# Patient Record
Sex: FEMALE | Race: WHITE | NOT HISPANIC OR LATINO | URBAN - METROPOLITAN AREA
[De-identification: names, ages, dates, MRNs, and addresses within clinical notes are randomized per-mention and may not be internally consistent; named-entity substitution may affect disease eponyms.]

---

## 2017-02-04 ENCOUNTER — INPATIENT (INPATIENT)
Facility: HOSPITAL | Age: 62
LOS: 12 days | Discharge: HOME HEALTH | End: 2017-02-17
Attending: INTERNAL MEDICINE | Admitting: INTERNAL MEDICINE
Payer: MEDICARE

## 2017-02-04 VITALS
HEIGHT: 61 IN | RESPIRATION RATE: 18 BRPM | SYSTOLIC BLOOD PRESSURE: 82 MMHG | HEART RATE: 96 BPM | OXYGEN SATURATION: 100 % | TEMPERATURE: 97 F | WEIGHT: 125 LBS | DIASTOLIC BLOOD PRESSURE: 62 MMHG

## 2017-02-04 DIAGNOSIS — K29.00 ACUTE GASTRITIS WITHOUT BLEEDING: ICD-10-CM

## 2017-02-04 DIAGNOSIS — Z98.89 OTHER SPECIFIED POSTPROCEDURAL STATES: Chronic | ICD-10-CM

## 2017-02-04 DIAGNOSIS — E10.65 TYPE 1 DIABETES MELLITUS WITH HYPERGLYCEMIA: ICD-10-CM

## 2017-02-04 LAB
ALBUMIN SERPL ELPH-MCNC: 2.6 G/DL — LOW (ref 3.3–5)
ALP SERPL-CCNC: 104 U/L — SIGNIFICANT CHANGE UP (ref 40–120)
ALT FLD-CCNC: 29 U/L — SIGNIFICANT CHANGE UP (ref 12–78)
ANION GAP SERPL CALC-SCNC: 17 MMOL/L — SIGNIFICANT CHANGE UP (ref 5–17)
APPEARANCE UR: ABNORMAL
AST SERPL-CCNC: 22 U/L — SIGNIFICANT CHANGE UP (ref 15–37)
B-OH-BUTYR SERPL-SCNC: 1.2 MMOL/L — HIGH
BACTERIA # UR AUTO: ABNORMAL
BASE EXCESS BLDV CALC-SCNC: -8.3 MMOL/L — LOW (ref -2–2)
BASOPHILS # BLD AUTO: 0.1 K/UL — SIGNIFICANT CHANGE UP (ref 0–0.2)
BASOPHILS NFR BLD AUTO: 1.7 % — SIGNIFICANT CHANGE UP (ref 0–2)
BILIRUB SERPL-MCNC: 0.6 MG/DL — SIGNIFICANT CHANGE UP (ref 0.2–1.2)
BILIRUB UR-MCNC: NEGATIVE — SIGNIFICANT CHANGE UP
BLOOD GAS COMMENTS, VENOUS: SIGNIFICANT CHANGE UP
BUN SERPL-MCNC: 35 MG/DL — HIGH (ref 7–23)
CALCIUM SERPL-MCNC: 7.3 MG/DL — LOW (ref 8.5–10.1)
CHLORIDE SERPL-SCNC: 87 MMOL/L — LOW (ref 96–108)
CO2 SERPL-SCNC: 18 MMOL/L — LOW (ref 22–31)
COLOR SPEC: YELLOW — SIGNIFICANT CHANGE UP
CREAT SERPL-MCNC: 3.22 MG/DL — HIGH (ref 0.5–1.3)
DIFF PNL FLD: ABNORMAL
EOSINOPHIL # BLD AUTO: 0 K/UL — SIGNIFICANT CHANGE UP (ref 0–0.5)
EOSINOPHIL NFR BLD AUTO: 0.1 % — SIGNIFICANT CHANGE UP (ref 0–6)
EPI CELLS # UR: ABNORMAL
GAS PNL BLDV: SIGNIFICANT CHANGE UP
GLUCOSE SERPL-MCNC: 360 MG/DL — HIGH (ref 70–99)
GLUCOSE UR QL: 1000 MG/DL
GRAN CASTS # UR COMP ASSIST: ABNORMAL /LPF
HCG SERPL-ACNC: 2 MIU/ML — SIGNIFICANT CHANGE UP
HCO3 BLDV-SCNC: 16 MMOL/L — LOW (ref 21–29)
HCT VFR BLD CALC: 39.6 % — SIGNIFICANT CHANGE UP (ref 34.5–45)
HGB BLD-MCNC: 14.7 G/DL — SIGNIFICANT CHANGE UP (ref 11.5–15.5)
HOROWITZ INDEX BLDV+IHG-RTO: 21 — SIGNIFICANT CHANGE UP
HYALINE CASTS # UR AUTO: ABNORMAL /LPF
KETONES UR-MCNC: NEGATIVE — SIGNIFICANT CHANGE UP
LEUKOCYTE ESTERASE UR-ACNC: NEGATIVE — SIGNIFICANT CHANGE UP
LIDOCAIN IGE QN: 38 U/L — LOW (ref 73–393)
LYMPHOCYTES # BLD AUTO: 0.3 K/UL — LOW (ref 1–3.3)
LYMPHOCYTES # BLD AUTO: 6.5 % — LOW (ref 13–44)
MCHC RBC-ENTMCNC: 32 PG — SIGNIFICANT CHANGE UP (ref 27–34)
MCHC RBC-ENTMCNC: 37.2 GM/DL — HIGH (ref 32–36)
MCV RBC AUTO: 86 FL — SIGNIFICANT CHANGE UP (ref 80–100)
MONOCYTES # BLD AUTO: 0.3 K/UL — SIGNIFICANT CHANGE UP (ref 0–0.9)
MONOCYTES NFR BLD AUTO: 7.6 % — SIGNIFICANT CHANGE UP (ref 2–14)
NEUTROPHILS # BLD AUTO: 3.8 K/UL — SIGNIFICANT CHANGE UP (ref 1.8–7.4)
NEUTROPHILS NFR BLD AUTO: 84.1 % — HIGH (ref 43–77)
NITRITE UR-MCNC: NEGATIVE — SIGNIFICANT CHANGE UP
PCO2 BLDV: 31 MMHG — LOW (ref 35–50)
PH BLDV: 7.34 — LOW (ref 7.35–7.45)
PH UR: 5 — SIGNIFICANT CHANGE UP (ref 4.8–8)
PLATELET # BLD AUTO: 241 K/UL — SIGNIFICANT CHANGE UP (ref 150–400)
PO2 BLDV: 55 MMHG — HIGH (ref 25–45)
POTASSIUM SERPL-MCNC: 2.6 MMOL/L — CRITICAL LOW (ref 3.5–5.3)
POTASSIUM SERPL-SCNC: 2.6 MMOL/L — CRITICAL LOW (ref 3.5–5.3)
PROT SERPL-MCNC: 6.4 GM/DL — SIGNIFICANT CHANGE UP (ref 6–8.3)
PROT UR-MCNC: 30 MG/DL
RBC # BLD: 4.6 M/UL — SIGNIFICANT CHANGE UP (ref 3.8–5.2)
RBC # FLD: 11.2 % — SIGNIFICANT CHANGE UP (ref 11–15)
RBC CASTS # UR COMP ASSIST: ABNORMAL /HPF (ref 0–4)
SAO2 % BLDV: 83 % — SIGNIFICANT CHANGE UP (ref 67–88)
SODIUM SERPL-SCNC: 122 MMOL/L — LOW (ref 135–145)
SP GR SPEC: 1.02 — SIGNIFICANT CHANGE UP (ref 1.01–1.02)
UROBILINOGEN FLD QL: NEGATIVE MG/DL — SIGNIFICANT CHANGE UP
WBC # BLD: 4.6 K/UL — SIGNIFICANT CHANGE UP (ref 3.8–10.5)
WBC # FLD AUTO: 4.6 K/UL — SIGNIFICANT CHANGE UP (ref 3.8–10.5)
WBC UR QL: SIGNIFICANT CHANGE UP

## 2017-02-04 PROCEDURE — 99285 EMERGENCY DEPT VISIT HI MDM: CPT

## 2017-02-04 RX ORDER — INSULIN GLARGINE 100 [IU]/ML
5 INJECTION, SOLUTION SUBCUTANEOUS
Qty: 0 | Refills: 0 | Status: DISCONTINUED | OUTPATIENT
Start: 2017-02-04 | End: 2017-02-05

## 2017-02-04 RX ORDER — ONDANSETRON 8 MG/1
4 TABLET, FILM COATED ORAL ONCE
Qty: 0 | Refills: 0 | Status: COMPLETED | OUTPATIENT
Start: 2017-02-04 | End: 2017-02-04

## 2017-02-04 RX ORDER — ONDANSETRON 8 MG/1
4 TABLET, FILM COATED ORAL EVERY 8 HOURS
Qty: 0 | Refills: 0 | Status: DISCONTINUED | OUTPATIENT
Start: 2017-02-04 | End: 2017-02-04

## 2017-02-04 RX ORDER — VANCOMYCIN HCL 1 G
125 VIAL (EA) INTRAVENOUS EVERY 6 HOURS
Qty: 0 | Refills: 0 | Status: DISCONTINUED | OUTPATIENT
Start: 2017-02-04 | End: 2017-02-08

## 2017-02-04 RX ORDER — GLUCAGON INJECTION, SOLUTION 0.5 MG/.1ML
1 INJECTION, SOLUTION SUBCUTANEOUS ONCE
Qty: 0 | Refills: 0 | Status: DISCONTINUED | OUTPATIENT
Start: 2017-02-04 | End: 2017-02-17

## 2017-02-04 RX ORDER — HEPARIN SODIUM 5000 [USP'U]/ML
5000 INJECTION INTRAVENOUS; SUBCUTANEOUS EVERY 12 HOURS
Qty: 0 | Refills: 0 | Status: DISCONTINUED | OUTPATIENT
Start: 2017-02-04 | End: 2017-02-11

## 2017-02-04 RX ORDER — DEXTROSE MONOHYDRATE, SODIUM CHLORIDE, AND POTASSIUM CHLORIDE 50; .745; 4.5 G/1000ML; G/1000ML; G/1000ML
1000 INJECTION, SOLUTION INTRAVENOUS
Qty: 0 | Refills: 0 | Status: DISCONTINUED | OUTPATIENT
Start: 2017-02-04 | End: 2017-02-05

## 2017-02-04 RX ORDER — INFLUENZA VIRUS VACCINE 15; 15; 15; 15 UG/.5ML; UG/.5ML; UG/.5ML; UG/.5ML
0.5 SUSPENSION INTRAMUSCULAR ONCE
Qty: 0 | Refills: 0 | Status: COMPLETED | OUTPATIENT
Start: 2017-02-04 | End: 2017-02-04

## 2017-02-04 RX ORDER — GLATIRAMER ACETATE 20 MG/ML
20 INJECTION, SOLUTION SUBCUTANEOUS AT BEDTIME
Qty: 0 | Refills: 0 | Status: DISCONTINUED | OUTPATIENT
Start: 2017-02-04 | End: 2017-02-10

## 2017-02-04 RX ORDER — DULOXETINE HYDROCHLORIDE 30 MG/1
60 CAPSULE, DELAYED RELEASE ORAL DAILY
Qty: 0 | Refills: 0 | Status: DISCONTINUED | OUTPATIENT
Start: 2017-02-04 | End: 2017-02-07

## 2017-02-04 RX ORDER — SODIUM CHLORIDE 9 MG/ML
2000 INJECTION INTRAMUSCULAR; INTRAVENOUS; SUBCUTANEOUS ONCE
Qty: 0 | Refills: 0 | Status: COMPLETED | OUTPATIENT
Start: 2017-02-04 | End: 2017-02-04

## 2017-02-04 RX ORDER — DEXTROSE 50 % IN WATER 50 %
12.5 SYRINGE (ML) INTRAVENOUS ONCE
Qty: 0 | Refills: 0 | Status: DISCONTINUED | OUTPATIENT
Start: 2017-02-04 | End: 2017-02-17

## 2017-02-04 RX ORDER — DEXTROSE 50 % IN WATER 50 %
25 SYRINGE (ML) INTRAVENOUS ONCE
Qty: 0 | Refills: 0 | Status: DISCONTINUED | OUTPATIENT
Start: 2017-02-04 | End: 2017-02-17

## 2017-02-04 RX ORDER — CLONAZEPAM 1 MG
1 TABLET ORAL AT BEDTIME
Qty: 0 | Refills: 0 | Status: DISCONTINUED | OUTPATIENT
Start: 2017-02-04 | End: 2017-02-11

## 2017-02-04 RX ORDER — INSULIN LISPRO 100/ML
VIAL (ML) SUBCUTANEOUS
Qty: 0 | Refills: 0 | Status: DISCONTINUED | OUTPATIENT
Start: 2017-02-04 | End: 2017-02-09

## 2017-02-04 RX ORDER — ALBUTEROL 90 UG/1
2 AEROSOL, METERED ORAL EVERY 6 HOURS
Qty: 0 | Refills: 0 | Status: DISCONTINUED | OUTPATIENT
Start: 2017-02-04 | End: 2017-02-05

## 2017-02-04 RX ORDER — DEXTROSE 50 % IN WATER 50 %
1 SYRINGE (ML) INTRAVENOUS ONCE
Qty: 0 | Refills: 0 | Status: DISCONTINUED | OUTPATIENT
Start: 2017-02-04 | End: 2017-02-17

## 2017-02-04 RX ORDER — POTASSIUM CHLORIDE 20 MEQ
20 PACKET (EA) ORAL ONCE
Qty: 0 | Refills: 0 | Status: COMPLETED | OUTPATIENT
Start: 2017-02-04 | End: 2017-02-04

## 2017-02-04 RX ORDER — SODIUM CHLORIDE 9 MG/ML
1000 INJECTION, SOLUTION INTRAVENOUS
Qty: 0 | Refills: 0 | Status: DISCONTINUED | OUTPATIENT
Start: 2017-02-04 | End: 2017-02-17

## 2017-02-04 RX ORDER — ONDANSETRON 8 MG/1
4 TABLET, FILM COATED ORAL EVERY 8 HOURS
Qty: 0 | Refills: 0 | Status: DISCONTINUED | OUTPATIENT
Start: 2017-02-04 | End: 2017-02-17

## 2017-02-04 RX ORDER — POTASSIUM CHLORIDE 20 MEQ
40 PACKET (EA) ORAL ONCE
Qty: 0 | Refills: 0 | Status: COMPLETED | OUTPATIENT
Start: 2017-02-04 | End: 2017-02-04

## 2017-02-04 RX ADMIN — Medication 8: at 17:12

## 2017-02-04 RX ADMIN — Medication 40 MILLIEQUIVALENT(S): at 12:13

## 2017-02-04 RX ADMIN — SODIUM CHLORIDE 1000 MILLILITER(S): 9 INJECTION INTRAMUSCULAR; INTRAVENOUS; SUBCUTANEOUS at 11:00

## 2017-02-04 RX ADMIN — Medication 1 MILLIGRAM(S): at 21:55

## 2017-02-04 RX ADMIN — Medication 125 MILLIGRAM(S): at 18:37

## 2017-02-04 RX ADMIN — DEXTROSE MONOHYDRATE, SODIUM CHLORIDE, AND POTASSIUM CHLORIDE 125 MILLILITER(S): 50; .745; 4.5 INJECTION, SOLUTION INTRAVENOUS at 21:57

## 2017-02-04 RX ADMIN — DEXTROSE MONOHYDRATE, SODIUM CHLORIDE, AND POTASSIUM CHLORIDE 125 MILLILITER(S): 50; .745; 4.5 INJECTION, SOLUTION INTRAVENOUS at 15:12

## 2017-02-04 RX ADMIN — INSULIN GLARGINE 5 UNIT(S): 100 INJECTION, SOLUTION SUBCUTANEOUS at 22:08

## 2017-02-04 RX ADMIN — ONDANSETRON 4 MILLIGRAM(S): 8 TABLET, FILM COATED ORAL at 11:15

## 2017-02-04 RX ADMIN — GLATIRAMER ACETATE 20 MILLIGRAM(S): 20 INJECTION, SOLUTION SUBCUTANEOUS at 21:56

## 2017-02-04 RX ADMIN — INSULIN GLARGINE 5 UNIT(S): 100 INJECTION, SOLUTION SUBCUTANEOUS at 15:07

## 2017-02-04 RX ADMIN — HEPARIN SODIUM 5000 UNIT(S): 5000 INJECTION INTRAVENOUS; SUBCUTANEOUS at 17:16

## 2017-02-04 RX ADMIN — Medication 50 MILLIEQUIVALENT(S): at 12:07

## 2017-02-04 NOTE — CONSULT NOTE ADULT - SUBJECTIVE AND OBJECTIVE BOX
Patient is a 61y old  Female who presents with a chief complaint of Nausea , Vomiting, pain abdomen. (04 Feb 2017 13:13)      HPI:  Pt is a 62 yo lady with a pmhx of DM1 w prior insulin pump (she stopped it on her own 1-2mo ago and was taking lantus and novolog, poor f/u as outpt last visit in office in 8/2016), HTN, HL, MS who presents to the ED with N/v/d for 2 days. She ate tacos on Wednesday, and then started having n/v/d that night until now, unable to keep anything down. No fevers, no focal abdominal pain, no dysuria, no chest pain, no short of breath. (04 Feb 2017 13:13)      PAST MEDICAL & SURGICAL HISTORY:  Multiple sclerosis  Myasthenia gravis  Hypertension  Diabetes  H/O cervical spine surgery      Diabetes History:DM1 hx multiple episodes of DKA    FAMILY HISTORY:  No pertinent family history in first degree relatives        Social History:    Allergies    No Known Allergies        MEDICATIONS  (STANDING):  clonazePAM Tablet 1milliGRAM(s) Oral at bedtime  DULoxetine 60milliGRAM(s) Oral daily  glatiramer Injectable 20milliGRAM(s) SubCutaneous at bedtime  heparin  Injectable 5000Unit(s) SubCutaneous every 12 hours  dextrose 5% + sodium chloride 0.9% with potassium chloride 20 mEq/L 1000milliLiter(s) IV Continuous <Continuous>  insulin lispro (HumaLOG) corrective regimen sliding scale  SubCutaneous three times a day before meals  dextrose 5%. 1000milliLiter(s) IV Continuous <Continuous>  dextrose 50% Injectable 12.5Gram(s) IV Push once  dextrose 50% Injectable 25Gram(s) IV Push once  insulin glargine Injectable (LANTUS) 5Unit(s) SubCutaneous two times a day  vancomycin    Solution 125milliGRAM(s) Oral every 6 hours    MEDICATIONS  (PRN):  ALBUTerol    90 MICROgram(s) HFA Inhaler 2Puff(s) Inhalation every 6 hours PRN Bronchospasm  dextrose Gel 1Dose(s) Oral once PRN Blood Glucose LESS THAN 70 milliGRAM(s)/deciliter  glucagon  Injectable 1milliGRAM(s) IntraMuscular once PRN Glucose LESS THAN 70 milligrams/deciliter  ondansetron Injectable 4milliGRAM(s) IV Push every 8 hours PRN Nausea and/or Vomiting      REVIEW OF SYSTEMS:  CONSTITUTIONAL:  as per HPI  HEENT:  Eyes:  No diplopia or blurred vision. ENT:  No earache, sore throat or runny nose.  CARDIOVASCULAR:  No pressure, squeezing, strangling, tightness, heaviness or aching about the chest, neck, axilla or epigastrium.  RESPIRATORY:  No cough, shortness of breath, PND or orthopnea.  GASTROINTESTINAL:  + nausea, vomiting or diarrhea.  GENITOURINARY:  No dysuria, frequency or urgency. No Blood in urine  MUSCULOSKELETAL:  no joint aches, no muscle pain, myalgia  SKIN:  No change in skin, hair or nails.  NEUROLOGIC:  No paresthesias, fasciculations, seizures or weakness.  PSYCHIATRIC:  No disorder of thought or mood.  ENDOCRINE:  No heat or cold intolerance, polyuria or polydipsia. abnormal weight gain or loss, oral thrush  HEMATOLOGICAL:  No easy bruising or bleeding.     T(C): 36.6, Max: 36.6 (02-04 @ 15:30)  HR: 95 (85 - 96)  BP: 98/43 (82/62 - 98/43)  RR: 18 (18 - 18)  SpO2: 98% (98% - 100%)  Wt(kg): --    PHYSICAL EXAM:  GENERAL: NAD,  well-developed  HEAD:  Atraumatic, Normocephalic  EYES: EOMI, PERRLA, conjunctiva and sclera clear  ENMT: No tonsillar erythema, exudates, or enlargement; Moist mucous membranes, Good dentition, No lesions  CHEST/LUNG: Clear to percussion bilaterally; No rales, rhonchi, wheezing, or rubs  HEART: Regular rate and rhythm; No murmurs, rubs, or gallops  ABDOMEN: Soft, Nontender, Nondistended; Bowel sounds present  EXTREMITIES:  2+ Peripheral Pulses, No clubbing, cyanosis, or edema      CAPILLARY BLOOD GLUCOSE  340 (04 Feb 2017 16:35)  284 (04 Feb 2017 13:58)                            14.7   4.6   )-----------( 241      ( 04 Feb 2017 11:16 )             39.6       CMP:  02-04 @ 11:16  SGPT 29  Albumin 2.6   Alk Phos 104   Anion Gap 17   SGOT 22   Total Bili 0.6   BUN 35   Calcium Total 7.3   CO2 18   Chloride 87   Creatinine 3.22   eGFR if AA 17   eGFR if non AA 15   Glucose 360   Potassium 2.6   Protein 6.4   Sodium 122

## 2017-02-04 NOTE — ED PROVIDER NOTE - MEDICAL DECISION MAKING DETAILS
Ddx: r/o DKA/ no abdominal tenderness or guarding to suggest surgical abdomen, dehydration  Plan: Labs, fluids, zofran, reassess

## 2017-02-04 NOTE — ED PROVIDER NOTE - OBJECTIVE STATEMENT
Pt is a 62 yo lady with a pmhx of DM1 w prior insulin pump since removed, HTN, HL, MS who presents to the ED with N/v/d for 2 days. She ate tacos on Wednesday, and then started having n/v/d that night until now, unable to keep anything down. No fevers, no focal abdominal pain, no dysuria, no chest pain, no sob.

## 2017-02-04 NOTE — H&P ADULT. - NEUROLOGICAL DETAILS
deep reflexes intact/sensation intact/cranial nerves intact/responds to verbal commands/alert and oriented x 3/responds to pain/no spontaneous movement

## 2017-02-04 NOTE — H&P ADULT. - HISTORY OF PRESENT ILLNESS
Pt is a 60 yo lady with a pmhx of DM1 w prior insulin pump since removed, HTN, HL, MS who presents to the ED with N/v/d for 2 days. She ate tacos on Wednesday, and then started having n/v/d that night until now, unable to keep anything down. No fevers, no focal abdominal pain, no dysuria, no chest pain, no short of breath.

## 2017-02-04 NOTE — ED ADULT NURSE NOTE - OBJECTIVE STATEMENT
patient received, alert and oriented x4, complaining of watery diarrhea and vomiting that started 2 days ago after eating tacos. patient has watery stools 2-3x per day and vomits after passing stool. patient is nasueated, dizzy.

## 2017-02-04 NOTE — H&P ADULT. - ASSESSMENT
Pt is a 62 yo lady with a pmhx of DM1 w prior insulin pump since removed, HTN, HL, MS who presents to the ED with N/v/d for 2 days. She ate tacos on Wednesday, and then started having n/v/d that night until now, unable to keep anything down. No fevers, no focal abdominal pain, no dysuria, no chest pain, no sob.Admit to regular floor, iIV fluids, Monitor BS, and lytres. Endocrine consult..

## 2017-02-04 NOTE — H&P ADULT. - RS GEN PE MLT RESP DETAILS PC
clear to auscultation bilaterally/respirations non-labored/good air movement/airway patent/breath sounds equal

## 2017-02-04 NOTE — H&P ADULT. - NEGATIVE NEUROLOGICAL SYMPTOMS
no vertigo/no syncope/no transient paralysis/no paresthesias/no focal seizures/no tremors/no generalized seizures

## 2017-02-05 DIAGNOSIS — K52.9 NONINFECTIVE GASTROENTERITIS AND COLITIS, UNSPECIFIED: ICD-10-CM

## 2017-02-05 LAB
ANION GAP SERPL CALC-SCNC: 12 MMOL/L — SIGNIFICANT CHANGE UP (ref 5–17)
BUN SERPL-MCNC: 22 MG/DL — SIGNIFICANT CHANGE UP (ref 7–23)
C DIFF BY PCR RESULT: SIGNIFICANT CHANGE UP
C DIFF TOX GENS STL QL NAA+PROBE: SIGNIFICANT CHANGE UP
CALCIUM SERPL-MCNC: 7.6 MG/DL — LOW (ref 8.5–10.1)
CHLORIDE SERPL-SCNC: 95 MMOL/L — LOW (ref 96–108)
CO2 SERPL-SCNC: 17 MMOL/L — LOW (ref 22–31)
CREAT SERPL-MCNC: 1.47 MG/DL — HIGH (ref 0.5–1.3)
GLUCOSE SERPL-MCNC: 453 MG/DL — CRITICAL HIGH (ref 70–99)
HBA1C BLD-MCNC: 11 % — HIGH (ref 4–5.6)
HCT VFR BLD CALC: 36.2 % — SIGNIFICANT CHANGE UP (ref 34.5–45)
HGB BLD-MCNC: 13.6 G/DL — SIGNIFICANT CHANGE UP (ref 11.5–15.5)
LYMPHOCYTES # BLD AUTO: 9 % — LOW (ref 13–44)
MAGNESIUM SERPL-MCNC: 1.8 MG/DL — SIGNIFICANT CHANGE UP (ref 1.8–2.4)
MCHC RBC-ENTMCNC: 32.9 PG — SIGNIFICANT CHANGE UP (ref 27–34)
MCHC RBC-ENTMCNC: 37.5 GM/DL — HIGH (ref 32–36)
MCV RBC AUTO: 87.6 FL — SIGNIFICANT CHANGE UP (ref 80–100)
MONOCYTES NFR BLD AUTO: 4 % — SIGNIFICANT CHANGE UP (ref 2–14)
NEUTROPHILS NFR BLD AUTO: 87 % — HIGH (ref 43–77)
PLAT MORPH BLD: NORMAL — SIGNIFICANT CHANGE UP
PLATELET # BLD AUTO: 243 K/UL — SIGNIFICANT CHANGE UP (ref 150–400)
POTASSIUM SERPL-MCNC: 2.8 MMOL/L — CRITICAL LOW (ref 3.5–5.3)
POTASSIUM SERPL-MCNC: 3.5 MMOL/L — SIGNIFICANT CHANGE UP (ref 3.5–5.3)
POTASSIUM SERPL-SCNC: 2.8 MMOL/L — CRITICAL LOW (ref 3.5–5.3)
POTASSIUM SERPL-SCNC: 3.5 MMOL/L — SIGNIFICANT CHANGE UP (ref 3.5–5.3)
RBC # BLD: 4.13 M/UL — SIGNIFICANT CHANGE UP (ref 3.8–5.2)
RBC # FLD: 11.6 % — SIGNIFICANT CHANGE UP (ref 11–15)
RBC BLD AUTO: NORMAL — SIGNIFICANT CHANGE UP
SODIUM SERPL-SCNC: 124 MMOL/L — LOW (ref 135–145)
WBC # BLD: 3.2 K/UL — LOW (ref 3.8–10.5)
WBC # FLD AUTO: 3.2 K/UL — LOW (ref 3.8–10.5)

## 2017-02-05 PROCEDURE — 74020: CPT | Mod: 26

## 2017-02-05 RX ORDER — DEXTROSE 50 % IN WATER 50 %
25 SYRINGE (ML) INTRAVENOUS ONCE
Qty: 0 | Refills: 0 | Status: COMPLETED | OUTPATIENT
Start: 2017-02-05 | End: 2017-02-05

## 2017-02-05 RX ORDER — INSULIN LISPRO 100/ML
3 VIAL (ML) SUBCUTANEOUS
Qty: 0 | Refills: 0 | Status: DISCONTINUED | OUTPATIENT
Start: 2017-02-05 | End: 2017-02-09

## 2017-02-05 RX ORDER — POTASSIUM CHLORIDE 20 MEQ
10 PACKET (EA) ORAL
Qty: 0 | Refills: 0 | Status: COMPLETED | OUTPATIENT
Start: 2017-02-05 | End: 2017-02-05

## 2017-02-05 RX ORDER — INSULIN GLARGINE 100 [IU]/ML
10 INJECTION, SOLUTION SUBCUTANEOUS
Qty: 0 | Refills: 0 | Status: DISCONTINUED | OUTPATIENT
Start: 2017-02-05 | End: 2017-02-07

## 2017-02-05 RX ORDER — INSULIN GLARGINE 100 [IU]/ML
5 INJECTION, SOLUTION SUBCUTANEOUS ONCE
Qty: 0 | Refills: 0 | Status: COMPLETED | OUTPATIENT
Start: 2017-02-05 | End: 2017-02-05

## 2017-02-05 RX ORDER — ALBUTEROL 90 UG/1
2.5 AEROSOL, METERED ORAL EVERY 6 HOURS
Qty: 0 | Refills: 0 | Status: DISCONTINUED | OUTPATIENT
Start: 2017-02-05 | End: 2017-02-12

## 2017-02-05 RX ORDER — INSULIN LISPRO 100/ML
10 VIAL (ML) SUBCUTANEOUS ONCE
Qty: 0 | Refills: 0 | Status: COMPLETED | OUTPATIENT
Start: 2017-02-05 | End: 2017-02-05

## 2017-02-05 RX ORDER — DEXTROSE MONOHYDRATE, SODIUM CHLORIDE, AND POTASSIUM CHLORIDE 50; .745; 4.5 G/1000ML; G/1000ML; G/1000ML
1000 INJECTION, SOLUTION INTRAVENOUS
Qty: 0 | Refills: 0 | Status: DISCONTINUED | OUTPATIENT
Start: 2017-02-05 | End: 2017-02-05

## 2017-02-05 RX ORDER — ALBUTEROL 90 UG/1
1 AEROSOL, METERED ORAL EVERY 4 HOURS
Qty: 0 | Refills: 0 | Status: DISCONTINUED | OUTPATIENT
Start: 2017-02-05 | End: 2017-02-17

## 2017-02-05 RX ORDER — PANTOPRAZOLE SODIUM 20 MG/1
40 TABLET, DELAYED RELEASE ORAL DAILY
Qty: 0 | Refills: 0 | Status: DISCONTINUED | OUTPATIENT
Start: 2017-02-05 | End: 2017-02-11

## 2017-02-05 RX ORDER — SODIUM CHLORIDE 9 MG/ML
1000 INJECTION, SOLUTION INTRAVENOUS
Qty: 0 | Refills: 0 | Status: DISCONTINUED | OUTPATIENT
Start: 2017-02-05 | End: 2017-02-06

## 2017-02-05 RX ADMIN — Medication 100 MILLIEQUIVALENT(S): at 10:32

## 2017-02-05 RX ADMIN — INSULIN GLARGINE 5 UNIT(S): 100 INJECTION, SOLUTION SUBCUTANEOUS at 10:52

## 2017-02-05 RX ADMIN — Medication 125 MILLIGRAM(S): at 06:59

## 2017-02-05 RX ADMIN — Medication 125 MILLIGRAM(S): at 23:03

## 2017-02-05 RX ADMIN — INSULIN GLARGINE 10 UNIT(S): 100 INJECTION, SOLUTION SUBCUTANEOUS at 22:07

## 2017-02-05 RX ADMIN — Medication 25 GRAM(S): at 12:14

## 2017-02-05 RX ADMIN — Medication 125 MILLIGRAM(S): at 12:55

## 2017-02-05 RX ADMIN — Medication 8: at 17:58

## 2017-02-05 RX ADMIN — HEPARIN SODIUM 5000 UNIT(S): 5000 INJECTION INTRAVENOUS; SUBCUTANEOUS at 17:02

## 2017-02-05 RX ADMIN — Medication 30 MILLILITER(S): at 22:07

## 2017-02-05 RX ADMIN — Medication 1 MILLIGRAM(S): at 22:07

## 2017-02-05 RX ADMIN — GLATIRAMER ACETATE 20 MILLIGRAM(S): 20 INJECTION, SOLUTION SUBCUTANEOUS at 22:06

## 2017-02-05 RX ADMIN — INSULIN GLARGINE 5 UNIT(S): 100 INJECTION, SOLUTION SUBCUTANEOUS at 09:25

## 2017-02-05 RX ADMIN — DULOXETINE HYDROCHLORIDE 60 MILLIGRAM(S): 30 CAPSULE, DELAYED RELEASE ORAL at 12:54

## 2017-02-05 RX ADMIN — Medication 100 MILLIEQUIVALENT(S): at 06:59

## 2017-02-05 RX ADMIN — Medication 125 MILLIGRAM(S): at 17:02

## 2017-02-05 RX ADMIN — ALBUTEROL 2.5 MILLIGRAM(S): 90 AEROSOL, METERED ORAL at 17:27

## 2017-02-05 RX ADMIN — Medication 6: at 08:51

## 2017-02-05 RX ADMIN — DEXTROSE MONOHYDRATE, SODIUM CHLORIDE, AND POTASSIUM CHLORIDE 125 MILLILITER(S): 50; .745; 4.5 INJECTION, SOLUTION INTRAVENOUS at 08:53

## 2017-02-05 RX ADMIN — Medication 100 MILLIEQUIVALENT(S): at 09:24

## 2017-02-05 RX ADMIN — SODIUM CHLORIDE 125 MILLILITER(S): 9 INJECTION, SOLUTION INTRAVENOUS at 13:00

## 2017-02-05 RX ADMIN — ALBUTEROL 2.5 MILLIGRAM(S): 90 AEROSOL, METERED ORAL at 13:12

## 2017-02-05 RX ADMIN — Medication 3 UNIT(S): at 17:58

## 2017-02-05 RX ADMIN — HEPARIN SODIUM 5000 UNIT(S): 5000 INJECTION INTRAVENOUS; SUBCUTANEOUS at 06:59

## 2017-02-05 RX ADMIN — Medication 125 MILLIGRAM(S): at 00:38

## 2017-02-05 RX ADMIN — SODIUM CHLORIDE 125 MILLILITER(S): 9 INJECTION, SOLUTION INTRAVENOUS at 21:11

## 2017-02-05 RX ADMIN — Medication 10 UNIT(S): at 06:58

## 2017-02-05 NOTE — CONSULT NOTE ADULT - SUBJECTIVE AND OBJECTIVE BOX
HPI:  Pt is a 60 yo lady with a pmhx of DM1 w prior insulin pump since removed, HTN, HL, MS who presents to the ED with N/v/d for 2 days. She ate tacos on Wednesday, and then started having n/v/d that night until now, unable to keep anything down. No fevers, no focal abdominal pain, no dysuria, no chest pain, no short of breath. (2017 13:13)  Patient states that she has had N/V, diarrhea since Wednesday. Occasional abdominal cramps.      PAST MEDICAL & SURGICAL HISTORY:  Multiple sclerosis  Myasthenia gravis  Hypertension  Diabetes  H/O cervical spine surgery      MEDICATIONS  (STANDING):  clonazePAM Tablet 1milliGRAM(s) Oral at bedtime  DULoxetine 60milliGRAM(s) Oral daily  glatiramer Injectable 20milliGRAM(s) SubCutaneous at bedtime  heparin  Injectable 5000Unit(s) SubCutaneous every 12 hours  insulin lispro (HumaLOG) corrective regimen sliding scale  SubCutaneous three times a day before meals  dextrose 5%. 1000milliLiter(s) IV Continuous <Continuous>  dextrose 50% Injectable 12.5Gram(s) IV Push once  dextrose 50% Injectable 25Gram(s) IV Push once  vancomycin    Solution 125milliGRAM(s) Oral every 6 hours  influenza   Vaccine 0.5milliLiter(s) IntraMuscular once  ALBUTerol    0.083% 2.5milliGRAM(s) Nebulizer every 6 hours  ALBUTerol    90 MICROgram(s) HFA Inhaler 1Puff(s) Inhalation every 4 hours  insulin glargine Injectable (LANTUS) 10Unit(s) SubCutaneous two times a day  insulin lispro Injectable (HumaLOG) 3Unit(s) SubCutaneous three times a day before meals  dextrose 5% + sodium chloride 0.9%. 1000milliLiter(s) IV Continuous <Continuous>    MEDICATIONS  (PRN):  dextrose Gel 1Dose(s) Oral once PRN Blood Glucose LESS THAN 70 milliGRAM(s)/deciliter  glucagon  Injectable 1milliGRAM(s) IntraMuscular once PRN Glucose LESS THAN 70 milligrams/deciliter  ondansetron Injectable 4milliGRAM(s) IV Push every 8 hours PRN Nausea and/or Vomiting      Allergies    No Known Allergies    Intolerances        FAMILY HISTORY:  No pertinent family history in first degree relatives      REVIEW OF SYSTEMS:    CONSTITUTIONAL: No fever, weight loss, or fatigue  EYES: No eye pain, visual disturbances, or discharge  ENMT:  No difficulty hearing, tinnitus, vertigo; No sinus or throat pain  NECK: No pain or stiffness  BREASTS: No pain, masses, or nipple discharge  RESPIRATORY: No cough, wheezing, chills or hemoptysis; No shortness of breath  CARDIOVASCULAR: No chest pain, palpitations, dizziness, or leg swelling  GASTROINTESTINAL: No abdominal or epigastric pain. No nausea, vomiting, or hematemesis; No diarrhea or constipation. No melena or hematochezia.  GENITOURINARY: No dysuria, frequency, hematuria, or incontinence  NEUROLOGICAL: No headaches, memory loss, loss of strength, numbness, or tremors  SKIN: No itching, burning, rashes, or lesions   LYMPH NODES: No enlarged glands  ENDOCRINE: No heat or cold intolerance; No hair loss  MUSCULOSKELETAL: No joint pain or swelling; No muscle, back, or extremity pain  PSYCHIATRIC: No depression, anxiety, mood swings, or difficulty sleeping  HEME/LYMPH: No easy bruising, or bleeding gums  ALLERGY AND IMMUNOLOGIC: No hives or eczema          SOCIAL HISTORY:    FAMILY HISTORY:  No pertinent family history in first degree relatives      Vital Signs Last 24 Hrs  T(C): 36.4, Max: 37 (-05 @ 07:12)  T(F): 97.6, Max: 98.6 (-05 @ 07:12)  HR: 105 (105 - 118)  BP: 142/76 (117/100 - 142/76)  BP(mean): --  RR: 18 (18 - 98)  SpO2: 100% (100% - 100%)    PHYSICAL EXAM:    GENERAL: NAD, well-groomed, well-developed  HEAD:  Atraumatic, Normocephalic  EYES: EOMI, PERRLA, conjunctiva and sclera clear  ENMT: No tonsillar erythema, exudates, or enlargement; Moist mucous membranes, Good dentition, No lesions  NECK: Supple, No JVD, Normal thyroid  NERVOUS SYSTEM:  Alert & Oriented X3, Good concentration; Motor Strength 5/5 B/L upper and lower extremities; DTRs 2+ intact and symmetric  CHEST/LUNG: Clear to percussion bilaterally; No rales, rhonchi, wheezing, or rubs  HEART: Regular rate and rhythm; No murmurs, rubs, or gallops  ABDOMEN: Soft, Nontender, Nondistended; Bowel sounds present  EXTREMITIES:  2+ Peripheral Pulses, No clubbing, cyanosis, or edema  LYMPH: No lymphadenopathy noted   RECTAL: No masses, prostate normal size and smooth, Guaiaci negative   BREAST: No palpable masses, skin no lesions no retractions, no discharges. adnexal no palpable masses noted   GYN: uterus normal size, adnexal, no palpable masses, no CMT, no uterine discharge   SKIN: No rashes or lesions    LABS:                        13.6   3.2   )-----------( 243      ( 2017 05:14 )             36.2     2017 05:14    124    |  95     |  22     ----------------------------<  453    2.8     |  17     |  1.47   2017 11:16    122    |  87     |  35     ----------------------------<  360    2.6     |  18     |  3.22     Ca    7.6        2017 05:14  Ca    7.3        2017 11:16  Mg     1.8       2017 05:14    TPro  6.4    /  Alb  2.6    /  TBili  0.6    /  DBili  x      /  AST  22     /  ALT  29     /  AlkPhos  104    2017 11:16      Urinalysis Basic - ( 2017 22:27 )    Color: Yellow / Appearance: Slightly Turbid / S.025 / pH: x  Gluc: x / Ketone: Negative  / Bili: Negative / Urobili: Negative mg/dL   Blood: x / Protein: 30 mg/dL / Nitrite: Negative   Leuk Esterase: Negative / RBC: 6-10 /HPF / WBC 0-2   Sq Epi: x / Non Sq Epi: Moderate / Bacteria: Moderate          RADIOLOGY & ADDITIONAL STUDIES: HPI: Patient is a poor historian.  Pt is a 62 yo lady with a pmhx of DM1 w prior insulin pump since removed, HTN, HL, MS who presents to the ED with N/v/d for 2 days. She ate tacos on Wednesday, and then started having n/v/d that night until now, unable to keep anything down. No fevers, no focal abdominal pain, no dysuria, no chest pain, no short of breath. (2017 13:13)  Patient states that she has had N/V, diarrhea since Wednesday. Occasional abdominal cramps. No fever. Non bloody stool. Otherwise, the patient denies melena, hematochezia, hematemesis, nausea, vomiting, abdominal pain, constipation, diarrhea, or change in bowel movements prior to Wednesday  Patient has history of C diff 2016  Patient on Verizibi. for IBS      PAST MEDICAL & SURGICAL HISTORY:  Multiple sclerosis  Myasthenia gravis  Hypertension  Diabetes  H/O cervical spine surgery      MEDICATIONS  (STANDING):  clonazePAM Tablet 1milliGRAM(s) Oral at bedtime  DULoxetine 60milliGRAM(s) Oral daily  glatiramer Injectable 20milliGRAM(s) SubCutaneous at bedtime  heparin  Injectable 5000Unit(s) SubCutaneous every 12 hours  insulin lispro (HumaLOG) corrective regimen sliding scale  SubCutaneous three times a day before meals  dextrose 5%. 1000milliLiter(s) IV Continuous <Continuous>  dextrose 50% Injectable 12.5Gram(s) IV Push once  dextrose 50% Injectable 25Gram(s) IV Push once  vancomycin    Solution 125milliGRAM(s) Oral every 6 hours  influenza   Vaccine 0.5milliLiter(s) IntraMuscular once  ALBUTerol    0.083% 2.5milliGRAM(s) Nebulizer every 6 hours  ALBUTerol    90 MICROgram(s) HFA Inhaler 1Puff(s) Inhalation every 4 hours  insulin glargine Injectable (LANTUS) 10Unit(s) SubCutaneous two times a day  insulin lispro Injectable (HumaLOG) 3Unit(s) SubCutaneous three times a day before meals  dextrose 5% + sodium chloride 0.9%. 1000milliLiter(s) IV Continuous <Continuous>    MEDICATIONS  (PRN):  dextrose Gel 1Dose(s) Oral once PRN Blood Glucose LESS THAN 70 milliGRAM(s)/deciliter  glucagon  Injectable 1milliGRAM(s) IntraMuscular once PRN Glucose LESS THAN 70 milligrams/deciliter  ondansetron Injectable 4milliGRAM(s) IV Push every 8 hours PRN Nausea and/or Vomiting      Allergies    No Known Allergies    Intolerances        FAMILY HISTORY:  No pertinent family history in first degree relatives      REVIEW OF SYSTEMS:    CONSTITUTIONAL: No fever, weight loss, or fatigue  EYES: No eye pain, visual disturbances, or discharge  NECK: No pain or stiffness  BREASTS: No pain, masses, or nipple discharge  RESPIRATORY: No cough, wheezing, chills or hemoptysis; No shortness of breath  CARDIOVASCULAR: No chest pain, palpitations, dizziness, or leg swelling  GASTROINTESTINAL: No abdominal or epigastric pain. No hematemesis; No constipation. No melena or hematochezia.  GENITOURINARY: No dysuria, frequency, hematuria, or incontinence  NEUROLOGICAL: No headaches, memory loss, loss of strength, numbness, or tremors  SKIN: No itching, burning, rashes, or lesions   LYMPH NODES: No enlarged glands  ENDOCRINE: No heat or cold intolerance; No hair loss  MUSCULOSKELETAL: No joint pain or swelling; No muscle, back, or extremity pain  PSYCHIATRIC: No depression, anxiety, mood swings, or difficulty sleeping          SOCIAL HISTORY:    FAMILY HISTORY:  No pertinent family history in first degree relatives      Vital Signs Last 24 Hrs  T(C): 36.4, Max: 37 (02-05 @ 07:12)  T(F): 97.6, Max: 98.6 (02-05 @ 07:12)  HR: 105 (105 - 118)  BP: 142/76 (117/100 - 142/76)  BP(mean): --  RR: 18 (18 - 98)  SpO2: 100% (100% - 100%)    PHYSICAL EXAM:    GENERAL: NAD, well-groomed, well-developed  HEAD:  Atraumatic, Normocephalic  EYES: EOMI, PERRLA, conjunctiva and sclera clear  ENMT: No tonsillar erythema, exudates, or enlargement; Moist mucous membranes, Good dentition, No lesions  NECK: Supple, No JVD, Normal thyroid  NERVOUS SYSTEM:  Alert & Oriented X3, Good concentration; Motor Strength 5/5 B/L upper and lower extremities; DTRs 2+ intact and symmetric  CHEST/LUNG: Clear to percussion bilaterally; No rales, rhonchi, wheezing, or rubs  HEART: Regular rate and rhythm; No murmurs, rubs, or gallops  ABDOMEN: Soft, Nontender, Nondistended; Bowel sounds present  EXTREMITIES:  2+ Peripheral Pulses, No clubbing, cyanosis, or edema  LYMPH: No lymphadenopathy noted   RECTAL: Refused.  SKIN: No rashes or lesions    LABS:                        13.6   3.2   )-----------( 243      ( 2017 05:14 )             36.2     2017 05:14    124    |  95     |  22     ----------------------------<  453    2.8     |  17     |  1.47   2017 11:16    122    |  87     |  35     ----------------------------<  360    2.6     |  18     |  3.22     Ca    7.6        2017 05:14  Ca    7.3        2017 11:16  Mg     1.8       2017 05:14    TPro  6.4    /  Alb  2.6    /  TBili  0.6    /  DBili  x      /  AST  22     /  ALT  29     /  AlkPhos  104    2017 11:16      Urinalysis Basic - ( 2017 22:27 )    Color: Yellow / Appearance: Slightly Turbid / S.025 / pH: x  Gluc: x / Ketone: Negative  / Bili: Negative / Urobili: Negative mg/dL   Blood: x / Protein: 30 mg/dL / Nitrite: Negative   Leuk Esterase: Negative / RBC: 6-10 /HPF / WBC 0-2   Sq Epi: x / Non Sq Epi: Moderate / Bacteria: Moderate          RADIOLOGY & ADDITIONAL STUDIES:

## 2017-02-05 NOTE — PROGRESS NOTE ADULT - SUBJECTIVE AND OBJECTIVE BOX
Patient is a 61y old  Female who presents with a chief complaint of Nausea , Vomiting, pain abdomen. (2017 13:13)      INTERVAL HPI/OVERNIGHT EVENTS:  pt continues with diarrhea  hyperglycemia on clears and IV fluids with dextrose    MEDICATIONS  (STANDING):  clonazePAM Tablet 1milliGRAM(s) Oral at bedtime  DULoxetine 60milliGRAM(s) Oral daily  glatiramer Injectable 20milliGRAM(s) SubCutaneous at bedtime  heparin  Injectable 5000Unit(s) SubCutaneous every 12 hours  insulin lispro (HumaLOG) corrective regimen sliding scale  SubCutaneous three times a day before meals  dextrose 5%. 1000milliLiter(s) IV Continuous <Continuous>  dextrose 50% Injectable 12.5Gram(s) IV Push once  dextrose 50% Injectable 25Gram(s) IV Push once  vancomycin    Solution 125milliGRAM(s) Oral every 6 hours  influenza   Vaccine 0.5milliLiter(s) IntraMuscular once  ALBUTerol    0.083% 2.5milliGRAM(s) Nebulizer every 6 hours  ALBUTerol    90 MICROgram(s) HFA Inhaler 1Puff(s) Inhalation every 4 hours  insulin glargine Injectable (LANTUS) 10Unit(s) SubCutaneous two times a day  insulin lispro Injectable (HumaLOG) 3Unit(s) SubCutaneous three times a day before meals  dextrose 5% + sodium chloride 0.9%. 1000milliLiter(s) IV Continuous <Continuous>    MEDICATIONS  (PRN):  dextrose Gel 1Dose(s) Oral once PRN Blood Glucose LESS THAN 70 milliGRAM(s)/deciliter  glucagon  Injectable 1milliGRAM(s) IntraMuscular once PRN Glucose LESS THAN 70 milligrams/deciliter  ondansetron Injectable 4milliGRAM(s) IV Push every 8 hours PRN Nausea and/or Vomiting      REVIEW OF SYSTEMS:  CONSTITUTIONAL: No fever, weight loss, or fatigue  RESPIRATORY: No cough, wheezing, chills or hemoptysis; No shortness of breath  CARDIOVASCULAR: No chest pain, palpitations, dizziness, or leg swelling  GASTROINTESTINAL: No abdominal or epigastric pain. No nausea, vomiting, or hematemesis; No diarrhea or constipation. No melena or hematochezia.  ENDOCRINE: No heat or cold intolerance; No hair loss      Vital Signs Last 24 Hrs  T(C): 36.4, Max: 37 (02-05 @ 07:12)  T(F): 97.6, Max: 98.6 (- @ 07:12)  HR: 105 (105 - 118)  BP: 142/76 (117/100 - 142/76)  BP(mean): --  RR: 18 (18 - 98)  SpO2: 100% (100% - 100%)    PHYSICAL EXAM:  GENERAL: NAD, well-groomed, well-developed  CHEST/LUNG: Clear to percussion bilaterally; No rales, rhonchi, wheezing, or rubs  HEART: Regular rate and rhythm; No murmurs, rubs, or gallops      LABS:                        13.6   3.2   )-----------( 243      ( 2017 05:14 )             36.2     2017 18:06    x      |  x      |  x      ----------------------------<  x      3.5     |  x      |  x        Ca    7.6        2017 05:14  Mg     1.8       2017 05:14    TPro  6.4    /  Alb  2.6    /  TBili  0.6    /  DBili  x      /  AST  22     /  ALT  29     /  AlkPhos  104    2017 11:16      Urinalysis Basic - ( 2017 22:27 )    Color: Yellow / Appearance: Slightly Turbid / S.025 / pH: x  Gluc: x / Ketone: Negative  / Bili: Negative / Urobili: Negative mg/dL   Blood: x / Protein: 30 mg/dL / Nitrite: Negative   Leuk Esterase: Negative / RBC: 6-10 /HPF / WBC 0-2   Sq Epi: x / Non Sq Epi: Moderate / Bacteria: Moderate      CAPILLARY BLOOD GLUCOSE  324 (2017 17:44)  211 (2017 13:00)  67 (2017 12:15)  67 (2017 12:07)  275 (2017 08:21)  380 (2017 05:52)  328 (2017 21:51)    Lipid panel:               RADIOLOGY & ADDITIONAL TESTS:

## 2017-02-05 NOTE — PROGRESS NOTE ADULT - PROBLEM SELECTOR PLAN 1
increase lantus to 10units bid  add lispro 3units with meals  continue MARISSA   attempt to change IV fluids to NS with kcl w/o dextrose  will follow

## 2017-02-05 NOTE — PROVIDER CONTACT NOTE (CRITICAL VALUE NOTIFICATION) - SITUATION
As per PA give 1o units of humalog  ans she will right the order. Also she will write the order for pottassium replacement As per PA give 10 units of humalog  ans she will right the order. Also she will write the order for pottassium replacement

## 2017-02-06 DIAGNOSIS — E13.00 OTHER SPECIFIED DIABETES MELLITUS WITH HYPEROSMOLARITY WITHOUT NONKETOTIC HYPERGLYCEMIC-HYPEROSMOLAR COMA (NKHHC): ICD-10-CM

## 2017-02-06 DIAGNOSIS — G35 MULTIPLE SCLEROSIS: ICD-10-CM

## 2017-02-06 DIAGNOSIS — N17.9 ACUTE KIDNEY FAILURE, UNSPECIFIED: ICD-10-CM

## 2017-02-06 DIAGNOSIS — K86.1 OTHER CHRONIC PANCREATITIS: ICD-10-CM

## 2017-02-06 RX ORDER — DEXTROSE MONOHYDRATE, SODIUM CHLORIDE, AND POTASSIUM CHLORIDE 50; .745; 4.5 G/1000ML; G/1000ML; G/1000ML
1000 INJECTION, SOLUTION INTRAVENOUS
Qty: 0 | Refills: 0 | Status: DISCONTINUED | OUTPATIENT
Start: 2017-02-06 | End: 2017-02-07

## 2017-02-06 RX ORDER — ACETAMINOPHEN 500 MG
650 TABLET ORAL EVERY 6 HOURS
Qty: 0 | Refills: 0 | Status: DISCONTINUED | OUTPATIENT
Start: 2017-02-06 | End: 2017-02-17

## 2017-02-06 RX ADMIN — Medication 2: at 15:31

## 2017-02-06 RX ADMIN — HEPARIN SODIUM 5000 UNIT(S): 5000 INJECTION INTRAVENOUS; SUBCUTANEOUS at 17:49

## 2017-02-06 RX ADMIN — Medication: at 11:11

## 2017-02-06 RX ADMIN — Medication 30 MILLILITER(S): at 05:59

## 2017-02-06 RX ADMIN — Medication 125 MILLIGRAM(S): at 17:49

## 2017-02-06 RX ADMIN — ALBUTEROL 2.5 MILLIGRAM(S): 90 AEROSOL, METERED ORAL at 18:21

## 2017-02-06 RX ADMIN — ALBUTEROL 2.5 MILLIGRAM(S): 90 AEROSOL, METERED ORAL at 12:57

## 2017-02-06 RX ADMIN — Medication 3 UNIT(S): at 12:45

## 2017-02-06 RX ADMIN — Medication 30 MILLILITER(S): at 15:25

## 2017-02-06 RX ADMIN — Medication 125 MILLIGRAM(S): at 05:58

## 2017-02-06 RX ADMIN — HEPARIN SODIUM 5000 UNIT(S): 5000 INJECTION INTRAVENOUS; SUBCUTANEOUS at 07:17

## 2017-02-06 RX ADMIN — DULOXETINE HYDROCHLORIDE 60 MILLIGRAM(S): 30 CAPSULE, DELAYED RELEASE ORAL at 12:43

## 2017-02-06 RX ADMIN — DEXTROSE MONOHYDRATE, SODIUM CHLORIDE, AND POTASSIUM CHLORIDE 125 MILLILITER(S): 50; .745; 4.5 INJECTION, SOLUTION INTRAVENOUS at 23:00

## 2017-02-06 RX ADMIN — INSULIN GLARGINE 10 UNIT(S): 100 INJECTION, SOLUTION SUBCUTANEOUS at 11:12

## 2017-02-06 RX ADMIN — SODIUM CHLORIDE 125 MILLILITER(S): 9 INJECTION, SOLUTION INTRAVENOUS at 03:48

## 2017-02-06 RX ADMIN — PANTOPRAZOLE SODIUM 40 MILLIGRAM(S): 20 TABLET, DELAYED RELEASE ORAL at 12:45

## 2017-02-06 RX ADMIN — Medication 650 MILLIGRAM(S): at 02:30

## 2017-02-06 RX ADMIN — Medication: at 12:44

## 2017-02-06 RX ADMIN — Medication 3 UNIT(S): at 15:31

## 2017-02-06 RX ADMIN — ALBUTEROL 2.5 MILLIGRAM(S): 90 AEROSOL, METERED ORAL at 06:03

## 2017-02-06 RX ADMIN — Medication 3 UNIT(S): at 11:13

## 2017-02-06 RX ADMIN — Medication 125 MILLIGRAM(S): at 12:45

## 2017-02-06 NOTE — PROGRESS NOTE ADULT - SUBJECTIVE AND OBJECTIVE BOX
Patient is a 61y old  Female who presents with a chief complaint of Nausea , Vomiting, pain abdomen. (2017 13:13)      HPI:  Pt is a 62 yo lady with a pmhx of DM1 w prior insulin pump since removed, HTN, HL, MS who presents to the ED with N/v/d for 2 days. She ate tacos on Wednesday, and then started having n/v/d that night until now, unable to keep anything down. No fevers, no focal abdominal pain, no dysuria, no chest pain, no short of breath. (2017 13:13)      INTERVAL HPI/OVERNIGHT EVENTS:  Less nausea, pain better, diarrhea improving.    MEDICATIONS  (STANDING):  clonazePAM Tablet 1milliGRAM(s) Oral at bedtime  DULoxetine 60milliGRAM(s) Oral daily  glatiramer Injectable 20milliGRAM(s) SubCutaneous at bedtime  heparin  Injectable 5000Unit(s) SubCutaneous every 12 hours  insulin lispro (HumaLOG) corrective regimen sliding scale  SubCutaneous three times a day before meals  dextrose 5%. 1000milliLiter(s) IV Continuous <Continuous>  dextrose 50% Injectable 12.5Gram(s) IV Push once  dextrose 50% Injectable 25Gram(s) IV Push once  vancomycin    Solution 125milliGRAM(s) Oral every 6 hours  influenza   Vaccine 0.5milliLiter(s) IntraMuscular once  ALBUTerol    0.083% 2.5milliGRAM(s) Nebulizer every 6 hours  ALBUTerol    90 MICROgram(s) HFA Inhaler 1Puff(s) Inhalation every 4 hours  insulin glargine Injectable (LANTUS) 10Unit(s) SubCutaneous two times a day  insulin lispro Injectable (HumaLOG) 3Unit(s) SubCutaneous three times a day before meals  dextrose 5% + sodium chloride 0.9%. 1000milliLiter(s) IV Continuous <Continuous>  bismuth subsalicylate Liquid 30milliLiter(s) Oral three times a day  pantoprazole  Injectable 40milliGRAM(s) IV Push daily    MEDICATIONS  (PRN):  dextrose Gel 1Dose(s) Oral once PRN Blood Glucose LESS THAN 70 milliGRAM(s)/deciliter  glucagon  Injectable 1milliGRAM(s) IntraMuscular once PRN Glucose LESS THAN 70 milligrams/deciliter  ondansetron Injectable 4milliGRAM(s) IV Push every 8 hours PRN Nausea and/or Vomiting  acetaminophen   Tablet 650milliGRAM(s) Oral every 6 hours PRN For Temp greater than 38 C (100.4 F)      FAMILY HISTORY:  No pertinent family history in first degree relatives      Allergies    No Known Allergies    Intolerances        PMH/PSH:  Multiple sclerosis  Myasthenia gravis  Hypertension  Diabetes  H/O cervical spine surgery        REVIEW OF SYSTEMS:  CONSTITUTIONAL: No fever, weight loss, or fatigue  EYES: No eye pain, visual disturbances, or discharge  NECK: No pain or stiffness  BREASTS: No pain, masses, or nipple discharge  RESPIRATORY: No cough, wheezing, chills or hemoptysis; No shortness of breath  CARDIOVASCULAR: No chest pain, palpitations, dizziness, or leg swelling  GASTROINTESTINAL: No abdominal or epigastric pain. No nausea, vomiting, or hematemesis; No diarrhea or constipation. No melena or hematochezia.  GENITOURINARY: No dysuria, frequency, hematuria, or incontinence  NEUROLOGICAL: No headaches, memory loss, loss of strength, numbness, or tremors    Vital Signs Last 24 Hrs  T(C): 36.7, Max: 38.4 (- @ 01:21)  T(F): 98.1, Max: 101.1 (- @ 01:21)  HR: 110 (105 - 121)  BP: 104/73 (103/64 - 142/76)  BP(mean): --  RR: 17 (17 - 18)  SpO2: 100% (100% - 100%)    PHYSICAL EXAM:  GENERAL: NAD, well-groomed, well-developed  HEAD:  Atraumatic, Normocephalic  EYES: EOMI, PERRLA, conjunctiva and sclera clear  NECK: Supple, No JVD, Normal thyroid  NERVOUS SYSTEM:  Alert & Oriented X 1, Good concentration; Motor Strength 5/5 B/L upper and lower extremities;   CHEST/LUNG: Clear to percussion bilaterally; No rales, rhonchi, wheezing, or rubs  HEART: Regular rate and rhythm; No murmurs, rubs, or gallops  ABDOMEN: Soft, Nontender, Nondistended; Bowel sounds present  EXTREMITIES:  2+ Peripheral Pulses, No clubbing, cyanosis, or edema    LABS:  amylase--[id number] - @ 11:16    02- @ 05:14   HGB 36.2  HCT 13.6  MCV 87.6   RDW 11.6 WBC 3.2 PLTA 243        02-04 @ 11:16   HGB 39.6  HCT 14.7  MCV 86.0   RDW 11.2 WBC 4.6 PLTA 241          2017 18:06    x      |  x      |  x      ----------------------------<  x      3.5     |  x      |  x      2017 05:14    124    |  95     |  22     ----------------------------<  453    2.8     |  17     |  1.47   2017 11:16    122    |  87     |  35     ----------------------------<  360    2.6     |  18     |  3.22     Ca    7.6        2017 05:14  Ca    7.3        2017 11:16  Mg     1.8       2017 05:14    TPro  6.4    /  Alb  2.6    /  TBili  0.6    /  DBili  x      /  AST  22     /  ALT  29     /  AlkPhos  104    2017 11:16      Urinalysis Basic - ( 2017 22:27 )    Color: Yellow / Appearance: Slightly Turbid / S.025 / pH: x  Gluc: x / Ketone: Negative  / Bili: Negative / Urobili: Negative mg/dL   Blood: x / Protein: 30 mg/dL / Nitrite: Negative   Leuk Esterase: Negative / RBC: 6-10 /HPF / WBC 0-2   Sq Epi: x / Non Sq Epi: Moderate / Bacteria: Moderate      CAPILLARY BLOOD GLUCOSE  324 (2017 17:44)  211 (2017 13:00)  67 (2017 12:15)  67 (2017 12:07)        RADIOLOGY & ADDITIONAL TESTS:    EXAM:  ABD COMP DECUB & OR ERECT                            PROCEDURE DATE:  2017        INTERPRETATION:  Abdomen radiographs          CLINICAL INFORMATION: Acute renal injury. Nausea and vomiting. The   patient is unable to communicate.    TECHNIQUE: Supine and upright views of the abdomen were obtained.    FINDINGS: No prior exams are available for review.    The bowel gas pattern is unremarkable. There is no hepatosplenomegaly,   mass effect or signs of free air in the peritoneal cavity. Calcifications   are seen in the pancreas compatible with chronic pancreatitis.   Degenerative changes are seen in the spine.    IMPRESSION: Unremarkable bowel gas pattern. Pancreatic calcifications,   compatible with chronic pancreatitis.              JONATHAN OWENS M.D. ATTENDING RADIOLOGIST  This document has been electronically signed. 2017  5:01PM        Imaging Personally Reviewed:  [ ] YES  [ ] NO    Consultant(s) Notes Reviewed:  [ ] YES  [ ] NO    Care Discussed with Consultants/Other Providers [ ] YES  [ ] NO

## 2017-02-06 NOTE — PROGRESS NOTE ADULT - PROBLEM SELECTOR PLAN 1
Symptoms improving, ( On PO vanco and Pepto Bismo ( 3 doses ). continue present care Symptoms improving, ( On PO vanco and Pepto Bismo ( 3 doses )). continue present care ( clear liquid diet )

## 2017-02-06 NOTE — PROGRESS NOTE ADULT - PROBLEM SELECTOR PLAN 1
continue lantus 10units bid   lispro 3units with meals  continue MARISSA    change IV fluids to NS with kcl w/o dextrose  will follow

## 2017-02-06 NOTE — PROGRESS NOTE ADULT - SUBJECTIVE AND OBJECTIVE BOX
Patient is a 61y old  Female who presents with a chief complaint of Nausea , Vomiting, pain abdomen. (2017 13:13)      INTERVAL HPI/OVERNIGHT EVENTS: Diarrhea improved.    MEDICATIONS  (STANDING):  clonazePAM Tablet 1milliGRAM(s) Oral at bedtime  DULoxetine 60milliGRAM(s) Oral daily  glatiramer Injectable 20milliGRAM(s) SubCutaneous at bedtime  heparin  Injectable 5000Unit(s) SubCutaneous every 12 hours  insulin lispro (HumaLOG) corrective regimen sliding scale  SubCutaneous three times a day before meals  dextrose 5%. 1000milliLiter(s) IV Continuous <Continuous>  dextrose 50% Injectable 12.5Gram(s) IV Push once  dextrose 50% Injectable 25Gram(s) IV Push once  vancomycin    Solution 125milliGRAM(s) Oral every 6 hours  influenza   Vaccine 0.5milliLiter(s) IntraMuscular once  ALBUTerol    0.083% 2.5milliGRAM(s) Nebulizer every 6 hours  ALBUTerol    90 MICROgram(s) HFA Inhaler 1Puff(s) Inhalation every 4 hours  insulin glargine Injectable (LANTUS) 10Unit(s) SubCutaneous two times a day  insulin lispro Injectable (HumaLOG) 3Unit(s) SubCutaneous three times a day before meals  bismuth subsalicylate Liquid 30milliLiter(s) Oral three times a day  pantoprazole  Injectable 40milliGRAM(s) IV Push daily  sodium chloride 0.9% with potassium chloride 20 mEq/L 1000milliLiter(s) IV Continuous <Continuous>    MEDICATIONS  (PRN):  dextrose Gel 1Dose(s) Oral once PRN Blood Glucose LESS THAN 70 milliGRAM(s)/deciliter  glucagon  Injectable 1milliGRAM(s) IntraMuscular once PRN Glucose LESS THAN 70 milligrams/deciliter  ondansetron Injectable 4milliGRAM(s) IV Push every 8 hours PRN Nausea and/or Vomiting  acetaminophen   Tablet 650milliGRAM(s) Oral every 6 hours PRN For Temp greater than 38 C (100.4 F)      Allergies    No Known Allergies    Intolerances        REVIEW OF SYSTEMS:  CONSTITUTIONAL: No fever, weight loss, C/O muscle weakness.  EYES: No eye pain, visual disturbances, or discharge  ENMT:  No difficulty hearing, tinnitus, vertigo; No sinus or throat pain  NECK: No pain or stiffness  BREASTS: No pain, masses, or nipple discharge  RESPIRATORY: No cough, wheezing, chills or hemoptysis; No shortness of breath  CARDIOVASCULAR: No chest pain, palpitations, dizziness, or leg swelling  GASTROINTESTINAL: No abdominal or epigastric pain. No nausea, vomiting, or hematemesis; No diarrhea or constipation. No melena or hematochezia.  GENITOURINARY: No dysuria, frequency, hematuria, or incontinence  NEUROLOGICAL: No headaches, memory loss, loss of strength, numbness, or tremors  SKIN: No itching, burning, rashes, or lesions   LYMPH NODES: No enlarged glands  ENDOCRINE: No heat or cold intolerance; No hair loss  MUSCULOSKELETAL: No joint pain or swelling; No muscle, back, or extremity pain  PSYCHIATRIC: No depression, anxiety, mood swings, or difficulty sleeping  HEME/LYMPH: No easy bruising, or bleeding gums  ALLERGY AND IMMUNOLOGIC: No hives or eczema    Vital Signs Last 24 Hrs  T(C): 36.7, Max: 38.4 ( @ 01:21)  T(F): 98.1, Max: 101.1 ( @ 01:21)  HR: 99 (94 - 121)  BP: 106/69 (103/64 - 106/69)  BP(mean): --  RR: 26 (17 - 26)  SpO2: 100% (98% - 100%)    PHYSICAL EXAM:  GENERAL: NAD, well-groomed, Cachectic  HEAD:  Atraumatic, Normocephalic  EYES: EOMI, PERRLA, conjunctiva and sclera clear  ENMT:  Moist mucous membranes, Good dentition, No lesions  NECK: Supple, No JVD, Normal thyroid  NERVOUS SYSTEM:  Alert & Oriented X3, Good concentration; Motor Strength 5/5 B/L upper and lower extremities; DTRs 2+ intact and symmetric  CHEST/LUNG: Clear to percussion bilaterally; No rales, rhonchi, wheezing, or rubs  HEART: Regular rate and rhythm; No murmurs, rubs, or gallops  ABDOMEN: Soft, Nontender, Nondistended; Bowel sounds present  EXTREMITIES:  2+ Peripheral Pulses, No clubbing, cyanosis, or edema  LYMPH: No lymphadenopathy noted  SKIN: No rashes or lesions    LABS:                        13.6   3.2   )-----------( 243      ( 2017 05:14 )             36.2     2017 18:06    x      |  x      |  x      ----------------------------<  x      3.5     |  x      |  x        Ca    7.6        2017 05:14  Mg     1.8       2017 05:14        Urinalysis Basic - ( 2017 22:27 )    Color: Yellow / Appearance: Slightly Turbid / S.025 / pH: x  Gluc: x / Ketone: Negative  / Bili: Negative / Urobili: Negative mg/dL   Blood: x / Protein: 30 mg/dL / Nitrite: Negative   Leuk Esterase: Negative / RBC: 6-10 /HPF / WBC 0-2   Sq Epi: x / Non Sq Epi: Moderate / Bacteria: Moderate      CAPILLARY BLOOD GLUCOSE  340 (2017 12:46)  345 (2017 09:40)  324 (2017 17:44)          Hemoglobin A1C, Whole Blood: 11.0 % ( @ 09:28)        RADIOLOGY & ADDITIONAL TESTS:      EXAM:  ABD COMP DECUB & OR ERECT                            PROCEDURE DATE:  2017        INTERPRETATION:  Abdomen radiographs          CLINICAL INFORMATION: Acute renal injury. Nausea and vomiting. The   patient is unable to communicate.    TECHNIQUE: Supine and upright views of the abdomen were obtained.    FINDINGS: No prior exams are available for review.    The bowel gas pattern is unremarkable. There is no hepatosplenomegaly,   mass effect or signs of free air in the peritoneal cavity. Calcifications   are seen in the pancreas compatible with chronic pancreatitis.   Degenerative changes are seen in the spine.    IMPRESSION: Unremarkable bowel gas pattern. Pancreatic calcifications,   compatible with chronic pancreatitis.      JONATHAN OWENS M.D. ATTENDING RADIOLOGIST  This document has been electronically signed. 2017  5:01PM       Imaging Personally Reviewed:  [x ] YES  [ ] NO    Consultant(s) Notes Reviewed:  [x ] YES  [ ] NO    Care Discussed with Consultants/Other Providers [x ] YES  [ ] NO    PROBLEMS:  ACUTE KIDNEY INJURY  NAUSEA AND VOMITTING  REYNALDO (acute kidney injury)  Chronic pancreatitis, unspecified pancreatitis type  Gastroenteritis  Uncontrolled type 1 diabetes mellitus with hyperglycemia  Acute gastritis without hemorrhage, unspecified gastritis type      Care discussed with family,         [  ]   yes  [  ]  No    imp:    stable[ ]    unstable[  ]     improving [x   ]       unchanged  [  ]                Plans:  Continue present plans  [x  ]               New consult [  ]   specialty  .......               order rochelle[  ]    test name.                  Discharge Planning  [  ]

## 2017-02-06 NOTE — PROGRESS NOTE ADULT - SUBJECTIVE AND OBJECTIVE BOX
Patient is a 61y old  Female who presents with a chief complaint of Nausea , Vomiting, pain abdomen. (2017 13:13)      INTERVAL HPI/OVERNIGHT EVENTS:  pt with no complaints  continues with diarrhea    MEDICATIONS  (STANDING):  clonazePAM Tablet 1milliGRAM(s) Oral at bedtime  DULoxetine 60milliGRAM(s) Oral daily  glatiramer Injectable 20milliGRAM(s) SubCutaneous at bedtime  heparin  Injectable 5000Unit(s) SubCutaneous every 12 hours  insulin lispro (HumaLOG) corrective regimen sliding scale  SubCutaneous three times a day before meals  dextrose 5%. 1000milliLiter(s) IV Continuous <Continuous>  dextrose 50% Injectable 12.5Gram(s) IV Push once  dextrose 50% Injectable 25Gram(s) IV Push once  vancomycin    Solution 125milliGRAM(s) Oral every 6 hours  influenza   Vaccine 0.5milliLiter(s) IntraMuscular once  ALBUTerol    0.083% 2.5milliGRAM(s) Nebulizer every 6 hours  ALBUTerol    90 MICROgram(s) HFA Inhaler 1Puff(s) Inhalation every 4 hours  insulin glargine Injectable (LANTUS) 10Unit(s) SubCutaneous two times a day  insulin lispro Injectable (HumaLOG) 3Unit(s) SubCutaneous three times a day before meals  bismuth subsalicylate Liquid 30milliLiter(s) Oral three times a day  pantoprazole  Injectable 40milliGRAM(s) IV Push daily  sodium chloride 0.9% with potassium chloride 20 mEq/L 1000milliLiter(s) IV Continuous <Continuous>    MEDICATIONS  (PRN):  dextrose Gel 1Dose(s) Oral once PRN Blood Glucose LESS THAN 70 milliGRAM(s)/deciliter  glucagon  Injectable 1milliGRAM(s) IntraMuscular once PRN Glucose LESS THAN 70 milligrams/deciliter  ondansetron Injectable 4milliGRAM(s) IV Push every 8 hours PRN Nausea and/or Vomiting  acetaminophen   Tablet 650milliGRAM(s) Oral every 6 hours PRN For Temp greater than 38 C (100.4 F)      REVIEW OF SYSTEMS:  CONSTITUTIONAL: No fever, weight loss, or fatigue  RESPIRATORY: No cough, wheezing, chills or hemoptysis; No shortness of breath  CARDIOVASCULAR: No chest pain, palpitations, dizziness, or leg swelling  ENDOCRINE: No heat or cold intolerance; No hair loss      Vital Signs Last 24 Hrs  T(C): 36.7, Max: 38.4 ( @ 01:21)  T(F): 98.1, Max: 101.1 ( @ 01:21)  HR: 104 (104 - 121)  BP: 106/69 (103/64 - 142/76)  BP(mean): --  RR: 26 (17 - 26)  SpO2: 98% (98% - 100%)    PHYSICAL EXAM:  GENERAL: NAD, well-groomed, well-developed  CHEST/LUNG: Clear to percussion bilaterally; No rales, rhonchi, wheezing, or rubs  HEART: Regular rate and rhythm; No murmurs, rubs, or gallops        LABS:                        13.6   3.2   )-----------( 243      ( 2017 05:14 )             36.2     2017 18:06    x      |  x      |  x      ----------------------------<  x      3.5     |  x      |  x        Ca    7.6        2017 05:14  Mg     1.8       2017 05:14    TPro  6.4    /  Alb  2.6    /  TBili  0.6    /  DBili  x      /  AST  22     /  ALT  29     /  AlkPhos  104    2017 11:16      Urinalysis Basic - ( 2017 22:27 )    Color: Yellow / Appearance: Slightly Turbid / S.025 / pH: x  Gluc: x / Ketone: Negative  / Bili: Negative / Urobili: Negative mg/dL   Blood: x / Protein: 30 mg/dL / Nitrite: Negative   Leuk Esterase: Negative / RBC: 6-10 /HPF / WBC 0-2   Sq Epi: x / Non Sq Epi: Moderate / Bacteria: Moderate      CAPILLARY BLOOD GLUCOSE  345 (2017 09:40)  324 (2017 17:44)  211 (2017 13:00)  67 (2017 12:15)  67 (2017 12:07)    Lipid panel:               RADIOLOGY & ADDITIONAL TESTS:

## 2017-02-06 NOTE — PROGRESS NOTE ADULT - ASSESSMENT
Pt is a 62 yo lady with a pmhx of DM1 w prior insulin pump since removed, HTN, HL, MS who presents to the ED with N/v/d for 2 days. She ate tacos on Wednesday, and then started having n/v/d that night until now, unable to keep anything down. No fevers, no focal abdominal pain, no dysuria, no chest pain, no sob.Admit to regular floor, IV fluids, Monitor BS, and lytres. Endocrine consult and f/u noted. On Lantus. GI consult noted.

## 2017-02-06 NOTE — ED ADULT NURSE REASSESSMENT NOTE - NS ED NURSE REASSESS COMMENT FT1
left iv swollen at the site , iv removed, elevation maitened, will cont to fallow up,
pt recebed on iso rectal tem 101 zoila alvarado made aware of it, waiting for orders, pt alert x4
noted 20g iv on left ac. 1 bolus given by emt./

## 2017-02-07 DIAGNOSIS — R19.7 DIARRHEA, UNSPECIFIED: ICD-10-CM

## 2017-02-07 LAB
-  AMIKACIN: SIGNIFICANT CHANGE UP
-  AMPICILLIN/SULBACTAM: SIGNIFICANT CHANGE UP
-  AMPICILLIN: SIGNIFICANT CHANGE UP
-  AMPICILLIN: SIGNIFICANT CHANGE UP
-  AZTREONAM: SIGNIFICANT CHANGE UP
-  CEFAZOLIN: SIGNIFICANT CHANGE UP
-  CEFEPIME: SIGNIFICANT CHANGE UP
-  CEFOXITIN: SIGNIFICANT CHANGE UP
-  CEFTAZIDIME: SIGNIFICANT CHANGE UP
-  CEFTRIAXONE: SIGNIFICANT CHANGE UP
-  CEFTRIAXONE: SIGNIFICANT CHANGE UP
-  CIPROFLOXACIN: SIGNIFICANT CHANGE UP
-  CIPROFLOXACIN: SIGNIFICANT CHANGE UP
-  ERTAPENEM: SIGNIFICANT CHANGE UP
-  GENTAMICIN: SIGNIFICANT CHANGE UP
-  IMIPENEM: SIGNIFICANT CHANGE UP
-  LEVOFLOXACIN: SIGNIFICANT CHANGE UP
-  MEROPENEM: SIGNIFICANT CHANGE UP
-  NITROFURANTOIN: SIGNIFICANT CHANGE UP
-  PIPERACILLIN/TAZOBACTAM: SIGNIFICANT CHANGE UP
-  TOBRAMYCIN: SIGNIFICANT CHANGE UP
-  TRIMETHOPRIM/SULFAMETHOXAZOLE: SIGNIFICANT CHANGE UP
-  TRIMETHOPRIM/SULFAMETHOXAZOLE: SIGNIFICANT CHANGE UP
CULTURE RESULTS: SIGNIFICANT CHANGE UP
METHOD TYPE: SIGNIFICANT CHANGE UP
ORGANISM # SPEC MICROSCOPIC CNT: SIGNIFICANT CHANGE UP
SPECIMEN SOURCE: SIGNIFICANT CHANGE UP

## 2017-02-07 RX ORDER — LIPASE/PROTEASE/AMYLASE 16-48-48K
1 CAPSULE,DELAYED RELEASE (ENTERIC COATED) ORAL
Qty: 0 | Refills: 0 | Status: DISCONTINUED | OUTPATIENT
Start: 2017-02-07 | End: 2017-02-17

## 2017-02-07 RX ORDER — LOPERAMIDE HCL 2 MG
2 TABLET ORAL ONCE
Qty: 0 | Refills: 0 | Status: COMPLETED | OUTPATIENT
Start: 2017-02-07 | End: 2017-02-07

## 2017-02-07 RX ORDER — INSULIN GLARGINE 100 [IU]/ML
7 INJECTION, SOLUTION SUBCUTANEOUS
Qty: 0 | Refills: 0 | Status: DISCONTINUED | OUTPATIENT
Start: 2017-02-07 | End: 2017-02-09

## 2017-02-07 RX ORDER — DEXTROSE MONOHYDRATE, SODIUM CHLORIDE, AND POTASSIUM CHLORIDE 50; .745; 4.5 G/1000ML; G/1000ML; G/1000ML
1000 INJECTION, SOLUTION INTRAVENOUS
Qty: 0 | Refills: 0 | Status: DISCONTINUED | OUTPATIENT
Start: 2017-02-07 | End: 2017-02-08

## 2017-02-07 RX ADMIN — Medication 125 MILLIGRAM(S): at 07:03

## 2017-02-07 RX ADMIN — HEPARIN SODIUM 5000 UNIT(S): 5000 INJECTION INTRAVENOUS; SUBCUTANEOUS at 07:04

## 2017-02-07 RX ADMIN — ALBUTEROL 2.5 MILLIGRAM(S): 90 AEROSOL, METERED ORAL at 23:42

## 2017-02-07 RX ADMIN — PANTOPRAZOLE SODIUM 40 MILLIGRAM(S): 20 TABLET, DELAYED RELEASE ORAL at 13:06

## 2017-02-07 RX ADMIN — GLATIRAMER ACETATE 20 MILLIGRAM(S): 20 INJECTION, SOLUTION SUBCUTANEOUS at 01:25

## 2017-02-07 RX ADMIN — Medication 1 MILLIGRAM(S): at 22:50

## 2017-02-07 RX ADMIN — ALBUTEROL 2.5 MILLIGRAM(S): 90 AEROSOL, METERED ORAL at 17:29

## 2017-02-07 RX ADMIN — INSULIN GLARGINE 10 UNIT(S): 100 INJECTION, SOLUTION SUBCUTANEOUS at 09:01

## 2017-02-07 RX ADMIN — DULOXETINE HYDROCHLORIDE 60 MILLIGRAM(S): 30 CAPSULE, DELAYED RELEASE ORAL at 13:05

## 2017-02-07 RX ADMIN — INSULIN GLARGINE 7 UNIT(S): 100 INJECTION, SOLUTION SUBCUTANEOUS at 22:31

## 2017-02-07 RX ADMIN — ALBUTEROL 2.5 MILLIGRAM(S): 90 AEROSOL, METERED ORAL at 11:12

## 2017-02-07 RX ADMIN — GLATIRAMER ACETATE 20 MILLIGRAM(S): 20 INJECTION, SOLUTION SUBCUTANEOUS at 22:31

## 2017-02-07 RX ADMIN — Medication 125 MILLIGRAM(S): at 18:37

## 2017-02-07 RX ADMIN — ALBUTEROL 2.5 MILLIGRAM(S): 90 AEROSOL, METERED ORAL at 05:44

## 2017-02-07 RX ADMIN — Medication 125 MILLIGRAM(S): at 13:07

## 2017-02-07 RX ADMIN — Medication 1 MILLIGRAM(S): at 01:40

## 2017-02-07 RX ADMIN — Medication 125 MILLIGRAM(S): at 22:32

## 2017-02-07 RX ADMIN — Medication 3 UNIT(S): at 17:14

## 2017-02-07 RX ADMIN — Medication 125 MILLIGRAM(S): at 01:25

## 2017-02-07 RX ADMIN — DEXTROSE MONOHYDRATE, SODIUM CHLORIDE, AND POTASSIUM CHLORIDE 125 MILLILITER(S): 50; .745; 4.5 INJECTION, SOLUTION INTRAVENOUS at 07:06

## 2017-02-07 NOTE — PROGRESS NOTE ADULT - SUBJECTIVE AND OBJECTIVE BOX
Patient is a 61y old  Female who presents with a chief complaint of Nausea , Vomiting, pain abdomen. (04 Feb 2017 13:13)      HPI:  Pt is a 62 yo lady with a pmhx of DM1 w prior insulin pump since removed, HTN, HL, MS who presents to the ED with N/v/d for 2 days. She ate tacos on Wednesday, and then started having n/v/d that night until now, unable to keep anything down. No fevers, no focal abdominal pain, no dysuria, no chest pain, no short of breath. (04 Feb 2017 13:13)      INTERVAL HPI/OVERNIGHT EVENTS:  No N/V but the diarrhea has not resolved. No abdominal pain.    MEDICATIONS  (STANDING):  clonazePAM Tablet 1milliGRAM(s) Oral at bedtime  DULoxetine 60milliGRAM(s) Oral daily  glatiramer Injectable 20milliGRAM(s) SubCutaneous at bedtime  heparin  Injectable 5000Unit(s) SubCutaneous every 12 hours  insulin lispro (HumaLOG) corrective regimen sliding scale  SubCutaneous three times a day before meals  dextrose 5%. 1000milliLiter(s) IV Continuous <Continuous>  dextrose 50% Injectable 12.5Gram(s) IV Push once  dextrose 50% Injectable 25Gram(s) IV Push once  vancomycin    Solution 125milliGRAM(s) Oral every 6 hours  influenza   Vaccine 0.5milliLiter(s) IntraMuscular once  ALBUTerol    0.083% 2.5milliGRAM(s) Nebulizer every 6 hours  ALBUTerol    90 MICROgram(s) HFA Inhaler 1Puff(s) Inhalation every 4 hours  insulin lispro Injectable (HumaLOG) 3Unit(s) SubCutaneous three times a day before meals  pantoprazole  Injectable 40milliGRAM(s) IV Push daily  insulin glargine Injectable (LANTUS) 7Unit(s) SubCutaneous two times a day  sodium chloride 0.9% with potassium chloride 20 mEq/L 1000milliLiter(s) IV Continuous <Continuous>    MEDICATIONS  (PRN):  dextrose Gel 1Dose(s) Oral once PRN Blood Glucose LESS THAN 70 milliGRAM(s)/deciliter  glucagon  Injectable 1milliGRAM(s) IntraMuscular once PRN Glucose LESS THAN 70 milligrams/deciliter  ondansetron Injectable 4milliGRAM(s) IV Push every 8 hours PRN Nausea and/or Vomiting  acetaminophen   Tablet 650milliGRAM(s) Oral every 6 hours PRN For Temp greater than 38 C (100.4 F)      FAMILY HISTORY:  No pertinent family history in first degree relatives      Allergies    No Known Allergies    Intolerances        PMH/PSH:  Multiple sclerosis  Myasthenia gravis  Hypertension  Diabetes  H/O cervical spine surgery        REVIEW OF SYSTEMS:  CONSTITUTIONAL: No fever, weight loss, or fatigue  EYES: No eye pain, visual disturbances, or discharge  NECK: No pain or stiffness  BREASTS: No pain, masses, or nipple discharge  RESPIRATORY: No cough, wheezing, chills or hemoptysis; No shortness of breath  CARDIOVASCULAR: No chest pain, palpitations, dizziness, or leg swelling  GASTROINTESTINAL: No abdominal or epigastric pain. No nausea, vomiting, or hematemesis; No constipation. No melena or hematochezia.  GENITOURINARY: No dysuria, frequency, hematuria, or incontinence  NEUROLOGICAL: No headaches, memory loss, loss of strength, numbness, or tremors  SKIN: No itching, burning, rashes, or lesions     Vital Signs Last 24 Hrs  T(C): 37.3, Max: 37.3 (02-07 @ 14:24)  T(F): 99.2, Max: 99.2 (02-07 @ 14:24)  HR: 104 (73 - 117)  BP: 95/70 (93/75 - 95/70)  BP(mean): --  RR: 18 (16 - 18)  SpO2: 95% (90% - 100%)    PHYSICAL EXAM:  GENERAL: NAD, well-groomed, well-developed  HEAD:  Atraumatic, Normocephalic  EYES: EOMI, PERRLA, conjunctiva and sclera clear  NECK: Supple, No JVD, Normal thyroid  NERVOUS SYSTEM:  Alert & Oriented X3, Good concentration; Motor Strength 5/5 B/L upper and lower extremities; DTRs 2+ intact and symmetric  CHEST/LUNG: Clear to percussion bilaterally; No rales, rhonchi, wheezing, or rubs  HEART: Regular rate and rhythm; No murmurs, rubs, or gallops  ABDOMEN: Soft, Nontender, Nondistended; Bowel sounds present  EXTREMITIES:  2+ Peripheral Pulses, No clubbing, cyanosis, or edema    LABS:  [id number] 02-04 @ 11:16    02-05 @ 05:14   HGB 36.2  HCT 13.6  MCV 87.6   RDW 11.6 WBC 3.2 PLTA 243        02-04 @ 11:16   HGB 39.6  HCT 14.7  MCV 86.0   RDW 11.2 WBC 4.6 PLTA 241          05 Feb 2017 18:06    x      |  x      |  x      ----------------------------<  x      3.5     |  x      |  x      05 Feb 2017 05:14    124    |  95     |  22     ----------------------------<  453    2.8     |  17     |  1.47   04 Feb 2017 11:16    122    |  87     |  35     ----------------------------<  360    2.6     |  18     |  3.22     Ca    7.6        05 Feb 2017 05:14  Ca    7.3        04 Feb 2017 11:16  Mg     1.8       05 Feb 2017 05:14    TPro  6.4    /  Alb  2.6    /  TBili  0.6    /  DBili  x      /  AST  22     /  ALT  29     /  AlkPhos  104    04 Feb 2017 11:16        CAPILLARY BLOOD GLUCOSE  255 (07 Feb 2017 17:50)  169 (07 Feb 2017 11:20)  92 (07 Feb 2017 01:01)        RADIOLOGY & ADDITIONAL TESTS:    Imaging Personally Reviewed:  [ ] YES  [ ] NO    Consultant(s) Notes Reviewed:  [ ] YES  [ ] NO    Care Discussed with Consultants/Other Providers [ ] YES  [ ] NO

## 2017-02-07 NOTE — PROGRESS NOTE ADULT - SUBJECTIVE AND OBJECTIVE BOX
Patient is a 61y old  Female who presents with a chief complaint of Nausea , Vomiting, pain abdomen. (04 Feb 2017 13:13)      INTERVAL HPI/OVERNIGHT EVENTS:  pt feeling better, tolerating diet better    MEDICATIONS  (STANDING):  clonazePAM Tablet 1milliGRAM(s) Oral at bedtime  DULoxetine 60milliGRAM(s) Oral daily  glatiramer Injectable 20milliGRAM(s) SubCutaneous at bedtime  heparin  Injectable 5000Unit(s) SubCutaneous every 12 hours  insulin lispro (HumaLOG) corrective regimen sliding scale  SubCutaneous three times a day before meals  dextrose 5%. 1000milliLiter(s) IV Continuous <Continuous>  dextrose 50% Injectable 12.5Gram(s) IV Push once  dextrose 50% Injectable 25Gram(s) IV Push once  vancomycin    Solution 125milliGRAM(s) Oral every 6 hours  influenza   Vaccine 0.5milliLiter(s) IntraMuscular once  ALBUTerol    0.083% 2.5milliGRAM(s) Nebulizer every 6 hours  ALBUTerol    90 MICROgram(s) HFA Inhaler 1Puff(s) Inhalation every 4 hours  insulin lispro Injectable (HumaLOG) 3Unit(s) SubCutaneous three times a day before meals  pantoprazole  Injectable 40milliGRAM(s) IV Push daily  insulin glargine Injectable (LANTUS) 7Unit(s) SubCutaneous two times a day  sodium chloride 0.9% with potassium chloride 20 mEq/L 1000milliLiter(s) IV Continuous <Continuous>    MEDICATIONS  (PRN):  dextrose Gel 1Dose(s) Oral once PRN Blood Glucose LESS THAN 70 milliGRAM(s)/deciliter  glucagon  Injectable 1milliGRAM(s) IntraMuscular once PRN Glucose LESS THAN 70 milligrams/deciliter  ondansetron Injectable 4milliGRAM(s) IV Push every 8 hours PRN Nausea and/or Vomiting  acetaminophen   Tablet 650milliGRAM(s) Oral every 6 hours PRN For Temp greater than 38 C (100.4 F)      REVIEW OF SYSTEMS:  CONSTITUTIONAL: No fever, weight loss, or fatigue  RESPIRATORY: No cough, wheezing, chills or hemoptysis; No shortness of breath  CARDIOVASCULAR: No chest pain, palpitations, dizziness, or leg swelling  GASTROINTESTINAL: No abdominal or epigastric pain. No nausea, vomiting, or hematemesis; No diarrhea or constipation. No melena or hematochezia.  ENDOCRINE: No heat or cold intolerance; No hair loss      Vital Signs Last 24 Hrs  T(C): 36.9, Max: 36.9 (02-07 @ 05:30)  T(F): 98.5, Max: 98.5 (02-07 @ 05:30)  HR: 117 (73 - 117)  BP: 93/75 (93/75 - 95/66)  BP(mean): --  RR: 16 (16 - 16)  SpO2: 97% (90% - 100%)    PHYSICAL EXAM:  GENERAL: NAD, well-groomed, well-developed  CHEST/LUNG: Clear to percussion bilaterally; No rales, rhonchi, wheezing, or rubs  HEART: Regular rate and rhythm; No murmurs, rubs, or gallops        LABS:    05 Feb 2017 18:06    x      |  x      |  x      ----------------------------<  x      3.5     |  x      |  x                CAPILLARY BLOOD GLUCOSE  169 (07 Feb 2017 11:20)  92 (07 Feb 2017 01:01)  165 (06 Feb 2017 15:25)    Lipid panel:               RADIOLOGY & ADDITIONAL TESTS:

## 2017-02-07 NOTE — PROGRESS NOTE ADULT - PROBLEM SELECTOR PLAN 1
decrease lantus to 7units bid   lispro 3units with meals  continue MARISSA    change IV fluids to NS with kcl w/o dextrose  will follow

## 2017-02-07 NOTE — PROGRESS NOTE ADULT - SUBJECTIVE AND OBJECTIVE BOX
Patient is a 61y old  Female who presents with a chief complaint of Nausea , Vomiting, pain abdomen. (04 Feb 2017 13:13)      INTERVAL HPI/OVERNIGHT EVENTS: Still haqving diarrhea.    MEDICATIONS  (STANDING):  clonazePAM Tablet 1milliGRAM(s) Oral at bedtime  DULoxetine 60milliGRAM(s) Oral daily  glatiramer Injectable 20milliGRAM(s) SubCutaneous at bedtime  heparin  Injectable 5000Unit(s) SubCutaneous every 12 hours  insulin lispro (HumaLOG) corrective regimen sliding scale  SubCutaneous three times a day before meals  dextrose 5%. 1000milliLiter(s) IV Continuous <Continuous>  dextrose 50% Injectable 12.5Gram(s) IV Push once  dextrose 50% Injectable 25Gram(s) IV Push once  vancomycin    Solution 125milliGRAM(s) Oral every 6 hours  influenza   Vaccine 0.5milliLiter(s) IntraMuscular once  ALBUTerol    0.083% 2.5milliGRAM(s) Nebulizer every 6 hours  ALBUTerol    90 MICROgram(s) HFA Inhaler 1Puff(s) Inhalation every 4 hours  insulin lispro Injectable (HumaLOG) 3Unit(s) SubCutaneous three times a day before meals  pantoprazole  Injectable 40milliGRAM(s) IV Push daily  sodium chloride 0.9% with potassium chloride 20 mEq/L 1000milliLiter(s) IV Continuous <Continuous>  insulin glargine Injectable (LANTUS) 7Unit(s) SubCutaneous two times a day    MEDICATIONS  (PRN):  dextrose Gel 1Dose(s) Oral once PRN Blood Glucose LESS THAN 70 milliGRAM(s)/deciliter  glucagon  Injectable 1milliGRAM(s) IntraMuscular once PRN Glucose LESS THAN 70 milligrams/deciliter  ondansetron Injectable 4milliGRAM(s) IV Push every 8 hours PRN Nausea and/or Vomiting  acetaminophen   Tablet 650milliGRAM(s) Oral every 6 hours PRN For Temp greater than 38 C (100.4 F)      Allergies    No Known Allergies    Intolerances        REVIEW OF SYSTEMS:  CONSTITUTIONAL: No fever, weight lossweakness and fatigue.  EYES: No eye pain, visual disturbances, or discharge  ENMT:  No difficulty hearing, tinnitus, vertigo; No sinus or throat pain  NECK: No pain or stiffness  BREASTS: No pain, masses, or nipple discharge  RESPIRATORY: No cough, wheezing, chills or hemoptysis; No shortness of breath  CARDIOVASCULAR: No chest pain, palpitations, dizziness, or leg swelling  GASTROINTESTINAL: No abdominal or epigastric pain. No nausea, vomiting. watery diarrhea persists.  GENITOURINARY: No dysuria, frequency, hematuria, or incontinence  NEUROLOGICAL: No headaches, memory loss, loss of strength, numbness, or tremors  SKIN: No itching, burning, rashes, or lesions   LYMPH NODES: No enlarged glands  ENDOCRINE: No heat or cold intolerance; No hair loss  MUSCULOSKELETAL: No joint pain or swelling; No muscle, back, or extremity pain  PSYCHIATRIC: No depression, anxiety, mood swings, or difficulty sleeping  HEME/LYMPH: No easy bruising, or bleeding gums  ALLERGY AND IMMUNOLOGIC: No hives or eczema    Vital Signs Last 24 Hrs  T(C): 36.9, Max: 36.9 (02-07 @ 05:30)  T(F): 98.5, Max: 98.5 (02-07 @ 05:30)  HR: 117 (73 - 117)  BP: 93/75 (93/75 - 95/66)  BP(mean): --  RR: 16 (16 - 16)  SpO2: 97% (90% - 100%)    PHYSICAL EXAM:  GENERAL: NAD, well-groomed, well-developed  HEAD:  Atraumatic, Normocephalic  EYES: EOMI, PERRLA, conjunctiva and sclera clear  ENMT:  Moist mucous membranes, Good dentition, No lesions  NECK: Supple, No JVD, Normal thyroid  NERVOUS SYSTEM:  Alert & Oriented X3, Good concentration; Motor Strength 5/5 B/L upper and lower extremities; DTRs 2+ intact and symmetric  CHEST/LUNG: Clear to percussion bilaterally; No rales, rhonchi, wheezing, or rubs  HEART: Regular rate and rhythm; No murmurs, rubs, or gallops  ABDOMEN: Soft, Nontender, Nondistended; Bowel sounds hyperactive.  EXTREMITIES:  2+ Peripheral Pulses, No clubbing, cyanosis, or edema  LYMPH: No lymphadenopathy noted  SKIN: No rashes or lesions    LABS:    05 Feb 2017 18:06    x      |  x      |  x      ----------------------------<  x      3.5     |  x      |  x                CAPILLARY BLOOD GLUCOSE  169 (07 Feb 2017 11:20)  92 (07 Feb 2017 01:01)  165 (06 Feb 2017 15:25)  340 (06 Feb 2017 12:46)          Hemoglobin A1C, Whole Blood: 11.0 % (02-05 @ 09:28)        RADIOLOGY & ADDITIONAL TEST    EXAM:  ABD COMP DECUB & OR ERECT                            PROCEDURE DATE:  02/05/2017        INTERPRETATION:  Abdomen radiographs          CLINICAL INFORMATION: Acute renal injury. Nausea and vomiting. The   patient is unable to communicate.    TECHNIQUE: Supine and upright views of the abdomen were obtained.    FINDINGS: No prior exams are available for review.    The bowel gas pattern is unremarkable. There is no hepatosplenomegaly,   mass effect or signs of free air in the peritoneal cavity. Calcifications   are seen in the pancreas compatible with chronic pancreatitis.   Degenerative changes are seen in the spine.    IMPRESSION: Unremarkable bowel gas pattern. Pancreatic calcifications,   compatible with chronic pancreatitis.              JONATHAN OWENS M.D. ATTENDING RADIOLOGIST  This document has been electronically signed. Feb 5 2017  5:01PM              Imaging Personally Reviewed:  [x ] YES  [ ] NO    Consultant(s) Notes Reviewed:  [x ] YES  [ ] NO    Care Discussed with Consultants/Other Providers [x ] YES  [ ] NO    PROBLEMS:  ACUTE KIDNEY INJURY  NAUSEA AND VOMITTING  REYNALDO (acute kidney injury)  Diabetes mellitus of other type with hyperosmolarity, with long-term current use of insulin  Multiple sclerosis  REYNALDO (acute kidney injury)  Chronic pancreatitis, unspecified pancreatitis type  Gastroenteritis  Uncontrolled type 1 diabetes mellitus with hyperglycemia  Acute gastritis without hemorrhage, unspecified gastritis type      Care discussed with family,         [  ]   yes  [  ]  No    imp:    stable[ ]    unstable[  ]     improving [   ]       unchanged  [  ]                Plans:  Continue present plans  [  ]               New consult [  ]   specialty  .......               order rochelle[  ]    test name.                  Discharge Planning  [  ]

## 2017-02-07 NOTE — PROGRESS NOTE ADULT - ASSESSMENT
Pt is a 62 yo lady with a pmhx of DM1 w prior insulin pump since removed, HTN, HL, MS who presents to the ED with N/v/d for 2 days. She ate tacos on Wednesday, and then started having n/v/d that night until now, unable to keep anything down. No fevers, no focal abdominal pain, no dysuria, no chest pain, no sob.Admit to regular floor, IV fluids, Monitor BS, and lytres. Endocrine consult and f/u noted. On Lantus. GI and endocrine F/U noted. Lantus dose adjusted by Dr. Zamudio. Increase i/v fluids to 150 ml / hr.

## 2017-02-07 NOTE — PROGRESS NOTE ADULT - ASSESSMENT
N/V, Diarrhea - N/V resolved but the diarrhea has not improved. Doubt secondary to chronic pancreatitis.

## 2017-02-08 DIAGNOSIS — A41.9 SEPSIS, UNSPECIFIED ORGANISM: ICD-10-CM

## 2017-02-08 LAB
ALBUMIN SERPL ELPH-MCNC: 2.3 G/DL — LOW (ref 3.3–5)
ALP SERPL-CCNC: 163 U/L — HIGH (ref 40–120)
ALT FLD-CCNC: 18 U/L — SIGNIFICANT CHANGE UP (ref 12–78)
ANION GAP SERPL CALC-SCNC: 11 MMOL/L — SIGNIFICANT CHANGE UP (ref 5–17)
ANION GAP SERPL CALC-SCNC: 13 MMOL/L — SIGNIFICANT CHANGE UP (ref 5–17)
ANION GAP SERPL CALC-SCNC: 13 MMOL/L — SIGNIFICANT CHANGE UP (ref 5–17)
ANION GAP SERPL CALC-SCNC: 9 MMOL/L — SIGNIFICANT CHANGE UP (ref 5–17)
APPEARANCE UR: ABNORMAL
AST SERPL-CCNC: 15 U/L — SIGNIFICANT CHANGE UP (ref 15–37)
BACTERIA # UR AUTO: ABNORMAL
BASE EXCESS BLDA CALC-SCNC: -18.4 MMOL/L — LOW (ref -2–2)
BASOPHILS # BLD AUTO: 0.1 K/UL — SIGNIFICANT CHANGE UP (ref 0–0.2)
BASOPHILS NFR BLD AUTO: 0.9 % — SIGNIFICANT CHANGE UP (ref 0–2)
BILIRUB DIRECT SERPL-MCNC: 0.12 MG/DL — SIGNIFICANT CHANGE UP (ref 0.05–0.2)
BILIRUB INDIRECT FLD-MCNC: 0.2 MG/DL — SIGNIFICANT CHANGE UP (ref 0.2–1)
BILIRUB SERPL-MCNC: 0.3 MG/DL — SIGNIFICANT CHANGE UP (ref 0.2–1.2)
BILIRUB UR-MCNC: NEGATIVE — SIGNIFICANT CHANGE UP
BLOOD GAS COMMENTS: SIGNIFICANT CHANGE UP
BLOOD GAS COMMENTS: SIGNIFICANT CHANGE UP
BLOOD GAS SOURCE: SIGNIFICANT CHANGE UP
BUN SERPL-MCNC: 50 MG/DL — HIGH (ref 7–23)
BUN SERPL-MCNC: 53 MG/DL — HIGH (ref 7–23)
CALCIUM SERPL-MCNC: 8.1 MG/DL — LOW (ref 8.5–10.1)
CALCIUM SERPL-MCNC: 8.3 MG/DL — LOW (ref 8.5–10.1)
CALCIUM SERPL-MCNC: 8.7 MG/DL — SIGNIFICANT CHANGE UP (ref 8.5–10.1)
CALCIUM SERPL-MCNC: 8.9 MG/DL — SIGNIFICANT CHANGE UP (ref 8.5–10.1)
CHLORIDE SERPL-SCNC: 101 MMOL/L — SIGNIFICANT CHANGE UP (ref 96–108)
CHLORIDE SERPL-SCNC: 104 MMOL/L — SIGNIFICANT CHANGE UP (ref 96–108)
CHLORIDE SERPL-SCNC: 106 MMOL/L — SIGNIFICANT CHANGE UP (ref 96–108)
CHLORIDE SERPL-SCNC: 95 MMOL/L — LOW (ref 96–108)
CO2 SERPL-SCNC: 11 MMOL/L — LOW (ref 22–31)
CO2 SERPL-SCNC: 13 MMOL/L — LOW (ref 22–31)
COLOR SPEC: ABNORMAL
CREAT SERPL-MCNC: 4.61 MG/DL — HIGH (ref 0.5–1.3)
CREAT SERPL-MCNC: 4.9 MG/DL — HIGH (ref 0.5–1.3)
CREAT SERPL-MCNC: 5.01 MG/DL — HIGH (ref 0.5–1.3)
CREAT SERPL-MCNC: 5.09 MG/DL — HIGH (ref 0.5–1.3)
DIFF PNL FLD: ABNORMAL
EOSINOPHIL # BLD AUTO: 0 K/UL — SIGNIFICANT CHANGE UP (ref 0–0.5)
EOSINOPHIL NFR BLD AUTO: 0.1 % — SIGNIFICANT CHANGE UP (ref 0–6)
EPI CELLS # UR: SIGNIFICANT CHANGE UP
GLUCOSE SERPL-MCNC: 221 MG/DL — HIGH (ref 70–99)
GLUCOSE SERPL-MCNC: 242 MG/DL — HIGH (ref 70–99)
GLUCOSE SERPL-MCNC: 340 MG/DL — HIGH (ref 70–99)
GLUCOSE SERPL-MCNC: 81 MG/DL — SIGNIFICANT CHANGE UP (ref 70–99)
GLUCOSE UR QL: 100 MG/DL
GRAN CASTS # UR COMP ASSIST: ABNORMAL /LPF
HCO3 BLDA-SCNC: 8 MMOL/L — LOW (ref 21–29)
HCT VFR BLD CALC: 42.8 % — SIGNIFICANT CHANGE UP (ref 34.5–45)
HCT VFR BLD CALC: 49.3 % — HIGH (ref 34.5–45)
HGB BLD-MCNC: 15.6 G/DL — HIGH (ref 11.5–15.5)
HGB BLD-MCNC: 17.3 G/DL — HIGH (ref 11.5–15.5)
HOROWITZ INDEX BLDA+IHG-RTO: 21 — SIGNIFICANT CHANGE UP
KETONES UR-MCNC: NEGATIVE — SIGNIFICANT CHANGE UP
LACTATE SERPL-SCNC: 2.5 MMOL/L — HIGH (ref 0.7–2)
LACTATE SERPL-SCNC: 3.3 MMOL/L — HIGH (ref 0.7–2)
LEUKOCYTE ESTERASE UR-ACNC: ABNORMAL
LYMPHOCYTES # BLD AUTO: 0.5 K/UL — LOW (ref 1–3.3)
LYMPHOCYTES # BLD AUTO: 2.8 % — LOW (ref 13–44)
MCHC RBC-ENTMCNC: 32 PG — SIGNIFICANT CHANGE UP (ref 27–34)
MCHC RBC-ENTMCNC: 33 PG — SIGNIFICANT CHANGE UP (ref 27–34)
MCHC RBC-ENTMCNC: 35.1 GM/DL — SIGNIFICANT CHANGE UP (ref 32–36)
MCHC RBC-ENTMCNC: 36.4 GM/DL — HIGH (ref 32–36)
MCV RBC AUTO: 90.6 FL — SIGNIFICANT CHANGE UP (ref 80–100)
MCV RBC AUTO: 91 FL — SIGNIFICANT CHANGE UP (ref 80–100)
MONOCYTES # BLD AUTO: 0.7 K/UL — SIGNIFICANT CHANGE UP (ref 0–0.9)
MONOCYTES NFR BLD AUTO: 4.1 % — SIGNIFICANT CHANGE UP (ref 2–14)
NEUTROPHILS # BLD AUTO: 15.7 K/UL — HIGH (ref 1.8–7.4)
NEUTROPHILS NFR BLD AUTO: 92.2 % — HIGH (ref 43–77)
NITRITE UR-MCNC: NEGATIVE — SIGNIFICANT CHANGE UP
PCO2 BLDA: 19 MMHG — LOW (ref 32–46)
PH BLD: 7.24 — LOW (ref 7.35–7.45)
PH UR: 5 — SIGNIFICANT CHANGE UP (ref 4.8–8)
PLATELET # BLD AUTO: 229 K/UL — SIGNIFICANT CHANGE UP (ref 150–400)
PLATELET # BLD AUTO: 240 K/UL — SIGNIFICANT CHANGE UP (ref 150–400)
PO2 BLDA: 105 MMHG — SIGNIFICANT CHANGE UP (ref 74–108)
POTASSIUM SERPL-MCNC: 3.7 MMOL/L — SIGNIFICANT CHANGE UP (ref 3.5–5.3)
POTASSIUM SERPL-MCNC: 4 MMOL/L — SIGNIFICANT CHANGE UP (ref 3.5–5.3)
POTASSIUM SERPL-MCNC: 4.2 MMOL/L — SIGNIFICANT CHANGE UP (ref 3.5–5.3)
POTASSIUM SERPL-MCNC: 4.4 MMOL/L — SIGNIFICANT CHANGE UP (ref 3.5–5.3)
POTASSIUM SERPL-SCNC: 3.7 MMOL/L — SIGNIFICANT CHANGE UP (ref 3.5–5.3)
POTASSIUM SERPL-SCNC: 4 MMOL/L — SIGNIFICANT CHANGE UP (ref 3.5–5.3)
POTASSIUM SERPL-SCNC: 4.2 MMOL/L — SIGNIFICANT CHANGE UP (ref 3.5–5.3)
POTASSIUM SERPL-SCNC: 4.4 MMOL/L — SIGNIFICANT CHANGE UP (ref 3.5–5.3)
PROT SERPL-MCNC: 6.9 GM/DL — SIGNIFICANT CHANGE UP (ref 6–8.3)
PROT UR-MCNC: 100 MG/DL
RBC # BLD: 4.73 M/UL — SIGNIFICANT CHANGE UP (ref 3.8–5.2)
RBC # BLD: 5.42 M/UL — HIGH (ref 3.8–5.2)
RBC # FLD: 12.2 % — SIGNIFICANT CHANGE UP (ref 11–15)
RBC # FLD: 12.5 % — SIGNIFICANT CHANGE UP (ref 11–15)
RBC CASTS # UR COMP ASSIST: ABNORMAL /HPF (ref 0–4)
SAO2 % BLDA: 97 % — HIGH (ref 92–96)
SODIUM SERPL-SCNC: 119 MMOL/L — CRITICAL LOW (ref 135–145)
SODIUM SERPL-SCNC: 124 MMOL/L — LOW (ref 135–145)
SODIUM SERPL-SCNC: 125 MMOL/L — LOW (ref 135–145)
SODIUM SERPL-SCNC: 130 MMOL/L — LOW (ref 135–145)
SP GR SPEC: 1.01 — SIGNIFICANT CHANGE UP (ref 1.01–1.02)
UROBILINOGEN FLD QL: NEGATIVE MG/DL — SIGNIFICANT CHANGE UP
WBC # BLD: 17.1 K/UL — HIGH (ref 3.8–10.5)
WBC # BLD: 17.4 K/UL — HIGH (ref 3.8–10.5)
WBC # FLD AUTO: 17.1 K/UL — HIGH (ref 3.8–10.5)
WBC # FLD AUTO: 17.4 K/UL — HIGH (ref 3.8–10.5)
WBC UR QL: ABNORMAL

## 2017-02-08 PROCEDURE — 74176 CT ABD & PELVIS W/O CONTRAST: CPT | Mod: 26

## 2017-02-08 PROCEDURE — 99223 1ST HOSP IP/OBS HIGH 75: CPT

## 2017-02-08 RX ORDER — SODIUM CHLORIDE 9 MG/ML
1000 INJECTION, SOLUTION INTRAVENOUS
Qty: 0 | Refills: 0 | Status: DISCONTINUED | OUTPATIENT
Start: 2017-02-08 | End: 2017-02-08

## 2017-02-08 RX ORDER — MEROPENEM 1 G/30ML
500 INJECTION INTRAVENOUS EVERY 12 HOURS
Qty: 0 | Refills: 0 | Status: DISCONTINUED | OUTPATIENT
Start: 2017-02-08 | End: 2017-02-08

## 2017-02-08 RX ORDER — VANCOMYCIN HCL 1 G
1000 VIAL (EA) INTRAVENOUS ONCE
Qty: 0 | Refills: 0 | Status: COMPLETED | OUTPATIENT
Start: 2017-02-08 | End: 2017-02-08

## 2017-02-08 RX ORDER — MEROPENEM 1 G/30ML
1000 INJECTION INTRAVENOUS EVERY 24 HOURS
Qty: 0 | Refills: 0 | Status: DISCONTINUED | OUTPATIENT
Start: 2017-02-08 | End: 2017-02-10

## 2017-02-08 RX ORDER — SODIUM CHLORIDE 9 MG/ML
1000 INJECTION, SOLUTION INTRAVENOUS
Qty: 0 | Refills: 0 | Status: DISCONTINUED | OUTPATIENT
Start: 2017-02-08 | End: 2017-02-09

## 2017-02-08 RX ORDER — SODIUM CHLORIDE 9 MG/ML
1300 INJECTION INTRAMUSCULAR; INTRAVENOUS; SUBCUTANEOUS ONCE
Qty: 0 | Refills: 0 | Status: COMPLETED | OUTPATIENT
Start: 2017-02-08 | End: 2017-02-08

## 2017-02-08 RX ORDER — DEXTROSE 50 % IN WATER 50 %
25 SYRINGE (ML) INTRAVENOUS ONCE
Qty: 0 | Refills: 0 | Status: COMPLETED | OUTPATIENT
Start: 2017-02-08 | End: 2017-02-08

## 2017-02-08 RX ADMIN — Medication 250 MILLIGRAM(S): at 13:41

## 2017-02-08 RX ADMIN — Medication 25 MILLILITER(S): at 22:57

## 2017-02-08 RX ADMIN — Medication 4: at 16:57

## 2017-02-08 RX ADMIN — Medication 3 UNIT(S): at 07:55

## 2017-02-08 RX ADMIN — SODIUM CHLORIDE 150 MILLILITER(S): 9 INJECTION, SOLUTION INTRAVENOUS at 15:16

## 2017-02-08 RX ADMIN — Medication 6: at 07:54

## 2017-02-08 RX ADMIN — SODIUM CHLORIDE 150 MILLILITER(S): 9 INJECTION, SOLUTION INTRAVENOUS at 22:57

## 2017-02-08 RX ADMIN — INSULIN GLARGINE 7 UNIT(S): 100 INJECTION, SOLUTION SUBCUTANEOUS at 07:56

## 2017-02-08 RX ADMIN — Medication 125 MILLIGRAM(S): at 06:01

## 2017-02-08 RX ADMIN — GLATIRAMER ACETATE 20 MILLIGRAM(S): 20 INJECTION, SOLUTION SUBCUTANEOUS at 22:58

## 2017-02-08 RX ADMIN — ALBUTEROL 2.5 MILLIGRAM(S): 90 AEROSOL, METERED ORAL at 17:05

## 2017-02-08 RX ADMIN — MEROPENEM 200 MILLIGRAM(S): 1 INJECTION INTRAVENOUS at 13:41

## 2017-02-08 RX ADMIN — ALBUTEROL 2.5 MILLIGRAM(S): 90 AEROSOL, METERED ORAL at 05:13

## 2017-02-08 RX ADMIN — Medication 1 CAPSULE(S): at 08:47

## 2017-02-08 RX ADMIN — HEPARIN SODIUM 5000 UNIT(S): 5000 INJECTION INTRAVENOUS; SUBCUTANEOUS at 17:02

## 2017-02-08 RX ADMIN — HEPARIN SODIUM 5000 UNIT(S): 5000 INJECTION INTRAVENOUS; SUBCUTANEOUS at 06:01

## 2017-02-08 RX ADMIN — SODIUM CHLORIDE 1300 MILLILITER(S): 9 INJECTION INTRAMUSCULAR; INTRAVENOUS; SUBCUTANEOUS at 13:17

## 2017-02-08 RX ADMIN — PANTOPRAZOLE SODIUM 40 MILLIGRAM(S): 20 TABLET, DELAYED RELEASE ORAL at 13:14

## 2017-02-08 RX ADMIN — Medication 1 CAPSULE(S): at 16:58

## 2017-02-08 RX ADMIN — ALBUTEROL 2.5 MILLIGRAM(S): 90 AEROSOL, METERED ORAL at 11:13

## 2017-02-08 RX ADMIN — Medication 1 CAPSULE(S): at 13:17

## 2017-02-08 NOTE — PROGRESS NOTE ADULT - SUBJECTIVE AND OBJECTIVE BOX
Patient is a 61y old  Female who presents with a chief complaint of Nausea , Vomiting, pain abdomen. (2017 13:13)      INTERVAL HPI/OVERNIGHT EVENTS:  finger sticks are in 100's mainly but with fluctuations   on Lantus 7 units BID and Lispro sliding scale coverage with meals   po intake poorly responsive and patient with slight distress       MEDICATIONS  (STANDING):  clonazePAM Tablet 1milliGRAM(s) Oral at bedtime  glatiramer Injectable 20milliGRAM(s) SubCutaneous at bedtime  heparin  Injectable 5000Unit(s) SubCutaneous every 12 hours  insulin lispro (HumaLOG) corrective regimen sliding scale  SubCutaneous three times a day before meals  dextrose 5%. 1000milliLiter(s) IV Continuous <Continuous>  dextrose 50% Injectable 12.5Gram(s) IV Push once  dextrose 50% Injectable 25Gram(s) IV Push once  influenza   Vaccine 0.5milliLiter(s) IntraMuscular once  ALBUTerol    0.083% 2.5milliGRAM(s) Nebulizer every 6 hours  ALBUTerol    90 MICROgram(s) HFA Inhaler 1Puff(s) Inhalation every 4 hours  insulin lispro Injectable (HumaLOG) 3Unit(s) SubCutaneous three times a day before meals  pantoprazole  Injectable 40milliGRAM(s) IV Push daily  insulin glargine Injectable (LANTUS) 7Unit(s) SubCutaneous two times a day  amylase/lipase/protease  (CREON  6,000 Units) 1Capsule(s) Oral three times a day with meals  meropenem IVPB 1000milliGRAM(s) IV Intermittent every 24 hours  sodium chloride 0.45% 1000milliLiter(s) IV Continuous <Continuous>    MEDICATIONS  (PRN):  dextrose Gel 1Dose(s) Oral once PRN Blood Glucose LESS THAN 70 milliGRAM(s)/deciliter  glucagon  Injectable 1milliGRAM(s) IntraMuscular once PRN Glucose LESS THAN 70 milligrams/deciliter  ondansetron Injectable 4milliGRAM(s) IV Push every 8 hours PRN Nausea and/or Vomiting  acetaminophen   Tablet 650milliGRAM(s) Oral every 6 hours PRN For Temp greater than 38 C (100.4 F)      Allergies    No Known Allergies    Intolerances        REVIEW OF SYSTEMS:  CONSTITUTIONAL: No fever, weight loss, or fatigue but with anxiety   EYES: No eye pain, visual disturbances, or discharge  ENMT:  No difficulty hearing, tinnitus, vertigo; No sinus or throat pain  NECK: No pain or stiffness  RESPIRATORY: No cough, wheezing, chills or hemoptysis; No shortness of breath  CARDIOVASCULAR: No chest pain, palpitations, dizziness, or leg swelling  GASTROINTESTINAL: No abdominal or epigastric pain. but with slight  nausea, no  vomiting, or hematemesis; No diarrhea or constipation. No melena or hematochezia.  GENITOURINARY: No dysuria, frequency, hematuria, or incontinence  NEUROLOGICAL: No headaches, memory loss, loss of strength, numbness, or tremors  SKIN: No itching, burning, rashes, or lesions   LYMPH NODES: No enlarged glands  ENDOCRINE: No heat or cold intolerance; No hair loss  MUSCULOSKELETAL: No joint pain or swelling; No muscle, back, or extremity pain  PSYCHIATRIC: No depression, anxiety, mood swings, or difficulty sleeping  HEME/LYMPH: No easy bruising, or bleeding gums  ALLERY AND IMMUNOLOGIC: No hives or eczema    Vital Signs Last 24 Hrs  T(C): 36.2, Max: 37.3 ( @ 13:11)  T(F): 97.2, Max: 99.2 ( @ 13:11)  HR: 108 (93 - 114)  BP: 113/67 (103/66 - 125/70)  BP(mean): --  RR: 16 (16 - 18)  SpO2: 99% (95% - 100%)    PHYSICAL EXAM:  GENERAL: poorly nourished but no dehydration    HEAD:  Atraumatic, Normocephalic  EYES: EOMI, PERRLA, conjunctiva and sclera clear  NECK: Supple, No JVD, Normal thyroid  NERVOUS SYSTEM:  Alert & Oriented X3, Good concentration; Motor Strength 5/5 B/L upper and lower extremities; DTRs 2+ intact and symmetric  CHEST/LUNG: Clear to percussion bilaterally; No rales, rhonchi, wheezing, or rubs  HEART: Regular rate and rhythm; No murmurs, rubs, or gallops  ABDOMEN: Soft, Nontender, Nondistended; Bowel sounds present  EXTREMITIES:  2+ Peripheral Pulses, No clubbing, cyanosis, or edema    LABS:      Urinalysis Basic - ( 2017 15:24 )    Color: Bibiana / Appearance: Slightly Turbid / S.015 / pH: x  Gluc: x / Ketone: Negative  / Bili: Negative / Urobili: Negative mg/dL   Blood: x / Protein: 100 mg/dL / Nitrite: Negative   Leuk Esterase: Trace / RBC: 3-5 /HPF / WBC 6-10   Sq Epi: x / Non Sq Epi: Few / Bacteria: Moderate      CAPILLARY BLOOD GLUCOSE  243 (2017 16:34)  99 (2017 12:09)  292 (2017 07:50)    Lipid panel:       ABG - ( 2017 10:31 )  pH: x     /  pCO2: 19    /  pO2: 105   / HCO3: 8     / Base Excess: -18.4 /  SaO2: 97                  Thyroid:  Diabetes Tests:  Parathyroid Panel:  Adrenals:  RADIOLOGY & ADDITIONAL TESTS:    Imaging Personally Reviewed:  [ ] YES  [ ] NO    Consultant(s) Notes Reviewed:  [ ] YES  [ ] NO    Care Discussed with Consultants/Other Providers [ ] YES  [ ] NO

## 2017-02-08 NOTE — DIETITIAN INITIAL EVALUATION ADULT. - ETIOLOGY
Inadequate energy & protein intake related to REYNALDO, sepsis &  abdominal pain & N/V & diarrhea & uncontrolled DM; [id number]=70.0%(2/5)

## 2017-02-08 NOTE — CONSULT NOTE ADULT - SUBJECTIVE AND OBJECTIVE BOX
Infectious Diseases - Attending at Dr. Gooden    HPI:  Pt is a 60 yo lady with a pmhx of DM1 w prior insulin pump since removed, HTN, HL, MS who presents to the ED with N/v/d for 2 days. She ate tacos on Wednesday, and then started having n/v/d that night until now, unable to keep anything down. No fevers, no focal abdominal pain, no dysuria, no chest pain, no short of breath. (2017 13:13)    She was started on PO vancomycin as she has known history of C dfif. C difff PCR negative. Pt's wbc is worsening since yesterday .Today she is unresponsive and code sepsis called.  Pt also has acute renal failure. No fever but lactic acidosis noted. Pt's diarrhea has resolved      PAST MEDICAL & SURGICAL HISTORY:  Multiple sclerosis  Myasthenia gravis  Hypertension  Diabetes  H/O cervical spine surgery      Allergies    No Known Allergies    Intolerances        FAMILY HISTORY:  No pertinent family history in first degree relatives      SOCIAL HISTORY:unknown    REVIEW OF SYSTEMS:  unknown      MEDICATIONS  (STANDING):  clonazePAM Tablet 1milliGRAM(s) Oral at bedtime  glatiramer Injectable 20milliGRAM(s) SubCutaneous at bedtime  heparin  Injectable 5000Unit(s) SubCutaneous every 12 hours  insulin lispro (HumaLOG) corrective regimen sliding scale  SubCutaneous three times a day before meals  dextrose 5%. 1000milliLiter(s) IV Continuous <Continuous>  dextrose 50% Injectable 12.5Gram(s) IV Push once  dextrose 50% Injectable 25Gram(s) IV Push once  influenza   Vaccine 0.5milliLiter(s) IntraMuscular once  ALBUTerol    0.083% 2.5milliGRAM(s) Nebulizer every 6 hours  ALBUTerol    90 MICROgram(s) HFA Inhaler 1Puff(s) Inhalation every 4 hours  insulin lispro Injectable (HumaLOG) 3Unit(s) SubCutaneous three times a day before meals  pantoprazole  Injectable 40milliGRAM(s) IV Push daily  insulin glargine Injectable (LANTUS) 7Unit(s) SubCutaneous two times a day  amylase/lipase/protease  (CREON  6,000 Units) 1Capsule(s) Oral three times a day with meals  meropenem IVPB 1000milliGRAM(s) IV Intermittent every 24 hours  sodium chloride 0.45% 1000milliLiter(s) IV Continuous <Continuous>    MEDICATIONS  (PRN):  dextrose Gel 1Dose(s) Oral once PRN Blood Glucose LESS THAN 70 milliGRAM(s)/deciliter  glucagon  Injectable 1milliGRAM(s) IntraMuscular once PRN Glucose LESS THAN 70 milligrams/deciliter  ondansetron Injectable 4milliGRAM(s) IV Push every 8 hours PRN Nausea and/or Vomiting  acetaminophen   Tablet 650milliGRAM(s) Oral every 6 hours PRN For Temp greater than 38 C (100.4 F)      Vital Signs Last 24 Hrs  T(C): 37.3, Max: 37.3 ( @ 13:11)  T(F): 99.2, Max: 99.2 ( @ 13:11)  HR: 105 (96 - 114)  BP: 110/82 (98/69 - 125/70)  BP(mean): --  RR: 18 (16 - 18)  SpO2: 97% (95% - 100%)    PHYSICAL EXAM:    Constitutional: unresponsive ,no t following commands  No resp distress though  HEENT: PERRLA, EOMI, Normal Hearing, MMM  Neck: No LAD, No JVD  Back: Normal spine flexure, No CVA tenderness  Respiratory: CTAB/L  Cardiovascular: S1 and S2, RRR, no M/G/R  Gastrointestinal: BS+, soft, ? tendereness  Extremities: No peripheral edema  Vascular: 2+ peripheral pulses  Neurological: A/O x 3, no focal deficits  Skin: No rashes      LABS:                        17.3   17.4  )-----------( 240      ( 2017 10:14 )             49.3     2017 14:51    124    |  104    |  53     ----------------------------<  242    4.2     |  11     |  4.90     Ca    8.1        2017 14:51    TPro  6.9    /  Alb  2.3    /  TBili  0.3    /  DBili  .12    /  AST  15     /  ALT  18     /  AlkPhos  163    2017 10:15      Urinalysis Basic - ( 2017 15:24 )    Color: Bibiana / Appearance: Slightly Turbid / S.015 / pH: x  Gluc: x / Ketone: Negative  / Bili: Negative / Urobili: Negative mg/dL   Blood: x / Protein: 100 mg/dL / Nitrite: Negative   Leuk Esterase: Trace / RBC: x / WBC x   Sq Epi: x / Non Sq Epi: x / Bacteria: x        MICROBIOLOGY:  RECENT CULTURES:   .Urine Clean Catch (Midstream) Escherichia coli  Salmonella species XXXX   10,000 - 49,000 CFU/mL Escherichia coli  10,000 - 49,000 CFU/mL Salmonella enterica Group B  Revised urine counts reflect clinically significant counts  established by evidence based medicine.          RADIOLOGY & ADDITIONAL STUDIESEXAM:  ABD COMP DECUB & OR ERECT                            PROCEDURE DATE:  2017        INTERPRETATION:  Abdomen radiographs          CLINICAL INFORMATION: Acute renal injury. Nausea and vomiting. The   patient is unable to communicate.    TECHNIQUE: Supine and upright views of the abdomen were obtained.    FINDINGS: No prior exams are available for review.    The bowel gas pattern is unremarkable. There is no hepatosplenomegaly,   mass effect or signs of free air in the peritoneal cavity. Calcifications   are seen in the pancreas compatible with chronic pancreatitis.   Degenerative changes are seen in the spine.    IMPRESSION: Unremarkable bowel gas pattern. Pancreatic calcifications,   compatible with chronic pancreatitis.EXAM:  ABD COMP DECUB & OR ERECT                            PROCEDURE DATE:  2017        INTERPRETATION:  Abdomen radiographs          CLINICAL INFORMATION: Acute renal injury. Nausea and vomiting. The   patient is unable to communicate.    TECHNIQUE: Supine and upright views of the abdomen were obtained.    FINDINGS: No prior exams are available for review.    The bowel gas pattern is unremarkable. There is no hepatosplenomegaly,   mass effect or signs of free air in the peritoneal cavity. Calcifications   are seen in the pancreas compatible with chronic pancreatitis.   Degenerative changes are seen in the spine.    IMPRESSION: Unremarkable bowel gas pattern. Pancreatic calcifications,   compatible with chronic pancreatitis.: Infectious Diseases - Attending at Dr. Gooden    HPI:  Pt is a 60 yo lady with a pmhx of DM1 w prior insulin pump since removed, HTN, HL, MS who presents to the ED with N/v/d for 2 days. She ate tacos on Wednesday, and then started having n/v/d that night until now, unable to keep anything down. No fevers, no focal abdominal pain, no dysuria, no chest pain, no short of breath. (2017 13:13)    She was started on PO vancomycin for possible C diff infection. C diff PCR negative. Pt's wbc is worsening since yesterday .Today she is unresponsive and code sepsis called.  Pt also has acute renal failure. No fever but lactic acidosis noted. Pt's diarrhea has resolved      PAST MEDICAL & SURGICAL HISTORY:  Multiple sclerosis  Myasthenia gravis  Hypertension  Diabetes  H/O cervical spine surgery      Allergies    No Known Allergies    Intolerances        FAMILY HISTORY:  No pertinent family history in first degree relatives      SOCIAL HISTORY:unknown    REVIEW OF SYSTEMS:  unknown      MEDICATIONS  (STANDING):  clonazePAM Tablet 1milliGRAM(s) Oral at bedtime  glatiramer Injectable 20milliGRAM(s) SubCutaneous at bedtime  heparin  Injectable 5000Unit(s) SubCutaneous every 12 hours  insulin lispro (HumaLOG) corrective regimen sliding scale  SubCutaneous three times a day before meals  dextrose 5%. 1000milliLiter(s) IV Continuous <Continuous>  dextrose 50% Injectable 12.5Gram(s) IV Push once  dextrose 50% Injectable 25Gram(s) IV Push once  influenza   Vaccine 0.5milliLiter(s) IntraMuscular once  ALBUTerol    0.083% 2.5milliGRAM(s) Nebulizer every 6 hours  ALBUTerol    90 MICROgram(s) HFA Inhaler 1Puff(s) Inhalation every 4 hours  insulin lispro Injectable (HumaLOG) 3Unit(s) SubCutaneous three times a day before meals  pantoprazole  Injectable 40milliGRAM(s) IV Push daily  insulin glargine Injectable (LANTUS) 7Unit(s) SubCutaneous two times a day  amylase/lipase/protease  (CREON  6,000 Units) 1Capsule(s) Oral three times a day with meals  meropenem IVPB 1000milliGRAM(s) IV Intermittent every 24 hours  sodium chloride 0.45% 1000milliLiter(s) IV Continuous <Continuous>    MEDICATIONS  (PRN):  dextrose Gel 1Dose(s) Oral once PRN Blood Glucose LESS THAN 70 milliGRAM(s)/deciliter  glucagon  Injectable 1milliGRAM(s) IntraMuscular once PRN Glucose LESS THAN 70 milligrams/deciliter  ondansetron Injectable 4milliGRAM(s) IV Push every 8 hours PRN Nausea and/or Vomiting  acetaminophen   Tablet 650milliGRAM(s) Oral every 6 hours PRN For Temp greater than 38 C (100.4 F)      Vital Signs Last 24 Hrs  T(C): 37.3, Max: 37.3 ( @ 13:11)  T(F): 99.2, Max: 99.2 ( @ 13:11)  HR: 105 (96 - 114)  BP: 110/82 (98/69 - 125/70)  BP(mean): --  RR: 18 (16 - 18)  SpO2: 97% (95% - 100%)    PHYSICAL EXAM:    Constitutional: unresponsive ,no t following commands  No resp distress though  HEENT: PERRLA, EOMI, Normal Hearing, MMM  Neck: No LAD, No JVD  Back: Normal spine flexure, No CVA tenderness  Respiratory: CTAB/L  Cardiovascular: S1 and S2, RRR, no M/G/R  Gastrointestinal: BS+, soft, ? tendereness  Extremities: No peripheral edema  Vascular: 2+ peripheral pulses  Neurological: A/O x 3, no focal deficits  Skin: No rashes      LABS:                        17.3   17.4  )-----------( 240      ( 2017 10:14 )             49.3     2017 14:51    124    |  104    |  53     ----------------------------<  242    4.2     |  11     |  4.90     Ca    8.1        2017 14:51    TPro  6.9    /  Alb  2.3    /  TBili  0.3    /  DBili  .12    /  AST  15     /  ALT  18     /  AlkPhos  163    2017 10:15      Urinalysis Basic - ( 2017 15:24 )    Color: Bibiana / Appearance: Slightly Turbid / S.015 / pH: x  Gluc: x / Ketone: Negative  / Bili: Negative / Urobili: Negative mg/dL   Blood: x / Protein: 100 mg/dL / Nitrite: Negative   Leuk Esterase: Trace / RBC: x / WBC x   Sq Epi: x / Non Sq Epi: x / Bacteria: x        MICROBIOLOGY:  RECENT CULTURES:   .Urine Clean Catch (Midstream) Escherichia coli  Salmonella species XXXX   10,000 - 49,000 CFU/mL Escherichia coli  10,000 - 49,000 CFU/mL Salmonella enterica Group B  Revised urine counts reflect clinically significant counts  established by evidence based medicine.          RADIOLOGY & ADDITIONAL STUDIESEXAM:  ABD COMP DECUB & OR ERECT                            PROCEDURE DATE:  2017        INTERPRETATION:  Abdomen radiographs          CLINICAL INFORMATION: Acute renal injury. Nausea and vomiting. The   patient is unable to communicate.    TECHNIQUE: Supine and upright views of the abdomen were obtained.    FINDINGS: No prior exams are available for review.    The bowel gas pattern is unremarkable. There is no hepatosplenomegaly,   mass effect or signs of free air in the peritoneal cavity. Calcifications   are seen in the pancreas compatible with chronic pancreatitis.   Degenerative changes are seen in the spine.    IMPRESSION: Unremarkable bowel gas pattern. Pancreatic calcifications,   compatible with chronic pancreatitis.EXAM:  ABD COMP DECUB & OR ERECT                            PROCEDURE DATE:  2017        INTERPRETATION:  Abdomen radiographs          CLINICAL INFORMATION: Acute renal injury. Nausea and vomiting. The   patient is unable to communicate.    TECHNIQUE: Supine and upright views of the abdomen were obtained.    FINDINGS: No prior exams are available for review.    The bowel gas pattern is unremarkable. There is no hepatosplenomegaly,   mass effect or signs of free air in the peritoneal cavity. Calcifications   are seen in the pancreas compatible with chronic pancreatitis.   Degenerative changes are seen in the spine.    IMPRESSION: Unremarkable bowel gas pattern. Pancreatic calcifications,   compatible with chronic pancreatitis.:

## 2017-02-08 NOTE — CONSULT NOTE ADULT - SUBJECTIVE AND OBJECTIVE BOX
Patient is a 61y old  Female who presents with a chief complaint of Nausea , Vomiting, pain abdomen. (04 Feb 2017 13:13)    HPI:  Pt is a 60 yo lady with a pmhx of DM1 w prior insulin pump since removed, HTN, HL, MS who presents to the ED with N/v/d for 2 days. She ate tacos on Wednesday, and then started having n/v/d that night until now, unable to keep anything down. No fevers, no focal abdominal pain, no dysuria, no chest pain, no short of breath. (04 Feb 2017 13:13)    Noted worsening REYNALDO, metabolic acidosis; Urine culture with Salmonella; PLts; Hgb stable.    PAST MEDICAL & SURGICAL HISTORY:  Multiple sclerosis  Myasthenia gravis  Hypertension  Diabetes  H/O cervical spine surgery    FAMILY HISTORY:  No pertinent family history in first degree relatives    No Known Allergies    MEDICATIONS  (STANDING):  clonazePAM Tablet 1milliGRAM(s) Oral at bedtime  glatiramer Injectable 20milliGRAM(s) SubCutaneous at bedtime  heparin  Injectable 5000Unit(s) SubCutaneous every 12 hours  insulin lispro (HumaLOG) corrective regimen sliding scale  SubCutaneous three times a day before meals  dextrose 5%. 1000milliLiter(s) IV Continuous <Continuous>  dextrose 50% Injectable 12.5Gram(s) IV Push once  dextrose 50% Injectable 25Gram(s) IV Push once  influenza   Vaccine 0.5milliLiter(s) IntraMuscular once  ALBUTerol    0.083% 2.5milliGRAM(s) Nebulizer every 6 hours  ALBUTerol    90 MICROgram(s) HFA Inhaler 1Puff(s) Inhalation every 4 hours  insulin lispro Injectable (HumaLOG) 3Unit(s) SubCutaneous three times a day before meals  pantoprazole  Injectable 40milliGRAM(s) IV Push daily  insulin glargine Injectable (LANTUS) 7Unit(s) SubCutaneous two times a day  amylase/lipase/protease  (CREON  6,000 Units) 1Capsule(s) Oral three times a day with meals  meropenem IVPB 1000milliGRAM(s) IV Intermittent every 24 hours  sodium chloride 0.45% 1000milliLiter(s) IV Continuous <Continuous>    MEDICATIONS  (PRN):  dextrose Gel 1Dose(s) Oral once PRN Blood Glucose LESS THAN 70 milliGRAM(s)/deciliter  glucagon  Injectable 1milliGRAM(s) IntraMuscular once PRN Glucose LESS THAN 70 milligrams/deciliter  ondansetron Injectable 4milliGRAM(s) IV Push every 8 hours PRN Nausea and/or Vomiting  acetaminophen   Tablet 650milliGRAM(s) Oral every 6 hours PRN For Temp greater than 38 C (100.4 F)    Vital Signs Last 24 Hrs  T(C): 37.3, Max: 37.3 (02-07 @ 14:24)  T(F): 99.2, Max: 99.2 (02-07 @ 14:24)  HR: 105 (96 - 114)  BP: 110/82 (95/70 - 125/70)  BP(mean): --  RR: 18 (16 - 18)  SpO2: 97% (95% - 100%)    CAPILLARY BLOOD GLUCOSE  99 (08 Feb 2017 12:09)  292 (08 Feb 2017 07:50)  255 (07 Feb 2017 17:50)    PHYSICAL EXAM: alert X1 ; lethargic      T(C): 37.3, Max: 37.3 (02-07 @ 14:24)  HR: 105 (96 - 114)  BP: 110/82 (95/70 - 125/70)  RR: 18 (16 - 18)  SpO2: 97% (95% - 100%)  Wt(kg): --  Respiratory: clear anteriorly, decreased BS at bases  Cardiovascular: S1 S2  Gastrointestinal: soft NT ND +BS  Extremities:   no edema              08 Feb 2017 10:14    125    |  101    |  53     ----------------------------<  221    4.0     |  11     |  5.09     Ca    8.9        08 Feb 2017 10:14    TPro  6.9    /  Alb  2.3    /  TBili  0.3    /  DBili  .12    /  AST  15     /  ALT  18     /  AlkPhos  163    08 Feb 2017 10:15                          17.3   17.4  )-----------( 240      ( 08 Feb 2017 10:14 )             49.3       ABG - ( 08 Feb 2017 10:31 )  pH: x     /  pCO2: 19    /  pO2: 105   / HCO3: 8     / Base Excess: -18.4 /  SaO2: 97                    Assessment and Plan    REYNALDO, DKA, sepsis, ATN; metabolic acidosis with REYNALDO and GI loss; hyponatremia with REYNALDO, hyperglycemia; r/o Salmonella sepsis.  IVF with bicarbonate support; IV abx as per PCCM recommendations;  Fernando, I/O;   UA micro;  No immediate indication for HD, will follow closely; basic panel q6-8 hrs.

## 2017-02-08 NOTE — CHART NOTE - NSCHARTNOTEFT_GEN_A_CORE
Upon Nutritional Assessment by the Registered Dietitian your patient was determined to meet criteria / has evidence of the following diagnosis/diagnoses:          [ ]  Mild Protein Calorie Malnutrition        [ ]  Moderate Protein Calorie Malnutrition        [x ] Severe Protein Calorie Malnutrition        [ ] Unspecified Protein Calorie Malnutrition        [x ] Underweight / BMI <19        [ ] Morbid Obesity / BMI > 40      Findings as based on:  •  Comprehensive nutrition assessment and consultation  •  Calorie counts (nutrient intake analysis)  •  Food acceptance and intake status from observations by staff  •  Follow up  •  Patient education  •  Intervention secondary to interdisciplinary rounds  •   concerns      Treatment:    The following diet has been recommended:  Adv po diet when medically feasible Full liquids/Glucerna shake 1 can 3 x day    PROVIDER Section:     By signing this assessment you are acknowledging and agree with the diagnosis/diagnoses assigned by the Registered Dietitian    Comments:

## 2017-02-08 NOTE — DIETITIAN INITIAL EVALUATION ADULT. - OTHER INFO
Pt seen for Clear liquids x 4 days & HgbA1C>7.0% & BMI=18. Unable to obtain wt & diet hx due to lethargy & family no present. Pt with hx DM type1 managed DM with Lantus & Novolog(no longer on insulin pump). Pt consuming & tolerating Clear liquids consumed poorly 25% consumed due to REYNALDO.

## 2017-02-08 NOTE — CONSULT NOTE ADULT - PROBLEM SELECTOR RECOMMENDATION 2
presented with N/V/D Urine culture has salmonella -possible salmonella gastroenteritis R/O Salmonella BSI r/o salmonleea enterocolitis etc  Send Stool c/s   start Meropenem

## 2017-02-08 NOTE — PROGRESS NOTE ADULT - SUBJECTIVE AND OBJECTIVE BOX
Patient is a 61y old  Female who presents with a chief complaint of Nausea , Vomiting, pain abdomen. (04 Feb 2017 13:13)      HPI:  Pt is a 60 yo lady with a pmhx of DM1 w prior insulin pump since removed, HTN, HL, MS who presents to the ED with N/v/d for 2 days. She ate tacos on Wednesday, and then started having n/v/d that night until now, unable to keep anything down. No fevers, no focal abdominal pain, no dysuria, no chest pain, no short of breath. (04 Feb 2017 13:13)      INTERVAL HPI/OVERNIGHT EVENTS:  Lethargic    MEDICATIONS  (STANDING):  clonazePAM Tablet 1milliGRAM(s) Oral at bedtime  glatiramer Injectable 20milliGRAM(s) SubCutaneous at bedtime  heparin  Injectable 5000Unit(s) SubCutaneous every 12 hours  insulin lispro (HumaLOG) corrective regimen sliding scale  SubCutaneous three times a day before meals  dextrose 5%. 1000milliLiter(s) IV Continuous <Continuous>  dextrose 50% Injectable 12.5Gram(s) IV Push once  dextrose 50% Injectable 25Gram(s) IV Push once  vancomycin    Solution 125milliGRAM(s) Oral every 6 hours  influenza   Vaccine 0.5milliLiter(s) IntraMuscular once  ALBUTerol    0.083% 2.5milliGRAM(s) Nebulizer every 6 hours  ALBUTerol    90 MICROgram(s) HFA Inhaler 1Puff(s) Inhalation every 4 hours  insulin lispro Injectable (HumaLOG) 3Unit(s) SubCutaneous three times a day before meals  pantoprazole  Injectable 40milliGRAM(s) IV Push daily  insulin glargine Injectable (LANTUS) 7Unit(s) SubCutaneous two times a day  amylase/lipase/protease  (CREON  6,000 Units) 1Capsule(s) Oral three times a day with meals  dextrose 5% + sodium chloride 0.9%. 1000milliLiter(s) IV Continuous <Continuous>    MEDICATIONS  (PRN):  dextrose Gel 1Dose(s) Oral once PRN Blood Glucose LESS THAN 70 milliGRAM(s)/deciliter  glucagon  Injectable 1milliGRAM(s) IntraMuscular once PRN Glucose LESS THAN 70 milligrams/deciliter  ondansetron Injectable 4milliGRAM(s) IV Push every 8 hours PRN Nausea and/or Vomiting  acetaminophen   Tablet 650milliGRAM(s) Oral every 6 hours PRN For Temp greater than 38 C (100.4 F)      FAMILY HISTORY:  No pertinent family history in first degree relatives      Allergies    No Known Allergies    Intolerances        PMH/PSH:  Multiple sclerosis  Myasthenia gravis  Hypertension  Diabetes  H/O cervical spine surgery        REVIEW OF SYSTEMS:  CONSTITUTIONAL: No fever, weight loss, or fatigue  EYES: No eye pain, visual disturbances, or discharge  ENMT:  No difficulty hearing, tinnitus, vertigo; No sinus or throat pain  NECK: No pain or stiffness  BREASTS: No pain, masses, or nipple discharge  RESPIRATORY: No cough, wheezing, chills or hemoptysis; No shortness of breath  CARDIOVASCULAR: No chest pain, palpitations, dizziness, or leg swelling  GASTROINTESTINAL: No abdominal or epigastric pain. No nausea, vomiting, or hematemesis; No diarrhea or constipation. No melena or hematochezia.  GENITOURINARY: No dysuria, frequency, hematuria, or incontinence  NEUROLOGICAL: No headaches, memory loss, loss of strength, numbness, or tremors  SKIN: No itching, burning, rashes, or lesions   LYMPH NODES: No enlarged glands  ENDOCRINE: No heat or cold intolerance; No hair loss  MUSCULOSKELETAL: No joint pain or swelling; No muscle, back, or extremity pain  PSYCHIATRIC: No depression, anxiety, mood swings, or difficulty sleeping  HEME/LYMPH: No easy bruising, or bleeding gums  ALLERGY AND IMMUNOLOGIC: No hives or eczema    Vital Signs Last 24 Hrs  T(C): 36.3, Max: 37.3 (02-07 @ 14:24)  T(F): 97.4, Max: 99.2 (02-07 @ 14:24)  HR: 108 (97 - 117)  BP: 125/70 (95/70 - 125/70)  BP(mean): --  RR: 18 (16 - 18)  SpO2: 98% (95% - 100%)    PHYSICAL EXAM:  GENERAL: NAD, well-groomed, well-developed  HEAD:  Atraumatic, Normocephalic  EYES: EOMI, PERRLA, conjunctiva and sclera clear  ENMT: No tonsillar erythema, exudates, or enlargement; Moist mucous membranes, Good dentition, No lesions  NECK: Supple, No JVD, Normal thyroid  NERVOUS SYSTEM:  Alert & Oriented X3, Good concentration; Motor Strength 5/5 B/L upper and lower extremities; DTRs 2+ intact and symmetric  CHEST/LUNG: Clear to percussion bilaterally; No rales, rhonchi, wheezing, or rubs  HEART: Regular rate and rhythm; No murmurs, rubs, or gallops  ABDOMEN: Soft, Nontender, Nondistended; Bowel sounds present  EXTREMITIES:  2+ Peripheral Pulses, No clubbing, cyanosis, or edema  LYMPH: No lymphadenopathy noted  SKIN: No rashes or lesions    LABS:  amylase--[id number] 02-04 @ 11:16    02-08 @ 08:35   HGB 42.8  HCT 15.6  MCV 90.6   RDW 12.5 WBC 17.1 PLTA 229        02-05 @ 05:14   HGB 36.2  HCT 13.6  MCV 87.6   RDW 11.6 WBC 3.2 PLTA 243        02-04 @ 11:16   HGB 39.6  HCT 14.7  MCV 86.0   RDW 11.2 WBC 4.6 PLTA 241          08 Feb 2017 08:35    119    |  95     |  50     ----------------------------<  340    4.4     |  11     |  5.01   05 Feb 2017 18:06    x      |  x      |  x      ----------------------------<  x      3.5     |  x      |  x      05 Feb 2017 05:14    124    |  95     |  22     ----------------------------<  453    2.8     |  17     |  1.47   04 Feb 2017 11:16    122    |  87     |  35     ----------------------------<  360    2.6     |  18     |  3.22     Ca    8.7        08 Feb 2017 08:35  Ca    7.6        05 Feb 2017 05:14  Ca    7.3        04 Feb 2017 11:16  Mg     1.8       05 Feb 2017 05:14    TPro  6.4    /  Alb  2.6    /  TBili  0.6    /  DBili  x      /  AST  22     /  ALT  29     /  AlkPhos  104    04 Feb 2017 11:16        CAPILLARY BLOOD GLUCOSE  292 (08 Feb 2017 07:50)  255 (07 Feb 2017 17:50)  169 (07 Feb 2017 11:20)        RADIOLOGY & ADDITIONAL TESTS:    Imaging Personally Reviewed:  [ ] YES  [ ] NO    Consultant(s) Notes Reviewed:  [ ] YES  [ ] NO    Care Discussed with Consultants/Other Providers [ ] YES  [ ] NO Patient is a 61y old  Female who presents with a chief complaint of Nausea , Vomiting, pain abdomen. (04 Feb 2017 13:13)      HPI:  Pt is a 60 yo lady with a pmhx of DM1 w prior insulin pump since removed, HTN, HL, MS who presents to the ED with N/v/d for 2 days. She ate tacos on Wednesday, and then started having n/v/d that night until now, unable to keep anything down. No fevers, no focal abdominal pain, no dysuria, no chest pain, no short of breath. (04 Feb 2017 13:13)      INTERVAL HPI/OVERNIGHT EVENTS:  Lethargic, two bowel movements over night. See lab results.    MEDICATIONS  (STANDING):  clonazePAM Tablet 1milliGRAM(s) Oral at bedtime  glatiramer Injectable 20milliGRAM(s) SubCutaneous at bedtime  heparin  Injectable 5000Unit(s) SubCutaneous every 12 hours  insulin lispro (HumaLOG) corrective regimen sliding scale  SubCutaneous three times a day before meals  dextrose 5%. 1000milliLiter(s) IV Continuous <Continuous>  dextrose 50% Injectable 12.5Gram(s) IV Push once  dextrose 50% Injectable 25Gram(s) IV Push once  vancomycin    Solution 125milliGRAM(s) Oral every 6 hours  influenza   Vaccine 0.5milliLiter(s) IntraMuscular once  ALBUTerol    0.083% 2.5milliGRAM(s) Nebulizer every 6 hours  ALBUTerol    90 MICROgram(s) HFA Inhaler 1Puff(s) Inhalation every 4 hours  insulin lispro Injectable (HumaLOG) 3Unit(s) SubCutaneous three times a day before meals  pantoprazole  Injectable 40milliGRAM(s) IV Push daily  insulin glargine Injectable (LANTUS) 7Unit(s) SubCutaneous two times a day  amylase/lipase/protease  (CREON  6,000 Units) 1Capsule(s) Oral three times a day with meals  dextrose 5% + sodium chloride 0.9%. 1000milliLiter(s) IV Continuous <Continuous>    MEDICATIONS  (PRN):  dextrose Gel 1Dose(s) Oral once PRN Blood Glucose LESS THAN 70 milliGRAM(s)/deciliter  glucagon  Injectable 1milliGRAM(s) IntraMuscular once PRN Glucose LESS THAN 70 milligrams/deciliter  ondansetron Injectable 4milliGRAM(s) IV Push every 8 hours PRN Nausea and/or Vomiting  acetaminophen   Tablet 650milliGRAM(s) Oral every 6 hours PRN For Temp greater than 38 C (100.4 F)      FAMILY HISTORY:  No pertinent family history in first degree relatives      Allergies    No Known Allergies    Intolerances        PMH/PSH:  Multiple sclerosis  Myasthenia gravis  Hypertension  Diabetes  H/O cervical spine surgery        REVIEW OF SYSTEMS:  CONSTITUTIONAL: Lethargic  No fever, weight loss,  EYES: No eye pain, visual disturbances, or discharge  NECK: No pain or stiffness  RESPIRATORY: No cough, wheezing, chills or hemoptysis; No shortness of breath  CARDIOVASCULAR: No chest pain, palpitations, dizziness, or leg swelling  GASTROINTESTINAL: No abdominal or epigastric pain. No nausea, vomiting, or hematemesis; No constipation. No melena or hematochezia.  GENITOURINARY: No dysuria, frequency, hematuria, or incontinence  NEUROLOGICAL: No headaches, memory loss, loss of strength, numbness, or tremors  SKIN: No itching, burning, rashes, or lesions   LYMPH NODES: No enlarged glands    Vital Signs Last 24 Hrs  T(C): 36.3, Max: 37.3 (02-07 @ 14:24)  T(F): 97.4, Max: 99.2 (02-07 @ 14:24)  HR: 108 (97 - 117)  BP: 125/70 (95/70 - 125/70)  BP(mean): --  RR: 18 (16 - 18)  SpO2: 98% (95% - 100%)    PHYSICAL EXAM:  GENERAL: NAD, well-groomed, well-developed  HEAD:  Atraumatic, Normocephalic  EYES: EOMI, PERRLA, conjunctiva and sclera clear  NECK: Supple, No JVD, Normal thyroid  NERVOUS SYSTEM:  Alert & Oriented X2, Easily arouse able  concentration; Motor Strength 5/5 B/L upper and lower extremities;   CHEST/LUNG: Clear to percussion bilaterally; No rales, rhonchi, wheezing, or rubs  HEART: Regular rate and rhythm; No murmurs, rubs, or gallops  ABDOMEN: Soft, Nontender, Nondistended; Bowel sounds present  EXTREMITIES:  2+ Peripheral Pulses, No clubbing, cyanosis, or edema  LYMPH: No lymphadenopathy noted  SKIN: No rashes or lesions    LABS:  amylase--[id number] 02-04 @ 11:16    02-08 @ 08:35   HGB 42.8  HCT 15.6  MCV 90.6   RDW 12.5 WBC 17.1 PLTA 229        02-05 @ 05:14   HGB 36.2  HCT 13.6  MCV 87.6   RDW 11.6 WBC 3.2 PLTA 243        02-04 @ 11:16   HGB 39.6  HCT 14.7  MCV 86.0   RDW 11.2 WBC 4.6 PLTA 241          08 Feb 2017 08:35    119    |  95     |  50     ----------------------------<  340    4.4     |  11     |  5.01   05 Feb 2017 18:06    x      |  x      |  x      ----------------------------<  x      3.5     |  x      |  x      05 Feb 2017 05:14    124    |  95     |  22     ----------------------------<  453    2.8     |  17     |  1.47   04 Feb 2017 11:16    122    |  87     |  35     ----------------------------<  360    2.6     |  18     |  3.22     Ca    8.7        08 Feb 2017 08:35  Ca    7.6        05 Feb 2017 05:14  Ca    7.3        04 Feb 2017 11:16  Mg     1.8       05 Feb 2017 05:14    TPro  6.4    /  Alb  2.6    /  TBili  0.6    /  DBili  x      /  AST  22     /  ALT  29     /  AlkPhos  104    04 Feb 2017 11:16        CAPILLARY BLOOD GLUCOSE  292 (08 Feb 2017 07:50)  255 (07 Feb 2017 17:50)  169 (07 Feb 2017 11:20)        RADIOLOGY & ADDITIONAL TESTS:    Imaging Personally Reviewed:  [ ] YES  [ ] NO    Consultant(s) Notes Reviewed:  [ ] YES  [ ] NO    Care Discussed with Consultants/Other Providers [ ] YES  [ ] NO

## 2017-02-08 NOTE — PROGRESS NOTE ADULT - PROBLEM SELECTOR PLAN 1
Continue with the same regimen while inpatient   encourage more Nutrition   maintain adequate hydration

## 2017-02-08 NOTE — DIETITIAN INITIAL EVALUATION ADULT. - PERTINENT MEDS FT
Heparin, Vanco, Amylase, Lantus 7units 2 x day, Tylenol, Humalog 3units ac meals, Albuterol, Zofran, Humalog, Copaxone, Klonopin

## 2017-02-08 NOTE — PROGRESS NOTE ADULT - PROBLEM SELECTOR PLAN 2
pancreatic enzymes added . Imodium x 1 given  Cymbalta d/c'd temporarily. Colonoscopy if no improvement.

## 2017-02-08 NOTE — PROGRESS NOTE ADULT - ASSESSMENT
N/V, Diarrhea - N/V resolved but the diarrhea has not improved. Doubt secondary to chronic pancreatitis. Had 2 BMs overnight. Imodium x 1 given    Note : Blood work reveals ARF, leucocytosis, acidosis R/O DKA, Sepsis,  repeat blood work ordered stat, ICU consult called. Renal, ID consult to be called by house staff.

## 2017-02-08 NOTE — CHART NOTE - NSCHARTNOTEFT_GEN_A_CORE
Patient is a 61y old  Female who presents with a chief complaint of Nausea , Vomiting, pain abdomen. (2017 13:13)      INTERVAL HPI/OVERNIGHT EVENTS:    MEDICATIONS  (STANDING):  clonazePAM Tablet 1milliGRAM(s) Oral at bedtime  glatiramer Injectable 20milliGRAM(s) SubCutaneous at bedtime  heparin  Injectable 5000Unit(s) SubCutaneous every 12 hours  insulin lispro (HumaLOG) corrective regimen sliding scale  SubCutaneous three times a day before meals  dextrose 5%. 1000milliLiter(s) IV Continuous <Continuous>  dextrose 50% Injectable 12.5Gram(s) IV Push once  dextrose 50% Injectable 25Gram(s) IV Push once  influenza   Vaccine 0.5milliLiter(s) IntraMuscular once  ALBUTerol    0.083% 2.5milliGRAM(s) Nebulizer every 6 hours  ALBUTerol    90 MICROgram(s) HFA Inhaler 1Puff(s) Inhalation every 4 hours  insulin lispro Injectable (HumaLOG) 3Unit(s) SubCutaneous three times a day before meals  pantoprazole  Injectable 40milliGRAM(s) IV Push daily  insulin glargine Injectable (LANTUS) 7Unit(s) SubCutaneous two times a day  amylase/lipase/protease  (CREON  6,000 Units) 1Capsule(s) Oral three times a day with meals  meropenem IVPB 1000milliGRAM(s) IV Intermittent every 24 hours  sodium chloride 0.45% 1000milliLiter(s) IV Continuous <Continuous>    MEDICATIONS  (PRN):  dextrose Gel 1Dose(s) Oral once PRN Blood Glucose LESS THAN 70 milliGRAM(s)/deciliter  glucagon  Injectable 1milliGRAM(s) IntraMuscular once PRN Glucose LESS THAN 70 milligrams/deciliter  ondansetron Injectable 4milliGRAM(s) IV Push every 8 hours PRN Nausea and/or Vomiting  acetaminophen   Tablet 650milliGRAM(s) Oral every 6 hours PRN For Temp greater than 38 C (100.4 F)      Allergies    No Known Allergies    Intolerances        REVIEW OF SYSTEMS:  CONSTITUTIONAL: No fever, weight loss, or fatigue  EYES: No eye pain, visual disturbances, or discharge  ENMT:  No difficulty hearing, tinnitus, vertigo; No sinus or throat pain  NECK: No pain or stiffness  RESPIRATORY: No cough, wheezing, chills or hemoptysis; No shortness of breath  CARDIOVASCULAR: No chest pain, palpitations, dizziness, or leg swelling  GASTROINTESTINAL: No abdominal or epigastric pain. No nausea, vomiting, or hematemesis; No diarrhea or constipation. No melena or hematochezia.  GENITOURINARY: No dysuria, frequency, hematuria, or incontinence  NEUROLOGICAL: No headaches, memory loss, loss of strength, numbness, or tremors  SKIN: No itching, burning, rashes, or lesions   LYMPH NODES: No enlarged glands  ENDOCRINE: No heat or cold intolerance; No hair loss  MUSCULOSKELETAL: No joint pain or swelling; No muscle, back, or extremity pain  PSYCHIATRIC: No depression, anxiety, mood swings, or difficulty sleeping  HEME/LYMPH: No easy bruising, or bleeding gums  ALLERY AND IMMUNOLOGIC: No hives or eczema    Vital Signs Last 24 Hrs  T(C): 36.2, Max: 37.3 ( @ 13:11)  T(F): 97.2, Max: 99.2 ( @ 13:11)  HR: 108 (93 - 114)  BP: 113/67 (103/66 - 125/70)  BP(mean): --  RR: 16 (16 - 18)  SpO2: 99% (95% - 100%)    PHYSICAL EXAM:  GENERAL: NAD, well-groomed, well-developed  HEAD:  Atraumatic, Normocephalic  EYES: EOMI, PERRLA, conjunctiva and sclera clear  ENMT: No tonsillar erythema, exudates, or enlargement; Moist mucous membranes, Good dentition, No lesions  NECK: Supple, No JVD, Normal thyroid  NERVOUS SYSTEM:  Alert & Oriented X3, Good concentration; Motor Strength 5/5 B/L upper and lower extremities; DTRs 2+ intact and symmetric  CHEST/LUNG: Clear to percussion bilaterally; No rales, rhonchi, wheezing, or rubs  HEART: Regular rate and rhythm; No murmurs, rubs, or gallops  ABDOMEN: Soft, Nontender, Nondistended; Bowel sounds present  EXTREMITIES:  2+ Peripheral Pulses, No clubbing, cyanosis, or edema  LYMPH: No lymphadenopathy noted  SKIN: No rashes or lesions    LABS:      Urinalysis Basic - ( 2017 15:24 )    Color: Bibiana / Appearance: Slightly Turbid / S.015 / pH: x  Gluc: x / Ketone: Negative  / Bili: Negative / Urobili: Negative mg/dL   Blood: x / Protein: 100 mg/dL / Nitrite: Negative   Leuk Esterase: Trace / RBC: 3-5 /HPF / WBC 6-10   Sq Epi: x / Non Sq Epi: Few / Bacteria: Moderate      CAPILLARY BLOOD GLUCOSE  243 (2017 16:34)  99 (2017 12:09)  292 (2017 07:50)    Lipid panel:       ABG - ( 2017 10:31 )  pH: x     /  pCO2: 19    /  pO2: 105   / HCO3: 8     / Base Excess: -18.4 /  SaO2: 97                  Thyroid:  Diabetes Tests:  Parathyroid Panel:  Adrenals:  RADIOLOGY & ADDITIONAL TESTS:    Imaging Personally Reviewed:  [ ] YES  [ ] NO    Consultant(s) Notes Reviewed:  [ ] YES  [ ] NO    Care Discussed with Consultants/Other Providers [ ] YES  [ ] NO

## 2017-02-08 NOTE — CHART NOTE - NSCHARTNOTEFT_GEN_A_CORE
Hospitalist- Medicine NP    CC: Abnormal lab results    Pt is a 60 yo lady with a pmhx of DM1 w prior insulin pump since removed, HTN, HL, MS who presents to the ED with N/v/d for 2 days. She ate tacos on Wednesday, and then started having n/v/d that night until now, unable to keep anything down. No fevers, no focal abdominal pain, no dysuria, no chest pain, no short of breath.     Vital Signs Last 24 Hrs  T(C): 37.1, Max: 37.3 (02-07 @ 14:24)  T(F): 98.8, Max: 99.2 (02-07 @ 14:24)  HR: 114 (96 - 114)  BP: 120/81 (95/70 - 125/70)  BP(mean): --  RR: 18 (16 - 18)  SpO2: 96% (95% - 100%)    REVIEW OF SYSTEMS:  CONSTITUTIONAL: Lethargic, No fever, weight loss,  EYES: No eye pain, visual disturbances, or discharge  NECK: No pain or stiffness  RESPIRATORY: No cough, wheezing, chills or hemoptysis; No shortness of breath  CARDIOVASCULAR: No chest pain, palpitations, dizziness, or leg swelling  GASTROINTESTINAL: No abdominal or epigastric pain. No nausea, vomiting, or hematemesis; No constipation. No melena or hematochezia.    PHYSICAL EXAM:  GENERAL: NAD, well-groomed, well-developed  HEAD:  Atraumatic, Normocephalic  EYES: EOMI, PERRLA, conjunctiva and sclera clear  NECK: Supple, No JVD, Normal thyroid  NERVOUS SYSTEM:  Alert & Oriented X2, Easily arouse able  concentration        Labs:                    17.3   17.4  )-----------( 240      ( 08 Feb 2017 10:14 )             49.3     08 Feb 2017 10:14    125    |  101    |  53     ----------------------------<  221    4.0     |  11     |  5.09     Ca    8.9        08 Feb 2017 10:14    TPro  6.9    /  Alb  2.3    /  TBili  0.3    /  DBili  .12    /  AST  15     /  ALT  18     /  AlkPhos  163    08 Feb 2017 10:15    LIVER FUNCTIONS - ( 08 Feb 2017 10:15 )  Alb: 2.3 g/dL / Pro: 6.9 gm/dL / ALK PHOS: 163 U/L / ALT: 18 U/L / AST: 15 U/L / GGT: x                                     CAPILLARY BLOOD GLUCOSE  99 (08 Feb 2017 12:09)  292 (08 Feb 2017 07:50)  255 (07 Feb 2017 17:50)    Assessment and plan:     61 year old admitted with N/V, Diarrhea - N/V resolved but the diarrhea has not improved.   Seen now for abnormal Blood work reveals ARF, leucocytosis, acidosis R/O DKA, Sepsis,     ICU consult called.   Renal consult Dr. Steve  ID consult Dr. Whyte   D/w Dr. Gramajo   will continue to monitor

## 2017-02-08 NOTE — DIETITIAN INITIAL EVALUATION ADULT. - PERTINENT LABORATORY DATA
02-08 Na 119   Glu 340   K+ 4.4   Cr  5.01   BUN 50   Phos n/a   Alb 2.6   PAB n/a   Hgb 17.3 g/dL<H> Hct 49.3 %<H>, AynM9F=86.0%(2/5),  GFR=9

## 2017-02-08 NOTE — DIETITIAN INITIAL EVALUATION ADULT. - PHYSICAL APPEARANCE
underweight/BMI=18.0; +3 edema Lt arm; Nutrition focused physical exam conducted ; found signs of malnutrition [ ]absent [x ]present.  Subcutaneous fat loss: [Moderate ] Orbital fat pads region, [moderate ]Buccal fat region, [moderate ]Triceps region,  [severe ]Ribs region.  Muscle wasting: [moderate ]Temples region, [severe ]Clavicle region, [severe ]Shoulder region, [severe ]Scapula region, [moderate ]Interosseous region,  [moderate ]thigh region, [moderate ]Calf region

## 2017-02-08 NOTE — DIETITIAN INITIAL EVALUATION ADULT. - NS AS NUTRI INTERV MEDICAL AND FOOD SUPPLEMENTS
Recommend when medically feasible Glucerna shake 1 can 3 x day(220kcal & 10gm protein)/Commercial beverage

## 2017-02-09 DIAGNOSIS — J18.9 PNEUMONIA, UNSPECIFIED ORGANISM: ICD-10-CM

## 2017-02-09 DIAGNOSIS — A41.9 SEPSIS, UNSPECIFIED ORGANISM: ICD-10-CM

## 2017-02-09 DIAGNOSIS — E10.641 TYPE 1 DIABETES MELLITUS WITH HYPOGLYCEMIA WITH COMA: ICD-10-CM

## 2017-02-09 DIAGNOSIS — G93.41 METABOLIC ENCEPHALOPATHY: ICD-10-CM

## 2017-02-09 DIAGNOSIS — R78.81 BACTEREMIA: ICD-10-CM

## 2017-02-09 LAB
AMYLASE P1 CFR SERPL: 9 U/L — LOW (ref 25–115)
ANION GAP SERPL CALC-SCNC: 5 MMOL/L — SIGNIFICANT CHANGE UP (ref 5–17)
ANION GAP SERPL CALC-SCNC: 8 MMOL/L — SIGNIFICANT CHANGE UP (ref 5–17)
BASE EXCESS BLDA CALC-SCNC: -1.7 MMOL/L — SIGNIFICANT CHANGE UP (ref -2–2)
BASOPHILS # BLD AUTO: 0.1 K/UL — SIGNIFICANT CHANGE UP (ref 0–0.2)
BASOPHILS NFR BLD AUTO: 0.4 % — SIGNIFICANT CHANGE UP (ref 0–2)
BLOOD GAS COMMENTS: SIGNIFICANT CHANGE UP
BLOOD GAS COMMENTS: SIGNIFICANT CHANGE UP
BLOOD GAS SOURCE: SIGNIFICANT CHANGE UP
BUN SERPL-MCNC: 46 MG/DL — HIGH (ref 7–23)
BUN SERPL-MCNC: 46 MG/DL — HIGH (ref 7–23)
CALCIUM SERPL-MCNC: 7.3 MG/DL — LOW (ref 8.5–10.1)
CALCIUM SERPL-MCNC: 8.1 MG/DL — LOW (ref 8.5–10.1)
CHLORIDE SERPL-SCNC: 105 MMOL/L — SIGNIFICANT CHANGE UP (ref 96–108)
CHLORIDE SERPL-SCNC: 107 MMOL/L — SIGNIFICANT CHANGE UP (ref 96–108)
CO2 SERPL-SCNC: 17 MMOL/L — LOW (ref 22–31)
CO2 SERPL-SCNC: 21 MMOL/L — LOW (ref 22–31)
CREAT SERPL-MCNC: 2.78 MG/DL — HIGH (ref 0.5–1.3)
CREAT SERPL-MCNC: 3.18 MG/DL — HIGH (ref 0.5–1.3)
CULTURE RESULTS: NO GROWTH — SIGNIFICANT CHANGE UP
EOSINOPHIL # BLD AUTO: 0 K/UL — SIGNIFICANT CHANGE UP (ref 0–0.5)
EOSINOPHIL NFR BLD AUTO: 0.1 % — SIGNIFICANT CHANGE UP (ref 0–6)
GLUCOSE SERPL-MCNC: 246 MG/DL — HIGH (ref 70–99)
GLUCOSE SERPL-MCNC: 58 MG/DL — LOW (ref 70–99)
GRAM STN FLD: SIGNIFICANT CHANGE UP
HCO3 BLDA-SCNC: 22 MMOL/L — SIGNIFICANT CHANGE UP (ref 21–29)
HCT VFR BLD CALC: 33.4 % — LOW (ref 34.5–45)
HGB BLD-MCNC: 12.4 G/DL — SIGNIFICANT CHANGE UP (ref 11.5–15.5)
HOROWITZ INDEX BLDA+IHG-RTO: 28 — SIGNIFICANT CHANGE UP
LACTATE SERPL-SCNC: 0.9 MMOL/L — SIGNIFICANT CHANGE UP (ref 0.7–2)
LACTATE SERPL-SCNC: 1.2 MMOL/L — SIGNIFICANT CHANGE UP (ref 0.7–2)
LIDOCAIN IGE QN: 35 U/L — LOW (ref 73–393)
LYMPHOCYTES # BLD AUTO: 0.4 K/UL — LOW (ref 1–3.3)
LYMPHOCYTES # BLD AUTO: 2.3 % — LOW (ref 13–44)
MAGNESIUM SERPL-MCNC: 1.9 MG/DL — SIGNIFICANT CHANGE UP (ref 1.8–2.4)
MCHC RBC-ENTMCNC: 32.4 PG — SIGNIFICANT CHANGE UP (ref 27–34)
MCHC RBC-ENTMCNC: 37.2 GM/DL — HIGH (ref 32–36)
MCV RBC AUTO: 87 FL — SIGNIFICANT CHANGE UP (ref 80–100)
MONOCYTES # BLD AUTO: 0.4 K/UL — SIGNIFICANT CHANGE UP (ref 0–0.9)
MONOCYTES NFR BLD AUTO: 2.6 % — SIGNIFICANT CHANGE UP (ref 2–14)
NEUTROPHILS # BLD AUTO: 14.4 K/UL — HIGH (ref 1.8–7.4)
NEUTROPHILS NFR BLD AUTO: 94.8 % — HIGH (ref 43–77)
PCO2 BLDA: 34 MMHG — SIGNIFICANT CHANGE UP (ref 32–46)
PH BLD: 7.42 — SIGNIFICANT CHANGE UP (ref 7.35–7.45)
PHOSPHATE SERPL-MCNC: 2 MG/DL — LOW (ref 2.5–4.5)
PLATELET # BLD AUTO: 210 K/UL — SIGNIFICANT CHANGE UP (ref 150–400)
PO2 BLDA: 123 MMHG — HIGH (ref 74–108)
POTASSIUM SERPL-MCNC: 3.4 MMOL/L — LOW (ref 3.5–5.3)
POTASSIUM SERPL-MCNC: 3.6 MMOL/L — SIGNIFICANT CHANGE UP (ref 3.5–5.3)
POTASSIUM SERPL-SCNC: 3.4 MMOL/L — LOW (ref 3.5–5.3)
POTASSIUM SERPL-SCNC: 3.6 MMOL/L — SIGNIFICANT CHANGE UP (ref 3.5–5.3)
RBC # BLD: 3.84 M/UL — SIGNIFICANT CHANGE UP (ref 3.8–5.2)
RBC # FLD: 11.5 % — SIGNIFICANT CHANGE UP (ref 11–15)
SAO2 % BLDA: 98 % — HIGH (ref 92–96)
SODIUM SERPL-SCNC: 130 MMOL/L — LOW (ref 135–145)
SODIUM SERPL-SCNC: 133 MMOL/L — LOW (ref 135–145)
SPECIMEN SOURCE: SIGNIFICANT CHANGE UP
SPECIMEN SOURCE: SIGNIFICANT CHANGE UP
WBC # BLD: 15.2 K/UL — HIGH (ref 3.8–10.5)
WBC # FLD AUTO: 15.2 K/UL — HIGH (ref 3.8–10.5)

## 2017-02-09 PROCEDURE — 99291 CRITICAL CARE FIRST HOUR: CPT

## 2017-02-09 PROCEDURE — 99233 SBSQ HOSP IP/OBS HIGH 50: CPT

## 2017-02-09 PROCEDURE — 70450 CT HEAD/BRAIN W/O DYE: CPT | Mod: 26

## 2017-02-09 RX ORDER — SODIUM CHLORIDE 9 MG/ML
1000 INJECTION, SOLUTION INTRAVENOUS
Qty: 0 | Refills: 0 | Status: DISCONTINUED | OUTPATIENT
Start: 2017-02-09 | End: 2017-02-10

## 2017-02-09 RX ORDER — POTASSIUM CHLORIDE 20 MEQ
10 PACKET (EA) ORAL
Qty: 0 | Refills: 0 | Status: COMPLETED | OUTPATIENT
Start: 2017-02-09 | End: 2017-02-09

## 2017-02-09 RX ORDER — DEXTROSE 50 % IN WATER 50 %
50 SYRINGE (ML) INTRAVENOUS ONCE
Qty: 0 | Refills: 0 | Status: COMPLETED | OUTPATIENT
Start: 2017-02-09 | End: 2017-02-09

## 2017-02-09 RX ORDER — SODIUM CHLORIDE 9 MG/ML
1000 INJECTION, SOLUTION INTRAVENOUS
Qty: 0 | Refills: 0 | Status: DISCONTINUED | OUTPATIENT
Start: 2017-02-09 | End: 2017-02-09

## 2017-02-09 RX ORDER — SODIUM CHLORIDE 9 MG/ML
1000 INJECTION INTRAMUSCULAR; INTRAVENOUS; SUBCUTANEOUS ONCE
Qty: 0 | Refills: 0 | Status: COMPLETED | OUTPATIENT
Start: 2017-02-09 | End: 2017-02-09

## 2017-02-09 RX ORDER — DEXTROSE 50 % IN WATER 50 %
12.5 SYRINGE (ML) INTRAVENOUS ONCE
Qty: 0 | Refills: 0 | Status: COMPLETED | OUTPATIENT
Start: 2017-02-09 | End: 2017-02-09

## 2017-02-09 RX ADMIN — HEPARIN SODIUM 5000 UNIT(S): 5000 INJECTION INTRAVENOUS; SUBCUTANEOUS at 05:19

## 2017-02-09 RX ADMIN — ALBUTEROL 2.5 MILLIGRAM(S): 90 AEROSOL, METERED ORAL at 06:34

## 2017-02-09 RX ADMIN — PANTOPRAZOLE SODIUM 40 MILLIGRAM(S): 20 TABLET, DELAYED RELEASE ORAL at 12:18

## 2017-02-09 RX ADMIN — Medication 12.5 GRAM(S): at 08:21

## 2017-02-09 RX ADMIN — MEROPENEM 200 MILLIGRAM(S): 1 INJECTION INTRAVENOUS at 16:07

## 2017-02-09 RX ADMIN — SODIUM CHLORIDE 100 MILLILITER(S): 9 INJECTION, SOLUTION INTRAVENOUS at 09:54

## 2017-02-09 RX ADMIN — Medication 100 MILLIEQUIVALENT(S): at 12:57

## 2017-02-09 RX ADMIN — ALBUTEROL 2.5 MILLIGRAM(S): 90 AEROSOL, METERED ORAL at 17:37

## 2017-02-09 RX ADMIN — SODIUM CHLORIDE 150 MILLILITER(S): 9 INJECTION, SOLUTION INTRAVENOUS at 18:30

## 2017-02-09 RX ADMIN — Medication 50 MILLILITER(S): at 17:50

## 2017-02-09 RX ADMIN — ALBUTEROL 2.5 MILLIGRAM(S): 90 AEROSOL, METERED ORAL at 11:15

## 2017-02-09 RX ADMIN — SODIUM CHLORIDE 500 MILLILITER(S): 9 INJECTION INTRAMUSCULAR; INTRAVENOUS; SUBCUTANEOUS at 02:42

## 2017-02-09 RX ADMIN — ALBUTEROL 2.5 MILLIGRAM(S): 90 AEROSOL, METERED ORAL at 00:41

## 2017-02-09 RX ADMIN — HEPARIN SODIUM 5000 UNIT(S): 5000 INJECTION INTRAVENOUS; SUBCUTANEOUS at 17:42

## 2017-02-09 RX ADMIN — Medication 100 MILLIEQUIVALENT(S): at 12:10

## 2017-02-09 RX ADMIN — Medication 100 MILLIEQUIVALENT(S): at 14:23

## 2017-02-09 NOTE — CONSULT NOTE ADULT - PROBLEM SELECTOR RECOMMENDATION 4
with hyponatremia. and metabolic acidosis.  started on bicarb gtt. pt needs johnson for strict i/o's, serial chemistries to follow na,

## 2017-02-09 NOTE — PROGRESS NOTE ADULT - PROBLEM SELECTOR PLAN 1
currently stable   continue with D5W   soon add low dose Lantus to prevent any Diabetic Ketoacidosis

## 2017-02-09 NOTE — PROGRESS NOTE ADULT - SUBJECTIVE AND OBJECTIVE BOX
Patient is a 61y old  Female who presents with a chief complaint of Nausea , Vomiting, pain abdomen. (2017 13:13)      HPI:  Pt is a 62 yo lady with a pmhx of DM1 w prior insulin pump since removed, HTN, HL, MS who presents to the ED with N/v/d for 2 days. She ate tacos on Wednesday, and then started having n/v/d that night until now, unable to keep anything down. No fevers, no focal abdominal pain, no dysuria, no chest pain, no short of breath. (2017 13:13)      INTERVAL HPI/OVERNIGHT EVENTS:  Diarrhea slightly better.     MEDICATIONS  (STANDING):  clonazePAM Tablet 1milliGRAM(s) Oral at bedtime  glatiramer Injectable 20milliGRAM(s) SubCutaneous at bedtime  heparin  Injectable 5000Unit(s) SubCutaneous every 12 hours  insulin lispro (HumaLOG) corrective regimen sliding scale  SubCutaneous three times a day before meals  dextrose 5%. 1000milliLiter(s) IV Continuous <Continuous>  dextrose 50% Injectable 12.5Gram(s) IV Push once  dextrose 50% Injectable 25Gram(s) IV Push once  influenza   Vaccine 0.5milliLiter(s) IntraMuscular once  ALBUTerol    0.083% 2.5milliGRAM(s) Nebulizer every 6 hours  ALBUTerol    90 MICROgram(s) HFA Inhaler 1Puff(s) Inhalation every 4 hours  insulin lispro Injectable (HumaLOG) 3Unit(s) SubCutaneous three times a day before meals  pantoprazole  Injectable 40milliGRAM(s) IV Push daily  insulin glargine Injectable (LANTUS) 7Unit(s) SubCutaneous two times a day  amylase/lipase/protease  (CREON  6,000 Units) 1Capsule(s) Oral three times a day with meals  meropenem IVPB 1000milliGRAM(s) IV Intermittent every 24 hours  dextrose 5% + sodium chloride 0.45% 1000milliLiter(s) IV Continuous <Continuous>  potassium chloride  10 mEq/100 mL IVPB 10milliEquivalent(s) IV Intermittent every 1 hour    MEDICATIONS  (PRN):  dextrose Gel 1Dose(s) Oral once PRN Blood Glucose LESS THAN 70 milliGRAM(s)/deciliter  glucagon  Injectable 1milliGRAM(s) IntraMuscular once PRN Glucose LESS THAN 70 milligrams/deciliter  ondansetron Injectable 4milliGRAM(s) IV Push every 8 hours PRN Nausea and/or Vomiting  acetaminophen   Tablet 650milliGRAM(s) Oral every 6 hours PRN For Temp greater than 38 C (100.4 F)      FAMILY HISTORY:  No pertinent family history in first degree relatives      Allergies    No Known Allergies    Intolerances        PMH/PSH:  Multiple sclerosis  Myasthenia gravis  Hypertension  Diabetes  H/O cervical spine surgery        REVIEW OF SYSTEMS:  Lethargic  CONSTITUTIONAL: No fever,  EYES: No eye pain, visual disturbances, or discharge  NECK: No pain or stiffness  RESPIRATORY: No cough, wheezing, chills or hemoptysis; No shortness of breath  CARDIOVASCULAR: No chest pain, palpitations, dizziness, or leg swelling  GASTROINTESTINAL: No abdominal or epigastric pain. No nausea, vomiting, or hematemesis; No diarrhea or constipation. No melena or hematochezia.  GENITOURINARY: No dysuria, frequency, hematuria, or incontinence  NEUROLOGICAL: No headaches, memory loss, loss of strength, numbness, or tremors  SKIN: No itching, burning, rashes, or lesions       Vital Signs Last 24 Hrs  T(C): 36.8, Max: 37.3 (02-08 @ 13:11)  T(F): 98.2, Max: 99.2 (02-08 @ 13:11)  HR: 84 (84 - 114)  BP: 99/53 (99/53 - 113/67)  BP(mean): --  RR: 18 (16 - 18)  SpO2: 96% (95% - 99%)    PHYSICAL EXAM:  GENERAL: NAD, well-groomed, well-developed  HEAD:  Atraumatic, Normocephalic  EYES: EOMI, PERRLA, conjunctiva and sclera clear  NECK: Supple, No JVD, Normal thyroid  NERVOUS SYSTEM:  Alert & Oriented X3, Good concentration; Motor Strength 5/5 B/L upper and lower extremities; DTRs 2+ intact and symmetric  CHEST/LUNG: Clear to percussion bilaterally; No rales, rhonchi, wheezing, or rubs  HEART: Regular rate and rhythm; No murmurs, rubs, or gallops  ABDOMEN: Soft, Nontender, Nondistended; Bowel sounds present  EXTREMITIES:  2+ Peripheral Pulses, No clubbing, cyanosis, or edema  LYMPH: No lymphadenopathy noted  SKIN: No rashes or lesions    LABS:  [id number]  @ 11:16    02 @ 08:59   HGB 33.4  HCT 12.4  MCV 87.0   RDW 11.5 WBC 15.2 PLTA 210        08 @ 10:14   HGB 49.3  HCT 17.3  MCV 91.0   RDW 12.2 WBC 17.4 PLTA 240         @ 08:35   HGB 42.8  HCT 15.6  MCV 90.6   RDW 12.5 WBC 17.1 PLTA 229        0205 @ 05:14   HGB 36.2  HCT 13.6  MCV 87.6   RDW 11.6 WBC 3.2 PLTA 243        04 @ 11:16   HGB 39.6  HCT 14.7  MCV 86.0   RDW 11.2 WBC 4.6 PLTA 241          2017 09:36    130    |  105    |  46     ----------------------------<  246    3.4     |  17     |  3.18   2017 20:33    130    |  106    |  53     ----------------------------<  81     3.7     |  13     |  4.61   2017 14:51    124    |  104    |  53     ----------------------------<  242    4.2     |  11     |  4.90   2017 10:14    125    |  101    |  53     ----------------------------<  221    4.0     |  11     |  5.09   2017 08:35    119    |  95     |  50     ----------------------------<  340    4.4     |  11     |  5.01   2017 18:06    x      |  x      |  x      ----------------------------<  x      3.5     |  x      |  x      2017 05:14    124    |  95     |  22     ----------------------------<  453    2.8     |  17     |  1.47     Ca    7.3        2017 09:36  Ca    8.3        2017 20:33  Ca    8.1        2017 14:51  Ca    8.9        2017 10:14  Ca    8.7        2017 08:35  Ca    7.6        2017 05:14  Ca    7.3        2017 11:16  Phos  2.0       2017 09:36  Mg     1.9       2017 09:36  Mg     1.8       2017 05:14    TPro  6.9    /  Alb  2.3    /  TBili  0.3    /  DBili  .12    /  AST  15     /  ALT  18     /  AlkPhos  163    2017 10:15  TPro  6.4    /  Alb  2.6    /  TBili  0.6    /  DBili  x      /  AST  22     /  ALT  29     /  AlkPhos  104    2017 11:16      Urinalysis Basic - ( 2017 15:24 )    Color: Bibiana / Appearance: Slightly Turbid / S.015 / pH: x  Gluc: x / Ketone: Negative  / Bili: Negative / Urobili: Negative mg/dL   Blood: x / Protein: 100 mg/dL / Nitrite: Negative   Leuk Esterase: Trace / RBC: 3-5 /HPF / WBC 6-10   Sq Epi: x / Non Sq Epi: Few / Bacteria: Moderate      CAPILLARY BLOOD GLUCOSE  112 (2017 11:05)  45 (2017 08:07)  43 (2017 08:06)  144 (2017 23:59)  58 (2017 22:29)  58 (2017 22:26)  243 (2017 16:34)  99 (2017 12:09)        RADIOLOGY & ADDITIONAL TESTS:    Imaging Personally Reviewed:  [ ] YES  [ ] NO    Consultant(s) Notes Reviewed:  [ ] YES  [ ] NO    Care Discussed with Consultants/Other Providers [ ] YES  [ ] NO

## 2017-02-09 NOTE — PROGRESS NOTE ADULT - SUBJECTIVE AND OBJECTIVE BOX
Patient is a 61y old  Female who presents with a chief complaint of Nausea , Vomiting, pain abdomen. (2017 13:13)      INTERVAL HPI / OVERNIGHT EVENTS: pt waking up a bit and trying to follow simple commands but still very lethargic    MEDICATIONS  (STANDING):  clonazePAM Tablet 1milliGRAM(s) Oral at bedtime  glatiramer Injectable 20milliGRAM(s) SubCutaneous at bedtime  heparin  Injectable 5000Unit(s) SubCutaneous every 12 hours  dextrose 5%. 1000milliLiter(s) IV Continuous <Continuous>  dextrose 50% Injectable 12.5Gram(s) IV Push once  dextrose 50% Injectable 25Gram(s) IV Push once  influenza   Vaccine 0.5milliLiter(s) IntraMuscular once  ALBUTerol    0.083% 2.5milliGRAM(s) Nebulizer every 6 hours  ALBUTerol    90 MICROgram(s) HFA Inhaler 1Puff(s) Inhalation every 4 hours  pantoprazole  Injectable 40milliGRAM(s) IV Push daily  amylase/lipase/protease  (CREON  6,000 Units) 1Capsule(s) Oral three times a day with meals  meropenem IVPB 1000milliGRAM(s) IV Intermittent every 24 hours  dextrose 5% 1000milliLiter(s) IV Continuous <Continuous>    MEDICATIONS  (PRN):  dextrose Gel 1Dose(s) Oral once PRN Blood Glucose LESS THAN 70 milliGRAM(s)/deciliter  glucagon  Injectable 1milliGRAM(s) IntraMuscular once PRN Glucose LESS THAN 70 milligrams/deciliter  ondansetron Injectable 4milliGRAM(s) IV Push every 8 hours PRN Nausea and/or Vomiting  acetaminophen   Tablet 650milliGRAM(s) Oral every 6 hours PRN For Temp greater than 38 C (100.4 F)      Vital Signs Last 24 Hrs  T(C): 36.9, Max: 37.1 (- @ 05:04)  T(F): 98.4, Max: 98.8 (- @ 05:04)  HR: 97 (80 - 103)  BP: 105/63 (88/55 - 105/63)  BP(mean): 72 (72 - 72)  RR: 21 (16 - 21)  SpO2: 83% (83% - 98%)    PHYSICAL EXAM:    Constitutional: NAD, well-groomed, well-developed  Respiratory: CTAB/L  Cardiovascular: S1 and S2, RRR, no M/G/R  Gastrointestinal: BS+, soft, NT/ND  Extremities: No peripheral edema  Vascular: 2+ peripheral pulses  Skin: No rashes      LABS:                        12.4   15.2  )-----------( 210      ( 2017 08:59 )             33.4     2017 15:09    133    |  107    |  46     ----------------------------<  58     3.6     |  21     |  2.78     Ca    8.1        2017 15:09  Phos  2.0       2017 09:36  Mg     1.9       2017 09:36    TPro  6.9    /  Alb  2.3    /  TBili  0.3    /  DBili  .12    /  AST  15     /  ALT  18     /  AlkPhos  163    2017 10:15      Urinalysis Basic - ( 2017 15:24 )    Color: Bibiana / Appearance: Slightly Turbid / S.015 / pH: x  Gluc: x / Ketone: Negative  / Bili: Negative / Urobili: Negative mg/dL   Blood: x / Protein: 100 mg/dL / Nitrite: Negative   Leuk Esterase: Trace / RBC: 3-5 /HPF / WBC 6-10   Sq Epi: x / Non Sq Epi: Few / Bacteria: Moderate          MICROBIOLOGY:  RECENT CULTURES:   .Stool Feces XXXX XXXX   Suspicious colony-R/O pathogens.     .Blood Blood XXXX XXXX   No growth to date.     .Urine Catheterized XXXX XXXX   No growth     .Blood Blood-Venous XXXX   Growth in aerobic bottle:  Gram Negative Rods   Growth in aerobic bottle:  Gram Negative Rods     .Urine Clean Catch (Midstream) Escherichia coli  Salmonella species XXXX   10,000 - 49,000 CFU/mL Escherichia coli  10,000 - 49,000 CFU/mL Salmonella enterica Group B  Revised urine counts reflect clinically significant counts  established by evidence based medicine.          RADIOLOGY & ADDITIONAL STUDIES:    CT Abdomen and pelvis-necrotic left lung lesion near heart and no obvious bowel anomaly

## 2017-02-09 NOTE — CHART NOTE - NSCHARTNOTEFT_GEN_A_CORE
RRT called for hypoglycemia =34. D50 pushed with appropriate rise to 181.    Pt continues to be lethargic.  ABG done and WNL.     will f/u lactate.  Will closely monitor respiratory status as pt may need intubation.  BCx still pending.     D/W Dr. Gramajo RRT called for hypoglycemia =34. D50 pushed with appropriate rise to 181. Bicarbonate gtt changed to D5.  Will dc lantus and premeal insulin. Check FS q4 hours.      Pt continues to be lethargic but arousable.  Answers questions appropriately.    ABG done and WNL.     will f/u lactate.  Will closely monitor respiratory status as pt may need intubation.  BCx still pending.     D/W Dr. Gramajo RRT called for hypoglycemia =34. D50 pushed with appropriate rise to 181. Bicarbonate gtt changed to D5.  Will dc lantus and premeal insulin. Check FS q4 hours.      Pt continues to be lethargic but arousable.  Answers questions appropriately.    ABG done and WNL.     will f/u lactate.  Will closely monitor respiratory status as pt may need intubation.  BCx still pending.     D/W Dr. Gramajo    D/W KERMIT Barton will admit to MICU

## 2017-02-09 NOTE — PROGRESS NOTE ADULT - SUBJECTIVE AND OBJECTIVE BOX
Patient is a 61y old  Female who presents with a chief complaint of Nausea , Vomiting, pain abdomen. (2017 13:13)      INTERVAL HPI/OVERNIGHT EVENTS:  had a severe bout of  hypoglycemia   insulin discontinued and patient currently on 150 cc/hr of d5w   she is a Type I diaabetic nd thus will need insulin reinstituted soon   poor PO intake        MEDICATIONS  (STANDING):  clonazePAM Tablet 1milliGRAM(s) Oral at bedtime  glatiramer Injectable 20milliGRAM(s) SubCutaneous at bedtime  heparin  Injectable 5000Unit(s) SubCutaneous every 12 hours  dextrose 5%. 1000milliLiter(s) IV Continuous <Continuous>  dextrose 50% Injectable 12.5Gram(s) IV Push once  dextrose 50% Injectable 25Gram(s) IV Push once  influenza   Vaccine 0.5milliLiter(s) IntraMuscular once  ALBUTerol    0.083% 2.5milliGRAM(s) Nebulizer every 6 hours  ALBUTerol    90 MICROgram(s) HFA Inhaler 1Puff(s) Inhalation every 4 hours  pantoprazole  Injectable 40milliGRAM(s) IV Push daily  amylase/lipase/protease  (CREON  6,000 Units) 1Capsule(s) Oral three times a day with meals  meropenem IVPB 1000milliGRAM(s) IV Intermittent every 24 hours  dextrose 5% 1000milliLiter(s) IV Continuous <Continuous>    MEDICATIONS  (PRN):  dextrose Gel 1Dose(s) Oral once PRN Blood Glucose LESS THAN 70 milliGRAM(s)/deciliter  glucagon  Injectable 1milliGRAM(s) IntraMuscular once PRN Glucose LESS THAN 70 milligrams/deciliter  ondansetron Injectable 4milliGRAM(s) IV Push every 8 hours PRN Nausea and/or Vomiting  acetaminophen   Tablet 650milliGRAM(s) Oral every 6 hours PRN For Temp greater than 38 C (100.4 F)      Allergies    No Known Allergies    Intolerances        REVIEW OF SYSTEMS:  CONSTITUTIONAL: currently stable   EYES: No eye pain, visual disturbances, or discharge  ENMT:  No difficulty hearing, tinnitus, vertigo; No sinus or throat pain  NECK: No pain or stiffness  RESPIRATORY: No cough, wheezing, chills or hemoptysis; No shortness of breath  CARDIOVASCULAR: No chest pain, palpitations, dizziness, or leg swelling  GASTROINTESTINAL: No abdominal or epigastric pain. No nausea, vomiting, or hematemesis; No diarrhea or constipation. No melena or hematochezia.  GENITOURINARY: No dysuria, frequency, hematuria, or incontinence  NEUROLOGICAL: No headaches, memory loss, loss of strength, numbness, or tremors  SKIN: No itching, burning, rashes, or lesions   LYMPH NODES: No enlarged glands  ENDOCRINE: No heat or cold intolerance; No hair loss  MUSCULOSKELETAL: No joint pain or swelling; No muscle, back, or extremity pain  PSYCHIATRIC: No depression, anxiety, mood swings, or difficulty sleeping  HEME/LYMPH: No easy bruising, or bleeding gums  ALLERY AND IMMUNOLOGIC: No hives or eczema    Vital Signs Last 24 Hrs  T(C): 36.9, Max: 37.3 ( @ 21:25)  T(F): 98.4, Max: 99.2 ( @ 21:25)  HR: 88 (80 - 108)  BP: 88/55 (88/55 - 113/67)  BP(mean): --  RR: 16 (16 - 18)  SpO2: 98% (96% - 99%)    PHYSICAL EXAM:  GENERAL: anxious but stable , rundown   HEAD:  Atraumatic, Normocephalic  EYES: EOMI, PERRLA, conjunctiva and sclera clear  ENMT: No tonsillar erythema, exudates, or enlargement; Moist mucous membranes, Good dentition, No lesions  NECK: Supple, No JVD, Normal thyroid  NERVOUS SYSTEM:  Alert & Oriented X3, Good concentration; Motor Strength 5/5 B/L upper and lower extremities; DTRs 2+ intact and symmetric  CHEST/LUNG: Clear to percussion bilaterally; No rales, rhonchi, wheezing, or rubs  HEART: Regular rate and rhythm; No murmurs, rubs, or gallops  ABDOMEN: Soft, Nontender, Nondistended; Bowel sounds present  EXTREMITIES:  2+ Peripheral Pulses, No clubbing, cyanosis, or edema  SKIN: No rashes or lesions    LABS:      Urinalysis Basic - ( 2017 15:24 )    Color: Bibiana / Appearance: Slightly Turbid / S.015 / pH: x  Gluc: x / Ketone: Negative  / Bili: Negative / Urobili: Negative mg/dL   Blood: x / Protein: 100 mg/dL / Nitrite: Negative   Leuk Esterase: Trace / RBC: 3-5 /HPF / WBC 6-10   Sq Epi: x / Non Sq Epi: Few / Bacteria: Moderate      CAPILLARY BLOOD GLUCOSE  181 (2017 17:14)  200 (2017 17:03)  34 (2017 16:49)  38 (2017 16:48)  112 (2017 11:05)  45 (2017 08:07)  43 (2017 08:06)  144 (2017 23:59)  58 (2017 22:29)  58 (2017 22:26)    Lipid panel:       ABG - ( 2017 17:14 )  pH: x     /  pCO2: 34    /  pO2: 123   / HCO3: 22    / Base Excess: -1.7  /  SaO2: 98                  Thyroid:  Diabetes Tests:  Parathyroid Panel:  Adrenals:  RADIOLOGY & ADDITIONAL TESTS:    Imaging Personally Reviewed:  [ ] YES  [ ] NO    Consultant(s) Notes Reviewed:  [ ] YES  [ ] NO    Care Discussed with Consultants/Other Providers [ ] YES  [ ] NO

## 2017-02-09 NOTE — PROGRESS NOTE ADULT - SUBJECTIVE AND OBJECTIVE BOX
Patient seen in follow up for REYNALDO; lethargic but arousable.    MEDICATIONS  (STANDING):  clonazePAM Tablet 1milliGRAM(s) Oral at bedtime  glatiramer Injectable 20milliGRAM(s) SubCutaneous at bedtime  heparin  Injectable 5000Unit(s) SubCutaneous every 12 hours  insulin lispro (HumaLOG) corrective regimen sliding scale  SubCutaneous three times a day before meals  dextrose 5%. 1000milliLiter(s) IV Continuous <Continuous>  dextrose 50% Injectable 12.5Gram(s) IV Push once  dextrose 50% Injectable 25Gram(s) IV Push once  influenza   Vaccine 0.5milliLiter(s) IntraMuscular once  ALBUTerol    0.083% 2.5milliGRAM(s) Nebulizer every 6 hours  ALBUTerol    90 MICROgram(s) HFA Inhaler 1Puff(s) Inhalation every 4 hours  insulin lispro Injectable (HumaLOG) 3Unit(s) SubCutaneous three times a day before meals  pantoprazole  Injectable 40milliGRAM(s) IV Push daily  insulin glargine Injectable (LANTUS) 7Unit(s) SubCutaneous two times a day  amylase/lipase/protease  (CREON  6,000 Units) 1Capsule(s) Oral three times a day with meals  meropenem IVPB 1000milliGRAM(s) IV Intermittent every 24 hours  dextrose 5% + sodium chloride 0.45% 1000milliLiter(s) IV Continuous <Continuous>  potassium chloride  10 mEq/100 mL IVPB 10milliEquivalent(s) IV Intermittent every 1 hour    MEDICATIONS  (PRN):  dextrose Gel 1Dose(s) Oral once PRN Blood Glucose LESS THAN 70 milliGRAM(s)/deciliter  glucagon  Injectable 1milliGRAM(s) IntraMuscular once PRN Glucose LESS THAN 70 milligrams/deciliter  ondansetron Injectable 4milliGRAM(s) IV Push every 8 hours PRN Nausea and/or Vomiting  acetaminophen   Tablet 650milliGRAM(s) Oral every 6 hours PRN For Temp greater than 38 C (100.4 F)    PHYSICAL EXAM:      T(C): 36.8, Max: 37.3 (02-08 @ 13:11)  HR: 84 (84 - 108)  BP: 99/53 (99/53 - 113/67)  RR: 18 (16 - 18)  SpO2: 96% (95% - 99%)  Wt(kg): --  Respiratory: clear anteriorly, decreased BS at bases  Cardiovascular: S1 S2  Gastrointestinal: soft NT ND +BS  Extremities:  no edema                                    12.4   15.2  )-----------( 210      ( 09 Feb 2017 08:59 )             33.4     09 Feb 2017 09:36    130    |  105    |  46     ----------------------------<  246    3.4     |  17     |  3.18     Ca    7.3        09 Feb 2017 09:36  Phos  2.0       09 Feb 2017 09:36  Mg     1.9       09 Feb 2017 09:36    TPro  6.9    /  Alb  2.3    /  TBili  0.3    /  DBili  .12    /  AST  15     /  ALT  18     /  AlkPhos  163    08 Feb 2017 10:15    ABG - ( 08 Feb 2017 10:31 )  pH: x     /  pCO2: 19    /  pO2: 105   / HCO3: 8     / Base Excess: -18.4 /  SaO2: 97                LIVER FUNCTIONS - ( 08 Feb 2017 10:15 )  Alb: 2.3 g/dL / Pro: 6.9 gm/dL / ALK PHOS: 163 U/L / ALT: 18 U/L / AST: 15 U/L / GGT: x             Assessment and Plan:  REYNALDO suspected Salmonella sepsis, ATN; hyponatremia; slowly improving.  IVF abx  ID, GI follow up.

## 2017-02-09 NOTE — PROGRESS NOTE ADULT - ASSESSMENT
N/V, Diarrhea - N/V resolved but the diarrhea has somewhat improved on Merrem.  Probably secondary to Salmonella enteritis ( neg CT, Urine (+) Salmonella, BC (+) gram neg rods )

## 2017-02-09 NOTE — PROGRESS NOTE ADULT - PROBLEM SELECTOR PLAN 1
slight improvement in WBC count and AMS  Gram neg Bacteremia-on meropenem  R/O salmonella BSI as UC + for salmonella  S/P vanco X 1

## 2017-02-09 NOTE — CONSULT NOTE ADULT - SUBJECTIVE AND OBJECTIVE BOX
Patient is a 62y old  Female who presents with a chief complaint of N/V/abdominal pain    HPI:t is a 62 yo with a pmhx of DM1 w prior insulin pump since removed, HTN, HL, MS who presents to the ED with N/v/d for 2 days. She ate tacos on Wednesday, and then started having n/v/d that night until now, unable to keep anything down. No fevers, no focal abdominal pain, no dysuria, no chest pain, no short of breath.    Consult called for abnormal labs: ARF, leukocytosis, acidosis, hyponatremia    PAST MEDICAL & SURGICAL HISTORY:  HLD (hyperlipidemia)  HTN (hypertension)    FAMILY HISTORY:    Social History:  Allergies    No Known Allergies    Intolerances        MEDICATIONS  (STANDING):    MEDICATIONS  (PRN):      REVIEW OF SYSTEMS:    unable to obtain      PHYSICAL EXAM:  ICU Vital Signs Last 24 Hrs  T(C): 36.7, Max: 38.8 (02-08 @ 22:17)  T(F): 98.1, Max: 101.8 (02-08 @ 22:17)  HR: 85 (71 - 122)  BP: 89/59 (89/59 - 119/79)  BP(mean): --  ABP: --  ABP(mean): --  RR: 20 (18 - 20)  SpO2: 97% (95% - 99%)          PAST MEDICAL & SURGICAL HISTORY:  HLD (hyperlipidemia)  HTN (hypertension)    FAMILY HISTORY:    Social History:  Allergies    No Known Allergies    Intolerances        MEDICATIONS  (STANDING):    MEDICATIONS  (PRN):      REVIEW OF SYSTEMS:  unable to obtain     PHYSICAL EXAM:  GENERAL: NAD, well-developed  HEAD:  Atraumatic, Normocephalic  EYES: , PERRLA,  NECK: Supple, No JVD,   NERVOUS SYSTEM:  lethargic  CHEST/LUNG: decreased BS with poor inspiratory effort  HEART: Regular rate and rhythm; No murmurs, rubs, or gallops  ABDOMEN: Soft, Nontender, Nondistended; Bowel sounds present  EXTREMITIES:  2+ Peripheral Pulses, No clubbing, cyanosis, or edema  LYMPH: No lymphadenopathy noted  SKIN: No rashes or lesions     Vital Signs Last 24 Hrs  T(C): 36.7, Max: 38.8 (02-08 @ 22:17)  T(F): 98.1, Max: 101.8 (02-08 @ 22:17)  HR: 85 (71 - 122)  BP: 89/59 (89/59 - 119/79)  BP(mean): --  ABP: --  ABP(mean): --  RR: 20 (18 - 20)  SpO2: 97% (95% - 99%)          LABS:                        11.9   7.3   )-----------( 161      ( 2017 00:13 )             33.1     2017 00:13    137    |  98     |  13     ----------------------------<  109    3.2     |  27     |  0.95     Ca    8.5        2017 00:13    TPro  7.9    /  Alb  3.8    /  TBili  0.6    /  DBili  x      /  AST  26     /  ALT  24     /  AlkPhos  60     2017 00:13      Urinalysis Basic - ( 2017 04:44 )    Color: Yellow / Appearance: Clear / S.010 / pH: x  Gluc: x / Ketone: Trace  / Bili: Negative / Urobili: Negative mg/dL   Blood: x / Protein: 15 mg/dL / Nitrite: Positive   Leuk Esterase: Moderate / RBC: 0-2 /HPF / WBC 26-50   Sq Epi: x / Non Sq Epi: Few / Bacteria: Many            CRITICAL CARE TIME SPENT:        LABS:                        11.9   7.3   )-----------( 161      ( 2017 00:13 )             33.1     2017 00:13    137    |  98     |  13     ----------------------------<  109    3.2     |  27     |  0.95     Ca    8.5        2017 00:13    TPro  7.9    /  Alb  3.8    /  TBili  0.6    /  DBili  x      /  AST  26     /  ALT  24     /  AlkPhos  60     2017 00:13      Urinalysis Basic - ( 2017 04:44 )    Color: Yellow / Appearance: Clear / S.010 / pH: x  Gluc: x / Ketone: Trace  / Bili: Negative / Urobili: Negative mg/dL   Blood: x / Protein: 15 mg/dL / Nitrite: Positive   Leuk Esterase: Moderate / RBC: 0-2 /HPF / WBC 26-50   Sq Epi: x / Non Sq Epi: Few / Bacteria: Many                  CRITICAL CARE TIME SPENT:

## 2017-02-10 DIAGNOSIS — K85.91 ACUTE PANCREATITIS WITH UNINFECTED NECROSIS, UNSPECIFIED: ICD-10-CM

## 2017-02-10 LAB
-  AMPICILLIN: SIGNIFICANT CHANGE UP
-  CIPROFLOXACIN: SIGNIFICANT CHANGE UP
-  TRIMETHOPRIM/SULFAMETHOXAZOLE: SIGNIFICANT CHANGE UP
ANION GAP SERPL CALC-SCNC: 6 MMOL/L — SIGNIFICANT CHANGE UP (ref 5–17)
BASOPHILS # BLD AUTO: 0.1 K/UL — SIGNIFICANT CHANGE UP (ref 0–0.2)
BASOPHILS NFR BLD AUTO: 0.4 % — SIGNIFICANT CHANGE UP (ref 0–2)
BUN SERPL-MCNC: 38 MG/DL — HIGH (ref 7–23)
CALCIUM SERPL-MCNC: 7.6 MG/DL — LOW (ref 8.5–10.1)
CHLORIDE SERPL-SCNC: 99 MMOL/L — SIGNIFICANT CHANGE UP (ref 96–108)
CO2 SERPL-SCNC: 27 MMOL/L — SIGNIFICANT CHANGE UP (ref 22–31)
CREAT SERPL-MCNC: 1.74 MG/DL — HIGH (ref 0.5–1.3)
CULTURE RESULTS: SIGNIFICANT CHANGE UP
EOSINOPHIL # BLD AUTO: 0 K/UL — SIGNIFICANT CHANGE UP (ref 0–0.5)
EOSINOPHIL NFR BLD AUTO: 0.2 % — SIGNIFICANT CHANGE UP (ref 0–6)
GLUCOSE SERPL-MCNC: 228 MG/DL — HIGH (ref 70–99)
HCT VFR BLD CALC: 34.6 % — SIGNIFICANT CHANGE UP (ref 34.5–45)
HGB BLD-MCNC: 12.8 G/DL — SIGNIFICANT CHANGE UP (ref 11.5–15.5)
LACTATE SERPL-SCNC: 2.1 MMOL/L — HIGH (ref 0.7–2)
LYMPHOCYTES # BLD AUTO: 0.5 K/UL — LOW (ref 1–3.3)
LYMPHOCYTES # BLD AUTO: 3 % — LOW (ref 13–44)
MAGNESIUM SERPL-MCNC: 2.1 MG/DL — SIGNIFICANT CHANGE UP (ref 1.8–2.4)
MCHC RBC-ENTMCNC: 32.6 PG — SIGNIFICANT CHANGE UP (ref 27–34)
MCHC RBC-ENTMCNC: 37 GM/DL — HIGH (ref 32–36)
MCV RBC AUTO: 88.2 FL — SIGNIFICANT CHANGE UP (ref 80–100)
METHOD TYPE: SIGNIFICANT CHANGE UP
METHOD TYPE: SIGNIFICANT CHANGE UP
MONOCYTES # BLD AUTO: 0.4 K/UL — SIGNIFICANT CHANGE UP (ref 0–0.9)
MONOCYTES NFR BLD AUTO: 2.1 % — SIGNIFICANT CHANGE UP (ref 2–14)
NEUTROPHILS # BLD AUTO: 15.5 K/UL — HIGH (ref 1.8–7.4)
NEUTROPHILS NFR BLD AUTO: 94.4 % — HIGH (ref 43–77)
ORGANISM # SPEC MICROSCOPIC CNT: SIGNIFICANT CHANGE UP
PHOSPHATE SERPL-MCNC: 2.1 MG/DL — LOW (ref 2.5–4.5)
PLATELET # BLD AUTO: 223 K/UL — SIGNIFICANT CHANGE UP (ref 150–400)
POTASSIUM SERPL-MCNC: 3 MMOL/L — LOW (ref 3.5–5.3)
POTASSIUM SERPL-SCNC: 3 MMOL/L — LOW (ref 3.5–5.3)
PROCALCITONIN SERPL-MCNC: 5.32 NG/ML — HIGH (ref 0–0.05)
RBC # BLD: 3.92 M/UL — SIGNIFICANT CHANGE UP (ref 3.8–5.2)
RBC # FLD: 11.9 % — SIGNIFICANT CHANGE UP (ref 11–15)
SODIUM SERPL-SCNC: 132 MMOL/L — LOW (ref 135–145)
SPECIMEN SOURCE: SIGNIFICANT CHANGE UP
VANCOMYCIN TROUGH SERPL-MCNC: 9.1 UG/ML — LOW (ref 10–20)
WBC # BLD: 16.4 K/UL — HIGH (ref 3.8–10.5)
WBC # FLD AUTO: 16.4 K/UL — HIGH (ref 3.8–10.5)

## 2017-02-10 PROCEDURE — 99233 SBSQ HOSP IP/OBS HIGH 50: CPT

## 2017-02-10 PROCEDURE — 99291 CRITICAL CARE FIRST HOUR: CPT

## 2017-02-10 PROCEDURE — 71250 CT THORAX DX C-: CPT | Mod: 26

## 2017-02-10 PROCEDURE — 93306 TTE W/DOPPLER COMPLETE: CPT | Mod: 26

## 2017-02-10 RX ORDER — METRONIDAZOLE 500 MG
500 TABLET ORAL ONCE
Qty: 0 | Refills: 0 | Status: COMPLETED | OUTPATIENT
Start: 2017-02-10 | End: 2017-02-10

## 2017-02-10 RX ORDER — CEFTRIAXONE 500 MG/1
2 INJECTION, POWDER, FOR SOLUTION INTRAMUSCULAR; INTRAVENOUS ONCE
Qty: 0 | Refills: 0 | Status: COMPLETED | OUTPATIENT
Start: 2017-02-10 | End: 2017-02-10

## 2017-02-10 RX ORDER — POTASSIUM PHOSPHATE, MONOBASIC POTASSIUM PHOSPHATE, DIBASIC 236; 224 MG/ML; MG/ML
15 INJECTION, SOLUTION INTRAVENOUS ONCE
Qty: 0 | Refills: 0 | Status: COMPLETED | OUTPATIENT
Start: 2017-02-10 | End: 2017-02-10

## 2017-02-10 RX ORDER — CEFTRIAXONE 500 MG/1
INJECTION, POWDER, FOR SOLUTION INTRAMUSCULAR; INTRAVENOUS
Qty: 0 | Refills: 0 | Status: DISCONTINUED | OUTPATIENT
Start: 2017-02-10 | End: 2017-02-11

## 2017-02-10 RX ORDER — INSULIN HUMAN 100 [IU]/ML
4 INJECTION, SOLUTION SUBCUTANEOUS
Qty: 100 | Refills: 0 | Status: DISCONTINUED | OUTPATIENT
Start: 2017-02-10 | End: 2017-02-11

## 2017-02-10 RX ORDER — CEFTRIAXONE 500 MG/1
2 INJECTION, POWDER, FOR SOLUTION INTRAMUSCULAR; INTRAVENOUS EVERY 24 HOURS
Qty: 0 | Refills: 0 | Status: DISCONTINUED | OUTPATIENT
Start: 2017-02-11 | End: 2017-02-11

## 2017-02-10 RX ORDER — INSULIN LISPRO 100/ML
VIAL (ML) SUBCUTANEOUS
Qty: 0 | Refills: 0 | Status: DISCONTINUED | OUTPATIENT
Start: 2017-02-10 | End: 2017-02-10

## 2017-02-10 RX ORDER — DEXTROSE MONOHYDRATE, SODIUM CHLORIDE, AND POTASSIUM CHLORIDE 50; .745; 4.5 G/1000ML; G/1000ML; G/1000ML
1000 INJECTION, SOLUTION INTRAVENOUS
Qty: 0 | Refills: 0 | Status: DISCONTINUED | OUTPATIENT
Start: 2017-02-10 | End: 2017-02-11

## 2017-02-10 RX ORDER — METRONIDAZOLE 500 MG
TABLET ORAL
Qty: 0 | Refills: 0 | Status: DISCONTINUED | OUTPATIENT
Start: 2017-02-10 | End: 2017-02-13

## 2017-02-10 RX ORDER — METRONIDAZOLE 500 MG
500 TABLET ORAL EVERY 8 HOURS
Qty: 0 | Refills: 0 | Status: DISCONTINUED | OUTPATIENT
Start: 2017-02-10 | End: 2017-02-13

## 2017-02-10 RX ORDER — INSULIN GLARGINE 100 [IU]/ML
4 INJECTION, SOLUTION SUBCUTANEOUS
Qty: 0 | Refills: 0 | Status: DISCONTINUED | OUTPATIENT
Start: 2017-02-10 | End: 2017-02-11

## 2017-02-10 RX ORDER — POTASSIUM CHLORIDE 20 MEQ
10 PACKET (EA) ORAL
Qty: 0 | Refills: 0 | Status: COMPLETED | OUTPATIENT
Start: 2017-02-10 | End: 2017-02-10

## 2017-02-10 RX ADMIN — Medication 1 MILLIGRAM(S): at 21:35

## 2017-02-10 RX ADMIN — POTASSIUM PHOSPHATE, MONOBASIC POTASSIUM PHOSPHATE, DIBASIC 63.75 MILLIMOLE(S): 236; 224 INJECTION, SOLUTION INTRAVENOUS at 07:34

## 2017-02-10 RX ADMIN — Medication 1 CAPSULE(S): at 13:06

## 2017-02-10 RX ADMIN — Medication 100 MILLIGRAM(S): at 14:29

## 2017-02-10 RX ADMIN — Medication 100 MILLIGRAM(S): at 21:35

## 2017-02-10 RX ADMIN — HEPARIN SODIUM 5000 UNIT(S): 5000 INJECTION INTRAVENOUS; SUBCUTANEOUS at 17:32

## 2017-02-10 RX ADMIN — Medication 1 CAPSULE(S): at 07:45

## 2017-02-10 RX ADMIN — Medication 5: at 16:41

## 2017-02-10 RX ADMIN — Medication 100 MILLIEQUIVALENT(S): at 07:27

## 2017-02-10 RX ADMIN — ALBUTEROL 2.5 MILLIGRAM(S): 90 AEROSOL, METERED ORAL at 00:33

## 2017-02-10 RX ADMIN — Medication 100 MILLIEQUIVALENT(S): at 05:59

## 2017-02-10 RX ADMIN — ALBUTEROL 2.5 MILLIGRAM(S): 90 AEROSOL, METERED ORAL at 11:08

## 2017-02-10 RX ADMIN — HEPARIN SODIUM 5000 UNIT(S): 5000 INJECTION INTRAVENOUS; SUBCUTANEOUS at 05:58

## 2017-02-10 RX ADMIN — ALBUTEROL 2.5 MILLIGRAM(S): 90 AEROSOL, METERED ORAL at 06:25

## 2017-02-10 RX ADMIN — DEXTROSE MONOHYDRATE, SODIUM CHLORIDE, AND POTASSIUM CHLORIDE 100 MILLILITER(S): 50; .745; 4.5 INJECTION, SOLUTION INTRAVENOUS at 17:55

## 2017-02-10 RX ADMIN — MEROPENEM 200 MILLIGRAM(S): 1 INJECTION INTRAVENOUS at 13:04

## 2017-02-10 RX ADMIN — Medication 1 CAPSULE(S): at 17:33

## 2017-02-10 RX ADMIN — ALBUTEROL 2.5 MILLIGRAM(S): 90 AEROSOL, METERED ORAL at 17:45

## 2017-02-10 RX ADMIN — PANTOPRAZOLE SODIUM 40 MILLIGRAM(S): 20 TABLET, DELAYED RELEASE ORAL at 13:04

## 2017-02-10 RX ADMIN — DEXTROSE MONOHYDRATE, SODIUM CHLORIDE, AND POTASSIUM CHLORIDE 100 MILLILITER(S): 50; .745; 4.5 INJECTION, SOLUTION INTRAVENOUS at 10:37

## 2017-02-10 RX ADMIN — CEFTRIAXONE 100 GRAM(S): 500 INJECTION, POWDER, FOR SOLUTION INTRAMUSCULAR; INTRAVENOUS at 18:34

## 2017-02-10 RX ADMIN — INSULIN GLARGINE 4 UNIT(S): 100 INJECTION, SOLUTION SUBCUTANEOUS at 13:05

## 2017-02-10 RX ADMIN — INSULIN HUMAN 4 UNIT(S)/HR: 100 INJECTION, SOLUTION SUBCUTANEOUS at 19:01

## 2017-02-10 RX ADMIN — SODIUM CHLORIDE 150 MILLILITER(S): 9 INJECTION, SOLUTION INTRAVENOUS at 02:09

## 2017-02-10 RX ADMIN — Medication 100 MILLIGRAM(S): at 05:58

## 2017-02-10 NOTE — CONSULT NOTE ADULT - PROBLEM SELECTOR RECOMMENDATION 5
Monitor, CT scan of head negative, most likely related to hypoglycemia Monitor, CT scan of head negative, most likely related to hypoglycemia or metabolic acidosis. Sodium bicarbonate gtt increased to 100 ml/hr

## 2017-02-10 NOTE — PROGRESS NOTE ADULT - PROBLEM SELECTOR PLAN 1
sec to salmonella bacteremia sec to gastroenteritis   Blood culture and urine culture positive  Stool culture pending  Switch to to IV rocephin  D/C vancomycin   S/P vanco X 1

## 2017-02-10 NOTE — PROGRESS NOTE ADULT - ASSESSMENT
N/V, Diarrhea - N/V resolved but the diarrhea has somewhat improved on Merrem/flagyl.  Probably secondary to Salmonella enteritis ( neg CT, Urine (+) Salmonella, BC (+) gram neg rods )

## 2017-02-10 NOTE — PROGRESS NOTE ADULT - SUBJECTIVE AND OBJECTIVE BOX
Patient seen in follow up for REYNALDO, sepsis.  at bedside. Describes wife as very active at home.    MEDICATIONS  (STANDING):  clonazePAM Tablet 1milliGRAM(s) Oral at bedtime  heparin  Injectable 5000Unit(s) SubCutaneous every 12 hours  dextrose 5%. 1000milliLiter(s) IV Continuous <Continuous>  dextrose 50% Injectable 12.5Gram(s) IV Push once  dextrose 50% Injectable 25Gram(s) IV Push once  influenza   Vaccine 0.5milliLiter(s) IntraMuscular once  ALBUTerol    0.083% 2.5milliGRAM(s) Nebulizer every 6 hours  ALBUTerol    90 MICROgram(s) HFA Inhaler 1Puff(s) Inhalation every 4 hours  pantoprazole  Injectable 40milliGRAM(s) IV Push daily  amylase/lipase/protease  (CREON  6,000 Units) 1Capsule(s) Oral three times a day with meals  meropenem IVPB 1000milliGRAM(s) IV Intermittent every 24 hours  LORazepam   Injectable 1milliGRAM(s) IV Push once  metroNIDAZOLE  IVPB 500milliGRAM(s) IV Intermittent every 8 hours  metroNIDAZOLE  IVPB  IV Intermittent   dextrose 5% + sodium chloride 0.9% with potassium chloride 20 mEq/L 1000milliLiter(s) IV Continuous <Continuous>    MEDICATIONS  (PRN):  dextrose Gel 1Dose(s) Oral once PRN Blood Glucose LESS THAN 70 milliGRAM(s)/deciliter  glucagon  Injectable 1milliGRAM(s) IntraMuscular once PRN Glucose LESS THAN 70 milligrams/deciliter  ondansetron Injectable 4milliGRAM(s) IV Push every 8 hours PRN Nausea and/or Vomiting  acetaminophen   Tablet 650milliGRAM(s) Oral every 6 hours PRN For Temp greater than 38 C (100.4 F)    PHYSICAL EXAM: weak, lethargic, arousable.      T(C): 36.8, Max: 37.1 (02-10 @ 04:00)  HR: 89 (75 - 103)  BP: 109/59 (88/55 - 109/59)  RR: 15 (14 - 21)  SpO2: 99% (83% - 100%)  Wt(kg): --  Respiratory: clear anteriorly, decreased BS at bases  Cardiovascular: S1 S2  Gastrointestinal: soft NT ND +BS  Extremities:  no  edema                                    12.8   16.4  )-----------( 223      ( 10 Feb 2017 03:27 )             34.6     10 Feb 2017 03:27    132    |  99     |  38     ----------------------------<  228    3.0     |  27     |  1.74     Ca    7.6        10 Feb 2017 03:27  Phos  2.1       10 Feb 2017 03:27  Mg     2.1       10 Feb 2017 03:27      ABG - ( 09 Feb 2017 17:14 )  pH: x     /  pCO2: 34    /  pO2: 123   / HCO3: 22    / Base Excess: -1.7  /  SaO2: 98                    Assessment and Plan:  REYNALDO gram negative sepsis Salmonella; lung abscess.  IVF; Replete electrolytes. IV abx; renal fxn improving.  Will follow course.

## 2017-02-10 NOTE — CONSULT NOTE ADULT - PROBLEM SELECTOR RECOMMENDATION 9
ID consult noted, should check pancx and start IV abx. +salmonella in urine cx.
R/O entercolitis,BSI(salmonella bacteremia ) etc  Start Meropenem and give 1 dose of Vancomycin  IV fluids  CT abdomen and pelvis  being evaluated for ICU sec to unresponsiveness and lactic acidosis
Suspect that patient either septic for necrotic lung mass or necritizing pancreatitis. D/c glatiramer as linked to malignancy and immunocompromise. Will admit to ICU to correct hypoglycemia
start lantus 5units bid  agree with MARISSA with meals alone while on clears  check ha1c   will follow
Clear liquid diet, IV fluid ( stool for C diff), Pepto Bismo, stool count, abdominal x ray

## 2017-02-10 NOTE — PROGRESS NOTE ADULT - SUBJECTIVE AND OBJECTIVE BOX
INTERVAL HPI/OVERNIGHT EVENTS:   HPI:  Pt is a 60 yo lady with a pmhx of DM1 w prior insulin pump since removed, HTN, HL, MS who presents to the ED with N/v/d for 2 days. She ate tacos on Wednesday, and then started having n/v/d that night until now, unable to keep anything down. No fevers, no focal abdominal pain, no dysuria, no chest pain, no short of breath. (2017 13:13). Salmonella sepsis lung abscess sepsis. Required bicarb drip for REYNALDO and acidemia now improved and off bicarb drip      CENTRAL LINE: [ ] YES [x NO  LOCATION:   DATE INSERTED:  REMOVE: [ ] YES [ ] NO  EXPLAIN:    MITCHELL: [x YES [ ] NO    DATE INSERTED:  REMOVE:  [ ] YES [ ] NO  EXPLAIN:    A-LINE:  [ ] YES [x ] NO  LOCATION:   DATE INSERTED:  REMOVE:  [ ] YES [ ] NO  EXPLAIN:    PAST MEDICAL & SURGICAL HISTORY:  Multiple sclerosis  Myasthenia gravis  Hypertension  Diabetes  H/O cervical spine surgery      REVIEW OF SYSTEMS:    no fevers. hasnt reqd pressors, lethargic but arousable no cp or SOB    ICU Vital Signs Last 24 Hrs  T(C): 36.8, Max: 37.1 (02-10 @ 04:00)  T(F): 98.2, Max: 98.8 (02-10 @ 04:00)  HR: 89 (75 - 103)  BP: 109/59 (88/55 - 109/59)  BP(mean): 70 (63 - 74)  ABP: --  ABP(mean): --  RR: 15 (14 - 21)  SpO2: 99% (83% - 100%)      ABG - ( 2017 17:14 )  pH: x     /  pCO2: 34    /  pO2: 123   / HCO3: 22    / Base Excess: -1.7  /  SaO2: 98                  I&O's Detail    I & Os for current day (as of 10 Feb 2017 12:14)  =============================================  IN:    dextrose 5%: 1350 ml    IV PiggyBack: 200 ml    Total IN: 1550 ml  ---------------------------------------------  OUT:    Indwelling Catheter - Urethral: 1250 ml    Total OUT: 1250 ml  ---------------------------------------------  Total NET: 300 ml        CAPILLARY BLOOD GLUCOSE  294 (10 Feb 2017 09:52)  248 (10 Feb 2017 07:28)  187 (10 Feb 2017 02:28)  157 (2017 22:03)  181 (2017 17:14)  200 (2017 17:03)  34 (2017 16:49)  38 (2017 16:48)    PHYSICAL EXAM:    G en lethargic but arousable  CHEST/LUNG: Clear to percussion bilaterally; No rales, rhonchi, wheezing, or rubs  HEART: Regular rate and rhythm; No murmurs, rubs, or gallops  ABDOMEN: Soft, Nontender, Nondistended; Bowel sounds present  EXTREMITIES:  2+ Peripheral Pulses, No clubbing, cyanosis, or edema  LYMPH: No lymphadenopathy noted  SKIN: No rashes or lesions      LABS:                        12.8   16.4  )-----------( 223      ( 10 Feb 2017 03:27 )             34.6      10 Feb 2017 03:27    132    |  99     |  38     ----------------------------<  228    3.0     |  27     |  1.74     Ca    7.6        10 Feb 2017 03:27  Phos  2.1       10 Feb 2017 03:27  Mg     2.1       10 Feb 2017 03:27        Urinalysis Basic - ( 2017 15:24 )    Color: Bibiana / Appearance: Slightly Turbid / S.015 / pH: x  Gluc: x / Ketone: Negative  / Bili: Negative / Urobili: Negative mg/dL   Blood: x / Protein: 100 mg/dL / Nitrite: Negative   Leuk Esterase: Trace / RBC: 3-5 /HPF / WBC 6-10   Sq Epi: x / Non Sq Epi: Few / Bacteria: Moderate      Culture Results:   Suspicious colony-R/O pathogens. ( @ 01:35)  Culture Results:   No growth to date. ( @ 18:33)  Culture Results:   No growth to date. ( @ 18:33)  Culture Results:   No growth ( @ 18:11)  Culture Results:   Growth in aerobic bottle:  Gram Negative Rods  Growth in anaerobic bottle:  Gram Negative Rods ( @ 12:26)      RADIOLOGY & ADDITIONAL STUDIES:      Assessment and Plan:    CRITICAL CARE TIME SPENT:

## 2017-02-10 NOTE — PROGRESS NOTE ADULT - SUBJECTIVE AND OBJECTIVE BOX
Patient is a 61y old  Female who presents with a chief complaint of Nausea , Vomiting, pain abdomen. (2017 13:13)      HPI:  Pt is a 62 yo lady with a pmhx of DM1 w prior insulin pump since removed, HTN, HL, MS who presents to the ED with N/v/d for 2 days. She ate tacos on Wednesday, and then started having n/v/d that night until now, unable to keep anything down. No fevers, no focal abdominal pain, no dysuria, no chest pain, no short of breath. (2017 13:13)      INTERVAL HPI/OVERNIGHT EVENTS:  Patient 3 watery bowel movements overnight. The patient denies melena, hematochezia, hematemesis, nausea, vomiting, abdominal pain, or constipation      MEDICATIONS  (STANDING):  clonazePAM Tablet 1milliGRAM(s) Oral at bedtime  heparin  Injectable 5000Unit(s) SubCutaneous every 12 hours  dextrose 5%. 1000milliLiter(s) IV Continuous <Continuous>  dextrose 50% Injectable 12.5Gram(s) IV Push once  dextrose 50% Injectable 25Gram(s) IV Push once  influenza   Vaccine 0.5milliLiter(s) IntraMuscular once  ALBUTerol    0.083% 2.5milliGRAM(s) Nebulizer every 6 hours  ALBUTerol    90 MICROgram(s) HFA Inhaler 1Puff(s) Inhalation every 4 hours  pantoprazole  Injectable 40milliGRAM(s) IV Push daily  amylase/lipase/protease  (CREON  6,000 Units) 1Capsule(s) Oral three times a day with meals  meropenem IVPB 1000milliGRAM(s) IV Intermittent every 24 hours  LORazepam   Injectable 1milliGRAM(s) IV Push once  metroNIDAZOLE  IVPB 500milliGRAM(s) IV Intermittent every 8 hours  metroNIDAZOLE  IVPB  IV Intermittent   dextrose 5% + sodium chloride 0.9% with potassium chloride 20 mEq/L 1000milliLiter(s) IV Continuous <Continuous>  insulin glargine Injectable (LANTUS) 4Unit(s) SubCutaneous <User Schedule>  insulin lispro (HumaLOG) corrective regimen sliding scale  SubCutaneous three times a day before meals    MEDICATIONS  (PRN):  dextrose Gel 1Dose(s) Oral once PRN Blood Glucose LESS THAN 70 milliGRAM(s)/deciliter  glucagon  Injectable 1milliGRAM(s) IntraMuscular once PRN Glucose LESS THAN 70 milligrams/deciliter  ondansetron Injectable 4milliGRAM(s) IV Push every 8 hours PRN Nausea and/or Vomiting  acetaminophen   Tablet 650milliGRAM(s) Oral every 6 hours PRN For Temp greater than 38 C (100.4 F)      FAMILY HISTORY:  No pertinent family history in first degree relatives      Allergies    No Known Allergies    Intolerances        PMH/PSH:  Multiple sclerosis  Myasthenia gravis  Hypertension  Diabetes  H/O cervical spine surgery        REVIEW OF SYSTEMS:  CONSTITUTIONAL: No fever, weight loss, or fatigue  EYES: No eye pain, visual disturbances, or discharge  NECK: No pain or stiffness  BREASTS: No pain, masses, or nipple discharge  RESPIRATORY: No cough, wheezing, chills or hemoptysis; No shortness of breath  CARDIOVASCULAR: No chest pain, palpitations, dizziness, or leg swelling  GASTROINTESTINAL: No abdominal or epigastric pain. No nausea, vomiting, or hematemesis; No diarrhea or constipation. No melena or hematochezia.  GENITOURINARY: No dysuria, frequency, hematuria, or incontinence  NEUROLOGICAL: No headaches, memory loss, loss of strength, numbness, or tremors  SKIN: No itching, burning, rashes, or lesions     Vital Signs Last 24 Hrs  T(C): 36.8, Max: 37.1 (-10 @ 04:00)  T(F): 98.2, Max: 98.8 (-10 @ 04:00)  HR: 89 (75 - 103)  BP: 109/59 (88/55 - 109/59)  BP(mean): 70 (63 - 74)  RR: 15 (14 - 21)  SpO2: 99% (83% - 100%)    PHYSICAL EXAM:  GENERAL: NAD, well-groomed, well-developed  HEAD:  Atraumatic, Normocephalic  EYES: EOMI, PERRLA, conjunctiva and sclera clear  NECK: Supple, No JVD, Normal thyroid  NERVOUS SYSTEM:  Alert & Oriented X 2, Fair concentration;   CHEST/LUNG: Clear to percussion bilaterally; No rales, rhonchi, wheezing, or rubs  HEART: Regular rate and rhythm; No murmurs, rubs, or gallops  ABDOMEN: Soft, Nontender, Nondistended; Bowel sounds present  EXTREMITIES:  2+ Peripheral Pulses, No clubbing, cyanosis, or edema  LYMPH: No lymphadenopathy noted  SKIN: No rashes or lesions    LABS:  [id number]  @ 19:11    02-10 @ 03:27   HGB 34.6  HCT 12.8  MCV 88.2   RDW 11.9 WBC 16.4 PLTA 223         @ 08:59   HGB 33.4  HCT 12.4  MCV 87.0   RDW 11.5 WBC 15.2 PLTA 210        08 @ 10:14   HGB 49.3  HCT 17.3  MCV 91.0   RDW 12.2 WBC 17.4 PLTA 240         @ 08:35   HGB 42.8  HCT 15.6  MCV 90.6   RDW 12.5 WBC 17.1 PLTA 229          10 2017 03:27    132    |  99     |  38     ----------------------------<  228    3.0     |  27     |  1.74   2017 15:09    133    |  107    |  46     ----------------------------<  58     3.6     |  21     |  2.78   2017 09:36    130    |  105    |  46     ----------------------------<  246    3.4     |  17     |  3.18   2017 20:33    130    |  106    |  53     ----------------------------<  81     3.7     |  13     |  4.61   2017 14:51    124    |  104    |  53     ----------------------------<  242    4.2     |  11     |  4.90   2017 10:14    125    |  101    |  53     ----------------------------<  221    4.0     |  11     |  5.09   2017 08:35    119    |  95     |  50     ----------------------------<  340    4.4     |  11     |  5.01     Ca    7.6        10 Feb 2017 03:27  Ca    8.1        2017 15:09  Ca    7.3        2017 09:36  Ca    8.3        2017 20:33  Ca    8.1        2017 14:51  Ca    8.9        2017 10:14  Ca    8.7        2017 08:35  Phos  2.1       10 Feb 2017 03:27  Phos  2.0       2017 09:36  Mg     2.1       10 Feb 2017 03:27  Mg     1.9       2017 09:36  Mg     1.8       2017 05:14    TPro  6.9    /  Alb  2.3    /  TBili  0.3    /  DBili  .12    /  AST  15     /  ALT  18     /  AlkPhos  163    2017 10:15  TPro  6.4    /  Alb  2.6    /  TBili  0.6    /  DBili  x      /  AST  22     /  ALT  29     /  AlkPhos  104    2017 11:16      Urinalysis Basic - ( 2017 15:24 )    Color: Bibiana / Appearance: Slightly Turbid / S.015 / pH: x  Gluc: x / Ketone: Negative  / Bili: Negative / Urobili: Negative mg/dL   Blood: x / Protein: 100 mg/dL / Nitrite: Negative   Leuk Esterase: Trace / RBC: 3-5 /HPF / WBC 6-10   Sq Epi: x / Non Sq Epi: Few / Bacteria: Moderate      CAPILLARY BLOOD GLUCOSE  294 (10 Feb 2017 09:52)  248 (10 Feb 2017 07:28)  187 (10 Feb 2017 02:28)  157 (2017 22:03)  181 (2017 17:14)  200 (2017 17:03)  34 (2017 16:49)  38 (2017 16:48)        RADIOLOGY & ADDITIONAL TESTS:    Imaging Personally Reviewed:  [ ] YES  [ ] NO    Consultant(s) Notes Reviewed:  [ ] YES  [ ] NO    Care Discussed with Consultants/Other Providers [ ] YES  [ ] NO

## 2017-02-10 NOTE — CONSULT NOTE ADULT - PROBLEM SELECTOR PROBLEM 1
Sepsis
Sepsis, due to unspecified organism
Sepsis, due to unspecified organism
Uncontrolled type 1 diabetes mellitus with hyperglycemia
Gastroenteritis

## 2017-02-10 NOTE — PROGRESS NOTE ADULT - PROBLEM SELECTOR PLAN 1
C/W merro and flagyl. BLDCX have cleared check echo. Likely salmonella enteric infection with bacteremia,  contaminated urine culture and seeded to lung. Will need at least 2 weeks IV ABX, possibly longer in setting of lung abscess. MAPs ok off pressors.

## 2017-02-10 NOTE — PROGRESS NOTE ADULT - SUBJECTIVE AND OBJECTIVE BOX
Patient is a 61y old  Female who presents with a chief complaint of Nausea , Vomiting, pain abdomen. (2017 13:13)      INTERVAL HPI/OVERNIGHT EVENTS:  pt seen with  at bedside  weak  tolerating clears  no further hypoglcyemia now hyperglycemic    MEDICATIONS  (STANDING):  clonazePAM Tablet 1milliGRAM(s) Oral at bedtime  heparin  Injectable 5000Unit(s) SubCutaneous every 12 hours  dextrose 5%. 1000milliLiter(s) IV Continuous <Continuous>  dextrose 50% Injectable 12.5Gram(s) IV Push once  dextrose 50% Injectable 25Gram(s) IV Push once  influenza   Vaccine 0.5milliLiter(s) IntraMuscular once  ALBUTerol    0.083% 2.5milliGRAM(s) Nebulizer every 6 hours  ALBUTerol    90 MICROgram(s) HFA Inhaler 1Puff(s) Inhalation every 4 hours  pantoprazole  Injectable 40milliGRAM(s) IV Push daily  amylase/lipase/protease  (CREON  6,000 Units) 1Capsule(s) Oral three times a day with meals  meropenem IVPB 1000milliGRAM(s) IV Intermittent every 24 hours  LORazepam   Injectable 1milliGRAM(s) IV Push once  metroNIDAZOLE  IVPB 500milliGRAM(s) IV Intermittent every 8 hours  metroNIDAZOLE  IVPB  IV Intermittent   dextrose 5% + sodium chloride 0.9% with potassium chloride 20 mEq/L 1000milliLiter(s) IV Continuous <Continuous>  insulin glargine Injectable (LANTUS) 4Unit(s) SubCutaneous <User Schedule>  insulin lispro (HumaLOG) corrective regimen sliding scale  SubCutaneous three times a day before meals    MEDICATIONS  (PRN):  dextrose Gel 1Dose(s) Oral once PRN Blood Glucose LESS THAN 70 milliGRAM(s)/deciliter  glucagon  Injectable 1milliGRAM(s) IntraMuscular once PRN Glucose LESS THAN 70 milligrams/deciliter  ondansetron Injectable 4milliGRAM(s) IV Push every 8 hours PRN Nausea and/or Vomiting  acetaminophen   Tablet 650milliGRAM(s) Oral every 6 hours PRN For Temp greater than 38 C (100.4 F)      REVIEW OF SYSTEMS:  CONSTITUTIONAL: No fever, weight loss, or fatigue  RESPIRATORY: No cough, wheezing, chills or hemoptysis; No shortness of breath  CARDIOVASCULAR: No chest pain, palpitations, dizziness, or leg swelling  GASTROINTESTINAL: No abdominal or epigastric pain. No nausea, vomiting, or hematemesis; No diarrhea or constipation. No melena or hematochezia.  ENDOCRINE: No heat or cold intolerance; No hair loss      Vital Signs Last 24 Hrs  T(C): 36.8, Max: 37.1 (02-10 @ 04:00)  T(F): 98.2, Max: 98.8 (02-10 @ 04:00)  HR: 89 (75 - 103)  BP: 109/59 (88/55 - 109/59)  BP(mean): 70 (63 - 74)  RR: 15 (14 - 21)  SpO2: 99% (83% - 100%)    PHYSICAL EXAM:  GENERAL: NAD, well-groomed, well-developed  CHEST/LUNG: Clear to percussion bilaterally; No rales, rhonchi, wheezing, or rubs  HEART: Regular rate and rhythm; No murmurs, rubs, or gallops        LABS:                        12.8   16.4  )-----------( 223      ( 10 Feb 2017 03:27 )             34.6     10 Feb 2017 03:27    132    |  99     |  38     ----------------------------<  228    3.0     |  27     |  1.74     Ca    7.6        10 Feb 2017 03:27  Phos  2.1       10 Feb 2017 03:27  Mg     2.1       10 Feb 2017 03:27        Urinalysis Basic - ( 2017 15:24 )    Color: Bibiana / Appearance: Slightly Turbid / S.015 / pH: x  Gluc: x / Ketone: Negative  / Bili: Negative / Urobili: Negative mg/dL   Blood: x / Protein: 100 mg/dL / Nitrite: Negative   Leuk Esterase: Trace / RBC: 3-5 /HPF / WBC 6-10   Sq Epi: x / Non Sq Epi: Few / Bacteria: Moderate      CAPILLARY BLOOD GLUCOSE  294 (10 Feb 2017 09:52)  248 (10 Feb 2017 07:28)  187 (10 Feb 2017 02:28)  157 (2017 22:03)  181 (2017 17:14)  200 (2017 17:03)  34 (2017 16:49)  38 (2017 16:48)    Lipid panel:       ABG - ( 2017 17:14 )  pH: x     /  pCO2: 34    /  pO2: 123   / HCO3: 22    / Base Excess: -1.7  /  SaO2: 98                      RADIOLOGY & ADDITIONAL TESTS:

## 2017-02-10 NOTE — PROGRESS NOTE ADULT - SUBJECTIVE AND OBJECTIVE BOX
Patient is a 61y old  Female who presents with a chief complaint of Nausea , Vomiting, pain abdomen. (04 Feb 2017 13:13)      INTERVAL HPI / OVERNIGHT EVENTS: pt moved to ICU yesterday. still very somnolent though maintaining saturation ,no fever    MEDICATIONS  (STANDING):  clonazePAM Tablet 1milliGRAM(s) Oral at bedtime  heparin  Injectable 5000Unit(s) SubCutaneous every 12 hours  dextrose 5%. 1000milliLiter(s) IV Continuous <Continuous>  dextrose 50% Injectable 12.5Gram(s) IV Push once  dextrose 50% Injectable 25Gram(s) IV Push once  influenza   Vaccine 0.5milliLiter(s) IntraMuscular once  ALBUTerol    0.083% 2.5milliGRAM(s) Nebulizer every 6 hours  ALBUTerol    90 MICROgram(s) HFA Inhaler 1Puff(s) Inhalation every 4 hours  pantoprazole  Injectable 40milliGRAM(s) IV Push daily  amylase/lipase/protease  (CREON  6,000 Units) 1Capsule(s) Oral three times a day with meals  meropenem IVPB 1000milliGRAM(s) IV Intermittent every 24 hours  LORazepam   Injectable 1milliGRAM(s) IV Push once  metroNIDAZOLE  IVPB 500milliGRAM(s) IV Intermittent every 8 hours  metroNIDAZOLE  IVPB  IV Intermittent   dextrose 5% + sodium chloride 0.9% with potassium chloride 20 mEq/L 1000milliLiter(s) IV Continuous <Continuous>  insulin glargine Injectable (LANTUS) 4Unit(s) SubCutaneous <User Schedule>  insulin lispro (HumaLOG) corrective regimen sliding scale  SubCutaneous three times a day before meals    MEDICATIONS  (PRN):  dextrose Gel 1Dose(s) Oral once PRN Blood Glucose LESS THAN 70 milliGRAM(s)/deciliter  glucagon  Injectable 1milliGRAM(s) IntraMuscular once PRN Glucose LESS THAN 70 milligrams/deciliter  ondansetron Injectable 4milliGRAM(s) IV Push every 8 hours PRN Nausea and/or Vomiting  acetaminophen   Tablet 650milliGRAM(s) Oral every 6 hours PRN For Temp greater than 38 C (100.4 F)      Vital Signs Last 24 Hrs  T(C): 36.8, Max: 37.1 (02-10 @ 04:00)  T(F): 98.2, Max: 98.8 (02-10 @ 04:00)  HR: 83 (72 - 103)  BP: 109/57 (86/49 - 112/57)  BP(mean): 70 (58 - 81)  RR: 15 (14 - 21)  SpO2: 100% (83% - 100%)    PHYSICAL EXAM:    Constitutional: NAD, well-groomed, well-developed  Respiratory: CTAB/L  Cardiovascular: S1 and S2, RRR, no M/G/R  Gastrointestinal: BS+, soft, NT/ND  Extremities: No peripheral edema  Vascular: 2+ peripheral pulses  Skin: No rashes      LABS:                        12.8   16.4  )-----------( 223      ( 10 Feb 2017 03:27 )             34.6     10 Feb 2017 03:27    132    |  99     |  38     ----------------------------<  228    3.0     |  27     |  1.74     Ca    7.6        10 Feb 2017 03:27  Phos  2.1       10 Feb 2017 03:27  Mg     2.1       10 Feb 2017 03:27              MICROBIOLOGY:  RECENT CULTURES:  02-09 .Stool Feces XXXX XXXX   Suspicious colony-R/O pathogens.    02-08 .Blood Blood XXXX XXXX   No growth to date.    02-08 .Urine Catheterized XXXX XXXX   No growth    02-08 .Blood Blood-Venous XXXX   Growth in aerobic bottle:  Gram Negative Rods  Growth in anaerobic bottle:  Gram Negative Rods   Growth in aerobic and anaerobic bottles: Salmonella species, Group B    02-05 .Urine Clean Catch (Midstream) Escherichia coli  Salmonella species XXXX   10,000 - 49,000 CFU/mL Escherichia coli  10,000 - 49,000 CFU/mL Salmonella enterica Group B  Revised urine counts reflect clinically significant counts  established by evidence based medicine.          RADIOLOGY & ADDITIONAL STUDIES:

## 2017-02-10 NOTE — CONSULT NOTE ADULT - SUBJECTIVE AND OBJECTIVE BOX
Patient is a 61y old  Female who presents with a chief complaint of Nausea , Vomiting, pain abdomen. (2017 13:13)      HPI:  Pt is a 60 yo lady with a pmhx of DM1 w prior insulin pump since removed, HTN, hyperlipidemia, multiple sclerosis, myasthenia gravis, chronic pancreatitis, h/o alcoholism  who presents to the ED with N/v/d for 2 days.   Patient admitted and had rapid response for severe bout of hypoglycemia and noted to have acute kidney injury. Patient with CT Scan of Chest and abdomen revealing chronic pancreatitis and necrotic lesion at heart border      Allergies    No Known Allergies    Intolerances        MEDICATIONS  (STANDING):  clonazePAM Tablet 1milliGRAM(s) Oral at bedtime  glatiramer Injectable 20milliGRAM(s) SubCutaneous at bedtime  heparin  Injectable 5000Unit(s) SubCutaneous every 12 hours  dextrose 5%. 1000milliLiter(s) IV Continuous <Continuous>  dextrose 50% Injectable 12.5Gram(s) IV Push once  dextrose 50% Injectable 25Gram(s) IV Push once  influenza   Vaccine 0.5milliLiter(s) IntraMuscular once  ALBUTerol    0.083% 2.5milliGRAM(s) Nebulizer every 6 hours  ALBUTerol    90 MICROgram(s) HFA Inhaler 1Puff(s) Inhalation every 4 hours  pantoprazole  Injectable 40milliGRAM(s) IV Push daily  amylase/lipase/protease  (CREON  6,000 Units) 1Capsule(s) Oral three times a day with meals  meropenem IVPB 1000milliGRAM(s) IV Intermittent every 24 hours  dextrose 5% 1000milliLiter(s) IV Continuous <Continuous>  LORazepam   Injectable 1milliGRAM(s) IV Push once  potassium phosphate IVPB 15milliMole(s) IV Intermittent once  potassium chloride  10 mEq/100 mL IVPB 10milliEquivalent(s) IV Intermittent every 1 hour  metroNIDAZOLE  IVPB 500milliGRAM(s) IV Intermittent every 8 hours  metroNIDAZOLE  IVPB  IV Intermittent     MEDICATIONS  (PRN):  dextrose Gel 1Dose(s) Oral once PRN Blood Glucose LESS THAN 70 milliGRAM(s)/deciliter  glucagon  Injectable 1milliGRAM(s) IntraMuscular once PRN Glucose LESS THAN 70 milligrams/deciliter  ondansetron Injectable 4milliGRAM(s) IV Push every 8 hours PRN Nausea and/or Vomiting  acetaminophen   Tablet 650milliGRAM(s) Oral every 6 hours PRN For Temp greater than 38 C (100.4 F)      Drug Dosing Weight  Height (cm): 154.9 (2017 10:10)  Weight (kg): 45.5 (2017 21:25)  BMI (kg/m2): 19 (2017 21:25)  BSA (m2): 1.41 (2017 21:25)    PAST MEDICAL & SURGICAL HISTORY:  Multiple sclerosis  Myasthenia gravis  Hypertension  Diabetes  H/O cervical spine surgery  Alcoholism  Chronic Pancreatitis      ADVANCE DIRECTIVES: none    REVIEW OF SYSTEMS poor historiam      General:	extreme pallor and lethargy    Skin/Breast: pallor      Respiratory and Thorax: denies cough, hemoptysis  	  Cardiovascular:	    Gastrointestinal:	N/V and epigastric abdominal pain    Genitourinary:	decreased urine output    	    Neurological:	confusion      	        ICU Vital Signs Last 24 Hrs  T(C): 37.7, Max: 37.7 (02-10 @ 04:00)  T(F): 99.8, Max: 99.8 (02-10 @ 04:00)  HR: 87 (75 - 103)  BP: 104/53 (88/55 - 105/63)  BP(mean): 65 (63 - 72)  ABP: --  ABP(mean): --  RR: 17 (14 - 21)  SpO2: 100% (83% - 100%)      ABG - ( 2017 17:14 )  pH: x     /  pCO2: 34    /  pO2: 123   / HCO3: 22    / Base Excess: -1.7  /  SaO2: 98                  I&O's Detail    I & Os for current day (as of 10 Feb 2017 07:19)  =============================================  IN:    Total IN: 0 ml  ---------------------------------------------  OUT:    Indwelling Catheter - Urethral: 1175 ml    Total OUT: 1175 ml  ---------------------------------------------  Total NET: -1175 ml      PHYSICAL EXAM:      Constitutional: pallor, lethargy when first seen improved with improved glycemia    Eyes: dry      Neck: supple      Respiratory: bilateral air entry with wheezes    Cardiovascular: S1/S2    Gastrointestinal: soft, tender in epigastric region, no rebound    Genitourinary: johnson      Extremities: no edema      Skin: pallor    Psychiatric: confused        LABS:  CBC Full  -  ( 10 Feb 2017 03:27 )  WBC Count : 16.4 K/uL  Hemoglobin : 12.8 g/dL  Hematocrit : 34.6 %  Platelet Count - Automated : 223 K/uL  Mean Cell Volume : 88.2 fl  Mean Cell Hemoglobin : 32.6 pg  Mean Cell Hemoglobin Concentration : 37.0 gm/dL  Auto Neutrophil # : 15.5 K/uL  Auto Lymphocyte # : 0.5 K/uL  Auto Monocyte # : 0.4 K/uL  Auto Eosinophil # : 0.0 K/uL  Auto Basophil # : 0.1 K/uL  Auto Neutrophil % : 94.4 %  Auto Lymphocyte % : 3.0 %  Auto Monocyte % : 2.1 %  Auto Eosinophil % : 0.2 %  Auto Basophil % : 0.4 %    10 Feb 2017 03:27    132    |  99     |  38     ----------------------------<  228    3.0     |  27     |  1.74     Ca    7.6        10 Feb 2017 03:27  Phos  2.1       10 Feb 2017 03:27  Mg     2.1       10 Feb 2017 03:27    TPro  6.9    /  Alb  2.3    /  TBili  0.3    /  DBili  .12    /  AST  15     /  ALT  18     /  AlkPhos  163    2017 10:15    CAPILLARY BLOOD GLUCOSE  157 (2017 22:03)      Urinalysis Basic - ( 2017 15:24 )    Color: Bibiana / Appearance: Slightly Turbid / S.015 / pH: x  Gluc: x / Ketone: Negative  / Bili: Negative / Urobili: Negative mg/dL   Blood: x / Protein: 100 mg/dL / Nitrite: Negative   Leuk Esterase: Trace / RBC: 3-5 /HPF / WBC 6-10   Sq Epi: x / Non Sq Epi: Few / Bacteria: Moderate      RADIOLOGY: CT scan of chest and abdomen- necrotic pulmonary lesion, chronic pancreatitis    CRITICAL CARE TIME SPENT: 90 minutes; 50% of the time spent coordinating care for this patient

## 2017-02-11 LAB
-  AMPICILLIN: SIGNIFICANT CHANGE UP
-  CEFTRIAXONE: SIGNIFICANT CHANGE UP
-  CIPROFLOXACIN: SIGNIFICANT CHANGE UP
-  TRIMETHOPRIM/SULFAMETHOXAZOLE: SIGNIFICANT CHANGE UP
ANION GAP SERPL CALC-SCNC: 5 MMOL/L — SIGNIFICANT CHANGE UP (ref 5–17)
BUN SERPL-MCNC: 22 MG/DL — SIGNIFICANT CHANGE UP (ref 7–23)
CALCIUM SERPL-MCNC: 7.6 MG/DL — LOW (ref 8.5–10.1)
CHLORIDE SERPL-SCNC: 105 MMOL/L — SIGNIFICANT CHANGE UP (ref 96–108)
CO2 SERPL-SCNC: 27 MMOL/L — SIGNIFICANT CHANGE UP (ref 22–31)
CREAT SERPL-MCNC: 0.8 MG/DL — SIGNIFICANT CHANGE UP (ref 0.5–1.3)
CULTURE RESULTS: SIGNIFICANT CHANGE UP
GLUCOSE SERPL-MCNC: 191 MG/DL — HIGH (ref 70–99)
GRAM STN FLD: SIGNIFICANT CHANGE UP
HCT VFR BLD CALC: 34.4 % — LOW (ref 34.5–45)
HGB BLD-MCNC: 12.5 G/DL — SIGNIFICANT CHANGE UP (ref 11.5–15.5)
MAGNESIUM SERPL-MCNC: 2 MG/DL — SIGNIFICANT CHANGE UP (ref 1.8–2.4)
MCHC RBC-ENTMCNC: 31.7 PG — SIGNIFICANT CHANGE UP (ref 27–34)
MCHC RBC-ENTMCNC: 36.2 GM/DL — HIGH (ref 32–36)
MCV RBC AUTO: 87.6 FL — SIGNIFICANT CHANGE UP (ref 80–100)
METHOD TYPE: SIGNIFICANT CHANGE UP
ORGANISM # SPEC MICROSCOPIC CNT: SIGNIFICANT CHANGE UP
ORGANISM # SPEC MICROSCOPIC CNT: SIGNIFICANT CHANGE UP
PHOSPHATE SERPL-MCNC: 1.8 MG/DL — LOW (ref 2.5–4.5)
PLATELET # BLD AUTO: 308 K/UL — SIGNIFICANT CHANGE UP (ref 150–400)
POTASSIUM SERPL-MCNC: 3.5 MMOL/L — SIGNIFICANT CHANGE UP (ref 3.5–5.3)
POTASSIUM SERPL-SCNC: 3.5 MMOL/L — SIGNIFICANT CHANGE UP (ref 3.5–5.3)
RBC # BLD: 3.93 M/UL — SIGNIFICANT CHANGE UP (ref 3.8–5.2)
RBC # FLD: 11.8 % — SIGNIFICANT CHANGE UP (ref 11–15)
SODIUM SERPL-SCNC: 137 MMOL/L — SIGNIFICANT CHANGE UP (ref 135–145)
SPECIMEN SOURCE: SIGNIFICANT CHANGE UP
WBC # BLD: 17.4 K/UL — HIGH (ref 3.8–10.5)
WBC # FLD AUTO: 17.4 K/UL — HIGH (ref 3.8–10.5)

## 2017-02-11 PROCEDURE — 99291 CRITICAL CARE FIRST HOUR: CPT

## 2017-02-11 PROCEDURE — 36600 WITHDRAWAL OF ARTERIAL BLOOD: CPT

## 2017-02-11 RX ORDER — DEXTROSE 50 % IN WATER 50 %
25 SYRINGE (ML) INTRAVENOUS ONCE
Qty: 0 | Refills: 0 | Status: COMPLETED | OUTPATIENT
Start: 2017-02-11 | End: 2017-02-11

## 2017-02-11 RX ORDER — INSULIN LISPRO 100/ML
VIAL (ML) SUBCUTANEOUS
Qty: 0 | Refills: 0 | Status: DISCONTINUED | OUTPATIENT
Start: 2017-02-11 | End: 2017-02-17

## 2017-02-11 RX ORDER — INSULIN GLARGINE 100 [IU]/ML
10 INJECTION, SOLUTION SUBCUTANEOUS
Qty: 0 | Refills: 0 | Status: DISCONTINUED | OUTPATIENT
Start: 2017-02-11 | End: 2017-02-13

## 2017-02-11 RX ORDER — HEPARIN SODIUM 5000 [USP'U]/ML
5000 INJECTION INTRAVENOUS; SUBCUTANEOUS EVERY 12 HOURS
Qty: 0 | Refills: 0 | Status: DISCONTINUED | OUTPATIENT
Start: 2017-02-11 | End: 2017-02-17

## 2017-02-11 RX ORDER — INSULIN LISPRO 100/ML
VIAL (ML) SUBCUTANEOUS AT BEDTIME
Qty: 0 | Refills: 0 | Status: DISCONTINUED | OUTPATIENT
Start: 2017-02-11 | End: 2017-02-17

## 2017-02-11 RX ORDER — PANTOPRAZOLE SODIUM 20 MG/1
40 TABLET, DELAYED RELEASE ORAL DAILY
Qty: 0 | Refills: 0 | Status: DISCONTINUED | OUTPATIENT
Start: 2017-02-11 | End: 2017-02-12

## 2017-02-11 RX ORDER — POTASSIUM PHOSPHATE, MONOBASIC POTASSIUM PHOSPHATE, DIBASIC 236; 224 MG/ML; MG/ML
15 INJECTION, SOLUTION INTRAVENOUS ONCE
Qty: 0 | Refills: 0 | Status: COMPLETED | OUTPATIENT
Start: 2017-02-11 | End: 2017-02-11

## 2017-02-11 RX ORDER — CEFTRIAXONE 500 MG/1
2 INJECTION, POWDER, FOR SOLUTION INTRAMUSCULAR; INTRAVENOUS EVERY 24 HOURS
Qty: 0 | Refills: 0 | Status: DISCONTINUED | OUTPATIENT
Start: 2017-02-11 | End: 2017-02-17

## 2017-02-11 RX ORDER — DEXTROSE MONOHYDRATE, SODIUM CHLORIDE, AND POTASSIUM CHLORIDE 50; .745; 4.5 G/1000ML; G/1000ML; G/1000ML
1000 INJECTION, SOLUTION INTRAVENOUS
Qty: 0 | Refills: 0 | Status: DISCONTINUED | OUTPATIENT
Start: 2017-02-11 | End: 2017-02-12

## 2017-02-11 RX ADMIN — ALBUTEROL 2.5 MILLIGRAM(S): 90 AEROSOL, METERED ORAL at 17:21

## 2017-02-11 RX ADMIN — Medication 100 MILLIGRAM(S): at 21:54

## 2017-02-11 RX ADMIN — Medication 1 MILLIGRAM(S): at 21:55

## 2017-02-11 RX ADMIN — HEPARIN SODIUM 5000 UNIT(S): 5000 INJECTION INTRAVENOUS; SUBCUTANEOUS at 17:50

## 2017-02-11 RX ADMIN — ALBUTEROL 2.5 MILLIGRAM(S): 90 AEROSOL, METERED ORAL at 01:32

## 2017-02-11 RX ADMIN — DEXTROSE MONOHYDRATE, SODIUM CHLORIDE, AND POTASSIUM CHLORIDE 100 MILLILITER(S): 50; .745; 4.5 INJECTION, SOLUTION INTRAVENOUS at 04:15

## 2017-02-11 RX ADMIN — INSULIN GLARGINE 10 UNIT(S): 100 INJECTION, SOLUTION SUBCUTANEOUS at 10:00

## 2017-02-11 RX ADMIN — ALBUTEROL 2.5 MILLIGRAM(S): 90 AEROSOL, METERED ORAL at 05:44

## 2017-02-11 RX ADMIN — Medication 100 MILLIGRAM(S): at 14:49

## 2017-02-11 RX ADMIN — Medication 1 CAPSULE(S): at 17:50

## 2017-02-11 RX ADMIN — PANTOPRAZOLE SODIUM 40 MILLIGRAM(S): 20 TABLET, DELAYED RELEASE ORAL at 11:58

## 2017-02-11 RX ADMIN — Medication 1 CAPSULE(S): at 11:58

## 2017-02-11 RX ADMIN — CEFTRIAXONE 100 GRAM(S): 500 INJECTION, POWDER, FOR SOLUTION INTRAMUSCULAR; INTRAVENOUS at 11:58

## 2017-02-11 RX ADMIN — ALBUTEROL 2.5 MILLIGRAM(S): 90 AEROSOL, METERED ORAL at 11:10

## 2017-02-11 RX ADMIN — HEPARIN SODIUM 5000 UNIT(S): 5000 INJECTION INTRAVENOUS; SUBCUTANEOUS at 05:01

## 2017-02-11 RX ADMIN — ALBUTEROL 2.5 MILLIGRAM(S): 90 AEROSOL, METERED ORAL at 23:37

## 2017-02-11 RX ADMIN — Medication 1: at 16:19

## 2017-02-11 RX ADMIN — Medication 25 GRAM(S): at 12:23

## 2017-02-11 RX ADMIN — POTASSIUM PHOSPHATE, MONOBASIC POTASSIUM PHOSPHATE, DIBASIC 63.75 MILLIMOLE(S): 236; 224 INJECTION, SOLUTION INTRAVENOUS at 06:28

## 2017-02-11 RX ADMIN — Medication 1 CAPSULE(S): at 09:18

## 2017-02-11 RX ADMIN — Medication 100 MILLIGRAM(S): at 05:01

## 2017-02-11 NOTE — PROGRESS NOTE ADULT - ASSESSMENT
REYNALDO resolved, gram negative sepsis, lung abscess.  Remains on IVF. IV abx.   Will sign off, please call with questions.

## 2017-02-11 NOTE — PROGRESS NOTE ADULT - PROBLEM SELECTOR PLAN 1
currently with increased risk of hypoglycemia   encourage more Nutrition   continue with antibiotics   Continue with the same regimen while inpatient

## 2017-02-11 NOTE — PROGRESS NOTE ADULT - SUBJECTIVE AND OBJECTIVE BOX
Pt feeling a little better; n/v improved; still with some diarrhea - on antibiotics    MEDICATIONS  (STANDING):  clonazePAM Tablet 1milliGRAM(s) Oral at bedtime  dextrose 5%. 1000milliLiter(s) IV Continuous <Continuous>  dextrose 50% Injectable 12.5Gram(s) IV Push once  dextrose 50% Injectable 25Gram(s) IV Push once  influenza   Vaccine 0.5milliLiter(s) IntraMuscular once  ALBUTerol    0.083% 2.5milliGRAM(s) Nebulizer every 6 hours  ALBUTerol    90 MICROgram(s) HFA Inhaler 1Puff(s) Inhalation every 4 hours  amylase/lipase/protease  (CREON  6,000 Units) 1Capsule(s) Oral three times a day with meals  metroNIDAZOLE  IVPB 500milliGRAM(s) IV Intermittent every 8 hours  metroNIDAZOLE  IVPB  IV Intermittent   insulin glargine Injectable (LANTUS) 10Unit(s) SubCutaneous <User Schedule>  cefTRIAXone   IVPB 2Gram(s) IV Intermittent every 24 hours  pantoprazole  Injectable 40milliGRAM(s) IV Push daily  heparin  Injectable 5000Unit(s) SubCutaneous every 12 hours  dextrose 5% + sodium chloride 0.9% with potassium chloride 20 mEq/L 1000milliLiter(s) IV Continuous <Continuous>  insulin lispro (HumaLOG) corrective regimen sliding scale  SubCutaneous three times a day before meals  insulin lispro (HumaLOG) corrective regimen sliding scale  SubCutaneous at bedtime    MEDICATIONS  (PRN):  dextrose Gel 1Dose(s) Oral once PRN Blood Glucose LESS THAN 70 milliGRAM(s)/deciliter  glucagon  Injectable 1milliGRAM(s) IntraMuscular once PRN Glucose LESS THAN 70 milligrams/deciliter  ondansetron Injectable 4milliGRAM(s) IV Push every 8 hours PRN Nausea and/or Vomiting  acetaminophen   Tablet 650milliGRAM(s) Oral every 6 hours PRN For Temp greater than 38 C (100.4 F)      Allergies    No Known Allergies    Intolerances        Vital Signs Last 24 Hrs  T(C): 37.4, Max: 37.4 (02-11 @ 19:30)  T(F): 99.4, Max: 99.4 (02-11 @ 19:30)  HR: 92 (78 - 108)  BP: 131/79 (90/55 - 131/79)  BP(mean): 93 (63 - 93)  RR: 22 (13 - 32)  SpO2: 100% (80% - 100%)    PHYSICAL EXAM:  General: NAD.  CVS: S1, S2  Chest: air entry bilaterally present  Abd: BS present, soft, non-tender      LABS:                        12.5   17.4  )-----------( 308      ( 11 Feb 2017 04:56 )             34.4     11 Feb 2017 04:56    137    |  105    |  22     ----------------------------<  191    3.5     |  27     |  0.80     Ca    7.6        11 Feb 2017 04:56  Phos  1.8       11 Feb 2017 04:56  Mg     2.0       11 Feb 2017 04:56        continue present antibiotics  cbc, lytes in am  on full fluids

## 2017-02-11 NOTE — PROGRESS NOTE ADULT - SUBJECTIVE AND OBJECTIVE BOX
Patient seen in follow up for REYNALDO. Alert, c/o right lower anterior chest pain. Alert, conversant.    Sepsis  Lung abscess  REYNALDO  Multiple sclerosis  Myasthenia gravis  Hypertension  Diabetes    MEDICATIONS  (STANDING):  clonazePAM Tablet 1milliGRAM(s) Oral at bedtime  dextrose 5%. 1000milliLiter(s) IV Continuous <Continuous>  dextrose 50% Injectable 12.5Gram(s) IV Push once  dextrose 50% Injectable 25Gram(s) IV Push once  influenza   Vaccine 0.5milliLiter(s) IntraMuscular once  ALBUTerol    0.083% 2.5milliGRAM(s) Nebulizer every 6 hours  ALBUTerol    90 MICROgram(s) HFA Inhaler 1Puff(s) Inhalation every 4 hours  amylase/lipase/protease  (CREON  6,000 Units) 1Capsule(s) Oral three times a day with meals  metroNIDAZOLE  IVPB 500milliGRAM(s) IV Intermittent every 8 hours  metroNIDAZOLE  IVPB  IV Intermittent   insulin glargine Injectable (LANTUS) 10Unit(s) SubCutaneous <User Schedule>  cefTRIAXone   IVPB 2Gram(s) IV Intermittent every 24 hours  pantoprazole  Injectable 40milliGRAM(s) IV Push daily  heparin  Injectable 5000Unit(s) SubCutaneous every 12 hours  dextrose 5% + sodium chloride 0.9% with potassium chloride 20 mEq/L 1000milliLiter(s) IV Continuous <Continuous>  insulin lispro (HumaLOG) corrective regimen sliding scale  SubCutaneous three times a day before meals  insulin lispro (HumaLOG) corrective regimen sliding scale  SubCutaneous at bedtime    MEDICATIONS  (PRN):  dextrose Gel 1Dose(s) Oral once PRN Blood Glucose LESS THAN 70 milliGRAM(s)/deciliter  glucagon  Injectable 1milliGRAM(s) IntraMuscular once PRN Glucose LESS THAN 70 milligrams/deciliter  ondansetron Injectable 4milliGRAM(s) IV Push every 8 hours PRN Nausea and/or Vomiting  acetaminophen   Tablet 650milliGRAM(s) Oral every 6 hours PRN For Temp greater than 38 C (100.4 F)    T(C): 37.4, Max: 37.9 (02-10 @ 19:41)  HR: 108 (72 - 108)  BP: 126/76 (86/49 - 128/65)  RR: 14 (13 - 32)  SpO2: 100% (80% - 100%)  Wt(kg): --  I&O's Detail  I & Os for 24h ending 11 Feb 2017 07:00  =============================================  IN:    dextrose 5% + sodium chloride 0.9% with potassium chloride 20 mEq/L: 2100 ml    IV PiggyBack: 850 ml    Oral Fluid: 450 ml    dextrose 5%: 300 ml    insulin Infusion: 8 ml    Total IN: 3708 ml  ---------------------------------------------  OUT:    Indwelling Catheter - Urethral: 1600 ml    Total OUT: 1600 ml  ---------------------------------------------  Total NET: 2108 ml    I & Os for current day (as of 11 Feb 2017 20:59)  =============================================  IN:    dextrose 5% + sodium chloride 0.9% with potassium chloride 20 mEq/L: 700 ml    dextrose 5% + sodium chloride 0.9% with potassium chloride 20 mEq/L: 500 ml    Oral Fluid: 240 ml    IV PiggyBack: 100 ml    insulin Infusion: 8 ml    Total IN: 1548 ml  ---------------------------------------------  OUT:    Rectal Tube: 1000 ml    Indwelling Catheter - Urethral: 150 ml    Total OUT: 1150 ml  ---------------------------------------------  Total NET: 398 ml      PHYSICAL EXAM:  General: NAD  Respiratory: b/l air entry  Cardiovascular: S1 S2 regular  Gastrointestinal: soft, NT, BS present  Extremities:  no edema    CBC Full  -  ( 11 Feb 2017 04:56 )  WBC Count : 17.4 K/uL  Hemoglobin : 12.5 g/dL  Hematocrit : 34.4 %  Platelet Count - Automated : 308 K/uL  Mean Cell Volume : 87.6 fl  Mean Cell Hemoglobin : 31.7 pg  Mean Cell Hemoglobin Concentration : 36.2 gm/dL  Auto Neutrophil # : x  Auto Lymphocyte # : x  Auto Monocyte # : x  Auto Eosinophil # : x  Auto Basophil # : x  Auto Neutrophil % : x  Auto Lymphocyte % : x  Auto Monocyte % : x  Auto Eosinophil % : x  Auto Basophil % : x    11 Feb 2017 04:56    137    |  105    |  22     ----------------------------<  191    3.5     |  27     |  0.80     Ca    7.6        11 Feb 2017 04:56  Phos  1.8       11 Feb 2017 04:56  Mg     2.0       11 Feb 2017 04:56          Sodium, Serum: 137 (02-11 @ 04:56)  Sodium, Serum: 132 (02-10 @ 03:27)  Sodium, Serum: 133 (02-09 @ 15:09)  Sodium, Serum: 130 (02-09 @ 09:36)    Creatinine, Serum: 0.80 (02-11 @ 04:56)  Creatinine, Serum: 1.74 (02-10 @ 03:27)  Creatinine, Serum: 2.78 (02-09 @ 15:09)  Creatinine, Serum: 3.18 (02-09 @ 09:36)    Potassium, Serum: 3.5 (02-11 @ 04:56)  Potassium, Serum: 3.0 (02-10 @ 03:27)  Potassium, Serum: 3.6 (02-09 @ 15:09)  Potassium, Serum: 3.4 (02-09 @ 09:36)    Hemoglobin: 12.5 (02-11 @ 04:56)  Hemoglobin: 12.8 (02-10 @ 03:27)  Hemoglobin: 12.4 (02-09 @ 08:59)

## 2017-02-11 NOTE — PROGRESS NOTE ADULT - SUBJECTIVE AND OBJECTIVE BOX
Patient is a 61y old  Female who presents with a chief complaint of Nausea , Vomiting, pain abdomen. (04 Feb 2017 13:13)      Interval History: Found to have Salmonella infection   currently obtunded , in CCU and on IV fluids and low dose Lantus and Lispro sliding scale coverage with meals   very poorly responsive po intake and finger sticks are in  range     MEDICATIONS  (STANDING):  clonazePAM Tablet 1milliGRAM(s) Oral at bedtime  dextrose 5%. 1000milliLiter(s) IV Continuous <Continuous>  dextrose 50% Injectable 12.5Gram(s) IV Push once  dextrose 50% Injectable 25Gram(s) IV Push once  influenza   Vaccine 0.5milliLiter(s) IntraMuscular once  ALBUTerol    0.083% 2.5milliGRAM(s) Nebulizer every 6 hours  ALBUTerol    90 MICROgram(s) HFA Inhaler 1Puff(s) Inhalation every 4 hours  amylase/lipase/protease  (CREON  6,000 Units) 1Capsule(s) Oral three times a day with meals  metroNIDAZOLE  IVPB 500milliGRAM(s) IV Intermittent every 8 hours  metroNIDAZOLE  IVPB  IV Intermittent   insulin glargine Injectable (LANTUS) 10Unit(s) SubCutaneous <User Schedule>  cefTRIAXone   IVPB 2Gram(s) IV Intermittent every 24 hours  pantoprazole  Injectable 40milliGRAM(s) IV Push daily  heparin  Injectable 5000Unit(s) SubCutaneous every 12 hours  dextrose 5% + sodium chloride 0.9% with potassium chloride 20 mEq/L 1000milliLiter(s) IV Continuous <Continuous>  insulin lispro (HumaLOG) corrective regimen sliding scale  SubCutaneous three times a day before meals  insulin lispro (HumaLOG) corrective regimen sliding scale  SubCutaneous at bedtime    MEDICATIONS  (PRN):  dextrose Gel 1Dose(s) Oral once PRN Blood Glucose LESS THAN 70 milliGRAM(s)/deciliter  glucagon  Injectable 1milliGRAM(s) IntraMuscular once PRN Glucose LESS THAN 70 milligrams/deciliter  ondansetron Injectable 4milliGRAM(s) IV Push every 8 hours PRN Nausea and/or Vomiting  acetaminophen   Tablet 650milliGRAM(s) Oral every 6 hours PRN For Temp greater than 38 C (100.4 F)      Allergies    No Known Allergies    Intolerances        REVIEW OF SYSTEMS:  CONSTITUTIONAL: lethargic   poorly nourished  and poorly responsive   no obvoius nausea , vomiting, hemoptysis, hematemesis, hematochezia or hematuria     Vital Signs Last 24 Hrs  T(C): 37.1, Max: 37.9 (02-10 @ 19:41)  T(F): 98.8, Max: 100.2 (02-10 @ 19:41)  HR: 101 (78 - 108)  BP: 120/77 (90/55 - 128/65)  BP(mean): 86 (63 - 86)  RR: 25 (13 - 32)  SpO2: 100% (80% - 100%)    PHYSICAL EXAM:  GENERAL: rundown   HEAD:  Atraumatic, Normocephalic  EYES: EOMI, PERRLA, conjunctiva and sclera clear  NECK: Supple, No JVD, Normal thyroid  NERVOUS SYSTEM:  lethargic  CHEST/LUNG: Clear to percussion bilaterally; No rales, rhonchi, wheezing, or rubs  HEART: Regular rate and rhythm; No murmurs, rubs, or gallops  ABDOMEN: Soft, Nontender, Nondistended; Bowel sounds present  EXTREMITIES:  2+ Peripheral Pulses, No clubbing, cyanosis, or edema  SKIN: No rashes or lesions    LABS:        CAPILLARY BLOOD GLUCOSE  143 (11 Feb 2017 13:15)  48 (11 Feb 2017 12:08)  55 (11 Feb 2017 12:06)  113 (11 Feb 2017 10:12)  163 (11 Feb 2017 09:04)  207 (11 Feb 2017 07:54)  199 (11 Feb 2017 06:28)  161 (11 Feb 2017 04:18)  123 (11 Feb 2017 02:21)  100 (11 Feb 2017 01:28)  71 (11 Feb 2017 00:41)  68 (11 Feb 2017 00:12)  75 (10 Feb 2017 23:20)  153 (10 Feb 2017 22:12)  143 (10 Feb 2017 21:14)  196 (10 Feb 2017 20:11)  317 (10 Feb 2017 18:59)    Lipid panel:       ABG - ( 09 Feb 2017 17:14 )  pH: x     /  pCO2: 34    /  pO2: 123   / HCO3: 22    / Base Excess: -1.7  /  SaO2: 98                  Thyroid:  Diabetes Tests:  Parathyroid Panel:  Adrenals:  RADIOLOGY & ADDITIONAL TESTS:    Imaging Personally Reviewed:  [ ] YES  [ ] NO    Consultant(s) Notes Reviewed:  [ ] YES  [ ] NO    Care Discussed with Consultants/Other Providers [ ] YES  [ ] NO

## 2017-02-11 NOTE — PROGRESS NOTE ADULT - PROBLEM SELECTOR PLAN 1
Salmonella sepsis- Hemodynamics improved mental status improved. ABX changed to Rocephrin and flagyl per ID. blood cx have cleared. F/U echo. mental status and renal function have improved

## 2017-02-11 NOTE — PROGRESS NOTE ADULT - SUBJECTIVE AND OBJECTIVE BOX
INTERVAL HPI/OVERNIGHT EVENTS:   HPI:  Pt is a 60 yo lady with a pmhx of DM1 w prior insulin pump since removed, HTN, HL, MS who presents to the ED with N/v/d for 2 days. She ate tacos on Wednesday, and then started having n/v/d that night until now, unable to keep anything down. No fevers, no focal abdominal pain, no dysuria, no chest pain, no short of breath. (04 Feb 2017 13:13)  Salmonella gastroenteritis, bacteremia, septic shock, early lingular lung abscess. blood cx have cleared    CENTRAL LINE: [ ] YES [x ] NO  LOCATION:   DATE INSERTED:  REMOVE: [ ] YES [ ] NO  EXPLAIN:    MITCHELL: [x ] YES [ ] NO    DATE INSERTED:  REMOVE:  [ ] YES [ ] NO  EXPLAIN: strict I n O    A-LINE:  [ ] YES [ ] NO  LOCATION:   DATE INSERTED:  REMOVE:  [ ] YES [ x] NO  EXPLAIN:    PAST MEDICAL & SURGICAL HISTORY:  Multiple sclerosis  Myasthenia gravis  Hypertension  Diabetes  H/O cervical spine surgery      REVIEW OF SYSTEMS:    off bicarb drip making some urine no arrhythmias No N/V/D     ICU Vital Signs Last 24 Hrs  T(C): 36.2, Max: 37.9 (02-10 @ 19:41)  T(F): 97.2, Max: 100.2 (02-10 @ 19:41)  HR: 95 (72 - 108)  BP: 100/63 (90/55 - 128/65)  BP(mean): 72 (58 - 84)  ABP: --  ABP(mean): --  RR: 17 (13 - 32)  SpO2: 100% (80% - 100%)      ABG - ( 09 Feb 2017 17:14 )  pH: x     /  pCO2: 34    /  pO2: 123   / HCO3: 22    / Base Excess: -1.7  /  SaO2: 98                  I&O's Detail    I & Os for current day (as of 11 Feb 2017 09:20)  =============================================  IN:    dextrose 5% + sodium chloride 0.9% with potassium chloride 20 mEq/L: 2100 ml    IV PiggyBack: 850 ml    Oral Fluid: 450 ml    dextrose 5%: 300 ml    insulin Infusion: 8 ml    Total IN: 3708 ml  ---------------------------------------------  OUT:    Indwelling Catheter - Urethral: 1600 ml    Total OUT: 1600 ml  ---------------------------------------------  Total NET: 2108 ml        CAPILLARY BLOOD GLUCOSE  163 (11 Feb 2017 09:04)  207 (11 Feb 2017 07:54)  199 (11 Feb 2017 06:28)  161 (11 Feb 2017 04:18)  123 (11 Feb 2017 02:21)  100 (11 Feb 2017 01:28)  71 (11 Feb 2017 00:41)  68 (11 Feb 2017 00:12)  75 (10 Feb 2017 23:20)  153 (10 Feb 2017 22:12)  143 (10 Feb 2017 21:14)  196 (10 Feb 2017 20:11)  317 (10 Feb 2017 18:59)  417 (10 Feb 2017 16:37)  294 (10 Feb 2017 09:52)    PHYSICAL EXAM:     Gen More awake today  HEENt NO jvd  lungs CTA B/L  CVS S1 S2 RRR  Abd NT ND    LABS:                        12.5   17.4  )-----------( 308      ( 11 Feb 2017 04:56 )             34.4      11 Feb 2017 04:56    137    |  105    |  22     ----------------------------<  191    3.5     |  27     |  0.80     Ca    7.6        11 Feb 2017 04:56  Phos  1.8       11 Feb 2017 04:56  Mg     2.0       11 Feb 2017 04:56          Culture Results:   No growth to date. (02-10 @ 08:37)  Culture Results:   No growth to date. (02-09 @ 22:52)  Culture Results:   No growth to date. (02-09 @ 22:52)  Culture Results:   Few Salmonella species  (Stool culture examined for Salmonella,  Shigella, Campylobacter, Aeromonas, Plesiomonas,  Vibrio, E.coli O157 and Yersinia) (02-09 @ 01:35)  Culture Results:   No growth to date. (02-08 @ 18:33)  Culture Results:   No growth to date. (02-08 @ 18:33)  Culture Results:   No growth (02-08 @ 18:11)  Culture Results:   Growth in aerobic and anaerobic bottles: Salmonella species, Group B (02-08 @ 12:26)      RADIOLOGY & ADDITIONAL STUDIES   CT chest:IMPRESSION:  Airspace consolidation left upper lobe as well as right   lower lobe, with additional scattered infiltrates noted within the left   upper lobe and left lower lobe. Small right pleural effusion. Mediastinal   lymphadenopathy.   My read : early lingular abscess/cavity    Assessment and Plan:    CRITICAL CARE TIME SPENT:

## 2017-02-12 LAB
ANION GAP SERPL CALC-SCNC: 9 MMOL/L — SIGNIFICANT CHANGE UP (ref 5–17)
BUN SERPL-MCNC: 15 MG/DL — SIGNIFICANT CHANGE UP (ref 7–23)
CALCIUM SERPL-MCNC: 7.7 MG/DL — LOW (ref 8.5–10.1)
CHLORIDE SERPL-SCNC: 105 MMOL/L — SIGNIFICANT CHANGE UP (ref 96–108)
CO2 SERPL-SCNC: 25 MMOL/L — SIGNIFICANT CHANGE UP (ref 22–31)
CREAT SERPL-MCNC: 0.78 MG/DL — SIGNIFICANT CHANGE UP (ref 0.5–1.3)
GLUCOSE SERPL-MCNC: 313 MG/DL — HIGH (ref 70–99)
HCT VFR BLD CALC: 35.1 % — SIGNIFICANT CHANGE UP (ref 34.5–45)
HGB BLD-MCNC: 12.5 G/DL — SIGNIFICANT CHANGE UP (ref 11.5–15.5)
MAGNESIUM SERPL-MCNC: 1.9 MG/DL — SIGNIFICANT CHANGE UP (ref 1.8–2.4)
MCHC RBC-ENTMCNC: 32.5 PG — SIGNIFICANT CHANGE UP (ref 27–34)
MCHC RBC-ENTMCNC: 35.7 GM/DL — SIGNIFICANT CHANGE UP (ref 32–36)
MCV RBC AUTO: 91.2 FL — SIGNIFICANT CHANGE UP (ref 80–100)
PHOSPHATE SERPL-MCNC: 1.5 MG/DL — LOW (ref 2.5–4.5)
PLATELET # BLD AUTO: 321 K/UL — SIGNIFICANT CHANGE UP (ref 150–400)
POTASSIUM SERPL-MCNC: 3.9 MMOL/L — SIGNIFICANT CHANGE UP (ref 3.5–5.3)
POTASSIUM SERPL-SCNC: 3.9 MMOL/L — SIGNIFICANT CHANGE UP (ref 3.5–5.3)
RBC # BLD: 3.85 M/UL — SIGNIFICANT CHANGE UP (ref 3.8–5.2)
RBC # FLD: 12.8 % — SIGNIFICANT CHANGE UP (ref 11–15)
SODIUM SERPL-SCNC: 139 MMOL/L — SIGNIFICANT CHANGE UP (ref 135–145)
WBC # BLD: 12.7 K/UL — HIGH (ref 3.8–10.5)
WBC # FLD AUTO: 12.7 K/UL — HIGH (ref 3.8–10.5)

## 2017-02-12 PROCEDURE — 99291 CRITICAL CARE FIRST HOUR: CPT

## 2017-02-12 RX ORDER — ACYCLOVIR SODIUM 500 MG
400 VIAL (EA) INTRAVENOUS EVERY 12 HOURS
Qty: 0 | Refills: 0 | Status: DISCONTINUED | OUTPATIENT
Start: 2017-02-12 | End: 2017-02-12

## 2017-02-12 RX ORDER — PANTOPRAZOLE SODIUM 20 MG/1
40 TABLET, DELAYED RELEASE ORAL
Qty: 0 | Refills: 0 | Status: DISCONTINUED | OUTPATIENT
Start: 2017-02-12 | End: 2017-02-17

## 2017-02-12 RX ORDER — SODIUM CHLORIDE 9 MG/ML
1000 INJECTION INTRAMUSCULAR; INTRAVENOUS; SUBCUTANEOUS
Qty: 0 | Refills: 0 | Status: DISCONTINUED | OUTPATIENT
Start: 2017-02-12 | End: 2017-02-17

## 2017-02-12 RX ORDER — NYSTATIN 500MM UNIT
500000 POWDER (EA) MISCELLANEOUS EVERY 6 HOURS
Qty: 0 | Refills: 0 | Status: DISCONTINUED | OUTPATIENT
Start: 2017-02-12 | End: 2017-02-17

## 2017-02-12 RX ORDER — INSULIN LISPRO 100/ML
4 VIAL (ML) SUBCUTANEOUS
Qty: 0 | Refills: 0 | Status: DISCONTINUED | OUTPATIENT
Start: 2017-02-12 | End: 2017-02-16

## 2017-02-12 RX ORDER — POTASSIUM PHOSPHATE, MONOBASIC POTASSIUM PHOSPHATE, DIBASIC 236; 224 MG/ML; MG/ML
15 INJECTION, SOLUTION INTRAVENOUS ONCE
Qty: 0 | Refills: 0 | Status: COMPLETED | OUTPATIENT
Start: 2017-02-12 | End: 2017-02-12

## 2017-02-12 RX ORDER — ACYCLOVIR SODIUM 500 MG
400 VIAL (EA) INTRAVENOUS THREE TIMES A DAY
Qty: 0 | Refills: 0 | Status: DISCONTINUED | OUTPATIENT
Start: 2017-02-12 | End: 2017-02-17

## 2017-02-12 RX ADMIN — Medication 100 MILLIGRAM(S): at 14:03

## 2017-02-12 RX ADMIN — HEPARIN SODIUM 5000 UNIT(S): 5000 INJECTION INTRAVENOUS; SUBCUTANEOUS at 17:33

## 2017-02-12 RX ADMIN — Medication 400 MILLIGRAM(S): at 22:16

## 2017-02-12 RX ADMIN — Medication 500000 UNIT(S): at 23:21

## 2017-02-12 RX ADMIN — Medication 100 MILLIGRAM(S): at 22:17

## 2017-02-12 RX ADMIN — Medication 4 UNIT(S): at 17:32

## 2017-02-12 RX ADMIN — INSULIN GLARGINE 10 UNIT(S): 100 INJECTION, SOLUTION SUBCUTANEOUS at 10:14

## 2017-02-12 RX ADMIN — DEXTROSE MONOHYDRATE, SODIUM CHLORIDE, AND POTASSIUM CHLORIDE 100 MILLILITER(S): 50; .745; 4.5 INJECTION, SOLUTION INTRAVENOUS at 12:14

## 2017-02-12 RX ADMIN — PANTOPRAZOLE SODIUM 40 MILLIGRAM(S): 20 TABLET, DELAYED RELEASE ORAL at 12:13

## 2017-02-12 RX ADMIN — Medication 3: at 12:13

## 2017-02-12 RX ADMIN — HEPARIN SODIUM 5000 UNIT(S): 5000 INJECTION INTRAVENOUS; SUBCUTANEOUS at 06:08

## 2017-02-12 RX ADMIN — Medication 1 CAPSULE(S): at 08:42

## 2017-02-12 RX ADMIN — POTASSIUM PHOSPHATE, MONOBASIC POTASSIUM PHOSPHATE, DIBASIC 63.75 MILLIMOLE(S): 236; 224 INJECTION, SOLUTION INTRAVENOUS at 06:08

## 2017-02-12 RX ADMIN — CEFTRIAXONE 100 GRAM(S): 500 INJECTION, POWDER, FOR SOLUTION INTRAMUSCULAR; INTRAVENOUS at 12:13

## 2017-02-12 RX ADMIN — Medication 100 MILLIGRAM(S): at 06:08

## 2017-02-12 RX ADMIN — Medication 1 CAPSULE(S): at 17:33

## 2017-02-12 RX ADMIN — Medication 3: at 17:32

## 2017-02-12 RX ADMIN — Medication 400 MILLIGRAM(S): at 14:24

## 2017-02-12 RX ADMIN — ALBUTEROL 2.5 MILLIGRAM(S): 90 AEROSOL, METERED ORAL at 06:01

## 2017-02-12 RX ADMIN — Medication 1 CAPSULE(S): at 12:13

## 2017-02-12 RX ADMIN — Medication 500000 UNIT(S): at 17:32

## 2017-02-12 RX ADMIN — Medication 4: at 08:41

## 2017-02-12 NOTE — PROGRESS NOTE ADULT - PROBLEM SELECTOR PLAN 1
currently finger sticks are increased   Lantus was increased to 12 units   po intake fair, will add prandial lispro 4 units   while inpatient better to have FS< 150 and preferably in 120-140 range

## 2017-02-12 NOTE — PROGRESS NOTE ADULT - PROBLEM SELECTOR PLAN 2
likely lung abscess needs to continue Rx for bacteremia and needs repeat CT chest in few weeks to ensure resolution with ABX

## 2017-02-12 NOTE — PHYSICAL THERAPY INITIAL EVALUATION ADULT - IMPAIRMENTS FOUND, PT EVAL
ergonomics and body mechanics/cognitive impairment/joint integrity and mobility/muscle strength/aerobic capacity/endurance/gross motor/gait, locomotion, and balance/neuromotor development and sensory integration/poor safety awareness/fine motor/posture

## 2017-02-12 NOTE — PHYSICAL THERAPY INITIAL EVALUATION ADULT - MODIFIED CLINICAL TEST OF SENSORY INTEGRATION IN BALANCE TEST
Bowels Score _0__, Bladder Score __0_, Grooming Score __0_, Toilet Use Score _0__, Feeding Score _5__, Transfer Score __0_, Mobility Score __0_, Dressing Score __0_, Stairs Score _0__, Bathing Score _0__,     Total Score ___5

## 2017-02-12 NOTE — PROGRESS NOTE ADULT - PROBLEM SELECTOR PLAN 1
Salmonella sepsis- Hemodynamics improved mental status improved. BLDCX cleared, echo no vegitations ABX changed to Rocephrin and flagyl per ID. blood cx have cleared. F/U echo. mental status and renal function have improved  Can transfer to floors

## 2017-02-12 NOTE — PROGRESS NOTE ADULT - SUBJECTIVE AND OBJECTIVE BOX
Patient is a 61y old  Female who presents with a chief complaint of Nausea , Vomiting, pain abdomen. (04 Feb 2017 13:13)      Interval History: finger sticks are increased now as patient's po intake increased   also infection better     MEDICATIONS  (STANDING):  dextrose 5%. 1000milliLiter(s) IV Continuous <Continuous>  dextrose 50% Injectable 12.5Gram(s) IV Push once  dextrose 50% Injectable 25Gram(s) IV Push once  influenza   Vaccine 0.5milliLiter(s) IntraMuscular once  ALBUTerol    90 MICROgram(s) HFA Inhaler 1Puff(s) Inhalation every 4 hours  amylase/lipase/protease  (CREON  6,000 Units) 1Capsule(s) Oral three times a day with meals  metroNIDAZOLE  IVPB 500milliGRAM(s) IV Intermittent every 8 hours  metroNIDAZOLE  IVPB  IV Intermittent   insulin glargine Injectable (LANTUS) 10Unit(s) SubCutaneous <User Schedule>  cefTRIAXone   IVPB 2Gram(s) IV Intermittent every 24 hours  heparin  Injectable 5000Unit(s) SubCutaneous every 12 hours  insulin lispro (HumaLOG) corrective regimen sliding scale  SubCutaneous three times a day before meals  insulin lispro (HumaLOG) corrective regimen sliding scale  SubCutaneous at bedtime  nystatin    Suspension [id number](s) Oral every 6 hours  sodium chloride 0.9%. 1000milliLiter(s) IV Continuous <Continuous>  acyclovir   Tablet 400milliGRAM(s) Oral three times a day  pantoprazole    Tablet 40milliGRAM(s) Oral before breakfast  insulin lispro Injectable (HumaLOG) 4Unit(s) SubCutaneous before breakfast  insulin lispro Injectable (HumaLOG) 4Unit(s) SubCutaneous before lunch  insulin lispro Injectable (HumaLOG) 4Unit(s) SubCutaneous before dinner    MEDICATIONS  (PRN):  dextrose Gel 1Dose(s) Oral once PRN Blood Glucose LESS THAN 70 milliGRAM(s)/deciliter  glucagon  Injectable 1milliGRAM(s) IntraMuscular once PRN Glucose LESS THAN 70 milligrams/deciliter  ondansetron Injectable 4milliGRAM(s) IV Push every 8 hours PRN Nausea and/or Vomiting  acetaminophen   Tablet 650milliGRAM(s) Oral every 6 hours PRN For Temp greater than 38 C (100.4 F)      Allergies    No Known Allergies    Intolerances        REVIEW OF SYSTEMS:  CONSTITUTIONAL: rundown but stable   EYES: No eye pain, visual disturbances, or discharge  ENMT:  No difficulty hearing, tinnitus, vertigo; No sinus or throat pain  NECK: No pain or stiffness  RESPIRATORY: No cough, wheezing, chills or hemoptysis; No shortness of breath  CARDIOVASCULAR: No chest pain, palpitations, dizziness, or leg swelling  GASTROINTESTINAL: No abdominal or epigastric pain. No nausea, vomiting, or hematemesis; No diarrhea or constipation. No melena or hematochezia.  GENITOURINARY: No dysuria, frequency, hematuria, or incontinence  NEUROLOGICAL: No headaches, memory loss, loss of strength, numbness, or tremors  SKIN: No itching, burning, rashes, or lesions   LYMPH NODES: No enlarged glands  ENDOCRINE: No heat or cold intolerance; No hair loss  MUSCULOSKELETAL: No joint pain or swelling; No muscle, back, or extremity pain  PSYCHIATRIC: No depression, anxiety, mood swings, or difficulty sleeping  HEME/LYMPH: No easy bruising, or bleeding gums  ALLERY AND IMMUNOLOGIC: No hives or eczema    Vital Signs Last 24 Hrs  T(C): 37.8, Max: 37.8 (02-12 @ 07:08)  T(F): 100, Max: 100 (02-12 @ 07:08)  HR: 106 (80 - 108)  BP: 132/72 (98/60 - 141/81)  BP(mean): 87 (69 - 94)  RR: 18 (13 - 28)  SpO2: 100% (82% - 100%)    PHYSICAL EXAM:  GENERAL:   HEAD:  Atraumatic, Normocephalic  EYES: EOMI, PERRLA, conjunctiva and sclera clear  ENMT: No tonsillar erythema, exudates, or enlargement; Moist mucous membranes, Good dentition, No lesions  NECK: Supple, No JVD, Normal thyroid  NERVOUS SYSTEM:  Alert & Oriented X3, Good concentration; Motor Strength 5/5 B/L upper and lower extremities; DTRs 2+ intact and symmetric  CHEST/LUNG: Clear to percussion bilaterally; No rales, rhonchi, wheezing, or rubs  HEART: Regular rate and rhythm; No murmurs, rubs, or gallops  ABDOMEN: Soft, Nontender, Nondistended; Bowel sounds present  EXTREMITIES:  2+ Peripheral Pulses, No clubbing, cyanosis, or edema  SKIN: No rashes or lesions    LABS:        CAPILLARY BLOOD GLUCOSE  256 (12 Feb 2017 12:12)  344 (12 Feb 2017 08:38)  239 (11 Feb 2017 21:51)  195 (11 Feb 2017 16:17)    Lipid panel:           Thyroid:  Diabetes Tests:  Parathyroid Panel:  Adrenals:  RADIOLOGY & ADDITIONAL TESTS:    Imaging Personally Reviewed:  [ ] YES  [ ] NO    Consultant(s) Notes Reviewed:  [ ] YES  [ ] NO    Care Discussed with Consultants/Other Providers [ ] YES  [ ] NO

## 2017-02-12 NOTE — PHYSICAL THERAPY INITIAL EVALUATION ADULT - PLANNED THERAPY INTERVENTIONS, PT EVAL
postural re-education/neuromuscular re-education/balance training/transfer training/bed mobility training/strengthening/manual therapy techniques/motor coordination training/stretching/gait training/joint mobilization

## 2017-02-12 NOTE — PROGRESS NOTE ADULT - SUBJECTIVE AND OBJECTIVE BOX
INTERVAL HPI/OVERNIGHT EVENTS:   HPI:  Pt is a 62 yo lady with a pmhx of DM1 w prior insulin pump since removed, HTN, HL, MS who presents to the ED with N/v/d for 2 days. She ate tacos on Wednesday, and then started having n/v/d that night until now, unable to keep anything down. No fevers, no focal abdominal pain, no dysuria, no chest pain, no short of breath. (04 Feb 2017 13:13)  Salmonella gastroenteritis and sepsis/bacteremia/ligular lung abscess      CENTRAL LINE: [ ] YES [ x] NO  LOCATION:   DATE INSERTED:  REMOVE: [ ] YES [ ] NO  EXPLAIN:    MITCHELL: [ ] YES [x ] NO    DATE INSERTED:  REMOVE:  [ ] YES [ ] NO  EXPLAIN:    A-LINE:  [ ] YES [x ] NO  LOCATION:   DATE INSERTED:  REMOVE:  [ ] YES [ ] NO  EXPLAIN:    PAST MEDICAL & SURGICAL HISTORY:  Multiple sclerosis  Myasthenia gravis  Hypertension  Diabetes  H/O cervical spine surgery      REVIEW OF SYSTEMS:    No fevers, NO arrythmias no drips no pressors eating and drinking just a bit    ICU Vital Signs Last 24 Hrs  T(C): 37.8, Max: 37.8 (02-12 @ 07:08)  T(F): 100, Max: 100 (02-12 @ 07:08)  HR: 93 (78 - 108)  BP: 127/67 (98/60 - 139/68)  BP(mean): 81 (69 - 94)  ABP: --  ABP(mean): --  RR: 13 (13 - 28)  SpO2: 100% (100% - 100%)          I&O's Detail  I & Os for 24h ending 12 Feb 2017 07:00  =============================================  IN:    dextrose 5% + sodium chloride 0.9% with potassium chloride 20 mEq/L: 1600 ml    dextrose 5% + sodium chloride 0.9% with potassium chloride 20 mEq/L: 700 ml    Solution: 300 ml    Solution: 250 ml    Oral Fluid: 240 ml    insulin Infusion: 8 ml    Total IN: 3098 ml  ---------------------------------------------  OUT:    Rectal Tube: 1300 ml    Indwelling Catheter - Urethral: 150 ml    Total OUT: 1450 ml  ---------------------------------------------  Total NET: 1648 ml    I & Os for current day (as of 12 Feb 2017 12:42)  =============================================  IN:    dextrose 5% + sodium chloride 0.9% with potassium chloride 20 mEq/L: 200 ml    Total IN: 200 ml  ---------------------------------------------  OUT:    Total OUT: 0 ml  ---------------------------------------------  Total NET: 200 ml        CAPILLARY BLOOD GLUCOSE  256 (12 Feb 2017 12:12)  344 (12 Feb 2017 08:38)  239 (11 Feb 2017 21:51)  195 (11 Feb 2017 16:17)  143 (11 Feb 2017 13:15)    PHYSICAL EXAM:     GEn Cachectic, herpetic outbreak on upper lipds, thrush  CHEST/LUNG: Clear to percussion bilaterally; No rales, rhonchi, wheezing, or rubs  HEART: Regular rate and rhythm; No murmurs, rubs, or gallops  ABDOMEN: Soft, Nontender, Nondistended; Bowel sounds present  EXTREMITIES:  2+ Peripheral Pulses, No clubbing, cyanosis, or edema  LYMPH: No lymphadenopathy noted  SKIN: No rashes or lesions      LABS:                        12.5   12.7  )-----------( 321      ( 12 Feb 2017 03:39 )             35.1      12 Feb 2017 03:39    139    |  105    |  15     ----------------------------<  313    3.9     |  25     |  0.78     Ca    7.7        12 Feb 2017 03:39  Phos  1.5       12 Feb 2017 03:39  Mg     1.9       12 Feb 2017 03:39          Culture Results:   No growth to date. (02-10 @ 08:37)  Culture Results:   No growth to date. (02-09 @ 22:52)  Culture Results:   No growth to date. (02-09 @ 22:52)      RADIOLOGY & ADDITIONAL STUDIES:      Assessment and Plan:    CRITICAL CARE TIME SPENT:

## 2017-02-12 NOTE — CHART NOTE - NSCHARTNOTEFT_GEN_A_CORE
Pt medically stable for transfer to medical floor.  Pt more awake and alert and eating on her own.    Off insulin gtt since yesterday.  FS still elevated. Discussed with endo, will increase lantus to 12 units daily and continue ISS. No preprandial insulin at this time.  Will adjust tomorrow.    Acyclovir started for oral herpes flare up,     Pt should continue iv abx for bacteremia and possible lung abscess.  Should repeat imaging studies in 6 weeks.  If no improvement, may need to r/o for malignancy.    Dr. Gramajo aware of transfer to medical floor.

## 2017-02-13 DIAGNOSIS — A02.0 SALMONELLA ENTERITIS: ICD-10-CM

## 2017-02-13 DIAGNOSIS — B02.9 ZOSTER WITHOUT COMPLICATIONS: ICD-10-CM

## 2017-02-13 DIAGNOSIS — B00.1 HERPESVIRAL VESICULAR DERMATITIS: ICD-10-CM

## 2017-02-13 DIAGNOSIS — R78.81 BACTEREMIA: ICD-10-CM

## 2017-02-13 DIAGNOSIS — D72.829 ELEVATED WHITE BLOOD CELL COUNT, UNSPECIFIED: ICD-10-CM

## 2017-02-13 DIAGNOSIS — A02.22: ICD-10-CM

## 2017-02-13 LAB
ANION GAP SERPL CALC-SCNC: 8 MMOL/L — SIGNIFICANT CHANGE UP (ref 5–17)
BUN SERPL-MCNC: 10 MG/DL — SIGNIFICANT CHANGE UP (ref 7–23)
CALCIUM SERPL-MCNC: 7.7 MG/DL — LOW (ref 8.5–10.1)
CHLORIDE SERPL-SCNC: 108 MMOL/L — SIGNIFICANT CHANGE UP (ref 96–108)
CO2 SERPL-SCNC: 25 MMOL/L — SIGNIFICANT CHANGE UP (ref 22–31)
CREAT SERPL-MCNC: 0.68 MG/DL — SIGNIFICANT CHANGE UP (ref 0.5–1.3)
CULTURE RESULTS: SIGNIFICANT CHANGE UP
CULTURE RESULTS: SIGNIFICANT CHANGE UP
GLUCOSE SERPL-MCNC: 92 MG/DL — SIGNIFICANT CHANGE UP (ref 70–99)
HCT VFR BLD CALC: 35 % — SIGNIFICANT CHANGE UP (ref 34.5–45)
HGB BLD-MCNC: 12.8 G/DL — SIGNIFICANT CHANGE UP (ref 11.5–15.5)
MAGNESIUM SERPL-MCNC: 1.6 MG/DL — LOW (ref 1.8–2.4)
MCHC RBC-ENTMCNC: 32.7 PG — SIGNIFICANT CHANGE UP (ref 27–34)
MCHC RBC-ENTMCNC: 36.6 GM/DL — HIGH (ref 32–36)
MCV RBC AUTO: 89.3 FL — SIGNIFICANT CHANGE UP (ref 80–100)
PHOSPHATE SERPL-MCNC: 1.3 MG/DL — LOW (ref 2.5–4.5)
PLATELET # BLD AUTO: 358 K/UL — SIGNIFICANT CHANGE UP (ref 150–400)
POTASSIUM SERPL-MCNC: 3.3 MMOL/L — LOW (ref 3.5–5.3)
POTASSIUM SERPL-SCNC: 3.3 MMOL/L — LOW (ref 3.5–5.3)
RBC # BLD: 3.92 M/UL — SIGNIFICANT CHANGE UP (ref 3.8–5.2)
RBC # FLD: 12.4 % — SIGNIFICANT CHANGE UP (ref 11–15)
SODIUM SERPL-SCNC: 141 MMOL/L — SIGNIFICANT CHANGE UP (ref 135–145)
SPECIMEN SOURCE: SIGNIFICANT CHANGE UP
SPECIMEN SOURCE: SIGNIFICANT CHANGE UP
WBC # BLD: 15.1 K/UL — HIGH (ref 3.8–10.5)
WBC # FLD AUTO: 15.1 K/UL — HIGH (ref 3.8–10.5)

## 2017-02-13 PROCEDURE — 99233 SBSQ HOSP IP/OBS HIGH 50: CPT

## 2017-02-13 RX ORDER — INSULIN GLARGINE 100 [IU]/ML
7 INJECTION, SOLUTION SUBCUTANEOUS
Qty: 0 | Refills: 0 | Status: DISCONTINUED | OUTPATIENT
Start: 2017-02-13 | End: 2017-02-16

## 2017-02-13 RX ORDER — DOCOSANOL 100 MG/G
0.5 CREAM TOPICAL
Qty: 0 | Refills: 0 | Status: DISCONTINUED | OUTPATIENT
Start: 2017-02-13 | End: 2017-02-17

## 2017-02-13 RX ORDER — SODIUM,POTASSIUM PHOSPHATES 278-250MG
1 POWDER IN PACKET (EA) ORAL
Qty: 0 | Refills: 0 | Status: COMPLETED | OUTPATIENT
Start: 2017-02-13 | End: 2017-02-16

## 2017-02-13 RX ORDER — POTASSIUM CHLORIDE 20 MEQ
20 PACKET (EA) ORAL ONCE
Qty: 0 | Refills: 0 | Status: COMPLETED | OUTPATIENT
Start: 2017-02-13 | End: 2017-02-13

## 2017-02-13 RX ORDER — MAGNESIUM SULFATE 500 MG/ML
2 VIAL (ML) INJECTION ONCE
Qty: 0 | Refills: 0 | Status: COMPLETED | OUTPATIENT
Start: 2017-02-13 | End: 2017-02-13

## 2017-02-13 RX ORDER — GLATIRAMER ACETATE 20 MG/ML
20 INJECTION, SOLUTION SUBCUTANEOUS AT BEDTIME
Qty: 0 | Refills: 0 | Status: DISCONTINUED | OUTPATIENT
Start: 2017-02-13 | End: 2017-02-17

## 2017-02-13 RX ADMIN — DOCOSANOL 0.5 APPLICATION(S): 100 CREAM TOPICAL at 20:39

## 2017-02-13 RX ADMIN — DOCOSANOL 0.5 APPLICATION(S): 100 CREAM TOPICAL at 23:54

## 2017-02-13 RX ADMIN — PANTOPRAZOLE SODIUM 40 MILLIGRAM(S): 20 TABLET, DELAYED RELEASE ORAL at 09:22

## 2017-02-13 RX ADMIN — Medication 500000 UNIT(S): at 17:20

## 2017-02-13 RX ADMIN — Medication 1 TABLET(S): at 17:21

## 2017-02-13 RX ADMIN — Medication 1 TABLET(S): at 11:59

## 2017-02-13 RX ADMIN — Medication 1 CAPSULE(S): at 11:59

## 2017-02-13 RX ADMIN — Medication 400 MILLIGRAM(S): at 14:44

## 2017-02-13 RX ADMIN — Medication 500000 UNIT(S): at 06:15

## 2017-02-13 RX ADMIN — Medication 100 MILLIGRAM(S): at 06:14

## 2017-02-13 RX ADMIN — HEPARIN SODIUM 5000 UNIT(S): 5000 INJECTION INTRAVENOUS; SUBCUTANEOUS at 06:15

## 2017-02-13 RX ADMIN — Medication 500000 UNIT(S): at 11:14

## 2017-02-13 RX ADMIN — DOCOSANOL 0.5 APPLICATION(S): 100 CREAM TOPICAL at 17:21

## 2017-02-13 RX ADMIN — Medication 50 GRAM(S): at 11:13

## 2017-02-13 RX ADMIN — HEPARIN SODIUM 5000 UNIT(S): 5000 INJECTION INTRAVENOUS; SUBCUTANEOUS at 17:21

## 2017-02-13 RX ADMIN — Medication 1 CAPSULE(S): at 17:21

## 2017-02-13 RX ADMIN — INSULIN GLARGINE 7 UNIT(S): 100 INJECTION, SOLUTION SUBCUTANEOUS at 17:34

## 2017-02-13 RX ADMIN — Medication 400 MILLIGRAM(S): at 06:14

## 2017-02-13 RX ADMIN — Medication 400 MILLIGRAM(S): at 21:36

## 2017-02-13 RX ADMIN — Medication 4: at 17:34

## 2017-02-13 RX ADMIN — SODIUM CHLORIDE 75 MILLILITER(S): 9 INJECTION INTRAMUSCULAR; INTRAVENOUS; SUBCUTANEOUS at 06:14

## 2017-02-13 RX ADMIN — Medication 500000 UNIT(S): at 23:57

## 2017-02-13 RX ADMIN — CEFTRIAXONE 100 GRAM(S): 500 INJECTION, POWDER, FOR SOLUTION INTRAMUSCULAR; INTRAVENOUS at 11:13

## 2017-02-13 RX ADMIN — GLATIRAMER ACETATE 20 MILLIGRAM(S): 20 INJECTION, SOLUTION SUBCUTANEOUS at 21:37

## 2017-02-13 RX ADMIN — Medication 1 TABLET(S): at 21:36

## 2017-02-13 RX ADMIN — SODIUM CHLORIDE 75 MILLILITER(S): 9 INJECTION INTRAMUSCULAR; INTRAVENOUS; SUBCUTANEOUS at 21:36

## 2017-02-13 RX ADMIN — Medication 4 UNIT(S): at 17:34

## 2017-02-13 RX ADMIN — Medication 20 MILLIEQUIVALENT(S): at 09:22

## 2017-02-13 NOTE — CONSULT NOTE ADULT - SUBJECTIVE AND OBJECTIVE BOX
Patient is a 61y old  Female who presents with a chief complaint of Nausea , Vomiting, pain abdomen. (04 Feb 2017 13:13)    HPI:  Pt is a 60 yo lady with a pmhx of DM1 w prior insulin pump since removed, HTN, HL, MS who presents to the ED with N/v/d for 2 days. She ate tacos on Wednesday, and then started having n/v/d that night until now, unable to keep anything down. No fevers, no focal abdominal pain, no dysuria, no chest pain, no short of breath.   Hospital course complicated with Salmonella sepsis(urine, stool and blood), pneumonia with suspected lung abscess and hypoglycemia episode. Was in ICU, now on regular floor.   Awake and comfortable without distress.    PAST MEDICAL & SURGICAL HISTORY:  Multiple sclerosis  Myasthenia gravis  Hypertension  Diabetes  H/O cervical spine surgery    FAMILY HISTORY:  No pertinent family history in first degree relatives    SOCIAL HISTORY: single, lives alone.    Allergies  No Known Allergies    MEDICATIONS  (STANDING):  dextrose 5%. 1000milliLiter(s) IV Continuous <Continuous>  dextrose 50% Injectable 12.5Gram(s) IV Push once  dextrose 50% Injectable 25Gram(s) IV Push once  influenza   Vaccine 0.5milliLiter(s) IntraMuscular once  ALBUTerol    90 MICROgram(s) HFA Inhaler 1Puff(s) Inhalation every 4 hours  amylase/lipase/protease  (CREON  6,000 Units) 1Capsule(s) Oral three times a day with meals  cefTRIAXone   IVPB 2Gram(s) IV Intermittent every 24 hours  heparin  Injectable 5000Unit(s) SubCutaneous every 12 hours  insulin lispro (HumaLOG) corrective regimen sliding scale  SubCutaneous three times a day before meals  insulin lispro (HumaLOG) corrective regimen sliding scale  SubCutaneous at bedtime  nystatin    Suspension 624566Hpxu(s) Oral every 6 hours  sodium chloride 0.9%. 1000milliLiter(s) IV Continuous <Continuous>  acyclovir   Tablet 400milliGRAM(s) Oral three times a day  pantoprazole    Tablet 40milliGRAM(s) Oral before breakfast  insulin lispro Injectable (HumaLOG) 4Unit(s) SubCutaneous before breakfast  insulin lispro Injectable (HumaLOG) 4Unit(s) SubCutaneous before lunch  insulin lispro Injectable (HumaLOG) 4Unit(s) SubCutaneous before dinner  potassium acid phosphate/sodium acid phosphate tablet (K-PHOS No. 2) 1Tablet(s) Oral four times a day with meals  insulin glargine Injectable (LANTUS) 7Unit(s) SubCutaneous <User Schedule>  docosanol 10% Cream 0.5Application(s) Topical five times a day  glatiramer Injectable 20milliGRAM(s) SubCutaneous at bedtime    MEDICATIONS  (PRN):  dextrose Gel 1Dose(s) Oral once PRN Blood Glucose LESS THAN 70 milliGRAM(s)/deciliter  glucagon  Injectable 1milliGRAM(s) IntraMuscular once PRN Glucose LESS THAN 70 milligrams/deciliter  ondansetron Injectable 4milliGRAM(s) IV Push every 8 hours PRN Nausea and/or Vomiting  acetaminophen   Tablet 650milliGRAM(s) Oral every 6 hours PRN For Temp greater than 38 C (100.4 F)    REVIEW OF SYSTEMS:    Constitutional:            No fever, weight loss or fatigue  HEENT:                       No difficulty hearing, tinnitus, vertigo; No sinus or throat pain  Respiratory:                pneumonia  Cardiovascular:           No chest pain, palpitations  Gastrointestinal:        abdominal and epigastric pain. N/V/diarrhea   Genitourinary:            No dysuria, frequency, hematuria or incontinence  SKIN:                             no rash  Musculoskeletal:        No joint pain or swelling  Extremities:                No swelling  Neurological:              MS  Psychiatric:                 No depression, anxiety    Vital Signs Last 24 Hrs  T(C): 36.6, Max: 37.8 (02-12 @ 20:00)  T(F): 97.8, Max: 100 (02-12 @ 20:00)  HR: 103 (84 - 111)  BP: 129/76 (122/47 - 145/86)  BP(mean): 92 (92 - 92)  RR: 17 (16 - 22)  SpO2: 99% (95% - 100%)    PHYSICAL EXAM:  GEN:        Awake, responsive and comfortable.  HEENT:    Normal.    RESP:      normal  CVS:           Regular rate and rhythm.   ABD:         Soft, non-tender, non-distended;   :             No costovertebral angle tenderness  SKIN:           Warm and dry.  EXTR:            ecchymosis  CNS:              Intact sensory and motor function.  PSYCH:        cooperative, no anxiety or depression    LABS:                        12.8   15.1  )-----------( 358      ( 13 Feb 2017 06:39 )             35.0     13 Feb 2017 06:39    141    |  108    |  10     ----------------------------<  92     3.3     |  25     |  0.68     Ca    7.7        13 Feb 2017 06:39  Phos  1.3       13 Feb 2017 06:39  Mg     1.6       13 Feb 2017 06:39    EKG: sinus rhythm    RADIOLOGY & ADDITIONAL STUDIES:    EXAM:  CT CHEST                            PROCEDURE DATE:  02/10/2017        INTERPRETATION:  CLINICAL HISTORY:  Sepsis, pneumonia, evaluate for lung   parenchymal abscess    Multiple axial images of the chest were obtained from the lung apices   through upper abdomen without the administration of intravascular   contrast. Reformatted coronal and sagittal images are submitted.    COMPARISON: None    FINDINGS:    Subcarinal and right lower paratracheal lymph nodes measure up to 11 mm.   Left lower paratracheal lymph nodes measure up to 10 mm. No left hilar   lymphadenopathy is demonstrated. The right hilar region is obscured due   to airspace consolidation within the medial aspect of the right lower   lobe lung parenchyma. There is no evidence for axillary lymphadenopathy.   The heart size appears within normal limits. No pericardial effusion is   identified. Thoracic aortic caliber demonstrates unremarkable caliber.    The central bronchial anatomy appears patent.    Scattered air containing spaces are identified both within the central   and peripheral aspects of the lung parenchyma. There is opacity   identified within the medial as well as posterior-medial aspects of the   right lower lobe lung parenchyma likely related to regions of right lower   lobe consolidation. A small right pleural effusion is noted. Opacity   within the posterior right lower lobe lung parenchyma is likely related   to compressive atelectasis. Airspace consolidation is identified within   the left upper lobe with scattered infiltrates noted within both the left   upper lobe and left lower lobe. No lung parenchymal abscess formation is   identified. There is no evidence for left pleural effusion. There is no   evidence for pneumothorax. No mediastinal shift is noted.    The stomach is not distended precluding evaluation for gastric wall   thickening. A 1.5 cm left hepatic cyst is noted. The right adrenal gland   appears unremarkable. Nodular prominence of the left adrenal gland is   demonstrated. There is extensive calcification noted within the pancreas   body and tail regions, likely related to chronic calcific pancreatitis.   Degenerative changes of the thoracic and lumbar spine noted.    IMPRESSION:  Airspace consolidation left upper lobe as well as right   lower lobe, with additional scattered infiltrates noted within the left   upper lobe and left lower lobe. Small right pleural effusion. Mediastinal   lymphadenopathy.    MALIKA HEREDIA   This document has been electronically signed. Feb 10 2017  6:13PM      ASSESSMENT AND PLAN:  ·	Multilobar pneumonia.  ·	Salmonella sepsis.  ·	Leukocytosis.  ·	Hypokalemia.  ·	DM with hypoglycemia.  ·	Multiple sclerosis.  ·	HTN.  ·	Herpese Zoster.    Continue antibiotics and antiviral.  Replace potassium and follow electrolytes.

## 2017-02-13 NOTE — PROGRESS NOTE ADULT - PROBLEM SELECTOR PLAN 1
sec to salmonella bacteremia sec to gastroenteritis   Blood culture,stool culture and urine culture positive  Cont Rocephin

## 2017-02-13 NOTE — PROGRESS NOTE ADULT - SUBJECTIVE AND OBJECTIVE BOX
Patient is a 61y old  Female who presents with a chief complaint of Nausea , Vomiting, pain abdomen. (04 Feb 2017 13:13)      HPI:  Pt is a 62 yo lady with a pmhx of DM1 w prior insulin pump since removed, HTN, HL, MS who presents to the ED with N/v/d for 2 days. She ate tacos on Wednesday, and then started having n/v/d that night until now, unable to keep anything down. No fevers, no focal abdominal pain, no dysuria, no chest pain, no short of breath. (04 Feb 2017 13:13)      INTERVAL HPI/OVERNIGHT EVENTS:  700 ml / 24 hours. Tolerating liquid diet. No abdominal pain.    MEDICATIONS  (STANDING):  dextrose 5%. 1000milliLiter(s) IV Continuous <Continuous>  dextrose 50% Injectable 12.5Gram(s) IV Push once  dextrose 50% Injectable 25Gram(s) IV Push once  influenza   Vaccine 0.5milliLiter(s) IntraMuscular once  ALBUTerol    90 MICROgram(s) HFA Inhaler 1Puff(s) Inhalation every 4 hours  amylase/lipase/protease  (CREON  6,000 Units) 1Capsule(s) Oral three times a day with meals  cefTRIAXone   IVPB 2Gram(s) IV Intermittent every 24 hours  heparin  Injectable 5000Unit(s) SubCutaneous every 12 hours  insulin lispro (HumaLOG) corrective regimen sliding scale  SubCutaneous three times a day before meals  insulin lispro (HumaLOG) corrective regimen sliding scale  SubCutaneous at bedtime  nystatin    Suspension 843868Mbid(s) Oral every 6 hours  sodium chloride 0.9%. 1000milliLiter(s) IV Continuous <Continuous>  acyclovir   Tablet 400milliGRAM(s) Oral three times a day  pantoprazole    Tablet 40milliGRAM(s) Oral before breakfast  insulin lispro Injectable (HumaLOG) 4Unit(s) SubCutaneous before breakfast  insulin lispro Injectable (HumaLOG) 4Unit(s) SubCutaneous before lunch  insulin lispro Injectable (HumaLOG) 4Unit(s) SubCutaneous before dinner  potassium acid phosphate/sodium acid phosphate tablet (K-PHOS No. 2) 1Tablet(s) Oral four times a day with meals  insulin glargine Injectable (LANTUS) 7Unit(s) SubCutaneous <User Schedule>  docosanol 10% Cream 1Application(s) Topical five times a day    MEDICATIONS  (PRN):  dextrose Gel 1Dose(s) Oral once PRN Blood Glucose LESS THAN 70 milliGRAM(s)/deciliter  glucagon  Injectable 1milliGRAM(s) IntraMuscular once PRN Glucose LESS THAN 70 milligrams/deciliter  ondansetron Injectable 4milliGRAM(s) IV Push every 8 hours PRN Nausea and/or Vomiting  acetaminophen   Tablet 650milliGRAM(s) Oral every 6 hours PRN For Temp greater than 38 C (100.4 F)      FAMILY HISTORY:  No pertinent family history in first degree relatives      Allergies    No Known Allergies    Intolerances        PMH/PSH:  Multiple sclerosis  Myasthenia gravis  Hypertension  Diabetes  H/O cervical spine surgery        REVIEW OF SYSTEMS:  CONSTITUTIONAL: No fever, weight loss, or fatigue  EYES: No eye pain, visual disturbances, or discharge  NECK: No pain or stiffness  BREASTS: No pain, masses, or nipple discharge  RESPIRATORY: No cough, wheezing, chills or hemoptysis; No shortness of breath  CARDIOVASCULAR: No chest pain, palpitations, dizziness, or leg swelling  GASTROINTESTINAL: No abdominal or epigastric pain. No nausea, vomiting, or hematemesis; No diarrhea or constipation. No melena or hematochezia.  GENITOURINARY: No dysuria, frequency, hematuria, or incontinence  NEUROLOGICAL: No headaches, memory loss, loss of strength, numbness, or tremors  SKIN: No itching, burning, rashes, or lesions   LYMPH NODES: No enlarged glands  ENDOCRINE: No heat or cold intolerance; No hair loss      Vital Signs Last 24 Hrs  T(C): 36.1, Max: 37.8 (02-12 @ 20:00)  T(F): 97, Max: 100 (02-12 @ 20:00)  HR: 96 (84 - 111)  BP: 133/79 (122/47 - 145/86)  BP(mean): 92 (90 - 92)  RR: 18 (16 - 28)  SpO2: 96% (95% - 100%)    PHYSICAL EXAM:  GENERAL: NAD, well-groomed, well-developed  HEAD:  Atraumatic, Normocephalic  EYES: EOMI, PERRLA, conjunctiva and sclera clear  NECK: Supple, No JVD, Normal thyroid  NERVOUS SYSTEM:  Alert & Oriented X3, Good concentration; Motor Strength 5/5 B/L upper and lower extremities; DTRs 2+ intact and symmetric  CHEST/LUNG: Clear to percussion bilaterally; No rales, rhonchi, wheezing, or rubs  HEART: Regular rate and rhythm; No murmurs, rubs, or gallops  ABDOMEN: Soft, Nontender, Nondistended; Bowel sounds present  EXTREMITIES:  2+ Peripheral Pulses, No clubbing, cyanosis, or edema  LYMPH: No lymphadenopathy noted  SKIN: No rashes or lesions    LABS:  [id number] 02-09 @ 19:11    02-13 @ 06:39   HGB 35.0  HCT 12.8  MCV 89.3   RDW 12.4 WBC 15.1 PLTA 358        02-12 @ 03:39   HGB 35.1  HCT 12.5  MCV 91.2   RDW 12.8 WBC 12.7 PLTA 321        02-11 @ 04:56   HGB 34.4  HCT 12.5  MCV 87.6   RDW 11.8 WBC 17.4 PLTA 308        02-10 @ 03:27   HGB 34.6  HCT 12.8  MCV 88.2   RDW 11.9 WBC 16.4 PLTA 223        02-09 @ 08:59   HGB 33.4  HCT 12.4  MCV 87.0   RDW 11.5 WBC 15.2 PLTA 210          13 Feb 2017 06:39    141    |  108    |  10     ----------------------------<  92     3.3     |  25     |  0.68   12 Feb 2017 03:39    139    |  105    |  15     ----------------------------<  313    3.9     |  25     |  0.78   11 Feb 2017 04:56    137    |  105    |  22     ----------------------------<  191    3.5     |  27     |  0.80   10 Feb 2017 03:27    132    |  99     |  38     ----------------------------<  228    3.0     |  27     |  1.74   09 Feb 2017 15:09    133    |  107    |  46     ----------------------------<  58     3.6     |  21     |  2.78   09 Feb 2017 09:36    130    |  105    |  46     ----------------------------<  246    3.4     |  17     |  3.18   08 Feb 2017 20:33    130    |  106    |  53     ----------------------------<  81     3.7     |  13     |  4.61     Ca    7.7        13 Feb 2017 06:39  Ca    7.7        12 Feb 2017 03:39  Ca    7.6        11 Feb 2017 04:56  Ca    7.6        10 Feb 2017 03:27  Ca    8.1        09 Feb 2017 15:09  Ca    7.3        09 Feb 2017 09:36  Ca    8.3        08 Feb 2017 20:33  Phos  1.3       13 Feb 2017 06:39  Phos  1.5       12 Feb 2017 03:39  Phos  1.8       11 Feb 2017 04:56  Phos  2.1       10 Feb 2017 03:27  Phos  2.0       09 Feb 2017 09:36  Mg     1.6       13 Feb 2017 06:39  Mg     1.9       12 Feb 2017 03:39  Mg     2.0       11 Feb 2017 04:56  Mg     2.1       10 Feb 2017 03:27  Mg     1.9       09 Feb 2017 09:36    TPro  6.9    /  Alb  2.3    /  TBili  0.3    /  DBili  .12    /  AST  15     /  ALT  18     /  AlkPhos  163    08 Feb 2017 10:15        CAPILLARY BLOOD GLUCOSE  112 (13 Feb 2017 11:57)  81 (13 Feb 2017 10:05)  61 (13 Feb 2017 09:17)  63 (13 Feb 2017 09:13)  134 (12 Feb 2017 21:46)  260 (12 Feb 2017 17:06)        RADIOLOGY & ADDITIONAL TESTS:    Imaging Personally Reviewed:  [ ] YES  [ ] NO    Consultant(s) Notes Reviewed:  [ ] YES  [ ] NO    Care Discussed with Consultants/Other Providers [ ] YES  [ ] NO

## 2017-02-13 NOTE — PROGRESS NOTE ADULT - SUBJECTIVE AND OBJECTIVE BOX
Patient is a 61y old  Female who presents with a chief complaint of Nausea , Vomiting, pain abdomen. (04 Feb 2017 13:13)      INTERVAL HPI/OVERNIGHT EVENTS:  pt confused this am  hypoglycemia    MEDICATIONS  (STANDING):  dextrose 5%. 1000milliLiter(s) IV Continuous <Continuous>  dextrose 50% Injectable 12.5Gram(s) IV Push once  dextrose 50% Injectable 25Gram(s) IV Push once  influenza   Vaccine 0.5milliLiter(s) IntraMuscular once  ALBUTerol    90 MICROgram(s) HFA Inhaler 1Puff(s) Inhalation every 4 hours  amylase/lipase/protease  (CREON  6,000 Units) 1Capsule(s) Oral three times a day with meals  metroNIDAZOLE  IVPB 500milliGRAM(s) IV Intermittent every 8 hours  metroNIDAZOLE  IVPB  IV Intermittent   cefTRIAXone   IVPB 2Gram(s) IV Intermittent every 24 hours  heparin  Injectable 5000Unit(s) SubCutaneous every 12 hours  insulin lispro (HumaLOG) corrective regimen sliding scale  SubCutaneous three times a day before meals  insulin lispro (HumaLOG) corrective regimen sliding scale  SubCutaneous at bedtime  nystatin    Suspension 526056Wwbp(s) Oral every 6 hours  sodium chloride 0.9%. 1000milliLiter(s) IV Continuous <Continuous>  acyclovir   Tablet 400milliGRAM(s) Oral three times a day  pantoprazole    Tablet 40milliGRAM(s) Oral before breakfast  insulin lispro Injectable (HumaLOG) 4Unit(s) SubCutaneous before breakfast  insulin lispro Injectable (HumaLOG) 4Unit(s) SubCutaneous before lunch  insulin lispro Injectable (HumaLOG) 4Unit(s) SubCutaneous before dinner  potassium acid phosphate/sodium acid phosphate tablet (K-PHOS No. 2) 1Tablet(s) Oral four times a day with meals  insulin glargine Injectable (LANTUS) 7Unit(s) SubCutaneous <User Schedule>  magnesium sulfate  IVPB 2Gram(s) IV Intermittent once    MEDICATIONS  (PRN):  dextrose Gel 1Dose(s) Oral once PRN Blood Glucose LESS THAN 70 milliGRAM(s)/deciliter  glucagon  Injectable 1milliGRAM(s) IntraMuscular once PRN Glucose LESS THAN 70 milligrams/deciliter  ondansetron Injectable 4milliGRAM(s) IV Push every 8 hours PRN Nausea and/or Vomiting  acetaminophen   Tablet 650milliGRAM(s) Oral every 6 hours PRN For Temp greater than 38 C (100.4 F)      REVIEW OF SYSTEMS:  CONSTITUTIONAL: No fever, weight loss, or fatigue  RESPIRATORY: No cough, wheezing, chills or hemoptysis; No shortness of breath  CARDIOVASCULAR: No chest pain, palpitations, dizziness, or leg swelling  GASTROINTESTINAL: No abdominal or epigastric pain. No nausea, vomiting, or hematemesis; No diarrhea or constipation. No melena or hematochezia.  ENDOCRINE: No heat or cold intolerance; No hair loss      Vital Signs Last 24 Hrs  T(C): 36.6, Max: 37.8 (02-12 @ 20:00)  T(F): 97.8, Max: 100 (02-12 @ 20:00)  HR: 98 (84 - 111)  BP: 135/83 (122/47 - 145/86)  BP(mean): 92 (81 - 92)  RR: 16 (13 - 28)  SpO2: 100% (82% - 100%)    PHYSICAL EXAM:  GENERAL: NAD, well-groomed, well-developed  CHEST/LUNG: Clear to percussion bilaterally; No rales, rhonchi, wheezing, or rubs  HEART: Regular rate and rhythm; No murmurs, rubs, or gallops      LABS:                        12.8   15.1  )-----------( 358      ( 13 Feb 2017 06:39 )             35.0     13 Feb 2017 06:39    141    |  108    |  10     ----------------------------<  92     3.3     |  25     |  0.68     Ca    7.7        13 Feb 2017 06:39  Phos  1.3       13 Feb 2017 06:39  Mg     1.6       13 Feb 2017 06:39          CAPILLARY BLOOD GLUCOSE  61 (13 Feb 2017 09:17)  63 (13 Feb 2017 09:13)  134 (12 Feb 2017 21:46)  260 (12 Feb 2017 17:06)  256 (12 Feb 2017 12:12)

## 2017-02-13 NOTE — PHARMACY COMMUNICATION NOTE - COMMENTS
md is aware of pts low glucose level this morning. special instruction : NOT to hold insulin. Spoke to AJIT Caballero. she spoke to dr. HARRISON  and informed her pts glucose level is low this morning. RN will reschedule lantus to be given later during the day .

## 2017-02-13 NOTE — PROGRESS NOTE ADULT - ASSESSMENT
Pt is a 62 yo lady with a pmhx of DM1 w prior insulin pump since removed, HTN, HL, MS who presents to the ED with N/v/d for 2 days. She ate tacos on Wednesday, and then started having n/v/d that night until now, unable to keep anything down. No fevers, no focal abdominal pain, no dysuria, no chest pain, no short of breath. (04 Feb 2017 13:13)  Salmonella gastroenteritis and sepsis/bacteremia/ligular lung abscess. ID f/U noted Will D/C Flagyl.

## 2017-02-13 NOTE — PROGRESS NOTE ADULT - SUBJECTIVE AND OBJECTIVE BOX
Patient is a 61y old  Female who presents with a chief complaint of Nausea , Vomiting, pain abdomen. (04 Feb 2017 13:13)      INTERVAL HPI/OVERNIGHT EVENTS:    MEDICATIONS  (STANDING):  dextrose 5%. 1000milliLiter(s) IV Continuous <Continuous>  dextrose 50% Injectable 12.5Gram(s) IV Push once  dextrose 50% Injectable 25Gram(s) IV Push once  influenza   Vaccine 0.5milliLiter(s) IntraMuscular once  ALBUTerol    90 MICROgram(s) HFA Inhaler 1Puff(s) Inhalation every 4 hours  amylase/lipase/protease  (CREON  6,000 Units) 1Capsule(s) Oral three times a day with meals  cefTRIAXone   IVPB 2Gram(s) IV Intermittent every 24 hours  heparin  Injectable 5000Unit(s) SubCutaneous every 12 hours  insulin lispro (HumaLOG) corrective regimen sliding scale  SubCutaneous three times a day before meals  insulin lispro (HumaLOG) corrective regimen sliding scale  SubCutaneous at bedtime  nystatin    Suspension 635062Cfpw(s) Oral every 6 hours  sodium chloride 0.9%. 1000milliLiter(s) IV Continuous <Continuous>  acyclovir   Tablet 400milliGRAM(s) Oral three times a day  pantoprazole    Tablet 40milliGRAM(s) Oral before breakfast  insulin lispro Injectable (HumaLOG) 4Unit(s) SubCutaneous before breakfast  insulin lispro Injectable (HumaLOG) 4Unit(s) SubCutaneous before lunch  insulin lispro Injectable (HumaLOG) 4Unit(s) SubCutaneous before dinner  potassium acid phosphate/sodium acid phosphate tablet (K-PHOS No. 2) 1Tablet(s) Oral four times a day with meals  insulin glargine Injectable (LANTUS) 7Unit(s) SubCutaneous <User Schedule>  docosanol 10% Cream 1Application(s) Topical five times a day    MEDICATIONS  (PRN):  dextrose Gel 1Dose(s) Oral once PRN Blood Glucose LESS THAN 70 milliGRAM(s)/deciliter  glucagon  Injectable 1milliGRAM(s) IntraMuscular once PRN Glucose LESS THAN 70 milligrams/deciliter  ondansetron Injectable 4milliGRAM(s) IV Push every 8 hours PRN Nausea and/or Vomiting  acetaminophen   Tablet 650milliGRAM(s) Oral every 6 hours PRN For Temp greater than 38 C (100.4 F)      Allergies    No Known Allergies    Intolerances        REVIEW OF SYSTEMS:  CONSTITUTIONAL: No fever, weight loss, or fatigue  EYES: No eye pain, visual disturbances, or discharge  ENMT:  No difficulty hearing, tinnitus, vertigo; No sinus or throat pain  NECK: No pain or stiffness  BREASTS: No pain, masses, or nipple discharge  RESPIRATORY: No cough, wheezing, chills or hemoptysis; No shortness of breath  CARDIOVASCULAR: No chest pain, palpitations, dizziness, or leg swelling  GASTROINTESTINAL: No abdominal or epigastric pain. No nausea, vomiting, or hematemesis; No diarrhea or constipation. No melena or hematochezia.  GENITOURINARY: No dysuria, frequency, hematuria, or incontinence  NEUROLOGICAL: No headaches, memory loss, loss of strength, numbness, or tremors  SKIN: No itching, burning, rashes, or lesions   LYMPH NODES: No enlarged glands  ENDOCRINE: No heat or cold intolerance; No hair loss  MUSCULOSKELETAL: No joint pain or swelling; No muscle, back, or extremity pain  PSYCHIATRIC: No depression, anxiety, mood swings, or difficulty sleeping  HEME/LYMPH: No easy bruising, or bleeding gums  ALLERGY AND IMMUNOLOGIC: No hives or eczema    Vital Signs Last 24 Hrs  T(C): 36.1, Max: 37.8 (02-12 @ 20:00)  T(F): 97, Max: 100 (02-12 @ 20:00)  HR: 96 (84 - 111)  BP: 133/79 (122/47 - 145/86)  BP(mean): 92 (90 - 92)  RR: 18 (16 - 28)  SpO2: 96% (95% - 100%)    PHYSICAL EXAM:  GENERAL: NAD, well-groomed, well-developed  HEAD:  Atraumatic, Normocephalic  EYES: EOMI, PERRLA, conjunctiva and sclera clear  ENMT:  Moist mucous membranes, Good dentition, No lesions  NECK: Supple, No JVD, Normal thyroid  NERVOUS SYSTEM:  Alert & Oriented X3, Good concentration; Motor Strength 4/5 B/L upper and lower extremities; DTRs 2+ intact and symmetric  CHEST/LUNG: Clear to percussion bilaterally; No rales, rhonchi, wheezing, or rubs  HEART: Regular rate and rhythm; No murmurs, rubs, or gallops  ABDOMEN: Soft, Nontender, Nondistended; Bowel sounds present  EXTREMITIES:  2+ Peripheral Pulses, No clubbing, cyanosis, or edema  LYMPH: No lymphadenopathy noted  SKIN: No rashes or lesions    LABS:                        12.8   15.1  )-----------( 358      ( 13 Feb 2017 06:39 )             35.0     13 Feb 2017 06:39    141    |  108    |  10     ----------------------------<  92     3.3     |  25     |  0.68     Ca    7.7        13 Feb 2017 06:39  Phos  1.3       13 Feb 2017 06:39  Mg     1.6       13 Feb 2017 06:39          CAPILLARY BLOOD GLUCOSE  112 (13 Feb 2017 11:57)  81 (13 Feb 2017 10:05)  61 (13 Feb 2017 09:17)  63 (13 Feb 2017 09:13)  134 (12 Feb 2017 21:46)  260 (12 Feb 2017 17:06)                RADIOLOGY & ADDITIONAL TESTS:      EXAM:  CT CHEST                            PROCEDURE DATE:  02/10/2017        INTERPRETATION:  CLINICAL HISTORY:  Sepsis, pneumonia, evaluate for lung   parenchymal abscess    Multiple axial images of the chest were obtained from the lung apices   through upper abdomen without the administration of intravascular   contrast. Reformatted coronal and sagittal images are submitted.    COMPARISON: None    FINDINGS:    Subcarinal and right lower paratracheal lymph nodes measure up to 11 mm.   Left lower paratracheal lymph nodes measure up to 10 mm. No left hilar   lymphadenopathy is demonstrated. The right hilar region is obscured due   to airspace consolidation within the medial aspect of the right lower   lobe lung parenchyma. There is no evidence for axillary lymphadenopathy.   The heart size appears within normal limits. No pericardial effusion is   identified. Thoracic aortic caliber demonstrates unremarkable caliber.    The central bronchial anatomy appears patent.    Scattered air containing spaces are identified both within the central   and peripheral aspects of the lung parenchyma. There is opacity   identified within the medial as well as posterior-medial aspects of the   right lower lobe lung parenchyma likely related to regions of right lower   lobe consolidation. A small right pleural effusion is noted. Opacity   within the posterior right lower lobe lung parenchyma is likely related   to compressive atelectasis. Airspace consolidation is identified within   the left upper lobe with scattered infiltrates noted within both the left   upper lobe and left lower lobe. No lung parenchymal abscess formation is   identified. There is no evidence for left pleural effusion. There is no   evidence for pneumothorax. No mediastinal shift is noted.    The stomach is not distended precluding evaluation for gastric wall   thickening. A 1.5 cm left hepatic cyst is noted. The right adrenal gland   appears unremarkable. Nodular prominence of the left adrenal gland is   demonstrated. There is extensive calcification noted within the pancreas   body and tail regions, likely related to chronic calcific pancreatitis.   Degenerative changes of the thoracic and lumbar spine noted.    IMPRESSION:  Airspace consolidation left upper lobe as well as right   lower lobe, with additional scattered infiltrates noted within the left   upper lobe and left lower lobe. Small right pleural effusion. Mediastinal   lymphadenopathy.    MALIKA HEREDIA   This document has been electronically signed. Feb 10 2017  6:13PM    Imaging Personally Reviewed:  [x ] YES  [ ] NO    Consultant(s) Notes Reviewed:  [ ] YES  [ ] NO    Care Discussed with Consultants/Other Providers [ ] YES  [ ] NO    PROBLEMS:  ACUTE KIDNEY INJURY  NAUSEA AND VOMITTING  REYNALDO (acute kidney injury)  Pancreatitis, necrotizing  Acute metabolic encephalopathy  Lung infection  Bacteremia due to Gram-negative bacteria  Type 1 diabetes mellitus with hypoglycemic coma  Sepsis  Sepsis, due to unspecified organism  Diarrhea, unspecified type  Diabetes mellitus of other type with hyperosmolarity, with long-term current use of insulin  Multiple sclerosis  REYNALDO (acute kidney injury)  Chronic pancreatitis, unspecified pancreatitis type  Gastroenteritis  Uncontrolled type 1 diabetes mellitus with hyperglycemia  Acute gastritis without hemorrhage, unspecified gastritis type      Care discussed with family,         [  ]   yes  [  ]  No    imp:    stable[ ]    unstable[  ]     improving [   ]       unchanged  [  ]                Plans:  Continue present plans  [  ]               New consult [  ]   specialty  .......               order rochelle[  ]    test name.                  Discharge Planning  [  ]

## 2017-02-13 NOTE — CONSULT NOTE ADULT - CONSULT REASON
DM1
N/V, diarrhea
Patient with multiple rapid responses for altered status and hypoglycemia
REYNALDO
acidosis
pneumonia
Sepsis

## 2017-02-13 NOTE — PROGRESS NOTE ADULT - ASSESSMENT
N/V, Diarrhea - N/V resolved but the diarrhea continues..  Probably secondary to Salmonella enteritis ( neg CT, Urine (+) Salmonella, BC (+) gram neg rods )

## 2017-02-13 NOTE — PROGRESS NOTE ADULT - PROBLEM SELECTOR PLAN 1
decrease lantus to 7units daily (do not hold, pt is type 1 diabetic)  contnue lispro with meals (hold this am) and MARISSA  will follow

## 2017-02-13 NOTE — PROGRESS NOTE ADULT - SUBJECTIVE AND OBJECTIVE BOX
Patient is a 61y old  Female who presents with a chief complaint of Nausea , Vomiting, pain abdomen. (04 Feb 2017 13:13)      INTERVAL HPI / OVERNIGHT EVENTS:imrpoving ,weak, diarrhea present ,no fever ,wants to be started on solid food    MEDICATIONS  (STANDING):  dextrose 5%. 1000milliLiter(s) IV Continuous <Continuous>  dextrose 50% Injectable 12.5Gram(s) IV Push once  dextrose 50% Injectable 25Gram(s) IV Push once  influenza   Vaccine 0.5milliLiter(s) IntraMuscular once  ALBUTerol    90 MICROgram(s) HFA Inhaler 1Puff(s) Inhalation every 4 hours  amylase/lipase/protease  (CREON  6,000 Units) 1Capsule(s) Oral three times a day with meals  cefTRIAXone   IVPB 2Gram(s) IV Intermittent every 24 hours  heparin  Injectable 5000Unit(s) SubCutaneous every 12 hours  insulin lispro (HumaLOG) corrective regimen sliding scale  SubCutaneous three times a day before meals  insulin lispro (HumaLOG) corrective regimen sliding scale  SubCutaneous at bedtime  nystatin    Suspension 071481Udve(s) Oral every 6 hours  sodium chloride 0.9%. 1000milliLiter(s) IV Continuous <Continuous>  acyclovir   Tablet 400milliGRAM(s) Oral three times a day  pantoprazole    Tablet 40milliGRAM(s) Oral before breakfast  insulin lispro Injectable (HumaLOG) 4Unit(s) SubCutaneous before breakfast  insulin lispro Injectable (HumaLOG) 4Unit(s) SubCutaneous before lunch  insulin lispro Injectable (HumaLOG) 4Unit(s) SubCutaneous before dinner  potassium acid phosphate/sodium acid phosphate tablet (K-PHOS No. 2) 1Tablet(s) Oral four times a day with meals  insulin glargine Injectable (LANTUS) 7Unit(s) SubCutaneous <User Schedule>  docosanol 10% Cream 0.5Application(s) Topical five times a day  glatiramer Injectable 20milliGRAM(s) SubCutaneous at bedtime    MEDICATIONS  (PRN):  dextrose Gel 1Dose(s) Oral once PRN Blood Glucose LESS THAN 70 milliGRAM(s)/deciliter  glucagon  Injectable 1milliGRAM(s) IntraMuscular once PRN Glucose LESS THAN 70 milligrams/deciliter  ondansetron Injectable 4milliGRAM(s) IV Push every 8 hours PRN Nausea and/or Vomiting  acetaminophen   Tablet 650milliGRAM(s) Oral every 6 hours PRN For Temp greater than 38 C (100.4 F)      Vital Signs Last 24 Hrs  T(C): 36.6, Max: 37.6 (02-13 @ 04:49)  T(F): 97.8, Max: 99.6 (02-13 @ 04:49)  HR: 103 (84 - 103)  BP: 129/76 (122/47 - 135/83)  BP(mean): --  RR: 17 (16 - 18)  SpO2: 99% (95% - 100%)    PHYSICAL EXAM:  HEENT: scab over upper lips  Constitutional: cachectic ,NAD  Respiratory: CTAB/L  Cardiovascular: S1 and S2, RRR, no M/G/R  Gastrointestinal: BS+, soft, NT/ND  Extremities: No peripheral edema  Vascular: 2+ peripheral pulses  Skin: No rashes  rectal tube present with diarrhea      LABS:                        12.8   15.1  )-----------( 358      ( 13 Feb 2017 06:39 )             35.0     13 Feb 2017 06:39    141    |  108    |  10     ----------------------------<  92     3.3     |  25     |  0.68     Ca    7.7        13 Feb 2017 06:39  Phos  1.3       13 Feb 2017 06:39  Mg     1.6       13 Feb 2017 06:39              MICROBIOLOGY:  RECENT CULTURES:  02-10 .Blood Blood-Peripheral XXXX XXXX   No growth to date.    02-09 .Blood Blood XXXX XXXX   No growth to date.    02-09 .Stool Feces Salmonella species XXXX   Few Salmonella species  (Stool culture examined for Salmonella,  Shigella, Campylobacter, Aeromonas, Plesiomonas,  Vibrio, E.coli O157 and Yersinia)    02-08 .Blood Blood XXXX XXXX   No growth at 5 days.    02-08 .Urine Catheterized XXXX XXXX   No growth    02-08 .Blood Blood-Venous Salmonella species   Growth in aerobic bottle:  Gram Negative Rods  Growth in anaerobic bottle:  Gram Negative Rods   Growth in aerobic and anaerobic bottles: Salmonella species, Group B          RADIOLOGY & ADDITIONAL STUDIES:      CT chest     IMPRESSION:  Airspace consolidation left upper lobe as well as right   lower lobe, with additional scattered infiltrates noted within the left   upper lobe and left lower lobe. Small right pleural effusion. Mediastinal   lymphadenopathy.

## 2017-02-14 DIAGNOSIS — E87.6 HYPOKALEMIA: ICD-10-CM

## 2017-02-14 LAB
ANION GAP SERPL CALC-SCNC: 10 MMOL/L — SIGNIFICANT CHANGE UP (ref 5–17)
ANION GAP SERPL CALC-SCNC: 9 MMOL/L — SIGNIFICANT CHANGE UP (ref 5–17)
BUN SERPL-MCNC: 7 MG/DL — SIGNIFICANT CHANGE UP (ref 7–23)
BUN SERPL-MCNC: 7 MG/DL — SIGNIFICANT CHANGE UP (ref 7–23)
CALCIUM SERPL-MCNC: 7.3 MG/DL — LOW (ref 8.5–10.1)
CALCIUM SERPL-MCNC: 7.5 MG/DL — LOW (ref 8.5–10.1)
CHLORIDE SERPL-SCNC: 100 MMOL/L — SIGNIFICANT CHANGE UP (ref 96–108)
CHLORIDE SERPL-SCNC: 104 MMOL/L — SIGNIFICANT CHANGE UP (ref 96–108)
CO2 SERPL-SCNC: 22 MMOL/L — SIGNIFICANT CHANGE UP (ref 22–31)
CO2 SERPL-SCNC: 23 MMOL/L — SIGNIFICANT CHANGE UP (ref 22–31)
CREAT SERPL-MCNC: 0.57 MG/DL — SIGNIFICANT CHANGE UP (ref 0.5–1.3)
CREAT SERPL-MCNC: 0.76 MG/DL — SIGNIFICANT CHANGE UP (ref 0.5–1.3)
CULTURE RESULTS: SIGNIFICANT CHANGE UP
CULTURE RESULTS: SIGNIFICANT CHANGE UP
GLUCOSE SERPL-MCNC: 262 MG/DL — HIGH (ref 70–99)
GLUCOSE SERPL-MCNC: 60 MG/DL — LOW (ref 70–99)
HCT VFR BLD CALC: 33.8 % — LOW (ref 34.5–45)
HGB BLD-MCNC: 11.9 G/DL — SIGNIFICANT CHANGE UP (ref 11.5–15.5)
MAGNESIUM SERPL-MCNC: 1.7 MG/DL — LOW (ref 1.8–2.4)
MCHC RBC-ENTMCNC: 32.1 PG — SIGNIFICANT CHANGE UP (ref 27–34)
MCHC RBC-ENTMCNC: 35.3 GM/DL — SIGNIFICANT CHANGE UP (ref 32–36)
MCV RBC AUTO: 91 FL — SIGNIFICANT CHANGE UP (ref 80–100)
PHOSPHATE SERPL-MCNC: 2.2 MG/DL — LOW (ref 2.5–4.5)
PLATELET # BLD AUTO: 327 K/UL — SIGNIFICANT CHANGE UP (ref 150–400)
POTASSIUM SERPL-MCNC: 2.7 MMOL/L — CRITICAL LOW (ref 3.5–5.3)
POTASSIUM SERPL-MCNC: 3.7 MMOL/L — SIGNIFICANT CHANGE UP (ref 3.5–5.3)
POTASSIUM SERPL-SCNC: 2.7 MMOL/L — CRITICAL LOW (ref 3.5–5.3)
POTASSIUM SERPL-SCNC: 3.7 MMOL/L — SIGNIFICANT CHANGE UP (ref 3.5–5.3)
PROCALCITONIN SERPL-MCNC: 0.5 NG/ML — HIGH (ref 0–0.05)
RBC # BLD: 3.72 M/UL — LOW (ref 3.8–5.2)
RBC # FLD: 12.8 % — SIGNIFICANT CHANGE UP (ref 11–15)
SODIUM SERPL-SCNC: 132 MMOL/L — LOW (ref 135–145)
SODIUM SERPL-SCNC: 136 MMOL/L — SIGNIFICANT CHANGE UP (ref 135–145)
SPECIMEN SOURCE: SIGNIFICANT CHANGE UP
SPECIMEN SOURCE: SIGNIFICANT CHANGE UP
WBC # BLD: 14 K/UL — HIGH (ref 3.8–10.5)
WBC # FLD AUTO: 14 K/UL — HIGH (ref 3.8–10.5)

## 2017-02-14 PROCEDURE — 99233 SBSQ HOSP IP/OBS HIGH 50: CPT

## 2017-02-14 RX ORDER — SOD SULF/SODIUM/NAHCO3/KCL/PEG
1000 SOLUTION, RECONSTITUTED, ORAL ORAL ONCE
Qty: 0 | Refills: 0 | Status: COMPLETED | OUTPATIENT
Start: 2017-02-14 | End: 2017-02-14

## 2017-02-14 RX ORDER — MAGNESIUM SULFATE 500 MG/ML
2 VIAL (ML) INJECTION ONCE
Qty: 0 | Refills: 0 | Status: COMPLETED | OUTPATIENT
Start: 2017-02-14 | End: 2017-02-14

## 2017-02-14 RX ORDER — POTASSIUM CHLORIDE 20 MEQ
40 PACKET (EA) ORAL EVERY 4 HOURS
Qty: 0 | Refills: 0 | Status: COMPLETED | OUTPATIENT
Start: 2017-02-14 | End: 2017-02-14

## 2017-02-14 RX ORDER — POTASSIUM CHLORIDE 20 MEQ
10 PACKET (EA) ORAL
Qty: 0 | Refills: 0 | Status: COMPLETED | OUTPATIENT
Start: 2017-02-14 | End: 2017-02-14

## 2017-02-14 RX ADMIN — Medication 400 MILLIGRAM(S): at 05:03

## 2017-02-14 RX ADMIN — Medication 40 MILLIEQUIVALENT(S): at 14:11

## 2017-02-14 RX ADMIN — Medication 400 MILLIGRAM(S): at 14:11

## 2017-02-14 RX ADMIN — Medication 1 TABLET(S): at 17:04

## 2017-02-14 RX ADMIN — INSULIN GLARGINE 7 UNIT(S): 100 INJECTION, SOLUTION SUBCUTANEOUS at 10:51

## 2017-02-14 RX ADMIN — Medication 1 CAPSULE(S): at 11:38

## 2017-02-14 RX ADMIN — DOCOSANOL 0.5 APPLICATION(S): 100 CREAM TOPICAL at 23:59

## 2017-02-14 RX ADMIN — Medication 500000 UNIT(S): at 05:03

## 2017-02-14 RX ADMIN — Medication 2: at 16:10

## 2017-02-14 RX ADMIN — Medication 400 MILLIGRAM(S): at 23:10

## 2017-02-14 RX ADMIN — Medication 500000 UNIT(S): at 17:04

## 2017-02-14 RX ADMIN — PANTOPRAZOLE SODIUM 40 MILLIGRAM(S): 20 TABLET, DELAYED RELEASE ORAL at 07:43

## 2017-02-14 RX ADMIN — Medication 1000 MILLILITER(S): at 17:07

## 2017-02-14 RX ADMIN — Medication 40 MILLIEQUIVALENT(S): at 10:51

## 2017-02-14 RX ADMIN — Medication 1 TABLET(S): at 07:43

## 2017-02-14 RX ADMIN — CEFTRIAXONE 100 GRAM(S): 500 INJECTION, POWDER, FOR SOLUTION INTRAMUSCULAR; INTRAVENOUS at 11:38

## 2017-02-14 RX ADMIN — Medication 500000 UNIT(S): at 23:11

## 2017-02-14 RX ADMIN — Medication 100 MILLIEQUIVALENT(S): at 09:22

## 2017-02-14 RX ADMIN — DOCOSANOL 0.5 APPLICATION(S): 100 CREAM TOPICAL at 20:30

## 2017-02-14 RX ADMIN — Medication 4 UNIT(S): at 11:39

## 2017-02-14 RX ADMIN — Medication 500000 UNIT(S): at 11:39

## 2017-02-14 RX ADMIN — DOCOSANOL 0.5 APPLICATION(S): 100 CREAM TOPICAL at 16:10

## 2017-02-14 RX ADMIN — Medication 1: at 11:39

## 2017-02-14 RX ADMIN — Medication 1000 MILLILITER(S): at 21:16

## 2017-02-14 RX ADMIN — Medication 1 TABLET(S): at 11:39

## 2017-02-14 RX ADMIN — Medication 1 TABLET(S): at 23:10

## 2017-02-14 RX ADMIN — Medication 50 GRAM(S): at 12:23

## 2017-02-14 RX ADMIN — SODIUM CHLORIDE 75 MILLILITER(S): 9 INJECTION INTRAMUSCULAR; INTRAVENOUS; SUBCUTANEOUS at 10:51

## 2017-02-14 RX ADMIN — Medication 100 MILLIEQUIVALENT(S): at 10:51

## 2017-02-14 RX ADMIN — HEPARIN SODIUM 5000 UNIT(S): 5000 INJECTION INTRAVENOUS; SUBCUTANEOUS at 05:03

## 2017-02-14 RX ADMIN — Medication 1 CAPSULE(S): at 17:04

## 2017-02-14 RX ADMIN — DOCOSANOL 0.5 APPLICATION(S): 100 CREAM TOPICAL at 11:40

## 2017-02-14 RX ADMIN — Medication 100 MILLIEQUIVALENT(S): at 08:14

## 2017-02-14 RX ADMIN — GLATIRAMER ACETATE 20 MILLIGRAM(S): 20 INJECTION, SOLUTION SUBCUTANEOUS at 23:08

## 2017-02-14 RX ADMIN — Medication 4 UNIT(S): at 16:10

## 2017-02-14 RX ADMIN — DOCOSANOL 0.5 APPLICATION(S): 100 CREAM TOPICAL at 08:13

## 2017-02-14 RX ADMIN — Medication 1 CAPSULE(S): at 07:44

## 2017-02-14 NOTE — PROGRESS NOTE ADULT - SUBJECTIVE AND OBJECTIVE BOX
Patient is a 61y old  Female who presents with a chief complaint of Nausea , Vomiting, pain abdomen. (04 Feb 2017 13:13)      INTERVAL HPI/OVERNIGHT EVENTS: Still has diarrhea.    MEDICATIONS  (STANDING):  dextrose 5%. 1000milliLiter(s) IV Continuous <Continuous>  dextrose 50% Injectable 12.5Gram(s) IV Push once  dextrose 50% Injectable 25Gram(s) IV Push once  influenza   Vaccine 0.5milliLiter(s) IntraMuscular once  ALBUTerol    90 MICROgram(s) HFA Inhaler 1Puff(s) Inhalation every 4 hours  amylase/lipase/protease  (CREON  6,000 Units) 1Capsule(s) Oral three times a day with meals  cefTRIAXone   IVPB 2Gram(s) IV Intermittent every 24 hours  heparin  Injectable 5000Unit(s) SubCutaneous every 12 hours  insulin lispro (HumaLOG) corrective regimen sliding scale  SubCutaneous three times a day before meals  insulin lispro (HumaLOG) corrective regimen sliding scale  SubCutaneous at bedtime  nystatin    Suspension 749571Totz(s) Oral every 6 hours  sodium chloride 0.9%. 1000milliLiter(s) IV Continuous <Continuous>  acyclovir   Tablet 400milliGRAM(s) Oral three times a day  pantoprazole    Tablet 40milliGRAM(s) Oral before breakfast  insulin lispro Injectable (HumaLOG) 4Unit(s) SubCutaneous before breakfast  insulin lispro Injectable (HumaLOG) 4Unit(s) SubCutaneous before lunch  insulin lispro Injectable (HumaLOG) 4Unit(s) SubCutaneous before dinner  potassium acid phosphate/sodium acid phosphate tablet (K-PHOS No. 2) 1Tablet(s) Oral four times a day with meals  insulin glargine Injectable (LANTUS) 7Unit(s) SubCutaneous <User Schedule>  docosanol 10% Cream 0.5Application(s) Topical five times a day  glatiramer Injectable 20milliGRAM(s) SubCutaneous at bedtime  magnesium sulfate  IVPB 2Gram(s) IV Intermittent once  potassium chloride    Tablet ER 40milliEquivalent(s) Oral every 4 hours  polyethylene glycol/electrolyte Solution 1000milliLiter(s) Oral once  polyethylene glycol/electrolyte Solution 1000milliLiter(s) Oral once    MEDICATIONS  (PRN):  dextrose Gel 1Dose(s) Oral once PRN Blood Glucose LESS THAN 70 milliGRAM(s)/deciliter  glucagon  Injectable 1milliGRAM(s) IntraMuscular once PRN Glucose LESS THAN 70 milligrams/deciliter  ondansetron Injectable 4milliGRAM(s) IV Push every 8 hours PRN Nausea and/or Vomiting  acetaminophen   Tablet 650milliGRAM(s) Oral every 6 hours PRN For Temp greater than 38 C (100.4 F)      Allergies    No Known Allergies    Intolerances        REVIEW OF SYSTEMS:  CONSTITUTIONAL: No fever, weight loss, or fatigue  EYES: No eye pain, visual disturbances, or discharge  ENMT:  No difficulty hearing, tinnitus, vertigo; No sinus or throat pain  NECK: No pain or stiffness  BREASTS: No pain, masses, or nipple discharge  RESPIRATORY: No cough, wheezing, chills or hemoptysis; No shortness of breath  CARDIOVASCULAR: No chest pain, palpitations, dizziness, or leg swelling  GASTROINTESTINAL: No abdominal or epigastric pain. No nausea, vomiting, or hematemesis; No diarrhea or constipation. No melena or hematochezia.  GENITOURINARY: No dysuria, frequency, hematuria, or incontinence  NEUROLOGICAL: No headaches, memory loss, loss of strength, numbness, or tremors  SKIN: No itching, burning, rashes, or lesions   LYMPH NODES: No enlarged glands  ENDOCRINE: No heat or cold intolerance; No hair loss  MUSCULOSKELETAL: No joint pain or swelling; No muscle, back, or extremity pain  PSYCHIATRIC: No depression, anxiety, mood swings, or difficulty sleeping  HEME/LYMPH: No easy bruising, or bleeding gums  ALLERGY AND IMMUNOLOGIC: No hives or eczema    Vital Signs Last 24 Hrs  T(C): 36.6, Max: 36.6 (02-13 @ 17:21)  T(F): 97.8, Max: 97.9 (02-13 @ 23:35)  HR: 79 (79 - 103)  BP: 131/80 (120/71 - 149/86)  BP(mean): --  RR: 18 (17 - 18)  SpO2: 99% (96% - 100%)    PHYSICAL EXAM:  GENERAL: NAD, well-groomed, well-developed  HEAD:  Atraumatic, Normocephalic  EYES: EOMI, PERRLA, conjunctiva and sclera clear  ENMT:  Moist mucous membranes, Good dentition, No lesions  NECK: Supple, No JVD, Normal thyroid  NERVOUS SYSTEM:  Alert & Oriented X3, Good concentration; Motor Strength 5/5 B/L upper and lower extremities; DTRs 2+ intact and symmetric  CHEST/LUNG: Clear to percussion bilaterally; No rales, rhonchi, wheezing, or rubs  HEART: Regular rate and rhythm; No murmurs, rubs, or gallops  ABDOMEN: Soft, Nontender, Nondistended; Bowel sounds present  EXTREMITIES:  2+ Peripheral Pulses, No clubbing, cyanosis, or edema  LYMPH: No lymphadenopathy noted  SKIN: No rashes or lesions    LABS:                        11.9   14.0  )-----------( 327      ( 14 Feb 2017 07:12 )             33.8     14 Feb 2017 07:12    136    |  104    |  7      ----------------------------<  60     2.7     |  22     |  0.57     Ca    7.5        14 Feb 2017 07:12  Phos  2.2       14 Feb 2017 07:12  Mg     1.7       14 Feb 2017 07:12    Culture - Urine (02.05.17 @ 09:45)    -  Ampicillin: R >16    -  Aztreonam: S <=4    -  Ceftazidime: S <=1    -  Ceftriaxone: S <=1    -  Ceftriaxone: S <=1    -  Nitrofurantoin: S <=32    -  Piperacillin/Tazobactam: S <=16    -  Amikacin: S <=16    -  Ampicillin/Sulbactam: S <=8/4    -  Cefazolin: S <=8    -  Cefoxitin: S <=8    -  Ciprofloxacin: R >2    -  Tobramycin: R >8    -  Trimethoprim/Sulfamethoxazole: S <=2/38    -  Trimethoprim/Sulfamethoxazole: S <=2/38    -  Cefepime: S <=4    -  Gentamicin: S <=4    -  Levofloxacin: R >4    -  Meropenem: S <=1    -  Ampicillin: S <=8    -  Ciprofloxacin: S <=1    -  Ertapenem: S <=1    -  Imipenem: S <=1    Specimen Source: .Urine Clean Catch (Midstream)    Culture Results:   10,000 - 49,000 CFU/mL Escherichia coli  10,000 - 49,000 CFU/mL Salmonella enterica Group B  Revised urine counts reflect clinically significant counts  established by evidence based medicine.    Organism Identification: Escherichia coli  Salmonella species    Organism: Salmonella species    Organism: Salmonella species    Organism: Escherichia coli    Method Type: KB    Method Type: ETHEL    Method Type: ETHEL    Culture - Stool (02.09.17 @ 01:35)    -  Ampicillin: S <=8    -  Ciprofloxacin: S <=1    -  Trimethoprim/Sulfamethoxazole: S <=2/38    Specimen Source: .Stool Feces    Culture Results:   Few Salmonella species  (Stool culture examined for Salmonella,  Shigella, Campylobacter, Aeromonas, Plesiomonas,  Vibrio, E.coli O157 and Yersinia)    Organism Identification: Salmonella species    Organism: Salmonella species    Organism: Salmonella species    Method Type: KB    Method Type: ETHEL              CAPILLARY BLOOD GLUCOSE  181 (14 Feb 2017 11:37)  119 (14 Feb 2017 07:43)  144 (13 Feb 2017 21:41)      RADIOLOGY & ADDITIONAL TESTS:      EXAM:  CT CHEST                            PROCEDURE DATE:  02/10/2017        INTERPRETATION:  CLINICAL HISTORY:  Sepsis, pneumonia, evaluate for lung   parenchymal abscess    Multiple axial images of the chest were obtained from the lung apices   through upper abdomen without the administration of intravascular   contrast. Reformatted coronal and sagittal images are submitted.    COMPARISON: None    FINDINGS:    Subcarinal and right lower paratracheal lymph nodes measure up to 11 mm.   Left lower paratracheal lymph nodes measure up to 10 mm. No left hilar   lymphadenopathy is demonstrated. The right hilar region is obscured due   to airspace consolidation within the medial aspect of the right lower   lobe lung parenchyma. There is no evidence for axillary lymphadenopathy.   The heart size appears within normal limits. No pericardial effusion is   identified. Thoracic aortic caliber demonstrates unremarkable caliber.    The central bronchial anatomy appears patent.    Scattered air containing spaces are identified both within the central   and peripheral aspects of the lung parenchyma. There is opacity   identified within the medial as well as posterior-medial aspects of the   right lower lobe lung parenchyma likely related to regions of right lower   lobe consolidation. A small right pleural effusion is noted. Opacity   within the posterior right lower lobe lung parenchyma is likely related   to compressive atelectasis. Airspace consolidation is identified within   the left upper lobe with scattered infiltrates noted within both the left   upper lobe and left lower lobe. No lung parenchymal abscess formation is   identified. There is no evidence for left pleural effusion. There is no   evidence for pneumothorax. No mediastinal shift is noted.    The stomach is not distended precluding evaluation for gastric wall   thickening. A 1.5 cm left hepatic cyst is noted. The right adrenal gland   appears unremarkable. Nodular prominence of the left adrenal gland is   demonstrated. There is extensive calcification noted within the pancreas   body and tail regions, likely related to chronic calcific pancreatitis.   Degenerative changes of the thoracic and lumbar spine noted.    IMPRESSION:  Airspace consolidation left upper lobe as well as right   lower lobe, with additional scattered infiltrates noted within the left   upper lobe and left lower lobe. Small right pleural effusion. Mediastinal   lymphadenopathy.      MALIKA HEREDIA   This document has been electronically signed. Feb 10 2017  6:13PM      Imaging Personally Reviewed:  [ ] YES  [ ] NO    Consultant(s) Notes Reviewed:  [ ] YES  [ ] NO    Care Discussed with Consultants/Other Providers [ ] YES  [ ] NO    PROBLEMS:  ACUTE KIDNEY INJURY  NAUSEA AND VOMITTING  REYNALDO (acute kidney injury)  Herpes labialis without complication  Leukocytosis, unspecified type  Salmonella pneumonia  Salmonella gastroenteritis  Salmonella bacteremia  Herpes zoster without complication  Pancreatitis, necrotizing  Acute metabolic encephalopathy  Lung infection  Bacteremia due to Gram-negative bacteria  Type 1 diabetes mellitus with hypoglycemic coma  Sepsis  Sepsis, due to unspecified organism  Diarrhea, unspecified type  Diabetes mellitus of other type with hyperosmolarity, with long-term current use of insulin  Multiple sclerosis  REYNALDO (acute kidney injury)  Chronic pancreatitis, unspecified pancreatitis type  Gastroenteritis  Uncontrolled type 1 diabetes mellitus with hyperglycemia  Acute gastritis without hemorrhage, unspecified gastritis type      Care discussed with family,         [  ]   yes  [  ]  No    imp:    stable[ ]    unstable[  ]     improving [   ]       unchanged  [  ]                Plans:  Continue present plans  [  ]               New consult [  ]   specialty  .......               order rochelle[  ]    test name.                  Discharge Planning  [  ]

## 2017-02-14 NOTE — PROGRESS NOTE ADULT - ASSESSMENT
Pt is a 62 yo lady with a pmhx of DM1 w prior insulin pump since removed, HTN, HL, MS who presents to the ED with N/v/d for 2 days. She ate tacos on Wednesday, and then started having n/v/d that night until now, unable to keep anything down. No fevers, no focal abdominal pain, no dysuria, no chest pain, no short of breath. (04 Feb 2017 13:13)  Salmonella gastroenteritis and sepsis/bacteremia/ligular lung abscess. Lung abscess ruled out. Multilobar pneumonia. GI f/U noted, will need Colonoscopy.

## 2017-02-14 NOTE — PROGRESS NOTE ADULT - SUBJECTIVE AND OBJECTIVE BOX
Patient is a 61y old  Female who presents with a chief complaint of Nausea , Vomiting, pain abdomen. (04 Feb 2017 13:13)      HPI:  Pt is a 62 yo lady with a pmhx of DM1 w prior insulin pump since removed, HTN, HL, MS who presents to the ED with N/v/d for 2 days. She ate tacos on Wednesday, and then started having n/v/d that night until now, unable to keep anything down. No fevers, no focal abdominal pain, no dysuria, no chest pain, no short of breath. (04 Feb 2017 13:13)      INTERVAL HPI/OVERNIGHT EVENTS:  Diarrhea still continues. No n/v, abdominal pain    MEDICATIONS  (STANDING):  dextrose 5%. 1000milliLiter(s) IV Continuous <Continuous>  dextrose 50% Injectable 12.5Gram(s) IV Push once  dextrose 50% Injectable 25Gram(s) IV Push once  influenza   Vaccine 0.5milliLiter(s) IntraMuscular once  ALBUTerol    90 MICROgram(s) HFA Inhaler 1Puff(s) Inhalation every 4 hours  amylase/lipase/protease  (CREON  6,000 Units) 1Capsule(s) Oral three times a day with meals  cefTRIAXone   IVPB 2Gram(s) IV Intermittent every 24 hours  heparin  Injectable 5000Unit(s) SubCutaneous every 12 hours  insulin lispro (HumaLOG) corrective regimen sliding scale  SubCutaneous three times a day before meals  insulin lispro (HumaLOG) corrective regimen sliding scale  SubCutaneous at bedtime  nystatin    Suspension [id number](s) Oral every 6 hours  sodium chloride 0.9%. 1000milliLiter(s) IV Continuous <Continuous>  acyclovir   Tablet 400milliGRAM(s) Oral three times a day  pantoprazole    Tablet 40milliGRAM(s) Oral before breakfast  insulin lispro Injectable (HumaLOG) 4Unit(s) SubCutaneous before breakfast  insulin lispro Injectable (HumaLOG) 4Unit(s) SubCutaneous before lunch  insulin lispro Injectable (HumaLOG) 4Unit(s) SubCutaneous before dinner  potassium acid phosphate/sodium acid phosphate tablet (K-PHOS No. 2) 1Tablet(s) Oral four times a day with meals  insulin glargine Injectable (LANTUS) 7Unit(s) SubCutaneous <User Schedule>  docosanol 10% Cream 0.5Application(s) Topical five times a day  glatiramer Injectable 20milliGRAM(s) SubCutaneous at bedtime  magnesium sulfate  IVPB 2Gram(s) IV Intermittent once  potassium chloride  10 mEq/100 mL IVPB 10milliEquivalent(s) IV Intermittent every 1 hour  potassium chloride    Tablet ER 40milliEquivalent(s) Oral every 4 hours    MEDICATIONS  (PRN):  dextrose Gel 1Dose(s) Oral once PRN Blood Glucose LESS THAN 70 milliGRAM(s)/deciliter  glucagon  Injectable 1milliGRAM(s) IntraMuscular once PRN Glucose LESS THAN 70 milligrams/deciliter  ondansetron Injectable 4milliGRAM(s) IV Push every 8 hours PRN Nausea and/or Vomiting  acetaminophen   Tablet 650milliGRAM(s) Oral every 6 hours PRN For Temp greater than 38 C (100.4 F)      FAMILY HISTORY:  No pertinent family history in first degree relatives      Allergies    No Known Allergies    Intolerances        PMH/PSH:  Multiple sclerosis  Myasthenia gravis  Hypertension  Diabetes  H/O cervical spine surgery        REVIEW OF SYSTEMS:  CONSTITUTIONAL: No fever, weight loss, or fatigue  EYES: No eye pain, visual disturbances, or discharge  ENMT:  No difficulty hearing, tinnitus, vertigo; No sinus or throat pain  NECK: No pain or stiffness  BREASTS: No pain, masses, or nipple discharge  RESPIRATORY: No cough, wheezing, chills or hemoptysis; No shortness of breath  CARDIOVASCULAR: No chest pain, palpitations, dizziness, or leg swelling  GASTROINTESTINAL: No abdominal or epigastric pain. No nausea, vomiting, or hematemesis; No  constipation. No melena or hematochezia.  GENITOURINARY: No dysuria, frequency, hematuria, or incontinence  NEUROLOGICAL: No headaches, memory loss, loss of strength, numbness, or tremors  SKIN: No itching, burning, rashes, or lesions     Vital Signs Last 24 Hrs  T(C): 36.4, Max: 36.6 (02-13 @ 17:21)  T(F): 97.6, Max: 97.9 (02-13 @ 23:35)  HR: 97 (96 - 103)  BP: 120/71 (120/71 - 149/86)  BP(mean): --  RR: 17 (17 - 18)  SpO2: 100% (96% - 100%)    PHYSICAL EXAM:  GENERAL: NAD, well-groomed, well-developed  HEAD:  Atraumatic, Normocephalic  EYES: EOMI, PERRLA, conjunctiva and sclera clear  NECK: Supple, No JVD, Normal thyroid  NERVOUS SYSTEM:  Alert & Oriented X3, Good concentration; Motor Strength 5/5 B/L upper and lower extremities; DTRs 2+ intact and symmetric  CHEST/LUNG: Clear to percussion bilaterally; No rales, rhonchi, wheezing, or rubs  HEART: Regular rate and rhythm; No murmurs, rubs, or gallops  ABDOMEN: Soft, Nontender, Nondistended; Bowel sounds present  EXTREMITIES:  2+ Peripheral Pulses, No clubbing, cyanosis, or edema  LYMPH: No lymphadenopathy noted  SKIN: No rashes or lesions    LABS:  [id number] 02-09 @ 19:11    02-14 @ 07:12   HGB 33.8  HCT 11.9  MCV 91.0   RDW 12.8 WBC 14.0 PLTA 327        02-13 @ 06:39   HGB 35.0  HCT 12.8  MCV 89.3   RDW 12.4 WBC 15.1 PLTA 358        02-12 @ 03:39   HGB 35.1  HCT 12.5  MCV 91.2   RDW 12.8 WBC 12.7 PLTA 321        02-11 @ 04:56   HGB 34.4  HCT 12.5  MCV 87.6   RDW 11.8 WBC 17.4 PLTA 308        02-10 @ 03:27   HGB 34.6  HCT 12.8  MCV 88.2   RDW 11.9 WBC 16.4 PLTA 223        02-09 @ 08:59   HGB 33.4  HCT 12.4  MCV 87.0   RDW 11.5 WBC 15.2 PLTA 210          14 Feb 2017 07:12    136    |  104    |  7      ----------------------------<  60     2.7     |  22     |  0.57   13 Feb 2017 06:39    141    |  108    |  10     ----------------------------<  92     3.3     |  25     |  0.68   12 Feb 2017 03:39    139    |  105    |  15     ----------------------------<  313    3.9     |  25     |  0.78   11 Feb 2017 04:56    137    |  105    |  22     ----------------------------<  191    3.5     |  27     |  0.80   10 Feb 2017 03:27    132    |  99     |  38     ----------------------------<  228    3.0     |  27     |  1.74   09 Feb 2017 15:09    133    |  107    |  46     ----------------------------<  58     3.6     |  21     |  2.78   09 Feb 2017 09:36    130    |  105    |  46     ----------------------------<  246    3.4     |  17     |  3.18     Ca    7.5        14 Feb 2017 07:12  Ca    7.7        13 Feb 2017 06:39  Ca    7.7        12 Feb 2017 03:39  Ca    7.6        11 Feb 2017 04:56  Ca    7.6        10 Feb 2017 03:27  Ca    8.1        09 Feb 2017 15:09  Ca    7.3        09 Feb 2017 09:36  Phos  2.2       14 Feb 2017 07:12  Phos  1.3       13 Feb 2017 06:39  Phos  1.5       12 Feb 2017 03:39  Phos  1.8       11 Feb 2017 04:56  Phos  2.1       10 Feb 2017 03:27  Phos  2.0       09 Feb 2017 09:36  Mg     1.7       14 Feb 2017 07:12  Mg     1.6       13 Feb 2017 06:39  Mg     1.9       12 Feb 2017 03:39  Mg     2.0       11 Feb 2017 04:56  Mg     2.1       10 Feb 2017 03:27  Mg     1.9       09 Feb 2017 09:36    TPro  6.9    /  Alb  2.3    /  TBili  0.3    /  DBili  .12    /  AST  15     /  ALT  18     /  AlkPhos  163    08 Feb 2017 10:15        CAPILLARY BLOOD GLUCOSE  119 (14 Feb 2017 07:43)  144 (13 Feb 2017 21:41)  112 (13 Feb 2017 11:57)  81 (13 Feb 2017 10:05)  61 (13 Feb 2017 09:17)  63 (13 Feb 2017 09:13)        RADIOLOGY & ADDITIONAL TESTS:    Imaging Personally Reviewed:  [ ] YES  [ ] NO    Consultant(s) Notes Reviewed:  [ ] YES  [ ] NO    Care Discussed with Consultants/Other Providers [ ] YES  [ ] NO

## 2017-02-14 NOTE — PROGRESS NOTE ADULT - SUBJECTIVE AND OBJECTIVE BOX
INTERVAL HPI/OVERNIGHT EVENTS:  Pt is a 62 yo lady with a pmhx of DM1 w prior insulin pump since removed, HTN, HL, MS who presents to the ED with N/v/d for 2 days. She ate tacos on Wednesday, and then started having n/v/d that night until now, unable to keep anything down. No fevers, no focal abdominal pain, no dysuria, no chest pain, no short of breath.   Hospital course complicated with Salmonella sepsis(urine, stool and blood), pneumonia with suspected lung abscess and hypoglycemia episode. Was in ICU, now on regular floor.   Awake and comfortable without distress. reports having dry cough.    Vital Signs Last 24 Hrs  T(C): 36.6, Max: 36.6 (02-13 @ 17:21)  T(F): 97.8, Max: 97.9 (02-13 @ 23:35)  HR: 79 (79 - 103)  BP: 131/80 (120/71 - 149/86)  BP(mean): --  RR: 18 (17 - 18)  SpO2: 99% (99% - 100%)    PHYSICAL EXAM:  GEN:        Awake, responsive and comfortable.  HEENT:    Normal.    RESP:      no wheezing.  CVS:           Regular rate and rhythm.   ABD:         Soft, non-tender, non-distended;   :             No costovertebral angle tenderness  EXTR:            No clubbing, cyanosis or edema  CNS:              Intact sensory and motor function.    MEDICATIONS  (STANDING):  dextrose 5%. 1000milliLiter(s) IV Continuous <Continuous>  dextrose 50% Injectable 12.5Gram(s) IV Push once  dextrose 50% Injectable 25Gram(s) IV Push once  influenza   Vaccine 0.5milliLiter(s) IntraMuscular once  ALBUTerol    90 MICROgram(s) HFA Inhaler 1Puff(s) Inhalation every 4 hours  amylase/lipase/protease  (CREON  6,000 Units) 1Capsule(s) Oral three times a day with meals  cefTRIAXone   IVPB 2Gram(s) IV Intermittent every 24 hours  heparin  Injectable 5000Unit(s) SubCutaneous every 12 hours  insulin lispro (HumaLOG) corrective regimen sliding scale  SubCutaneous three times a day before meals  insulin lispro (HumaLOG) corrective regimen sliding scale  SubCutaneous at bedtime  nystatin    Suspension [id number](s) Oral every 6 hours  sodium chloride 0.9%. 1000milliLiter(s) IV Continuous <Continuous>  acyclovir   Tablet 400milliGRAM(s) Oral three times a day  pantoprazole    Tablet 40milliGRAM(s) Oral before breakfast  insulin lispro Injectable (HumaLOG) 4Unit(s) SubCutaneous before breakfast  insulin lispro Injectable (HumaLOG) 4Unit(s) SubCutaneous before lunch  insulin lispro Injectable (HumaLOG) 4Unit(s) SubCutaneous before dinner  potassium acid phosphate/sodium acid phosphate tablet (K-PHOS No. 2) 1Tablet(s) Oral four times a day with meals  insulin glargine Injectable (LANTUS) 7Unit(s) SubCutaneous <User Schedule>  docosanol 10% Cream 0.5Application(s) Topical five times a day  glatiramer Injectable 20milliGRAM(s) SubCutaneous at bedtime  polyethylene glycol/electrolyte Solution 1000milliLiter(s) Oral once  polyethylene glycol/electrolyte Solution 1000milliLiter(s) Oral once    MEDICATIONS  (PRN):  dextrose Gel 1Dose(s) Oral once PRN Blood Glucose LESS THAN 70 milliGRAM(s)/deciliter  glucagon  Injectable 1milliGRAM(s) IntraMuscular once PRN Glucose LESS THAN 70 milligrams/deciliter  ondansetron Injectable 4milliGRAM(s) IV Push every 8 hours PRN Nausea and/or Vomiting  acetaminophen   Tablet 650milliGRAM(s) Oral every 6 hours PRN For Temp greater than 38 C (100.4 F)    LABS:                        11.9   14.0  )-----------( 327      ( 14 Feb 2017 07:12 )             33.8     14 Feb 2017 12:48    132    |  100    |  7      ----------------------------<  262    3.7     |  23     |  0.76     Ca    7.3        14 Feb 2017 12:48  Phos  2.2       14 Feb 2017 07:12  Mg     1.7       14 Feb 2017 07:12    ASSESSMENT AND PLAN:  ·	Multilobar pneumonia.  ·	Salmonella sepsis.  ·	Leukocytosis.  ·	Hypokalemia.  ·	DM with hypoglycemia.  ·	Multiple sclerosis.  ·	HTN.  ·	Herpes's Zoster.    Continue antibiotics.  Add Robitussin.

## 2017-02-14 NOTE — PROGRESS NOTE ADULT - SUBJECTIVE AND OBJECTIVE BOX
Patient is a 61y old  Female who presents with a chief complaint of Nausea , Vomiting, pain abdomen. (04 Feb 2017 13:13)      INTERVAL HPI / OVERNIGHT EVENTS:diarrhea continues,no abdo pain ,breathing is better than earlier     MEDICATIONS  (STANDING):  dextrose 5%. 1000milliLiter(s) IV Continuous <Continuous>  dextrose 50% Injectable 12.5Gram(s) IV Push once  dextrose 50% Injectable 25Gram(s) IV Push once  influenza   Vaccine 0.5milliLiter(s) IntraMuscular once  ALBUTerol    90 MICROgram(s) HFA Inhaler 1Puff(s) Inhalation every 4 hours  amylase/lipase/protease  (CREON  6,000 Units) 1Capsule(s) Oral three times a day with meals  cefTRIAXone   IVPB 2Gram(s) IV Intermittent every 24 hours  heparin  Injectable 5000Unit(s) SubCutaneous every 12 hours  insulin lispro (HumaLOG) corrective regimen sliding scale  SubCutaneous three times a day before meals  insulin lispro (HumaLOG) corrective regimen sliding scale  SubCutaneous at bedtime  nystatin    Suspension [id number](s) Oral every 6 hours  sodium chloride 0.9%. 1000milliLiter(s) IV Continuous <Continuous>  acyclovir   Tablet 400milliGRAM(s) Oral three times a day  pantoprazole    Tablet 40milliGRAM(s) Oral before breakfast  insulin lispro Injectable (HumaLOG) 4Unit(s) SubCutaneous before breakfast  insulin lispro Injectable (HumaLOG) 4Unit(s) SubCutaneous before lunch  insulin lispro Injectable (HumaLOG) 4Unit(s) SubCutaneous before dinner  potassium acid phosphate/sodium acid phosphate tablet (K-PHOS No. 2) 1Tablet(s) Oral four times a day with meals  insulin glargine Injectable (LANTUS) 7Unit(s) SubCutaneous <User Schedule>  docosanol 10% Cream 0.5Application(s) Topical five times a day  glatiramer Injectable 20milliGRAM(s) SubCutaneous at bedtime    MEDICATIONS  (PRN):  dextrose Gel 1Dose(s) Oral once PRN Blood Glucose LESS THAN 70 milliGRAM(s)/deciliter  glucagon  Injectable 1milliGRAM(s) IntraMuscular once PRN Glucose LESS THAN 70 milligrams/deciliter  ondansetron Injectable 4milliGRAM(s) IV Push every 8 hours PRN Nausea and/or Vomiting  acetaminophen   Tablet 650milliGRAM(s) Oral every 6 hours PRN For Temp greater than 38 C (100.4 F)  guaiFENesin    Syrup 200milliGRAM(s) Oral every 6 hours PRN Cough      Vital Signs Last 24 Hrs  T(C): 37.2, Max: 37.2 (02-14 @ 17:22)  T(F): 99, Max: 99 (02-14 @ 17:22)  HR: 99 (79 - 101)  BP: 153/84 (120/71 - 153/84)  BP(mean): --  RR: 17 (17 - 18)  SpO2: 100% (99% - 100%)    PHYSICAL EXAM:    Constitutional: NAD, well-groomed, well-developed  Respiratory: CTAB/L  Cardiovascular: S1 and S2, RRR, no M/G/R  Gastrointestinal: BS+, soft, NT/ND  Extremities: No peripheral edema  Vascular: 2+ peripheral pulses  Skin: No rashes  rectal tube +      LABS:                        11.9   14.0  )-----------( 327      ( 14 Feb 2017 07:12 )             33.8     14 Feb 2017 12:48    132    |  100    |  7      ----------------------------<  262    3.7     |  23     |  0.76     Ca    7.3        14 Feb 2017 12:48  Phos  2.2       14 Feb 2017 07:12  Mg     1.7       14 Feb 2017 07:12              MICROBIOLOGY:  RECENT CULTURES:  02-10 .Blood Blood-Peripheral XXXX XXXX   No growth to date.    02-09 .Blood Blood XXXX XXXX   No growth to date.    02-09 .Stool Feces Salmonella species XXXX   Few Salmonella species  (Stool culture examined for Salmonella,  Shigella, Campylobacter, Aeromonas, Plesiomonas,  Vibrio, E.coli O157 and Yersinia)    02-08 .Blood Blood XXXX XXXX   No growth at 5 days.    02-08 .Urine Catheterized XXXX XXXX   No growth    02-08 .Blood Blood-Venous Salmonella species   Growth in aerobic bottle:  Gram Negative Rods  Growth in anaerobic bottle:  Gram Negative Rods   Growth in aerobic and anaerobic bottles: Salmonella species, Group B          RADIOLOGY & ADDITIONAL STUDIES:

## 2017-02-14 NOTE — PROGRESS NOTE ADULT - SUBJECTIVE AND OBJECTIVE BOX
Patient seen in follow up for REYNALDO, electrolyte imbalance; feels well alert.    MEDICATIONS  (STANDING):  dextrose 5%. 1000milliLiter(s) IV Continuous <Continuous>  dextrose 50% Injectable 12.5Gram(s) IV Push once  dextrose 50% Injectable 25Gram(s) IV Push once  influenza   Vaccine 0.5milliLiter(s) IntraMuscular once  ALBUTerol    90 MICROgram(s) HFA Inhaler 1Puff(s) Inhalation every 4 hours  amylase/lipase/protease  (CREON  6,000 Units) 1Capsule(s) Oral three times a day with meals  cefTRIAXone   IVPB 2Gram(s) IV Intermittent every 24 hours  heparin  Injectable 5000Unit(s) SubCutaneous every 12 hours  insulin lispro (HumaLOG) corrective regimen sliding scale  SubCutaneous three times a day before meals  insulin lispro (HumaLOG) corrective regimen sliding scale  SubCutaneous at bedtime  nystatin    Suspension 340482Dsak(s) Oral every 6 hours  sodium chloride 0.9%. 1000milliLiter(s) IV Continuous <Continuous>  acyclovir   Tablet 400milliGRAM(s) Oral three times a day  pantoprazole    Tablet 40milliGRAM(s) Oral before breakfast  insulin lispro Injectable (HumaLOG) 4Unit(s) SubCutaneous before breakfast  insulin lispro Injectable (HumaLOG) 4Unit(s) SubCutaneous before lunch  insulin lispro Injectable (HumaLOG) 4Unit(s) SubCutaneous before dinner  potassium acid phosphate/sodium acid phosphate tablet (K-PHOS No. 2) 1Tablet(s) Oral four times a day with meals  insulin glargine Injectable (LANTUS) 7Unit(s) SubCutaneous <User Schedule>  docosanol 10% Cream 0.5Application(s) Topical five times a day  glatiramer Injectable 20milliGRAM(s) SubCutaneous at bedtime  polyethylene glycol/electrolyte Solution 1000milliLiter(s) Oral once  polyethylene glycol/electrolyte Solution 1000milliLiter(s) Oral once    MEDICATIONS  (PRN):  dextrose Gel 1Dose(s) Oral once PRN Blood Glucose LESS THAN 70 milliGRAM(s)/deciliter  glucagon  Injectable 1milliGRAM(s) IntraMuscular once PRN Glucose LESS THAN 70 milligrams/deciliter  ondansetron Injectable 4milliGRAM(s) IV Push every 8 hours PRN Nausea and/or Vomiting  acetaminophen   Tablet 650milliGRAM(s) Oral every 6 hours PRN For Temp greater than 38 C (100.4 F)    PHYSICAL EXAM:      T(C): 36.6, Max: 36.6 (02-13 @ 17:21)  HR: 79 (79 - 103)  BP: 131/80 (120/71 - 149/86)  RR: 18 (17 - 18)  SpO2: 99% (99% - 100%)  Wt(kg): --  Respiratory: clear anteriorly, decreased BS at bases  Cardiovascular: S1 S2  Gastrointestinal: soft NT ND +BS  Extremities:   no edema                                    11.9   14.0  )-----------( 327      ( 14 Feb 2017 07:12 )             33.8     14 Feb 2017 12:48    132    |  100    |  7      ----------------------------<  262    3.7     |  23     |  0.76     Ca    7.3        14 Feb 2017 12:48  Phos  2.2       14 Feb 2017 07:12  Mg     1.7       14 Feb 2017 07:12            Assessment and Plan:    REYNALDO, electrolyte imbalance;  For colonscopy; can d/c IVF once po adequate.

## 2017-02-14 NOTE — PROGRESS NOTE ADULT - PROBLEM SELECTOR PLAN 1
sec to salmonella bacteremia sec to gastroenteritis   Blood culture, stool culture and urine culture positive  Cont Rocephin

## 2017-02-14 NOTE — PROVIDER CONTACT NOTE (CRITICAL VALUE NOTIFICATION) - TEST AND RESULT REPORTED:
sodium 119
K 2.7
blood culture prelim gram negative rods in aerobic bottle
potassium 2.6
Few Salmonella Species
Pottassium 2.8 and Glucose 453

## 2017-02-14 NOTE — PROGRESS NOTE ADULT - SUBJECTIVE AND OBJECTIVE BOX
Patient is a 61y old  Female who presents with a chief complaint of Nausea , Vomiting, pain abdomen. (04 Feb 2017 13:13)      INTERVAL HPI/OVERNIGHT EVENTS:  pt continues with diarrhea  no other complaints    MEDICATIONS  (STANDING):  dextrose 5%. 1000milliLiter(s) IV Continuous <Continuous>  dextrose 50% Injectable 12.5Gram(s) IV Push once  dextrose 50% Injectable 25Gram(s) IV Push once  influenza   Vaccine 0.5milliLiter(s) IntraMuscular once  ALBUTerol    90 MICROgram(s) HFA Inhaler 1Puff(s) Inhalation every 4 hours  amylase/lipase/protease  (CREON  6,000 Units) 1Capsule(s) Oral three times a day with meals  cefTRIAXone   IVPB 2Gram(s) IV Intermittent every 24 hours  heparin  Injectable 5000Unit(s) SubCutaneous every 12 hours  insulin lispro (HumaLOG) corrective regimen sliding scale  SubCutaneous three times a day before meals  insulin lispro (HumaLOG) corrective regimen sliding scale  SubCutaneous at bedtime  nystatin    Suspension [id number](s) Oral every 6 hours  sodium chloride 0.9%. 1000milliLiter(s) IV Continuous <Continuous>  acyclovir   Tablet 400milliGRAM(s) Oral three times a day  pantoprazole    Tablet 40milliGRAM(s) Oral before breakfast  insulin lispro Injectable (HumaLOG) 4Unit(s) SubCutaneous before breakfast  insulin lispro Injectable (HumaLOG) 4Unit(s) SubCutaneous before lunch  insulin lispro Injectable (HumaLOG) 4Unit(s) SubCutaneous before dinner  potassium acid phosphate/sodium acid phosphate tablet (K-PHOS No. 2) 1Tablet(s) Oral four times a day with meals  insulin glargine Injectable (LANTUS) 7Unit(s) SubCutaneous <User Schedule>  docosanol 10% Cream 0.5Application(s) Topical five times a day  glatiramer Injectable 20milliGRAM(s) SubCutaneous at bedtime  polyethylene glycol/electrolyte Solution 1000milliLiter(s) Oral once  polyethylene glycol/electrolyte Solution 1000milliLiter(s) Oral once    MEDICATIONS  (PRN):  dextrose Gel 1Dose(s) Oral once PRN Blood Glucose LESS THAN 70 milliGRAM(s)/deciliter  glucagon  Injectable 1milliGRAM(s) IntraMuscular once PRN Glucose LESS THAN 70 milligrams/deciliter  ondansetron Injectable 4milliGRAM(s) IV Push every 8 hours PRN Nausea and/or Vomiting  acetaminophen   Tablet 650milliGRAM(s) Oral every 6 hours PRN For Temp greater than 38 C (100.4 F)      REVIEW OF SYSTEMS:  CONSTITUTIONAL: No fever, weight loss, or fatigue  RESPIRATORY: No cough, wheezing, chills or hemoptysis; No shortness of breath  CARDIOVASCULAR: No chest pain, palpitations, dizziness, or leg swelling  GASTROINTESTINAL: No abdominal or epigastric pain. No nausea, vomiting, or hematemesis; + diarrhea . No melena or hematochezia.  ENDOCRINE: No heat or cold intolerance; No hair loss      Vital Signs Last 24 Hrs  T(C): 36.6, Max: 36.6 (02-13 @ 17:21)  T(F): 97.8, Max: 97.9 (02-13 @ 23:35)  HR: 79 (79 - 103)  BP: 131/80 (120/71 - 149/86)  BP(mean): --  RR: 18 (17 - 18)  SpO2: 99% (99% - 100%)    PHYSICAL EXAM:  GENERAL: NAD, well-groomed, well-developed  CHEST/LUNG: Clear to percussion bilaterally; No rales, rhonchi, wheezing, or rubs  HEART: Regular rate and rhythm; No murmurs, rubs, or gallops        LABS:                        11.9   14.0  )-----------( 327      ( 14 Feb 2017 07:12 )             33.8     14 Feb 2017 12:48    132    |  100    |  7      ----------------------------<  262    3.7     |  23     |  0.76     Ca    7.3        14 Feb 2017 12:48  Phos  2.2       14 Feb 2017 07:12  Mg     1.7       14 Feb 2017 07:12          CAPILLARY BLOOD GLUCOSE  181 (14 Feb 2017 11:37)  119 (14 Feb 2017 07:43)  144 (13 Feb 2017 21:41)

## 2017-02-14 NOTE — PROGRESS NOTE ADULT - PROBLEM SELECTOR PLAN 2
Patient on Ceftriaxone l. Clear liquid diet. Stool count. Will arrange colonoscopy Patient on Ceftriaxone l. Clear liquid diet. Stool count. Will arrange colonoscopy. Will empirically start patient on Flagyl.

## 2017-02-14 NOTE — PROGRESS NOTE ADULT - PROBLEM SELECTOR PLAN 1
controlled continue lantus to 7units daily (do not hold, pt is type 1 diabetic)  contnue lispro with meals and MARISSA  will follow

## 2017-02-15 LAB
-  CIPROFLOXACIN: SIGNIFICANT CHANGE UP
CULTURE RESULTS: SIGNIFICANT CHANGE UP
CULTURE RESULTS: SIGNIFICANT CHANGE UP
HCT VFR BLD CALC: 41.6 % — SIGNIFICANT CHANGE UP (ref 34.5–45)
HGB BLD-MCNC: 14.1 G/DL — SIGNIFICANT CHANGE UP (ref 11.5–15.5)
MCHC RBC-ENTMCNC: 30.9 PG — SIGNIFICANT CHANGE UP (ref 27–34)
MCHC RBC-ENTMCNC: 33.9 GM/DL — SIGNIFICANT CHANGE UP (ref 32–36)
MCV RBC AUTO: 91.4 FL — SIGNIFICANT CHANGE UP (ref 80–100)
ORGANISM # SPEC MICROSCOPIC CNT: SIGNIFICANT CHANGE UP
PLATELET # BLD AUTO: 424 K/UL — HIGH (ref 150–400)
RBC # BLD: 4.55 M/UL — SIGNIFICANT CHANGE UP (ref 3.8–5.2)
RBC # FLD: 12.8 % — SIGNIFICANT CHANGE UP (ref 11–15)
SPECIMEN SOURCE: SIGNIFICANT CHANGE UP
WBC # BLD: 10 K/UL — SIGNIFICANT CHANGE UP (ref 3.8–10.5)
WBC # FLD AUTO: 10 K/UL — SIGNIFICANT CHANGE UP (ref 3.8–10.5)

## 2017-02-15 PROCEDURE — 88305 TISSUE EXAM BY PATHOLOGIST: CPT | Mod: 26

## 2017-02-15 PROCEDURE — 88304 TISSUE EXAM BY PATHOLOGIST: CPT | Mod: 26

## 2017-02-15 PROCEDURE — 99233 SBSQ HOSP IP/OBS HIGH 50: CPT

## 2017-02-15 RX ORDER — SODIUM CHLORIDE 9 MG/ML
1000 INJECTION, SOLUTION INTRAVENOUS
Qty: 0 | Refills: 0 | Status: DISCONTINUED | OUTPATIENT
Start: 2017-02-15 | End: 2017-02-15

## 2017-02-15 RX ORDER — LOPERAMIDE HCL 2 MG
2 TABLET ORAL ONCE
Qty: 0 | Refills: 0 | Status: COMPLETED | OUTPATIENT
Start: 2017-02-15 | End: 2017-02-15

## 2017-02-15 RX ORDER — LACTOBACILLUS ACIDOPHILUS 100MM CELL
1 CAPSULE ORAL
Qty: 0 | Refills: 0 | Status: DISCONTINUED | OUTPATIENT
Start: 2017-02-15 | End: 2017-02-17

## 2017-02-15 RX ADMIN — Medication 500000 UNIT(S): at 17:56

## 2017-02-15 RX ADMIN — Medication 2 MILLIGRAM(S): at 15:43

## 2017-02-15 RX ADMIN — DOCOSANOL 0.5 APPLICATION(S): 100 CREAM TOPICAL at 21:45

## 2017-02-15 RX ADMIN — CEFTRIAXONE 100 GRAM(S): 500 INJECTION, POWDER, FOR SOLUTION INTRAMUSCULAR; INTRAVENOUS at 10:31

## 2017-02-15 RX ADMIN — Medication 4 UNIT(S): at 17:57

## 2017-02-15 RX ADMIN — DOCOSANOL 0.5 APPLICATION(S): 100 CREAM TOPICAL at 08:34

## 2017-02-15 RX ADMIN — Medication 1 TABLET(S): at 17:56

## 2017-02-15 RX ADMIN — DOCOSANOL 0.5 APPLICATION(S): 100 CREAM TOPICAL at 13:30

## 2017-02-15 RX ADMIN — Medication 1 TABLET(S): at 21:41

## 2017-02-15 RX ADMIN — Medication 500000 UNIT(S): at 13:30

## 2017-02-15 RX ADMIN — Medication 1 TABLET(S): at 17:57

## 2017-02-15 RX ADMIN — Medication 400 MILLIGRAM(S): at 06:06

## 2017-02-15 RX ADMIN — GLATIRAMER ACETATE 20 MILLIGRAM(S): 20 INJECTION, SOLUTION SUBCUTANEOUS at 22:31

## 2017-02-15 RX ADMIN — Medication 200 MILLIGRAM(S): at 21:41

## 2017-02-15 RX ADMIN — SODIUM CHLORIDE 75 MILLILITER(S): 9 INJECTION INTRAMUSCULAR; INTRAVENOUS; SUBCUTANEOUS at 17:55

## 2017-02-15 RX ADMIN — Medication 1 TABLET(S): at 13:30

## 2017-02-15 RX ADMIN — SODIUM CHLORIDE 75 MILLILITER(S): 9 INJECTION INTRAMUSCULAR; INTRAVENOUS; SUBCUTANEOUS at 10:30

## 2017-02-15 RX ADMIN — Medication 1 CAPSULE(S): at 17:56

## 2017-02-15 RX ADMIN — Medication 1 CAPSULE(S): at 13:30

## 2017-02-15 RX ADMIN — DOCOSANOL 0.5 APPLICATION(S): 100 CREAM TOPICAL at 15:43

## 2017-02-15 RX ADMIN — Medication 400 MILLIGRAM(S): at 13:30

## 2017-02-15 RX ADMIN — Medication 400 MILLIGRAM(S): at 21:41

## 2017-02-15 RX ADMIN — SODIUM CHLORIDE 50 MILLILITER(S): 9 INJECTION, SOLUTION INTRAVENOUS at 13:36

## 2017-02-15 RX ADMIN — INSULIN GLARGINE 7 UNIT(S): 100 INJECTION, SOLUTION SUBCUTANEOUS at 13:37

## 2017-02-15 RX ADMIN — HEPARIN SODIUM 5000 UNIT(S): 5000 INJECTION INTRAVENOUS; SUBCUTANEOUS at 17:56

## 2017-02-15 RX ADMIN — Medication 500000 UNIT(S): at 06:33

## 2017-02-15 NOTE — PROGRESS NOTE ADULT - SUBJECTIVE AND OBJECTIVE BOX
Patient is a 61y old  Female who presents with a chief complaint of Nausea , Vomiting, pain abdomen. (04 Feb 2017 13:13)      Interval History: had colonoscopy today and was within normal limits. status post ''Salmonella'' infection   on clear lquids and low dose Lantus and Lispro sliding scale coverage with meals and finger sticks are in low 100's     MEDICATIONS  (STANDING):  dextrose 5%. 1000milliLiter(s) IV Continuous <Continuous>  dextrose 50% Injectable 12.5Gram(s) IV Push once  dextrose 50% Injectable 25Gram(s) IV Push once  influenza   Vaccine 0.5milliLiter(s) IntraMuscular once  ALBUTerol    90 MICROgram(s) HFA Inhaler 1Puff(s) Inhalation every 4 hours  amylase/lipase/protease  (CREON  6,000 Units) 1Capsule(s) Oral three times a day with meals  cefTRIAXone   IVPB 2Gram(s) IV Intermittent every 24 hours  heparin  Injectable 5000Unit(s) SubCutaneous every 12 hours  insulin lispro (HumaLOG) corrective regimen sliding scale  SubCutaneous three times a day before meals  insulin lispro (HumaLOG) corrective regimen sliding scale  SubCutaneous at bedtime  nystatin    Suspension [id number](s) Oral every 6 hours  sodium chloride 0.9%. 1000milliLiter(s) IV Continuous <Continuous>  acyclovir   Tablet 400milliGRAM(s) Oral three times a day  pantoprazole    Tablet 40milliGRAM(s) Oral before breakfast  insulin lispro Injectable (HumaLOG) 4Unit(s) SubCutaneous before breakfast  insulin lispro Injectable (HumaLOG) 4Unit(s) SubCutaneous before lunch  insulin lispro Injectable (HumaLOG) 4Unit(s) SubCutaneous before dinner  potassium acid phosphate/sodium acid phosphate tablet (K-PHOS No. 2) 1Tablet(s) Oral four times a day with meals  insulin glargine Injectable (LANTUS) 7Unit(s) SubCutaneous <User Schedule>  docosanol 10% Cream 0.5Application(s) Topical five times a day  glatiramer Injectable 20milliGRAM(s) SubCutaneous at bedtime  lactobacillus acidophilus 1Tablet(s) Oral two times a day with meals    MEDICATIONS  (PRN):  dextrose Gel 1Dose(s) Oral once PRN Blood Glucose LESS THAN 70 milliGRAM(s)/deciliter  glucagon  Injectable 1milliGRAM(s) IntraMuscular once PRN Glucose LESS THAN 70 milligrams/deciliter  ondansetron Injectable 4milliGRAM(s) IV Push every 8 hours PRN Nausea and/or Vomiting  acetaminophen   Tablet 650milliGRAM(s) Oral every 6 hours PRN For Temp greater than 38 C (100.4 F)  guaiFENesin    Syrup 200milliGRAM(s) Oral every 6 hours PRN Cough      Allergies    No Known Allergies    Intolerances        REVIEW OF SYSTEMS:  CONSTITUTIONAL: weak but stable   EYES: No eye pain, visual disturbances, or discharge  ENMT:  No difficulty hearing, tinnitus, vertigo; No sinus or throat pain  NECK: No pain or stiffness  RESPIRATORY: No cough, wheezing, chills or hemoptysis; No shortness of breath  CARDIOVASCULAR: No chest pain, palpitations, dizziness, or leg swelling  GASTROINTESTINAL: No abdominal or epigastric pain. No nausea, vomiting, or hematemesis; No diarrhea or constipation. No melena or hematochezia.  GENITOURINARY: No dysuria, frequency, hematuria, or incontinence  NEUROLOGICAL: No headaches, memory loss, loss of strength, numbness, or tremors  SKIN: No itching, burning, rashes, or lesions   LYMPH NODES: No enlarged glands  ENDOCRINE: No heat or cold intolerance; No hair loss  MUSCULOSKELETAL: No joint pain or swelling; No muscle, back, or extremity pain  PSYCHIATRIC: No depression, anxiety, mood swings, or difficulty sleeping  HEME/LYMPH: No easy bruising, or bleeding gums  ALLERY AND IMMUNOLOGIC: No hives or eczema    Vital Signs Last 24 Hrs  T(C): 36.8, Max: 37.1 (02-14 @ 23:58)  T(F): 98.2, Max: 98.8 (02-14 @ 23:58)  HR: 102 (17 - 102)  BP: 137/91 (107/65 - 148/82)  BP(mean): --  RR: 17 (16 - 20)  SpO2: 98% (97% - 100%)    PHYSICAL EXAM:  GENERAL:   HEAD:  Atraumatic, Normocephalic  EYES: EOMI, PERRLA, conjunctiva and sclera clear  ENMT: No tonsillar erythema, exudates, or enlargement; Moist mucous membranes, Good dentition, No lesions  NECK: Supple, No JVD, Normal thyroid  NERVOUS SYSTEM:  Alert & Oriented X3, Good concentration; Motor Strength 5/5 B/L upper and lower extremities; DTRs 2+ intact and symmetric  CHEST/LUNG: Clear to percussion bilaterally; No rales, rhonchi, wheezing, or rubs  HEART: Regular rate and rhythm; No murmurs, rubs, or gallops  ABDOMEN: Soft, Nontender, Nondistended; Bowel sounds present  EXTREMITIES:  2+ Peripheral Pulses, No clubbing, cyanosis, or edema  SKIN: No rashes or lesions    LABS:        CAPILLARY BLOOD GLUCOSE  127 (15 Feb 2017 15:50)  107 (15 Feb 2017 10:41)  107 (15 Feb 2017 10:37)  82 (15 Feb 2017 08:03)  102 (14 Feb 2017 22:04)    Lipid panel:           Thyroid:  Diabetes Tests:  Parathyroid Panel:  Adrenals:  RADIOLOGY & ADDITIONAL TESTS:    Imaging Personally Reviewed:  [ ] YES  [ ] NO    Consultant(s) Notes Reviewed:  [ ] YES  [ ] NO    Care Discussed with Consultants/Other Providers [ ] YES  [ ] NO

## 2017-02-15 NOTE — PHARMACY COMMUNICATION NOTE - COMMENTS
Informed RN pto med. Glatopa 20 mg has only a 2 day supply left after todays dose 2/15/17 Informed RN pt own med. Glatopa 20 mg has only a 2 day supply left after todays dose 2/15/17

## 2017-02-15 NOTE — PROGRESS NOTE ADULT - SUBJECTIVE AND OBJECTIVE BOX
Patient is a 61y old  Female who presents with a chief complaint of Nausea , Vomiting, pain abdomen. (04 Feb 2017 13:13)      INTERVAL HPI / OVERNIGHT EVENTS:s/p endoscopy,feels better,no abdo pain ,rectal tube removed    MEDICATIONS  (STANDING):  dextrose 5%. 1000milliLiter(s) IV Continuous <Continuous>  dextrose 50% Injectable 12.5Gram(s) IV Push once  dextrose 50% Injectable 25Gram(s) IV Push once  influenza   Vaccine 0.5milliLiter(s) IntraMuscular once  ALBUTerol    90 MICROgram(s) HFA Inhaler 1Puff(s) Inhalation every 4 hours  amylase/lipase/protease  (CREON  6,000 Units) 1Capsule(s) Oral three times a day with meals  cefTRIAXone   IVPB 2Gram(s) IV Intermittent every 24 hours  heparin  Injectable 5000Unit(s) SubCutaneous every 12 hours  insulin lispro (HumaLOG) corrective regimen sliding scale  SubCutaneous three times a day before meals  insulin lispro (HumaLOG) corrective regimen sliding scale  SubCutaneous at bedtime  nystatin    Suspension 520846Cbed(s) Oral every 6 hours  sodium chloride 0.9%. 1000milliLiter(s) IV Continuous <Continuous>  acyclovir   Tablet 400milliGRAM(s) Oral three times a day  pantoprazole    Tablet 40milliGRAM(s) Oral before breakfast  docosanol 10% Cream 0.5Application(s) Topical five times a day  glatiramer Injectable 20milliGRAM(s) SubCutaneous at bedtime  lactobacillus acidophilus 1Tablet(s) Oral two times a day with meals  insulin glargine Injectable (LANTUS) 5Unit(s) SubCutaneous <User Schedule>    MEDICATIONS  (PRN):  dextrose Gel 1Dose(s) Oral once PRN Blood Glucose LESS THAN 70 milliGRAM(s)/deciliter  glucagon  Injectable 1milliGRAM(s) IntraMuscular once PRN Glucose LESS THAN 70 milligrams/deciliter  ondansetron Injectable 4milliGRAM(s) IV Push every 8 hours PRN Nausea and/or Vomiting  acetaminophen   Tablet 650milliGRAM(s) Oral every 6 hours PRN For Temp greater than 38 C (100.4 F)  guaiFENesin    Syrup 200milliGRAM(s) Oral every 6 hours PRN Cough      Vital Signs Last 24 Hrs  Tmax-afebrile    PHYSICAL EXAM:  HEECT: nasolabial fold scab-improving  Constitutional: NAD, well-groomed, well-developed  Respiratory: CTAB/L  Cardiovascular: S1 and S2, RRR, no M/G/R  Gastrointestinal: BS+, soft, NT/ND  Extremities: No peripheral edema  Vascular: 2+ peripheral pulses  Skin: No rashes      LABS:                        14.1   10.0  )-----------( 424      ( 15 Feb 2017 07:16 )             41.6     14 Feb 2017 12:48    132    |  100    |  7      ----------------------------<  262    3.7     |  23     |  0.76     Ca    7.3        14 Feb 2017 12:48              MICROBIOLOGY:  RECENT CULTURES:  02-10 .Blood Blood-Peripheral XXXX XXXX   No growth at 5 days.    02-09 .Blood Blood XXXX XXXX   No growth at 5 days.          RADIOLOGY & ADDITIONAL STUDIES:

## 2017-02-15 NOTE — BRIEF OPERATIVE NOTE - POST-OP DX
Diverticulum, colon, congenital  02/15/2017    Active  Raghu Marquez  Polyp  02/15/2017    Active  Raghu Marquez

## 2017-02-15 NOTE — PROGRESS NOTE ADULT - PROBLEM SELECTOR PLAN 1
sec to salmonella bacteremia sec to gastroenteritis   Blood culture, stool culture and urine culture positive  Cont Rocephin(day 8/14)  Switch to oral -ampicillin when planned for d/c to finish the course of antibiotics

## 2017-02-15 NOTE — CHART NOTE - NSCHARTNOTEFT_GEN_A_CORE
The patient denies melena, hematochezia, hematemesis, nausea, vomiting, abdominal pain, constipation,  or change in bowel movements   NO C/P, N/V  VSS, afebrile  Lungs - clear  Heart - S1S2 WNL  Abd - BS(+), soft , nontender    For colonoscopy today  @ 10AM today

## 2017-02-15 NOTE — PROGRESS NOTE ADULT - SUBJECTIVE AND OBJECTIVE BOX
INTERVAL HPI/OVERNIGHT EVENTS:  Pt is a 62 yo lady with a pmhx of DM1 w prior insulin pump since removed, HTN, HL, MS who presents to the ED with N/v/d for 2 days. She ate tacos on Wednesday, and then started having n/v/d that night until now, unable to keep anything down. No fevers, no focal abdominal pain, no dysuria, no chest pain, no short of breath.   Hospital course complicated with Salmonella sepsis(urine, stool and blood), pneumonia with suspected lung abscess and hypoglycemia episode. Was in ICU, now on regular floor. S/P colonoscopy with Polypectomy on 02/15/17.  Awake and comfortable without distress.     Vital Signs Last 24 Hrs  T(C): 36.7, Max: 37.2 (02-14 @ 17:22)  T(F): 98, Max: 99 (02-14 @ 17:22)  HR: 22 (17 - 102)  BP: 143/78 (107/65 - 153/84)  BP(mean): --  RR: 20 (16 - 20)  SpO2: 100% (97% - 100%)    PHYSICAL EXAM:  GEN:         Awake, responsive and comfortable.  HEENT:    Normal.    RESP:     no wheezing.  CVS:         Regular rate and rhythm.   ABD:         Soft, non-tender, non-distended;   :             No costovertebral angle tenderness  EXTR:            No clubbing, cyanosis or edema  CNS:              Intact sensory and motor function.    MEDICATIONS  (STANDING):  dextrose 5%. 1000milliLiter(s) IV Continuous <Continuous>  dextrose 50% Injectable 12.5Gram(s) IV Push once  dextrose 50% Injectable 25Gram(s) IV Push once  influenza   Vaccine 0.5milliLiter(s) IntraMuscular once  ALBUTerol    90 MICROgram(s) HFA Inhaler 1Puff(s) Inhalation every 4 hours  amylase/lipase/protease  (CREON  6,000 Units) 1Capsule(s) Oral three times a day with meals  cefTRIAXone   IVPB 2Gram(s) IV Intermittent every 24 hours  heparin  Injectable 5000Unit(s) SubCutaneous every 12 hours  insulin lispro (HumaLOG) corrective regimen sliding scale  SubCutaneous three times a day before meals  insulin lispro (HumaLOG) corrective regimen sliding scale  SubCutaneous at bedtime  nystatin    Suspension [id number](s) Oral every 6 hours  sodium chloride 0.9%. 1000milliLiter(s) IV Continuous <Continuous>  acyclovir   Tablet 400milliGRAM(s) Oral three times a day  pantoprazole    Tablet 40milliGRAM(s) Oral before breakfast  insulin lispro Injectable (HumaLOG) 4Unit(s) SubCutaneous before breakfast  insulin lispro Injectable (HumaLOG) 4Unit(s) SubCutaneous before lunch  insulin lispro Injectable (HumaLOG) 4Unit(s) SubCutaneous before dinner  potassium acid phosphate/sodium acid phosphate tablet (K-PHOS No. 2) 1Tablet(s) Oral four times a day with meals  insulin glargine Injectable (LANTUS) 7Unit(s) SubCutaneous <User Schedule>  docosanol 10% Cream 0.5Application(s) Topical five times a day  glatiramer Injectable 20milliGRAM(s) SubCutaneous at bedtime  lactated ringers. 1000milliLiter(s) IV Continuous <Continuous>  loperamide 2milliGRAM(s) Oral once  lactobacillus acidophilus 1Tablet(s) Oral two times a day with meals    MEDICATIONS  (PRN):  dextrose Gel 1Dose(s) Oral once PRN Blood Glucose LESS THAN 70 milliGRAM(s)/deciliter  glucagon  Injectable 1milliGRAM(s) IntraMuscular once PRN Glucose LESS THAN 70 milligrams/deciliter  ondansetron Injectable 4milliGRAM(s) IV Push every 8 hours PRN Nausea and/or Vomiting  acetaminophen   Tablet 650milliGRAM(s) Oral every 6 hours PRN For Temp greater than 38 C (100.4 F)  guaiFENesin    Syrup 200milliGRAM(s) Oral every 6 hours PRN Cough    LABS:                        14.1   10.0  )-----------( 424      ( 15 Feb 2017 07:16 )             41.6     14 Feb 2017 12:48    132    |  100    |  7      ----------------------------<  262    3.7     |  23     |  0.76     Ca    7.3        14 Feb 2017 12:48  Phos  2.2       14 Feb 2017 07:12  Mg     1.7       14 Feb 2017 07:12    ASSESSMENT AND PLAN:  ·	Multilobar pneumonia.  ·	Salmonella sepsis.  ·	Leukocytosis.  ·	Hypokalemia.  ·	DM with hypoglycemia.  ·	Multiple sclerosis.  ·	HTN.  ·	Herpes's Zoster.    Continue antibiotics and nebulizer.

## 2017-02-15 NOTE — PROGRESS NOTE ADULT - PROBLEM SELECTOR PLAN 1
Continue with the same regimen while inpatient   as Nutrition increases, may need more insulin added   as out-patient consider insulin pump

## 2017-02-15 NOTE — PROGRESS NOTE ADULT - SUBJECTIVE AND OBJECTIVE BOX
Colonoscopy    -Informed consent obtained from patient prior to exam.     Indication: Diarrhea    Anesthesia: As per anesthesiologist  provided by:    Rectum:  WNL    Sigmoid:  Occasional diverticulum    Descending colon:  5 mm polyp s/p polypectomy    Transverse Colon:  WNL    Ascending Colon:  5 mm polyp s/p polypectomy, random biopsy of right colon    Cecum:  WNL    Terminal Ileum  WNL, s/p biopsy    The patient tolerated the procedure well.    Findings:  As above    Plan:  Clear liquids, stool count, imodium x 1, d/c rectal tube. Will follow.

## 2017-02-15 NOTE — PROGRESS NOTE ADULT - SUBJECTIVE AND OBJECTIVE BOX
Patient is a 61y old  Female who presents with a chief complaint of Nausea , Vomiting, pain abdomen. (04 Feb 2017 13:13)      INTERVAL HPI/OVERNIGHT EVENTS:    MEDICATIONS  (STANDING):  dextrose 5%. 1000milliLiter(s) IV Continuous <Continuous>  dextrose 50% Injectable 12.5Gram(s) IV Push once  dextrose 50% Injectable 25Gram(s) IV Push once  influenza   Vaccine 0.5milliLiter(s) IntraMuscular once  ALBUTerol    90 MICROgram(s) HFA Inhaler 1Puff(s) Inhalation every 4 hours  amylase/lipase/protease  (CREON  6,000 Units) 1Capsule(s) Oral three times a day with meals  cefTRIAXone   IVPB 2Gram(s) IV Intermittent every 24 hours  heparin  Injectable 5000Unit(s) SubCutaneous every 12 hours  insulin lispro (HumaLOG) corrective regimen sliding scale  SubCutaneous three times a day before meals  insulin lispro (HumaLOG) corrective regimen sliding scale  SubCutaneous at bedtime  nystatin    Suspension 243528Mqoz(s) Oral every 6 hours  sodium chloride 0.9%. 1000milliLiter(s) IV Continuous <Continuous>  acyclovir   Tablet 400milliGRAM(s) Oral three times a day  pantoprazole    Tablet 40milliGRAM(s) Oral before breakfast  insulin lispro Injectable (HumaLOG) 4Unit(s) SubCutaneous before breakfast  insulin lispro Injectable (HumaLOG) 4Unit(s) SubCutaneous before lunch  insulin lispro Injectable (HumaLOG) 4Unit(s) SubCutaneous before dinner  potassium acid phosphate/sodium acid phosphate tablet (K-PHOS No. 2) 1Tablet(s) Oral four times a day with meals  insulin glargine Injectable (LANTUS) 7Unit(s) SubCutaneous <User Schedule>  docosanol 10% Cream 0.5Application(s) Topical five times a day  glatiramer Injectable 20milliGRAM(s) SubCutaneous at bedtime    MEDICATIONS  (PRN):  dextrose Gel 1Dose(s) Oral once PRN Blood Glucose LESS THAN 70 milliGRAM(s)/deciliter  glucagon  Injectable 1milliGRAM(s) IntraMuscular once PRN Glucose LESS THAN 70 milligrams/deciliter  ondansetron Injectable 4milliGRAM(s) IV Push every 8 hours PRN Nausea and/or Vomiting  acetaminophen   Tablet 650milliGRAM(s) Oral every 6 hours PRN For Temp greater than 38 C (100.4 F)  guaiFENesin    Syrup 200milliGRAM(s) Oral every 6 hours PRN Cough      Allergies    No Known Allergies    Intolerances        REVIEW OF SYSTEMS:  CONSTITUTIONAL: No fever, weight loss, or fatigue  EYES: No eye pain, visual disturbances, or discharge  ENMT:  No difficulty hearing, tinnitus, vertigo; No sinus or throat pain  NECK: No pain or stiffness  BREASTS: No pain, masses, or nipple discharge  RESPIRATORY: No cough, wheezing, chills or hemoptysis; No shortness of breath  CARDIOVASCULAR: No chest pain, palpitations, dizziness, or leg swelling  GASTROINTESTINAL: No abdominal or epigastric pain. No nausea, vomiting, or hematemesis ;Diarrhea 2/2 GI prep.  GENITOURINARY: No dysuria, frequency, hematuria, or incontinence  NEUROLOGICAL: No headaches, memory loss, loss of strength, numbness, or tremors  SKIN: No itching, burning, rashes, or lesions   LYMPH NODES: No enlarged glands  ENDOCRINE: No heat or cold intolerance; No hair loss  MUSCULOSKELETAL: No joint pain or swelling; No muscle, back, or extremity pain  PSYCHIATRIC: No depression, anxiety, mood swings, or difficulty sleeping  HEME/LYMPH: No easy bruising, or bleeding gums  ALLERGY AND IMMUNOLOGIC: No hives or eczema    Vital Signs Last 24 Hrs  T(C): 36.7, Max: 37.2 (02-14 @ 17:22)  T(F): 98, Max: 99 (02-14 @ 17:22)  HR: 97 (78 - 99)  BP: 126/82 (118/67 - 153/84)  BP(mean): --  RR: 18 (17 - 18)  SpO2: 99% (97% - 100%)    PHYSICAL EXAM:  GENERAL: NAD, well-groomed, well-developed  HEAD:  Atraumatic, Normocephalic  EYES: EOMI, PERRLA, conjunctiva and sclera clear  ENMT:  Moist mucous membranes, Good dentition, No lesions  NECK: Supple, No JVD, Normal thyroid  NERVOUS SYSTEM:  Alert & Oriented X3, Good concentration; Motor Strength 5/5 B/L upper and lower extremities; DTRs 2+ intact and symmetric  CHEST/LUNG: Clear to percussion bilaterally; No rales, rhonchi, wheezing, or rubs  HEART: Regular rate and rhythm; No murmurs, rubs, or gallops  ABDOMEN: Soft, Nontender, Nondistended; Bowel sounds present  EXTREMITIES:  2+ Peripheral Pulses, No clubbing, cyanosis, or edema  LYMPH: No lymphadenopathy noted  SKIN: No rashes or lesions    LABS:                        14.1   10.0  )-----------( 424      ( 15 Feb 2017 07:16 )             41.6     14 Feb 2017 12:48    132    |  100    |  7      ----------------------------<  262    3.7     |  23     |  0.76     Ca    7.3        14 Feb 2017 12:48  Phos  2.2       14 Feb 2017 07:12  Mg     1.7       14 Feb 2017 07:12          CAPILLARY BLOOD GLUCOSE  82 (15 Feb 2017 08:03)  102 (14 Feb 2017 22:04)  209 (14 Feb 2017 16:09)  181 (14 Feb 2017 11:37)      RADIOLOGY & ADDITIONAL TESTS:      EXAM:  CT CHEST                            PROCEDURE DATE:  02/10/2017        INTERPRETATION:  CLINICAL HISTORY:  Sepsis, pneumonia, evaluate for lung   parenchymal abscess    Multiple axial images of the chest were obtained from the lung apices   through upper abdomen without the administration of intravascular   contrast. Reformatted coronal and sagittal images are submitted.    COMPARISON: None    FINDINGS:    Subcarinal and right lower paratracheal lymph nodes measure up to 11 mm.   Left lower paratracheal lymph nodes measure up to 10 mm. No left hilar   lymphadenopathy is demonstrated. The right hilar region is obscured due   to airspace consolidation within the medial aspect of the right lower   lobe lung parenchyma. There is no evidence for axillary lymphadenopathy.   The heart size appears within normal limits. No pericardial effusion is   identified. Thoracic aortic caliber demonstrates unremarkable caliber.    The central bronchial anatomy appears patent.    Scattered air containing spaces are identified both within the central   and peripheral aspects of the lung parenchyma. There is opacity   identified within the medial as well as posterior-medial aspects of the   right lower lobe lung parenchyma likely related to regions of right lower   lobe consolidation. A small right pleural effusion is noted. Opacity   within the posterior right lower lobe lung parenchyma is likely related   to compressive atelectasis. Airspace consolidation is identified within   the left upper lobe with scattered infiltrates noted within both the left   upper lobe and left lower lobe. No lung parenchymal abscess formation is   identified. There is no evidence for left pleural effusion. There is no   evidence for pneumothorax. No mediastinal shift is noted.    The stomach is not distended precluding evaluation for gastric wall   thickening. A 1.5 cm left hepatic cyst is noted. The right adrenal gland   appears unremarkable. Nodular prominence of the left adrenal gland is   demonstrated. There is extensive calcification noted within the pancreas   body and tail regions, likely related to chronic calcific pancreatitis.   Degenerative changes of the thoracic and lumbar spine noted.    IMPRESSION:  Airspace consolidation left upper lobe as well as right   lower lobe, with additional scattered infiltrates noted within the left   upper lobe and left lower lobe. Small right pleural effusion. Mediastinal   lymphadenopathy.      MALIKA HEREDIA   This document has been electronically signed. Feb 10 2017  6:13PM    Imaging Personally Reviewed:  [x ] YES  [ ] NO    Consultant(s) Notes Reviewed:  [x ] YES  [ ] NO    Care Discussed with Consultants/Other Providers [ ] YES  [ ] NO    PROBLEMS:  ACUTE KIDNEY INJURY  NAUSEA AND VOMITTING  REYNALDO (acute kidney injury)  Hypokalemia due to loss of potassium  Herpes labialis without complication  Leukocytosis, unspecified type  Salmonella pneumonia  Salmonella gastroenteritis  Salmonella bacteremia  Herpes zoster without complication  Pancreatitis, necrotizing  Acute metabolic encephalopathy  Lung infection  Bacteremia due to Gram-negative bacteria  Type 1 diabetes mellitus with hypoglycemic coma  Sepsis  Sepsis, due to unspecified organism  Diarrhea, unspecified type  Diabetes mellitus of other type with hyperosmolarity, with long-term current use of insulin  Multiple sclerosis  REYNALDO (acute kidney injury)  Chronic pancreatitis, unspecified pancreatitis type  Gastroenteritis  Uncontrolled type 1 diabetes mellitus with hyperglycemia  Acute gastritis without hemorrhage, unspecified gastritis type      Care discussed with family,         [  ]   yes  [  ]  No    imp:    stable[ ]    unstable[  ]     improving [ x  ]       unchanged  [  ]                Plans:  Continue present plans  [ x ]               New consult [  ]   specialty  .......               order rochelle[  ]    test name.                  Discharge Planning  [  ]

## 2017-02-16 DIAGNOSIS — K63.5 POLYP OF COLON: ICD-10-CM

## 2017-02-16 LAB — SURGICAL PATHOLOGY FINAL REPORT - CH: SIGNIFICANT CHANGE UP

## 2017-02-16 PROCEDURE — 99233 SBSQ HOSP IP/OBS HIGH 50: CPT

## 2017-02-16 RX ORDER — DEXTROSE 50 % IN WATER 50 %
25 SYRINGE (ML) INTRAVENOUS ONCE
Qty: 0 | Refills: 0 | Status: COMPLETED | OUTPATIENT
Start: 2017-02-16 | End: 2017-02-16

## 2017-02-16 RX ORDER — LOPERAMIDE HCL 2 MG
2 TABLET ORAL ONCE
Qty: 0 | Refills: 0 | Status: COMPLETED | OUTPATIENT
Start: 2017-02-16 | End: 2017-02-16

## 2017-02-16 RX ORDER — INSULIN GLARGINE 100 [IU]/ML
5 INJECTION, SOLUTION SUBCUTANEOUS
Qty: 0 | Refills: 0 | Status: DISCONTINUED | OUTPATIENT
Start: 2017-02-16 | End: 2017-02-17

## 2017-02-16 RX ADMIN — DOCOSANOL 0.5 APPLICATION(S): 100 CREAM TOPICAL at 11:34

## 2017-02-16 RX ADMIN — Medication 2 MILLIGRAM(S): at 16:26

## 2017-02-16 RX ADMIN — Medication 500000 UNIT(S): at 05:58

## 2017-02-16 RX ADMIN — SODIUM CHLORIDE 75 MILLILITER(S): 9 INJECTION INTRAMUSCULAR; INTRAVENOUS; SUBCUTANEOUS at 08:24

## 2017-02-16 RX ADMIN — DOCOSANOL 0.5 APPLICATION(S): 100 CREAM TOPICAL at 21:44

## 2017-02-16 RX ADMIN — DOCOSANOL 0.5 APPLICATION(S): 100 CREAM TOPICAL at 00:46

## 2017-02-16 RX ADMIN — PANTOPRAZOLE SODIUM 40 MILLIGRAM(S): 20 TABLET, DELAYED RELEASE ORAL at 08:24

## 2017-02-16 RX ADMIN — Medication 400 MILLIGRAM(S): at 05:58

## 2017-02-16 RX ADMIN — Medication 500000 UNIT(S): at 18:15

## 2017-02-16 RX ADMIN — Medication 1 CAPSULE(S): at 08:23

## 2017-02-16 RX ADMIN — CEFTRIAXONE 100 GRAM(S): 500 INJECTION, POWDER, FOR SOLUTION INTRAMUSCULAR; INTRAVENOUS at 12:02

## 2017-02-16 RX ADMIN — Medication 500000 UNIT(S): at 00:46

## 2017-02-16 RX ADMIN — INSULIN GLARGINE 5 UNIT(S): 100 INJECTION, SOLUTION SUBCUTANEOUS at 11:33

## 2017-02-16 RX ADMIN — Medication 500000 UNIT(S): at 11:34

## 2017-02-16 RX ADMIN — Medication 25 MILLILITER(S): at 05:18

## 2017-02-16 RX ADMIN — Medication 1 TABLET(S): at 08:25

## 2017-02-16 RX ADMIN — Medication 1 TABLET(S): at 08:24

## 2017-02-16 RX ADMIN — Medication 1 TABLET(S): at 18:14

## 2017-02-16 RX ADMIN — Medication 1 CAPSULE(S): at 11:34

## 2017-02-16 RX ADMIN — DOCOSANOL 0.5 APPLICATION(S): 100 CREAM TOPICAL at 16:26

## 2017-02-16 RX ADMIN — GLATIRAMER ACETATE 20 MILLIGRAM(S): 20 INJECTION, SOLUTION SUBCUTANEOUS at 21:46

## 2017-02-16 RX ADMIN — Medication 200 MILLIGRAM(S): at 22:35

## 2017-02-16 RX ADMIN — DOCOSANOL 0.5 APPLICATION(S): 100 CREAM TOPICAL at 08:24

## 2017-02-16 RX ADMIN — HEPARIN SODIUM 5000 UNIT(S): 5000 INJECTION INTRAVENOUS; SUBCUTANEOUS at 05:58

## 2017-02-16 RX ADMIN — Medication 25 MILLILITER(S): at 05:15

## 2017-02-16 RX ADMIN — Medication 1 CAPSULE(S): at 18:14

## 2017-02-16 RX ADMIN — HEPARIN SODIUM 5000 UNIT(S): 5000 INJECTION INTRAVENOUS; SUBCUTANEOUS at 18:14

## 2017-02-16 RX ADMIN — Medication 400 MILLIGRAM(S): at 21:45

## 2017-02-16 RX ADMIN — Medication 400 MILLIGRAM(S): at 16:26

## 2017-02-16 NOTE — PROGRESS NOTE ADULT - PROBLEM SELECTOR PLAN 1
sec to salmonella bacteremia sec to gastroenteritis   Blood culture, stool culture and urine culture positive  Cont Rocephin(day 9/14)  Switch to ampicillin on d/c  pt can get last dose of rocephin tomorrow before d/c home (day 10 )  and start oral abx from Saturday for 4 days on d/c home  Switch to oral -ampicillin when planned for d/c to finish the course of antibiotics

## 2017-02-16 NOTE — PROGRESS NOTE ADULT - PROBLEM SELECTOR PLAN 1
with hypoglycemia, decrease lantus to 5units daily (do not hold, pt is type 1 diabetic)  stop lispro with meals as pt has rarely been getting it and glucose in low 100s  continue MARISSA  once on full diet will need to add back premeal lispro   will follow

## 2017-02-16 NOTE — DISCHARGE NOTE ADULT - CARE PLAN
Principal Discharge DX:	Salmonella gastroenteritis  Goal:	Resolved  Instructions for follow-up, activity and diet:	DASH/TLC Consistent carbs.  Secondary Diagnosis:	Salmonella sepsis  Goal:	Resolved  Secondary Diagnosis:	Salmonella bacteremia  Goal:	Resolved  Secondary Diagnosis:	Multiple sclerosis  Instructions for follow-up, activity and diet:	Neuro f/u  Secondary Diagnosis:	Uncontrolled type 1 diabetes mellitus with hyperglycemia  Goal:	Endocrine f/u, continue insulin.  Secondary Diagnosis:	Herpes labialis without complication  Instructions for follow-up, activity and diet:	Resolving  Secondary Diagnosis:	Chronic pancreatitis, unspecified pancreatitis type  Instructions for follow-up, activity and diet:	Creon. Principal Discharge DX:	Salmonella gastroenteritis  Goal:	Resolved  Instructions for follow-up, activity and diet:	DASH/TLC Consistent carbs.  Secondary Diagnosis:	Salmonella sepsis  Goal:	Resolved  Secondary Diagnosis:	Salmonella bacteremia  Goal:	Resolved  Secondary Diagnosis:	Multiple sclerosis  Instructions for follow-up, activity and diet:	Neuro f/u  Secondary Diagnosis:	Uncontrolled type 1 diabetes mellitus with hyperglycemia  Goal:	Endocrine f/u, continue insulin.  Secondary Diagnosis:	Herpes labialis without complication  Goal:	Abreva, Acyclovir.  Instructions for follow-up, activity and diet:	Resolving  Secondary Diagnosis:	Chronic pancreatitis, unspecified pancreatitis type  Instructions for follow-up, activity and diet:	Creon.

## 2017-02-16 NOTE — PROGRESS NOTE ADULT - PROBLEM SELECTOR PLAN 1
- Resolved. patient was given imodium x 1 yesterday. Tolerating liquids. . 1) Advance diet 2) Imodium QOD ( one dose today ) 3) Verbezi non formulary

## 2017-02-16 NOTE — PROGRESS NOTE ADULT - SUBJECTIVE AND OBJECTIVE BOX
Patient is a 61y old  Female who presents with a chief complaint of Nausea , Vomiting, pain abdomen. (04 Feb 2017 13:13)      HPI:  Pt is a 62 yo lady with a pmhx of DM1 w prior insulin pump since removed, HTN, HL, MS who presents to the ED with N/v/d for 2 days. She ate tacos on Wednesday, and then started having n/v/d that night until now, unable to keep anything down. No fevers, no focal abdominal pain, no dysuria, no chest pain, no short of breath. (04 Feb 2017 13:13)      INTERVAL HPI/OVERNIGHT EVENTS:  PATIENT SEEN EARLIER TODAY.  The patient denies melena, hematochezia, hematemesis, nausea, vomiting, abdominal pain, constipation, diarrhea, or change in bowel movements     MEDICATIONS  (STANDING):  dextrose 5%. 1000milliLiter(s) IV Continuous <Continuous>  dextrose 50% Injectable 12.5Gram(s) IV Push once  dextrose 50% Injectable 25Gram(s) IV Push once  influenza   Vaccine 0.5milliLiter(s) IntraMuscular once  ALBUTerol    90 MICROgram(s) HFA Inhaler 1Puff(s) Inhalation every 4 hours  amylase/lipase/protease  (CREON  6,000 Units) 1Capsule(s) Oral three times a day with meals  cefTRIAXone   IVPB 2Gram(s) IV Intermittent every 24 hours  heparin  Injectable 5000Unit(s) SubCutaneous every 12 hours  insulin lispro (HumaLOG) corrective regimen sliding scale  SubCutaneous three times a day before meals  insulin lispro (HumaLOG) corrective regimen sliding scale  SubCutaneous at bedtime  nystatin    Suspension 933605Fodu(s) Oral every 6 hours  sodium chloride 0.9%. 1000milliLiter(s) IV Continuous <Continuous>  acyclovir   Tablet 400milliGRAM(s) Oral three times a day  pantoprazole    Tablet 40milliGRAM(s) Oral before breakfast  docosanol 10% Cream 0.5Application(s) Topical five times a day  glatiramer Injectable 20milliGRAM(s) SubCutaneous at bedtime  lactobacillus acidophilus 1Tablet(s) Oral two times a day with meals  insulin glargine Injectable (LANTUS) 5Unit(s) SubCutaneous <User Schedule>    MEDICATIONS  (PRN):  dextrose Gel 1Dose(s) Oral once PRN Blood Glucose LESS THAN 70 milliGRAM(s)/deciliter  glucagon  Injectable 1milliGRAM(s) IntraMuscular once PRN Glucose LESS THAN 70 milligrams/deciliter  ondansetron Injectable 4milliGRAM(s) IV Push every 8 hours PRN Nausea and/or Vomiting  acetaminophen   Tablet 650milliGRAM(s) Oral every 6 hours PRN For Temp greater than 38 C (100.4 F)  guaiFENesin    Syrup 200milliGRAM(s) Oral every 6 hours PRN Cough      FAMILY HISTORY:  No pertinent family history in first degree relatives      Allergies    No Known Allergies    Intolerances        PMH/PSH:  Multiple sclerosis  Myasthenia gravis  Hypertension  Diabetes  H/O cervical spine surgery        REVIEW OF SYSTEMS:  CONSTITUTIONAL: No fever, weight loss, or fatigue  EYES: No eye pain, visual disturbances, or discharge  NECK: No pain or stiffness  BREASTS: No pain, masses, or nipple discharge  RESPIRATORY: No cough, wheezing, chills or hemoptysis; No shortness of breath  CARDIOVASCULAR: No chest pain, palpitations, dizziness, or leg swelling  GASTROINTESTINAL: No abdominal or epigastric pain. No nausea, vomiting, or hematemesis; No diarrhea or constipation. No melena or hematochezia.  GENITOURINARY: No dysuria, frequency, hematuria, or incontinence  NEUROLOGICAL: No headaches, memory loss, loss of strength, numbness, or tremors  SKIN: No itching, burning, rashes, or lesions       Vital Signs Last 24 Hrs  T(C): 37.7, Max: 37.7 (02-16 @ 18:17)  T(F): 99.8, Max: 99.8 (02-16 @ 18:17)  HR: 115 (79 - 115)  BP: 149/78 (105/72 - 158/88)  BP(mean): --  RR: 17 (16 - 18)  SpO2: 100% (96% - 100%)    PHYSICAL EXAM:  GENERAL: NAD, well-groomed, well-developed  HEAD:  Atraumatic, Normocephalic  EYES: EOMI, PERRLA, conjunctiva and sclera clear  NECK: Supple, No JVD, Normal thyroid  NERVOUS SYSTEM:  Alert & Oriented X3, Good concentration  CHEST/LUNG: Clear to percussion bilaterally; No rales, rhonchi, wheezing, or rubs  HEART: Regular rate and rhythm; No murmurs, rubs, or gallops  ABDOMEN: Soft, Nontender, Nondistended; Bowel sounds present  EXTREMITIES:  2+ Peripheral Pulses, No clubbing, cyanosis, or edema  LYMPH: No lymphadenopathy noted  SKIN: No rashes or lesions    LABS:    02-15 @ 07:16   HGB 41.6  HCT 14.1  MCV 91.4   RDW 12.8 WBC 10.0 PLTA 424        02-14 @ 07:12   HGB 33.8  HCT 11.9  MCV 91.0   RDW 12.8 WBC 14.0 PLTA 327        02-13 @ 06:39   HGB 35.0  HCT 12.8  MCV 89.3   RDW 12.4 WBC 15.1 PLTA 358        02-12 @ 03:39   HGB 35.1  HCT 12.5  MCV 91.2   RDW 12.8 WBC 12.7 PLTA 321          14 Feb 2017 12:48    132    |  100    |  7      ----------------------------<  262    3.7     |  23     |  0.76   14 Feb 2017 07:12    136    |  104    |  7      ----------------------------<  60     2.7     |  22     |  0.57   13 Feb 2017 06:39    141    |  108    |  10     ----------------------------<  92     3.3     |  25     |  0.68   12 Feb 2017 03:39    139    |  105    |  15     ----------------------------<  313    3.9     |  25     |  0.78   11 Feb 2017 04:56    137    |  105    |  22     ----------------------------<  191    3.5     |  27     |  0.80   10 Feb 2017 03:27    132    |  99     |  38     ----------------------------<  228    3.0     |  27     |  1.74     Ca    7.3        14 Feb 2017 12:48  Ca    7.5        14 Feb 2017 07:12  Ca    7.7        13 Feb 2017 06:39  Ca    7.7        12 Feb 2017 03:39  Ca    7.6        11 Feb 2017 04:56  Ca    7.6        10 Feb 2017 03:27  Phos  2.2       14 Feb 2017 07:12  Phos  1.3       13 Feb 2017 06:39  Phos  1.5       12 Feb 2017 03:39  Phos  1.8       11 Feb 2017 04:56  Phos  2.1       10 Feb 2017 03:27  Mg     1.7       14 Feb 2017 07:12  Mg     1.6       13 Feb 2017 06:39  Mg     1.9       12 Feb 2017 03:39  Mg     2.0       11 Feb 2017 04:56  Mg     2.1       10 Feb 2017 03:27          CAPILLARY BLOOD GLUCOSE  131 (16 Feb 2017 15:38)  136 (16 Feb 2017 11:31)  169 (16 Feb 2017 07:36)  187 (16 Feb 2017 05:20)  48 (16 Feb 2017 04:40)  48 (16 Feb 2017 04:38)  91 (15 Feb 2017 21:49)        RADIOLOGY & ADDITIONAL TESTS:    Imaging Personally Reviewed:  [ ] YES  [ ] NO    Consultant(s) Notes Reviewed:  [ ] YES  [ ] NO    Care Discussed with Consultants/Other Providers [ ] YES  [ ] NO

## 2017-02-16 NOTE — DISCHARGE NOTE ADULT - PLAN OF CARE
Resolved DASH/TLC Consistent carbs. Neuro f/u Endocrine f/u, continue insulin. Resolving Mary Abreva, Acyclovir.

## 2017-02-16 NOTE — PROGRESS NOTE ADULT - SUBJECTIVE AND OBJECTIVE BOX
Patient is a 61y old  Female who presents with a chief complaint of Nausea , Vomiting, pain abdomen. (04 Feb 2017 13:13)      INTERVAL HPI/OVERNIGHT EVENTS:  pt with no complaints  hypoglycemia overngiht  tolerating liquids    MEDICATIONS  (STANDING):  dextrose 5%. 1000milliLiter(s) IV Continuous <Continuous>  dextrose 50% Injectable 12.5Gram(s) IV Push once  dextrose 50% Injectable 25Gram(s) IV Push once  influenza   Vaccine 0.5milliLiter(s) IntraMuscular once  ALBUTerol    90 MICROgram(s) HFA Inhaler 1Puff(s) Inhalation every 4 hours  amylase/lipase/protease  (CREON  6,000 Units) 1Capsule(s) Oral three times a day with meals  cefTRIAXone   IVPB 2Gram(s) IV Intermittent every 24 hours  heparin  Injectable 5000Unit(s) SubCutaneous every 12 hours  insulin lispro (HumaLOG) corrective regimen sliding scale  SubCutaneous three times a day before meals  insulin lispro (HumaLOG) corrective regimen sliding scale  SubCutaneous at bedtime  nystatin    Suspension [id number](s) Oral every 6 hours  sodium chloride 0.9%. 1000milliLiter(s) IV Continuous <Continuous>  acyclovir   Tablet 400milliGRAM(s) Oral three times a day  pantoprazole    Tablet 40milliGRAM(s) Oral before breakfast  docosanol 10% Cream 0.5Application(s) Topical five times a day  glatiramer Injectable 20milliGRAM(s) SubCutaneous at bedtime  lactobacillus acidophilus 1Tablet(s) Oral two times a day with meals  insulin glargine Injectable (LANTUS) 5Unit(s) SubCutaneous <User Schedule>    MEDICATIONS  (PRN):  dextrose Gel 1Dose(s) Oral once PRN Blood Glucose LESS THAN 70 milliGRAM(s)/deciliter  glucagon  Injectable 1milliGRAM(s) IntraMuscular once PRN Glucose LESS THAN 70 milligrams/deciliter  ondansetron Injectable 4milliGRAM(s) IV Push every 8 hours PRN Nausea and/or Vomiting  acetaminophen   Tablet 650milliGRAM(s) Oral every 6 hours PRN For Temp greater than 38 C (100.4 F)  guaiFENesin    Syrup 200milliGRAM(s) Oral every 6 hours PRN Cough      REVIEW OF SYSTEMS:  CONSTITUTIONAL: No fever, weight loss, or fatigue  RESPIRATORY: No cough, wheezing, chills or hemoptysis; No shortness of breath  CARDIOVASCULAR: No chest pain, palpitations, dizziness, or leg swelling  GASTROINTESTINAL: No abdominal or epigastric pain. No nausea, vomiting, or hematemesis; No diarrhea or constipation. No melena or hematochezia.  ENDOCRINE: No heat or cold intolerance; No hair loss      Vital Signs Last 24 Hrs  T(C): 36.6, Max: 36.8 (02-15 @ 17:40)  T(F): 97.8, Max: 98.2 (02-15 @ 17:40)  HR: 81 (17 - 102)  BP: 105/72 (105/72 - 148/82)  BP(mean): --  RR: 16 (16 - 20)  SpO2: 98% (96% - 100%)    PHYSICAL EXAM:  GENERAL: NAD, well-groomed, well-developed  CHEST/LUNG: Clear to percussion bilaterally; No rales, rhonchi, wheezing, or rubs  HEART: Regular rate and rhythm; No murmurs, rubs, or gallops        LABS:                        14.1   10.0  )-----------( 424      ( 15 Feb 2017 07:16 )             41.6     14 Feb 2017 12:48    132    |  100    |  7      ----------------------------<  262    3.7     |  23     |  0.76     Ca    7.3        14 Feb 2017 12:48          CAPILLARY BLOOD GLUCOSE  169 (16 Feb 2017 07:36)  187 (16 Feb 2017 05:20)  48 (16 Feb 2017 04:40)  48 (16 Feb 2017 04:38)  91 (15 Feb 2017 21:49)  127 (15 Feb 2017 15:50)  107 (15 Feb 2017 10:41)  107 (15 Feb 2017 10:37)

## 2017-02-16 NOTE — DISCHARGE NOTE ADULT - PATIENT PORTAL LINK FT
“You can access the [facility] Patient Portal, offered by [facility], by registering with the following website: [email or web address]”

## 2017-02-16 NOTE — DISCHARGE NOTE ADULT - CARE PROVIDER_API CALL
Sulma Garcia), Internal Medicine  [address]  [place] [address]  Phone: [phone]  Fax: [phone]    Tess Gramajo ([place]), Internal Medicine  [address] [place] [address]  Phone: [phone]  Fax: [phone]

## 2017-02-16 NOTE — PROGRESS NOTE ADULT - SUBJECTIVE AND OBJECTIVE BOX
INTERVAL HPI/OVERNIGHT EVENTS:  Pt is a 60 yo lady with a pmhx of DM1 w prior insulin pump since removed, HTN, HL, MS who presents to the ED with N/v/d for 2 days. She ate tacos on Wednesday, and then started having n/v/d that night until now, unable to keep anything down. No fevers, no focal abdominal pain, no dysuria, no chest pain, no short of breath.   Hospital course complicated with Salmonella sepsis(urine, stool and blood), pneumonia with suspected lung abscess and hypoglycemia episode. Was in ICU, now on regular floor. S/P colonoscopy with Polypectomy on 02/15/17.  Awake and comfortable without distress. out of bed to chair.    Vital Signs Last 24 Hrs  T(C): 36.6, Max: 36.8 (02-15 @ 17:40)  T(F): 97.8, Max: 98.2 (02-15 @ 17:40)  HR: 81 (17 - 102)  BP: 105/72 (105/72 - 148/82)  BP(mean): --  RR: 16 (16 - 20)  SpO2: 98% (96% - 100%)    PHYSICAL EXAM:  GEN:         Awake, responsive and comfortable.  HEENT:    Normal.    RESP:      no wheezing.  CVS:          Regular rate and rhythm.   ABD:         Soft, non-tender, non-distended;   :             No costovertebral angle tenderness  EXTR:            No clubbing, cyanosis or edema  CNS:              Intact sensory and motor function.    MEDICATIONS  (STANDING):  dextrose 5%. 1000milliLiter(s) IV Continuous <Continuous>  dextrose 50% Injectable 12.5Gram(s) IV Push once  dextrose 50% Injectable 25Gram(s) IV Push once  influenza   Vaccine 0.5milliLiter(s) IntraMuscular once  ALBUTerol    90 MICROgram(s) HFA Inhaler 1Puff(s) Inhalation every 4 hours  amylase/lipase/protease  (CREON  6,000 Units) 1Capsule(s) Oral three times a day with meals  cefTRIAXone   IVPB 2Gram(s) IV Intermittent every 24 hours  heparin  Injectable 5000Unit(s) SubCutaneous every 12 hours  insulin lispro (HumaLOG) corrective regimen sliding scale  SubCutaneous three times a day before meals  insulin lispro (HumaLOG) corrective regimen sliding scale  SubCutaneous at bedtime  nystatin    Suspension [id number](s) Oral every 6 hours  sodium chloride 0.9%. 1000milliLiter(s) IV Continuous <Continuous>  acyclovir   Tablet 400milliGRAM(s) Oral three times a day  pantoprazole    Tablet 40milliGRAM(s) Oral before breakfast  docosanol 10% Cream 0.5Application(s) Topical five times a day  glatiramer Injectable 20milliGRAM(s) SubCutaneous at bedtime  lactobacillus acidophilus 1Tablet(s) Oral two times a day with meals  insulin glargine Injectable (LANTUS) 5Unit(s) SubCutaneous <User Schedule>    MEDICATIONS  (PRN):  dextrose Gel 1Dose(s) Oral once PRN Blood Glucose LESS THAN 70 milliGRAM(s)/deciliter  glucagon  Injectable 1milliGRAM(s) IntraMuscular once PRN Glucose LESS THAN 70 milligrams/deciliter  ondansetron Injectable 4milliGRAM(s) IV Push every 8 hours PRN Nausea and/or Vomiting  acetaminophen   Tablet 650milliGRAM(s) Oral every 6 hours PRN For Temp greater than 38 C (100.4 F)  guaiFENesin    Syrup 200milliGRAM(s) Oral every 6 hours PRN Cough      LABS:                        14.1   10.0  )-----------( 424      ( 15 Feb 2017 07:16 )             41.6     14 Feb 2017 12:48    132    |  100    |  7      ----------------------------<  262    3.7     |  23     |  0.76     Ca    7.3        14 Feb 2017 12:48    ASSESSMENT AND PLAN:  ·	Multilobar pneumonia. clinically improving.  ·	Salmonella sepsis. improving  ·	Leukocytosis. improved.  ·	Hypokalemia. better.  ·	DM with hypoglycemia.  ·	Multiple sclerosis.  ·	HTN.  ·	Herpes  libialis.    Complete antibiotics per ID.  Will need repeat chest CT in 6-8 weeks.

## 2017-02-16 NOTE — DISCHARGE NOTE ADULT - SECONDARY DIAGNOSIS.
Salmonella sepsis Salmonella bacteremia Multiple sclerosis Uncontrolled type 1 diabetes mellitus with hyperglycemia Herpes labialis without complication Chronic pancreatitis, unspecified pancreatitis type

## 2017-02-16 NOTE — PROGRESS NOTE ADULT - SUBJECTIVE AND OBJECTIVE BOX
Patient is a 61y old  Female who presents with a chief complaint of Nausea , Vomiting, pain abdomen. (04 Feb 2017 13:13)      INTERVAL HPI / OVERNIGHT EVENTS: doing much better, diarrhea resolved    MEDICATIONS  (STANDING):  dextrose 5%. 1000milliLiter(s) IV Continuous <Continuous>  dextrose 50% Injectable 12.5Gram(s) IV Push once  dextrose 50% Injectable 25Gram(s) IV Push once  influenza   Vaccine 0.5milliLiter(s) IntraMuscular once  ALBUTerol    90 MICROgram(s) HFA Inhaler 1Puff(s) Inhalation every 4 hours  amylase/lipase/protease  (CREON  6,000 Units) 1Capsule(s) Oral three times a day with meals  cefTRIAXone   IVPB 2Gram(s) IV Intermittent every 24 hours  heparin  Injectable 5000Unit(s) SubCutaneous every 12 hours  insulin lispro (HumaLOG) corrective regimen sliding scale  SubCutaneous three times a day before meals  insulin lispro (HumaLOG) corrective regimen sliding scale  SubCutaneous at bedtime  nystatin    Suspension [id number](s) Oral every 6 hours  sodium chloride 0.9%. 1000milliLiter(s) IV Continuous <Continuous>  acyclovir   Tablet 400milliGRAM(s) Oral three times a day  pantoprazole    Tablet 40milliGRAM(s) Oral before breakfast  docosanol 10% Cream 0.5Application(s) Topical five times a day  glatiramer Injectable 20milliGRAM(s) SubCutaneous at bedtime  lactobacillus acidophilus 1Tablet(s) Oral two times a day with meals  insulin glargine Injectable (LANTUS) 5Unit(s) SubCutaneous <User Schedule>    MEDICATIONS  (PRN):  dextrose Gel 1Dose(s) Oral once PRN Blood Glucose LESS THAN 70 milliGRAM(s)/deciliter  glucagon  Injectable 1milliGRAM(s) IntraMuscular once PRN Glucose LESS THAN 70 milligrams/deciliter  ondansetron Injectable 4milliGRAM(s) IV Push every 8 hours PRN Nausea and/or Vomiting  acetaminophen   Tablet 650milliGRAM(s) Oral every 6 hours PRN For Temp greater than 38 C (100.4 F)  guaiFENesin    Syrup 200milliGRAM(s) Oral every 6 hours PRN Cough      Vital Signs Last 24 Hrs  T(C): 36.9, Max: 36.9 (02-16 @ 12:25)  T(F): 98.4, Max: 98.4 (02-16 @ 12:25)  HR: 79 (79 - 102)  BP: 132/76 (105/72 - 137/91)  BP(mean): --  RR: 18 (16 - 18)  SpO2: 99% (96% - 99%)    PHYSICAL EXAM:  HEENT : nasolabial scab +  Constitutional: NAD, well-groomed, well-developed  Respiratory: CTAB/L  Cardiovascular: S1 and S2, RRR, no M/G/R  Gastrointestinal: BS+, soft, NT/ND  Extremities: No peripheral edema  Vascular: 2+ peripheral pulses  Skin: No rashes      LABS:                        14.1   10.0  )-----------( 424      ( 15 Feb 2017 07:16 )             41.6                   MICROBIOLOGY:  RECENT CULTURES:  02-10 .Blood Blood-Peripheral XXXX XXXX   No growth at 5 days.    02-09 .Blood Blood XXXX XXXX   No growth at 5 days.          RADIOLOGY & ADDITIONAL STUDIES:

## 2017-02-16 NOTE — DISCHARGE NOTE ADULT - DISCHARGE TO
INTRA-ARTICULAR HIP JOINT INJECTION     PROCEDURE:  1) Left Hip Joint Injection  2) Fluoroscopic needle guidance    REASON FOR PROCEDURE:Chronic hip pain secondary to arthritis    PHYSICIAN: Fredy Patel DO  ASSISTANT:Osmany Castillo MD    MEDICATIONS INJECTED: A 3mL solution containing 40mg Kenalog, 2mL 0.25% Bupivacaine with epi (1:200,000).  LOCAL ANESTHETIC INJECTED: 5 mL of 1% lidocaine.  CONTRAST:1mL, Isovue 180 used.    SEDATION MEDICATIONS: None  ESTIMATED BLOOD LOSS: None  COMPLICATIONS: None    Fluoroscopy: 8.7sec, 1.87mGY    Pre-procedure pain score: 7/10  Post-procedure pain score: 7/10    TECHNIQUE: Time-out was taken to identify the correct patient, procedure and side prior to starting the procedure. The patient was placed in the supine position with all pressure points checked and padded for comfort.  The left leg was placed in slight external rotation.  she was then prepped and draped in the usual sterile fashion using chloroprep and sterile towels. The area above the target site was determined under fluoroscopy. Local anesthetic was given by raising a skin wheal and going down to the hub of a 25-gauge 1.25-inch straight needle.  A 22-gauge, 3.5-inch Quincke needle was introduced under intermittent fluoroscopy until the femoral neck was reached.  When the tip of the needle was thought to be in the appropriate position, the above noted contrast was then injected to confirm intraarticular spread. The above solution was then injected slowly after negative aspiration and the needle withdrawn after replacing the stylet.  The procedure was completed without complications and was tolerated well. The patient was monitored after the procedure.  The patient (or responsible party) was given post-procedure and discharge instructions to follow at home. The patient was discharged in stable condition. A follow-up phone call will be made within one week.    Fredy Patel DO    [facility]  Pain Management  Department of Anesthesiology       Home

## 2017-02-16 NOTE — DISCHARGE NOTE ADULT - HOSPITAL COURSE
Pt is a 62 yo lady with a pmhx of DM1 w prior insulin pump since removed, HTN, HL, MS who presents to the ED with N/v/d for 2 days. She ate tacos on Wednesday, and then started having n/v/d that night until now, unable to keep anything down. No fevers, no focal abdominal pain, no dysuria, no chest pain, no short of breath. (04 Feb 2017 13:13). Pt diagnosed with  Salmonella gastroenteritis and sepsis/bacteremia/ligular lung abscess. Transferred to ICU for REYNALDO, AMS, Hypovolemia. Lung abscess ruled out. Multilobar pneumonia as per CT chest. GI f/U noted, will need Colonoscopy. Scheduled for colonoscopy today. Continue with Rocephin. Complete IV antibiotics as per ID. Colonoscopy findings noted. S/P Polypectomy. Pathology report discussed . Discussed with Dr. Escalona, may be discharged on ampicillin for 4 more days. F/U with GI , Neurology , Endocrinology and PMD

## 2017-02-16 NOTE — PROGRESS NOTE ADULT - ASSESSMENT
Pt is a 62 yo lady with a pmhx of DM1 w prior insulin pump since removed, HTN, HL, MS who presents to the ED with N/v/d for 2 days. She ate tacos on Wednesday, and then started having n/v/d that night until now, unable to keep anything down. No fevers, no focal abdominal pain, no dysuria, no chest pain, no short of breath. (04 Feb 2017 13:13)  Salmonella gastroenteritis and sepsis/bacteremia/ligular lung abscess. Lung abscess ruled out. Multilobar pneumonia. GI f/U noted, will need Colonoscopy. Scheduled for colonoscopy today. Continue with Rocephin. Complete IV antibiotics as per ID. Colonoscopy findings noted. S/P Polypectomy. Advance diet as per GI.

## 2017-02-16 NOTE — DISCHARGE NOTE ADULT - MEDICATION SUMMARY - MEDICATIONS TO TAKE
I will START or STAY ON the medications listed below when I get home from the hospital:    clonazePAM 1 mg oral tablet  -- 1 tab(s) by mouth once a day (at bedtime)  -- Indication: For Anxiety    DULoxetine 60 mg oral delayed release capsule  -- 1 cap(s) by mouth once a day (at bedtime)  -- Indication: For Depression    insulin glargine  -- 5 unit(s) subcutaneous once a day (at bedtime)  -- Indication: For DM    NovoLOG  -- 5 unit(s) subcutaneous 3 times a day with meals , per blood sugar level.  -- Indication: For DM    nystatin 100,000 units/mL oral suspension  -- 5 milliliter(s) by mouth every 6 hours  -- Indication: For Thrush    acyclovir 400 mg oral tablet  -- 1 tab(s) by mouth 3 times a day  -- Indication: For Zoster    albuterol CFC free 90 mcg/inh inhalation aerosol  -- 2 puff(s) inhaled every 12 hours, As needed, Bronchospasm  -- Indication: For Bronchospasm    docosanol 10% topical cream  -- 0.5 application on skin 5 times a day  -- Indication: For Zoster    pancrelipase 6000 units-19,000 units-30,000 units oral delayed release capsule  -- 1 cap(s) by mouth 3 times a day (with meals)  -- Indication: For Pancreatitis,     Viberzi 100 mg oral tablet  -- 1 tab(s) by mouth 2 times a day  -- Indication: For Colitis    Glatopa 20 mg/mL subcutaneous solution  --  subcutaneous   -- Indication: For MS    influenza virus vaccine, inactivated  --     -- Indication: For Vaccine    ampicillin 500 mg oral capsule  -- 1 cap(s) by mouth 4 times a day x4 days  -- Indication: For Salmonella    pantoprazole 40 mg oral delayed release tablet  -- 1 tab(s) by mouth once a day (before a meal)  -- Indication: For Gastritis.

## 2017-02-16 NOTE — PROGRESS NOTE ADULT - SUBJECTIVE AND OBJECTIVE BOX
Patient is a 61y old  Female who presents with a chief complaint of Nausea , Vomiting, pain abdomen. (04 Feb 2017 13:13)      INTERVAL HPI/OVERNIGHT EVENTS:S/P colonoscopy, polypectomy. No diarrhea.    MEDICATIONS  (STANDING):  dextrose 5%. 1000milliLiter(s) IV Continuous <Continuous>  dextrose 50% Injectable 12.5Gram(s) IV Push once  dextrose 50% Injectable 25Gram(s) IV Push once  influenza   Vaccine 0.5milliLiter(s) IntraMuscular once  ALBUTerol    90 MICROgram(s) HFA Inhaler 1Puff(s) Inhalation every 4 hours  amylase/lipase/protease  (CREON  6,000 Units) 1Capsule(s) Oral three times a day with meals  cefTRIAXone   IVPB 2Gram(s) IV Intermittent every 24 hours  heparin  Injectable 5000Unit(s) SubCutaneous every 12 hours  insulin lispro (HumaLOG) corrective regimen sliding scale  SubCutaneous three times a day before meals  insulin lispro (HumaLOG) corrective regimen sliding scale  SubCutaneous at bedtime  nystatin    Suspension [id number](s) Oral every 6 hours  sodium chloride 0.9%. 1000milliLiter(s) IV Continuous <Continuous>  acyclovir   Tablet 400milliGRAM(s) Oral three times a day  pantoprazole    Tablet 40milliGRAM(s) Oral before breakfast  docosanol 10% Cream 0.5Application(s) Topical five times a day  glatiramer Injectable 20milliGRAM(s) SubCutaneous at bedtime  lactobacillus acidophilus 1Tablet(s) Oral two times a day with meals  insulin glargine Injectable (LANTUS) 5Unit(s) SubCutaneous <User Schedule>    MEDICATIONS  (PRN):  dextrose Gel 1Dose(s) Oral once PRN Blood Glucose LESS THAN 70 milliGRAM(s)/deciliter  glucagon  Injectable 1milliGRAM(s) IntraMuscular once PRN Glucose LESS THAN 70 milligrams/deciliter  ondansetron Injectable 4milliGRAM(s) IV Push every 8 hours PRN Nausea and/or Vomiting  acetaminophen   Tablet 650milliGRAM(s) Oral every 6 hours PRN For Temp greater than 38 C (100.4 F)  guaiFENesin    Syrup 200milliGRAM(s) Oral every 6 hours PRN Cough      Allergies    No Known Allergies    Intolerances        REVIEW OF SYSTEMS:  CONSTITUTIONAL: No fever, weight loss, or fatigue  EYES: No eye pain, visual disturbances, or discharge  ENMT:  No difficulty hearing, tinnitus, vertigo; No sinus or throat pain  NECK: No pain or stiffness  BREASTS: No pain, masses, or nipple discharge  RESPIRATORY: No cough, wheezing, chills or hemoptysis; No shortness of breath  CARDIOVASCULAR: No chest pain, palpitations, dizziness, or leg swelling  GASTROINTESTINAL: No abdominal or epigastric pain. No nausea, vomiting, or hematemesis; No diarrhea or constipation. No melena or hematochezia.  GENITOURINARY: No dysuria, frequency, hematuria, or incontinence  NEUROLOGICAL: No headaches, memory loss, loss of strength, numbness, or tremors  SKIN: No itching, burning, rashes, or lesions   LYMPH NODES: No enlarged glands  ENDOCRINE: No heat or cold intolerance; No hair loss  MUSCULOSKELETAL: No joint pain or swelling; No muscle, back, or extremity pain  PSYCHIATRIC: No depression, anxiety, mood swings, or difficulty sleeping  HEME/LYMPH: No easy bruising, or bleeding gums  ALLERGY AND IMMUNOLOGIC: No hives or eczema    Vital Signs Last 24 Hrs  T(C): 36.6, Max: 36.8 (02-15 @ 17:40)  T(F): 97.8, Max: 98.2 (02-15 @ 17:40)  HR: 81 (17 - 102)  BP: 105/72 (105/72 - 143/78)  BP(mean): --  RR: 16 (16 - 20)  SpO2: 98% (96% - 100%)    PHYSICAL EXAM:  GENERAL: NAD, well-groomed, well-developed  HEAD:  Atraumatic, Normocephalic  EYES: EOMI, PERRLA, conjunctiva and sclera clear  ENMT:  Moist mucous membranes, Good dentition, No lesions  NECK: Supple, No JVD, Normal thyroid  NERVOUS SYSTEM:  Alert & Oriented X3, Good concentration; Motor Strength 5/5 B/L upper and lower extremities; DTRs 2+ intact and symmetric  CHEST/LUNG: Clear to percussion bilaterally; No rales, rhonchi, wheezing, or rubs  HEART: Regular rate and rhythm; No murmurs, rubs, or gallops  ABDOMEN: Soft, Nontender, Nondistended; Bowel sounds present  EXTREMITIES:  2+ Peripheral Pulses, No clubbing, cyanosis, or edema  LYMPH: No lymphadenopathy noted  SKIN: No rashes or lesions    LABS:                        14.1   10.0  )-----------( 424      ( 15 Feb 2017 07:16 )             41.6     14 Feb 2017 12:48    132    |  100    |  7      ----------------------------<  262    3.7     |  23     |  0.76     Ca    7.3        14 Feb 2017 12:48          CAPILLARY BLOOD GLUCOSE  136 (16 Feb 2017 11:31)  169 (16 Feb 2017 07:36)  187 (16 Feb 2017 05:20)  48 (16 Feb 2017 04:40)  48 (16 Feb 2017 04:38)  91 (15 Feb 2017 21:49)  127 (15 Feb 2017 15:50)      RADIOLOGY & ADDITIONAL TESTS:      EXAM:  CT CHEST                            PROCEDURE DATE:  02/10/2017        INTERPRETATION:  CLINICAL HISTORY:  Sepsis, pneumonia, evaluate for lung   parenchymal abscess    Multiple axial images of the chest were obtained from the lung apices   through upper abdomen without the administration of intravascular   contrast. Reformatted coronal and sagittal images are submitted.    COMPARISON: None    FINDINGS:    Subcarinal and right lower paratracheal lymph nodes measure up to 11 mm.   Left lower paratracheal lymph nodes measure up to 10 mm. No left hilar   lymphadenopathy is demonstrated. The right hilar region is obscured due   to airspace consolidation within the medial aspect of the right lower   lobe lung parenchyma. There is no evidence for axillary lymphadenopathy.   The heart size appears within normal limits. No pericardial effusion is   identified. Thoracic aortic caliber demonstrates unremarkable caliber.    The central bronchial anatomy appears patent.    Scattered air containing spaces are identified both within the central   and peripheral aspects of the lung parenchyma. There is opacity   identified within the medial as well as posterior-medial aspects of the   right lower lobe lung parenchyma likely related to regions of right lower   lobe consolidation. A small right pleural effusion is noted. Opacity   within the posterior right lower lobe lung parenchyma is likely related   to compressive atelectasis. Airspace consolidation is identified within   the left upper lobe with scattered infiltrates noted within both the left   upper lobe and left lower lobe. No lung parenchymal abscess formation is   identified. There is no evidence for left pleural effusion. There is no   evidence for pneumothorax. No mediastinal shift is noted.    The stomach is not distended precluding evaluation for gastric wall   thickening. A 1.5 cm left hepatic cyst is noted. The right adrenal gland   appears unremarkable. Nodular prominence of the left adrenal gland is   demonstrated. There is extensive calcification noted within the pancreas   body and tail regions, likely related to chronic calcific pancreatitis.   Degenerative changes of the thoracic and lumbar spine noted.    IMPRESSION:  Airspace consolidation left upper lobe as well as right   lower lobe, with additional scattered infiltrates noted within the left   upper lobe and left lower lobe. Small right pleural effusion. Mediastinal   lymphadenopathy.    MALIKA HEREDIA   This document has been electronically signed. Feb 10 2017  6:13PM       Imaging Personally Reviewed:  [x ] YES  [ ] NO    Consultant(s) Notes Reviewed:  [x ] YES  [ ] NO    Care Discussed with Consultants/Other Providers [x ] YES  [ ] NO    PROBLEMS:  ACUTE KIDNEY INJURY  NAUSEA AND VOMITTING  REYNALDO (acute kidney injury)  Hypokalemia due to loss of potassium  Herpes labialis without complication  Leukocytosis, unspecified type  Salmonella pneumonia  Salmonella gastroenteritis  Salmonella bacteremia  Herpes zoster without complication  Pancreatitis, necrotizing  Acute metabolic encephalopathy  Lung infection  Bacteremia due to Gram-negative bacteria  Type 1 diabetes mellitus with hypoglycemic coma  Sepsis  Sepsis, due to unspecified organism  Diarrhea, unspecified type  Diabetes mellitus of other type with hyperosmolarity, with long-term current use of insulin  Multiple sclerosis  REYNALDO (acute kidney injury)  Chronic pancreatitis, unspecified pancreatitis type  Gastroenteritis  Uncontrolled type 1 diabetes mellitus with hyperglycemia  Acute gastritis without hemorrhage, unspecified gastritis type      Care discussed with family,         [  ]   yes  [  ]  No    imp:    stable[ ]    unstable[  ]     improving [   ]       unchanged  [  ]                Plans:  Continue present plans  [  ]               New consult [  ]   specialty  .......               order rochelle[  ]    test name.                  Discharge Planning  [  ]

## 2017-02-17 VITALS
TEMPERATURE: 98 F | SYSTOLIC BLOOD PRESSURE: 123 MMHG | HEART RATE: 105 BPM | DIASTOLIC BLOOD PRESSURE: 78 MMHG | RESPIRATION RATE: 16 BRPM | OXYGEN SATURATION: 97 %

## 2017-02-17 LAB — CULTURE RESULTS: SIGNIFICANT CHANGE UP

## 2017-02-17 RX ORDER — LIPASE/PROTEASE/AMYLASE 16-48-48K
1 CAPSULE,DELAYED RELEASE (ENTERIC COATED) ORAL
Qty: 270 | Refills: 0 | OUTPATIENT
Start: 2017-02-17 | End: 2017-05-18

## 2017-02-17 RX ORDER — DOCOSANOL 100 MG/G
0.5 CREAM TOPICAL
Qty: 5 | Refills: 0
Start: 2017-02-17 | End: 2017-02-27

## 2017-02-17 RX ORDER — INFLUENZA VIRUS VACCINE 15; 15; 15; 15 UG/.5ML; UG/.5ML; UG/.5ML; UG/.5ML
0 SUSPENSION INTRAMUSCULAR
Qty: 0 | Refills: 0 | COMMUNITY
Start: 2017-02-17

## 2017-02-17 RX ORDER — NYSTATIN 500MM UNIT
5 POWDER (EA) MISCELLANEOUS
Qty: 0 | Refills: 0 | DISCHARGE
Start: 2017-02-17

## 2017-02-17 RX ORDER — INSULIN GLARGINE 100 [IU]/ML
5 INJECTION, SOLUTION SUBCUTANEOUS
Qty: 0 | Refills: 0 | DISCHARGE
Start: 2017-02-17

## 2017-02-17 RX ORDER — INSULIN GLARGINE 100 [IU]/ML
0 INJECTION, SOLUTION SUBCUTANEOUS
Qty: 0 | Refills: 0 | COMMUNITY

## 2017-02-17 RX ORDER — ACYCLOVIR SODIUM 500 MG
1 VIAL (EA) INTRAVENOUS
Qty: 9 | Refills: 0 | OUTPATIENT
Start: 2017-02-17 | End: 2017-02-20

## 2017-02-17 RX ORDER — PANTOPRAZOLE SODIUM 20 MG/1
1 TABLET, DELAYED RELEASE ORAL
Qty: 21 | Refills: 0 | OUTPATIENT
Start: 2017-02-17 | End: 2017-03-10

## 2017-02-17 RX ORDER — INSULIN ASPART 100 [IU]/ML
5 INJECTION, SOLUTION SUBCUTANEOUS
Qty: 0 | Refills: 0 | COMMUNITY

## 2017-02-17 RX ORDER — AMPICILLIN TRIHYDRATE 250 MG
1 CAPSULE ORAL
Qty: 16 | Refills: 0 | OUTPATIENT
Start: 2017-02-17 | End: 2017-02-21

## 2017-02-17 RX ADMIN — Medication 500000 UNIT(S): at 11:53

## 2017-02-17 RX ADMIN — Medication 1 CAPSULE(S): at 08:11

## 2017-02-17 RX ADMIN — INSULIN GLARGINE 5 UNIT(S): 100 INJECTION, SOLUTION SUBCUTANEOUS at 11:51

## 2017-02-17 RX ADMIN — DOCOSANOL 0.5 APPLICATION(S): 100 CREAM TOPICAL at 00:21

## 2017-02-17 RX ADMIN — Medication 400 MILLIGRAM(S): at 14:51

## 2017-02-17 RX ADMIN — Medication 1 CAPSULE(S): at 11:51

## 2017-02-17 RX ADMIN — CEFTRIAXONE 100 GRAM(S): 500 INJECTION, POWDER, FOR SOLUTION INTRAMUSCULAR; INTRAVENOUS at 11:51

## 2017-02-17 RX ADMIN — HEPARIN SODIUM 5000 UNIT(S): 5000 INJECTION INTRAVENOUS; SUBCUTANEOUS at 06:10

## 2017-02-17 RX ADMIN — DOCOSANOL 0.5 APPLICATION(S): 100 CREAM TOPICAL at 08:10

## 2017-02-17 RX ADMIN — Medication 1 TABLET(S): at 08:11

## 2017-02-17 RX ADMIN — Medication 500000 UNIT(S): at 06:08

## 2017-02-17 RX ADMIN — DOCOSANOL 0.5 APPLICATION(S): 100 CREAM TOPICAL at 11:52

## 2017-02-17 RX ADMIN — Medication 500000 UNIT(S): at 00:20

## 2017-02-17 RX ADMIN — PANTOPRAZOLE SODIUM 40 MILLIGRAM(S): 20 TABLET, DELAYED RELEASE ORAL at 08:11

## 2017-02-17 RX ADMIN — Medication 400 MILLIGRAM(S): at 06:09

## 2017-02-17 RX ADMIN — Medication 5: at 11:52

## 2017-02-17 NOTE — PROGRESS NOTE ADULT - PROBLEM SELECTOR PLAN 4
increase Lantus to 10 U feed breakfast    HAs come off insulin drip
Endocrine F/U
no lung abscess or tumor noted on CT  cont above
possible lung abscess sec to bacteremia or lung tumor (h/o smoking and significant wt loss per )  Will need IR guided bioppsy when more stable  D
Endocrine F/U
as above
increase Lantus to 10 U feed breakfast and attempt to wean off insulin drip.

## 2017-02-17 NOTE — PROGRESS NOTE ADULT - PROBLEM SELECTOR PROBLEM 4
Lung infection
Multiple sclerosis
Type 1 diabetes mellitus with hypoglycemic coma
Type 1 diabetes mellitus with hypoglycemic coma
Lung infection
Multiple sclerosis
Salmonella pneumonia
Type 1 diabetes mellitus with hypoglycemic coma

## 2017-02-17 NOTE — PROGRESS NOTE ADULT - PROBLEM SELECTOR PROBLEM 1
Bacteremia due to Gram-negative bacteria
Bacteremia due to Gram-negative bacteria
Diarrhea, unspecified type
Acute gastritis without hemorrhage, unspecified gastritis type
Acute gastritis without hemorrhage, unspecified gastritis type
Bacteremia due to Gram-negative bacteria
Chronic pancreatitis, unspecified pancreatitis type
Gastroenteritis
Sepsis, due to unspecified organism
Uncontrolled type 1 diabetes mellitus with hyperglycemia
Bacteremia due to Gram-negative bacteria
Diarrhea, unspecified type
Bacteremia due to Gram-negative bacteria
Diabetes mellitus of other type with hyperosmolarity, with long-term current use of insulin
Type 1 diabetes mellitus with hypoglycemic coma
Uncontrolled type 1 diabetes mellitus with hyperglycemia

## 2017-02-17 NOTE — PROGRESS NOTE ADULT - PROBLEM SELECTOR PLAN 5
GI f/u
Resolving, Continue IV antibiotics.
resolved,  sec to sepsis and dehydration
resolved,  sec to sepsis and dehydration
resolved,sec to sepsis and dehydration
resolved,sec to sepsis and dehydration
resolving
Resolving, Continue IV antibiotics.
resolving
GI f/u

## 2017-02-17 NOTE — PROGRESS NOTE ADULT - PROBLEM SELECTOR PLAN 1
well controlled  continue lantus 5units daily (do not hold, pt is type 1 diabetic)  continue MARISSA with meals  can be d/c on current regimen with outpt f/u with endocrinology within 1-2wks in plans to resume insulin pump

## 2017-02-17 NOTE — PROGRESS NOTE ADULT - PROBLEM SELECTOR PLAN 7
Salmonella, Cont. IV antibiotics.
on acyclovir and  abreva  improving
on acyclovir and  abreva  improving
on acyclovir and add abreva
Resolving
on acyclovir and add abreva

## 2017-02-17 NOTE — PROGRESS NOTE ADULT - PROBLEM/PLAN-1
DISPLAY PLAN FREE TEXT

## 2017-02-17 NOTE — PROGRESS NOTE ADULT - PROBLEM SELECTOR PLAN 1
- Resolved. Biopsy revealed microscopic colitis. Would continue patient on Verbezi at home since it worked for the patient Would continue Imodium QOD while in the hospital . Tolerating regular diet. . 1) Imodium QOD  Verbezi non formulary  2) continue Creon 3) stable from GI point of view for D/C

## 2017-02-17 NOTE — PROGRESS NOTE ADULT - PROBLEM SELECTOR PROBLEM 7
Gastroenteritis
Herpes labialis without complication
REYNALDO (acute kidney injury)

## 2017-02-17 NOTE — PROGRESS NOTE ADULT - PROBLEM SELECTOR PLAN 6
Endocrine f/u npted.
Gal trim ine
Glatiramer
leukocytosis still at 14  no fever  diarrhea still present though c diff neg ton admission  Trend WBC  check PCT and cbc in Am
leukocytosis trending up no fever  diarrhea still present though c diff neg ton admission  Trend WBC  check PCT and cbc in Am
resolving
resolving
sec to salmonellosis
improving
Endocrine f/u npted.

## 2017-02-17 NOTE — PROGRESS NOTE ADULT - PROBLEM SELECTOR PLAN 3
Resolved
Resolving
Stool c/s pending
still has diarrhea
still has diarrhea -no improvement  On probioitcs  planned for a colonoscopy tomorrow
still has diarrhea -no improvement  On probioitcs  s/p colonoscopy -polyp removed and biopsy sen
still has diarrhea -no improvement  On probioitcs  s/p colonoscopy -polyp removed and biopsy sen
Resolved
Stool c/s pending  suspicious for salmonella GE
Continue Creon.
Resolving

## 2017-02-17 NOTE — PROGRESS NOTE ADULT - PROBLEM SELECTOR PROBLEM 5
REYNALDO (acute kidney injury)
Chronic pancreatitis, unspecified pancreatitis type
Chronic pancreatitis, unspecified pancreatitis type
REYNALDO (acute kidney injury)
Sepsis, due to unspecified organism

## 2017-02-17 NOTE — PROGRESS NOTE ADULT - PROBLEM SELECTOR PROBLEM 3
Gastroenteritis
Chronic pancreatitis, unspecified pancreatitis type
REYNALDO (acute kidney injury)
Sepsis
Sepsis
Acute metabolic encephalopathy
Gastroenteritis
REYNALDO (acute kidney injury)
Salmonella gastroenteritis
Sepsis
Acute metabolic encephalopathy

## 2017-02-17 NOTE — PROGRESS NOTE ADULT - SUBJECTIVE AND OBJECTIVE BOX
Patient is a 61y old  Female who presents with a chief complaint of Nausea , Vomiting, pain abdomen. (04 Feb 2017 13:13)      HPI:  Pt is a 60 yo lady with a pmhx of DM1 w prior insulin pump since removed, HTN, HL, MS who presents to the ED with N/v/d for 2 days. She ate tacos on Wednesday, and then started having n/v/d that night until now, unable to keep anything down. No fevers, no focal abdominal pain, no dysuria, no chest pain, no short of breath. (04 Feb 2017 13:13)      INTERVAL HPI/OVERNIGHT EVENTS:  No BM overnight. Yesterday, patient had a small formed bowel movement. The patient denies melena, hematochezia, hematemesis, nausea, vomiting, abdominal pain, constipation, diarrhea, or change in bowel movements     MEDICATIONS  (STANDING):  dextrose 5%. 1000milliLiter(s) IV Continuous <Continuous>  dextrose 50% Injectable 12.5Gram(s) IV Push once  dextrose 50% Injectable 25Gram(s) IV Push once  influenza   Vaccine 0.5milliLiter(s) IntraMuscular once  ALBUTerol    90 MICROgram(s) HFA Inhaler 1Puff(s) Inhalation every 4 hours  amylase/lipase/protease  (CREON  6,000 Units) 1Capsule(s) Oral three times a day with meals  cefTRIAXone   IVPB 2Gram(s) IV Intermittent every 24 hours  heparin  Injectable 5000Unit(s) SubCutaneous every 12 hours  insulin lispro (HumaLOG) corrective regimen sliding scale  SubCutaneous three times a day before meals  insulin lispro (HumaLOG) corrective regimen sliding scale  SubCutaneous at bedtime  nystatin    Suspension 987829Gpep(s) Oral every 6 hours  sodium chloride 0.9%. 1000milliLiter(s) IV Continuous <Continuous>  acyclovir   Tablet 400milliGRAM(s) Oral three times a day  pantoprazole    Tablet 40milliGRAM(s) Oral before breakfast  docosanol 10% Cream 0.5Application(s) Topical five times a day  glatiramer Injectable 20milliGRAM(s) SubCutaneous at bedtime  lactobacillus acidophilus 1Tablet(s) Oral two times a day with meals  insulin glargine Injectable (LANTUS) 5Unit(s) SubCutaneous <User Schedule>    MEDICATIONS  (PRN):  dextrose Gel 1Dose(s) Oral once PRN Blood Glucose LESS THAN 70 milliGRAM(s)/deciliter  glucagon  Injectable 1milliGRAM(s) IntraMuscular once PRN Glucose LESS THAN 70 milligrams/deciliter  ondansetron Injectable 4milliGRAM(s) IV Push every 8 hours PRN Nausea and/or Vomiting  acetaminophen   Tablet 650milliGRAM(s) Oral every 6 hours PRN For Temp greater than 38 C (100.4 F)  guaiFENesin    Syrup 200milliGRAM(s) Oral every 6 hours PRN Cough      FAMILY HISTORY:  No pertinent family history in first degree relatives      Allergies    No Known Allergies    Intolerances        PMH/PSH:  Multiple sclerosis  Myasthenia gravis  Hypertension  Diabetes  H/O cervical spine surgery        REVIEW OF SYSTEMS:  CONSTITUTIONAL: No fever, weight loss, or fatigue  EYES: No eye pain, visual disturbances, or discharge  NECK: No pain or stiffness  BREASTS: No pain, masses, or nipple discharge  RESPIRATORY: No cough, wheezing, chills or hemoptysis; No shortness of breath  CARDIOVASCULAR: No chest pain, palpitations, dizziness, or leg swelling  GASTROINTESTINAL: No abdominal or epigastric pain. No nausea, vomiting, or hematemesis; No diarrhea or constipation. No melena or hematochezia.  GENITOURINARY: No dysuria, frequency, hematuria, or incontinence  NEUROLOGICAL: No headaches, memory loss, loss of strength, numbness, or tremors  SKIN: No itching, burning, rashes, or lesions   LYMPH NODES: No enlarged glands      Vital Signs Last 24 Hrs  T(C): 36.6, Max: 37.7 (02-16 @ 18:17)  T(F): 97.8, Max: 99.8 (02-16 @ 18:17)  HR: 87 (79 - 115)  BP: 144/84 (105/72 - 158/88)  BP(mean): --  RR: 15 (15 - 18)  SpO2: 100% (97% - 100%)    PHYSICAL EXAM:  GENERAL: NAD, well-groomed, well-developed  HEAD:  Atraumatic, Normocephalic  EYES: EOMI, PERRLA, conjunctiva and sclera clear  NECK: Supple, No JVD, Normal thyroid  NERVOUS SYSTEM:  Alert & Oriented X3, Good concentration; Motor Strength 5/5 B/L upper and lower extremities; DTRs 2+ intact and symmetric  CHEST/LUNG: Clear to percussion bilaterally; No rales, rhonchi, wheezing, or rubs  HEART: Regular rate and rhythm; No murmurs, rubs, or gallops  ABDOMEN: Soft, Nontender, Nondistended; Bowel sounds present  EXTREMITIES:  2+ Peripheral Pulses, No clubbing, cyanosis, or edema  LYMPH: No lymphadenopathy noted  SKIN: No rashes or lesions    LABS:    02-15 @ 07:16   HGB 41.6  HCT 14.1  MCV 91.4   RDW 12.8 WBC 10.0 PLTA 424        02-14 @ 07:12   HGB 33.8  HCT 11.9  MCV 91.0   RDW 12.8 WBC 14.0 PLTA 327        02-13 @ 06:39   HGB 35.0  HCT 12.8  MCV 89.3   RDW 12.4 WBC 15.1 PLTA 358          14 Feb 2017 12:48    132    |  100    |  7      ----------------------------<  262    3.7     |  23     |  0.76   14 Feb 2017 07:12    136    |  104    |  7      ----------------------------<  60     2.7     |  22     |  0.57   13 Feb 2017 06:39    141    |  108    |  10     ----------------------------<  92     3.3     |  25     |  0.68   12 Feb 2017 03:39    139    |  105    |  15     ----------------------------<  313    3.9     |  25     |  0.78   11 Feb 2017 04:56    137    |  105    |  22     ----------------------------<  191    3.5     |  27     |  0.80     Ca    7.3        14 Feb 2017 12:48  Ca    7.5        14 Feb 2017 07:12  Ca    7.7        13 Feb 2017 06:39  Ca    7.7        12 Feb 2017 03:39  Ca    7.6        11 Feb 2017 04:56  Phos  2.2       14 Feb 2017 07:12  Phos  1.3       13 Feb 2017 06:39  Phos  1.5       12 Feb 2017 03:39  Phos  1.8       11 Feb 2017 04:56  Mg     1.7       14 Feb 2017 07:12  Mg     1.6       13 Feb 2017 06:39  Mg     1.9       12 Feb 2017 03:39  Mg     2.0       11 Feb 2017 04:56          CAPILLARY BLOOD GLUCOSE  114 (17 Feb 2017 07:52)  102 (16 Feb 2017 21:42)  131 (16 Feb 2017 15:38)  136 (16 Feb 2017 11:31)        RADIOLOGY & ADDITIONAL TESTS:    Imaging Personally Reviewed:  [ ] YES  [ ] NO    Consultant(s) Notes Reviewed:  [ ] YES  [ ] NO    Care Discussed with Consultants/Other Providers [ ] YES  [ ] NO

## 2017-02-17 NOTE — PROGRESS NOTE ADULT - PROBLEM SELECTOR PROBLEM 9
Hypokalemia due to loss of potassium
Herpes zoster without complication

## 2017-02-17 NOTE — PROGRESS NOTE ADULT - SUBJECTIVE AND OBJECTIVE BOX
Patient is a 61y old  Female who presents with a chief complaint of Nausea , Vomiting, pain abdomen. (04 Feb 2017 13:13)      INTERVAL HPI/OVERNIGHT EVENTS: No further diarrhea, tolerating diet well.    MEDICATIONS  (STANDING):  dextrose 5%. 1000milliLiter(s) IV Continuous <Continuous>  dextrose 50% Injectable 12.5Gram(s) IV Push once  dextrose 50% Injectable 25Gram(s) IV Push once  influenza   Vaccine 0.5milliLiter(s) IntraMuscular once  ALBUTerol    90 MICROgram(s) HFA Inhaler 1Puff(s) Inhalation every 4 hours  amylase/lipase/protease  (CREON  6,000 Units) 1Capsule(s) Oral three times a day with meals  cefTRIAXone   IVPB 2Gram(s) IV Intermittent every 24 hours  heparin  Injectable 5000Unit(s) SubCutaneous every 12 hours  insulin lispro (HumaLOG) corrective regimen sliding scale  SubCutaneous three times a day before meals  insulin lispro (HumaLOG) corrective regimen sliding scale  SubCutaneous at bedtime  nystatin    Suspension 324014Cngl(s) Oral every 6 hours  sodium chloride 0.9%. 1000milliLiter(s) IV Continuous <Continuous>  acyclovir   Tablet 400milliGRAM(s) Oral three times a day  pantoprazole    Tablet 40milliGRAM(s) Oral before breakfast  docosanol 10% Cream 0.5Application(s) Topical five times a day  glatiramer Injectable 20milliGRAM(s) SubCutaneous at bedtime  lactobacillus acidophilus 1Tablet(s) Oral two times a day with meals  insulin glargine Injectable (LANTUS) 5Unit(s) SubCutaneous <User Schedule>    MEDICATIONS  (PRN):  dextrose Gel 1Dose(s) Oral once PRN Blood Glucose LESS THAN 70 milliGRAM(s)/deciliter  glucagon  Injectable 1milliGRAM(s) IntraMuscular once PRN Glucose LESS THAN 70 milligrams/deciliter  ondansetron Injectable 4milliGRAM(s) IV Push every 8 hours PRN Nausea and/or Vomiting  acetaminophen   Tablet 650milliGRAM(s) Oral every 6 hours PRN For Temp greater than 38 C (100.4 F)  guaiFENesin    Syrup 200milliGRAM(s) Oral every 6 hours PRN Cough      Allergies    No Known Allergies    Intolerances        REVIEW OF SYSTEMS:  CONSTITUTIONAL: No fever ,Weight loss since before admission.  EYES: No eye pain, visual disturbances, or discharge  ENMT:  No difficulty hearing, tinnitus, vertigo; No sinus or throat pain  NECK: No pain or stiffness  BREASTS: No pain, masses, or nipple discharge  RESPIRATORY: No cough, wheezing, chills or hemoptysis; No shortness of breath  CARDIOVASCULAR: No chest pain, palpitations, dizziness, or leg swelling  GASTROINTESTINAL: No abdominal or epigastric pain. No nausea, vomiting, or hematemesis; No diarrhea or constipation. No melena or hematochezia.  GENITOURINARY: No dysuria, frequency, hematuria, or incontinence  NEUROLOGICAL: No headaches, memory loss, loss of strength, numbness, or tremors  SKIN: No itching, burning, rashes, or lesions   LYMPH NODES: No enlarged glands  ENDOCRINE: No heat or cold intolerance; No hair loss  MUSCULOSKELETAL: No joint pain or swelling; No muscle, back, or extremity pain  PSYCHIATRIC: No depression, anxiety, mood swings, or difficulty sleeping  HEME/LYMPH: No easy bruising, or bleeding gums  ALLERGY AND IMMUNOLOGIC: No hives or eczema    Vital Signs Last 24 Hrs  T(C): 36.6, Max: 37.7 (02-16 @ 18:17)  T(F): 97.8, Max: 99.8 (02-16 @ 18:17)  HR: 87 (79 - 115)  BP: 144/84 (105/72 - 158/88)  BP(mean): --  RR: 15 (15 - 18)  SpO2: 100% (97% - 100%)    PHYSICAL EXAM:  GENERAL: NAD, well-groomed, well-developed  HEAD:  Atraumatic, Normocephalic  EYES: EOMI, PERRLA, conjunctiva and sclera clear  ENMT:  Moist mucous membranes, Good dentition, No lesions, [place].  NECK: Supple, No JVD, Normal thyroid  NERVOUS SYSTEM:  Alert & Oriented X3, Good concentration; Motor Strength 5/5 B/L upper and lower extremities; DTRs 2+ intact and symmetric  CHEST/LUNG: Clear to percussion bilaterally; No rales, rhonchi, wheezing, or rubs  HEART: Regular rate and rhythm; No murmurs, rubs, or gallops  ABDOMEN: Soft, Nontender, Nondistended; Bowel sounds present  EXTREMITIES:  2+ Peripheral Pulses, No clubbing, cyanosis, or edema  LYMPH: No lymphadenopathy noted  SKIN: No rashes or lesions    LABS:              CAPILLARY BLOOD GLUCOSE  114 (17 Feb 2017 07:52)  102 (16 Feb 2017 21:42)  131 (16 Feb 2017 15:38)  136 (16 Feb 2017 11:31)    Surgical Pathology Final Report -  (02.15.17 @ 11:40)    Surgical Pathology Final Report - :   ACCESSION No:  [id number]    DEEP CESPEDES                    2        Surgical Final Report    Final Diagnosis  *  1. Terminal ileum, biopsy:  - Small bowel mucosa with mild chronic inflammation and  preserved  villous architecture .Negative for cryptitis, crypt  abscess or granuloma.    2. Right colon polyp, polypectomy:  - Tubular adenoma    3. Right colon, biopsy:  - Colonic mucosa with mild chronic inflammation.  Negative  for changes  suggestive of collagenous or lymphocytic colitis.    4.Colon polyp at 40 cm, polypectomy :  - Tubular adenoma    Justin Beck MD  (Electronic Signature)  Reported on: 02/16/17    Synoptic Summary  _  .    Clinical Information  Diarrhea  Operation/procedure; colonoscopy and biopsy    Specimen Description  1-Terminal ileum  2-Right colon polyp  3-Right colon biopsy  4-Colon polyp @40 cm    Gross Description  1.  The specimen is received in formalin and the specimen  container is labeled as: Terminal ileum. It consists of three  pink-tan soft tissue fragments ranging from 1.5 mm to 0.3 cm in  greatest dimension, with an aggregate dimension of 0.3 x 0.2 x  0.2 cm.  Entirely submitted.  One cassette.    2.  The specimen is received in formalin and the specimen  container is labeledas: Right colon polyp. It consists of a  pink-tan soft tissue fragment measuring 0.3 x 0.3 x 0.2 cm, and  one minute fragment.  Entirely submitted.  One cassette.    DEEP CESPEDES                    2        Surgical Final Report      The specimen is received in formalin and the specimen  container is labeled as: Right colon biopsy r/o  collagenous/microscopic colitis. It consists of three tan soft  tissue fragments ranging from 0.1-0.3 cm in greatest dimension,  with an aggregate dimension of 0.4 x 0.3 x 0.2 cm.  Entirely  submitted.  One cassette.    4.  The specimen is received in formalin and the specimen  container is labeled as: Colon polyp @ 40 cm. It consists of a  pink-tan soft tissue fragment measuring 0.4 x 0.3 x 0.2 cm.  Entirely submitted.  One cassette.    In addition to other data that may appear on the specimen  container, the label has been inspected to confirm the presence  of the patient's name and date of birth.        Wed  February 15,2017 04:12 PM      RADIOLOGY & ADDITIONAL TESTS:    Imaging Personally Reviewed:  [ ] YES  [ ] NO    Consultant(s) Notes Reviewed:  [x ] YES  [ ] NO    Care Discussed with Consultants/Other Providers [x ] YES  [ ] NO    PROBLEMS:  ACUTE KIDNEY INJURY  NAUSEA AND VOMITTING  Salmonella gastroenteritis  REYNALDO (acute kidney injury)  Polyp of colon, unspecified part of colon, unspecified type  Hypokalemia due to loss of potassium  Herpes labialis without complication  Leukocytosis, unspecified type  Salmonella pneumonia  Salmonella gastroenteritis  Salmonella bacteremia  Herpes zoster without complication  Pancreatitis, necrotizing  Acute metabolic encephalopathy  Lung infection  Bacteremia due to Gram-negative bacteria  Type 1 diabetes mellitus with hypoglycemic coma  Sepsis  Sepsis, due to unspecified organism  Diarrhea, unspecified type  Diabetes mellitus of other type with hyperosmolarity, with long-term current use of insulin  Multiple sclerosis  REYNALDO (acute kidney injury)  Chronic pancreatitis, unspecified pancreatitis type  Gastroenteritis  Uncontrolled type 1 diabetes mellitus with hyperglycemia  Acute gastritis without hemorrhage, unspecified gastritis type      Care discussed with family,         [  ]   yes  [  ]  No    imp:    stable[ ]    unstable[  ]     improving [   ]       unchanged  [  ]                Plans:  Continue present plans  [  ]               New consult [  ]   specialty  .......               order rochelle[  ]    test name.                  Discharge Planning  [  ]

## 2017-02-17 NOTE — PROGRESS NOTE ADULT - PROBLEM SELECTOR PROBLEM 8
Herpes zoster without complication
Multiple sclerosis
Gastroenteritis
Multiple sclerosis

## 2017-02-17 NOTE — PROGRESS NOTE ADULT - ASSESSMENT
Pt is a 60 yo lady with a pmhx of DM1 w prior insulin pump since removed, HTN, HL, MS who presents to the ED with N/v/d for 2 days. She ate tacos on Wednesday, and then started having n/v/d that night until now, unable to keep anything down. No fevers, no focal abdominal pain, no dysuria, no chest pain, no short of breath. (04 Feb 2017 13:13)  Salmonella gastroenteritis and sepsis/bacteremia/ligular lung abscess. Lung abscess ruled out. Multilobar pneumonia. GI f/U noted, will need Colonoscopy. Scheduled for colonoscopy today. Continue with Rocephin. Complete IV antibiotics as per ID. Colonoscopy findings noted. S/P Polypectomy. Pathology report discussed . Discussed with Dr. Escalona, may be discharged on ampicillin for 4 more days. F/U with GI , Neurology , Endocrinology and PMD .

## 2017-02-17 NOTE — PROGRESS NOTE ADULT - SUBJECTIVE AND OBJECTIVE BOX
Patient is a 61y old  Female who presents with a chief complaint of Nausea , Vomiting, pain abdomen. (04 Feb 2017 13:13)      INTERVAL HPI/OVERNIGHT EVENTS:  pt looking and feeling much better  tolerating full diet  d/c planning in progress    MEDICATIONS  (STANDING):  dextrose 5%. 1000milliLiter(s) IV Continuous <Continuous>  dextrose 50% Injectable 12.5Gram(s) IV Push once  dextrose 50% Injectable 25Gram(s) IV Push once  influenza   Vaccine 0.5milliLiter(s) IntraMuscular once  ALBUTerol    90 MICROgram(s) HFA Inhaler 1Puff(s) Inhalation every 4 hours  amylase/lipase/protease  (CREON  6,000 Units) 1Capsule(s) Oral three times a day with meals  cefTRIAXone   IVPB 2Gram(s) IV Intermittent every 24 hours  heparin  Injectable 5000Unit(s) SubCutaneous every 12 hours  insulin lispro (HumaLOG) corrective regimen sliding scale  SubCutaneous three times a day before meals  insulin lispro (HumaLOG) corrective regimen sliding scale  SubCutaneous at bedtime  nystatin    Suspension 493217Wxkj(s) Oral every 6 hours  sodium chloride 0.9%. 1000milliLiter(s) IV Continuous <Continuous>  acyclovir   Tablet 400milliGRAM(s) Oral three times a day  pantoprazole    Tablet 40milliGRAM(s) Oral before breakfast  docosanol 10% Cream 0.5Application(s) Topical five times a day  glatiramer Injectable 20milliGRAM(s) SubCutaneous at bedtime  lactobacillus acidophilus 1Tablet(s) Oral two times a day with meals  insulin glargine Injectable (LANTUS) 5Unit(s) SubCutaneous <User Schedule>    MEDICATIONS  (PRN):  dextrose Gel 1Dose(s) Oral once PRN Blood Glucose LESS THAN 70 milliGRAM(s)/deciliter  glucagon  Injectable 1milliGRAM(s) IntraMuscular once PRN Glucose LESS THAN 70 milligrams/deciliter  ondansetron Injectable 4milliGRAM(s) IV Push every 8 hours PRN Nausea and/or Vomiting  acetaminophen   Tablet 650milliGRAM(s) Oral every 6 hours PRN For Temp greater than 38 C (100.4 F)  guaiFENesin    Syrup 200milliGRAM(s) Oral every 6 hours PRN Cough      REVIEW OF SYSTEMS:  CONSTITUTIONAL: No fever, weight loss, or fatigue  RESPIRATORY: No cough, wheezing, chills or hemoptysis; No shortness of breath  CARDIOVASCULAR: No chest pain, palpitations, dizziness, or leg swelling  GASTROINTESTINAL: No abdominal or epigastric pain. No nausea, vomitingENDOCRINE: No heat or cold intolerance; No hair loss      Vital Signs Last 24 Hrs  T(C): 36.6, Max: 37.7 (02-16 @ 18:17)  T(F): 97.8, Max: 99.8 (02-16 @ 18:17)  HR: 87 (79 - 115)  BP: 144/84 (105/72 - 158/88)  BP(mean): --  RR: 15 (15 - 18)  SpO2: 100% (97% - 100%)    PHYSICAL EXAM:  GENERAL: NAD, well-groomed, well-developed  CHEST/LUNG: Clear to percussion bilaterally; No rales, rhonchi, wheezing, or rubs  HEART: Regular rate and rhythm; No murmurs, rubs, or gallops      CAPILLARY BLOOD GLUCOSE  114 (17 Feb 2017 07:52)  102 (16 Feb 2017 21:42)  131 (16 Feb 2017 15:38)  136 (16 Feb 2017 11:31)

## 2017-02-17 NOTE — PROGRESS NOTE ADULT - PROBLEM SELECTOR PROBLEM 2
Bacteremia due to Gram-negative bacteria
Chronic pancreatitis, unspecified pancreatitis type
Lung infection
Lung infection
Polyp of colon, unspecified part of colon, unspecified type
Bacteremia due to Gram-negative bacteria
Diarrhea, unspecified type
Gastroenteritis
Gastroenteritis
Lung infection
Salmonella bacteremia
Lung infection
Polyp of colon, unspecified part of colon, unspecified type
Lung infection

## 2017-02-17 NOTE — PROGRESS NOTE ADULT - PROBLEM SELECTOR PROBLEM 6
Acute metabolic encephalopathy
Diabetes mellitus of other type with hyperosmolarity, with long-term current use of insulin
Diabetes mellitus of other type with hyperosmolarity, with long-term current use of insulin
Acute metabolic encephalopathy
Leukocytosis, unspecified type
Multiple sclerosis

## 2017-02-20 ENCOUNTER — TRANSCRIPTION ENCOUNTER (OUTPATIENT)
Age: 62
End: 2017-02-20

## 2017-02-21 DIAGNOSIS — I10 ESSENTIAL (PRIMARY) HYPERTENSION: ICD-10-CM

## 2017-02-21 DIAGNOSIS — R26.81 UNSTEADINESS ON FEET: ICD-10-CM

## 2017-02-21 DIAGNOSIS — B00.1 HERPESVIRAL VESICULAR DERMATITIS: ICD-10-CM

## 2017-02-21 DIAGNOSIS — A02.0 SALMONELLA ENTERITIS: ICD-10-CM

## 2017-02-21 DIAGNOSIS — J18.9 PNEUMONIA, UNSPECIFIED ORGANISM: ICD-10-CM

## 2017-02-21 DIAGNOSIS — E86.1 HYPOVOLEMIA: ICD-10-CM

## 2017-02-21 DIAGNOSIS — G93.41 METABOLIC ENCEPHALOPATHY: ICD-10-CM

## 2017-02-21 DIAGNOSIS — Q43.8 OTHER SPECIFIED CONGENITAL MALFORMATIONS OF INTESTINE: ICD-10-CM

## 2017-02-21 DIAGNOSIS — F41.8 OTHER SPECIFIED ANXIETY DISORDERS: ICD-10-CM

## 2017-02-21 DIAGNOSIS — G35 MULTIPLE SCLEROSIS: ICD-10-CM

## 2017-02-21 DIAGNOSIS — A02.1 SALMONELLA SEPSIS: ICD-10-CM

## 2017-02-21 DIAGNOSIS — G70.00 MYASTHENIA GRAVIS WITHOUT (ACUTE) EXACERBATION: ICD-10-CM

## 2017-02-21 DIAGNOSIS — I51.9 HEART DISEASE, UNSPECIFIED: ICD-10-CM

## 2017-02-21 DIAGNOSIS — E10.65 TYPE 1 DIABETES MELLITUS WITH HYPERGLYCEMIA: ICD-10-CM

## 2017-02-21 DIAGNOSIS — N17.9 ACUTE KIDNEY FAILURE, UNSPECIFIED: ICD-10-CM

## 2017-02-21 DIAGNOSIS — B37.9 CANDIDIASIS, UNSPECIFIED: ICD-10-CM

## 2017-02-21 DIAGNOSIS — Z79.51 LONG TERM (CURRENT) USE OF INHALED STEROIDS: ICD-10-CM

## 2017-02-21 DIAGNOSIS — K86.1 OTHER CHRONIC PANCREATITIS: ICD-10-CM

## 2017-02-21 DIAGNOSIS — J90 PLEURAL EFFUSION, NOT ELSEWHERE CLASSIFIED: ICD-10-CM

## 2017-02-21 DIAGNOSIS — E78.5 HYPERLIPIDEMIA, UNSPECIFIED: ICD-10-CM

## 2017-02-21 DIAGNOSIS — E87.2 ACIDOSIS: ICD-10-CM

## 2017-02-21 DIAGNOSIS — R59.0 LOCALIZED ENLARGED LYMPH NODES: ICD-10-CM

## 2017-02-21 DIAGNOSIS — E43 UNSPECIFIED SEVERE PROTEIN-CALORIE MALNUTRITION: ICD-10-CM

## 2017-02-21 DIAGNOSIS — E87.1 HYPO-OSMOLALITY AND HYPONATREMIA: ICD-10-CM

## 2017-02-21 DIAGNOSIS — Z79.4 LONG TERM (CURRENT) USE OF INSULIN: ICD-10-CM

## 2017-02-21 DIAGNOSIS — F10.21 ALCOHOL DEPENDENCE, IN REMISSION: ICD-10-CM

## 2017-02-22 LAB
HLA FOR CELIAC DISEASE PNL BLD/T: SIGNIFICANT CHANGE UP

## 2017-03-27 ENCOUNTER — INPATIENT (INPATIENT)
Facility: HOSPITAL | Age: 62
LOS: 5 days | Discharge: HOME HEALTH | End: 2017-04-02
Attending: INTERNAL MEDICINE | Admitting: INTERNAL MEDICINE
Payer: MEDICARE

## 2017-03-27 VITALS
TEMPERATURE: 94 F | HEART RATE: 66 BPM | RESPIRATION RATE: 17 BRPM | DIASTOLIC BLOOD PRESSURE: 69 MMHG | WEIGHT: 149.03 LBS | SYSTOLIC BLOOD PRESSURE: 119 MMHG | HEIGHT: 64 IN | OXYGEN SATURATION: 100 %

## 2017-03-27 DIAGNOSIS — Z98.89 OTHER SPECIFIED POSTPROCEDURAL STATES: Chronic | ICD-10-CM

## 2017-03-27 LAB
ALBUMIN SERPL ELPH-MCNC: 1.8 G/DL — LOW (ref 3.3–5)
ALP SERPL-CCNC: 96 U/L — SIGNIFICANT CHANGE UP (ref 40–120)
ALT FLD-CCNC: 25 U/L — SIGNIFICANT CHANGE UP (ref 12–78)
ANION GAP SERPL CALC-SCNC: 5 MMOL/L — SIGNIFICANT CHANGE UP (ref 5–17)
ANISOCYTOSIS BLD QL: SLIGHT — SIGNIFICANT CHANGE UP
APTT BLD: 29.8 SEC — SIGNIFICANT CHANGE UP (ref 27.5–37.4)
AST SERPL-CCNC: 27 U/L — SIGNIFICANT CHANGE UP (ref 15–37)
BILIRUB SERPL-MCNC: 0.3 MG/DL — SIGNIFICANT CHANGE UP (ref 0.2–1.2)
BUN SERPL-MCNC: 9 MG/DL — SIGNIFICANT CHANGE UP (ref 7–23)
CALCIUM SERPL-MCNC: 7.7 MG/DL — LOW (ref 8.5–10.1)
CHLORIDE SERPL-SCNC: 99 MMOL/L — SIGNIFICANT CHANGE UP (ref 96–108)
CK MB BLD-MCNC: 6.7 % — HIGH (ref 0–3.5)
CK MB CFR SERPL CALC: 3.2 NG/ML — SIGNIFICANT CHANGE UP (ref 0.5–3.6)
CK SERPL-CCNC: 48 U/L — SIGNIFICANT CHANGE UP (ref 26–192)
CO2 SERPL-SCNC: 29 MMOL/L — SIGNIFICANT CHANGE UP (ref 22–31)
CREAT SERPL-MCNC: 0.49 MG/DL — LOW (ref 0.5–1.3)
GLUCOSE SERPL-MCNC: 55 MG/DL — LOW (ref 70–99)
HCT VFR BLD CALC: 27 % — LOW (ref 34.5–45)
HGB BLD-MCNC: 9.2 G/DL — LOW (ref 11.5–15.5)
INR BLD: 0.92 RATIO — SIGNIFICANT CHANGE UP (ref 0.88–1.16)
LACTATE SERPL-SCNC: 0.8 MMOL/L — SIGNIFICANT CHANGE UP (ref 0.7–2)
LACTATE SERPL-SCNC: 3.3 MMOL/L — HIGH (ref 0.7–2)
LYMPHOCYTES # BLD AUTO: 31 % — SIGNIFICANT CHANGE UP (ref 13–44)
MACROCYTES BLD QL: SLIGHT — SIGNIFICANT CHANGE UP
MAGNESIUM SERPL-MCNC: 1.8 MG/DL — SIGNIFICANT CHANGE UP (ref 1.8–2.4)
MCHC RBC-ENTMCNC: 30.5 PG — SIGNIFICANT CHANGE UP (ref 27–34)
MCHC RBC-ENTMCNC: 34.1 GM/DL — SIGNIFICANT CHANGE UP (ref 32–36)
MCV RBC AUTO: 89.3 FL — SIGNIFICANT CHANGE UP (ref 80–100)
MONOCYTES NFR BLD AUTO: 5 % — SIGNIFICANT CHANGE UP (ref 2–14)
NEUTROPHILS NFR BLD AUTO: 64 % — SIGNIFICANT CHANGE UP (ref 43–77)
PLAT MORPH BLD: NORMAL — SIGNIFICANT CHANGE UP
PLATELET # BLD AUTO: 634 K/UL — HIGH (ref 150–400)
POLYCHROMASIA BLD QL SMEAR: SLIGHT — SIGNIFICANT CHANGE UP
POTASSIUM SERPL-MCNC: 3.9 MMOL/L — SIGNIFICANT CHANGE UP (ref 3.5–5.3)
POTASSIUM SERPL-SCNC: 3.9 MMOL/L — SIGNIFICANT CHANGE UP (ref 3.5–5.3)
PROT SERPL-MCNC: 5.1 GM/DL — LOW (ref 6–8.3)
PROTHROM AB SERPL-ACNC: 10 SEC — SIGNIFICANT CHANGE UP (ref 9.8–12.7)
RBC # BLD: 3.02 M/UL — LOW (ref 3.8–5.2)
RBC # FLD: 13.9 % — SIGNIFICANT CHANGE UP (ref 11–15)
RBC BLD AUTO: ABNORMAL
SODIUM SERPL-SCNC: 133 MMOL/L — LOW (ref 135–145)
TROPONIN I SERPL-MCNC: <.015 NG/ML — SIGNIFICANT CHANGE UP (ref 0.01–0.04)
WBC # BLD: 8.7 K/UL — SIGNIFICANT CHANGE UP (ref 3.8–10.5)
WBC # FLD AUTO: 8.7 K/UL — SIGNIFICANT CHANGE UP (ref 3.8–10.5)

## 2017-03-27 PROCEDURE — 71010: CPT | Mod: 26

## 2017-03-27 PROCEDURE — 93970 EXTREMITY STUDY: CPT | Mod: 26

## 2017-03-27 PROCEDURE — 99285 EMERGENCY DEPT VISIT HI MDM: CPT

## 2017-03-27 PROCEDURE — 70450 CT HEAD/BRAIN W/O DYE: CPT | Mod: 26

## 2017-03-27 RX ORDER — DEXTROSE 50 % IN WATER 50 %
25 SYRINGE (ML) INTRAVENOUS ONCE
Qty: 0 | Refills: 0 | Status: DISCONTINUED | OUTPATIENT
Start: 2017-03-27 | End: 2017-04-02

## 2017-03-27 RX ORDER — SODIUM CHLORIDE 9 MG/ML
1000 INJECTION, SOLUTION INTRAVENOUS
Qty: 0 | Refills: 0 | Status: DISCONTINUED | OUTPATIENT
Start: 2017-03-27 | End: 2017-03-28

## 2017-03-27 RX ORDER — ENOXAPARIN SODIUM 100 MG/ML
40 INJECTION SUBCUTANEOUS DAILY
Qty: 0 | Refills: 0 | Status: DISCONTINUED | OUTPATIENT
Start: 2017-03-27 | End: 2017-04-02

## 2017-03-27 RX ORDER — GLUCAGON INJECTION, SOLUTION 0.5 MG/.1ML
1 INJECTION, SOLUTION SUBCUTANEOUS ONCE
Qty: 0 | Refills: 0 | Status: DISCONTINUED | OUTPATIENT
Start: 2017-03-27 | End: 2017-04-02

## 2017-03-27 RX ORDER — INSULIN LISPRO 100/ML
VIAL (ML) SUBCUTANEOUS
Qty: 0 | Refills: 0 | Status: DISCONTINUED | OUTPATIENT
Start: 2017-03-27 | End: 2017-03-31

## 2017-03-27 RX ORDER — GLATIRAMER ACETATE 20 MG/ML
0 INJECTION, SOLUTION SUBCUTANEOUS
Qty: 0 | Refills: 0 | COMMUNITY

## 2017-03-27 RX ORDER — DEXTROSE 50 % IN WATER 50 %
12.5 SYRINGE (ML) INTRAVENOUS ONCE
Qty: 0 | Refills: 0 | Status: DISCONTINUED | OUTPATIENT
Start: 2017-03-27 | End: 2017-04-02

## 2017-03-27 RX ORDER — PIPERACILLIN AND TAZOBACTAM 4; .5 G/20ML; G/20ML
3.38 INJECTION, POWDER, LYOPHILIZED, FOR SOLUTION INTRAVENOUS EVERY 8 HOURS
Qty: 0 | Refills: 0 | Status: DISCONTINUED | OUTPATIENT
Start: 2017-03-27 | End: 2017-03-29

## 2017-03-27 RX ORDER — DULOXETINE HYDROCHLORIDE 30 MG/1
60 CAPSULE, DELAYED RELEASE ORAL AT BEDTIME
Qty: 0 | Refills: 0 | Status: DISCONTINUED | OUTPATIENT
Start: 2017-03-27 | End: 2017-04-02

## 2017-03-27 RX ORDER — PANTOPRAZOLE SODIUM 20 MG/1
40 TABLET, DELAYED RELEASE ORAL
Qty: 0 | Refills: 0 | Status: DISCONTINUED | OUTPATIENT
Start: 2017-03-27 | End: 2017-04-02

## 2017-03-27 RX ORDER — PIPERACILLIN AND TAZOBACTAM 4; .5 G/20ML; G/20ML
3.38 INJECTION, POWDER, LYOPHILIZED, FOR SOLUTION INTRAVENOUS ONCE
Qty: 0 | Refills: 0 | Status: COMPLETED | OUTPATIENT
Start: 2017-03-27 | End: 2017-03-27

## 2017-03-27 RX ORDER — INFLUENZA VIRUS VACCINE 15; 15; 15; 15 UG/.5ML; UG/.5ML; UG/.5ML; UG/.5ML
0.5 SUSPENSION INTRAMUSCULAR ONCE
Qty: 0 | Refills: 0 | Status: COMPLETED | OUTPATIENT
Start: 2017-03-27 | End: 2017-04-02

## 2017-03-27 RX ORDER — DEXTROSE 50 % IN WATER 50 %
1 SYRINGE (ML) INTRAVENOUS ONCE
Qty: 0 | Refills: 0 | Status: DISCONTINUED | OUTPATIENT
Start: 2017-03-27 | End: 2017-04-02

## 2017-03-27 RX ORDER — SODIUM CHLORIDE 9 MG/ML
1000 INJECTION, SOLUTION INTRAVENOUS
Qty: 0 | Refills: 0 | Status: COMPLETED | OUTPATIENT
Start: 2017-03-27 | End: 2017-03-27

## 2017-03-27 RX ORDER — LIPASE/PROTEASE/AMYLASE 16-48-48K
1 CAPSULE,DELAYED RELEASE (ENTERIC COATED) ORAL
Qty: 0 | Refills: 0 | Status: DISCONTINUED | OUTPATIENT
Start: 2017-03-27 | End: 2017-04-02

## 2017-03-27 RX ORDER — DEXTROSE 50 % IN WATER 50 %
50 SYRINGE (ML) INTRAVENOUS ONCE
Qty: 0 | Refills: 0 | Status: COMPLETED | OUTPATIENT
Start: 2017-03-27 | End: 2017-03-27

## 2017-03-27 RX ORDER — SODIUM CHLORIDE 9 MG/ML
1000 INJECTION, SOLUTION INTRAVENOUS
Qty: 0 | Refills: 0 | Status: DISCONTINUED | OUTPATIENT
Start: 2017-03-27 | End: 2017-04-02

## 2017-03-27 RX ORDER — CLONAZEPAM 1 MG
1 TABLET ORAL AT BEDTIME
Qty: 0 | Refills: 0 | Status: DISCONTINUED | OUTPATIENT
Start: 2017-03-27 | End: 2017-04-02

## 2017-03-27 RX ADMIN — SODIUM CHLORIDE 75 MILLILITER(S): 9 INJECTION, SOLUTION INTRAVENOUS at 15:45

## 2017-03-27 RX ADMIN — Medication 1 MILLIGRAM(S): at 22:13

## 2017-03-27 RX ADMIN — Medication 1 CAPSULE(S): at 18:35

## 2017-03-27 RX ADMIN — Medication 50 MILLILITER(S): at 07:39

## 2017-03-27 RX ADMIN — Medication 4: at 18:40

## 2017-03-27 RX ADMIN — PIPERACILLIN AND TAZOBACTAM 200 GRAM(S): 4; .5 INJECTION, POWDER, LYOPHILIZED, FOR SOLUTION INTRAVENOUS at 12:50

## 2017-03-27 RX ADMIN — SODIUM CHLORIDE 100 MILLILITER(S): 9 INJECTION, SOLUTION INTRAVENOUS at 10:02

## 2017-03-27 NOTE — ED PROVIDER NOTE - OBJECTIVE STATEMENT
61 years old female by ems c/o found confused in bed this morning by her . Ems arrived and blood sugar was 37. Pt received one dextro 50 iv from ems. Pt here is alert and oriented x 2 sts she had insulin last night. Pt denies headache, blurred visions, light sensitivities, cough, sob, chest pain, nausea, vomiting, fever, chills, abd pain.

## 2017-03-27 NOTE — ED PROVIDER NOTE - PROGRESS NOTE DETAILS
Pt is lethargic and refuses to eat breakfast and repeat accu check 132 at bed side after 2 dextro 50 iv one by ems and another here. Dr. Salcedo is notified and admit pt Dr. Salcedo sts pt is Dr. Parikh's pt and Dr. Hernandez covers Dr. Childs. Dr. Hernandez is notified and admit pt.

## 2017-03-27 NOTE — ED ADULT NURSE REASSESSMENT NOTE - NS ED NURSE REASSESS COMMENT FT1
received pt from outgoing shift lethargic FS 48 , Dextrose X one ample given will continue to monitor pt.
pt eating dinner vebalize dfeeling better
pt a/ox4 at this time, noted to have BM X4, diarrhea, dark green color, Dr Hooper made aware, perineal care done will continue to monitor pt.

## 2017-03-27 NOTE — ED PROVIDER NOTE - CONSTITUTIONAL, MLM
normal... Well appearing, well nourished, awake, alert, oriented to person, place, time/situation and in no apparent distress. No nasal flaring no shoulders retractions no diaphoresis

## 2017-03-27 NOTE — ED PROVIDER NOTE - MEDICAL DECISION MAKING DETAILS
hx, exam, labs, cxr, reeval Pt has been on d5w at 100 cc / hr for 2 hours and blood sugar at bed side is 62

## 2017-03-27 NOTE — ED ADULT TRIAGE NOTE - CHIEF COMPLAINT QUOTE
BIBA, for hypoglycemia, patient blood sugar 37. 1 amp of dextrose given, repeat 80 by EMS. FS 74 at triage. Unable to get temp

## 2017-03-28 DIAGNOSIS — E16.2 HYPOGLYCEMIA, UNSPECIFIED: ICD-10-CM

## 2017-03-28 LAB
LACTATE SERPL-SCNC: 1.9 MMOL/L — SIGNIFICANT CHANGE UP (ref 0.7–2)
PROCALCITONIN SERPL-MCNC: 0.1 NG/ML — HIGH (ref 0–0.05)

## 2017-03-28 RX ORDER — INSULIN GLARGINE 100 [IU]/ML
8 INJECTION, SOLUTION SUBCUTANEOUS AT BEDTIME
Qty: 0 | Refills: 0 | Status: DISCONTINUED | OUTPATIENT
Start: 2017-03-28 | End: 2017-03-29

## 2017-03-28 RX ADMIN — PANTOPRAZOLE SODIUM 40 MILLIGRAM(S): 20 TABLET, DELAYED RELEASE ORAL at 06:15

## 2017-03-28 RX ADMIN — DULOXETINE HYDROCHLORIDE 60 MILLIGRAM(S): 30 CAPSULE, DELAYED RELEASE ORAL at 00:09

## 2017-03-28 RX ADMIN — ENOXAPARIN SODIUM 40 MILLIGRAM(S): 100 INJECTION SUBCUTANEOUS at 13:09

## 2017-03-28 RX ADMIN — Medication 1 CAPSULE(S): at 09:39

## 2017-03-28 RX ADMIN — PIPERACILLIN AND TAZOBACTAM 25 GRAM(S): 4; .5 INJECTION, POWDER, LYOPHILIZED, FOR SOLUTION INTRAVENOUS at 05:51

## 2017-03-28 RX ADMIN — Medication 1 MILLIGRAM(S): at 22:26

## 2017-03-28 RX ADMIN — Medication 10: at 08:58

## 2017-03-28 RX ADMIN — PIPERACILLIN AND TAZOBACTAM 25 GRAM(S): 4; .5 INJECTION, POWDER, LYOPHILIZED, FOR SOLUTION INTRAVENOUS at 14:50

## 2017-03-28 RX ADMIN — Medication 6: at 13:07

## 2017-03-28 RX ADMIN — PIPERACILLIN AND TAZOBACTAM 25 GRAM(S): 4; .5 INJECTION, POWDER, LYOPHILIZED, FOR SOLUTION INTRAVENOUS at 22:26

## 2017-03-28 RX ADMIN — INSULIN GLARGINE 8 UNIT(S): 100 INJECTION, SOLUTION SUBCUTANEOUS at 22:37

## 2017-03-28 RX ADMIN — Medication 1 CAPSULE(S): at 13:09

## 2017-03-28 RX ADMIN — Medication 1 CAPSULE(S): at 17:17

## 2017-03-28 RX ADMIN — Medication 6: at 16:07

## 2017-03-28 RX ADMIN — DULOXETINE HYDROCHLORIDE 60 MILLIGRAM(S): 30 CAPSULE, DELAYED RELEASE ORAL at 23:21

## 2017-03-28 RX ADMIN — PIPERACILLIN AND TAZOBACTAM 25 GRAM(S): 4; .5 INJECTION, POWDER, LYOPHILIZED, FOR SOLUTION INTRAVENOUS at 00:08

## 2017-03-28 NOTE — H&P ADULT. - HISTORY OF PRESENT ILLNESS
61 years old female by ems c/o found confused in bed this morning by her . Ems arrived and blood sugar was 37. Pt received one dextro 50 iv from ems. Pt here is alert and oriented x 2 sts she had insulin last night. Patient also has productive cough. Pt denies headache, blurred visions, light sensitivities, cough, sob, chest pain, nausea, vomiting, fever, chills, abd pain.

## 2017-03-28 NOTE — CONSULT NOTE ADULT - PROBLEM SELECTOR RECOMMENDATION 9
hypoglycemia secondary to increased insulin dose . patient has decreased Renal Gluconeogenesis and hence is prone to hypoglycemia .   also poor insight into management of her brittle diabetes   might need help at home or be placed in more supervised setting   will need insulin but multiple injections of low dose long and short acting insulin   while inpatient better to have FS< 150 and preferably in 120-140 range   Thank You for the courtesy of this consultation !!!

## 2017-03-29 DIAGNOSIS — K52.9 NONINFECTIVE GASTROENTERITIS AND COLITIS, UNSPECIFIED: ICD-10-CM

## 2017-03-29 DIAGNOSIS — E10.649 TYPE 1 DIABETES MELLITUS WITH HYPOGLYCEMIA WITHOUT COMA: ICD-10-CM

## 2017-03-29 DIAGNOSIS — J15.6 PNEUMONIA DUE TO OTHER GRAM-NEGATIVE BACTERIA: ICD-10-CM

## 2017-03-29 DIAGNOSIS — G35 MULTIPLE SCLEROSIS: ICD-10-CM

## 2017-03-29 DIAGNOSIS — I10 ESSENTIAL (PRIMARY) HYPERTENSION: ICD-10-CM

## 2017-03-29 DIAGNOSIS — K86.1 OTHER CHRONIC PANCREATITIS: ICD-10-CM

## 2017-03-29 LAB
ALBUMIN SERPL ELPH-MCNC: 1.7 G/DL — LOW (ref 3.3–5)
ALP SERPL-CCNC: 89 U/L — SIGNIFICANT CHANGE UP (ref 40–120)
ALT FLD-CCNC: 27 U/L — SIGNIFICANT CHANGE UP (ref 12–78)
ANION GAP SERPL CALC-SCNC: 11 MMOL/L — SIGNIFICANT CHANGE UP (ref 5–17)
AST SERPL-CCNC: 21 U/L — SIGNIFICANT CHANGE UP (ref 15–37)
BASOPHILS # BLD AUTO: 0.1 K/UL — SIGNIFICANT CHANGE UP (ref 0–0.2)
BASOPHILS NFR BLD AUTO: 1.1 % — SIGNIFICANT CHANGE UP (ref 0–2)
BILIRUB SERPL-MCNC: 0.3 MG/DL — SIGNIFICANT CHANGE UP (ref 0.2–1.2)
BUN SERPL-MCNC: 10 MG/DL — SIGNIFICANT CHANGE UP (ref 7–23)
CALCIUM SERPL-MCNC: 7.8 MG/DL — LOW (ref 8.5–10.1)
CHLORIDE SERPL-SCNC: 94 MMOL/L — LOW (ref 96–108)
CO2 SERPL-SCNC: 29 MMOL/L — SIGNIFICANT CHANGE UP (ref 22–31)
CREAT SERPL-MCNC: 0.71 MG/DL — SIGNIFICANT CHANGE UP (ref 0.5–1.3)
EOSINOPHIL # BLD AUTO: 0.1 K/UL — SIGNIFICANT CHANGE UP (ref 0–0.5)
EOSINOPHIL NFR BLD AUTO: 0.6 % — SIGNIFICANT CHANGE UP (ref 0–6)
GLUCOSE SERPL-MCNC: 253 MG/DL — HIGH (ref 70–99)
HCT VFR BLD CALC: 27.3 % — LOW (ref 34.5–45)
HGB BLD-MCNC: 9.2 G/DL — LOW (ref 11.5–15.5)
LACTATE SERPL-SCNC: 1.8 MMOL/L — SIGNIFICANT CHANGE UP (ref 0.7–2)
LYMPHOCYTES # BLD AUTO: 2.6 K/UL — SIGNIFICANT CHANGE UP (ref 1–3.3)
LYMPHOCYTES # BLD AUTO: 30.9 % — SIGNIFICANT CHANGE UP (ref 13–44)
MCHC RBC-ENTMCNC: 30.1 PG — SIGNIFICANT CHANGE UP (ref 27–34)
MCHC RBC-ENTMCNC: 33.8 GM/DL — SIGNIFICANT CHANGE UP (ref 32–36)
MCV RBC AUTO: 89 FL — SIGNIFICANT CHANGE UP (ref 80–100)
MONOCYTES # BLD AUTO: 0.5 K/UL — SIGNIFICANT CHANGE UP (ref 0–0.9)
MONOCYTES NFR BLD AUTO: 6.1 % — SIGNIFICANT CHANGE UP (ref 2–14)
NEUTROPHILS # BLD AUTO: 5.2 K/UL — SIGNIFICANT CHANGE UP (ref 1.8–7.4)
NEUTROPHILS NFR BLD AUTO: 61.3 % — SIGNIFICANT CHANGE UP (ref 43–77)
PLATELET # BLD AUTO: 710 K/UL — HIGH (ref 150–400)
POTASSIUM SERPL-MCNC: 3.5 MMOL/L — SIGNIFICANT CHANGE UP (ref 3.5–5.3)
POTASSIUM SERPL-SCNC: 3.5 MMOL/L — SIGNIFICANT CHANGE UP (ref 3.5–5.3)
PROT SERPL-MCNC: 4.9 GM/DL — LOW (ref 6–8.3)
RBC # BLD: 3.07 M/UL — LOW (ref 3.8–5.2)
RBC # FLD: 14.2 % — SIGNIFICANT CHANGE UP (ref 11–15)
SODIUM SERPL-SCNC: 134 MMOL/L — LOW (ref 135–145)
WBC # BLD: 8.5 K/UL — SIGNIFICANT CHANGE UP (ref 3.8–10.5)
WBC # FLD AUTO: 8.5 K/UL — SIGNIFICANT CHANGE UP (ref 3.8–10.5)

## 2017-03-29 PROCEDURE — 71250 CT THORAX DX C-: CPT | Mod: 26

## 2017-03-29 RX ORDER — CEFAZOLIN SODIUM 1 G
VIAL (EA) INJECTION
Qty: 0 | Refills: 0 | Status: DISCONTINUED | OUTPATIENT
Start: 2017-03-29 | End: 2017-04-02

## 2017-03-29 RX ORDER — IBUPROFEN 200 MG
400 TABLET ORAL THREE TIMES A DAY
Qty: 0 | Refills: 0 | Status: DISCONTINUED | OUTPATIENT
Start: 2017-03-29 | End: 2017-04-02

## 2017-03-29 RX ORDER — DIPHENOXYLATE HCL/ATROPINE 2.5-.025MG
1 TABLET ORAL THREE TIMES A DAY
Qty: 0 | Refills: 0 | Status: DISCONTINUED | OUTPATIENT
Start: 2017-03-29 | End: 2017-04-01

## 2017-03-29 RX ORDER — INSULIN GLARGINE 100 [IU]/ML
14 INJECTION, SOLUTION SUBCUTANEOUS AT BEDTIME
Qty: 0 | Refills: 0 | Status: DISCONTINUED | OUTPATIENT
Start: 2017-03-29 | End: 2017-03-31

## 2017-03-29 RX ORDER — CEFAZOLIN SODIUM 1 G
1000 VIAL (EA) INJECTION EVERY 8 HOURS
Qty: 0 | Refills: 0 | Status: DISCONTINUED | OUTPATIENT
Start: 2017-03-29 | End: 2017-04-02

## 2017-03-29 RX ORDER — FLUTICASONE PROPIONATE 50 MCG
1 SPRAY, SUSPENSION NASAL
Qty: 0 | Refills: 0 | Status: DISCONTINUED | OUTPATIENT
Start: 2017-03-29 | End: 2017-04-02

## 2017-03-29 RX ORDER — ACETAMINOPHEN 500 MG
650 TABLET ORAL EVERY 6 HOURS
Qty: 0 | Refills: 0 | Status: DISCONTINUED | OUTPATIENT
Start: 2017-03-29 | End: 2017-04-02

## 2017-03-29 RX ORDER — CEFAZOLIN SODIUM 1 G
1000 VIAL (EA) INJECTION ONCE
Qty: 0 | Refills: 0 | Status: COMPLETED | OUTPATIENT
Start: 2017-03-29 | End: 2017-03-29

## 2017-03-29 RX ORDER — LOPERAMIDE HCL 2 MG
2 TABLET ORAL THREE TIMES A DAY
Qty: 0 | Refills: 0 | Status: DISCONTINUED | OUTPATIENT
Start: 2017-03-29 | End: 2017-03-29

## 2017-03-29 RX ADMIN — Medication 400 MILLIGRAM(S): at 21:40

## 2017-03-29 RX ADMIN — Medication 1 TABLET(S): at 22:17

## 2017-03-29 RX ADMIN — Medication 1 SPRAY(S): at 21:51

## 2017-03-29 RX ADMIN — Medication 650 MILLIGRAM(S): at 13:03

## 2017-03-29 RX ADMIN — Medication 1 CAPSULE(S): at 13:02

## 2017-03-29 RX ADMIN — Medication 1 MILLIGRAM(S): at 21:52

## 2017-03-29 RX ADMIN — Medication 2 MILLIGRAM(S): at 13:02

## 2017-03-29 RX ADMIN — PANTOPRAZOLE SODIUM 40 MILLIGRAM(S): 20 TABLET, DELAYED RELEASE ORAL at 06:48

## 2017-03-29 RX ADMIN — DULOXETINE HYDROCHLORIDE 60 MILLIGRAM(S): 30 CAPSULE, DELAYED RELEASE ORAL at 21:52

## 2017-03-29 RX ADMIN — INSULIN GLARGINE 8 UNIT(S): 100 INJECTION, SOLUTION SUBCUTANEOUS at 22:17

## 2017-03-29 RX ADMIN — Medication 2: at 11:12

## 2017-03-29 RX ADMIN — Medication 400 MILLIGRAM(S): at 22:40

## 2017-03-29 RX ADMIN — Medication 650 MILLIGRAM(S): at 14:56

## 2017-03-29 RX ADMIN — Medication 6: at 16:31

## 2017-03-29 RX ADMIN — Medication 100 MILLIGRAM(S): at 19:55

## 2017-03-29 RX ADMIN — Medication 6: at 07:44

## 2017-03-29 RX ADMIN — Medication 1 CAPSULE(S): at 09:47

## 2017-03-29 RX ADMIN — Medication 1 CAPSULE(S): at 16:32

## 2017-03-29 RX ADMIN — PIPERACILLIN AND TAZOBACTAM 25 GRAM(S): 4; .5 INJECTION, POWDER, LYOPHILIZED, FOR SOLUTION INTRAVENOUS at 05:36

## 2017-03-29 RX ADMIN — PIPERACILLIN AND TAZOBACTAM 25 GRAM(S): 4; .5 INJECTION, POWDER, LYOPHILIZED, FOR SOLUTION INTRAVENOUS at 13:03

## 2017-03-29 RX ADMIN — ENOXAPARIN SODIUM 40 MILLIGRAM(S): 100 INJECTION SUBCUTANEOUS at 11:15

## 2017-03-29 NOTE — PROGRESS NOTE ADULT - SUBJECTIVE AND OBJECTIVE BOX
Patient is a 61y old  Female who presents with a chief complaint of Hypoglycemia (28 Mar 2017 10:09)      Interval History: finger sticks are now in 200's   on Lantus 8 units and Lispro sliding scale coverage with meals , off IV fluids  with good PO     MEDICATIONS  (STANDING):  clonazePAM Tablet 1milliGRAM(s) Oral at bedtime  DULoxetine 60milliGRAM(s) Oral at bedtime  amylase/lipase/protease  (CREON  6,000 Units) 1Capsule(s) Oral three times a day with meals  pantoprazole    Tablet 40milliGRAM(s) Oral before breakfast  insulin lispro (HumaLOG) corrective regimen sliding scale  SubCutaneous three times a day before meals  dextrose 5%. 1000milliLiter(s) IV Continuous <Continuous>  dextrose 50% Injectable 12.5Gram(s) IV Push once  dextrose 50% Injectable 25Gram(s) IV Push once  dextrose 50% Injectable 25Gram(s) IV Push once  enoxaparin Injectable 40milliGRAM(s) SubCutaneous daily  influenza   Vaccine 0.5milliLiter(s) IntraMuscular once  freetext medication  - 100milliGRAM(s) Oral two times a day  freetext medication  - 20milliGRAM(s) SubCutaneous at bedtime  diphenoxylate/atropine 1Tablet(s) Oral three times a day  ibuprofen  Tablet 400milliGRAM(s) Oral three times a day  ceFAZolin   IVPB  IV Intermittent   ceFAZolin   IVPB 1000milliGRAM(s) IV Intermittent every 8 hours  fluticasone propionate 50 MICROgram(s)/spray Nasal Spray 1Spray(s) Both Nostrils two times a day  insulin glargine Injectable (LANTUS) 14Unit(s) SubCutaneous at bedtime    MEDICATIONS  (PRN):  dextrose Gel 1Dose(s) Oral once PRN Blood Glucose LESS THAN 70 milliGRAM(s)/deciliter  glucagon  Injectable 1milliGRAM(s) IntraMuscular once PRN Glucose LESS THAN 70 milligrams/deciliter  acetaminophen   Tablet. 650milliGRAM(s) Oral every 6 hours PRN Moderate Pain (4 - 6)      Allergies    No Known Allergies    Intolerances        REVIEW OF SYSTEMS:  CONSTITUTIONAL: no change in mental status   EYES: No eye pain, visual disturbances, or discharge  ENMT:  No difficulty hearing, tinnitus, vertigo; No sinus or throat pain  NECK: No pain or stiffness  RESPIRATORY: No cough, wheezing, chills or hemoptysis; No shortness of breath  CARDIOVASCULAR: No chest pain, palpitations, dizziness, or leg swelling  GASTROINTESTINAL: No abdominal or epigastric pain. No nausea, vomiting, or hematemesis; No diarrhea or constipation. No melena or hematochezia.  GENITOURINARY: No dysuria, frequency, hematuria, or incontinence  NEUROLOGICAL: No headaches, memory loss, loss of strength, numbness, or tremors  SKIN: No itching, burning, rashes, or lesions   LYMPH NODES: No enlarged glands  ENDOCRINE: No heat or cold intolerance; No hair loss  MUSCULOSKELETAL: No joint pain or swelling; No muscle, back, or extremity pain  PSYCHIATRIC: No depression, anxiety, mood swings, or difficulty sleeping  HEME/LYMPH: No easy bruising, or bleeding gums  ALLERY AND IMMUNOLOGIC: No hives or eczema    Vital Signs Last 24 Hrs  T(C): 37.1, Max: 37.7 (03-29 @ 00:16)  T(F): 98.8, Max: 99.8 (03-29 @ 00:16)  HR: 97 (90 - 118)  BP: 135/81 (105/67 - 155/88)  BP(mean): --  RR: 18 (17 - 18)  SpO2: 98% (93% - 98%)    PHYSICAL EXAM:  GENERAL:   HEAD:  Atraumatic, Normocephalic  EYES: EOMI, PERRLA, conjunctiva and sclera clear  ENMT: No tonsillar erythema, exudates, or enlargement; Moist mucous membranes, Good dentition, No lesions  NECK: Supple, No JVD, Normal thyroid  NERVOUS SYSTEM:  Alert & Oriented X3, Good concentration; Motor Strength 5/5 B/L upper and lower extremities; DTRs 2+ intact and symmetric  CHEST/LUNG: Clear to percussion bilaterally; No rales, rhonchi, wheezing, or rubs  HEART: Regular rate and rhythm; No murmurs, rubs, or gallops  ABDOMEN: Soft, Nontender, Nondistended; Bowel sounds present  EXTREMITIES:  2+ Peripheral Pulses, No clubbing, cyanosis, or edema  SKIN: No rashes or lesions    LABS:        CAPILLARY BLOOD GLUCOSE  331 (29 Mar 2017 23:08)  254 (29 Mar 2017 16:30)  161 (29 Mar 2017 11:10)  267 (29 Mar 2017 07:43)    Lipid panel:           Thyroid:  Diabetes Tests:  Parathyroid Panel:  Adrenals:  RADIOLOGY & ADDITIONAL TESTS:    Imaging Personally Reviewed:  [ ] YES  [ ] NO    Consultant(s) Notes Reviewed:  [ ] YES  [ ] NO    Care Discussed with Consultants/Other Providers [ ] YES  [ ] NO

## 2017-03-29 NOTE — PROGRESS NOTE ADULT - ASSESSMENT
61 years old female is admitted for hypoglycemia, h/o DM II, PNA, r/o sepsis, elevated lactate, MD  Plan: IV D5.45% NS. IV Abx. Monitor lactate level. Blood and urine culture. Chest x ray noted. On Zosyn. will get pulmonary consult.

## 2017-03-29 NOTE — PHYSICAL THERAPY INITIAL EVALUATION ADULT - ADDITIONAL COMMENTS
Pt reports she has 3 steps to negotiate with L railing up/R railing down in order to enter home/kitchen. She reports she sleeps on first floor of home.

## 2017-03-29 NOTE — PROGRESS NOTE ADULT - SUBJECTIVE AND OBJECTIVE BOX
Patient is a 61y old  Female who presents with a chief complaint of Hypoglycemia (28 Mar 2017 10:09)      INTERVAL HPI/OVERNIGHT EVENTS: developed diarrhea.    MEDICATIONS  (STANDING):  clonazePAM Tablet 1milliGRAM(s) Oral at bedtime  DULoxetine 60milliGRAM(s) Oral at bedtime  amylase/lipase/protease  (CREON  6,000 Units) 1Capsule(s) Oral three times a day with meals  pantoprazole    Tablet 40milliGRAM(s) Oral before breakfast  insulin lispro (HumaLOG) corrective regimen sliding scale  SubCutaneous three times a day before meals  dextrose 5%. 1000milliLiter(s) IV Continuous <Continuous>  dextrose 50% Injectable 12.5Gram(s) IV Push once  dextrose 50% Injectable 25Gram(s) IV Push once  dextrose 50% Injectable 25Gram(s) IV Push once  enoxaparin Injectable 40milliGRAM(s) SubCutaneous daily  piperacillin/tazobactam IVPB. 3.375Gram(s) IV Intermittent every 8 hours  influenza   Vaccine 0.5milliLiter(s) IntraMuscular once  freetext medication  - 100milliGRAM(s) Oral two times a day  freetext medication  - 20milliGRAM(s) SubCutaneous at bedtime  insulin glargine Injectable (LANTUS) 8Unit(s) SubCutaneous at bedtime  loperamide 2milliGRAM(s) Oral three times a day    MEDICATIONS  (PRN):  dextrose Gel 1Dose(s) Oral once PRN Blood Glucose LESS THAN 70 milliGRAM(s)/deciliter  glucagon  Injectable 1milliGRAM(s) IntraMuscular once PRN Glucose LESS THAN 70 milligrams/deciliter      Allergies    No Known Allergies    Intolerances        REVIEW OF SYSTEMS:  CONSTITUTIONAL: No fever, weight loss, or fatigue  EYES: No eye pain, visual disturbances, or discharge  ENMT:  No difficulty hearing, tinnitus, vertigo; No sinus or throat pain  NECK: No pain or stiffness  BREASTS: No pain, masses, or nipple discharge  RESPIRATORY: No cough, wheezing, chills or hemoptysis; No shortness of breath  CARDIOVASCULAR: No chest pain, palpitations, dizziness, or leg swelling  GASTROINTESTINAL: No abdominal or epigastric pain. No nausea, vomiting, or hematemesis; No diarrhea or constipation. No melena or hematochezia.  GENITOURINARY: No dysuria, frequency, hematuria, or incontinence  NEUROLOGICAL: No headaches, memory loss, loss of strength, numbness, or tremors  SKIN: No itching, burning, rashes, or lesions   LYMPH NODES: No enlarged glands  ENDOCRINE: No heat or cold intolerance; No hair loss  MUSCULOSKELETAL: No joint pain or swelling; No muscle, back, or extremity pain  PSYCHIATRIC: No depression, anxiety, mood swings, or difficulty sleeping  HEME/LYMPH: No easy bruising, or bleeding gums  ALLERGY AND IMMUNOLOGIC: No hives or eczema    Vital Signs Last 24 Hrs  T(C): 37, Max: 37.7 (03-29 @ 00:16)  T(F): 98.6, Max: 99.8 (03-29 @ 00:16)  HR: 98 (98 - 118)  BP: 137/86 (103/711 - 155/88)  BP(mean): --  RR: 18 (17 - 18)  SpO2: 95% (93% - 96%)    PHYSICAL EXAM:  GENERAL: NAD, well-groomed, well-developed  HEAD:  Atraumatic, Normocephalic  EYES: EOMI, PERRLA, conjunctiva and sclera clear  ENMT:  Moist mucous membranes, Good dentition, No lesions  NECK: Supple, No JVD, Normal thyroid  NERVOUS SYSTEM:  Alert & Oriented X3, Good concentration; Motor Strength 5/5 B/L upper and lower extremities; DTRs 2+ intact and symmetric  CHEST/LUNG: Clear to percussion bilaterally; No rales, rhonchi, wheezing, or rubs  HEART: Regular rate and rhythm; No murmurs, rubs, or gallops  ABDOMEN: Soft, Nontender, Nondistended; Bowel sounds present  EXTREMITIES:  2+ Peripheral Pulses, No clubbing, cyanosis,Edema resolved, erythema present.  LYMPH: No lymphadenopathy noted  SKIN: Erythema lower ext.    LABS:                        9.2    8.5   )-----------( 710      ( 29 Mar 2017 07:06 )             27.3     29 Mar 2017 07:06    134    |  94     |  10     ----------------------------<  253    3.5     |  29     |  0.71     Ca    7.8        29 Mar 2017 07:06    TPro  4.9    /  Alb  1.7    /  TBili  0.3    /  DBili  x      /  AST  21     /  ALT  27     /  AlkPhos  89     29 Mar 2017 07:06  Procalcitonin, Serum: 0.10: PCT Interpretation  -----------------------  PCT <= 0.5 ng/mL  Systemic infection (sepsis) is not likely and risk for  progression to severe systemic infection (severe sepsis/septic shock)  is low. Local Bacterial infection is possible.  If early seps0.10: is is suspected clinically, PCT may be re-assessed  in 6-24 hours  PCT > 0.5 and <= 1  ng/mL  Systemic infection (sepsis) is possible, but other conditions are known  to elevate PCT as well.Moderate risk for progression to severe systemic  infection (0.10: severe sepsis/septic shock).  The patient should be closely monitored both clinically and by  re-assessing  PCT within 6-24 hours.  PCT > 1 ng/mL  Systemic infection (sepsis) is likely, unless other causes are known.  High risk for progression to seve0.10: re systemic infection (severe  sepsis/septic  shock).  PCT >= 10 ng/mL  Important systemic inflammatory response, almost exclusively due to severe  bacterial sepsis or septic shock.  High likelihood of severe sepsis or septic shock ng/mL (03.28.17 @ 21:06)        CAPILLARY BLOOD GLUCOSE  267 (29 Mar 2017 07:43)  332 (28 Mar 2017 22:30)  279 (28 Mar 2017 16:01)  270 (28 Mar 2017 13:06)      RADIOLOGY & ADDITIONAL TESTS:      EXAM:  CHEST SINGLE VIEW                            PROCEDURE DATE:  03/27/2017        INTERPRETATION:  EXAM: Portable chest x-ray.    CLINICAL INFORMATION: Cough. Patient is not able to communicate.    FINDINGS: Shallow inspiration limits the exam. Cardiac silhouette is   magnified in this projection. Slight increased haziness is seen in both   mid to lower lung zones compatible with bilateral airspace disease. This   could represent pneumonia in the appropriate clinical setting and   clinicalcorrelation is advised. Remainder of the study appears similar   to prior exam of 3/14/2016.    IMPRESSION: Findings as above.              AUTUMN ELDER M.D., ATTENDING RADIOLOGIST  This document has been electronically signed. Mar 27 2017 10:08AM               Imaging Personally Reviewed:  [x ] YES  [ ] NO    Consultant(s) Notes Reviewed:  [x ] YES  [ ] NO    Care Discussed with Consultants/Other Providers [ ] YES  [ ] NO    PROBLEMS:  HYPOGLYCEMIA; PNEUMONIA  LOW BLOOD SUGAR  Hypoglycemia  Hypoglycemia      Care discussed with family,         [  ]   yes  [  ]  No    imp:    stable[x ]    unstable[  ]     improving [   ]       unchanged  [  ]                Plans:  Continue present plans  [  ]               New consult [  ]   specialty  .......               order rochelle[  ]    test name.                  Discharge Planning  [  ]

## 2017-03-29 NOTE — PHYSICAL THERAPY INITIAL EVALUATION ADULT - GAIT DEVIATIONS NOTED, PT EVAL
increased stride width/decreased velocity of limb motion/decreased stride length/decreased step length/increased time in double stance/decreased arielle

## 2017-03-29 NOTE — CONSULT NOTE ADULT - ASSESSMENT
diabetes I   hypoglycemia
bilateral leg cellulitis   resolving health care associated oneumonia ; no new infiltrates Vs last admission   hiv test and counselling though no real risk factors per patient   antibiotics   local care   will follow with you thanks   flu and pneumovax before discharge

## 2017-03-29 NOTE — PHYSICAL THERAPY INITIAL EVALUATION ADULT - CRITERIA FOR SKILLED THERAPEUTIC INTERVENTIONS
predicted duration of therapy intervention/home with home PT. Pt reports she has rolling walker at home/therapy frequency/risk reduction/prevention/impairments found/functional limitations in following categories/anticipated discharge recommendation

## 2017-03-29 NOTE — CONSULT NOTE ADULT - SUBJECTIVE AND OBJECTIVE BOX
61 years old female by ems c/o found confused in bed this morning by her . Ems arrived and blood sugar was 37. Pt received one dextro 50 iv from ems. Pt here is alert and oriented x 2 sts she had insulin last night. Pt denies headache, blurred visions, light sensitivities, cough, sob, chest pain, nausea, vomiting, fever, chills, abd pain.  was really stable before this hypoglycemic episode  PAST MEDICAL & SURGICAL HISTORY:  Multiple sclerosis  Myasthenia gravis  Hypertension  Diabetes  H/O cervical spine surgery  recent gr B.salmonella in blood     SOCHX:   ex tobacco,   age 16 to 16 weeks ago  ex alcohol;; goes to [facility]; sober x 2 years   disabled 14 years   same man 30 years ;  22   FMHX: FA/MO  -non  contributory       Recent Travel:    Immunizations:    Allergies    No Known Allergies    Intolerances        MEDICATIONS  (STANDING):  clonazePAM Tablet 1milliGRAM(s) Oral at bedtime  DULoxetine 60milliGRAM(s) Oral at bedtime  amylase/lipase/protease  (CREON  6,000 Units) 1Capsule(s) Oral three times a day with meals  pantoprazole    Tablet 40milliGRAM(s) Oral before breakfast  insulin lispro (HumaLOG) corrective regimen sliding scale  SubCutaneous three times a day before meals  dextrose 5%. 1000milliLiter(s) IV Continuous <Continuous>  dextrose 50% Injectable 12.5Gram(s) IV Push once  dextrose 50% Injectable 25Gram(s) IV Push once  dextrose 50% Injectable 25Gram(s) IV Push once  enoxaparin Injectable 40milliGRAM(s) SubCutaneous daily  influenza   Vaccine 0.5milliLiter(s) IntraMuscular once  freetext medication  - 100milliGRAM(s) Oral two times a day  freetext medication  - 20milliGRAM(s) SubCutaneous at bedtime  insulin glargine Injectable (LANTUS) 8Unit(s) SubCutaneous at bedtime  diphenoxylate/atropine 1Tablet(s) Oral three times a day  ibuprofen  Tablet 400milliGRAM(s) Oral three times a day  ceFAZolin   IVPB  IV Intermittent     MEDICATIONS  (PRN):  dextrose Gel 1Dose(s) Oral once PRN Blood Glucose LESS THAN 70 milliGRAM(s)/deciliter  glucagon  Injectable 1milliGRAM(s) IntraMuscular once PRN Glucose LESS THAN 70 milligrams/deciliter  acetaminophen   Tablet. 650milliGRAM(s) Oral every 6 hours PRN Moderate Pain (4 - 6)      REVIEW OF SYSTEMS:  CONSTITUTIONAL: No fever,  has had  weight loss,  also  fatigue since last admitted  EYES: No eye pain, visual disturbances, or discharge  ENMT:  No difficulty hearing, tinnitus, vertigo; No sinus or throat pain  NECK: No pain or stiffness  BREASTS: No pain, masses, or nipple discharge  RESPIRATORY: No cough, wheezing, chills or hemoptysis; No shortness of breath  CARDIOVASCULAR: No chest pain, palpitations, dizziness, or leg swelling  GASTROINTESTINAL: No abdominal or epigastric pain. No nausea, vomiting, or hematemesis; No diarrhea or constipation. No melena or hematochezia.  GENITOURINARY: No dysuria, frequency, hematuria, or incontinence  NEUROLOGICAL: No headaches, memory loss  , loss of strength plus   SKIN: No itching, burning, rashes, or lesions ; recent herpes simplex oralis ; still itching her   LYMPH NODES: No enlarged glands  ENDOCRINE: No heat or cold intolerance; No hair loss  MUSCULOSKELETAL: No joint pain or swelling; No muscle, back, or extremity pain  PSYCHIATRIC: has had depression,major issues since moms death and because of stepson    anxiety, mood swings, or difficulty sleeping  HEME/LYMPH: No easy bruising, or bleeding gums  ALLERGY AND IMMUNOLOGIC: No hives or eczema    VITAL SIGNS:    T(C): 37.1, Max: 37.7 (03-29 @ 00:16)  T(F): 98.8, Max: 99.8 (03-29 @ 00:16)  HR: 97 (90 - 118)  BP: 135/81 (105/67 - 155/88)  RR: 18 (17 - 18)  SpO2: 98% (93% - 98%)  Wt(kg): --    PHYSICAL EXAM:    GENERAL: NAD, well-groomed, well-developed  HEAD:  Atraumatic, Normocephalic  EYES: EOMI, PERRLA, conjunctiva and sclera clear  ENMT: No tonsillar erythema, exudates, or enlargement;   misbah oral discolouration from last herpes outbreak   NECK: Supple, No JVD, Normal thyroid  NERVOUS SYSTEM:  Alert & Oriented X3, Good concentration; Motor Strength 5/5 B/L upper and lower extremities; DTRs 2+ intact and symmetric  CHEST/LUNG: Clear to percussion bilaterally; No rales, rhonchi, wheezing bilaterally  HEART: Regular rate and rhythm; No murmurs, rubs, or gallops  ABDOMEN: Soft, Nontender, Nondistended; Bowel sounds present  EXTREMITIES:   bilateral edema;; ?livido reticularis and cellulitis and edema   LYMPH: No lymphadenopathy noted  SKIN: No rashes or lesions  BACK: no pressor sore     LABS:                         9.2    8.5   )-----------( 710      ( 29 Mar 2017 07:06 )             27.3     29 Mar 2017 07:06    134    |  94     |  10     ----------------------------<  253    3.5     |  29     |  0.71     Ca    7.8        29 Mar 2017 07:06    TPro  4.9    /  Alb  1.7    /  TBili  0.3    /  DBili  x      /  AST  21     /  ALT  27     /  AlkPhos  89     29 Mar 2017 07:06    LIVER FUNCTIONS - ( 29 Mar 2017 07:06 )  Alb: 1.7 g/dL / Pro: 4.9 gm/dL / ALK PHOS: 89 U/L / ALT: 27 U/L / AST: 21 U/L / GGT: x                                   Culture Results:   No growth to date. (03-28 @ 14:53)  Culture Results:   No growth to date. (03-27 @ 10:11)  Culture Results:   No growth to date. (03-27 @ 10:11)                Radiology:    MPRESSION:     Mild interval decrease of lingula and right lower lobe consolidations.  Small bilateral pleural effusions.
Patient is a 61y old  Female who presents with a chief complaint of Hypoglycemia (28 Mar 2017 10:09)      Reason For Consult:  hypoglycemia     HPI:  61 years old female by ems c/o found confused in bed this morning by her . Ems arrived and blood sugar was 37. Pt received one dextro 50 iv from ems. Pt here is alert and oriented x 2  she had insulin last night. Patient also has productive cough. Pt denies headache, blurred visions, light sensitivities, cough, sob, chest pain, nausea, vomiting, fever, chills, abd pain. (28 Mar 2017 10:09)  Endocrine History :     brittle diabetic and recently with decreased renal function and Renal Gluconeogenesis . hypoglycemia could be secondary to mismatch between food intake and insulin dose and/or timing             PAST MEDICAL & SURGICAL HISTORY:  Multiple sclerosis  Myasthenia gravis  Hypertension  Diabetes  H/O cervical spine surgery      FAMILY HISTORY:  No pertinent family history in first degree relatives        Social History:    MEDICATIONS  (STANDING):  clonazePAM Tablet 1milliGRAM(s) Oral at bedtime  DULoxetine 60milliGRAM(s) Oral at bedtime  amylase/lipase/protease  (CREON  6,000 Units) 1Capsule(s) Oral three times a day with meals  pantoprazole    Tablet 40milliGRAM(s) Oral before breakfast  insulin lispro (HumaLOG) corrective regimen sliding scale  SubCutaneous three times a day before meals  dextrose 5%. 1000milliLiter(s) IV Continuous <Continuous>  dextrose 50% Injectable 12.5Gram(s) IV Push once  dextrose 50% Injectable 25Gram(s) IV Push once  dextrose 50% Injectable 25Gram(s) IV Push once  enoxaparin Injectable 40milliGRAM(s) SubCutaneous daily  piperacillin/tazobactam IVPB. 3.375Gram(s) IV Intermittent every 8 hours  influenza   Vaccine 0.5milliLiter(s) IntraMuscular once  freetext medication  - 100milliGRAM(s) Oral two times a day  freetext medication  - 20milliGRAM(s) SubCutaneous at bedtime  insulin glargine Injectable (LANTUS) 8Unit(s) SubCutaneous at bedtime    MEDICATIONS  (PRN):  dextrose Gel 1Dose(s) Oral once PRN Blood Glucose LESS THAN 70 milliGRAM(s)/deciliter  glucagon  Injectable 1milliGRAM(s) IntraMuscular once PRN Glucose LESS THAN 70 milligrams/deciliter      REVIEW OF SYSTEMS:  CONSTITUTIONAL:  stable   HEENT:  Eyes:  No diplopia or blurred vision. ENT:  No earache, sore throat or runny nose.  CARDIOVASCULAR:  No pressure, squeezing, strangling, tightness, heaviness or aching about the chest, neck, axilla or epigastrium.  RESPIRATORY:  No cough, shortness of breath, PND or orthopnea.  GASTROINTESTINAL:  No nausea, vomiting or diarrhea.  GENITOURINARY:  No dysuria, frequency or urgency. No Blood in urine  MUSCULOSKELETAL:  no joint aches, no muscle pain, myalgia  SKIN:  No change in skin, hair or nails.  NEUROLOGIC:  No paresthesias, fasciculations, seizures or weakness.  PSYCHIATRIC:  No disorder of thought or mood.  ENDOCRINE:  No heat or cold intolerance, polyuria or polydipsia. abnormal weight gain or loss, oral thrush  HEMATOLOGICAL:  No easy bruising or bleeding.     T(C): 37.4, Max: 37.7 (03-28 @ 05:26)  HR: 103 (98 - 114)  BP: 134/80 (103/711 - 134/80)  RR: 18 (17 - 18)  SpO2: 96% (94% - 97%)  Wt(kg): --    PHYSICAL EXAM: in NAD , weak , mental status clear  GENERAL: NAD, well-groomed, well-developed  HEAD:  Atraumatic, Normocephalic  EYES: EOMI, PERRLA, conjunctiva and sclera clear  ENMT: No tonsillar erythema, exudates, or enlargement; Moist mucous membranes, Good dentition, No lesions  NECK: Supple, No JVD, Normal thyroid  NERVOUS SYSTEM:  Alert & Oriented X3, Good concentration; Motor Strength 5/5 B/L upper and lower extremities; DTRs 2+ intact and symmetric  CHEST/LUNG: Clear to percussion bilaterally; No rales, rhonchi, wheezing, or rubs  HEART: Regular rate and rhythm; No murmurs, rubs, or gallops  ABDOMEN: Soft, Nontender, Nondistended; Bowel sounds present  EXTREMITIES:  2+ Peripheral Pulses, No clubbing, cyanosis, or edema  LYMPH: No lymphadenopathy noted  SKIN: No rashes or lesions    CAPILLARY BLOOD GLUCOSE  279 (28 Mar 2017 16:01)  270 (28 Mar 2017 13:06)  394 (28 Mar 2017 08:50)  358 (27 Mar 2017 21:47)                            9.2    8.7   )-----------( 634      ( 27 Mar 2017 07:21 )             27.0       CMP:  03-27 @ 07:21  SGPT 25  Albumin 1.8   Alk Phos 96   Anion Gap 5   SGOT 27   Total Bili 0.3   BUN 9   Calcium Total 7.7   CO2 29   Chloride 99   Creatinine 0.49   eGFR if    eGFR if non    Glucose 55   Potassium 3.9   Protein 5.1   Sodium 133      Thyroid Function Tests:      Diabetes Tests:     Parathyroids:     Adrenals:       Radiology:
Patient is a 61y old  Female who presents with a chief complaint of Hypoglycemia (28 Mar 2017 10:09)      HPI:  61 years old female by ems c/o found confused in bed this morning by her . Ems arrived and blood sugar was 37. Pt received one dextro 50 iv from ems. Pt here is alert and oriented x 2 sts she had insulin last night. Patient also has productive cough but not some thing new. No fever or chills. Few months ago was admitted with Salmonella sepsis.  Smoked close to 45 years, quit few months ago.    PAST MEDICAL & SURGICAL HISTORY:  Multiple sclerosis  Myasthenia gravis  Hypertension  Diabetes  H/O cervical spine surgery    FAMILY HISTORY:  No pertinent family history in first degree relatives    SOCIAL HISTORY: Smoked 45 years, quit few months ago.    Allergies  No Known Allergies    MEDICATIONS  (STANDING):  clonazePAM Tablet 1milliGRAM(s) Oral at bedtime  DULoxetine 60milliGRAM(s) Oral at bedtime  amylase/lipase/protease  (CREON  6,000 Units) 1Capsule(s) Oral three times a day with meals  pantoprazole    Tablet 40milliGRAM(s) Oral before breakfast  insulin lispro (HumaLOG) corrective regimen sliding scale  SubCutaneous three times a day before meals  dextrose 5%. 1000milliLiter(s) IV Continuous <Continuous>  dextrose 50% Injectable 12.5Gram(s) IV Push once  dextrose 50% Injectable 25Gram(s) IV Push once  dextrose 50% Injectable 25Gram(s) IV Push once  enoxaparin Injectable 40milliGRAM(s) SubCutaneous daily  influenza   Vaccine 0.5milliLiter(s) IntraMuscular once  freetext medication  - 100milliGRAM(s) Oral two times a day  freetext medication  - 20milliGRAM(s) SubCutaneous at bedtime  insulin glargine Injectable (LANTUS) 8Unit(s) SubCutaneous at bedtime  diphenoxylate/atropine 1Tablet(s) Oral three times a day  ibuprofen  Tablet 400milliGRAM(s) Oral three times a day  ceFAZolin   IVPB  IV Intermittent   ceFAZolin   IVPB 1000milliGRAM(s) IV Intermittent once  ceFAZolin   IVPB 1000milliGRAM(s) IV Intermittent every 8 hours    MEDICATIONS  (PRN):  dextrose Gel 1Dose(s) Oral once PRN Blood Glucose LESS THAN 70 milliGRAM(s)/deciliter  glucagon  Injectable 1milliGRAM(s) IntraMuscular once PRN Glucose LESS THAN 70 milligrams/deciliter  acetaminophen   Tablet. 650milliGRAM(s) Oral every 6 hours PRN Moderate Pain (4 - 6)    REVIEW OF SYSTEMS:    Constitutional:            No fever, weight loss or fatigue  HEENT:                      No difficulty hearing, tinnitus, vertigo; No sinus or throat pain  Respiratory:              cough,  Cardiovascular:           No chest pain, palpitations  Gastrointestinal:        No abdominal or epigastric pain. No N/V/diarrhea or hematemesis  Genitourinary:            No dysuria, frequency, hematuria or incontinence  SKIN:                             no rash  Musculoskeletal:        No joint pain or swelling  Extremities:                No swelling  Neurological:              confusion.  Psychiatric:                 No depression, anxiety    Vital Signs Last 24 Hrs  T(C): 37.1, Max: 37.7 (03-29 @ 00:16)  T(F): 98.8, Max: 99.8 (03-29 @ 00:16)  HR: 97 (90 - 118)  BP: 135/81 (105/67 - 155/88)  BP(mean): --  RR: 18 (17 - 18)  SpO2: 98% (93% - 98%)    PHYSICAL EXAM:  GEN:         Awake, responsive and comfortable.  HEENT:    Normal.    RESP:      no wheezing.  CVS:         Regular rate and rhythm.   ABD:         Soft, non-tender, non-distended;   :             No costovertebral angle tenderness  SKIN:           Warm and dry.  EXTR:            No clubbing, cyanosis or edema  CNS:              Intact sensory and motor function.  PSYCH:        cooperative, no anxiety or depression    LABS:                        9.2    8.5   )-----------( 710      ( 29 Mar 2017 07:06 )             27.3     29 Mar 2017 07:06    134    |  94     |  10     ----------------------------<  253    3.5     |  29     |  0.71     Ca    7.8        29 Mar 2017 07:06    TPro  4.9    /  Alb  1.7    /  TBili  0.3    /  DBili  x      /  AST  21     /  ALT  27     /  AlkPhos  89     29 Mar 2017 07:06    EKG: sinus rhythm    RADIOLOGY & ADDITIONAL STUDIES:    EXAM:  CT CHEST                            PROCEDURE DATE:  03/29/2017        INTERPRETATION:  CLINICAL INFORMATION: Pneumonia    COMPARISON: 2/10/2017    PROCEDURE:     Serial axial sections through the chest were obtained without   administrationof nonionic intravenous contrast. Sagittal and coronal   reformats were then generated from the axial images.    FINDINGS:     LUNG AND LARGE AIRWAYS: Lingular and right lower lobe consolidation,   mildly decreased. Mild emphysema and bullous changes. Scattered tiny   nodules measuring up to 2 mm. Bilateral lower lobe atelectasis.  PLEURA: Small bilateral pleural effusions.  VESSELS: Atherosclerotic changes of the aorta and coronary arteries.  HEART: Normal size. Aortic valve calcifications. Trace pericardial   effusion.  MEDIASTINUM AND DELANEY: Mild mediastinal adenopathy.  CHEST WALL AND LOWER NECK: Subcutaneous soft tissue edema.    UPPER ABDOMEN: Hypodense hepatic lesions measuring up to 1.6 cm.   Pancreatic calcifications. Bilateral adrenal thickening.    BONES: Spinal degenerative changes. Partially imaged lower cervical spine   fusion.    IMPRESSION:     Mild interval decrease of lingula and right lower lobe consolidations.  Small bilateral pleural effusions.    IRVING MILLER M.D., ATTENDING RADIOLOGIST  This document has been electronically signed. Mar 29 2017  2:30PM      ASSESSMENT AND PLAN:  ·	Altered mental status.  ·	Hypoglycemia.  ·	Bilateral pleural effusion.  ·	Anemia.  ·	Multiple Sclerosis.  ·	Myasthenia Gravis.  ·	HTN.  ·	DM.  ·	Possible COPD.    Mentation and glucose improved.  Will add PRN nebulizer.

## 2017-03-30 LAB
ANION GAP SERPL CALC-SCNC: 7 MMOL/L — SIGNIFICANT CHANGE UP (ref 5–17)
BASOPHILS # BLD AUTO: 0.1 K/UL — SIGNIFICANT CHANGE UP (ref 0–0.2)
BASOPHILS NFR BLD AUTO: 1.1 % — SIGNIFICANT CHANGE UP (ref 0–2)
BUN SERPL-MCNC: 12 MG/DL — SIGNIFICANT CHANGE UP (ref 7–23)
CALCIUM SERPL-MCNC: 8 MG/DL — LOW (ref 8.5–10.1)
CHLORIDE SERPL-SCNC: 93 MMOL/L — LOW (ref 96–108)
CO2 SERPL-SCNC: 33 MMOL/L — HIGH (ref 22–31)
CREAT SERPL-MCNC: 0.69 MG/DL — SIGNIFICANT CHANGE UP (ref 0.5–1.3)
CULTURE RESULTS: SIGNIFICANT CHANGE UP
EOSINOPHIL # BLD AUTO: 0.1 K/UL — SIGNIFICANT CHANGE UP (ref 0–0.5)
EOSINOPHIL NFR BLD AUTO: 0.6 % — SIGNIFICANT CHANGE UP (ref 0–6)
GLUCOSE SERPL-MCNC: 193 MG/DL — HIGH (ref 70–99)
HCT VFR BLD CALC: 26 % — LOW (ref 34.5–45)
HGB BLD-MCNC: 9.3 G/DL — LOW (ref 11.5–15.5)
HIV 1+2 AB+HIV1 P24 AG SERPL QL IA: SIGNIFICANT CHANGE UP
LYMPHOCYTES # BLD AUTO: 2.5 K/UL — SIGNIFICANT CHANGE UP (ref 1–3.3)
LYMPHOCYTES # BLD AUTO: 29.3 % — SIGNIFICANT CHANGE UP (ref 13–44)
MAGNESIUM SERPL-MCNC: 1.9 MG/DL — SIGNIFICANT CHANGE UP (ref 1.8–2.4)
MCHC RBC-ENTMCNC: 31.5 PG — SIGNIFICANT CHANGE UP (ref 27–34)
MCHC RBC-ENTMCNC: 35.6 GM/DL — SIGNIFICANT CHANGE UP (ref 32–36)
MCV RBC AUTO: 88.5 FL — SIGNIFICANT CHANGE UP (ref 80–100)
MONOCYTES # BLD AUTO: 0.6 K/UL — SIGNIFICANT CHANGE UP (ref 0–0.9)
MONOCYTES NFR BLD AUTO: 7.4 % — SIGNIFICANT CHANGE UP (ref 2–14)
NEUTROPHILS # BLD AUTO: 5.2 K/UL — SIGNIFICANT CHANGE UP (ref 1.8–7.4)
NEUTROPHILS NFR BLD AUTO: 61.6 % — SIGNIFICANT CHANGE UP (ref 43–77)
PLATELET # BLD AUTO: 602 K/UL — HIGH (ref 150–400)
POTASSIUM SERPL-MCNC: 3.8 MMOL/L — SIGNIFICANT CHANGE UP (ref 3.5–5.3)
POTASSIUM SERPL-SCNC: 3.8 MMOL/L — SIGNIFICANT CHANGE UP (ref 3.5–5.3)
RBC # BLD: 2.94 M/UL — LOW (ref 3.8–5.2)
RBC # FLD: 14 % — SIGNIFICANT CHANGE UP (ref 11–15)
SODIUM SERPL-SCNC: 133 MMOL/L — LOW (ref 135–145)
SPECIMEN SOURCE: SIGNIFICANT CHANGE UP
WBC # BLD: 8.5 K/UL — SIGNIFICANT CHANGE UP (ref 3.8–10.5)
WBC # FLD AUTO: 8.5 K/UL — SIGNIFICANT CHANGE UP (ref 3.8–10.5)

## 2017-03-30 RX ORDER — INSULIN LISPRO 100/ML
5 VIAL (ML) SUBCUTANEOUS
Qty: 0 | Refills: 0 | Status: DISCONTINUED | OUTPATIENT
Start: 2017-03-30 | End: 2017-04-02

## 2017-03-30 RX ORDER — PNEUMOCOCCAL 13-VALENT CONJUGATE VACCINE 2.2; 2.2; 2.2; 2.2; 2.2; 4.4; 2.2; 2.2; 2.2; 2.2; 2.2; 2.2; 2.2 UG/.5ML; UG/.5ML; UG/.5ML; UG/.5ML; UG/.5ML; UG/.5ML; UG/.5ML; UG/.5ML; UG/.5ML; UG/.5ML; UG/.5ML; UG/.5ML; UG/.5ML
0.5 INJECTION, SUSPENSION INTRAMUSCULAR ONCE
Qty: 0 | Refills: 0 | Status: DISCONTINUED | OUTPATIENT
Start: 2017-03-30 | End: 2017-03-30

## 2017-03-30 RX ORDER — PNEUMOCOCCAL 23-VAL P-SAC VAC 25MCG/0.5
0.5 VIAL (ML) INJECTION ONCE
Qty: 0 | Refills: 0 | Status: COMPLETED | OUTPATIENT
Start: 2017-03-30 | End: 2017-04-01

## 2017-03-30 RX ADMIN — Medication 1 CAPSULE(S): at 13:15

## 2017-03-30 RX ADMIN — Medication 100 MILLIGRAM(S): at 22:09

## 2017-03-30 RX ADMIN — Medication 100 MILLIGRAM(S): at 06:03

## 2017-03-30 RX ADMIN — Medication 1 MILLIGRAM(S): at 22:10

## 2017-03-30 RX ADMIN — Medication 1 TABLET(S): at 19:44

## 2017-03-30 RX ADMIN — Medication 400 MILLIGRAM(S): at 06:40

## 2017-03-30 RX ADMIN — Medication 1 TABLET(S): at 14:07

## 2017-03-30 RX ADMIN — Medication 1 SPRAY(S): at 17:46

## 2017-03-30 RX ADMIN — INSULIN GLARGINE 14 UNIT(S): 100 INJECTION, SOLUTION SUBCUTANEOUS at 23:08

## 2017-03-30 RX ADMIN — DULOXETINE HYDROCHLORIDE 60 MILLIGRAM(S): 30 CAPSULE, DELAYED RELEASE ORAL at 22:09

## 2017-03-30 RX ADMIN — Medication 400 MILLIGRAM(S): at 22:30

## 2017-03-30 RX ADMIN — ENOXAPARIN SODIUM 40 MILLIGRAM(S): 100 INJECTION SUBCUTANEOUS at 14:07

## 2017-03-30 RX ADMIN — Medication 100 MILLIGRAM(S): at 14:08

## 2017-03-30 RX ADMIN — Medication 400 MILLIGRAM(S): at 14:07

## 2017-03-30 RX ADMIN — Medication 400 MILLIGRAM(S): at 22:10

## 2017-03-30 RX ADMIN — Medication 6: at 17:43

## 2017-03-30 RX ADMIN — Medication 6: at 13:15

## 2017-03-30 RX ADMIN — Medication 1 CAPSULE(S): at 17:44

## 2017-03-30 RX ADMIN — Medication 1 CAPSULE(S): at 08:37

## 2017-03-30 RX ADMIN — Medication 400 MILLIGRAM(S): at 14:23

## 2017-03-30 RX ADMIN — Medication 400 MILLIGRAM(S): at 06:05

## 2017-03-30 RX ADMIN — PANTOPRAZOLE SODIUM 40 MILLIGRAM(S): 20 TABLET, DELAYED RELEASE ORAL at 06:05

## 2017-03-30 RX ADMIN — Medication 4: at 08:39

## 2017-03-30 RX ADMIN — Medication 1 SPRAY(S): at 06:04

## 2017-03-30 RX ADMIN — Medication 1 TABLET(S): at 06:04

## 2017-03-30 NOTE — PROGRESS NOTE ADULT - ASSESSMENT
61 years old female is admitted for hypoglycemia, h/o DM II, PNA, r/o sepsis, elevated lactate, MD  Plan: IV D5.45% NS. IV Abx. Monitor lactate level. Blood and urine culture. Chest x ray noted. On Zosyn. will get pulmonary consult.   Started on Ancef by ID. Resolving Pneumonia.

## 2017-03-30 NOTE — PROGRESS NOTE ADULT - ASSESSMENT
61 years old female is admitted for hypoglycemia, h/o DM II, PNA, r/o sepsis, elevated lactate,   here bilateral leg cellulitis and upper resp congestion   repeat ct Vs prev showed ongoing resolution   stigmata of old cig abuse  continue anceg   hiv NEGATIVE notified 61 years old female is admitted for hypoglycemia, h/o DM II, PNA, r/o sepsis, elevated lactate,   here bilateral leg cellulitis and upper resp congestion   repeat ct Vs prev showed ongoing resolution   stigmata of old tobacco  abuse  continue ancef   hiv NEGATIVE notified and counselled  will order flu and pneumovax

## 2017-03-30 NOTE — PROGRESS NOTE ADULT - SUBJECTIVE AND OBJECTIVE BOX
Patient is a 61y old  Female who presents with a chief complaint of Hypoglycemia (28 Mar 2017 10:09)      INTERVAL HPI/OVERNIGHT EVENTS: Asymptomatic    MEDICATIONS  (STANDING):  clonazePAM Tablet 1milliGRAM(s) Oral at bedtime  DULoxetine 60milliGRAM(s) Oral at bedtime  amylase/lipase/protease  (CREON  6,000 Units) 1Capsule(s) Oral three times a day with meals  pantoprazole    Tablet 40milliGRAM(s) Oral before breakfast  insulin lispro (HumaLOG) corrective regimen sliding scale  SubCutaneous three times a day before meals  dextrose 5%. 1000milliLiter(s) IV Continuous <Continuous>  dextrose 50% Injectable 12.5Gram(s) IV Push once  dextrose 50% Injectable 25Gram(s) IV Push once  dextrose 50% Injectable 25Gram(s) IV Push once  enoxaparin Injectable 40milliGRAM(s) SubCutaneous daily  influenza   Vaccine 0.5milliLiter(s) IntraMuscular once  freetext medication  - 100milliGRAM(s) Oral two times a day  freetext medication  - 20milliGRAM(s) SubCutaneous at bedtime  diphenoxylate/atropine 1Tablet(s) Oral three times a day  ibuprofen  Tablet 400milliGRAM(s) Oral three times a day  ceFAZolin   IVPB  IV Intermittent   ceFAZolin   IVPB 1000milliGRAM(s) IV Intermittent every 8 hours  fluticasone propionate 50 MICROgram(s)/spray Nasal Spray 1Spray(s) Both Nostrils two times a day  insulin glargine Injectable (LANTUS) 14Unit(s) SubCutaneous at bedtime    MEDICATIONS  (PRN):  dextrose Gel 1Dose(s) Oral once PRN Blood Glucose LESS THAN 70 milliGRAM(s)/deciliter  glucagon  Injectable 1milliGRAM(s) IntraMuscular once PRN Glucose LESS THAN 70 milligrams/deciliter  acetaminophen   Tablet. 650milliGRAM(s) Oral every 6 hours PRN Moderate Pain (4 - 6)      Allergies    No Known Allergies    Intolerances        REVIEW OF SYSTEMS:  CONSTITUTIONAL: No fever, weight loss, or fatigue  EYES: No eye pain, visual disturbances, or discharge  ENMT:  No difficulty hearing, tinnitus, vertigo; No sinus or throat pain  NECK: No pain or stiffness  BREASTS: No pain, masses, or nipple discharge  RESPIRATORY: No cough, wheezing, chills or hemoptysis; No shortness of breath  CARDIOVASCULAR: No chest pain, palpitations, dizziness, or leg swelling  GASTROINTESTINAL: No abdominal or epigastric pain. No nausea, vomiting, or hematemesis; No diarrhea or constipation. No melena or hematochezia.  GENITOURINARY: No dysuria, frequency, hematuria, or incontinence  NEUROLOGICAL: No headaches, memory loss, loss of strength, numbness, or tremors  SKIN: No itching, burning, rashes, or lesions   LYMPH NODES: No enlarged glands  ENDOCRINE: No heat or cold intolerance; No hair loss  MUSCULOSKELETAL: No joint pain or swelling; No muscle, back, or extremity pain  PSYCHIATRIC: No depression, anxiety, mood swings, or difficulty sleeping  HEME/LYMPH: No easy bruising, or bleeding gums  ALLERGY AND IMMUNOLOGIC: No hives or eczema    Vital Signs Last 24 Hrs  T(C): 36.6, Max: 37.1 (03-29 @ 17:02)  T(F): 97.8, Max: 98.8 (03-29 @ 17:02)  HR: 101 (95 - 101)  BP: 138/81 (135/81 - 151/80)  BP(mean): --  RR: 17 (17 - 18)  SpO2: 94% (93% - 98%)    PHYSICAL EXAM:  GENERAL: NAD, well-groomed, well-developed  HEAD:  Atraumatic, Normocephalic  EYES: EOMI, PERRLA, conjunctiva and sclera clear  ENMT:  Moist mucous membranes, Good dentition, No lesions  NECK: Supple, No JVD, Normal thyroid  NERVOUS SYSTEM:  Alert & Oriented X3, Good concentration; Motor Strength 5/5 B/L upper and lower extremities; DTRs 2+ intact and symmetric  CHEST/LUNG: Clear to percussion bilaterally; No rales, rhonchi, wheezing, or rubs  HEART: Regular rate and rhythm; No murmurs, rubs, or gallops  ABDOMEN: Soft, Nontender, Nondistended; Bowel sounds present  EXTREMITIES:  2+ Peripheral Pulses, No clubbing, cyanosis, or edema  LYMPH: No lymphadenopathy noted  SKIN: No rashes or lesions    LABS:                        9.3    8.5   )-----------( 602      ( 30 Mar 2017 07:31 )             26.0     30 Mar 2017 07:31    133    |  93     |  12     ----------------------------<  193    3.8     |  33     |  0.69     Ca    8.0        30 Mar 2017 07:31  Mg     1.9       30 Mar 2017 07:31    TPro  4.9    /  Alb  1.7    /  TBili  0.3    /  DBili  x      /  AST  21     /  ALT  27     /  AlkPhos  89     29 Mar 2017 07:06        CAPILLARY BLOOD GLUCOSE  277 (30 Mar 2017 12:09)  244 (30 Mar 2017 08:31)  331 (29 Mar 2017 23:08)  254 (29 Mar 2017 16:30)    RADIOLOGY & ADDITIONAL TESTS:      EXAM:  CT CHEST                            PROCEDURE DATE:  03/29/2017        INTERPRETATION:  CLINICAL INFORMATION: Pneumonia    COMPARISON: 2/10/2017    PROCEDURE:     Serial axial sections through the chest were obtained without   administrationof nonionic intravenous contrast. Sagittal and coronal   reformats were then generated from the axial images.    FINDINGS:     LUNG AND LARGE AIRWAYS: Lingular and right lower lobe consolidation,   mildly decreased. Mild emphysema and bullous changes. Scattered tiny   nodules measuring up to 2 mm. Bilateral lower lobe atelectasis.  PLEURA: Small bilateral pleural effusions.  VESSELS: Atherosclerotic changes of the aorta and coronary arteries.  HEART: Normal size. Aortic valve calcifications. Trace pericardial   effusion.  MEDIASTINUM AND DELANEY: Mild mediastinal adenopathy.  CHEST WALL AND LOWER NECK: Subcutaneous soft tissue edema.    UPPER ABDOMEN: Hypodense hepatic lesions measuring up to 1.6 cm.   Pancreatic calcifications. Bilateral adrenal thickening.    BONES: Spinal degenerative changes. Partially imaged lower cervical spine   fusion.    IMPRESSION:     Mild interval decrease of lingula and right lower lobe consolidations.  Small bilateral pleural effusions.      IRVING MILLER M.D., ATTENDING RADIOLOGIST  This document has been electronically signed. Mar 29 2017  2:30PM        Imaging Personally Reviewed:  [ ]x YES  [ ] NO    Consultant(s) Notes Reviewed:  [x ] YES  [ ] NO    Care Discussed with Consultants/Other Providers [ ] YES  [ ] NO    PROBLEMS:  HYPOGLYCEMIA; PNEUMONIA  LOW BLOOD SUGAR  Hypoglycemia  Chronic pancreatitis, unspecified pancreatitis type  IBD (inflammatory bowel disease)  Type 1 diabetes mellitus with hypoglycemia and without coma  Essential hypertension  Multiple sclerosis  Pneumonia of left lung due to other aerobic Gram-negative bacteria, unspecified part of lung  Hypoglycemia      Care discussed with family,         [  ]   yes  [  ]  No    imp:    stable[ ]    unstable[  ]     improving [   ]       unchanged  [  ]                Plans:  Continue present plans  [  ]               New consult [  ]   specialty  .......               order rochelle[  ]    test name.                  Discharge Planning  [  ]

## 2017-03-30 NOTE — PROGRESS NOTE ADULT - SUBJECTIVE AND OBJECTIVE BOX
Patient is a 61y old  Female who presents with a chief complaint of Hypoglycemia (28 Mar 2017 10:09)      Interval History: finger sticks are increased and in 250 or so   on Lantus 14 units and Lispro sliding scale coverage with meals   on Ceftriaxone       MEDICATIONS  (STANDING):  clonazePAM Tablet 1milliGRAM(s) Oral at bedtime  DULoxetine 60milliGRAM(s) Oral at bedtime  amylase/lipase/protease  (CREON  6,000 Units) 1Capsule(s) Oral three times a day with meals  pantoprazole    Tablet 40milliGRAM(s) Oral before breakfast  insulin lispro (HumaLOG) corrective regimen sliding scale  SubCutaneous three times a day before meals  dextrose 5%. 1000milliLiter(s) IV Continuous <Continuous>  dextrose 50% Injectable 12.5Gram(s) IV Push once  dextrose 50% Injectable 25Gram(s) IV Push once  dextrose 50% Injectable 25Gram(s) IV Push once  enoxaparin Injectable 40milliGRAM(s) SubCutaneous daily  influenza   Vaccine 0.5milliLiter(s) IntraMuscular once  freetext medication  - 100milliGRAM(s) Oral two times a day  freetext medication  - 20milliGRAM(s) SubCutaneous at bedtime  diphenoxylate/atropine 1Tablet(s) Oral three times a day  ibuprofen  Tablet 400milliGRAM(s) Oral three times a day  ceFAZolin   IVPB  IV Intermittent   ceFAZolin   IVPB 1000milliGRAM(s) IV Intermittent every 8 hours  fluticasone propionate 50 MICROgram(s)/spray Nasal Spray 1Spray(s) Both Nostrils two times a day  insulin glargine Injectable (LANTUS) 14Unit(s) SubCutaneous at bedtime  pneumococcal  23 Vaccine (PNEUMOVAX) 0.5milliLiter(s) IntraMuscular once  insulin lispro Injectable (HumaLOG) 5Unit(s) SubCutaneous before breakfast  insulin lispro Injectable (HumaLOG) 5Unit(s) SubCutaneous before lunch  insulin lispro Injectable (HumaLOG) 5Unit(s) SubCutaneous before dinner    MEDICATIONS  (PRN):  dextrose Gel 1Dose(s) Oral once PRN Blood Glucose LESS THAN 70 milliGRAM(s)/deciliter  glucagon  Injectable 1milliGRAM(s) IntraMuscular once PRN Glucose LESS THAN 70 milligrams/deciliter  acetaminophen   Tablet. 650milliGRAM(s) Oral every 6 hours PRN Moderate Pain (4 - 6)      Allergies    No Known Allergies    Intolerances        REVIEW OF SYSTEMS:  CONSTITUTIONAL: stable   EYES: No eye pain, visual disturbances, or discharge  ENMT:  No difficulty hearing, tinnitus, vertigo; No sinus or throat pain  NECK: No pain or stiffness  RESPIRATORY: No cough, wheezing, chills or hemoptysis; No shortness of breath  CARDIOVASCULAR: No chest pain, palpitations, dizziness, or leg swelling  GASTROINTESTINAL: No abdominal or epigastric pain. No nausea, vomiting, or hematemesis; No diarrhea or constipation. No melena or hematochezia.  GENITOURINARY: No dysuria, frequency, hematuria, or incontinence  NEUROLOGICAL: No headaches, memory loss, loss of strength, numbness, or tremors  SKIN: No itching, burning, rashes, or lesions   LYMPH NODES: No enlarged glands  ENDOCRINE: No heat or cold intolerance; No hair loss  MUSCULOSKELETAL: No joint pain or swelling; No muscle, back, or extremity pain  PSYCHIATRIC: No depression, anxiety, mood swings, or difficulty sleeping  HEME/LYMPH: No easy bruising, or bleeding gums  ALLERY AND IMMUNOLOGIC: No hives or eczema    Vital Signs Last 24 Hrs  T(C): 36.6, Max: 37.1 (03-29 @ 23:58)  T(F): 97.8, Max: 98.8 (03-29 @ 23:58)  HR: 88 (88 - 101)  BP: 118/76 (118/76 - 151/80)  BP(mean): --  RR: 16 (16 - 18)  SpO2: 96% (93% - 96%)    PHYSICAL EXAM:  GENERAL:   HEAD:  Atraumatic, Normocephalic  EYES: EOMI, PERRLA, conjunctiva and sclera clear  ENMT: No tonsillar erythema, exudates, or enlargement; Moist mucous membranes, Good dentition, No lesions  NECK: Supple, No JVD, Normal thyroid  NERVOUS SYSTEM:  Alert & Oriented X3, Good concentration; Motor Strength 5/5 B/L upper and lower extremities; DTRs 2+ intact and symmetric  CHEST/LUNG: Clear to percussion bilaterally; No rales, rhonchi, wheezing, or rubs  HEART: Regular rate and rhythm; No murmurs, rubs, or gallops  ABDOMEN: Soft, Nontender, Nondistended; Bowel sounds present  EXTREMITIES:  2+ Peripheral Pulses, No clubbing, cyanosis, or edema  SKIN: No rashes or lesions    LABS:        CAPILLARY BLOOD GLUCOSE  291 (30 Mar 2017 17:34)  277 (30 Mar 2017 12:09)  244 (30 Mar 2017 08:31)  331 (29 Mar 2017 23:08)    Lipid panel:           Thyroid:  Diabetes Tests:  Parathyroid Panel:  Adrenals:  RADIOLOGY & ADDITIONAL TESTS:    Imaging Personally Reviewed:  [ ] YES  [ ] NO    Consultant(s) Notes Reviewed:  [ ] YES  [ ] NO    Care Discussed with Consultants/Other Providers [ ] YES  [ ] NO

## 2017-03-30 NOTE — PROGRESS NOTE ADULT - SUBJECTIVE AND OBJECTIVE BOX
61 years old female by ems c/o found confused in bed this morning by her . Ems arrived and blood sugar was 37. Pt received one dextro 50 iv from ems. Pt here is alert and oriented x 2 sts she had insulin last night. Patient also has productive cough but not some thing new. No fever or chills. Few months ago was admitted with Salmonella sepsis.  Smoked close to 45 years, quit few months ago.  Resting comfortably without distress    Allergies    No Known Allergies    Intolerances        MEDICATIONS  (STANDING):  clonazePAM Tablet 1milliGRAM(s) Oral at bedtime  DULoxetine 60milliGRAM(s) Oral at bedtime  amylase/lipase/protease  (CREON  6,000 Units) 1Capsule(s) Oral three times a day with meals  pantoprazole    Tablet 40milliGRAM(s) Oral before breakfast  insulin lispro (HumaLOG) corrective regimen sliding scale  SubCutaneous three times a day before meals  dextrose 5%. 1000milliLiter(s) IV Continuous <Continuous>  dextrose 50% Injectable 12.5Gram(s) IV Push once  dextrose 50% Injectable 25Gram(s) IV Push once  dextrose 50% Injectable 25Gram(s) IV Push once  enoxaparin Injectable 40milliGRAM(s) SubCutaneous daily  influenza   Vaccine 0.5milliLiter(s) IntraMuscular once  freetext medication  - 100milliGRAM(s) Oral two times a day  freetext medication  - 20milliGRAM(s) SubCutaneous at bedtime  diphenoxylate/atropine 1Tablet(s) Oral three times a day  ibuprofen  Tablet 400milliGRAM(s) Oral three times a day  ceFAZolin   IVPB  IV Intermittent   ceFAZolin   IVPB 1000milliGRAM(s) IV Intermittent every 8 hours  fluticasone propionate 50 MICROgram(s)/spray Nasal Spray 1Spray(s) Both Nostrils two times a day  insulin glargine Injectable (LANTUS) 14Unit(s) SubCutaneous at bedtime    MEDICATIONS  (PRN):  dextrose Gel 1Dose(s) Oral once PRN Blood Glucose LESS THAN 70 milliGRAM(s)/deciliter  glucagon  Injectable 1milliGRAM(s) IntraMuscular once PRN Glucose LESS THAN 70 milligrams/deciliter  acetaminophen   Tablet. 650milliGRAM(s) Oral every 6 hours PRN Moderate Pain (4 - 6)      REVIEW OF SYSTEMS:    CONSTITUTIONAL: No fever, chills, weight loss, or fatigue  HEENT: No sore throat, runny nose, ear ache  RESPIRATORY: still feels congested  CARDIOVASCULAR: No chest pain, palpitations, dizziness  GASTROINTESTINAL: No abdominal pain. No nausea, vomiting, diarrhea  GENITOURINARY: No dysuria, increase frequency, hematuria, or incontinence  NEUROLOGICAL: No headaches, memory loss, loss of strength, numbness, or tremors, no weakness  EXTREMITY: No pedal edema BLE  SKIN: No itching, burning, rashes, or lesions     VITAL SIGNS:  T(C): 36.6, Max: 37.1 (03-29 @ 17:02)  T(F): 97.8, Max: 98.8 (03-29 @ 17:02)  HR: 101 (95 - 101)  BP: 138/81 (135/81 - 151/80)  RR: 17 (17 - 18)  SpO2: 94% (93% - 98%)  Wt(kg): --    PHYSICAL EXAM:    GENERAL: not in any distress  HEENT: Neck is supple, normocephalic, atraumatic   CHEST/LUNG: Clear to percussion bilaterally; No rales, rhonchi, wheezing  HEART: Regular rate and rhythm; No murmurs, rubs, or gallops  ABDOMEN: Soft, Nontender, Nondistended; Bowel sounds present, no rebound   EXTREMITIES:  2+ Peripheral Pulses, No clubbing, cyanosis, or edema  GENITOURINARY: NEGATIVE   SKIN: bilateral leg cellulitis ; status quo   BACK: no pressor sore   NERVOUS SYSTEM:  Alert & Oriented X3, Good concentration  PSYCH: normal affect     LABS:                         9.3    8.5   )-----------( 602      ( 30 Mar 2017 07:31 )             26.0     30 Mar 2017 07:31    133    |  93     |  12     ----------------------------<  193    3.8     |  33     |  0.69     Ca    8.0        30 Mar 2017 07:31  Mg     1.9       30 Mar 2017 07:31    TPro  4.9    /  Alb  1.7    /  TBili  0.3    /  DBili  x      /  AST  21     /  ALT  27     /  AlkPhos  89     29 Mar 2017 07:06    LIVER FUNCTIONS - ( 29 Mar 2017 07:06 )  Alb: 1.7 g/dL / Pro: 4.9 gm/dL / ALK PHOS: 89 U/L / ALT: 27 U/L / AST: 21 U/L / GGT: x                                   Culture Results:   <10,000 CFU/ml  Normal Urogenital colby present (03-29 @ 12:17)  Culture Results:   No growth to date. (03-28 @ 14:53)  Culture Results:   No growth to date. (03-27 @ 10:11)  Culture Results:   No growth to date. (03-27 @ 10:11)            HIV NEGATIVE     Radiology:    LUNG AND LARGE AIRWAYS: Lingular and right lower lobe consolidation,   mildly decreased. Mild emphysema and bullous changes. Scattered tiny   nodules measuring up to 2 mm. Bilateral lower lobe atelectasis.  PLEURA: Small bilateral pleural effusions.  VESSELS: Atherosclerotic changes of the aorta and coronary arteries.  HEART: Normal size. Aortic valve calcifications. Trace pericardial   effusion.  MEDIASTINUM AND DELANEY: Mild mediastinal adenopathy.  CHEST WALL AND LOWER NECK: Subcutaneous soft tissue edema.    UPPER ABDOMEN: Hypodense hepatic lesions measuring up to 1.6 cm.   Pancreatic calcifications. Bilateral adrenal thickening.    BONES: Spinal degenerative changes. Partially imaged lower cervical spine   fusion.    IMPRESSION:     Mild interval decrease of lingula and right lower lobe consolidations.  Small bilateral pleural effusions. 61 years old female by ems c/o found confused in bed this morning by her . Ems arrived and blood sugar was 37. Pt received one dextro 50 iv from ems. Pt here is alert and oriented x 2 sts she had insulin last night. Patient also has productive cough but not some thing new. No fever or chills. Few months ago was admitted with Salmonella sepsis.  Smoked close to 45 years, quit few months ago.; 16 weeks to be exact   Resting comfortably without distress    Allergies    No Known Allergies    Intolerances        MEDICATIONS  (STANDING):  clonazePAM Tablet 1milliGRAM(s) Oral at bedtime  DULoxetine 60milliGRAM(s) Oral at bedtime  amylase/lipase/protease  (CREON  6,000 Units) 1Capsule(s) Oral three times a day with meals  pantoprazole    Tablet 40milliGRAM(s) Oral before breakfast  insulin lispro (HumaLOG) corrective regimen sliding scale  SubCutaneous three times a day before meals  dextrose 5%. 1000milliLiter(s) IV Continuous <Continuous>  dextrose 50% Injectable 12.5Gram(s) IV Push once  dextrose 50% Injectable 25Gram(s) IV Push once  dextrose 50% Injectable 25Gram(s) IV Push once  enoxaparin Injectable 40milliGRAM(s) SubCutaneous daily  influenza   Vaccine 0.5milliLiter(s) IntraMuscular once  freetext medication  - 100milliGRAM(s) Oral two times a day  freetext medication  - 20milliGRAM(s) SubCutaneous at bedtime  diphenoxylate/atropine 1Tablet(s) Oral three times a day  ibuprofen  Tablet 400milliGRAM(s) Oral three times a day  ceFAZolin   IVPB  IV Intermittent   ceFAZolin   IVPB 1000milliGRAM(s) IV Intermittent every 8 hours  fluticasone propionate 50 MICROgram(s)/spray Nasal Spray 1Spray(s) Both Nostrils two times a day  insulin glargine Injectable (LANTUS) 14Unit(s) SubCutaneous at bedtime    MEDICATIONS  (PRN):  dextrose Gel 1Dose(s) Oral once PRN Blood Glucose LESS THAN 70 milliGRAM(s)/deciliter  glucagon  Injectable 1milliGRAM(s) IntraMuscular once PRN Glucose LESS THAN 70 milligrams/deciliter  acetaminophen   Tablet. 650milliGRAM(s) Oral every 6 hours PRN Moderate Pain (4 - 6)      REVIEW OF SYSTEMS:    CONSTITUTIONAL: No fever, chills,  has had  weight loss,and  fatigue  HEENT: No sore throat,   has runny nose and post nasal drip   ,no ear ache  RESPIRATORY: still feels congested  CARDIOVASCULAR: No chest pain, palpitations, dizziness  GASTROINTESTINAL: No abdominal pain. No nausea, vomiting, diarrhea  GENITOURINARY: No dysuria, increase frequency, hematuria, or incontinence  NEUROLOGICAL: No headaches, memory loss, loss of strength, numbness, or tremors, no weakness  EXTREMITY: has pedal edema BLE  SKIN: No itching, burning, rashes, or lesions     VITAL SIGNS:  T(C): 36.6, Max: 37.1 (03-29 @ 17:02)  T(F): 97.8, Max: 98.8 (03-29 @ 17:02)  HR: 101 (95 - 101)  BP: 138/81 (135/81 - 151/80)  RR: 17 (17 - 18)  SpO2: 94% (93% - 98%)  Wt(kg): --    PHYSICAL EXAM:    GENERAL: not in any distress  HEENT: Neck is supple, normocephalic, atraumatic   CHEST/LUNG: Clear  bilaterally; No rales, rhonchi, wheezing  HEART: Regular rate and rhythm; No murmurs, rubs, or gallops  ABDOMEN: Soft, Nontender, Nondistended; Bowel sounds present, no rebound   EXTREMITIES:  2+ Peripheral Pulses, No clubbing, cyanosis, or edema  GENITOURINARY: NEGATIVE   SKIN: bilateral leg cellulitis ; status quo   BACK: no pressor sore   NERVOUS SYSTEM:  Alert & Oriented X3, Good concentration  PSYCH: normal affect     LABS:                         9.3    8.5   )-----------( 602      ( 30 Mar 2017 07:31 )             26.0     30 Mar 2017 07:31    133    |  93     |  12     ----------------------------<  193    3.8     |  33     |  0.69     Ca    8.0        30 Mar 2017 07:31  Mg     1.9       30 Mar 2017 07:31    TPro  4.9    /  Alb  1.7    /  TBili  0.3    /  DBili  x      /  AST  21     /  ALT  27     /  AlkPhos  89     29 Mar 2017 07:06    LIVER FUNCTIONS - ( 29 Mar 2017 07:06 )  Alb: 1.7 g/dL / Pro: 4.9 gm/dL / ALK PHOS: 89 U/L / ALT: 27 U/L / AST: 21 U/L / GGT: x                                   Culture Results:   <10,000 CFU/ml  Normal Urogenital colby present (03-29 @ 12:17)  Culture Results:   No growth to date. (03-28 @ 14:53)  Culture Results:   No growth to date. (03-27 @ 10:11)  Culture Results:   No growth to date. (03-27 @ 10:11)            HIV NEGATIVE     Radiology:    LUNG AND LARGE AIRWAYS: Lingular and right lower lobe consolidation,   mildly decreased. Mild emphysema and bullous changes. Scattered tiny   nodules measuring up to 2 mm. Bilateral lower lobe atelectasis.  PLEURA: Small bilateral pleural effusions.  VESSELS: Atherosclerotic changes of the aorta and coronary arteries.  HEART: Normal size. Aortic valve calcifications. Trace pericardial   effusion.  MEDIASTINUM AND DELANEY: Mild mediastinal adenopathy.  CHEST WALL AND LOWER NECK: Subcutaneous soft tissue edema.    UPPER ABDOMEN: Hypodense hepatic lesions measuring up to 1.6 cm.   Pancreatic calcifications. Bilateral adrenal thickening.    BONES: Spinal degenerative changes. Partially imaged lower cervical spine   fusion.    IMPRESSION:     Mild interval decrease of lingula and right lower lobe consolidations.  Small bilateral pleural effusions.

## 2017-03-30 NOTE — PROGRESS NOTE ADULT - SUBJECTIVE AND OBJECTIVE BOX
INTERVAL HPI/OVERNIGHT EVENTS:  61 years old female by ems c/o found confused in bed this morning by her . Ems arrived and blood sugar was 37. Pt received one dextro 50 iv from ems. Pt here is alert and oriented x 2 sts she had insulin last night. Patient also has productive cough but not some thing new. No fever or chills. Few months ago was admitted with Salmonella sepsis.  Smoked close to 45 years, quit few months ago.  Resting comfortably without distress    Vital Signs Last 24 Hrs  T(C): 36.6, Max: 37.1 (03-29 @ 17:02)  T(F): 97.8, Max: 98.8 (03-29 @ 17:02)  HR: 101 (95 - 101)  BP: 138/81 (135/81 - 151/80)  BP(mean): --  RR: 17 (17 - 18)  SpO2: 94% (93% - 98%)    PHYSICAL EXAM:  GEN:         Awake, responsive and comfortable.  HEENT:    Normal.    RESP:      no wheezing.  CVS:         Regular rate and rhythm.   ABD:         Soft, non-tender, non-distended;   :             No costovertebral angle tenderness  EXTR:            No clubbing, cyanosis or edema  CNS:              Intact sensory and motor function.    MEDICATIONS  (STANDING):  clonazePAM Tablet 1milliGRAM(s) Oral at bedtime  DULoxetine 60milliGRAM(s) Oral at bedtime  amylase/lipase/protease  (CREON  6,000 Units) 1Capsule(s) Oral three times a day with meals  pantoprazole    Tablet 40milliGRAM(s) Oral before breakfast  insulin lispro (HumaLOG) corrective regimen sliding scale  SubCutaneous three times a day before meals  dextrose 5%. 1000milliLiter(s) IV Continuous <Continuous>  dextrose 50% Injectable 12.5Gram(s) IV Push once  dextrose 50% Injectable 25Gram(s) IV Push once  dextrose 50% Injectable 25Gram(s) IV Push once  enoxaparin Injectable 40milliGRAM(s) SubCutaneous daily  influenza   Vaccine 0.5milliLiter(s) IntraMuscular once  freetext medication  - 100milliGRAM(s) Oral two times a day  freetext medication  - 20milliGRAM(s) SubCutaneous at bedtime  diphenoxylate/atropine 1Tablet(s) Oral three times a day  ibuprofen  Tablet 400milliGRAM(s) Oral three times a day  ceFAZolin   IVPB  IV Intermittent   ceFAZolin   IVPB 1000milliGRAM(s) IV Intermittent every 8 hours  fluticasone propionate 50 MICROgram(s)/spray Nasal Spray 1Spray(s) Both Nostrils two times a day  insulin glargine Injectable (LANTUS) 14Unit(s) SubCutaneous at bedtime    MEDICATIONS  (PRN):  dextrose Gel 1Dose(s) Oral once PRN Blood Glucose LESS THAN 70 milliGRAM(s)/deciliter  glucagon  Injectable 1milliGRAM(s) IntraMuscular once PRN Glucose LESS THAN 70 milligrams/deciliter  acetaminophen   Tablet. 650milliGRAM(s) Oral every 6 hours PRN Moderate Pain (4 - 6)    LABS:                        9.3    8.5   )-----------( 602      ( 30 Mar 2017 07:31 )             26.0     30 Mar 2017 07:31    133    |  93     |  12     ----------------------------<  193    3.8     |  33     |  0.69     Ca    8.0        30 Mar 2017 07:31  Mg     1.9       30 Mar 2017 07:31    TPro  4.9    /  Alb  1.7    /  TBili  0.3    /  DBili  x      /  AST  21     /  ALT  27     /  AlkPhos  89     29 Mar 2017 07:06    ASSESSMENT AND PLAN:  ·	Altered mental status.  ·	Hypoglycemia.  ·	Bilateral pleural effusion.  ·	Anemia.  ·	Multiple Sclerosis.  ·	Myasthenia Gravis.  ·	HTN.  ·	DM.  ·	Possible COPD.    Continue current treatment.

## 2017-03-31 RX ORDER — INSULIN GLARGINE 100 [IU]/ML
10 INJECTION, SOLUTION SUBCUTANEOUS AT BEDTIME
Qty: 0 | Refills: 0 | Status: DISCONTINUED | OUTPATIENT
Start: 2017-03-31 | End: 2017-03-31

## 2017-03-31 RX ORDER — INSULIN LISPRO 100/ML
VIAL (ML) SUBCUTANEOUS EVERY 6 HOURS
Qty: 0 | Refills: 0 | Status: DISCONTINUED | OUTPATIENT
Start: 2017-03-31 | End: 2017-04-02

## 2017-03-31 RX ORDER — INSULIN GLARGINE 100 [IU]/ML
8 INJECTION, SOLUTION SUBCUTANEOUS AT BEDTIME
Qty: 0 | Refills: 0 | Status: DISCONTINUED | OUTPATIENT
Start: 2017-03-31 | End: 2017-04-02

## 2017-03-31 RX ADMIN — Medication 100 MILLIGRAM(S): at 22:12

## 2017-03-31 RX ADMIN — Medication 400 MILLIGRAM(S): at 06:30

## 2017-03-31 RX ADMIN — Medication 12: at 22:15

## 2017-03-31 RX ADMIN — ENOXAPARIN SODIUM 40 MILLIGRAM(S): 100 INJECTION SUBCUTANEOUS at 11:10

## 2017-03-31 RX ADMIN — Medication 650 MILLIGRAM(S): at 02:27

## 2017-03-31 RX ADMIN — Medication 5 UNIT(S): at 16:02

## 2017-03-31 RX ADMIN — Medication 1 CAPSULE(S): at 11:10

## 2017-03-31 RX ADMIN — Medication 5 UNIT(S): at 07:44

## 2017-03-31 RX ADMIN — DULOXETINE HYDROCHLORIDE 60 MILLIGRAM(S): 30 CAPSULE, DELAYED RELEASE ORAL at 22:12

## 2017-03-31 RX ADMIN — Medication 400 MILLIGRAM(S): at 22:11

## 2017-03-31 RX ADMIN — Medication 400 MILLIGRAM(S): at 14:16

## 2017-03-31 RX ADMIN — Medication 1 TABLET(S): at 07:05

## 2017-03-31 RX ADMIN — Medication 1 TABLET(S): at 22:11

## 2017-03-31 RX ADMIN — Medication 1 SPRAY(S): at 16:49

## 2017-03-31 RX ADMIN — Medication 1 TABLET(S): at 14:16

## 2017-03-31 RX ADMIN — Medication 400 MILLIGRAM(S): at 05:56

## 2017-03-31 RX ADMIN — INSULIN GLARGINE 8 UNIT(S): 100 INJECTION, SOLUTION SUBCUTANEOUS at 22:13

## 2017-03-31 RX ADMIN — Medication 400 MILLIGRAM(S): at 14:55

## 2017-03-31 RX ADMIN — Medication 100 MILLIGRAM(S): at 14:17

## 2017-03-31 RX ADMIN — Medication 5 UNIT(S): at 11:10

## 2017-03-31 RX ADMIN — Medication 650 MILLIGRAM(S): at 01:52

## 2017-03-31 RX ADMIN — Medication 400 MILLIGRAM(S): at 22:45

## 2017-03-31 RX ADMIN — Medication 8: at 16:02

## 2017-03-31 RX ADMIN — Medication 1 SPRAY(S): at 05:58

## 2017-03-31 RX ADMIN — Medication 100 MILLIGRAM(S): at 05:58

## 2017-03-31 RX ADMIN — PANTOPRAZOLE SODIUM 40 MILLIGRAM(S): 20 TABLET, DELAYED RELEASE ORAL at 07:44

## 2017-03-31 RX ADMIN — Medication 1 MILLIGRAM(S): at 22:11

## 2017-03-31 RX ADMIN — Medication 1 CAPSULE(S): at 07:45

## 2017-03-31 RX ADMIN — Medication 1 CAPSULE(S): at 16:49

## 2017-03-31 NOTE — PROGRESS NOTE ADULT - SUBJECTIVE AND OBJECTIVE BOX
Patient is a 61y old  Female who presents with a chief complaint of Hypoglycemia (28 Mar 2017 10:09)      INTERVAL HPI/OVERNIGHT EVENTS:  pt with hypoglycemia this am  eating well good appetite    MEDICATIONS  (STANDING):  clonazePAM Tablet 1milliGRAM(s) Oral at bedtime  DULoxetine 60milliGRAM(s) Oral at bedtime  amylase/lipase/protease  (CREON  6,000 Units) 1Capsule(s) Oral three times a day with meals  pantoprazole    Tablet 40milliGRAM(s) Oral before breakfast  insulin lispro (HumaLOG) corrective regimen sliding scale  SubCutaneous three times a day before meals  dextrose 5%. 1000milliLiter(s) IV Continuous <Continuous>  dextrose 50% Injectable 12.5Gram(s) IV Push once  dextrose 50% Injectable 25Gram(s) IV Push once  dextrose 50% Injectable 25Gram(s) IV Push once  enoxaparin Injectable 40milliGRAM(s) SubCutaneous daily  influenza   Vaccine 0.5milliLiter(s) IntraMuscular once  freetext medication  - 100milliGRAM(s) Oral two times a day  freetext medication  - 20milliGRAM(s) SubCutaneous at bedtime  diphenoxylate/atropine 1Tablet(s) Oral three times a day  ibuprofen  Tablet 400milliGRAM(s) Oral three times a day  ceFAZolin   IVPB  IV Intermittent   ceFAZolin   IVPB 1000milliGRAM(s) IV Intermittent every 8 hours  fluticasone propionate 50 MICROgram(s)/spray Nasal Spray 1Spray(s) Both Nostrils two times a day  pneumococcal  23 Vaccine (PNEUMOVAX) 0.5milliLiter(s) IntraMuscular once  insulin lispro Injectable (HumaLOG) 5Unit(s) SubCutaneous before breakfast  insulin lispro Injectable (HumaLOG) 5Unit(s) SubCutaneous before lunch  insulin lispro Injectable (HumaLOG) 5Unit(s) SubCutaneous before dinner  insulin glargine Injectable (LANTUS) 8Unit(s) SubCutaneous at bedtime    MEDICATIONS  (PRN):  dextrose Gel 1Dose(s) Oral once PRN Blood Glucose LESS THAN 70 milliGRAM(s)/deciliter  glucagon  Injectable 1milliGRAM(s) IntraMuscular once PRN Glucose LESS THAN 70 milligrams/deciliter  acetaminophen   Tablet. 650milliGRAM(s) Oral every 6 hours PRN Moderate Pain (4 - 6)      REVIEW OF SYSTEMS:  CONSTITUTIONAL: No fever, weight loss, or fatigue  RESPIRATORY: No cough, wheezing, chills or hemoptysis; No shortness of breath  CARDIOVASCULAR: No chest pain, palpitations, dizziness, or leg swelling  GASTROINTESTINAL: No abdominal or epigastric pain. No nausea, vomiting, or hematemesis; No diarrhea or constipation. No melena or hematochezia.  ENDOCRINE: No heat or cold intolerance; No hair loss      Vital Signs Last 24 Hrs  T(C): 36.7, Max: 36.7 (03-31 @ 10:37)  T(F): 98.1, Max: 98.1 (03-31 @ 10:37)  HR: 92 (88 - 97)  BP: 145/80 (118/76 - 145/80)  BP(mean): --  RR: 20 (16 - 20)  SpO2: 96% (95% - 100%)    PHYSICAL EXAM:  GENERAL: NAD, well-groomed, well-developed  CHEST/LUNG: Clear to percussion bilaterally; No rales, rhonchi, wheezing, or rubs  HEART: Regular rate and rhythm; No murmurs, rubs, or gallops        LABS:                        9.3    8.5   )-----------( 602      ( 30 Mar 2017 07:31 )             26.0     30 Mar 2017 07:31    133    |  93     |  12     ----------------------------<  193    3.8     |  33     |  0.69     Ca    8.0        30 Mar 2017 07:31  Mg     1.9       30 Mar 2017 07:31          CAPILLARY BLOOD GLUCOSE  147 (31 Mar 2017 07:37)  127 (31 Mar 2017 05:55)  47 (31 Mar 2017 04:22)  51 (31 Mar 2017 04:21)  184 (30 Mar 2017 19:54)  291 (30 Mar 2017 17:34)    Lipid panel:               RADIOLOGY & ADDITIONAL TESTS:

## 2017-03-31 NOTE — PROGRESS NOTE ADULT - SUBJECTIVE AND OBJECTIVE BOX
INTERVAL HPI/OVERNIGHT EVENTS:  61 years old female by ems c/o found confused in bed this morning by her . Ems arrived and blood sugar was 37. Pt received one dextro 50 iv from ems. Pt here is alert and oriented x 2 sts she had insulin last night. Patient also has productive cough but not some thing new. No fever or chills. Few months ago was admitted with Salmonella sepsis.  Smoked close to 45 years, quit few months ago.  Resting comfortably without distress. Off telemetry.    Vital Signs Last 24 Hrs  T(C): 36.7, Max: 36.7 (03-31 @ 10:37)  T(F): 98.1, Max: 98.1 (03-31 @ 10:37)  HR: 92 (88 - 97)  BP: 145/80 (118/76 - 145/80)  BP(mean): --  RR: 20 (16 - 20)  SpO2: 96% (95% - 100%)    PHYSICAL EXAM:  GEN:        Awake, responsive and comfortable.  HEENT:    Normal.    RESP:      no wheezing.  CVS:          Regular rate and rhythm.   ABD:         Soft, non-tender, non-distended;   :             No costovertebral angle tenderness  EXTR:            No clubbing, cyanosis or edema  CNS:              Intact sensory and motor function.    MEDICATIONS  (STANDING):  clonazePAM Tablet 1milliGRAM(s) Oral at bedtime  DULoxetine 60milliGRAM(s) Oral at bedtime  amylase/lipase/protease  (CREON  6,000 Units) 1Capsule(s) Oral three times a day with meals  pantoprazole    Tablet 40milliGRAM(s) Oral before breakfast  insulin lispro (HumaLOG) corrective regimen sliding scale  SubCutaneous three times a day before meals  dextrose 5%. 1000milliLiter(s) IV Continuous <Continuous>  dextrose 50% Injectable 12.5Gram(s) IV Push once  dextrose 50% Injectable 25Gram(s) IV Push once  dextrose 50% Injectable 25Gram(s) IV Push once  enoxaparin Injectable 40milliGRAM(s) SubCutaneous daily  influenza   Vaccine 0.5milliLiter(s) IntraMuscular once  freetext medication  - 100milliGRAM(s) Oral two times a day  freetext medication  - 20milliGRAM(s) SubCutaneous at bedtime  diphenoxylate/atropine 1Tablet(s) Oral three times a day  ibuprofen  Tablet 400milliGRAM(s) Oral three times a day  ceFAZolin   IVPB  IV Intermittent   ceFAZolin   IVPB 1000milliGRAM(s) IV Intermittent every 8 hours  fluticasone propionate 50 MICROgram(s)/spray Nasal Spray 1Spray(s) Both Nostrils two times a day  pneumococcal  23 Vaccine (PNEUMOVAX) 0.5milliLiter(s) IntraMuscular once  insulin lispro Injectable (HumaLOG) 5Unit(s) SubCutaneous before breakfast  insulin lispro Injectable (HumaLOG) 5Unit(s) SubCutaneous before lunch  insulin lispro Injectable (HumaLOG) 5Unit(s) SubCutaneous before dinner  insulin glargine Injectable (LANTUS) 8Unit(s) SubCutaneous at bedtime    MEDICATIONS  (PRN):  dextrose Gel 1Dose(s) Oral once PRN Blood Glucose LESS THAN 70 milliGRAM(s)/deciliter  glucagon  Injectable 1milliGRAM(s) IntraMuscular once PRN Glucose LESS THAN 70 milligrams/deciliter  acetaminophen   Tablet. 650milliGRAM(s) Oral every 6 hours PRN Moderate Pain (4 - 6)    LABS:                        9.3    8.5   )-----------( 602      ( 30 Mar 2017 07:31 )             26.0     30 Mar 2017 07:31    133    |  93     |  12     ----------------------------<  193    3.8     |  33     |  0.69     Ca    8.0        30 Mar 2017 07:31  Mg     1.9       30 Mar 2017 07:31    ASSESSMENT AND PLAN:  ·	Altered mental status.  ·	Hypoglycemia.  ·	Bilateral pleural effusion.  ·	Anemia.  ·	Multiple Sclerosis.  ·	Myasthenia Gravis.  ·	HTN.  ·	DM.  ·	Possible COPD.      Continue Flonase. PRN nebulizer.  PFT out pt.  Follow up chest CT in 6-8 weeks.

## 2017-03-31 NOTE — PROGRESS NOTE ADULT - SUBJECTIVE AND OBJECTIVE BOX
61 years old female by ems c/o found confused in bed this morning by her . Ems arrived and blood sugar was 37. Pt received one dextro 50 iv from ems. Pt here is alert and oriented x 2 sts she had insulin last night. Patient also has productive cough but not some thing new. No fever or chills. Few months ago was admitted with Salmonella sepsis.  Smoked close to 45 years, quit few months ago.; 16 weeks to be exact   Resting comfortably without distress  hiv NEGATIVE notified yesterday   Allergies    No Known Allergies    Intolerances        MEDICATIONS  (STANDING):  clonazePAM Tablet 1milliGRAM(s) Oral at bedtime  DULoxetine 60milliGRAM(s) Oral at bedtime  amylase/lipase/protease  (CREON  6,000 Units) 1Capsule(s) Oral three times a day with meals  pantoprazole    Tablet 40milliGRAM(s) Oral before breakfast  insulin lispro (HumaLOG) corrective regimen sliding scale  SubCutaneous three times a day before meals  dextrose 5%. 1000milliLiter(s) IV Continuous <Continuous>  dextrose 50% Injectable 12.5Gram(s) IV Push once  dextrose 50% Injectable 25Gram(s) IV Push once  dextrose 50% Injectable 25Gram(s) IV Push once  enoxaparin Injectable 40milliGRAM(s) SubCutaneous daily  influenza   Vaccine 0.5milliLiter(s) IntraMuscular once  freetext medication  - 100milliGRAM(s) Oral two times a day  freetext medication  - 20milliGRAM(s) SubCutaneous at bedtime  diphenoxylate/atropine 1Tablet(s) Oral three times a day  ibuprofen  Tablet 400milliGRAM(s) Oral three times a day  ceFAZolin   IVPB  IV Intermittent   ceFAZolin   IVPB 1000milliGRAM(s) IV Intermittent every 8 hours  fluticasone propionate 50 MICROgram(s)/spray Nasal Spray 1Spray(s) Both Nostrils two times a day  pneumococcal  23 Vaccine (PNEUMOVAX) 0.5milliLiter(s) IntraMuscular once  insulin lispro Injectable (HumaLOG) 5Unit(s) SubCutaneous before breakfast  insulin lispro Injectable (HumaLOG) 5Unit(s) SubCutaneous before lunch  insulin lispro Injectable (HumaLOG) 5Unit(s) SubCutaneous before dinner  insulin glargine Injectable (LANTUS) 8Unit(s) SubCutaneous at bedtime    MEDICATIONS  (PRN):  dextrose Gel 1Dose(s) Oral once PRN Blood Glucose LESS THAN 70 milliGRAM(s)/deciliter  glucagon  Injectable 1milliGRAM(s) IntraMuscular once PRN Glucose LESS THAN 70 milligrams/deciliter  acetaminophen   Tablet. 650milliGRAM(s) Oral every 6 hours PRN Moderate Pain (4 - 6)      REVIEW OF SYSTEMS:  legs feel better    VITAL SIGNS:  T(C): 36.7, Max: 36.7 (03-31 @ 10:37)  T(F): 98.1, Max: 98.1 (03-31 @ 10:37)  HR: 92 (88 - 97)  BP: 145/80 (118/76 - 145/80)  RR: 20 (16 - 20)  SpO2: 96% (95% - 100%)  Wt(kg): --    PHYSICAL EXAM:    GENERAL: not in any distress  HEENT: Neck is supple, normocephalic, atraumatic   CHEST/LUNG: Clear to percussion bilaterally; No rales, rhonchi, wheezing  HEART: Regular rate and rhythm; No murmurs, rubs, or gallops  ABDOMEN: Soft, Nontender, Nondistended; Bowel sounds present, no rebound   EXTREMITIES:  2+ Peripheral Pulses, No clubbing, cyanosis,   bilateral edema and cellulitis   SKIN: stable   BACK: no pressor sore   NERVOUS SYSTEM:  Alert & Oriented X3, Good concentration  PSYCH: normal affect     LABS:                         9.3    8.5   )-----------( 602      ( 30 Mar 2017 07:31 )             26.0     30 Mar 2017 07:31    133    |  93     |  12     ----------------------------<  193    3.8     |  33     |  0.69     Ca    8.0        30 Mar 2017 07:31  Mg     1.9       30 Mar 2017 07:31                                Culture Results:   <10,000 CFU/ml  Normal Urogenital colby present (03-29 @ 12:17)  Culture Results:   No growth to date. (03-28 @ 14:53)  Culture Results:   No growth to date. (03-27 @ 10:11)  Culture Results:   No growth to date. (03-27 @ 10:11)                Radiology: 61 years old female by ems c/o found confused in bed this morning by her . Ems arrived and blood sugar was 37. Pt received one dextro 50 iv from ems. Pt here is alert and oriented x 2 sts she had insulin last night. Patient also has productive cough but not some thing new. No fever or chills. Few months ago was admitted with Salmonella sepsis.  Smoked close to 45 years, quit few months ago.; 16 weeks to be exact   Resting comfortably without distress  hiv NEGATIVE notified yesterday   Allergies    No Known Allergies    Intolerances        MEDICATIONS  (STANDING):  clonazePAM Tablet 1milliGRAM(s) Oral at bedtime  DULoxetine 60milliGRAM(s) Oral at bedtime  amylase/lipase/protease  (CREON  6,000 Units) 1Capsule(s) Oral three times a day with meals  pantoprazole    Tablet 40milliGRAM(s) Oral before breakfast  insulin lispro (HumaLOG) corrective regimen sliding scale  SubCutaneous three times a day before meals  dextrose 5%. 1000milliLiter(s) IV Continuous <Continuous>  dextrose 50% Injectable 12.5Gram(s) IV Push once  dextrose 50% Injectable 25Gram(s) IV Push once  dextrose 50% Injectable 25Gram(s) IV Push once  enoxaparin Injectable 40milliGRAM(s) SubCutaneous daily  influenza   Vaccine 0.5milliLiter(s) IntraMuscular once  freetext medication  - 100milliGRAM(s) Oral two times a day  freetext medication  - 20milliGRAM(s) SubCutaneous at bedtime  diphenoxylate/atropine 1Tablet(s) Oral three times a day  ibuprofen  Tablet 400milliGRAM(s) Oral three times a day  ceFAZolin   IVPB  IV Intermittent   ceFAZolin   IVPB 1000milliGRAM(s) IV Intermittent every 8 hours  fluticasone propionate 50 MICROgram(s)/spray Nasal Spray 1Spray(s) Both Nostrils two times a day  pneumococcal  23 Vaccine (PNEUMOVAX) 0.5milliLiter(s) IntraMuscular once  insulin lispro Injectable (HumaLOG) 5Unit(s) SubCutaneous before breakfast  insulin lispro Injectable (HumaLOG) 5Unit(s) SubCutaneous before lunch  insulin lispro Injectable (HumaLOG) 5Unit(s) SubCutaneous before dinner  insulin glargine Injectable (LANTUS) 8Unit(s) SubCutaneous at bedtime    MEDICATIONS  (PRN):  dextrose Gel 1Dose(s) Oral once PRN Blood Glucose LESS THAN 70 milliGRAM(s)/deciliter  glucagon  Injectable 1milliGRAM(s) IntraMuscular once PRN Glucose LESS THAN 70 milligrams/deciliter  acetaminophen   Tablet. 650milliGRAM(s) Oral every 6 hours PRN Moderate Pain (4 - 6)      REVIEW OF SYSTEMS:  legs feel better    VITAL SIGNS:  T(C): 36.7, Max: 36.7 (03-31 @ 10:37)  T(F): 98.1, Max: 98.1 (03-31 @ 10:37)  HR: 92 (88 - 97)  BP: 145/80 (118/76 - 145/80)  RR: 20 (16 - 20)  SpO2: 96% (95% - 100%)  Wt(kg): --    PHYSICAL EXAM:    GENERAL: not in any distress  HEENT: Neck is supple, normocephalic, atraumatic   CHEST/LUNG: Clear to percussion bilaterally; No rales, rhonchi, wheezing  HEART: Regular rate and rhythm; No murmurs, rubs, or gallops  ABDOMEN: Soft, Nontender, Nondistended; Bowel sounds present, no rebound   EXTREMITIES:  2+ Peripheral Pulses, No clubbing, cyanosis,   bilateral edema and cellulitis improving nicely  SKIN: stable   BACK: no pressor sore   NERVOUS SYSTEM:  Alert & Oriented X3, Good concentration  PSYCH: normal affect     LABS:                         9.3    8.5   )-----------( 602      ( 30 Mar 2017 07:31 )             26.0     30 Mar 2017 07:31    133    |  93     |  12     ----------------------------<  193    3.8     |  33     |  0.69     Ca    8.0        30 Mar 2017 07:31  Mg     1.9       30 Mar 2017 07:31                                Culture Results:   <10,000 CFU/ml  Normal Urogenital colby present (03-29 @ 12:17)  Culture Results:   No growth to date. (03-28 @ 14:53)  Culture Results:   No growth to date. (03-27 @ 10:11)  Culture Results:   No growth to date. (03-27 @ 10:11)                Radiology:

## 2017-03-31 NOTE — PROGRESS NOTE ADULT - ASSESSMENT
61 years old female is admitted for hypoglycemia, h/o DM II, PNA, r/o sepsis, elevated lactate, MD  Plan: IV D5.45% NS. IV Abx. Monitor lactate level. Blood and urine culture. Chest x ray noted. On Zosyn. will get pulmonary consult.   Started on Ancef by ID. Resolving Pneumonia. Continue present management

## 2017-03-31 NOTE — PROGRESS NOTE ADULT - ASSESSMENT
61 years old female is admitted for hypoglycemia, h/o DM II, PNA, r/o sepsis, elevated lactate,   here bilateral leg cellulitis and upper resp congestion   repeat ct Vs prev showed ongoing resolution   stigmata of old tobacco  abuse  continue ancef

## 2017-03-31 NOTE — PROGRESS NOTE ADULT - SUBJECTIVE AND OBJECTIVE BOX
Patient is a 61y old  Female who presents with a chief complaint of Hypoglycemia (28 Mar 2017 10:09)      INTERVAL HPI/OVERNIGHT EVENTS: Asymptomatic    MEDICATIONS  (STANDING):  clonazePAM Tablet 1milliGRAM(s) Oral at bedtime  DULoxetine 60milliGRAM(s) Oral at bedtime  amylase/lipase/protease  (CREON  6,000 Units) 1Capsule(s) Oral three times a day with meals  pantoprazole    Tablet 40milliGRAM(s) Oral before breakfast  insulin lispro (HumaLOG) corrective regimen sliding scale  SubCutaneous three times a day before meals  dextrose 5%. 1000milliLiter(s) IV Continuous <Continuous>  dextrose 50% Injectable 12.5Gram(s) IV Push once  dextrose 50% Injectable 25Gram(s) IV Push once  dextrose 50% Injectable 25Gram(s) IV Push once  enoxaparin Injectable 40milliGRAM(s) SubCutaneous daily  influenza   Vaccine 0.5milliLiter(s) IntraMuscular once  freetext medication  - 100milliGRAM(s) Oral two times a day  freetext medication  - 20milliGRAM(s) SubCutaneous at bedtime  diphenoxylate/atropine 1Tablet(s) Oral three times a day  ibuprofen  Tablet 400milliGRAM(s) Oral three times a day  ceFAZolin   IVPB  IV Intermittent   ceFAZolin   IVPB 1000milliGRAM(s) IV Intermittent every 8 hours  fluticasone propionate 50 MICROgram(s)/spray Nasal Spray 1Spray(s) Both Nostrils two times a day  pneumococcal  23 Vaccine (PNEUMOVAX) 0.5milliLiter(s) IntraMuscular once  insulin lispro Injectable (HumaLOG) 5Unit(s) SubCutaneous before breakfast  insulin lispro Injectable (HumaLOG) 5Unit(s) SubCutaneous before lunch  insulin lispro Injectable (HumaLOG) 5Unit(s) SubCutaneous before dinner  insulin glargine Injectable (LANTUS) 8Unit(s) SubCutaneous at bedtime    MEDICATIONS  (PRN):  dextrose Gel 1Dose(s) Oral once PRN Blood Glucose LESS THAN 70 milliGRAM(s)/deciliter  glucagon  Injectable 1milliGRAM(s) IntraMuscular once PRN Glucose LESS THAN 70 milligrams/deciliter  acetaminophen   Tablet. 650milliGRAM(s) Oral every 6 hours PRN Moderate Pain (4 - 6)      Allergies    No Known Allergies    Intolerances        REVIEW OF SYSTEMS:  CONSTITUTIONAL: No fever, weight loss, or fatigue  EYES: No eye pain, visual disturbances, or discharge  ENMT:  No difficulty hearing, tinnitus, vertigo; No sinus or throat pain  NECK: No pain or stiffness  BREASTS: No pain, masses, or nipple discharge  RESPIRATORY: No cough, wheezing, chills or hemoptysis; No shortness of breath  CARDIOVASCULAR: No chest pain, palpitations, dizziness, or leg swelling  GASTROINTESTINAL: No abdominal or epigastric pain. No nausea, vomiting, or hematemesis; No diarrhea or constipation. No melena or hematochezia.  GENITOURINARY: No dysuria, frequency, hematuria, or incontinence  NEUROLOGICAL: No headaches, memory loss, loss of strength, numbness, or tremors  SKIN: No itching, burning, rashes, or lesions   LYMPH NODES: No enlarged glands  ENDOCRINE: No heat or cold intolerance; No hair loss  MUSCULOSKELETAL: No joint pain or swelling; No muscle, back, or extremity pain  PSYCHIATRIC: No depression, anxiety, mood swings, or difficulty sleeping  HEME/LYMPH: No easy bruising, or bleeding gums  ALLERGY AND IMMUNOLOGIC: No hives or eczema    Vital Signs Last 24 Hrs  T(C): 36.5, Max: 36.6 (03-30 @ 11:23)  T(F): 97.7, Max: 97.8 (03-30 @ 11:23)  HR: 97 (88 - 101)  BP: 140/79 (118/76 - 140/79)  BP(mean): --  RR: 16 (16 - 17)  SpO2: 97% (94% - 100%)    PHYSICAL EXAM:  GENERAL: NAD, well-groomed, well-developed  HEAD:  Atraumatic, Normocephalic  EYES: EOMI, PERRLA, conjunctiva and sclera clear  ENMT:  Moist mucous membranes, Good dentition, No lesions  NECK: Supple, No JVD, Normal thyroid  NERVOUS SYSTEM:  Alert & Oriented X3, Good concentration; Motor Strength 5/5 B/L upper and lower extremities; DTRs 2+ intact and symmetric  CHEST/LUNG: Clear to percussion bilaterally; No rales, rhonchi, wheezing, or rubs  HEART: Regular rate and rhythm; No murmurs, rubs, or gallops  ABDOMEN: Soft, Nontender, Nondistended; Bowel sounds present  EXTREMITIES:  2+ Peripheral Pulses, No clubbing, cyanosis, or edema. erythema and Edema improving.  LYMPH: No lymphadenopathy noted  SKIN: No rashes or lesions    LABS:                        9.3    8.5   )-----------( 602      ( 30 Mar 2017 07:31 )             26.0     30 Mar 2017 07:31    133    |  93     |  12     ----------------------------<  193    3.8     |  33     |  0.69     Ca    8.0        30 Mar 2017 07:31  Mg     1.9       30 Mar 2017 07:31          CAPILLARY BLOOD GLUCOSE  147 (31 Mar 2017 07:37)  127 (31 Mar 2017 05:55)  47 (31 Mar 2017 04:22)  51 (31 Mar 2017 04:21)  184 (30 Mar 2017 19:54)  291 (30 Mar 2017 17:34)  277 (30 Mar 2017 12:09)      RADIOLOGY & ADDITIONAL TESTS:      EXAM:  CT CHEST                            PROCEDURE DATE:  03/29/2017        INTERPRETATION:  CLINICAL INFORMATION: Pneumonia    COMPARISON: 2/10/2017    PROCEDURE:     Serial axial sections through the chest were obtained without   administrationof nonionic intravenous contrast. Sagittal and coronal   reformats were then generated from the axial images.    FINDINGS:     LUNG AND LARGE AIRWAYS: Lingular and right lower lobe consolidation,   mildly decreased. Mild emphysema and bullous changes. Scattered tiny   nodules measuring up to 2 mm. Bilateral lower lobe atelectasis.  PLEURA: Small bilateral pleural effusions.  VESSELS: Atherosclerotic changes of the aorta and coronary arteries.  HEART: Normal size. Aortic valve calcifications. Trace pericardial   effusion.  MEDIASTINUM AND DELANEY: Mild mediastinal adenopathy.  CHEST WALL AND LOWER NECK: Subcutaneous soft tissue edema.    UPPER ABDOMEN: Hypodense hepatic lesions measuring up to 1.6 cm.   Pancreatic calcifications. Bilateral adrenal thickening.    BONES: Spinal degenerative changes. Partially imaged lower cervical spine   fusion.    IMPRESSION:     Mild interval decrease of lingula and right lower lobe consolidations.  Small bilateral pleural effusions.                IRVING MILLER M.D., ATTENDING RADIOLOGIST  This document has been electronically signed. Mar 29 2017  2:30PM                Imaging Personally Reviewed:  [ ] YES  [ ] NO    Consultant(s) Notes Reviewed:  [x ] YES  [ ] NO    Care Discussed with Consultants/Other Providers [ ] YES  [ ] NO    PROBLEMS:  HYPOGLYCEMIA; PNEUMONIA  LOW BLOOD SUGAR  Hypoglycemia  Chronic pancreatitis, unspecified pancreatitis type  IBD (inflammatory bowel disease)  Type 1 diabetes mellitus with hypoglycemia and without coma  Essential hypertension  Multiple sclerosis  Pneumonia of left lung due to other aerobic Gram-negative bacteria, unspecified part of lung  Hypoglycemia      Care discussed with family,         [  ]   yes  [  ]  No    imp:    stable[x ]    unstable[  ]     improving [   ]       unchanged  [  ]                Plans:  Continue present plans  [  ]               New consult [  ]   specialty  .......               order rochelle[  ]    test name.                  Discharge Planning  [  ]

## 2017-04-01 LAB
CULTURE RESULTS: SIGNIFICANT CHANGE UP
CULTURE RESULTS: SIGNIFICANT CHANGE UP
SPECIMEN SOURCE: SIGNIFICANT CHANGE UP
SPECIMEN SOURCE: SIGNIFICANT CHANGE UP

## 2017-04-01 RX ADMIN — Medication 650 MILLIGRAM(S): at 12:54

## 2017-04-01 RX ADMIN — Medication 1 CAPSULE(S): at 17:49

## 2017-04-01 RX ADMIN — Medication 1 CAPSULE(S): at 11:56

## 2017-04-01 RX ADMIN — Medication 1 TABLET(S): at 06:02

## 2017-04-01 RX ADMIN — Medication 6: at 11:56

## 2017-04-01 RX ADMIN — INSULIN GLARGINE 8 UNIT(S): 100 INJECTION, SOLUTION SUBCUTANEOUS at 22:35

## 2017-04-01 RX ADMIN — Medication 1 TABLET(S): at 13:49

## 2017-04-01 RX ADMIN — Medication 400 MILLIGRAM(S): at 14:20

## 2017-04-01 RX ADMIN — Medication 100 MILLIGRAM(S): at 13:49

## 2017-04-01 RX ADMIN — Medication 650 MILLIGRAM(S): at 11:56

## 2017-04-01 RX ADMIN — Medication 1 MILLIGRAM(S): at 22:35

## 2017-04-01 RX ADMIN — Medication 400 MILLIGRAM(S): at 13:49

## 2017-04-01 RX ADMIN — Medication 1 CAPSULE(S): at 08:07

## 2017-04-01 RX ADMIN — Medication 400 MILLIGRAM(S): at 23:15

## 2017-04-01 RX ADMIN — Medication 100 MILLIGRAM(S): at 22:35

## 2017-04-01 RX ADMIN — Medication 1 SPRAY(S): at 17:50

## 2017-04-01 RX ADMIN — Medication 0.5 MILLILITER(S): at 11:56

## 2017-04-01 RX ADMIN — Medication 5 UNIT(S): at 11:56

## 2017-04-01 RX ADMIN — DULOXETINE HYDROCHLORIDE 60 MILLIGRAM(S): 30 CAPSULE, DELAYED RELEASE ORAL at 22:34

## 2017-04-01 RX ADMIN — Medication 5 UNIT(S): at 08:07

## 2017-04-01 RX ADMIN — Medication 100 MILLIGRAM(S): at 06:04

## 2017-04-01 RX ADMIN — ENOXAPARIN SODIUM 40 MILLIGRAM(S): 100 INJECTION SUBCUTANEOUS at 11:56

## 2017-04-01 RX ADMIN — Medication 1 SPRAY(S): at 06:03

## 2017-04-01 RX ADMIN — Medication 5 UNIT(S): at 17:53

## 2017-04-01 RX ADMIN — Medication 400 MILLIGRAM(S): at 22:34

## 2017-04-01 RX ADMIN — Medication 8: at 23:46

## 2017-04-01 RX ADMIN — PANTOPRAZOLE SODIUM 40 MILLIGRAM(S): 20 TABLET, DELAYED RELEASE ORAL at 08:07

## 2017-04-01 NOTE — DISCHARGE NOTE ADULT - MEDICATION SUMMARY - MEDICATIONS TO TAKE
I will START or STAY ON the medications listed below when I get home from the hospital:    freetext medication  -  -- 100 milligram(s) by mouth 2 times a day  -- Indication: For Viberzi, as per GI for IBD    clonazePAM 1 mg oral tablet  -- 1 tab(s) by mouth once a day (at bedtime)  -- Indication: For Anxiety    DULoxetine 60 mg oral delayed release capsule  -- 1 cap(s) by mouth once a day (at bedtime)  -- Indication: For DEpression    insulin glargine  -- 5 unit(s) subcutaneous once a day (at bedtime)  -- Indication: For DM    NovoLOG  -- 5 unit(s) subcutaneous 3 times a day with meals , per blood sugar level.  -- Indication: For DM    nystatin 100,000 units/mL oral suspension  -- 5 milliliter(s) by mouth every 6 hours  -- Indication: For Thrush    docosanol 10% topical cream  -- 0.5 application on skin 5 times a day  -- Indication: For Rash    Creon  --  by mouth   -- Indication: For Pancreatitis    Glatopa 20 mg/mL subcutaneous solution  -- 1 unit(s) subcutaneous once a day (at bedtime)  -- Indication: For MS    influenza virus vaccine, inactivated  --     -- Indication: For Vaccine    Augmentin 875 mg-125 mg oral tablet  -- 1 tab(s) by mouth every 12 hours x 7 days.  -- Indication: For Cellulitis

## 2017-04-01 NOTE — PROGRESS NOTE ADULT - ASSESSMENT
61 years old female is admitted for hypoglycemia, h/o DM II, PNA, r/o sepsis, elevated lactate, MD  Plan: IV D5.45% NS. IV Abx. Monitor lactate level. Blood and urine culture. Chest x ray noted. On Zosyn. will get pulmonary consult.   Started on Ancef by ID. Resolving Pneumonia. Continue present management. resolving cellulitis.

## 2017-04-01 NOTE — DISCHARGE NOTE ADULT - PATIENT PORTAL LINK FT
“You can access the Follow[facility] Patient Portal, offered by [facility], by registering with the following website: [email or web address]”

## 2017-04-01 NOTE — DISCHARGE NOTE ADULT - CARE PLAN
Principal Discharge DX:	Hypoglycemia  Goal:	Resolved  Instructions for follow-up, activity and diet:	DASH/TLC  Secondary Diagnosis:	Essential hypertension  Instructions for follow-up, activity and diet:	Monitor BP  Secondary Diagnosis:	IBD (inflammatory bowel disease)  Instructions for follow-up, activity and diet:	Viberzi, GI follow up  Secondary Diagnosis:	Multiple sclerosis  Instructions for follow-up, activity and diet:	Glatopa, Neurology F/U.  Secondary Diagnosis:	Pneumonia of left lung due to other aerobic Gram-negative bacteria, unspecified part of lung  Instructions for follow-up, activity and diet:	Resolved  Secondary Diagnosis:	Type 1 diabetes mellitus with hypoglycemia and without coma  Instructions for follow-up, activity and diet:	Monitor BS. F/U with Endocrinology.  Secondary Diagnosis:	Chronic pancreatitis, unspecified pancreatitis type  Instructions for follow-up, activity and diet:	Creon

## 2017-04-01 NOTE — PROGRESS NOTE ADULT - SUBJECTIVE AND OBJECTIVE BOX
Patient is a 61y old  Female who presents with a chief complaint of Hypoglycemia (28 Mar 2017 10:09)      INTERVAL HPI/OVERNIGHT EVENTS:    MEDICATIONS  (STANDING):  clonazePAM Tablet 1milliGRAM(s) Oral at bedtime  DULoxetine 60milliGRAM(s) Oral at bedtime  amylase/lipase/protease  (CREON  6,000 Units) 1Capsule(s) Oral three times a day with meals  pantoprazole    Tablet 40milliGRAM(s) Oral before breakfast  dextrose 5%. 1000milliLiter(s) IV Continuous <Continuous>  dextrose 50% Injectable 12.5Gram(s) IV Push once  dextrose 50% Injectable 25Gram(s) IV Push once  dextrose 50% Injectable 25Gram(s) IV Push once  enoxaparin Injectable 40milliGRAM(s) SubCutaneous daily  influenza   Vaccine 0.5milliLiter(s) IntraMuscular once  freetext medication  - 100milliGRAM(s) Oral two times a day  freetext medication  - 20milliGRAM(s) SubCutaneous at bedtime  diphenoxylate/atropine 1Tablet(s) Oral three times a day  ibuprofen  Tablet 400milliGRAM(s) Oral three times a day  ceFAZolin   IVPB  IV Intermittent   ceFAZolin   IVPB 1000milliGRAM(s) IV Intermittent every 8 hours  fluticasone propionate 50 MICROgram(s)/spray Nasal Spray 1Spray(s) Both Nostrils two times a day  pneumococcal  23 Vaccine (PNEUMOVAX) 0.5milliLiter(s) IntraMuscular once  insulin lispro Injectable (HumaLOG) 5Unit(s) SubCutaneous before breakfast  insulin lispro Injectable (HumaLOG) 5Unit(s) SubCutaneous before lunch  insulin lispro Injectable (HumaLOG) 5Unit(s) SubCutaneous before dinner  insulin glargine Injectable (LANTUS) 8Unit(s) SubCutaneous at bedtime  insulin lispro (HumaLOG) corrective regimen sliding scale  SubCutaneous every 6 hours    MEDICATIONS  (PRN):  dextrose Gel 1Dose(s) Oral once PRN Blood Glucose LESS THAN 70 milliGRAM(s)/deciliter  glucagon  Injectable 1milliGRAM(s) IntraMuscular once PRN Glucose LESS THAN 70 milligrams/deciliter  acetaminophen   Tablet. 650milliGRAM(s) Oral every 6 hours PRN Moderate Pain (4 - 6)    Allergies    No Known Allergies    Intolerances    REVIEW OF SYSTEMS:  CONSTITUTIONAL: No fever, weight loss, or fatigue  EYES: No eye pain, visual disturbances, or discharge  ENMT:  No difficulty hearing, tinnitus, vertigo; No sinus or throat pain  NECK: No pain or stiffness  BREASTS: No pain, masses, or nipple discharge  RESPIRATORY: No cough, wheezing, chills or hemoptysis; No shortness of breath  CARDIOVASCULAR: No chest pain, palpitations, dizziness, or leg swelling  GASTROINTESTINAL: No abdominal or epigastric pain. No nausea, vomiting, or hematemesis; No diarrhea or constipation. No melena or hematochezia.  GENITOURINARY: No dysuria, frequency, hematuria, or incontinence  NEUROLOGICAL: No headaches, memory loss, loss of strength, numbness, or tremors  SKIN: No itching, burning, rashes, or lesions   LYMPH NODES: No enlarged glands  ENDOCRINE: No heat or cold intolerance; No hair loss  MUSCULOSKELETAL: No joint pain or swelling; No muscle, back, or extremity pain  PSYCHIATRIC: No depression, anxiety, mood swings, or difficulty sleeping  HEME/LYMPH: No easy bruising, or bleeding gums  ALLERGY AND IMMUNOLOGIC: No hives or eczema    Vital Signs Last 24 Hrs  T(C): 36.4, Max: 36.9 (03-31 @ 17:54)  T(F): 97.5, Max: 98.4 (03-31 @ 17:54)  HR: 104 (92 - 104)  BP: 129/78 (129/78 - 155/84)  BP(mean): --  RR: 15 (15 - 20)  SpO2: 95% (95% - 99%)    PHYSICAL EXAM:  GENERAL: NAD, well-groomed, well-developed  HEAD:  Atraumatic, Normocephalic  EYES: EOMI, PERRLA, conjunctiva and sclera clear  ENMT:  Moist mucous membranes, Good dentition, No lesions  NECK: Supple, No JVD, Normal thyroid  NERVOUS SYSTEM:  Alert & Oriented X3, Good concentration; Motor Strength 5/5 B/L upper and lower extremities; DTRs 2+ intact and symmetric  CHEST/LUNG: Clear to percussion bilaterally; No rales, rhonchi, wheezing, or rubs  HEART: Regular rate and rhythm; No murmurs, rubs, or gallops  ABDOMEN: Soft, Nontender, Nondistended; Bowel sounds present  EXTREMITIES:  2+ Peripheral Pulses, No clubbing, cyanosis,! + edema lower extremities Erythema and cellulitis resolving.  LYMPH: No lymphadenopathy noted  SKIN: No rashes or lesions    LABS:              CAPILLARY BLOOD GLUCOSE  283 (01 Apr 2017 08:05)  49 (01 Apr 2017 06:01)  47 (01 Apr 2017 05:57)  202 (01 Apr 2017 00:55)  430 (31 Mar 2017 21:58)  427 (31 Mar 2017 21:57)                RADIOLOGY & ADDITIONAL TESTS:    Imaging Personally Reviewed:  [ ] YES  [ ] NO    Consultant(s) Notes Reviewed:  [ ] YES  [ ] NO    Care Discussed with Consultants/Other Providers [ ] YES  [ ] NO    PROBLEMS:  HYPOGLYCEMIA; PNEUMONIA  LOW BLOOD SUGAR  Hypoglycemia  Chronic pancreatitis, unspecified pancreatitis type  IBD (inflammatory bowel disease)  Type 1 diabetes mellitus with hypoglycemia and without coma  Essential hypertension  Multiple sclerosis  Pneumonia of left lung due to other aerobic Gram-negative bacteria, unspecified part of lung  Hypoglycemia      Care discussed with family,         [  ]   yes  [  ]  No    imp:    stable[ ]    unstable[  ]     improving [   ]       unchanged  [  ]                Plans:  Continue present plans  [  ]               New consult [  ]   specialty  .......               order rochelle[  ]    test name.                  Discharge Planning  [  ]

## 2017-04-01 NOTE — DISCHARGE NOTE ADULT - PLAN OF CARE
Resolved DASH/TLC Monitor BP Viberzi, GI follow up Glatopa, Neurology F/U. Monitor BS. F/U with Endocrinology. Nino

## 2017-04-01 NOTE — DISCHARGE NOTE ADULT - SECONDARY DIAGNOSIS.
Essential hypertension IBD (inflammatory bowel disease) Multiple sclerosis Pneumonia of left lung due to other aerobic Gram-negative bacteria, unspecified part of lung Type 1 diabetes mellitus with hypoglycemia and without coma Chronic pancreatitis, unspecified pancreatitis type

## 2017-04-01 NOTE — DISCHARGE NOTE ADULT - CARE PROVIDER_API CALL
Tess Gramajo ([facility]), Internal Medicine  [address] [place] [address]  Phone: [phone]  Fax: [phone]    Sulma Garcia), Internal Medicine  [address] [facility] [address] [place], [place] [address]  Phone: [phone]  Fax: [phone]

## 2017-04-01 NOTE — DISCHARGE NOTE ADULT - NS AS ACTIVITY OBS
Bathing allowed/Walking-Indoors allowed/Walking-Outdoors allowed/No Heavy lifting/straining/Stairs allowed

## 2017-04-01 NOTE — PROGRESS NOTE ADULT - SUBJECTIVE AND OBJECTIVE BOX
Patient is a 61y old  Female who presents with a chief complaint of Hypoglycemia (01 Apr 2017 13:31)      Interval History: finger sticks with wide fluctuations in finger sticks   on Lantus 8 units and prandial lispro 5 units and Lispro sliding scale coverage with meals   PO intake fair     MEDICATIONS  (STANDING):  clonazePAM Tablet 1milliGRAM(s) Oral at bedtime  DULoxetine 60milliGRAM(s) Oral at bedtime  amylase/lipase/protease  (CREON  6,000 Units) 1Capsule(s) Oral three times a day with meals  pantoprazole    Tablet 40milliGRAM(s) Oral before breakfast  dextrose 5%. 1000milliLiter(s) IV Continuous <Continuous>  dextrose 50% Injectable 12.5Gram(s) IV Push once  dextrose 50% Injectable 25Gram(s) IV Push once  dextrose 50% Injectable 25Gram(s) IV Push once  enoxaparin Injectable 40milliGRAM(s) SubCutaneous daily  freetext medication  - 100milliGRAM(s) Oral two times a day  freetext medication  - 20milliGRAM(s) SubCutaneous at bedtime  ibuprofen  Tablet 400milliGRAM(s) Oral three times a day  ceFAZolin   IVPB  IV Intermittent   ceFAZolin   IVPB 1000milliGRAM(s) IV Intermittent every 8 hours  fluticasone propionate 50 MICROgram(s)/spray Nasal Spray 1Spray(s) Both Nostrils two times a day  insulin lispro Injectable (HumaLOG) 5Unit(s) SubCutaneous before breakfast  insulin lispro Injectable (HumaLOG) 5Unit(s) SubCutaneous before lunch  insulin lispro Injectable (HumaLOG) 5Unit(s) SubCutaneous before dinner  insulin glargine Injectable (LANTUS) 8Unit(s) SubCutaneous at bedtime  insulin lispro (HumaLOG) corrective regimen sliding scale  SubCutaneous every 6 hours    MEDICATIONS  (PRN):  dextrose Gel 1Dose(s) Oral once PRN Blood Glucose LESS THAN 70 milliGRAM(s)/deciliter  glucagon  Injectable 1milliGRAM(s) IntraMuscular once PRN Glucose LESS THAN 70 milligrams/deciliter  acetaminophen   Tablet. 650milliGRAM(s) Oral every 6 hours PRN Moderate Pain (4 - 6)      Allergies    No Known Allergies    Intolerances        REVIEW OF SYSTEMS:  CONSTITUTIONAL:   EYES: No eye pain, visual disturbances, or discharge  ENMT:  No difficulty hearing, tinnitus, vertigo; No sinus or throat pain  NECK: No pain or stiffness  RESPIRATORY: No cough, wheezing, chills or hemoptysis; No shortness of breath  CARDIOVASCULAR: No chest pain, palpitations, dizziness, or leg swelling  GASTROINTESTINAL: No abdominal or epigastric pain. No nausea, vomiting, or hematemesis; No diarrhea or constipation. No melena or hematochezia.  GENITOURINARY: No dysuria, frequency, hematuria, or incontinence  NEUROLOGICAL: No headaches, memory loss, loss of strength, numbness, or tremors  SKIN: No itching, burning, rashes, or lesions   LYMPH NODES: No enlarged glands  ENDOCRINE: No heat or cold intolerance; No hair loss  MUSCULOSKELETAL: No joint pain or swelling; No muscle, back, or extremity pain  PSYCHIATRIC: No depression, anxiety, mood swings, or difficulty sleeping  HEME/LYMPH: No easy bruising, or bleeding gums  ALLERY AND IMMUNOLOGIC: No hives or eczema    Vital Signs Last 24 Hrs  T(C): 37.1, Max: 37.1 (04-01 @ 23:47)  T(F): 98.8, Max: 98.8 (04-01 @ 23:47)  HR: 78 (78 - 97)  BP: 136/75 (134/75 - 157/89)  BP(mean): --  RR: 16 (16 - 20)  SpO2: 99% (95% - 100%)    PHYSICAL EXAM:  GENERAL:   HEAD:  Atraumatic, Normocephalic  EYES: EOMI, PERRLA, conjunctiva and sclera clear  ENMT: No tonsillar erythema, exudates, or enlargement; Moist mucous membranes, Good dentition, No lesions  NECK: Supple, No JVD, Normal thyroid  NERVOUS SYSTEM:  Alert & Oriented X3, Good concentration; Motor Strength 5/5 B/L upper and lower extremities; DTRs 2+ intact and symmetric  CHEST/LUNG: Clear to percussion bilaterally; No rales, rhonchi, wheezing, or rubs  HEART: Regular rate and rhythm; No murmurs, rubs, or gallops  ABDOMEN: Soft, Nontender, Nondistended; Bowel sounds present  EXTREMITIES:  2+ Peripheral Pulses, No clubbing, cyanosis, or edema  SKIN: No rashes or lesions    LABS:        CAPILLARY BLOOD GLUCOSE  62 (02 Apr 2017 06:21)  317 (01 Apr 2017 22:31)  145 (01 Apr 2017 16:29)  245 (01 Apr 2017 13:22)    Lipid panel:           Thyroid:  Diabetes Tests:  Parathyroid Panel:  Adrenals:  RADIOLOGY & ADDITIONAL TESTS:    Imaging Personally Reviewed:  [ ] YES  [ ] NO    Consultant(s) Notes Reviewed:  [ ] YES  [ ] NO    Care Discussed with Consultants/Other Providers [ ] YES  [ ] NO

## 2017-04-01 NOTE — DISCHARGE NOTE ADULT - HOSPITAL COURSE
61 years old female is admitted for hypoglycemia, h/o DM II, PNA, r/o sepsis, elevated lactate, MD  Plan: IV D5.45% NS. IV Abx. Monitor lactate level. Blood and urine culture. Chest x ray noted. On Zosyn. will get pulmonary consult.   Started on Ancef by ID. Resolving Pneumonia. Continue present management. resolving cellulitis. Pt. asymptomatic. Discharge home on Augmentin

## 2017-04-01 NOTE — DISCHARGE NOTE ADULT - MEDICATION SUMMARY - MEDICATIONS TO STOP TAKING
I will STOP taking the medications listed below when I get home from the hospital:    pancrelipase 6000 units-19,000 units-30,000 units oral delayed release capsule  -- 1 cap(s) by mouth 3 times a day (with meals)    pantoprazole 40 mg oral delayed release tablet  -- 1 tab(s) by mouth once a day (before a meal)    ampicillin 500 mg oral capsule  -- 1 cap(s) by mouth 4 times a day x4 days    potassium acetate  --  intravenous

## 2017-04-02 VITALS
DIASTOLIC BLOOD PRESSURE: 91 MMHG | TEMPERATURE: 99 F | HEART RATE: 95 BPM | SYSTOLIC BLOOD PRESSURE: 138 MMHG | OXYGEN SATURATION: 99 % | RESPIRATION RATE: 16 BRPM

## 2017-04-02 LAB
CULTURE RESULTS: SIGNIFICANT CHANGE UP
SPECIMEN SOURCE: SIGNIFICANT CHANGE UP

## 2017-04-02 RX ORDER — ELUXADOLINE 100 MG/1
1 TABLET, FILM COATED ORAL
Qty: 0 | Refills: 0 | COMMUNITY

## 2017-04-02 RX ORDER — POTASSIUM ACETATE 196 MG/ML
0 INJECTION, SOLUTION INTRAVENOUS
Qty: 0 | Refills: 0 | COMMUNITY

## 2017-04-02 RX ADMIN — Medication 1 CAPSULE(S): at 07:57

## 2017-04-02 RX ADMIN — Medication 400 MILLIGRAM(S): at 06:43

## 2017-04-02 RX ADMIN — Medication 100 MILLIGRAM(S): at 06:39

## 2017-04-02 RX ADMIN — PANTOPRAZOLE SODIUM 40 MILLIGRAM(S): 20 TABLET, DELAYED RELEASE ORAL at 07:57

## 2017-04-02 RX ADMIN — Medication 1 SPRAY(S): at 06:41

## 2017-04-02 RX ADMIN — Medication 400 MILLIGRAM(S): at 07:30

## 2017-04-02 RX ADMIN — INFLUENZA VIRUS VACCINE 0.5 MILLILITER(S): 15; 15; 15; 15 SUSPENSION INTRAMUSCULAR at 09:07

## 2017-04-02 NOTE — PROGRESS NOTE ADULT - ASSESSMENT
61 years old female is admitted for hypoglycemia, h/o DM II, PNA, r/o sepsis, elevated lactate, MD  Plan: IV D5.45% NS. IV Abx. Monitor lactate level. Blood and urine culture. Chest x ray noted. On Zosyn. will get pulmonary consult.   Started on Ancef by ID. Resolving Pneumonia. Continue present management. resolving cellulitis. Cellulitis much improved. Plan discharge home on Augmentin.

## 2017-04-02 NOTE — PROGRESS NOTE ADULT - PROBLEM SELECTOR PROBLEM 4
IBD (inflammatory bowel disease)
Essential hypertension
IBD (inflammatory bowel disease)
Essential hypertension

## 2017-04-02 NOTE — PROGRESS NOTE ADULT - SUBJECTIVE AND OBJECTIVE BOX
Patient is a 61y old  Female who presents with a chief complaint of Hypoglycemia (01 Apr 2017 13:31)      INTERVAL HPI/OVERNIGHT EVENTS: asymptomatic    MEDICATIONS  (STANDING):  clonazePAM Tablet 1milliGRAM(s) Oral at bedtime  DULoxetine 60milliGRAM(s) Oral at bedtime  amylase/lipase/protease  (CREON  6,000 Units) 1Capsule(s) Oral three times a day with meals  pantoprazole    Tablet 40milliGRAM(s) Oral before breakfast  dextrose 5%. 1000milliLiter(s) IV Continuous <Continuous>  dextrose 50% Injectable 12.5Gram(s) IV Push once  dextrose 50% Injectable 25Gram(s) IV Push once  dextrose 50% Injectable 25Gram(s) IV Push once  enoxaparin Injectable 40milliGRAM(s) SubCutaneous daily  influenza   Vaccine 0.5milliLiter(s) IntraMuscular once  freetext medication  - 100milliGRAM(s) Oral two times a day  freetext medication  - 20milliGRAM(s) SubCutaneous at bedtime  ibuprofen  Tablet 400milliGRAM(s) Oral three times a day  ceFAZolin   IVPB  IV Intermittent   ceFAZolin   IVPB 1000milliGRAM(s) IV Intermittent every 8 hours  fluticasone propionate 50 MICROgram(s)/spray Nasal Spray 1Spray(s) Both Nostrils two times a day  insulin lispro Injectable (HumaLOG) 5Unit(s) SubCutaneous before breakfast  insulin lispro Injectable (HumaLOG) 5Unit(s) SubCutaneous before lunch  insulin lispro Injectable (HumaLOG) 5Unit(s) SubCutaneous before dinner  insulin glargine Injectable (LANTUS) 8Unit(s) SubCutaneous at bedtime  insulin lispro (HumaLOG) corrective regimen sliding scale  SubCutaneous every 6 hours    MEDICATIONS  (PRN):  dextrose Gel 1Dose(s) Oral once PRN Blood Glucose LESS THAN 70 milliGRAM(s)/deciliter  glucagon  Injectable 1milliGRAM(s) IntraMuscular once PRN Glucose LESS THAN 70 milligrams/deciliter  acetaminophen   Tablet. 650milliGRAM(s) Oral every 6 hours PRN Moderate Pain (4 - 6)      Allergies    No Known Allergies    Intolerances        REVIEW OF SYSTEMS:  CONSTITUTIONAL: No fever, weight loss, or fatigue  EYES: No eye pain, visual disturbances, or discharge  ENMT:  No difficulty hearing, tinnitus, vertigo; No sinus or throat pain  NECK: No pain or stiffness  BREASTS: No pain, masses, or nipple discharge  RESPIRATORY: No cough, wheezing, chills or hemoptysis; No shortness of breath  CARDIOVASCULAR: No chest pain, palpitations, dizziness, or leg swelling  GASTROINTESTINAL: No abdominal or epigastric pain. No nausea, vomiting, or hematemesis; No diarrhea or constipation. No melena or hematochezia.  GENITOURINARY: No dysuria, frequency, hematuria, or incontinence  NEUROLOGICAL: No headaches, memory loss, loss of strength, numbness, or tremors  SKIN: No itching, burning, rashes, or lesions   LYMPH NODES: No enlarged glands  ENDOCRINE: No heat or cold intolerance; No hair loss  MUSCULOSKELETAL: No joint pain or swelling; No muscle, back, or extremity pain  PSYCHIATRIC: No depression, anxiety, mood swings, or difficulty sleeping  HEME/LYMPH: No easy bruising, or bleeding gums  ALLERGY AND IMMUNOLOGIC: No hives or eczema    Vital Signs Last 24 Hrs  T(C): 37.1, Max: 37.1 (04-01 @ 23:47)  T(F): 98.8, Max: 98.8 (04-01 @ 23:47)  HR: 78 (78 - 97)  BP: 136/75 (134/75 - 157/89)  BP(mean): --  RR: 16 (16 - 20)  SpO2: 99% (95% - 100%)    PHYSICAL EXAM:  GENERAL: NAD, well-groomed, well-developed  HEAD:  Atraumatic, Normocephalic  EYES: EOMI, PERRLA, conjunctiva and sclera clear  ENMT:  Moist mucous membranes, Good dentition, No lesions  NECK: Supple, No JVD, Normal thyroid  NERVOUS SYSTEM:  Alert & Oriented X3, Good concentration; Motor Strength 5/5 B/L upper and lower extremities; DTRs 2+ intact and symmetric  CHEST/LUNG: Clear to percussion bilaterally; No rales, rhonchi, wheezing, or rubs  HEART: Regular rate and rhythm; No murmurs, rubs, or gallops  ABDOMEN: Soft, Nontender, Nondistended; Bowel sounds present  EXTREMITIES:  2+ Peripheral Pulses, No clubbing, cyanosis, 1 + edema. erythema and cellulitis much improved.  LYMPH: No lymphadenopathy noted  SKIN: No rashes or lesions    LABS:      CAPILLARY BLOOD GLUCOSE  62 (02 Apr 2017 06:21)  317 (01 Apr 2017 22:31)  145 (01 Apr 2017 16:29)  245 (01 Apr 2017 13:22)      RADIOLOGY & ADDITIONAL TESTS:    Imaging Personally Reviewed:  [ ] YES  [ ] NO    Consultant(s) Notes Reviewed:  [x ] YES  [ ] NO    Care Discussed with Consultants/Other Providers [ ] YES  [ ] NO    PROBLEMS:  HYPOGLYCEMIA; PNEUMONIA  LOW BLOOD SUGAR  Hypoglycemia  Chronic pancreatitis, unspecified pancreatitis type  IBD (inflammatory bowel disease)  Type 1 diabetes mellitus with hypoglycemia and without coma  Essential hypertension  Multiple sclerosis  Pneumonia of left lung due to other aerobic Gram-negative bacteria, unspecified part of lung  Hypoglycemia      Care discussed with family,         [  ]   yes  [  ]  No    imp:    stable[ ]    unstable[  ]     improving [   ]       unchanged  [  ]                Plans:  Continue present plans  [  ]               New consult [  ]   specialty  .......               order rochelle[  ]    test name.                  Discharge Planning  [  ]

## 2017-04-02 NOTE — PROGRESS NOTE ADULT - PROBLEM SELECTOR PLAN 1
resolved
decrease lantus to 8untis  continue lispro 5units with meals and MARISSA   will follow  needs to improve outpt compliance with f/u
finger sticks with fluctuations   patient can be managed as out-patient
gradually increase the dose of Lantus to 14 units   while inpatient better to have FS< 150 and preferably in 120-140 range   patient can be discharged on the same regimen
now resolved . may need more insulin   I added prandial lispro 5 units   while inpatient better to have FS< 150 and preferably in 120-140 range
resolved
very brittle diabetes   Continue with the same regimen while inpatient   timely meals important

## 2017-04-02 NOTE — PROGRESS NOTE ADULT - PROBLEM SELECTOR PROBLEM 1
Hypoglycemia
Type 1 diabetes mellitus with hypoglycemia and without coma
Type 1 diabetes mellitus with hypoglycemia and without coma
Hypoglycemia
Type 1 diabetes mellitus with hypoglycemia and without coma

## 2017-04-02 NOTE — PROGRESS NOTE ADULT - SUBJECTIVE AND OBJECTIVE BOX
Patient is a 61y old  Female who presents with a chief complaint of Hypoglycemia (01 Apr 2017 13:31)      Interval History: finger sticks  are with fluctuations.  discharge planning is on   PO intake fair and no hypoglycemia         MEDICATIONS  (STANDING):  clonazePAM Tablet 1milliGRAM(s) Oral at bedtime  DULoxetine 60milliGRAM(s) Oral at bedtime  amylase/lipase/protease  (CREON  6,000 Units) 1Capsule(s) Oral three times a day with meals  pantoprazole    Tablet 40milliGRAM(s) Oral before breakfast  dextrose 5%. 1000milliLiter(s) IV Continuous <Continuous>  dextrose 50% Injectable 12.5Gram(s) IV Push once  dextrose 50% Injectable 25Gram(s) IV Push once  dextrose 50% Injectable 25Gram(s) IV Push once  enoxaparin Injectable 40milliGRAM(s) SubCutaneous daily  freetext medication  - 100milliGRAM(s) Oral two times a day  freetext medication  - 20milliGRAM(s) SubCutaneous at bedtime  ibuprofen  Tablet 400milliGRAM(s) Oral three times a day  ceFAZolin   IVPB  IV Intermittent   ceFAZolin   IVPB 1000milliGRAM(s) IV Intermittent every 8 hours  fluticasone propionate 50 MICROgram(s)/spray Nasal Spray 1Spray(s) Both Nostrils two times a day  insulin lispro Injectable (HumaLOG) 5Unit(s) SubCutaneous before breakfast  insulin lispro Injectable (HumaLOG) 5Unit(s) SubCutaneous before lunch  insulin lispro Injectable (HumaLOG) 5Unit(s) SubCutaneous before dinner  insulin glargine Injectable (LANTUS) 8Unit(s) SubCutaneous at bedtime  insulin lispro (HumaLOG) corrective regimen sliding scale  SubCutaneous every 6 hours    MEDICATIONS  (PRN):  dextrose Gel 1Dose(s) Oral once PRN Blood Glucose LESS THAN 70 milliGRAM(s)/deciliter  glucagon  Injectable 1milliGRAM(s) IntraMuscular once PRN Glucose LESS THAN 70 milligrams/deciliter  acetaminophen   Tablet. 650milliGRAM(s) Oral every 6 hours PRN Moderate Pain (4 - 6)      Allergies    No Known Allergies    Intolerances        REVIEW OF SYSTEMS:  CONSTITUTIONAL:   EYES: No eye pain, visual disturbances, or discharge  ENMT:  No difficulty hearing, tinnitus, vertigo; No sinus or throat pain  NECK: No pain or stiffness  RESPIRATORY: No cough, wheezing, chills or hemoptysis; No shortness of breath  CARDIOVASCULAR: No chest pain, palpitations, dizziness, or leg swelling  GASTROINTESTINAL: No abdominal or epigastric pain. No nausea, vomiting, or hematemesis; No diarrhea or constipation. No melena or hematochezia.  GENITOURINARY: No dysuria, frequency, hematuria, or incontinence  NEUROLOGICAL: No headaches, memory loss, loss of strength, numbness, or tremors  SKIN: No itching, burning, rashes, or lesions   LYMPH NODES: No enlarged glands  ENDOCRINE: No heat or cold intolerance; No hair loss  MUSCULOSKELETAL: No joint pain or swelling; No muscle, back, or extremity pain  PSYCHIATRIC: No depression, anxiety, mood swings, or difficulty sleeping  HEME/LYMPH: No easy bruising, or bleeding gums  ALLERY AND IMMUNOLOGIC: No hives or eczema    Vital Signs Last 24 Hrs  T(C): 37, Max: 37.1 (04-01 @ 23:47)  T(F): 98.6, Max: 98.8 (04-01 @ 23:47)  HR: 95 (78 - 97)  BP: 138/91 (136/75 - 151/79)  BP(mean): --  RR: 16 (16 - 16)  SpO2: 99% (99% - 100%)    PHYSICAL EXAM:  GENERAL:   HEAD:  Atraumatic, Normocephalic  EYES: EOMI, PERRLA, conjunctiva and sclera clear  ENMT: No tonsillar erythema, exudates, or enlargement; Moist mucous membranes, Good dentition, No lesions  NECK: Supple, No JVD, Normal thyroid  NERVOUS SYSTEM:  Alert & Oriented X3, Good concentration; Motor Strength 5/5 B/L upper and lower extremities; DTRs 2+ intact and symmetric  CHEST/LUNG: Clear to percussion bilaterally; No rales, rhonchi, wheezing, or rubs  HEART: Regular rate and rhythm; No murmurs, rubs, or gallops  ABDOMEN: Soft, Nontender, Nondistended; Bowel sounds present  EXTREMITIES:  2+ Peripheral Pulses, No clubbing, cyanosis, or edema  SKIN: No rashes or lesions    LABS:        CAPILLARY BLOOD GLUCOSE  62 (02 Apr 2017 06:21)  317 (01 Apr 2017 22:31)    Lipid panel:           Thyroid:  Diabetes Tests:  Parathyroid Panel:  Adrenals:  RADIOLOGY & ADDITIONAL TESTS:    Imaging Personally Reviewed:  [ ] YES  [ ] NO    Consultant(s) Notes Reviewed:  [ ] YES  [ ] NO    Care Discussed with Consultants/Other Providers [ ] YES  [ ] NO

## 2017-04-02 NOTE — PROGRESS NOTE ADULT - PROVIDER SPECIALTY LIST ADULT
Endocrinology
Internal Medicine
Pulmonology
Pulmonology
Infectious Disease
Infectious Disease

## 2017-04-02 NOTE — PROGRESS NOTE ADULT - PROBLEM SELECTOR PROBLEM 3
Multiple sclerosis

## 2017-04-02 NOTE — PROGRESS NOTE ADULT - PROBLEM SELECTOR PROBLEM 6
IBD (inflammatory bowel disease)

## 2017-04-02 NOTE — PROGRESS NOTE ADULT - PROBLEM SELECTOR PROBLEM 5
Type 1 diabetes mellitus with hypoglycemia and without coma

## 2017-04-02 NOTE — PROGRESS NOTE ADULT - PROBLEM SELECTOR PROBLEM 7
Chronic pancreatitis, unspecified pancreatitis type

## 2017-04-02 NOTE — PROGRESS NOTE ADULT - PROBLEM SELECTOR PROBLEM 2
Hypoglycemia
Pneumonia of left lung due to other aerobic Gram-negative bacteria, unspecified part of lung
Hypoglycemia
Pneumonia of left lung due to other aerobic Gram-negative bacteria, unspecified part of lung

## 2017-04-04 DIAGNOSIS — G70.00 MYASTHENIA GRAVIS WITHOUT (ACUTE) EXACERBATION: ICD-10-CM

## 2017-04-04 DIAGNOSIS — K86.1 OTHER CHRONIC PANCREATITIS: ICD-10-CM

## 2017-04-04 DIAGNOSIS — I10 ESSENTIAL (PRIMARY) HYPERTENSION: ICD-10-CM

## 2017-04-04 DIAGNOSIS — G35 MULTIPLE SCLEROSIS: ICD-10-CM

## 2017-04-04 DIAGNOSIS — Z79.82 LONG TERM (CURRENT) USE OF ASPIRIN: ICD-10-CM

## 2017-04-04 DIAGNOSIS — Z79.4 LONG TERM (CURRENT) USE OF INSULIN: ICD-10-CM

## 2017-04-04 DIAGNOSIS — J15.6 PNEUMONIA DUE TO OTHER GRAM-NEGATIVE BACTERIA: ICD-10-CM

## 2017-04-04 DIAGNOSIS — B37.9 CANDIDIASIS, UNSPECIFIED: ICD-10-CM

## 2017-04-04 DIAGNOSIS — E11.649 TYPE 2 DIABETES MELLITUS WITH HYPOGLYCEMIA WITHOUT COMA: ICD-10-CM

## 2017-04-04 DIAGNOSIS — J90 PLEURAL EFFUSION, NOT ELSEWHERE CLASSIFIED: ICD-10-CM

## 2017-04-04 DIAGNOSIS — L03.115 CELLULITIS OF RIGHT LOWER LIMB: ICD-10-CM

## 2017-04-04 DIAGNOSIS — F41.8 OTHER SPECIFIED ANXIETY DISORDERS: ICD-10-CM

## 2017-04-04 DIAGNOSIS — L03.116 CELLULITIS OF LEFT LOWER LIMB: ICD-10-CM

## 2017-04-04 DIAGNOSIS — K58.9 IRRITABLE BOWEL SYNDROME WITHOUT DIARRHEA: ICD-10-CM

## 2017-07-04 ENCOUNTER — EMERGENCY (EMERGENCY)
Facility: HOSPITAL | Age: 62
LOS: 0 days | Discharge: HOME | End: 2017-07-04
Attending: EMERGENCY MEDICINE
Payer: MEDICARE

## 2017-07-04 VITALS
OXYGEN SATURATION: 97 % | RESPIRATION RATE: 18 BRPM | HEIGHT: 65 IN | TEMPERATURE: 98 F | SYSTOLIC BLOOD PRESSURE: 107 MMHG | WEIGHT: 130.07 LBS | DIASTOLIC BLOOD PRESSURE: 63 MMHG | HEART RATE: 118 BPM

## 2017-07-04 VITALS
TEMPERATURE: 98 F | OXYGEN SATURATION: 100 % | SYSTOLIC BLOOD PRESSURE: 152 MMHG | RESPIRATION RATE: 16 BRPM | DIASTOLIC BLOOD PRESSURE: 96 MMHG | HEART RATE: 96 BPM

## 2017-07-04 DIAGNOSIS — Z98.89 OTHER SPECIFIED POSTPROCEDURAL STATES: Chronic | ICD-10-CM

## 2017-07-04 LAB
ALBUMIN SERPL ELPH-MCNC: 3.6 G/DL — SIGNIFICANT CHANGE UP (ref 3.3–5)
ALP SERPL-CCNC: 169 U/L — HIGH (ref 40–120)
ALT FLD-CCNC: 40 U/L — SIGNIFICANT CHANGE UP (ref 12–78)
AMPHET UR-MCNC: NEGATIVE — SIGNIFICANT CHANGE UP
ANION GAP SERPL CALC-SCNC: 11 MMOL/L — SIGNIFICANT CHANGE UP (ref 5–17)
ANION GAP SERPL CALC-SCNC: 12 MMOL/L — SIGNIFICANT CHANGE UP (ref 5–17)
APAP SERPL-MCNC: < 2 UG/ML (ref 10–30)
APPEARANCE UR: CLEAR — SIGNIFICANT CHANGE UP
APTT BLD: 26 SEC — LOW (ref 27.5–37.4)
AST SERPL-CCNC: 14 U/L — LOW (ref 15–37)
B-OH-BUTYR SERPL-SCNC: 0.1 MMOL/L — SIGNIFICANT CHANGE UP
BACTERIA # UR AUTO: ABNORMAL
BARBITURATES UR SCN-MCNC: NEGATIVE — SIGNIFICANT CHANGE UP
BASOPHILS # BLD AUTO: 0.1 K/UL — SIGNIFICANT CHANGE UP (ref 0–0.2)
BASOPHILS NFR BLD AUTO: 0.5 % — SIGNIFICANT CHANGE UP (ref 0–2)
BENZODIAZ UR-MCNC: NEGATIVE — SIGNIFICANT CHANGE UP
BILIRUB SERPL-MCNC: 0.8 MG/DL — SIGNIFICANT CHANGE UP (ref 0.2–1.2)
BILIRUB UR-MCNC: NEGATIVE — SIGNIFICANT CHANGE UP
BUN SERPL-MCNC: 28 MG/DL — HIGH (ref 7–23)
BUN SERPL-MCNC: 30 MG/DL — HIGH (ref 7–23)
CALCIUM SERPL-MCNC: 8.3 MG/DL — LOW (ref 8.5–10.1)
CALCIUM SERPL-MCNC: 9.2 MG/DL — SIGNIFICANT CHANGE UP (ref 8.5–10.1)
CHLORIDE SERPL-SCNC: 86 MMOL/L — LOW (ref 96–108)
CHLORIDE SERPL-SCNC: 92 MMOL/L — LOW (ref 96–108)
CO2 SERPL-SCNC: 24 MMOL/L — SIGNIFICANT CHANGE UP (ref 22–31)
CO2 SERPL-SCNC: 28 MMOL/L — SIGNIFICANT CHANGE UP (ref 22–31)
COCAINE METAB.OTHER UR-MCNC: NEGATIVE — SIGNIFICANT CHANGE UP
COLOR SPEC: YELLOW — SIGNIFICANT CHANGE UP
CREAT SERPL-MCNC: 1.11 MG/DL — SIGNIFICANT CHANGE UP (ref 0.5–1.3)
CREAT SERPL-MCNC: 1.64 MG/DL — HIGH (ref 0.5–1.3)
DIFF PNL FLD: ABNORMAL
EOSINOPHIL # BLD AUTO: 0 K/UL — SIGNIFICANT CHANGE UP (ref 0–0.5)
EOSINOPHIL NFR BLD AUTO: 0.1 % — SIGNIFICANT CHANGE UP (ref 0–6)
EPI CELLS # UR: SIGNIFICANT CHANGE UP
ETHANOL SERPL-MCNC: <10 MG/DL — SIGNIFICANT CHANGE UP (ref 0–10)
GLUCOSE SERPL-MCNC: 206 MG/DL — HIGH (ref 70–99)
GLUCOSE SERPL-MCNC: 213 MG/DL — HIGH (ref 70–99)
GLUCOSE UR QL: NEGATIVE MG/DL — SIGNIFICANT CHANGE UP
HCT VFR BLD CALC: 38.4 % — SIGNIFICANT CHANGE UP (ref 34.5–45)
HGB BLD-MCNC: 14.1 G/DL — SIGNIFICANT CHANGE UP (ref 11.5–15.5)
INR BLD: 0.95 RATIO — SIGNIFICANT CHANGE UP (ref 0.88–1.16)
KETONES UR-MCNC: ABNORMAL
LACTATE SERPL-SCNC: 1.4 MMOL/L — SIGNIFICANT CHANGE UP (ref 0.7–2)
LACTATE SERPL-SCNC: 3.6 MMOL/L — HIGH (ref 0.7–2)
LEUKOCYTE ESTERASE UR-ACNC: ABNORMAL
LIDOCAIN IGE QN: 40 U/L — LOW (ref 73–393)
LYMPHOCYTES # BLD AUTO: 1.4 K/UL — SIGNIFICANT CHANGE UP (ref 1–3.3)
LYMPHOCYTES # BLD AUTO: 13 % — SIGNIFICANT CHANGE UP (ref 13–44)
MCHC RBC-ENTMCNC: 29 PG — SIGNIFICANT CHANGE UP (ref 27–34)
MCHC RBC-ENTMCNC: 36.8 GM/DL — HIGH (ref 32–36)
MCV RBC AUTO: 78.7 FL — LOW (ref 80–100)
METHADONE UR-MCNC: NEGATIVE — SIGNIFICANT CHANGE UP
MONOCYTES # BLD AUTO: 1.4 K/UL — HIGH (ref 0–0.9)
MONOCYTES NFR BLD AUTO: 12.8 % — SIGNIFICANT CHANGE UP (ref 2–14)
NEUTROPHILS # BLD AUTO: 8 K/UL — HIGH (ref 1.8–7.4)
NEUTROPHILS NFR BLD AUTO: 73.6 % — SIGNIFICANT CHANGE UP (ref 43–77)
NITRITE UR-MCNC: NEGATIVE — SIGNIFICANT CHANGE UP
OPIATES UR-MCNC: NEGATIVE — SIGNIFICANT CHANGE UP
PCP SPEC-MCNC: SIGNIFICANT CHANGE UP
PCP UR-MCNC: NEGATIVE — SIGNIFICANT CHANGE UP
PH UR: 6 — SIGNIFICANT CHANGE UP (ref 5–8)
PLATELET # BLD AUTO: 479 K/UL — HIGH (ref 150–400)
POTASSIUM SERPL-MCNC: 4.5 MMOL/L — SIGNIFICANT CHANGE UP (ref 3.5–5.3)
POTASSIUM SERPL-MCNC: 4.5 MMOL/L — SIGNIFICANT CHANGE UP (ref 3.5–5.3)
POTASSIUM SERPL-SCNC: 4.5 MMOL/L — SIGNIFICANT CHANGE UP (ref 3.5–5.3)
POTASSIUM SERPL-SCNC: 4.5 MMOL/L — SIGNIFICANT CHANGE UP (ref 3.5–5.3)
PROT SERPL-MCNC: 7.5 GM/DL — SIGNIFICANT CHANGE UP (ref 6–8.3)
PROT UR-MCNC: 30 MG/DL
PROTHROM AB SERPL-ACNC: 10.3 SEC — SIGNIFICANT CHANGE UP (ref 9.8–12.7)
RBC # BLD: 4.88 M/UL — SIGNIFICANT CHANGE UP (ref 3.8–5.2)
RBC # FLD: 14.2 % — SIGNIFICANT CHANGE UP (ref 11–15)
RBC CASTS # UR COMP ASSIST: ABNORMAL /HPF (ref 0–4)
SALICYLATES SERPL-MCNC: <1.7 MG/DL — LOW (ref 2.8–20)
SODIUM SERPL-SCNC: 126 MMOL/L — LOW (ref 135–145)
SODIUM SERPL-SCNC: 127 MMOL/L — LOW (ref 135–145)
SP GR SPEC: 1.01 — SIGNIFICANT CHANGE UP (ref 1.01–1.02)
THC UR QL: NEGATIVE — SIGNIFICANT CHANGE UP
TROPONIN I SERPL-MCNC: 0.03 NG/ML — SIGNIFICANT CHANGE UP (ref 0.01–0.04)
TROPONIN I SERPL-MCNC: 0.04 NG/ML — SIGNIFICANT CHANGE UP (ref 0.01–0.04)
UROBILINOGEN FLD QL: NEGATIVE MG/DL — SIGNIFICANT CHANGE UP
WBC # BLD: 10.8 K/UL — HIGH (ref 3.8–10.5)
WBC # FLD AUTO: 10.8 K/UL — HIGH (ref 3.8–10.5)
WBC UR QL: SIGNIFICANT CHANGE UP

## 2017-07-04 PROCEDURE — 99284 EMERGENCY DEPT VISIT MOD MDM: CPT

## 2017-07-04 PROCEDURE — 71010: CPT | Mod: 26

## 2017-07-04 PROCEDURE — 74176 CT ABD & PELVIS W/O CONTRAST: CPT | Mod: 26

## 2017-07-04 RX ORDER — SODIUM CHLORIDE 9 MG/ML
1000 INJECTION INTRAMUSCULAR; INTRAVENOUS; SUBCUTANEOUS ONCE
Qty: 0 | Refills: 0 | Status: COMPLETED | OUTPATIENT
Start: 2017-07-04 | End: 2017-07-04

## 2017-07-04 RX ORDER — METOCLOPRAMIDE HCL 10 MG
10 TABLET ORAL ONCE
Qty: 0 | Refills: 0 | Status: COMPLETED | OUTPATIENT
Start: 2017-07-04 | End: 2017-07-04

## 2017-07-04 RX ADMIN — Medication 10 MILLIGRAM(S): at 15:10

## 2017-07-04 RX ADMIN — SODIUM CHLORIDE 500 MILLILITER(S): 9 INJECTION INTRAMUSCULAR; INTRAVENOUS; SUBCUTANEOUS at 18:16

## 2017-07-04 RX ADMIN — SODIUM CHLORIDE 1000 MILLILITER(S): 9 INJECTION INTRAMUSCULAR; INTRAVENOUS; SUBCUTANEOUS at 15:30

## 2017-07-04 NOTE — ED PROVIDER NOTE - PHYSICAL EXAMINATION
GEN: Alert, NAD, mildly drowsy  HEAD: atraumatic, EOMI, PERRL, normal lids/conjunctiva  ENT: normal hearing, patent oropharynx without erythema/exudate, uvula midline  NECK: +supple, no tenderness/meningismus, +Trachea midline  PULM: Bilateral BS, normal resp effort, no wheeze/stridor/retractions  CV: RRR, no M/R/G, +dist ext pulses  ABD: soft, NT/ND  BACK: no CVAT, no midline tenderness  MSK: no edema/erythema/cyanosis  SKIN: no rash  NEURO: AAOx3, no sensory/motor deficits, CN 2-12 intact

## 2017-07-04 NOTE — ED PROVIDER NOTE - PRESENTING SYMPTOMS DETAILS
61F w htn, DM on insulin pump, MS, c-spine surg, hld comes in w n/v for 3 days after she ate a hamburger, has mild periumb pain, states FS 600s 2 days ago and thus took out her insulin pump and given herself injections. has chronic dry cough, no cp/palpitations/sob/dysuria/diarrhea  ROS: No fever/chills, No photophobia/eye pain/changes in vision, No ear pain/sore throat/dysphagia, No chest pain/palpitations, no SOB/cough/wheeze/stridor, No abdominal pain/D, no dysuria/frequency/discharge, No neck/back pain, no rash, no changes in neurological status/function.

## 2017-07-04 NOTE — ED PROVIDER NOTE - CARE PLAN
Principal Discharge DX:	Intractable vomiting with nausea, unspecified vomiting type  Secondary Diagnosis:	Dehydration

## 2017-07-04 NOTE — ED PROVIDER NOTE - MEDICAL DECISION MAKING DETAILS
pt n/v resolved, states she feels hungry, toelrating dinner and water. labs showing no acidosis, likely dehydration. pt appeasr well, stats she feels a lot better, and is comfortable going home. no dysuria. CT scan demonstrates no acute pathology. pt appears well and non-toxic. . VSS. Sx have improved during ED stay. No acute events during ED observation period. Precautions given to return to the ED if sx persist or change. Pt expresses understanding and has no further questions. Pt feels comfortable and wishes to be discharged home. Instructed to follow up with PMD in 24 hrs.

## 2017-07-04 NOTE — ED ADULT NURSE NOTE - OBJECTIVE STATEMENT
pt BIBA with c/o generalized malaise states that her sugars have been high recently and that her pump insulin has stopped using, + Nausea

## 2017-07-04 NOTE — ED ADULT TRIAGE NOTE - CHIEF COMPLAINT QUOTE
not feeling well for few days states insulin pump not working, states sugars have been hi fs by amb 177 speech a little slurred ems states hx etoh DR Aleman made aware

## 2017-07-05 DIAGNOSIS — I10 ESSENTIAL (PRIMARY) HYPERTENSION: ICD-10-CM

## 2017-07-05 DIAGNOSIS — E11.65 TYPE 2 DIABETES MELLITUS WITH HYPERGLYCEMIA: ICD-10-CM

## 2017-07-05 DIAGNOSIS — E86.0 DEHYDRATION: ICD-10-CM

## 2017-07-05 DIAGNOSIS — E11.9 TYPE 2 DIABETES MELLITUS WITHOUT COMPLICATIONS: ICD-10-CM

## 2017-07-05 DIAGNOSIS — R11.2 NAUSEA WITH VOMITING, UNSPECIFIED: ICD-10-CM

## 2017-07-05 DIAGNOSIS — Z79.4 LONG TERM (CURRENT) USE OF INSULIN: ICD-10-CM

## 2017-07-05 DIAGNOSIS — G35 MULTIPLE SCLEROSIS: ICD-10-CM

## 2017-07-05 LAB
CULTURE RESULTS: SIGNIFICANT CHANGE UP
SPECIMEN SOURCE: SIGNIFICANT CHANGE UP

## 2017-07-05 PROCEDURE — 93010 ELECTROCARDIOGRAM REPORT: CPT

## 2017-08-08 ENCOUNTER — INPATIENT (INPATIENT)
Facility: HOSPITAL | Age: 62
LOS: 7 days | Discharge: HOME | End: 2017-08-16
Attending: INTERNAL MEDICINE | Admitting: EMERGENCY MEDICINE
Payer: MEDICARE

## 2017-08-08 VITALS
WEIGHT: 104.94 LBS | HEIGHT: 63 IN | RESPIRATION RATE: 25 BRPM | HEART RATE: 125 BPM | OXYGEN SATURATION: 98 % | DIASTOLIC BLOOD PRESSURE: 78 MMHG | SYSTOLIC BLOOD PRESSURE: 183 MMHG

## 2017-08-08 DIAGNOSIS — I10 ESSENTIAL (PRIMARY) HYPERTENSION: ICD-10-CM

## 2017-08-08 DIAGNOSIS — E13.10 OTHER SPECIFIED DIABETES MELLITUS WITH KETOACIDOSIS WITHOUT COMA: ICD-10-CM

## 2017-08-08 DIAGNOSIS — N17.9 ACUTE KIDNEY FAILURE, UNSPECIFIED: ICD-10-CM

## 2017-08-08 DIAGNOSIS — F10.10 ALCOHOL ABUSE, UNCOMPLICATED: ICD-10-CM

## 2017-08-08 DIAGNOSIS — E87.1 HYPO-OSMOLALITY AND HYPONATREMIA: ICD-10-CM

## 2017-08-08 DIAGNOSIS — Z98.89 OTHER SPECIFIED POSTPROCEDURAL STATES: Chronic | ICD-10-CM

## 2017-08-08 DIAGNOSIS — K86.89 OTHER SPECIFIED DISEASES OF PANCREAS: ICD-10-CM

## 2017-08-08 DIAGNOSIS — G35 MULTIPLE SCLEROSIS: ICD-10-CM

## 2017-08-08 LAB
ACETONE SERPL-MCNC: ABNORMAL
ALBUMIN SERPL ELPH-MCNC: 3.3 G/DL — SIGNIFICANT CHANGE UP (ref 3.3–5)
ALP SERPL-CCNC: 226 U/L — HIGH (ref 40–120)
ALT FLD-CCNC: 51 U/L — SIGNIFICANT CHANGE UP (ref 12–78)
AMYLASE P1 CFR SERPL: 26 U/L — SIGNIFICANT CHANGE UP (ref 25–115)
ANION GAP SERPL CALC-SCNC: 35 MMOL/L — HIGH (ref 5–17)
APPEARANCE UR: CLEAR — SIGNIFICANT CHANGE UP
APTT BLD: 32.4 SEC — SIGNIFICANT CHANGE UP (ref 27.5–37.4)
AST SERPL-CCNC: 27 U/L — SIGNIFICANT CHANGE UP (ref 15–37)
BACTERIA # UR AUTO: ABNORMAL
BASE EXCESS BLDA CALC-SCNC: -28.4 MMOL/L — LOW (ref -2–2)
BASOPHILS # BLD AUTO: 0.1 K/UL — SIGNIFICANT CHANGE UP (ref 0–0.2)
BASOPHILS NFR BLD AUTO: 0.4 % — SIGNIFICANT CHANGE UP (ref 0–2)
BILIRUB SERPL-MCNC: 0.4 MG/DL — SIGNIFICANT CHANGE UP (ref 0.2–1.2)
BILIRUB UR-MCNC: NEGATIVE — SIGNIFICANT CHANGE UP
BLOOD GAS COMMENTS: SIGNIFICANT CHANGE UP
BLOOD GAS COMMENTS: SIGNIFICANT CHANGE UP
BLOOD GAS SOURCE: SIGNIFICANT CHANGE UP
BUN SERPL-MCNC: 40 MG/DL — HIGH (ref 7–23)
CALCIUM SERPL-MCNC: 9.5 MG/DL — SIGNIFICANT CHANGE UP (ref 8.5–10.1)
CHLORIDE SERPL-SCNC: 80 MMOL/L — LOW (ref 96–108)
CK MB BLD-MCNC: 4.1 % — HIGH (ref 0–3.5)
CK MB CFR SERPL CALC: 2.5 NG/ML — SIGNIFICANT CHANGE UP (ref 0.5–3.6)
CK SERPL-CCNC: 61 U/L — SIGNIFICANT CHANGE UP (ref 26–192)
CO2 SERPL-SCNC: 4 MMOL/L — CRITICAL LOW (ref 22–31)
COLOR SPEC: YELLOW — SIGNIFICANT CHANGE UP
CREAT SERPL-MCNC: 1.71 MG/DL — HIGH (ref 0.5–1.3)
DIFF PNL FLD: ABNORMAL
EOSINOPHIL # BLD AUTO: 0 K/UL — SIGNIFICANT CHANGE UP (ref 0–0.5)
EOSINOPHIL NFR BLD AUTO: 0 % — SIGNIFICANT CHANGE UP (ref 0–6)
EPI CELLS # UR: SIGNIFICANT CHANGE UP
ETHANOL SERPL-MCNC: <10 MG/DL — SIGNIFICANT CHANGE UP (ref 0–10)
GLUCOSE SERPL-MCNC: 876 MG/DL — CRITICAL HIGH (ref 70–99)
GLUCOSE UR QL: 1000 MG/DL
HCO3 BLDA-SCNC: 2 MMOL/L — LOW (ref 21–29)
HCT VFR BLD CALC: 41.6 % — SIGNIFICANT CHANGE UP (ref 34.5–45)
HGB BLD-MCNC: 12.6 G/DL — SIGNIFICANT CHANGE UP (ref 11.5–15.5)
HOROWITZ INDEX BLDA+IHG-RTO: 21 — SIGNIFICANT CHANGE UP
INR BLD: 1.04 RATIO — SIGNIFICANT CHANGE UP (ref 0.88–1.16)
KETONES UR-MCNC: ABNORMAL
LEUKOCYTE ESTERASE UR-ACNC: ABNORMAL
LIDOCAIN IGE QN: 54 U/L — LOW (ref 73–393)
LYMPHOCYTES # BLD AUTO: 0.8 K/UL — LOW (ref 1–3.3)
LYMPHOCYTES # BLD AUTO: 4.1 % — LOW (ref 13–44)
MCHC RBC-ENTMCNC: 29.6 PG — SIGNIFICANT CHANGE UP (ref 27–34)
MCHC RBC-ENTMCNC: 30.2 GM/DL — LOW (ref 32–36)
MCV RBC AUTO: 98.1 FL — SIGNIFICANT CHANGE UP (ref 80–100)
MONOCYTES # BLD AUTO: 1 K/UL — HIGH (ref 0–0.9)
MONOCYTES NFR BLD AUTO: 4.9 % — SIGNIFICANT CHANGE UP (ref 2–14)
NEUTROPHILS # BLD AUTO: 18.1 K/UL — HIGH (ref 1.8–7.4)
NEUTROPHILS NFR BLD AUTO: 90.5 % — HIGH (ref 43–77)
NITRITE UR-MCNC: NEGATIVE — SIGNIFICANT CHANGE UP
PCO2 BLDA: 10 MMHG — LOW (ref 32–46)
PH BLD: 6.99 — CRITICAL LOW (ref 7.35–7.45)
PH UR: 5 — SIGNIFICANT CHANGE UP (ref 5–8)
PLATELET # BLD AUTO: 500 K/UL — HIGH (ref 150–400)
PO2 BLDA: 132 MMHG — HIGH (ref 74–108)
POTASSIUM SERPL-MCNC: 6.3 MMOL/L — CRITICAL HIGH (ref 3.5–5.3)
POTASSIUM SERPL-SCNC: 6.3 MMOL/L — CRITICAL HIGH (ref 3.5–5.3)
PROT SERPL-MCNC: 8 GM/DL — SIGNIFICANT CHANGE UP (ref 6–8.3)
PROT UR-MCNC: 30 MG/DL
PROTHROM AB SERPL-ACNC: 11.3 SEC — SIGNIFICANT CHANGE UP (ref 9.8–12.7)
RBC # BLD: 4.24 M/UL — SIGNIFICANT CHANGE UP (ref 3.8–5.2)
RBC # FLD: 17.2 % — HIGH (ref 11–15)
RBC CASTS # UR COMP ASSIST: ABNORMAL /HPF (ref 0–4)
SAO2 % BLDA: 96 % — SIGNIFICANT CHANGE UP (ref 92–96)
SODIUM SERPL-SCNC: 119 MMOL/L — CRITICAL LOW (ref 135–145)
SP GR SPEC: 1.01 — SIGNIFICANT CHANGE UP (ref 1.01–1.02)
TROPONIN I SERPL-MCNC: <.015 NG/ML — SIGNIFICANT CHANGE UP (ref 0.01–0.04)
UROBILINOGEN FLD QL: NEGATIVE MG/DL — SIGNIFICANT CHANGE UP
WBC # BLD: 20 K/UL — HIGH (ref 3.8–10.5)
WBC # FLD AUTO: 20 K/UL — HIGH (ref 3.8–10.5)
WBC UR QL: ABNORMAL

## 2017-08-08 PROCEDURE — 74176 CT ABD & PELVIS W/O CONTRAST: CPT | Mod: 26

## 2017-08-08 PROCEDURE — 70450 CT HEAD/BRAIN W/O DYE: CPT | Mod: 26

## 2017-08-08 PROCEDURE — 71010: CPT | Mod: 26

## 2017-08-08 PROCEDURE — 99291 CRITICAL CARE FIRST HOUR: CPT

## 2017-08-08 PROCEDURE — 99285 EMERGENCY DEPT VISIT HI MDM: CPT

## 2017-08-08 RX ORDER — PIPERACILLIN AND TAZOBACTAM 4; .5 G/20ML; G/20ML
3.38 INJECTION, POWDER, LYOPHILIZED, FOR SOLUTION INTRAVENOUS EVERY 12 HOURS
Qty: 0 | Refills: 0 | Status: DISCONTINUED | OUTPATIENT
Start: 2017-08-08 | End: 2017-08-09

## 2017-08-08 RX ORDER — SODIUM BICARBONATE 1 MEQ/ML
50 SYRINGE (ML) INTRAVENOUS ONCE
Qty: 0 | Refills: 0 | Status: COMPLETED | OUTPATIENT
Start: 2017-08-08 | End: 2017-08-08

## 2017-08-08 RX ORDER — ONDANSETRON 8 MG/1
4 TABLET, FILM COATED ORAL ONCE
Qty: 0 | Refills: 0 | Status: COMPLETED | OUTPATIENT
Start: 2017-08-08 | End: 2017-08-08

## 2017-08-08 RX ORDER — SODIUM CHLORIDE 9 MG/ML
2000 INJECTION INTRAMUSCULAR; INTRAVENOUS; SUBCUTANEOUS ONCE
Qty: 0 | Refills: 0 | Status: COMPLETED | OUTPATIENT
Start: 2017-08-08 | End: 2017-08-08

## 2017-08-08 RX ORDER — DEXTROSE 50 % IN WATER 50 %
25 SYRINGE (ML) INTRAVENOUS
Qty: 0 | Refills: 0 | Status: DISCONTINUED | OUTPATIENT
Start: 2017-08-08 | End: 2017-08-16

## 2017-08-08 RX ORDER — IOHEXOL 300 MG/ML
30 INJECTION, SOLUTION INTRAVENOUS ONCE
Qty: 0 | Refills: 0 | Status: COMPLETED | OUTPATIENT
Start: 2017-08-08 | End: 2017-08-08

## 2017-08-08 RX ORDER — INSULIN HUMAN 100 [IU]/ML
10 INJECTION, SOLUTION SUBCUTANEOUS ONCE
Qty: 0 | Refills: 0 | Status: COMPLETED | OUTPATIENT
Start: 2017-08-08 | End: 2017-08-08

## 2017-08-08 RX ORDER — DEXTROSE 50 % IN WATER 50 %
50 SYRINGE (ML) INTRAVENOUS
Qty: 0 | Refills: 0 | Status: DISCONTINUED | OUTPATIENT
Start: 2017-08-08 | End: 2017-08-16

## 2017-08-08 RX ORDER — VANCOMYCIN HCL 1 G
1000 VIAL (EA) INTRAVENOUS ONCE
Qty: 0 | Refills: 0 | Status: COMPLETED | OUTPATIENT
Start: 2017-08-08 | End: 2017-08-09

## 2017-08-08 RX ORDER — SODIUM CHLORIDE 9 MG/ML
1000 INJECTION INTRAMUSCULAR; INTRAVENOUS; SUBCUTANEOUS
Qty: 0 | Refills: 0 | Status: DISCONTINUED | OUTPATIENT
Start: 2017-08-08 | End: 2017-08-09

## 2017-08-08 RX ORDER — CHLORHEXIDINE GLUCONATE 213 G/1000ML
1 SOLUTION TOPICAL DAILY
Qty: 0 | Refills: 0 | Status: DISCONTINUED | OUTPATIENT
Start: 2017-08-08 | End: 2017-08-10

## 2017-08-08 RX ORDER — PIPERACILLIN AND TAZOBACTAM 4; .5 G/20ML; G/20ML
3.38 INJECTION, POWDER, LYOPHILIZED, FOR SOLUTION INTRAVENOUS ONCE
Qty: 0 | Refills: 0 | Status: COMPLETED | OUTPATIENT
Start: 2017-08-08 | End: 2017-08-08

## 2017-08-08 RX ORDER — INSULIN HUMAN 100 [IU]/ML
5 INJECTION, SOLUTION SUBCUTANEOUS
Qty: 100 | Refills: 0 | Status: DISCONTINUED | OUTPATIENT
Start: 2017-08-08 | End: 2017-08-09

## 2017-08-08 RX ORDER — CALCIUM GLUCONATE 100 MG/ML
1 VIAL (ML) INTRAVENOUS ONCE
Qty: 1 | Refills: 0 | Status: COMPLETED | OUTPATIENT
Start: 2017-08-08 | End: 2017-08-08

## 2017-08-08 RX ORDER — SODIUM CHLORIDE 9 MG/ML
2000 INJECTION, SOLUTION INTRAVENOUS
Qty: 0 | Refills: 0 | Status: DISCONTINUED | OUTPATIENT
Start: 2017-08-08 | End: 2017-08-08

## 2017-08-08 RX ADMIN — PIPERACILLIN AND TAZOBACTAM 200 GRAM(S): 4; .5 INJECTION, POWDER, LYOPHILIZED, FOR SOLUTION INTRAVENOUS at 22:39

## 2017-08-08 RX ADMIN — ONDANSETRON 4 MILLIGRAM(S): 8 TABLET, FILM COATED ORAL at 18:16

## 2017-08-08 RX ADMIN — Medication 50 MILLIEQUIVALENT(S): at 18:52

## 2017-08-08 RX ADMIN — IOHEXOL 30 MILLILITER(S): 300 INJECTION, SOLUTION INTRAVENOUS at 18:26

## 2017-08-08 RX ADMIN — SODIUM CHLORIDE 2000 MILLILITER(S): 9 INJECTION INTRAMUSCULAR; INTRAVENOUS; SUBCUTANEOUS at 18:13

## 2017-08-08 RX ADMIN — INSULIN HUMAN 10 UNIT(S): 100 INJECTION, SOLUTION SUBCUTANEOUS at 18:54

## 2017-08-08 RX ADMIN — Medication 200 GRAM(S): at 19:04

## 2017-08-08 RX ADMIN — INSULIN HUMAN 5 UNIT(S)/HR: 100 INJECTION, SOLUTION SUBCUTANEOUS at 18:56

## 2017-08-08 NOTE — H&P ADULT - ASSESSMENT
62F hx HTN IDDM with insulin pump , multiple sclerosis.  Hgb A1c  > 11, chronic pancreatitis, ex ETOH abuse, last drink 14 yrs ago. Admit with poluria/polyphagia/polydipsia.  FS glucoses' running close to 400 over the last few days.      Severe DKA   Life threatening acidosis ?? precipitant.  Self reports "sinusitis"   analy with acidemic hyperkalemia  Chronic hyponatremia now acute pseudo-hyponatremia 62F hx HTN IDDM with insulin pump , multiple sclerosis.  Hgb A1c  > 11, chronic pancreatitis, ex ETOH abuse, last drink 14 yrs ago. Admit with poluria/polyphagia/polydipsia.  FS glucoses' running close to 400 over the last few days.      Severe DKA   Life threatening acidosis ?? precipitant.  Self reports "sinusitis"   analy with acidemic hyperkalemia  Chronic hyponatremia now acute pseudo-hyponatremia    33 min CC time

## 2017-08-08 NOTE — H&P ADULT - HISTORY OF PRESENT ILLNESS
Histrionic patient.    62F hx HTN IDDM with insulin pump , MS Hgb A1c  > 11, chronic pancreatitis, ex ETOH abuse, last drink 14 yrs ago. Admit with poluria/polyphagia/polydipsia.  FS glucoses' running close to 400 over the last few days.      Severe DKA.  given iv insulin fluids and a dose of vancomycin  cultures were sent. Histrionic patient.    62F hx HTN IDDM with insulin pump , MS Hgb A1c  > 11, chronic pancreatitis, ex ETOH abuse, last drink 14 yrs ago. Admit with poluria/polyphagia/polydipsia.  FS glucoses' running close to 400 over the last few days.      Severe DKA.  given iv insulin fluids and a dose of vancomycin  cultures were sent.    Her abd pain is gone, and oral contrast CT shows no significant finding for pain.  She does have a lot of stool.

## 2017-08-08 NOTE — ED PROVIDER NOTE - OBJECTIVE STATEMENT
6+3 yo female with history of alcohol abuse, insulin dependent diabetes presents with abdominal pain, left upper side for unable amount of time. Patient unable to provide further information.

## 2017-08-08 NOTE — H&P ADULT - PROBLEM SELECTOR PLAN 1
Insulin IVF serial AG  hgb a1c  epimeric  ABX pending cultures check PCT Insulin IVF serial AG  hgb a1c  epimeric  ABX pending cultures check PCT reports "sinusitis"  U/A not terrible impressive.  Did have a series of epidural injections last one being last week.  She has no change in character of her chronic back pain.

## 2017-08-08 NOTE — H&P ADULT - NSHPPHYSICALEXAM_GEN_ALL_CORE
Alert awake  No icterus no JVD  bilateral BS  abd soft NT  had pain earlier now gone   ext warm well perfused

## 2017-08-08 NOTE — H&P ADULT - ATTENDING COMMENTS
Severe DKA with possible component of HHS.  Source of infection unclear at this time. Could be pump malfunction versus uti versus intra-abdominal pathology    NEURO:monitor mental status for effects of acidosis  RESP: monitor breathing, patient currently able to compensate for severe metabolic acidosis, may require intubation if tires out  CV: EKG with LBBB, old when compared to previous EKG  FEN/GI: chronic pancreatitis, will need creon. gastroparesis.  currently received 2 liters ns 2 liters LR.  on insulin drip.  potassium elevation likely 2/2 acidosis  RENAL:monitor uop and cr  ID:broad spectrum abx for unclear source of infection blood urine cultures  LABS: send beta hydrox, lactate repeat abg, cbc ,bmp.  currently very acidotic, but patient appears clinically improving after fluids and insulin drip  PPX: scd sq ppi  ETHICS:full code  DISPO: Patient critically ill, requires ICU level of care    I have decided to review and order of clinical lab tests  Relevant radiology studies were reviewed  Decision made to obtain available old records  Discussion of test results with performing physician  Review and summarization of old records obtaining history from EMR  I have visualized independent imaging, EKGs and radiologies studies available but not otherwise officially read  Patient is critically ill and could decompensate imminently.  The patient required immediate attention  The medically necessary treatment decisions were required due to possibility of imminent threat to the patient  the patient is unable  to participate in giving history and/or making treatment decisions;    Critical Care time 45 min

## 2017-08-08 NOTE — ED ADULT NURSE NOTE - OBJECTIVE STATEMENT
pt arrive lethargic, hyperventilating, frequent urination, poor historian. pt c/o LUQ abdominal pain. pt c/o constipation, no bm x 5 days. pt remove external insulin  pump on arrival.

## 2017-08-08 NOTE — H&P ADULT - NSHPLABSRESULTS_GEN_ALL_CORE
< from: CT Abdomen and Pelvis No Cont (07.04.17 @ 16:37) >    MPRESSION:    Limited examination without intravenous contrast in the patient's severe   detected body habitus. No CT finding to explain the patient's symptoms.          < end of copied text >

## 2017-08-08 NOTE — ED ADULT TRIAGE NOTE - CHIEF COMPLAINT QUOTE
pt BIBA with c/o abdominal pain, hyperglycemia, + IDDM, as per  states that she was drinking, pt adamantly denies, FS hi,hi + hyperventilation

## 2017-08-09 DIAGNOSIS — R78.81 BACTEREMIA: ICD-10-CM

## 2017-08-09 DIAGNOSIS — E09.10 DRUG OR CHEMICAL INDUCED DIABETES MELLITUS WITH KETOACIDOSIS WITHOUT COMA: ICD-10-CM

## 2017-08-09 LAB
-  CANDIDA ALBICANS: SIGNIFICANT CHANGE UP
-  CANDIDA GLABRATA: SIGNIFICANT CHANGE UP
-  CANDIDA KRUSEI: SIGNIFICANT CHANGE UP
-  CANDIDA PARAPSILOSIS: SIGNIFICANT CHANGE UP
-  CANDIDA TROPICALIS: SIGNIFICANT CHANGE UP
-  COAGULASE NEGATIVE STAPHYLOCOCCUS: SIGNIFICANT CHANGE UP
-  K. PNEUMONIAE GROUP: SIGNIFICANT CHANGE UP
-  KPC RESISTANCE GENE: SIGNIFICANT CHANGE UP
-  STREPTOCOCCUS SP. (NOT GRP A, B OR S PNEUMONIAE): SIGNIFICANT CHANGE UP
A BAUMANNII DNA SPEC QL NAA+PROBE: SIGNIFICANT CHANGE UP
ALBUMIN SERPL ELPH-MCNC: 2.5 G/DL — LOW (ref 3.3–5)
ALP SERPL-CCNC: 147 U/L — HIGH (ref 40–120)
ALT FLD-CCNC: 38 U/L — SIGNIFICANT CHANGE UP (ref 12–78)
ANION GAP SERPL CALC-SCNC: 11 MMOL/L — SIGNIFICANT CHANGE UP (ref 5–17)
ANION GAP SERPL CALC-SCNC: 11 MMOL/L — SIGNIFICANT CHANGE UP (ref 5–17)
ANION GAP SERPL CALC-SCNC: 19 MMOL/L — HIGH (ref 5–17)
AST SERPL-CCNC: 20 U/L — SIGNIFICANT CHANGE UP (ref 15–37)
B-OH-BUTYR SERPL-SCNC: 4.2 MMOL/L — HIGH
BASE EXCESS BLDV CALC-SCNC: -11 MMOL/L — LOW (ref -2–2)
BILIRUB SERPL-MCNC: 0.5 MG/DL — SIGNIFICANT CHANGE UP (ref 0.2–1.2)
BLOOD GAS COMMENTS, VENOUS: SIGNIFICANT CHANGE UP
BUN SERPL-MCNC: 12 MG/DL — SIGNIFICANT CHANGE UP (ref 7–23)
BUN SERPL-MCNC: 18 MG/DL — SIGNIFICANT CHANGE UP (ref 7–23)
BUN SERPL-MCNC: 29 MG/DL — HIGH (ref 7–23)
CALCIUM SERPL-MCNC: 7.6 MG/DL — LOW (ref 8.5–10.1)
CALCIUM SERPL-MCNC: 8 MG/DL — LOW (ref 8.5–10.1)
CALCIUM SERPL-MCNC: 8.1 MG/DL — LOW (ref 8.5–10.1)
CHLORIDE SERPL-SCNC: 89 MMOL/L — LOW (ref 96–108)
CHLORIDE SERPL-SCNC: 90 MMOL/L — LOW (ref 96–108)
CHLORIDE SERPL-SCNC: 91 MMOL/L — LOW (ref 96–108)
CO2 SERPL-SCNC: 13 MMOL/L — LOW (ref 22–31)
CO2 SERPL-SCNC: 21 MMOL/L — LOW (ref 22–31)
CO2 SERPL-SCNC: 23 MMOL/L — SIGNIFICANT CHANGE UP (ref 22–31)
CREAT SERPL-MCNC: 0.8 MG/DL — SIGNIFICANT CHANGE UP (ref 0.5–1.3)
CREAT SERPL-MCNC: 0.88 MG/DL — SIGNIFICANT CHANGE UP (ref 0.5–1.3)
CREAT SERPL-MCNC: 1.3 MG/DL — SIGNIFICANT CHANGE UP (ref 0.5–1.3)
CULTURE RESULTS: SIGNIFICANT CHANGE UP
E CLOAC COMP DNA BLD POS QL NAA+PROBE: SIGNIFICANT CHANGE UP
E COLI DNA BLD POS QL NAA+NON-PROBE: SIGNIFICANT CHANGE UP
ENTEROCOC DNA BLD POS QL NAA+NON-PROBE: SIGNIFICANT CHANGE UP
ENTEROCOC DNA BLD POS QL NAA+NON-PROBE: SIGNIFICANT CHANGE UP
GAS PNL BLDV: SIGNIFICANT CHANGE UP
GLUCOSE SERPL-MCNC: 169 MG/DL — HIGH (ref 70–99)
GLUCOSE SERPL-MCNC: 206 MG/DL — HIGH (ref 70–99)
GLUCOSE SERPL-MCNC: 408 MG/DL — HIGH (ref 70–99)
GP B STREP DNA BLD POS QL NAA+NON-PROBE: SIGNIFICANT CHANGE UP
GRAM STN FLD: SIGNIFICANT CHANGE UP
GRAM STN FLD: SIGNIFICANT CHANGE UP
HAEM INFLU DNA BLD POS QL NAA+NON-PROBE: SIGNIFICANT CHANGE UP
HBA1C BLD-MCNC: 12.5 % — HIGH (ref 4–5.6)
HCO3 BLDV-SCNC: 14 MMOL/L — LOW (ref 21–29)
HCT VFR BLD CALC: 29.4 % — LOW (ref 34.5–45)
HGB BLD-MCNC: 9.9 G/DL — LOW (ref 11.5–15.5)
HOROWITZ INDEX BLDV+IHG-RTO: 28 — SIGNIFICANT CHANGE UP
K OXYTOCA DNA BLD POS QL NAA+NON-PROBE: SIGNIFICANT CHANGE UP
L MONOCYTOG DNA BLD POS QL NAA+NON-PROBE: SIGNIFICANT CHANGE UP
LACTATE SERPL-SCNC: 1.1 MMOL/L — SIGNIFICANT CHANGE UP (ref 0.7–2)
LACTATE SERPL-SCNC: 2.5 MMOL/L — HIGH (ref 0.7–2)
MAGNESIUM SERPL-MCNC: 1.8 MG/DL — SIGNIFICANT CHANGE UP (ref 1.6–2.6)
MAGNESIUM SERPL-MCNC: 1.9 MG/DL — SIGNIFICANT CHANGE UP (ref 1.6–2.6)
MCHC RBC-ENTMCNC: 29 PG — SIGNIFICANT CHANGE UP (ref 27–34)
MCHC RBC-ENTMCNC: 33.7 GM/DL — SIGNIFICANT CHANGE UP (ref 32–36)
MCV RBC AUTO: 86.1 FL — SIGNIFICANT CHANGE UP (ref 80–100)
METHOD TYPE: SIGNIFICANT CHANGE UP
MRSA SPEC QL CULT: SIGNIFICANT CHANGE UP
MSSA DNA SPEC QL NAA+PROBE: SIGNIFICANT CHANGE UP
N MEN ISLT CULT: SIGNIFICANT CHANGE UP
P AERUGINOSA DNA BLD POS NAA+NON-PROBE: SIGNIFICANT CHANGE UP
PCO2 BLDV: 32 MMHG — LOW (ref 35–50)
PH BLDV: 7.27 — LOW (ref 7.35–7.45)
PHOSPHATE SERPL-MCNC: 1 MG/DL — CRITICAL LOW (ref 2.5–4.5)
PHOSPHATE SERPL-MCNC: 1.9 MG/DL — LOW (ref 2.5–4.5)
PHOSPHATE SERPL-MCNC: 2.3 MG/DL — LOW (ref 2.5–4.5)
PLATELET # BLD AUTO: 392 K/UL — SIGNIFICANT CHANGE UP (ref 150–400)
PO2 BLDV: <42 MMHG — SIGNIFICANT CHANGE UP (ref 25–45)
POTASSIUM SERPL-MCNC: 3.9 MMOL/L — SIGNIFICANT CHANGE UP (ref 3.5–5.3)
POTASSIUM SERPL-MCNC: 4 MMOL/L — SIGNIFICANT CHANGE UP (ref 3.5–5.3)
POTASSIUM SERPL-MCNC: 5 MMOL/L — SIGNIFICANT CHANGE UP (ref 3.5–5.3)
POTASSIUM SERPL-SCNC: 3.9 MMOL/L — SIGNIFICANT CHANGE UP (ref 3.5–5.3)
POTASSIUM SERPL-SCNC: 4 MMOL/L — SIGNIFICANT CHANGE UP (ref 3.5–5.3)
POTASSIUM SERPL-SCNC: 5 MMOL/L — SIGNIFICANT CHANGE UP (ref 3.5–5.3)
PROCALCITONIN SERPL-MCNC: 10.62 NG/ML — HIGH (ref 0–0.04)
PROT SERPL-MCNC: 6.1 GM/DL — SIGNIFICANT CHANGE UP (ref 6–8.3)
PROTEUS SP DNA BLD POS QL NAA+NON-PROBE: SIGNIFICANT CHANGE UP
RBC # BLD: 3.42 M/UL — LOW (ref 3.8–5.2)
RBC # FLD: 15.2 % — HIGH (ref 11–15)
S MARCESCENS DNA BLD POS NAA+NON-PROBE: SIGNIFICANT CHANGE UP
S PNEUM DNA BLD POS QL NAA+NON-PROBE: SIGNIFICANT CHANGE UP
S PYO DNA BLD POS QL NAA+NON-PROBE: SIGNIFICANT CHANGE UP
SAO2 % BLDV: 44 % — LOW (ref 67–88)
SODIUM SERPL-SCNC: 122 MMOL/L — LOW (ref 135–145)
SODIUM SERPL-SCNC: 123 MMOL/L — LOW (ref 135–145)
SODIUM SERPL-SCNC: 123 MMOL/L — LOW (ref 135–145)
SPECIMEN SOURCE: SIGNIFICANT CHANGE UP
VANCOMYCIN FLD-MCNC: 10.8 UG/ML — SIGNIFICANT CHANGE UP
WBC # BLD: 12.9 K/UL — HIGH (ref 3.8–10.5)
WBC # FLD AUTO: 12.9 K/UL — HIGH (ref 3.8–10.5)

## 2017-08-09 PROCEDURE — 93010 ELECTROCARDIOGRAM REPORT: CPT

## 2017-08-09 PROCEDURE — 99291 CRITICAL CARE FIRST HOUR: CPT

## 2017-08-09 RX ORDER — GLUCAGON INJECTION, SOLUTION 0.5 MG/.1ML
1 INJECTION, SOLUTION SUBCUTANEOUS ONCE
Qty: 0 | Refills: 0 | Status: DISCONTINUED | OUTPATIENT
Start: 2017-08-09 | End: 2017-08-16

## 2017-08-09 RX ORDER — SODIUM,POTASSIUM PHOSPHATES 278-250MG
1 POWDER IN PACKET (EA) ORAL
Qty: 0 | Refills: 0 | Status: COMPLETED | OUTPATIENT
Start: 2017-08-09 | End: 2017-08-10

## 2017-08-09 RX ORDER — ENOXAPARIN SODIUM 100 MG/ML
40 INJECTION SUBCUTANEOUS DAILY
Qty: 0 | Refills: 0 | Status: DISCONTINUED | OUTPATIENT
Start: 2017-08-09 | End: 2017-08-16

## 2017-08-09 RX ORDER — CEFTRIAXONE 500 MG/1
INJECTION, POWDER, FOR SOLUTION INTRAMUSCULAR; INTRAVENOUS
Qty: 0 | Refills: 0 | Status: DISCONTINUED | OUTPATIENT
Start: 2017-08-09 | End: 2017-08-09

## 2017-08-09 RX ORDER — TRAMADOL HYDROCHLORIDE 50 MG/1
50 TABLET ORAL ONCE
Qty: 0 | Refills: 0 | Status: DISCONTINUED | OUTPATIENT
Start: 2017-08-09 | End: 2017-08-09

## 2017-08-09 RX ORDER — INSULIN HUMAN 100 [IU]/ML
8 INJECTION, SOLUTION SUBCUTANEOUS
Qty: 100 | Refills: 0 | Status: DISCONTINUED | OUTPATIENT
Start: 2017-08-09 | End: 2017-08-09

## 2017-08-09 RX ORDER — VANCOMYCIN HCL 1 G
1000 VIAL (EA) INTRAVENOUS EVERY 12 HOURS
Qty: 0 | Refills: 0 | Status: DISCONTINUED | OUTPATIENT
Start: 2017-08-09 | End: 2017-08-09

## 2017-08-09 RX ORDER — HYDROMORPHONE HYDROCHLORIDE 2 MG/ML
0.5 INJECTION INTRAMUSCULAR; INTRAVENOUS; SUBCUTANEOUS
Qty: 0 | Refills: 0 | Status: DISCONTINUED | OUTPATIENT
Start: 2017-08-09 | End: 2017-08-09

## 2017-08-09 RX ORDER — INSULIN LISPRO 100/ML
VIAL (ML) SUBCUTANEOUS
Qty: 0 | Refills: 0 | Status: DISCONTINUED | OUTPATIENT
Start: 2017-08-09 | End: 2017-08-16

## 2017-08-09 RX ORDER — INSULIN LISPRO 100/ML
VIAL (ML) SUBCUTANEOUS AT BEDTIME
Qty: 0 | Refills: 0 | Status: DISCONTINUED | OUTPATIENT
Start: 2017-08-09 | End: 2017-08-16

## 2017-08-09 RX ORDER — INSULIN GLARGINE 100 [IU]/ML
22 INJECTION, SOLUTION SUBCUTANEOUS EVERY MORNING
Qty: 0 | Refills: 0 | Status: DISCONTINUED | OUTPATIENT
Start: 2017-08-09 | End: 2017-08-16

## 2017-08-09 RX ORDER — DEXTROSE 50 % IN WATER 50 %
12.5 SYRINGE (ML) INTRAVENOUS ONCE
Qty: 0 | Refills: 0 | Status: DISCONTINUED | OUTPATIENT
Start: 2017-08-09 | End: 2017-08-16

## 2017-08-09 RX ORDER — CEFTRIAXONE 500 MG/1
1 INJECTION, POWDER, FOR SOLUTION INTRAMUSCULAR; INTRAVENOUS ONCE
Qty: 0 | Refills: 0 | Status: DISCONTINUED | OUTPATIENT
Start: 2017-08-09 | End: 2017-08-09

## 2017-08-09 RX ORDER — INSULIN LISPRO 100/ML
7 VIAL (ML) SUBCUTANEOUS
Qty: 0 | Refills: 0 | Status: DISCONTINUED | OUTPATIENT
Start: 2017-08-09 | End: 2017-08-10

## 2017-08-09 RX ORDER — INSULIN GLARGINE 100 [IU]/ML
22 INJECTION, SOLUTION SUBCUTANEOUS ONCE
Qty: 0 | Refills: 0 | Status: COMPLETED | OUTPATIENT
Start: 2017-08-09 | End: 2017-08-09

## 2017-08-09 RX ORDER — DEXTROSE 50 % IN WATER 50 %
25 SYRINGE (ML) INTRAVENOUS ONCE
Qty: 0 | Refills: 0 | Status: DISCONTINUED | OUTPATIENT
Start: 2017-08-09 | End: 2017-08-09

## 2017-08-09 RX ORDER — PIPERACILLIN AND TAZOBACTAM 4; .5 G/20ML; G/20ML
3.38 INJECTION, POWDER, LYOPHILIZED, FOR SOLUTION INTRAVENOUS EVERY 8 HOURS
Qty: 0 | Refills: 0 | Status: DISCONTINUED | OUTPATIENT
Start: 2017-08-09 | End: 2017-08-09

## 2017-08-09 RX ORDER — SODIUM CHLORIDE 9 MG/ML
1000 INJECTION, SOLUTION INTRAVENOUS
Qty: 0 | Refills: 0 | Status: DISCONTINUED | OUTPATIENT
Start: 2017-08-09 | End: 2017-08-15

## 2017-08-09 RX ORDER — POTASSIUM PHOSPHATE, MONOBASIC POTASSIUM PHOSPHATE, DIBASIC 236; 224 MG/ML; MG/ML
15 INJECTION, SOLUTION INTRAVENOUS ONCE
Qty: 0 | Refills: 0 | Status: COMPLETED | OUTPATIENT
Start: 2017-08-09 | End: 2017-08-09

## 2017-08-09 RX ORDER — DEXTROSE 50 % IN WATER 50 %
1 SYRINGE (ML) INTRAVENOUS ONCE
Qty: 0 | Refills: 0 | Status: DISCONTINUED | OUTPATIENT
Start: 2017-08-09 | End: 2017-08-16

## 2017-08-09 RX ORDER — MAGNESIUM SULFATE 500 MG/ML
2 VIAL (ML) INJECTION ONCE
Qty: 0 | Refills: 0 | Status: COMPLETED | OUTPATIENT
Start: 2017-08-09 | End: 2017-08-09

## 2017-08-09 RX ORDER — INSULIN HUMAN 100 [IU]/ML
3 INJECTION, SOLUTION SUBCUTANEOUS
Qty: 100 | Refills: 0 | Status: DISCONTINUED | OUTPATIENT
Start: 2017-08-09 | End: 2017-08-09

## 2017-08-09 RX ORDER — DEXTROSE 50 % IN WATER 50 %
25 SYRINGE (ML) INTRAVENOUS ONCE
Qty: 0 | Refills: 0 | Status: DISCONTINUED | OUTPATIENT
Start: 2017-08-09 | End: 2017-08-16

## 2017-08-09 RX ORDER — ACETAMINOPHEN 500 MG
650 TABLET ORAL EVERY 6 HOURS
Qty: 0 | Refills: 0 | Status: DISCONTINUED | OUTPATIENT
Start: 2017-08-09 | End: 2017-08-16

## 2017-08-09 RX ORDER — SODIUM CHLORIDE 9 MG/ML
1000 INJECTION, SOLUTION INTRAVENOUS
Qty: 0 | Refills: 0 | Status: DISCONTINUED | OUTPATIENT
Start: 2017-08-09 | End: 2017-08-09

## 2017-08-09 RX ORDER — AZITHROMYCIN 500 MG/1
500 TABLET, FILM COATED ORAL ONCE
Qty: 0 | Refills: 0 | Status: DISCONTINUED | OUTPATIENT
Start: 2017-08-09 | End: 2017-08-09

## 2017-08-09 RX ORDER — INSULIN LISPRO 100/ML
VIAL (ML) SUBCUTANEOUS AT BEDTIME
Qty: 0 | Refills: 0 | Status: DISCONTINUED | OUTPATIENT
Start: 2017-08-09 | End: 2017-08-09

## 2017-08-09 RX ORDER — AZITHROMYCIN 500 MG/1
TABLET, FILM COATED ORAL
Qty: 0 | Refills: 0 | Status: DISCONTINUED | OUTPATIENT
Start: 2017-08-09 | End: 2017-08-09

## 2017-08-09 RX ORDER — PIPERACILLIN AND TAZOBACTAM 4; .5 G/20ML; G/20ML
3.38 INJECTION, POWDER, LYOPHILIZED, FOR SOLUTION INTRAVENOUS EVERY 8 HOURS
Qty: 0 | Refills: 0 | Status: DISCONTINUED | OUTPATIENT
Start: 2017-08-09 | End: 2017-08-11

## 2017-08-09 RX ORDER — METOCLOPRAMIDE HCL 10 MG
10 TABLET ORAL ONCE
Qty: 0 | Refills: 0 | Status: COMPLETED | OUTPATIENT
Start: 2017-08-09 | End: 2017-08-09

## 2017-08-09 RX ORDER — TRAMADOL HYDROCHLORIDE 50 MG/1
25 TABLET ORAL ONCE
Qty: 0 | Refills: 0 | Status: DISCONTINUED | OUTPATIENT
Start: 2017-08-09 | End: 2017-08-09

## 2017-08-09 RX ADMIN — POTASSIUM PHOSPHATE, MONOBASIC POTASSIUM PHOSPHATE, DIBASIC 63.75 MILLIMOLE(S): 236; 224 INJECTION, SOLUTION INTRAVENOUS at 08:23

## 2017-08-09 RX ADMIN — Medication 250 MILLIGRAM(S): at 00:39

## 2017-08-09 RX ADMIN — Medication 63.75 MILLIMOLE(S): at 04:08

## 2017-08-09 RX ADMIN — TRAMADOL HYDROCHLORIDE 50 MILLIGRAM(S): 50 TABLET ORAL at 11:59

## 2017-08-09 RX ADMIN — Medication 4: at 22:06

## 2017-08-09 RX ADMIN — SODIUM CHLORIDE 150 MILLILITER(S): 9 INJECTION, SOLUTION INTRAVENOUS at 04:01

## 2017-08-09 RX ADMIN — PIPERACILLIN AND TAZOBACTAM 25 GRAM(S): 4; .5 INJECTION, POWDER, LYOPHILIZED, FOR SOLUTION INTRAVENOUS at 22:02

## 2017-08-09 RX ADMIN — Medication 1 TABLET(S): at 12:28

## 2017-08-09 RX ADMIN — Medication 10 MILLIGRAM(S): at 21:30

## 2017-08-09 RX ADMIN — INSULIN HUMAN 3 UNIT(S)/HR: 100 INJECTION, SOLUTION SUBCUTANEOUS at 03:56

## 2017-08-09 RX ADMIN — TRAMADOL HYDROCHLORIDE 50 MILLIGRAM(S): 50 TABLET ORAL at 02:30

## 2017-08-09 RX ADMIN — PIPERACILLIN AND TAZOBACTAM 25 GRAM(S): 4; .5 INJECTION, POWDER, LYOPHILIZED, FOR SOLUTION INTRAVENOUS at 13:55

## 2017-08-09 RX ADMIN — Medication 1 TABLET(S): at 22:02

## 2017-08-09 RX ADMIN — Medication 7 UNIT(S): at 17:08

## 2017-08-09 RX ADMIN — Medication 1 TABLET(S): at 17:08

## 2017-08-09 RX ADMIN — TRAMADOL HYDROCHLORIDE 50 MILLIGRAM(S): 50 TABLET ORAL at 03:30

## 2017-08-09 RX ADMIN — ENOXAPARIN SODIUM 40 MILLIGRAM(S): 100 INJECTION SUBCUTANEOUS at 11:59

## 2017-08-09 RX ADMIN — CHLORHEXIDINE GLUCONATE 1 APPLICATION(S): 213 SOLUTION TOPICAL at 11:59

## 2017-08-09 RX ADMIN — PIPERACILLIN AND TAZOBACTAM 25 GRAM(S): 4; .5 INJECTION, POWDER, LYOPHILIZED, FOR SOLUTION INTRAVENOUS at 05:35

## 2017-08-09 RX ADMIN — Medication 50 GRAM(S): at 13:05

## 2017-08-09 RX ADMIN — TRAMADOL HYDROCHLORIDE 50 MILLIGRAM(S): 50 TABLET ORAL at 12:55

## 2017-08-09 RX ADMIN — INSULIN GLARGINE 22 UNIT(S): 100 INJECTION, SOLUTION SUBCUTANEOUS at 10:04

## 2017-08-09 RX ADMIN — Medication 4: at 17:08

## 2017-08-09 NOTE — CONSULT NOTE ADULT - PROBLEM SELECTOR RECOMMENDATION 9
Diabetic Ketoacidosis resolved   Continue with the same regimen while inpatient   encourage mor Nutrition   REYNALDO recovered   Patient should have strict diet control and do exercise as tolerated upon discharge   Thank You for the courtesy of this consultation !!!

## 2017-08-09 NOTE — CHART NOTE - NSCHARTNOTEFT_GEN_A_CORE
62F hx HTN IDDM with insulin pump , MS Hgb A1c  > 11, chronic pancreatitis, ex ETOH abuse, last drink 14 yrs ago. Admit with poluria/polyphagia/polydipsia.  FS glucoses' running close to 400 over the last few days.    AG 30, pH 6.98, Severe DKA.  given iv insulin fluids and a dose of vancomycin  cultures were sent.    Her abd pain is gone, and oral contrast CT shows no significant finding for pain.  She does have a lot of stool. Pt now off insulin gtt and transitioned to SQ Lantus with pre-meal coverage.  FS are stable.  Pt found to have Gram (-) Ecoli bacteremia.  Pt on Zosyn at this time.  Pt manages sugars with insulin pump.  Dr. Miller c/s for transitioning back to pump as patients recent sugars have not been adequately controlled on previous pump settings.  Pt HD stable, VSS at this time.  Patient can be transferred to med/surg floor.  Pt signed out to Dr. Gramajo

## 2017-08-09 NOTE — PHYSICAL THERAPY INITIAL EVALUATION ADULT - MODIFIED CLINICAL TEST OF SENSORY INTEGRATION IN BALANCE TEST
Barthel Index: Feeding Score _10__, Bathing Score _0__, Grooming Score _0__, Dressing Score _0__, Bowels Score _0__, Bladder Score _0__, Toilet Score _0__, Transfers Score _0__, Mobility Score _0__, Stairs Score _0__,     Total Score _10__

## 2017-08-09 NOTE — PROGRESS NOTE ADULT - SUBJECTIVE AND OBJECTIVE BOX
INTERVAL HPI/OVERNIGHT EVENTS:   HPI:  62F hx HTN IDDM with insulin pump , MS Hgb A1c  > 11, chronic pancreatitis, ex ETOH abuse, last drink 14 yrs ago. presenting with generalized abdominal pain w/ N/V and malaise prior to admission. States high stress at home. fingersticks at home been reading >400 consistently. Denies fever, chills. no dysuria or diarrhea. no coughing or SOB or CP. no ETOH use. states compliment with medications. states compliant with diet. Recent steroid epidural injection for Chronic LBP within the past 2 weeks.    24Hour:  Insulin gtt titrated down to 3units / hr. Gap closed. no N/V. pain improved. c/o back pain and muscle aches. "Hungry"    ICU Vital Signs Last 24 Hrs  T(C): 37.7 (09 Aug 2017 11:30), Max: 37.7 (09 Aug 2017 11:30)  T(F): 99.8 (09 Aug 2017 11:30), Max: 99.8 (09 Aug 2017 11:30)  HR: 106 (09 Aug 2017 12:00) (72 - 128)  BP: 146/72 (09 Aug 2017 12:00) (119/70 - 183/78)  BP(mean): 89 (09 Aug 2017 12:00) (81 - 108)  ABP: --  ABP(mean): --  RR: 21 (09 Aug 2017 12:00) (17 - 29)  SpO2: 97% (09 Aug 2017 12:00) (94% - 100%)      ABG - ( 08 Aug 2017 18:38 )  pH: x     /  pCO2: 10    /  pO2: 132   / HCO3: 2     / Base Excess: -28.4 /  SaO2: 96          CAPILLARY BLOOD GLUCOSE  140 (09 Aug 2017 11:37)  213 (09 Aug 2017 09:21)  249 (09 Aug 2017 07:54)  214 (09 Aug 2017 06:30)  224 (09 Aug 2017 05:30)  215 (09 Aug 2017 04:30)  235 (09 Aug 2017 03:39)  300 (09 Aug 2017 02:30)  318 (09 Aug 2017 01:30)  383 (09 Aug 2017 00:30)  456 (08 Aug 2017 23:30)  499 (08 Aug 2017 22:30)        PHYSICAL EXAM:    GENERAL: NAD, well-groomed, well-developed  HEAD:  Atraumatic, Normocephalic  EYES: EOMI, PERRLA, conjunctiva and sclera clear  NECK: Supple, No JVD, Normal thyroid  NERVOUS SYSTEM:  Alert & Oriented X3, Good concentration; Motor Strength 5/5 B/L upper and lower extremities; DTRs 2+ intact and symmetric  CHEST/LUNG: Clear to percussion bilaterally; No rales, rhonchi, wheezing, or rubs  HEART: Regular rate and rhythm; No murmurs, rubs, or gallops  ABDOMEN: Soft, Nontender, Nondistended; Bowel sounds present  EXTREMITIES:  2+ Peripheral Pulses, No clubbing, cyanosis, or edema      LABS:                        9.9    12.9  )-----------( 392      ( 09 Aug 2017 05:10 )             29.4          123<L>  |  91<L>  |  18  ----------------------------<  206<H>  3.9   |  21<L>  |  0.88    Ca    8.1<L>      09 Aug 2017 05:10  Phos  1.0       Mg     1.8         TPro  6.1  /  Alb  2.5<L>  /  TBili  0.5  /  DBili  x   /  AST  20  /  ALT  38  /  AlkPhos  147<H>      PT/INR - ( 08 Aug 2017 18:03 )   PT: 11.3 sec;   INR: 1.04 ratio         PTT - ( 08 Aug 2017 18:03 )  PTT:32.4 sec  Urinalysis Basic - ( 08 Aug 2017 18:25 )    Color: Yellow / Appearance: Clear / S.015 / pH: x  Gluc: x / Ketone: Large  / Bili: Negative / Urobili: Negative mg/dL   Blood: x / Protein: 30 mg/dL / Nitrite: Negative   Leuk Esterase: Trace / RBC: 25-50 /HPF / WBC 11-25   Sq Epi: x / Non Sq Epi: Few / Bacteria: Moderate    CRITICAL CARE TIME SPENT: 45 minutes

## 2017-08-09 NOTE — PHYSICAL THERAPY INITIAL EVALUATION ADULT - ADDITIONAL COMMENTS
Pt and spouse (via phone call with social work) reporting that there are 4 steps to enter home and a full flight to get to 2nd floor. Pt is independent with ADLs when she is able to perform them. Pt has a rolling walker and a cane, ambulates short distances in the community. Son is disabled/special needs.

## 2017-08-09 NOTE — CONSULT NOTE ADULT - SUBJECTIVE AND OBJECTIVE BOX
Patient is a 62y old  Female who presents with a chief complaint of High blood sugar (09 Aug 2017 06:44)      Reason For Consult: Diabetic Ketoacidosis , uncontrolled diabetes     HPI:  Histrionic patient.    62F hx HTN IDDM with insulin pump , MS Hgb A1c  > 11, chronic pancreatitis, ex ETOH abuse, last drink 14 yrs ago. Admit with poluria/polyphagia/polydipsia.  FS glucoses' running close to 400 over the last few days.      Severe DKA.  given iv insulin fluids and a dose of vancomycin  cultures were sent.    Her abd pain is gone, and oral contrast CT shows no significant finding for pain.  She does have a lot of stool. (08 Aug 2017 22:57)    currently finger sticks are better , Creatinine normal , low serum Calcium but also low Albumin   PO increased           PAST MEDICAL & SURGICAL HISTORY:  Pancreatic insufficiency  Alcohol abuse  Multiple sclerosis  Hypertension  Diabetes  H/O cervical spine surgery      FAMILY HISTORY:  No pertinent family history in first degree relatives        Social History:    MEDICATIONS  (STANDING):  dextrose 50% Injectable 50 milliLiter(s) IV Push every 15 minutes  chlorhexidine 4% Liquid 1 Application(s) Topical daily  enoxaparin Injectable 40 milliGRAM(s) SubCutaneous daily  insulin Infusion 3 Unit(s)/Hr (3 mL/Hr) IV Continuous <Continuous>  dextrose 5% + lactated ringers. 1000 milliLiter(s) (150 mL/Hr) IV Continuous <Continuous>  potassium acid phosphate/sodium acid phosphate tablet (K-PHOS No. 2) 1 Tablet(s) Oral four times a day with meals  piperacillin/tazobactam IVPB. 3.375 Gram(s) IV Intermittent every 8 hours    MEDICATIONS  (PRN):  dextrose 50% Injectable 25 milliLiter(s) IV Push every 15 minutes PRN hypoglycemia      REVIEW OF SYSTEMS:  CONSTITUTIONAL:  as per HPI, weak thirsty  HEENT:  Eyes:  No diplopia or blurred vision. ENT:  No earache, sore throat or runny nose.  CARDIOVASCULAR:  No pressure, squeezing, strangling, tightness, heaviness or aching about the chest, neck, axilla or epigastrium.  RESPIRATORY:  No cough, shortness of breath, PND or orthopnea.  GASTROINTESTINAL:  No nausea, vomiting or diarrhea.  GENITOURINARY:  No dysuria, frequency or urgency. No Blood in urine  MUSCULOSKELETAL:  no joint aches, no muscle pain, myalgia  SKIN:  No change in skin, hair or nails.  NEUROLOGIC:  No paresthesias, fasciculations, seizures or weakness.  PSYCHIATRIC:  No disorder of thought or mood.  ENDOCRINE:  No heat or cold intolerance, polyuria or polydipsia. abnormal weight gain or loss, oral thrush  HEMATOLOGICAL:  No easy bruising or bleeding.     T(C): 37.7 (08-09-17 @ 11:30), Max: 37.7 (08-09-17 @ 11:30)  HR: 100 (08-09-17 @ 12:30) (72 - 128)  BP: 146/72 (08-09-17 @ 12:00) (119/70 - 183/78)  RR: 21 (08-09-17 @ 12:30) (17 - 29)  SpO2: 98% (08-09-17 @ 12:30) (94% - 100%)  Wt(kg): --    PHYSICAL EXAM: No dehydration or evidence of Acidosis  GENERAL: NAD, well-groomed, well-developed  HEAD:  Atraumatic, Normocephalic  EYES: EOMI, PERRLA, conjunctiva and sclera clear  ENMT: No tonsillar erythema, exudates, or enlargement; Moist mucous membranes, Good dentition, No lesions  NECK: Supple, No JVD, Normal thyroid  NERVOUS SYSTEM:  Alert & Oriented X3, Good concentration; Motor Strength 5/5 B/L upper and lower extremities; DTRs 2+ intact and symmetric  CHEST/LUNG: Clear to percussion bilaterally; No rales, rhonchi, wheezing, or rubs  HEART: Regular rate and rhythm; No murmurs, rubs, or gallops  ABDOMEN: Soft, Nontender, Nondistended; Bowel sounds present  EXTREMITIES:  2+ Peripheral Pulses, No clubbing, cyanosis, or edema  LYMPH: No lymphadenopathy noted  SKIN: No rashes or lesions    CAPILLARY BLOOD GLUCOSE  140 (09 Aug 2017 11:37)  213 (09 Aug 2017 09:21)  249 (09 Aug 2017 07:54)  214 (09 Aug 2017 06:30)  224 (09 Aug 2017 05:30)  215 (09 Aug 2017 04:30)  235 (09 Aug 2017 03:39)  300 (09 Aug 2017 02:30)  318 (09 Aug 2017 01:30)  383 (09 Aug 2017 00:30)  456 (08 Aug 2017 23:30)  499 (08 Aug 2017 22:30)                                9.9    12.9  )-----------( 392      ( 09 Aug 2017 05:10 )             29.4       CMP:  08-09 @ 05:10  SGPT 38  Albumin 2.5   Alk Phos 147   Anion Gap 11   SGOT 20   Total Bili 0.5   BUN 18   Calcium Total 8.1   CO2 21   Chloride 91   Creatinine 0.88   eGFR if AA 82   eGFR if non AA 70   Glucose 206   Potassium 3.9   Protein 6.1   Sodium 123      Thyroid Function Tests:      Diabetes Tests: 08-09 @ 08:03 HbA1c 12.5 C-Peptide --       Parathyroids:     Adrenals:       Radiology:

## 2017-08-10 LAB
ANION GAP SERPL CALC-SCNC: 10 MMOL/L — SIGNIFICANT CHANGE UP (ref 5–17)
BUN SERPL-MCNC: 8 MG/DL — SIGNIFICANT CHANGE UP (ref 7–23)
CALCIUM SERPL-MCNC: 7.8 MG/DL — LOW (ref 8.5–10.1)
CHLORIDE SERPL-SCNC: 91 MMOL/L — LOW (ref 96–108)
CO2 SERPL-SCNC: 27 MMOL/L — SIGNIFICANT CHANGE UP (ref 22–31)
CREAT SERPL-MCNC: 0.55 MG/DL — SIGNIFICANT CHANGE UP (ref 0.5–1.3)
GLUCOSE SERPL-MCNC: 112 MG/DL — HIGH (ref 70–99)
HCT VFR BLD CALC: 33.4 % — LOW (ref 34.5–45)
HGB BLD-MCNC: 11.4 G/DL — LOW (ref 11.5–15.5)
MAGNESIUM SERPL-MCNC: 2.2 MG/DL — SIGNIFICANT CHANGE UP (ref 1.6–2.6)
MCHC RBC-ENTMCNC: 29.8 PG — SIGNIFICANT CHANGE UP (ref 27–34)
MCHC RBC-ENTMCNC: 34.2 GM/DL — SIGNIFICANT CHANGE UP (ref 32–36)
MCV RBC AUTO: 87.2 FL — SIGNIFICANT CHANGE UP (ref 80–100)
PHOSPHATE SERPL-MCNC: 1.5 MG/DL — LOW (ref 2.5–4.5)
PLATELET # BLD AUTO: 348 K/UL — SIGNIFICANT CHANGE UP (ref 150–400)
POTASSIUM SERPL-MCNC: 3.2 MMOL/L — LOW (ref 3.5–5.3)
POTASSIUM SERPL-SCNC: 3.2 MMOL/L — LOW (ref 3.5–5.3)
RBC # BLD: 3.83 M/UL — SIGNIFICANT CHANGE UP (ref 3.8–5.2)
RBC # FLD: 15.6 % — HIGH (ref 11–15)
SODIUM SERPL-SCNC: 128 MMOL/L — LOW (ref 135–145)
WBC # BLD: 7.3 K/UL — SIGNIFICANT CHANGE UP (ref 3.8–10.5)
WBC # FLD AUTO: 7.3 K/UL — SIGNIFICANT CHANGE UP (ref 3.8–10.5)

## 2017-08-10 PROCEDURE — 99233 SBSQ HOSP IP/OBS HIGH 50: CPT

## 2017-08-10 RX ORDER — INSULIN LISPRO 100/ML
9 VIAL (ML) SUBCUTANEOUS
Qty: 0 | Refills: 0 | Status: DISCONTINUED | OUTPATIENT
Start: 2017-08-10 | End: 2017-08-16

## 2017-08-10 RX ORDER — POTASSIUM CHLORIDE 20 MEQ
40 PACKET (EA) ORAL ONCE
Qty: 0 | Refills: 0 | Status: COMPLETED | OUTPATIENT
Start: 2017-08-10 | End: 2017-08-10

## 2017-08-10 RX ORDER — DIPHENHYDRAMINE HCL 50 MG
25 CAPSULE ORAL ONCE
Qty: 0 | Refills: 0 | Status: COMPLETED | OUTPATIENT
Start: 2017-08-10 | End: 2017-08-10

## 2017-08-10 RX ORDER — POTASSIUM PHOSPHATE, MONOBASIC POTASSIUM PHOSPHATE, DIBASIC 236; 224 MG/ML; MG/ML
15 INJECTION, SOLUTION INTRAVENOUS ONCE
Qty: 0 | Refills: 0 | Status: DISCONTINUED | OUTPATIENT
Start: 2017-08-10 | End: 2017-08-10

## 2017-08-10 RX ORDER — SODIUM,POTASSIUM PHOSPHATES 278-250MG
1 POWDER IN PACKET (EA) ORAL
Qty: 0 | Refills: 0 | Status: DISCONTINUED | OUTPATIENT
Start: 2017-08-10 | End: 2017-08-10

## 2017-08-10 RX ORDER — SENNA PLUS 8.6 MG/1
2 TABLET ORAL AT BEDTIME
Qty: 0 | Refills: 0 | Status: DISCONTINUED | OUTPATIENT
Start: 2017-08-10 | End: 2017-08-16

## 2017-08-10 RX ORDER — DOCUSATE SODIUM 100 MG
100 CAPSULE ORAL
Qty: 0 | Refills: 0 | Status: DISCONTINUED | OUTPATIENT
Start: 2017-08-10 | End: 2017-08-16

## 2017-08-10 RX ADMIN — Medication 9 UNIT(S): at 16:07

## 2017-08-10 RX ADMIN — Medication 1 TABLET(S): at 22:01

## 2017-08-10 RX ADMIN — Medication 7 UNIT(S): at 13:09

## 2017-08-10 RX ADMIN — PIPERACILLIN AND TAZOBACTAM 25 GRAM(S): 4; .5 INJECTION, POWDER, LYOPHILIZED, FOR SOLUTION INTRAVENOUS at 13:20

## 2017-08-10 RX ADMIN — Medication 40 MILLIEQUIVALENT(S): at 05:57

## 2017-08-10 RX ADMIN — Medication 2: at 09:14

## 2017-08-10 RX ADMIN — Medication 1 TABLET(S): at 13:10

## 2017-08-10 RX ADMIN — Medication 7 UNIT(S): at 09:14

## 2017-08-10 RX ADMIN — Medication 25 MILLIGRAM(S): at 23:03

## 2017-08-10 RX ADMIN — ENOXAPARIN SODIUM 40 MILLIGRAM(S): 100 INJECTION SUBCUTANEOUS at 13:11

## 2017-08-10 RX ADMIN — Medication 1 TABLET(S): at 18:31

## 2017-08-10 RX ADMIN — Medication 100 MILLIGRAM(S): at 13:23

## 2017-08-10 RX ADMIN — Medication 63.75 MILLIMOLE(S): at 09:15

## 2017-08-10 RX ADMIN — PIPERACILLIN AND TAZOBACTAM 25 GRAM(S): 4; .5 INJECTION, POWDER, LYOPHILIZED, FOR SOLUTION INTRAVENOUS at 22:01

## 2017-08-10 RX ADMIN — INSULIN GLARGINE 22 UNIT(S): 100 INJECTION, SOLUTION SUBCUTANEOUS at 10:02

## 2017-08-10 RX ADMIN — Medication 8: at 16:04

## 2017-08-10 RX ADMIN — Medication 6: at 13:09

## 2017-08-10 RX ADMIN — CHLORHEXIDINE GLUCONATE 1 APPLICATION(S): 213 SOLUTION TOPICAL at 13:10

## 2017-08-10 RX ADMIN — PIPERACILLIN AND TAZOBACTAM 25 GRAM(S): 4; .5 INJECTION, POWDER, LYOPHILIZED, FOR SOLUTION INTRAVENOUS at 05:45

## 2017-08-10 NOTE — PROGRESS NOTE ADULT - SUBJECTIVE AND OBJECTIVE BOX
INTERVAL HPI/OVERNIGHT EVENTS:   HPI:  62F hx HTN IDDM with insulin pump , MS Hgb A1c  > 11, chronic pancreatitis, ex ETOH abuse, last drink 14 yrs ago. presenting with generalized abdominal pain w/ N/V and malaise prior to admission. States high stress at home. fingersticks at home been reading >400 consistently. Denies fever, chills. no dysuria or diarrhea. no coughing or SOB or CP. no ETOH use. states compliment with medications. states compliant with diet. Recent steroid epidural injection for Chronic LBP within the past 2 weeks.    24Hour: Fingersticks controlled. GAP remains closed. moving about unit. tolerating food. off insulin gtt since yesterday morning.    ICU Vital Signs Last 24 Hrs  T(C): 37.2 (10 Aug 2017 07:29), Max: 37.7 (09 Aug 2017 15:12)  T(F): 99 (10 Aug 2017 07:29), Max: 99.8 (09 Aug 2017 15:12)  HR: 96 (10 Aug 2017 11:00) (85 - 111)  BP: 146/89 (10 Aug 2017 11:00) (113/64 - 152/83)  BP(mean): 102 (10 Aug 2017 11:00) (74 - 105)  ABP: --  ABP(mean): --  RR: 17 (10 Aug 2017 11:00) (15 - 28)  SpO2: 100% (10 Aug 2017 11:00) (94% - 100%)      PHYSICAL EXAM:    GENERAL: NAD, well-groomed, well-developed  HEAD:  Atraumatic, Normocephalic  EYES: EOMI, PERRLA, conjunctiva and sclera clear  NERVOUS SYSTEM:  Alert & Oriented X3, Good concentration; Motor Strength 5/5 B/L upper and lower extremities; DTRs 2+ intact and symmetric  CHEST/LUNG: Clear bilaterally; No rales, rhonchi, wheezing, or rubs  HEART: Regular rate and rhythm; No murmurs, rubs, or gallops  ABDOMEN: Soft, Nontender, distended; Bowel sounds present  EXTREMITIES:  2+ Peripheral Pulses. edema    SKIN: No rashes or lesions      LABS:                        11.4   7.3   )-----------( 348      ( 10 Aug 2017 03:51 )             33.4      08-10    128<L>  |  91<L>  |  8   ----------------------------<  112<H>  3.2<L>   |  27  |  0.55    Ca    7.8<L>      10 Aug 2017 03:51  Phos  1.5     08-10  Mg     2.2     08-10    TPro  6.1  /  Alb  2.5<L>  /  TBili  0.5  /  DBili  x   /  AST  20  /  ALT  38  /  AlkPhos  147<H>      PT/INR - ( 08 Aug 2017 18:03 )   PT: 11.3 sec;   INR: 1.04 ratio         PTT - ( 08 Aug 2017 18:03 )  PTT:32.4 sec  Urinalysis Basic - ( 08 Aug 2017 18:25 )    Color: Yellow / Appearance: Clear / S.015 / pH: x  Gluc: x / Ketone: Large  / Bili: Negative / Urobili: Negative mg/dL   Blood: x / Protein: 30 mg/dL / Nitrite: Negative   Leuk Esterase: Trace / RBC: 25-50 /HPF / WBC 11-25   Sq Epi: x / Non Sq Epi: Few / Bacteria: Moderate      CRITICAL CARE TIME SPENT: 30 minutes

## 2017-08-10 NOTE — DIETITIAN INITIAL EVALUATION ADULT. - PERTINENT MEDS FT
Lovenox, Zosyn, Hibiclens, Lantus 22units am, Tylenol, Humalog SS, Humalog 7units ac meals, (Insulin infusion stopped 8/9)

## 2017-08-10 NOTE — PROGRESS NOTE ADULT - ATTENDING COMMENTS
63 yo F hx prior etoh abuse, chronic pancreatitis, DM admitted with severe DKA and GNR bacteremia. Remains hemodynamically stable. Presumably urine as source but UA polymicrobial. ID eval for bacteremia tx and f/u. DKA has resolved. Cont insulin. Endo f/u.
62F hx HTN IDDM with insulin pump , MS, chronic pancreatitis, ex ETOH abuse admitted with severe DKA and GNR bacteremia. Pt significantly improved as acidosis has improved and AG has closed. Will transition off gtt to lantus. Electrolytes repleted. Endocrine consult placed. Pt w/ GNR bacteremia likely ecoli. Ucx pending. CT a/p non contrast showed no abnormality.

## 2017-08-10 NOTE — CONSULT NOTE ADULT - ASSESSMENT
gram negative rods bacteremia   recently in [facility] with urinary tract infection x ?? 7 days ; no home antibiotics   has contd with dysuria despite that   extend antibiotics if vs change   refuses lactulose for fecal issues   await culture   will follow with you thanks

## 2017-08-10 NOTE — DIETITIAN INITIAL EVALUATION ADULT. - PERTINENT LABORATORY DATA
08-10 Na 128 mmol/L<L> Glu 112 mg/dL<H> K+ 3.2 mmol/L<L> Cr  0.55 mg/dL BUN 8 mg/dL Phos 1.5 mg/dL<L> Alb n/a   PAB n/a   Hgb 11.4 g/dL<L> Hct 33.4 %<L>, FwhN3F=97.5%(8/9), Alb=2.5(8/9), Phos=1.5

## 2017-08-10 NOTE — DIETITIAN INITIAL EVALUATION ADULT. - ETIOLOGY
Increased nutrient needs related to uncontrolled diabetes with polyuria & polydipsia & polyphagia & hx Chronic pancreatitis

## 2017-08-10 NOTE — CHART NOTE - NSCHARTNOTEFT_GEN_A_CORE
Upon Nutritional Assessment by the Registered Dietitian your patient was determined to meet criteria / has evidence of the following diagnosis/diagnoses:          [ ]  Mild Protein Calorie Malnutrition        [ ]  Moderate Protein Calorie Malnutrition        [x ] Severe Protein Calorie Malnutrition        [ ] Unspecified Protein Calorie Malnutrition        [ ] Underweight / BMI <19        [ ] Morbid Obesity / BMI > 40      Findings as based on:  •  Comprehensive nutrition assessment and consultation  •  Calorie counts (nutrient intake analysis)  •  Food acceptance and intake status from observations by staff  •  Follow up  •  Patient education  •  Intervention secondary to interdisciplinary rounds  •   concerns      Treatment:    The following diet has been recommended:  Low Na/[facility] with snack/Glucerna shake 1 can 2 x day(220kcal & 10gm protein)    PROVIDER Section:     By signing this assessment you are acknowledging and agree with the diagnosis/diagnoses assigned by the Registered Dietitian    Comments:

## 2017-08-10 NOTE — CONSULT NOTE ADULT - SUBJECTIVE AND OBJECTIVE BOX
62F hx HTN IDDM with insulin pump , MS Hgb A1c  > 11, chronic pancreatitis, ex ETOH abuse, last drink 14 yrs ago. Admit with poluria/polyphagia/polydipsia.  FS glucoses' running close to 400 over the last few days.    Severe DKA.  given iv insulin fluids and a dose of vancomycin  cultures were sent.    Her abd pain is gone, and oral contrast CT shows no significant finding for pain.  She does have a lot of stool. (08 Aug 2017 22:57)      PAST MEDICAL & SURGICAL HISTORY:  Pancreatic insufficiency  Alcohol abuse  Multiple sclerosis  Hypertension  Diabetes  H/O cervical spine surgery      SOCHX:  no tobacco,  -no  alcohol    FMHX: FA/MO  non- contributory       Recent Travel:    Immunizations:    Allergies    No Known Allergies    Intolerances        MEDICATIONS  (STANDING):  dextrose 50% Injectable 50 milliLiter(s) IV Push every 15 minutes  enoxaparin Injectable 40 milliGRAM(s) SubCutaneous daily  potassium acid phosphate/sodium acid phosphate tablet (K-PHOS No. 2) 1 Tablet(s) Oral four times a day with meals  piperacillin/tazobactam IVPB. 3.375 Gram(s) IV Intermittent every 8 hours  insulin lispro (HumaLOG) corrective regimen sliding scale   SubCutaneous three times a day before meals  dextrose 5%. 1000 milliLiter(s) (50 mL/Hr) IV Continuous <Continuous>  dextrose 50% Injectable 12.5 Gram(s) IV Push once  dextrose 50% Injectable 25 Gram(s) IV Push once  insulin lispro (HumaLOG) corrective regimen sliding scale   SubCutaneous at bedtime  insulin glargine Injectable (LANTUS) 22 Unit(s) SubCutaneous every morning  senna 2 Tablet(s) Oral at bedtime  docusate sodium 100 milliGRAM(s) Oral two times a day  insulin lispro Injectable (HumaLOG) 9 Unit(s) SubCutaneous three times a day before meals    MEDICATIONS  (PRN):  dextrose 50% Injectable 25 milliLiter(s) IV Push every 15 minutes PRN hypoglycemia  dextrose Gel 1 Dose(s) Oral once PRN Blood Glucose LESS THAN 70 milliGRAM(s)/deciliter  glucagon  Injectable 1 milliGRAM(s) IntraMuscular once PRN Glucose LESS THAN 70 milligrams/deciliter  acetaminophen   Tablet. 650 milliGRAM(s) Oral every 6 hours PRN Headache      REVIEW OF SYSTEMS:  CONSTITUTIONAL: No fever, weight loss, or fatigue  EYES: No eye pain, visual disturbances, or discharge  ENMT:  No difficulty hearing, tinnitus, vertigo; No sinus or throat pain  NECK: No pain or stiffness  BREASTS: No pain, masses, or nipple discharge  RESPIRATORY: No cough, wheezing, chills or hemoptysis; No shortness of breath  CARDIOVASCULAR: No chest pain, palpitations, dizziness, or leg swelling  GASTROINTESTINAL: No abdominal or epigastric pain. No nausea, vomiting, or hematemesis; No diarrhea or constipation. No melena or hematochezia.  GENITOURINARY: No dysuria, frequency, hematuria, or incontinence  NEUROLOGICAL: No headaches, memory loss, loss of strength, numbness, or tremors  SKIN: No itching, burning, rashes, or lesions   LYMPH NODES: No enlarged glands  ENDOCRINE: No heat or cold intolerance; No hair loss  MUSCULOSKELETAL: No joint pain or swelling; No muscle, back, or extremity pain  PSYCHIATRIC: No depression, anxiety, mood swings, or difficulty sleeping  HEME/LYMPH: No easy bruising, or bleeding gums  ALLERGY AND IMMUNOLOGIC: No hives or eczema    VITAL SIGNS:    T(C): 36.7 (08-10-17 @ 13:00), Max: 37.2 (08-10-17 @ 07:29)  T(F): 98 (08-10-17 @ 13:00), Max: 99 (08-10-17 @ 07:29)  HR: 98 (08-10-17 @ 15:30) (85 - 111)  BP: 135/72 (08-10-17 @ 15:00) (113/64 - 152/83)  RR: 21 (08-10-17 @ 15:30) (14 - 28)  SpO2: 98% (08-10-17 @ 15:30) (94% - 100%)    PHYSICAL EXAM:    GENERAL: NAD, well-groomed, well-developed  HEAD:  Atraumatic, Normocephalic  EYES: EOMI, PERRLA, conjunctiva and sclera clear  ENMT: No tonsillar erythema, exudates, or enlargement; Moist mucous membranes, Good dentition, No lesions  NECK: Supple, No JVD, Normal thyroid  NERVOUS SYSTEM:  Alert & Oriented X3, Good concentration; Motor Strength 5/5 B/L upper and lower extremities; DTRs 2+ intact and symmetric  CHEST/LUNG: Clear to percussion bilaterally; No rales, rhonchi, wheezing bilaterally  HEART: Regular rate and rhythm; No murmurs, rubs, or gallops  ABDOMEN: Soft, Nontender, Nondistended; Bowel sounds present  EXTREMITIES:  2+ Peripheral Pulses, No clubbing, cyanosis, or edema  LYMPH: No lymphadenopathy noted  SKIN: No rashes or lesions  BACK: no pressor sore     LABS:                         11.4   7.3   )-----------( 348      ( 10 Aug 2017 03:51 )             33.4     08-10    128<L>  |  91<L>  |  8   ----------------------------<  112<H>  3.2<L>   |  27  |  0.55    Ca    7.8<L>      10 Aug 2017 03:51  Phos  1.5     08-10  Mg     2.2     08-10    TPro  6.1  /  Alb  2.5<L>  /  TBili  0.5  /  DBili  x   /  AST  20  /  ALT  38  /  AlkPhos  147<H>      LIVER FUNCTIONS - ( 09 Aug 2017 05:10 )  Alb: 2.5 g/dL / Pro: 6.1 gm/dL / ALK PHOS: 147 U/L / ALT: 38 U/L / AST: 20 U/L / GGT: x           PT/INR - ( 08 Aug 2017 18:03 )   PT: 11.3 sec;   INR: 1.04 ratio         PTT - ( 08 Aug 2017 18:03 )  PTT:32.4 sec  Urinalysis Basic - ( 08 Aug 2017 18:25 )    Color: Yellow / Appearance: Clear / S.015 / pH: x  Gluc: x / Ketone: Large  / Bili: Negative / Urobili: Negative mg/dL   Blood: x / Protein: 30 mg/dL / Nitrite: Negative   Leuk Esterase: Trace / RBC: 25-50 /HPF / WBC 11-25   Sq Epi: x / Non Sq Epi: Few / Bacteria: Moderate      ABG - ( 08 Aug 2017 18:38 )  pH: x     /  pCO2: 10    /  pO2: 132   / HCO3: 2     / Base Excess: -28.4 /  SaO2: 96                CARDIAC MARKERS ( 08 Aug 2017 18:03 )  <.015 ng/mL / x     / 61 U/L / x     / 2.5 ng/mL                                  Radiology: 62F hx HTN IDDM with insulin pump , MS Hgb A1c  > 11, chronic pancreatitis, ex ETOH abuse, last drink 14 yrs ago. Admit with poluria/polyphagia/polydipsia.  FS glucoses' running close to 400 over the last few days.    Severe DKA.  given iv insulin fluids and a dose of vancomycin  cultures were sent.    Her abd pain is gone, and oral contrast CT shows no significant finding for pain.  She does have a lot of stool. (08 Aug 2017 22:57)      PAST MEDICAL & SURGICAL HISTORY:  Pancreatic insufficiency  Alcohol abuse  Multiple sclerosis  Hypertension  Diabetes  H/O cervical spine surgery      SOCHX:  no tobacco,  -no  alcohol    FMHX: FA/MO  non- contributory       Recent Travel:    Immunizations:    Allergies    No Known Allergies    Intolerances        MEDICATIONS  (STANDING):  dextrose 50% Injectable 50 milliLiter(s) IV Push every 15 minutes  enoxaparin Injectable 40 milliGRAM(s) SubCutaneous daily  potassium acid phosphate/sodium acid phosphate tablet (K-PHOS No. 2) 1 Tablet(s) Oral four times a day with meals  piperacillin/tazobactam IVPB. 3.375 Gram(s) IV Intermittent every 8 hours  insulin lispro (HumaLOG) corrective regimen sliding scale   SubCutaneous three times a day before meals  dextrose 5%. 1000 milliLiter(s) (50 mL/Hr) IV Continuous <Continuous>  dextrose 50% Injectable 12.5 Gram(s) IV Push once  dextrose 50% Injectable 25 Gram(s) IV Push once  insulin lispro (HumaLOG) corrective regimen sliding scale   SubCutaneous at bedtime  insulin glargine Injectable (LANTUS) 22 Unit(s) SubCutaneous every morning  senna 2 Tablet(s) Oral at bedtime  docusate sodium 100 milliGRAM(s) Oral two times a day  insulin lispro Injectable (HumaLOG) 9 Unit(s) SubCutaneous three times a day before meals    MEDICATIONS  (PRN):  dextrose 50% Injectable 25 milliLiter(s) IV Push every 15 minutes PRN hypoglycemia  dextrose Gel 1 Dose(s) Oral once PRN Blood Glucose LESS THAN 70 milliGRAM(s)/deciliter  glucagon  Injectable 1 milliGRAM(s) IntraMuscular once PRN Glucose LESS THAN 70 milligrams/deciliter  acetaminophen   Tablet. 650 milliGRAM(s) Oral every 6 hours PRN Headache      REVIEW OF SYSTEMS:  CONSTITUTIONAL: No fever,  lots of  weight loss,   has  fatigue  EYES: No eye pain, visual disturbances, or discharge  ENMT:  No difficulty hearing, tinnitus, vertigo; No sinus or throat pain  NECK: No pain or stiffness  BREASTS: No pain, masses, or nipple discharge  RESPIRATORY: No cough, wheezing, chills or hemoptysis; No shortness of breath  CARDIOVASCULAR: No chest pain, palpitations, dizziness, or leg swelling  GASTROINTESTINAL: No abdominal or epigastric pain. No nausea, vomiting, or hematemesis; has  constipation  and impaction  . No melena or hematochezia.  GENITOURINARY: No dysuria, frequency, hematuria, or incontinence  NEUROLOGICAL: No headaches, memory loss, loss of strength, numbness, or tremors  SKIN: No itching, burning, rashes, or lesions   LYMPH NODES: No enlarged glands  ENDOCRINE: No heat or cold intolerance; No hair loss  MUSCULOSKELETAL: No joint pain or swelling; No muscle, back, or extremity pain  PSYCHIATRIC: No depression, anxiety, mood swings, or difficulty sleeping  HEME/LYMPH: No easy bruising, or bleeding gums  ALLERGY AND IMMUNOLOGIC: No hives or eczema    VITAL SIGNS:    T(C): 36.7 (08-10-17 @ 13:00), Max: 37.2 (08-10-17 @ 07:29)  T(F): 98 (08-10-17 @ 13:00), Max: 99 (08-10-17 @ 07:29)  HR: 98 (08-10-17 @ 15:30) (85 - 111)  BP: 135/72 (08-10-17 @ 15:00) (113/64 - 152/83)  RR: 21 (08-10-17 @ 15:30) (14 - 28)  SpO2: 98% (08-10-17 @ 15:30) (94% - 100%)    PHYSICAL EXAM:    GENERAL: NAD, well-groomed, well-developed  HEAD:  Atraumatic, Normocephalic  EYES: EOMI, PERRLA, conjunctiva and sclera clear  ENMT: No tonsillar erythema, exudates, or enlargement; Moist mucous membranes, Good dentition, No lesions  NECK: Supple, No JVD, Normal thyroid  NERVOUS SYSTEM:  Alert & Oriented X3, Good concentration; Motor Strength 5/5 B/L upper and lower extremities; DTRs 2+ intact and symmetric  CHEST/LUNG: Clear to percussion bilaterally; No rales, rhonchi, wheezing bilaterally  HEART: Regular rate and rhythm; No murmurs, rubs, or gallops  ABDOMEN: Soft, Nontender, Nondistended; Bowel sounds present  EXTREMITIES:  2+ Peripheral Pulses, No clubbing, cyanosis, or edema  LYMPH: No lymphadenopathy noted  SKIN: No rashes or lesions  BACK: no pressor sore     LABS:                         11.4   7.3   )-----------( 348      ( 10 Aug 2017 03:51 )             33.4     08-10    128<L>  |  91<L>  |  8   ----------------------------<  112<H>  3.2<L>   |  27  |  0.55    Ca    7.8<L>      10 Aug 2017 03:51  Phos  1.5     08-10  Mg     2.2     08-10    TPro  6.1  /  Alb  2.5<L>  /  TBili  0.5  /  DBili  x   /  AST  20  /  ALT  38  /  AlkPhos  147<H>      LIVER FUNCTIONS - ( 09 Aug 2017 05:10 )  Alb: 2.5 g/dL / Pro: 6.1 gm/dL / ALK PHOS: 147 U/L / ALT: 38 U/L / AST: 20 U/L / GGT: x           PT/INR - ( 08 Aug 2017 18:03 )   PT: 11.3 sec;   INR: 1.04 ratio         PTT - ( 08 Aug 2017 18:03 )  PTT:32.4 sec  Urinalysis Basic - ( 08 Aug 2017 18:25 )    Color: Yellow / Appearance: Clear / S.015 / pH: x  Gluc: x / Ketone: Large  / Bili: Negative / Urobili: Negative mg/dL   Blood: x / Protein: 30 mg/dL / Nitrite: Negative   Leuk Esterase: Trace / RBC: 25-50 /HPF / WBC 11-25   Sq Epi: x / Non Sq Epi: Few / Bacteria: Moderate      ABG - ( 08 Aug 2017 18:38 )  pH: x     /  pCO2: 10    /  pO2: 132   / HCO3: 2     / Base Excess: -28.4 /  SaO2: 96                CARDIAC MARKERS ( 08 Aug 2017 18:03 )  <.015 ng/mL / x     / 61 U/L / x     / 2.5 ng/mL                                  Radiology:    < from: CT Abdomen and Pelvis w/ Oral Cont (17 @ 21:35) >  IMPRESSION:   New nonspecific patchy groundglass opacities in the lungs likely on an   infectious basis.    Distended stomach without evidence of mechanical obstruction. Otherwise,   no acute intra or abdominal or pelvic finding on this noncontrast study.                SUNITA VARGAS M.D., RADIOLOGIST  This document has been electronically signed. Aug  8 2017 11:51PM    < end of copied text >

## 2017-08-10 NOTE — DIETITIAN INITIAL EVALUATION ADULT. - OTHER INFO
Pt seen for CCU admission, Dx DKA & [id number]=09.5%.  Pt IDDM on insulin pump presents with uncontrolled DM possibly due to bacteremia likely E Coli. Pt with DKA presents with signs of polyuria, polydipsia & polyphagia. Pt seen for CCU admission, Dx DKA & [id number]=85.5%.  Pt IDDM on insulin pump presents with uncontrolled DM possibly due to bacteremia likely E Coli. Pt with DKA presents with signs of polyuria, polydipsia & polyphagia. Pt with s/p multiple hospitalizations due to chronic pancreatitis. Pt seen for CCU admission, Dx DKA & [id number]=74.5%. Pt does cooking & food shopping & reports "cheating on diet due to depressed @ times" Pt IDDM on insulin pump presents with uncontrolled DM possibly due to bacteremia likely E Coli. Pt with DKA presents with signs of polyuria, polydipsia & polyphagia. Pt SMBG 4 x day PTA.  Pt with s/p multiple hospitalizations due to chronic pancreatitis. Pt c/o constipation; pt reports last BM x 1 (8/10). Pt states she would like to gain ~40#.

## 2017-08-10 NOTE — DIETITIAN INITIAL EVALUATION ADULT. - PHYSICAL APPEARANCE
BMI=20.1/underweight underweight/BMI=20.1; +1 gen edema; Nutrition focused physical exam conducted ; found signs of malnutrition [ ]absent [x ]present.  Subcutaneous fat loss: [Moderate ] Orbital fat pads region, [moderate ]Buccal fat region, [severe ]Triceps region,  [distended abdomen ]Ribs region.  Muscle wasting: [severe ]Temples region, [severe ]Clavicle region, [severe ]Shoulder region, [unable ]Scapula region, [moderate ]Interosseous region,  [moderate ]thigh region, [moderate ]Calf region

## 2017-08-10 NOTE — PROGRESS NOTE ADULT - SUBJECTIVE AND OBJECTIVE BOX
Patient is a 62y old  Female who presents with a chief complaint of High blood sugar (09 Aug 2017 06:44)      Interval History: Diabetic Ketoacidosis resolved   on Lantus 22 units and Lispro sliding scale coverage with meals  finger sticks are in 300's         MEDICATIONS  (STANDING):  dextrose 50% Injectable 50 milliLiter(s) IV Push every 15 minutes  enoxaparin Injectable 40 milliGRAM(s) SubCutaneous daily  piperacillin/tazobactam IVPB. 3.375 Gram(s) IV Intermittent every 8 hours  insulin lispro (HumaLOG) corrective regimen sliding scale   SubCutaneous three times a day before meals  dextrose 5%. 1000 milliLiter(s) (50 mL/Hr) IV Continuous <Continuous>  dextrose 50% Injectable 12.5 Gram(s) IV Push once  dextrose 50% Injectable 25 Gram(s) IV Push once  insulin lispro (HumaLOG) corrective regimen sliding scale   SubCutaneous at bedtime  insulin glargine Injectable (LANTUS) 22 Unit(s) SubCutaneous every morning  senna 2 Tablet(s) Oral at bedtime  docusate sodium 100 milliGRAM(s) Oral two times a day  insulin lispro Injectable (HumaLOG) 9 Unit(s) SubCutaneous three times a day before meals    MEDICATIONS  (PRN):  dextrose 50% Injectable 25 milliLiter(s) IV Push every 15 minutes PRN hypoglycemia  dextrose Gel 1 Dose(s) Oral once PRN Blood Glucose LESS THAN 70 milliGRAM(s)/deciliter  glucagon  Injectable 1 milliGRAM(s) IntraMuscular once PRN Glucose LESS THAN 70 milligrams/deciliter  acetaminophen   Tablet. 650 milliGRAM(s) Oral every 6 hours PRN Headache      Allergies    No Known Allergies    Intolerances        REVIEW OF SYSTEMS:  CONSTITUTIONAL: weak  EYES: No eye pain, visual disturbances, or discharge  ENMT:  No difficulty hearing, tinnitus, vertigo; No sinus or throat pain  NECK: No pain or stiffness  RESPIRATORY: No cough, wheezing, chills or hemoptysis; No shortness of breath  CARDIOVASCULAR: No chest pain, palpitations, dizziness, or leg swelling  GASTROINTESTINAL: No abdominal or epigastric pain. No nausea, vomiting, or hematemesis; No diarrhea or constipation. No melena or hematochezia.  GENITOURINARY: No dysuria, frequency, hematuria, or incontinence  NEUROLOGICAL: No headaches, memory loss, loss of strength, numbness, or tremors  SKIN: No itching, burning, rashes, or lesions   LYMPH NODES: No enlarged glands  ENDOCRINE: No heat or cold intolerance; No hair loss  MUSCULOSKELETAL: No joint pain or swelling; No muscle, back, or extremity pain  PSYCHIATRIC: No depression, anxiety, mood swings, or difficulty sleeping  HEME/LYMPH: No easy bruising, or bleeding gums  ALLERY AND IMMUNOLOGIC: No hives or eczema    Vital Signs Last 24 Hrs  T(C): 37.2 (10 Aug 2017 17:10), Max: 37.2 (10 Aug 2017 07:29)  T(F): 99 (10 Aug 2017 17:10), Max: 99 (10 Aug 2017 07:29)  HR: 99 (10 Aug 2017 17:10) (87 - 111)  BP: 141/77 (10 Aug 2017 17:10) (125/81 - 152/83)  BP(mean): 88 (10 Aug 2017 15:00) (84 - 104)  RR: 18 (10 Aug 2017 17:10) (14 - 28)  SpO2: 98% (10 Aug 2017 17:10) (94% - 100%)    PHYSICAL EXAM:  GENERAL: no changes   HEAD:  Atraumatic, Normocephalic  EYES: EOMI, PERRLA, conjunctiva and sclera clear  ENMT: No tonsillar erythema, exudates, or enlargement; Moist mucous membranes, Good dentition, No lesions  NECK: Supple, No JVD, Normal thyroid  NERVOUS SYSTEM:  Alert & Oriented X3, Good concentration; Motor Strength 5/5 B/L upper and lower extremities; DTRs 2+ intact and symmetric  CHEST/LUNG: Clear to percussion bilaterally; No rales, rhonchi, wheezing, or rubs  HEART: Regular rate and rhythm; No murmurs, rubs, or gallops  ABDOMEN: Soft, Nontender, Nondistended; Bowel sounds present  EXTREMITIES:  2+ Peripheral Pulses, No clubbing, cyanosis, or edema  SKIN: No rashes or lesions    LABS:        CAPILLARY BLOOD GLUCOSE  239 (10 Aug 2017 21:59)  334 (10 Aug 2017 15:59)  281 (10 Aug 2017 13:07)  178 (10 Aug 2017 08:20)        Lipid panel:           Thyroid:  Diabetes Tests:  Parathyroid Panel:  Adrenals:  RADIOLOGY & ADDITIONAL TESTS:    Imaging Personally Reviewed:  [ ] YES  [ ] NO    Consultant(s) Notes Reviewed:  [ ] YES  [ ] NO    Care Discussed with Consultants/Other Providers [ ] YES  [ ] NO

## 2017-08-11 DIAGNOSIS — E10.10 TYPE 1 DIABETES MELLITUS WITH KETOACIDOSIS WITHOUT COMA: ICD-10-CM

## 2017-08-11 LAB
-  AMIKACIN: SIGNIFICANT CHANGE UP
-  AMPICILLIN/SULBACTAM: SIGNIFICANT CHANGE UP
-  AMPICILLIN: SIGNIFICANT CHANGE UP
-  AZTREONAM: SIGNIFICANT CHANGE UP
-  CEFAZOLIN: SIGNIFICANT CHANGE UP
-  CEFEPIME: SIGNIFICANT CHANGE UP
-  CEFOXITIN: SIGNIFICANT CHANGE UP
-  CEFTAZIDIME: SIGNIFICANT CHANGE UP
-  CEFTRIAXONE: SIGNIFICANT CHANGE UP
-  CIPROFLOXACIN: SIGNIFICANT CHANGE UP
-  ERTAPENEM: SIGNIFICANT CHANGE UP
-  GENTAMICIN: SIGNIFICANT CHANGE UP
-  IMIPENEM: SIGNIFICANT CHANGE UP
-  LEVOFLOXACIN: SIGNIFICANT CHANGE UP
-  MEROPENEM: SIGNIFICANT CHANGE UP
-  PIPERACILLIN/TAZOBACTAM: SIGNIFICANT CHANGE UP
-  TOBRAMYCIN: SIGNIFICANT CHANGE UP
-  TRIMETHOPRIM/SULFAMETHOXAZOLE: SIGNIFICANT CHANGE UP
ANION GAP SERPL CALC-SCNC: 10 MMOL/L — SIGNIFICANT CHANGE UP (ref 5–17)
ANION GAP SERPL CALC-SCNC: 11 MMOL/L — SIGNIFICANT CHANGE UP (ref 5–17)
BUN SERPL-MCNC: 14 MG/DL — SIGNIFICANT CHANGE UP (ref 7–23)
BUN SERPL-MCNC: 8 MG/DL — SIGNIFICANT CHANGE UP (ref 7–23)
CALCIUM SERPL-MCNC: 7.5 MG/DL — LOW (ref 8.5–10.1)
CALCIUM SERPL-MCNC: 8 MG/DL — LOW (ref 8.5–10.1)
CHLORIDE SERPL-SCNC: 89 MMOL/L — LOW (ref 96–108)
CHLORIDE SERPL-SCNC: 91 MMOL/L — LOW (ref 96–108)
CO2 SERPL-SCNC: 29 MMOL/L — SIGNIFICANT CHANGE UP (ref 22–31)
CO2 SERPL-SCNC: 30 MMOL/L — SIGNIFICANT CHANGE UP (ref 22–31)
CREAT SERPL-MCNC: 0.71 MG/DL — SIGNIFICANT CHANGE UP (ref 0.5–1.3)
CREAT SERPL-MCNC: 0.72 MG/DL — SIGNIFICANT CHANGE UP (ref 0.5–1.3)
CULTURE RESULTS: SIGNIFICANT CHANGE UP
CULTURE RESULTS: SIGNIFICANT CHANGE UP
GLUCOSE SERPL-MCNC: 200 MG/DL — HIGH (ref 70–99)
GLUCOSE SERPL-MCNC: 270 MG/DL — HIGH (ref 70–99)
GRAM STN FLD: SIGNIFICANT CHANGE UP
GRAM STN FLD: SIGNIFICANT CHANGE UP
HCT VFR BLD CALC: 34.4 % — LOW (ref 34.5–45)
HGB BLD-MCNC: 11.8 G/DL — SIGNIFICANT CHANGE UP (ref 11.5–15.5)
MAGNESIUM SERPL-MCNC: 2.4 MG/DL — SIGNIFICANT CHANGE UP (ref 1.6–2.6)
MCHC RBC-ENTMCNC: 30.2 PG — SIGNIFICANT CHANGE UP (ref 27–34)
MCHC RBC-ENTMCNC: 34.2 GM/DL — SIGNIFICANT CHANGE UP (ref 32–36)
MCV RBC AUTO: 88.4 FL — SIGNIFICANT CHANGE UP (ref 80–100)
METHOD TYPE: SIGNIFICANT CHANGE UP
ORGANISM # SPEC MICROSCOPIC CNT: SIGNIFICANT CHANGE UP
PHOSPHATE SERPL-MCNC: 2.1 MG/DL — LOW (ref 2.5–4.5)
PLATELET # BLD AUTO: 320 K/UL — SIGNIFICANT CHANGE UP (ref 150–400)
POTASSIUM SERPL-MCNC: 2.9 MMOL/L — CRITICAL LOW (ref 3.5–5.3)
POTASSIUM SERPL-MCNC: 4 MMOL/L — SIGNIFICANT CHANGE UP (ref 3.5–5.3)
POTASSIUM SERPL-SCNC: 2.9 MMOL/L — CRITICAL LOW (ref 3.5–5.3)
POTASSIUM SERPL-SCNC: 4 MMOL/L — SIGNIFICANT CHANGE UP (ref 3.5–5.3)
RBC # BLD: 3.89 M/UL — SIGNIFICANT CHANGE UP (ref 3.8–5.2)
RBC # FLD: 15.4 % — HIGH (ref 11–15)
SODIUM SERPL-SCNC: 129 MMOL/L — LOW (ref 135–145)
SODIUM SERPL-SCNC: 131 MMOL/L — LOW (ref 135–145)
SPECIMEN SOURCE: SIGNIFICANT CHANGE UP
SPECIMEN SOURCE: SIGNIFICANT CHANGE UP
WBC # BLD: 6.2 K/UL — SIGNIFICANT CHANGE UP (ref 3.8–10.5)
WBC # FLD AUTO: 6.2 K/UL — SIGNIFICANT CHANGE UP (ref 3.8–10.5)

## 2017-08-11 RX ORDER — CEFTRIAXONE 500 MG/1
1 INJECTION, POWDER, FOR SOLUTION INTRAMUSCULAR; INTRAVENOUS EVERY 24 HOURS
Qty: 0 | Refills: 0 | Status: DISCONTINUED | OUTPATIENT
Start: 2017-08-11 | End: 2017-08-14

## 2017-08-11 RX ORDER — CLONAZEPAM 1 MG
1 TABLET ORAL AT BEDTIME
Qty: 0 | Refills: 0 | Status: DISCONTINUED | OUTPATIENT
Start: 2017-08-11 | End: 2017-08-16

## 2017-08-11 RX ORDER — POTASSIUM CHLORIDE 20 MEQ
20 PACKET (EA) ORAL
Qty: 0 | Refills: 0 | Status: COMPLETED | OUTPATIENT
Start: 2017-08-11 | End: 2017-08-11

## 2017-08-11 RX ORDER — POTASSIUM CHLORIDE 20 MEQ
10 PACKET (EA) ORAL
Qty: 0 | Refills: 0 | Status: COMPLETED | OUTPATIENT
Start: 2017-08-11 | End: 2017-08-11

## 2017-08-11 RX ADMIN — Medication 4: at 17:40

## 2017-08-11 RX ADMIN — Medication 100 MILLIGRAM(S): at 05:51

## 2017-08-11 RX ADMIN — Medication 100 MILLIGRAM(S): at 17:43

## 2017-08-11 RX ADMIN — Medication 20 MILLIEQUIVALENT(S): at 12:12

## 2017-08-11 RX ADMIN — PIPERACILLIN AND TAZOBACTAM 25 GRAM(S): 4; .5 INJECTION, POWDER, LYOPHILIZED, FOR SOLUTION INTRAVENOUS at 13:01

## 2017-08-11 RX ADMIN — Medication 650 MILLIGRAM(S): at 22:09

## 2017-08-11 RX ADMIN — Medication 63.75 MILLIMOLE(S): at 12:12

## 2017-08-11 RX ADMIN — Medication 1 MILLIGRAM(S): at 22:08

## 2017-08-11 RX ADMIN — Medication 9 UNIT(S): at 08:17

## 2017-08-11 RX ADMIN — CEFTRIAXONE 100 GRAM(S): 500 INJECTION, POWDER, FOR SOLUTION INTRAMUSCULAR; INTRAVENOUS at 17:41

## 2017-08-11 RX ADMIN — Medication 20 MILLIEQUIVALENT(S): at 17:41

## 2017-08-11 RX ADMIN — Medication 9 UNIT(S): at 17:40

## 2017-08-11 RX ADMIN — Medication 9 UNIT(S): at 12:11

## 2017-08-11 RX ADMIN — ENOXAPARIN SODIUM 40 MILLIGRAM(S): 100 INJECTION SUBCUTANEOUS at 12:13

## 2017-08-11 RX ADMIN — Medication 20 MILLIEQUIVALENT(S): at 14:27

## 2017-08-11 RX ADMIN — INSULIN GLARGINE 22 UNIT(S): 100 INJECTION, SOLUTION SUBCUTANEOUS at 12:12

## 2017-08-11 RX ADMIN — Medication 650 MILLIGRAM(S): at 23:09

## 2017-08-11 RX ADMIN — Medication 6: at 08:16

## 2017-08-11 RX ADMIN — SENNA PLUS 2 TABLET(S): 8.6 TABLET ORAL at 22:09

## 2017-08-11 RX ADMIN — Medication 6: at 12:11

## 2017-08-11 RX ADMIN — Medication 100 MILLIEQUIVALENT(S): at 14:27

## 2017-08-11 RX ADMIN — PIPERACILLIN AND TAZOBACTAM 25 GRAM(S): 4; .5 INJECTION, POWDER, LYOPHILIZED, FOR SOLUTION INTRAVENOUS at 05:50

## 2017-08-11 RX ADMIN — Medication 100 MILLIEQUIVALENT(S): at 12:13

## 2017-08-11 RX ADMIN — Medication 100 MILLIEQUIVALENT(S): at 19:26

## 2017-08-11 NOTE — PROGRESS NOTE ADULT - SUBJECTIVE AND OBJECTIVE BOX
Patient is a 62y old  Female who presents with a chief complaint of High blood sugar (09 Aug 2017 06:44)      Interval History: finger sticks are in low 200's , slowly decreasing glucose toxicity   on prandial lispro 9 units and also Lantus low dose and Lispro sliding scale coverage with meals , Diabetic Ketoacidosis resolved     MEDICATIONS  (STANDING):  dextrose 50% Injectable 50 milliLiter(s) IV Push every 15 minutes  enoxaparin Injectable 40 milliGRAM(s) SubCutaneous daily  insulin lispro (HumaLOG) corrective regimen sliding scale   SubCutaneous three times a day before meals  dextrose 5%. 1000 milliLiter(s) (50 mL/Hr) IV Continuous <Continuous>  dextrose 50% Injectable 12.5 Gram(s) IV Push once  dextrose 50% Injectable 25 Gram(s) IV Push once  insulin lispro (HumaLOG) corrective regimen sliding scale   SubCutaneous at bedtime  insulin glargine Injectable (LANTUS) 22 Unit(s) SubCutaneous every morning  senna 2 Tablet(s) Oral at bedtime  docusate sodium 100 milliGRAM(s) Oral two times a day  insulin lispro Injectable (HumaLOG) 9 Unit(s) SubCutaneous three times a day before meals  clonazePAM Tablet 1 milliGRAM(s) Oral at bedtime  cefTRIAXone   IVPB 1 Gram(s) IV Intermittent every 24 hours    MEDICATIONS  (PRN):  dextrose 50% Injectable 25 milliLiter(s) IV Push every 15 minutes PRN hypoglycemia  dextrose Gel 1 Dose(s) Oral once PRN Blood Glucose LESS THAN 70 milliGRAM(s)/deciliter  glucagon  Injectable 1 milliGRAM(s) IntraMuscular once PRN Glucose LESS THAN 70 milligrams/deciliter  acetaminophen   Tablet. 650 milliGRAM(s) Oral every 6 hours PRN Headache      Allergies    No Known Allergies    Intolerances        REVIEW OF SYSTEMS:  CONSTITUTIONAL: No Complaints , weak   EYES: No eye pain, visual disturbances, or discharge  ENMT:  No difficulty hearing, tinnitus, vertigo; No sinus or throat pain  NECK: No pain or stiffness  RESPIRATORY: No cough, wheezing, chills or hemoptysis; No shortness of breath  CARDIOVASCULAR: No chest pain, palpitations, dizziness, or leg swelling  GASTROINTESTINAL: No abdominal or epigastric pain. No nausea, vomiting, or hematemesis; No diarrhea or constipation. No melena or hematochezia.  GENITOURINARY: No dysuria, frequency, hematuria, or incontinence  NEUROLOGICAL: No headaches, memory loss, loss of strength, numbness, or tremors  SKIN: No itching, burning, rashes, or lesions   LYMPH NODES: No enlarged glands  ENDOCRINE: No heat or cold intolerance; No hair loss  MUSCULOSKELETAL: No joint pain or swelling; No muscle, back, or extremity pain  PSYCHIATRIC: No depression, anxiety, mood swings, or difficulty sleeping  HEME/LYMPH: No easy bruising, or bleeding gums  ALLERY AND IMMUNOLOGIC: No hives or eczema    Vital Signs Last 24 Hrs  T(C): 36.2 (11 Aug 2017 17:03), Max: 36.7 (11 Aug 2017 11:27)  T(F): 97.2 (11 Aug 2017 17:03), Max: 98 (11 Aug 2017 11:27)  HR: 89 (11 Aug 2017 17:03) (73 - 105)  BP: 145/73 (11 Aug 2017 17:03) (118/77 - 148/68)  BP(mean): --  RR: 17 (11 Aug 2017 17:03) (17 - 18)  SpO2: 98% (11 Aug 2017 17:03) (95% - 98%)    PHYSICAL EXAM:  GENERAL: rundown condition   HEAD:  Atraumatic, Normocephalic  EYES: EOMI, PERRLA, conjunctiva and sclera clear  ENMT: No tonsillar erythema, exudates, or enlargement; Moist mucous membranes, Good dentition, No lesions  NECK: Supple, No JVD, Normal thyroid  NERVOUS SYSTEM:  Alert & Oriented X3, Good concentration; Motor Strength 5/5 B/L upper and lower extremities; DTRs 2+ intact and symmetric  CHEST/LUNG: Clear to percussion bilaterally; No rales, rhonchi, wheezing, or rubs  HEART: Regular rate and rhythm; No murmurs, rubs, or gallops  ABDOMEN: Soft, Nontender, Nondistended; Bowel sounds present  EXTREMITIES:  2+ Peripheral Pulses, No clubbing, cyanosis, or edema  SKIN: No rashes or lesions    LABS:        CAPILLARY BLOOD GLUCOSE  217 (11 Aug 2017 17:39)  276 (11 Aug 2017 11:55)  273 (11 Aug 2017 07:55)  239 (10 Aug 2017 21:59)        Lipid panel:           Thyroid:  Diabetes Tests:  Parathyroid Panel:  Adrenals:  RADIOLOGY & ADDITIONAL TESTS:    Imaging Personally Reviewed:  [ ] YES  [ ] NO    Consultant(s) Notes Reviewed:  [ ] YES  [ ] NO    Care Discussed with Consultants/Other Providers [ ] YES  [ ] NO

## 2017-08-11 NOTE — PROGRESS NOTE ADULT - SUBJECTIVE AND OBJECTIVE BOX
HPI:  Histrionic patient.    62F hx HTN IDDM with insulin pump , MS Hgb A1c  > 11, chronic pancreatitis, ex ETOH abuse, last drink 14 yrs ago. Admit with poluria/polyphagia/polydipsia.  FS glucoses' running close to 400 over the last few days.      Severe DKA.  given iv insulin fluids and a dose of vancomycin  cultures were sent.    Her abd pain is gone, and oral contrast CT shows no significant finding for pain.  She does have a lot of stool. (08 Aug 2017 22:57)      Allergies    No Known Allergies    Intolerances        MEDICATIONS  (STANDING):  dextrose 50% Injectable 50 milliLiter(s) IV Push every 15 minutes  enoxaparin Injectable 40 milliGRAM(s) SubCutaneous daily  piperacillin/tazobactam IVPB. 3.375 Gram(s) IV Intermittent every 8 hours  insulin lispro (HumaLOG) corrective regimen sliding scale   SubCutaneous three times a day before meals  dextrose 5%. 1000 milliLiter(s) (50 mL/Hr) IV Continuous <Continuous>  dextrose 50% Injectable 12.5 Gram(s) IV Push once  dextrose 50% Injectable 25 Gram(s) IV Push once  insulin lispro (HumaLOG) corrective regimen sliding scale   SubCutaneous at bedtime  insulin glargine Injectable (LANTUS) 22 Unit(s) SubCutaneous every morning  senna 2 Tablet(s) Oral at bedtime  docusate sodium 100 milliGRAM(s) Oral two times a day  insulin lispro Injectable (HumaLOG) 9 Unit(s) SubCutaneous three times a day before meals  potassium chloride  10 mEq/100 mL IVPB 10 milliEquivalent(s) IV Intermittent every 1 hour  potassium chloride    Tablet ER 20 milliEquivalent(s) Oral every 2 hours  clonazePAM Tablet 1 milliGRAM(s) Oral at bedtime    MEDICATIONS  (PRN):  dextrose 50% Injectable 25 milliLiter(s) IV Push every 15 minutes PRN hypoglycemia  dextrose Gel 1 Dose(s) Oral once PRN Blood Glucose LESS THAN 70 milliGRAM(s)/deciliter  glucagon  Injectable 1 milliGRAM(s) IntraMuscular once PRN Glucose LESS THAN 70 milligrams/deciliter  acetaminophen   Tablet. 650 milliGRAM(s) Oral every 6 hours PRN Headache      REVIEW OF SYSTEMS:    CONSTITUTIONAL: No fever, chills, weight loss, or fatigue  HEENT: No sore throat, runny nose, ear ache  RESPIRATORY: No cough, wheezing, No shortness of breath  CARDIOVASCULAR: No chest pain, palpitations, dizziness  GASTROINTESTINAL: No abdominal pain. No nausea, vomiting, diarrhea  GENITOURINARY: No dysuria, increase frequency, hematuria, or incontinence  NEUROLOGICAL: No headaches, memory loss, loss of strength, numbness, or tremors, no weakness  EXTREMITY: No pedal edema BLE  SKIN: No itching, burning, rashes, or lesions     VITAL SIGNS:  T(C): 36.7 (08-11-17 @ 11:27), Max: 37.2 (08-10-17 @ 17:10)  T(F): 98 (08-11-17 @ 11:27), Max: 99 (08-10-17 @ 17:10)  HR: 105 (08-11-17 @ 11:27) (73 - 105)  BP: 118/77 (08-11-17 @ 11:27) (118/77 - 148/68)  RR: 17 (08-11-17 @ 11:27) (17 - 21)  SpO2: 98% (08-11-17 @ 11:27) (95% - 98%)  Wt(kg): --    PHYSICAL EXAM:    GENERAL: not in any distress  HEENT: Neck is supple, normocephalic, atraumatic   CHEST/LUNG: Clear to percussion bilaterally; No rales, rhonchi, wheezing  HEART: Regular rate and rhythm; No murmurs, rubs, or gallops  ABDOMEN: Soft, Nontender, Nondistended; Bowel sounds present, no rebound   EXTREMITIES:  2+ Peripheral Pulses, No clubbing, cyanosis, or edema  GENITOURINARY:   SKIN: No rashes or lesions  BACK: no pressor sore   NERVOUS SYSTEM:  Alert & Oriented X3, Good concentration  PSYCH: normal affect     LABS:                         11.8   6.2   )-----------( 320      ( 11 Aug 2017 08:46 )             34.4     08-11    131<L>  |  91<L>  |  8   ----------------------------<  270<H>  2.9<LL>   |  29  |  0.72    Ca    8.0<L>      11 Aug 2017 08:46  Phos  2.1     08-11  Mg     2.4     08-11                                              Radiology: HPI:  Histrionic patient.    62F hx HTN IDDM with insulin pump , MS Hgb A1c  > 11, chronic pancreatitis, ex ETOH abuse, last drink 14 yrs ago. Admit with poluria/polyphagia/polydipsia.  FS glucoses' running close to 400 over the last few days.    Severe DKA.  given iv insulin fluids and a dose of vancomycin  cultures were sent.    Her abd pain is gone, and oral contrast CT shows no significant finding for pain.  She does have a lot of stool. (08 Aug 2017 22:57)      Allergies    No Known Allergies    Intolerances        MEDICATIONS  (STANDING):  dextrose 50% Injectable 50 milliLiter(s) IV Push every 15 minutes  enoxaparin Injectable 40 milliGRAM(s) SubCutaneous daily  piperacillin/tazobactam IVPB. 3.375 Gram(s) IV Intermittent every 8 hours  insulin lispro (HumaLOG) corrective regimen sliding scale   SubCutaneous three times a day before meals  dextrose 5%. 1000 milliLiter(s) (50 mL/Hr) IV Continuous <Continuous>  dextrose 50% Injectable 12.5 Gram(s) IV Push once  dextrose 50% Injectable 25 Gram(s) IV Push once  insulin lispro (HumaLOG) corrective regimen sliding scale   SubCutaneous at bedtime  insulin glargine Injectable (LANTUS) 22 Unit(s) SubCutaneous every morning  senna 2 Tablet(s) Oral at bedtime  docusate sodium 100 milliGRAM(s) Oral two times a day  insulin lispro Injectable (HumaLOG) 9 Unit(s) SubCutaneous three times a day before meals  potassium chloride  10 mEq/100 mL IVPB 10 milliEquivalent(s) IV Intermittent every 1 hour  potassium chloride    Tablet ER 20 milliEquivalent(s) Oral every 2 hours  clonazePAM Tablet 1 milliGRAM(s) Oral at bedtime    MEDICATIONS  (PRN):  dextrose 50% Injectable 25 milliLiter(s) IV Push every 15 minutes PRN hypoglycemia  dextrose Gel 1 Dose(s) Oral once PRN Blood Glucose LESS THAN 70 milliGRAM(s)/deciliter  glucagon  Injectable 1 milliGRAM(s) IntraMuscular once PRN Glucose LESS THAN 70 milligrams/deciliter  acetaminophen   Tablet. 650 milliGRAM(s) Oral every 6 hours PRN Headache      REVIEW OF SYSTEMS:  feels better   CONSTITUTIONAL: No fever, chills, weight loss, or fatigue  HEENT: No sore throat, runny nose, ear ache  RESPIRATORY: No cough, wheezing, No shortness of breath  CARDIOVASCULAR: No chest pain, palpitations, dizziness  GASTROINTESTINAL: No abdominal pain. No nausea, vomiting, diarrhea  GENITOURINARY: No dysuria, increase frequency, hematuria, or incontinence  NEUROLOGICAL: No headaches, memory loss, loss of strength, numbness, or tremors, no weakness  EXTREMITY: No pedal edema BLE  SKIN: No itching, burning, rashes, or lesions     VITAL SIGNS:  T(C): 36.7 (08-11-17 @ 11:27), Max: 37.2 (08-10-17 @ 17:10)  T(F): 98 (08-11-17 @ 11:27), Max: 99 (08-10-17 @ 17:10)  HR: 105 (08-11-17 @ 11:27) (73 - 105)  BP: 118/77 (08-11-17 @ 11:27) (118/77 - 148/68)  RR: 17 (08-11-17 @ 11:27) (17 - 21)  SpO2: 98% (08-11-17 @ 11:27) (95% - 98%)  Wt(kg): --    PHYSICAL EXAM:    GENERAL: not in any distress  HEENT: Neck is supple, normocephalic, atraumatic   CHEST/LUNG: Clear  bilaterally; No rales, rhonchi, wheezing  HEART: Regular rate and rhythm; No murmurs, rubs, or gallops  ABDOMEN: Soft, Nontender, Nondistended; Bowel sounds present, no rebound   EXTREMITIES:  2+ Peripheral Pulses, No clubbing, cyanosis, or edema  GENITOURINARY: NEGATIVE   SKIN: No rashes or lesions  BACK: no pressor sore   NERVOUS SYSTEM:  Alert & Oriented X3, Good concentration  PSYCH: normal affect     LABS:                         11.8   6.2   )-----------( 320      ( 11 Aug 2017 08:46 )             34.4     08-11    131<L>  |  91<L>  |  8   ----------------------------<  270<H>  2.9<LL>   |  29  |  0.72    Ca    8.0<L>      11 Aug 2017 08:46  Phos  2.1     08-11  Mg     2.4     08-11                                              Radiology:

## 2017-08-12 DIAGNOSIS — E10.69 TYPE 1 DIABETES MELLITUS WITH OTHER SPECIFIED COMPLICATION: ICD-10-CM

## 2017-08-12 LAB
ANION GAP SERPL CALC-SCNC: 9 MMOL/L — SIGNIFICANT CHANGE UP (ref 5–17)
BUN SERPL-MCNC: 12 MG/DL — SIGNIFICANT CHANGE UP (ref 7–23)
CALCIUM SERPL-MCNC: 8.7 MG/DL — SIGNIFICANT CHANGE UP (ref 8.5–10.1)
CHLORIDE SERPL-SCNC: 91 MMOL/L — LOW (ref 96–108)
CO2 SERPL-SCNC: 26 MMOL/L — SIGNIFICANT CHANGE UP (ref 22–31)
CREAT SERPL-MCNC: 0.66 MG/DL — SIGNIFICANT CHANGE UP (ref 0.5–1.3)
GLUCOSE SERPL-MCNC: 367 MG/DL — HIGH (ref 70–99)
HCT VFR BLD CALC: 35.5 % — SIGNIFICANT CHANGE UP (ref 34.5–45)
HGB BLD-MCNC: 11.7 G/DL — SIGNIFICANT CHANGE UP (ref 11.5–15.5)
MCHC RBC-ENTMCNC: 29.5 PG — SIGNIFICANT CHANGE UP (ref 27–34)
MCHC RBC-ENTMCNC: 32.9 GM/DL — SIGNIFICANT CHANGE UP (ref 32–36)
MCV RBC AUTO: 89.7 FL — SIGNIFICANT CHANGE UP (ref 80–100)
PHOSPHATE SERPL-MCNC: 2.6 MG/DL — SIGNIFICANT CHANGE UP (ref 2.5–4.5)
PLATELET # BLD AUTO: 337 K/UL — SIGNIFICANT CHANGE UP (ref 150–400)
POTASSIUM SERPL-MCNC: 4.7 MMOL/L — SIGNIFICANT CHANGE UP (ref 3.5–5.3)
POTASSIUM SERPL-SCNC: 4.7 MMOL/L — SIGNIFICANT CHANGE UP (ref 3.5–5.3)
RBC # BLD: 3.95 M/UL — SIGNIFICANT CHANGE UP (ref 3.8–5.2)
RBC # FLD: 15.5 % — HIGH (ref 11–15)
SODIUM SERPL-SCNC: 126 MMOL/L — LOW (ref 135–145)
WBC # BLD: 6.2 K/UL — SIGNIFICANT CHANGE UP (ref 3.8–10.5)
WBC # FLD AUTO: 6.2 K/UL — SIGNIFICANT CHANGE UP (ref 3.8–10.5)

## 2017-08-12 RX ORDER — SODIUM CHLORIDE 9 MG/ML
1 INJECTION INTRAMUSCULAR; INTRAVENOUS; SUBCUTANEOUS THREE TIMES A DAY
Qty: 0 | Refills: 0 | Status: DISCONTINUED | OUTPATIENT
Start: 2017-08-12 | End: 2017-08-15

## 2017-08-12 RX ORDER — POLYETHYLENE GLYCOL 3350 17 G/17G
17 POWDER, FOR SOLUTION ORAL
Qty: 0 | Refills: 0 | Status: DISCONTINUED | OUTPATIENT
Start: 2017-08-12 | End: 2017-08-16

## 2017-08-12 RX ADMIN — Medication 6: at 22:41

## 2017-08-12 RX ADMIN — Medication 6: at 15:46

## 2017-08-12 RX ADMIN — Medication 1 MILLIGRAM(S): at 22:40

## 2017-08-12 RX ADMIN — Medication 100 MILLIGRAM(S): at 05:35

## 2017-08-12 RX ADMIN — ENOXAPARIN SODIUM 40 MILLIGRAM(S): 100 INJECTION SUBCUTANEOUS at 11:00

## 2017-08-12 RX ADMIN — Medication 9 UNIT(S): at 11:00

## 2017-08-12 RX ADMIN — Medication 100 MILLIGRAM(S): at 17:01

## 2017-08-12 RX ADMIN — Medication 10: at 07:25

## 2017-08-12 RX ADMIN — POLYETHYLENE GLYCOL 3350 17 GRAM(S): 17 POWDER, FOR SOLUTION ORAL at 17:27

## 2017-08-12 RX ADMIN — CEFTRIAXONE 100 GRAM(S): 500 INJECTION, POWDER, FOR SOLUTION INTRAMUSCULAR; INTRAVENOUS at 17:02

## 2017-08-12 RX ADMIN — SODIUM CHLORIDE 1 GRAM(S): 9 INJECTION INTRAMUSCULAR; INTRAVENOUS; SUBCUTANEOUS at 19:43

## 2017-08-12 RX ADMIN — Medication 9 UNIT(S): at 15:46

## 2017-08-12 RX ADMIN — INSULIN GLARGINE 22 UNIT(S): 100 INJECTION, SOLUTION SUBCUTANEOUS at 07:24

## 2017-08-12 RX ADMIN — Medication 9 UNIT(S): at 07:25

## 2017-08-12 RX ADMIN — SENNA PLUS 2 TABLET(S): 8.6 TABLET ORAL at 22:40

## 2017-08-12 RX ADMIN — Medication 8: at 11:00

## 2017-08-12 RX ADMIN — SODIUM CHLORIDE 1 GRAM(S): 9 INJECTION INTRAMUSCULAR; INTRAVENOUS; SUBCUTANEOUS at 22:40

## 2017-08-12 NOTE — PROGRESS NOTE ADULT - SUBJECTIVE AND OBJECTIVE BOX
Patient is a 62y old  Female who presents with a chief complaint of High blood sugar (09 Aug 2017 06:44)      Interval History: finger sticks are stable Nutrition increased   on Lantus and Lispro sliding scale coverage with meals only   Diabetic Ketoacidosis resolved         MEDICATIONS  (STANDING):  dextrose 50% Injectable 50 milliLiter(s) IV Push every 15 minutes  enoxaparin Injectable 40 milliGRAM(s) SubCutaneous daily  insulin lispro (HumaLOG) corrective regimen sliding scale   SubCutaneous three times a day before meals  dextrose 5%. 1000 milliLiter(s) (50 mL/Hr) IV Continuous <Continuous>  dextrose 50% Injectable 12.5 Gram(s) IV Push once  dextrose 50% Injectable 25 Gram(s) IV Push once  insulin lispro (HumaLOG) corrective regimen sliding scale   SubCutaneous at bedtime  insulin glargine Injectable (LANTUS) 22 Unit(s) SubCutaneous every morning  senna 2 Tablet(s) Oral at bedtime  docusate sodium 100 milliGRAM(s) Oral two times a day  insulin lispro Injectable (HumaLOG) 9 Unit(s) SubCutaneous three times a day before meals  clonazePAM Tablet 1 milliGRAM(s) Oral at bedtime  cefTRIAXone   IVPB 1 Gram(s) IV Intermittent every 24 hours  polyethylene glycol 3350 17 Gram(s) Oral two times a day  sodium chloride 1 Gram(s) Oral three times a day    MEDICATIONS  (PRN):  dextrose 50% Injectable 25 milliLiter(s) IV Push every 15 minutes PRN hypoglycemia  dextrose Gel 1 Dose(s) Oral once PRN Blood Glucose LESS THAN 70 milliGRAM(s)/deciliter  glucagon  Injectable 1 milliGRAM(s) IntraMuscular once PRN Glucose LESS THAN 70 milligrams/deciliter  acetaminophen   Tablet. 650 milliGRAM(s) Oral every 6 hours PRN Headache      Allergies    No Known Allergies    Intolerances        REVIEW OF SYSTEMS:  CONSTITUTIONAL: weak  EYES: No eye pain, visual disturbances, or discharge  ENMT:  No difficulty hearing, tinnitus, vertigo; No sinus or throat pain  NECK: No pain or stiffness  RESPIRATORY: No cough, wheezing, chills or hemoptysis; No shortness of breath  CARDIOVASCULAR: No chest pain, palpitations, dizziness, or leg swelling  GASTROINTESTINAL: No abdominal or epigastric pain. No nausea, vomiting, or hematemesis; No diarrhea or constipation. No melena or hematochezia.  GENITOURINARY: No dysuria, frequency, hematuria, or incontinence  NEUROLOGICAL: No headaches, memory loss, loss of strength, numbness, or tremors  SKIN: No itching, burning, rashes, or lesions   LYMPH NODES: No enlarged glands  ENDOCRINE: No heat or cold intolerance; No hair loss  MUSCULOSKELETAL: No joint pain or swelling; No muscle, back, or extremity pain  PSYCHIATRIC: No depression, anxiety, mood swings, or difficulty sleeping  HEME/LYMPH: No easy bruising, or bleeding gums  ALLERY AND IMMUNOLOGIC: No hives or eczema    Vital Signs Last 24 Hrs  T(C): 36.7 (12 Aug 2017 12:16), Max: 37.1 (12 Aug 2017 00:17)  T(F): 98 (12 Aug 2017 12:16), Max: 98.8 (12 Aug 2017 00:17)  HR: 83 (12 Aug 2017 15:35) (80 - 87)  BP: 158/93 (12 Aug 2017 15:35) (105/72 - 158/93)  BP(mean): --  RR: 18 (12 Aug 2017 15:35) (16 - 18)  SpO2: 98% (12 Aug 2017 15:35) (96% - 98%)    PHYSICAL EXAM:  GENERAL: rundown but stable   HEAD:  Atraumatic, Normocephalic  EYES: EOMI, PERRLA, conjunctiva and sclera clear  ENMT: No tonsillar erythema, exudates, or enlargement; Moist mucous membranes, Good dentition, No lesions  NECK: Supple, No JVD, Normal thyroid  NERVOUS SYSTEM:  Alert & Oriented X3, Good concentration; Motor Strength 5/5 B/L upper and lower extremities; DTRs 2+ intact and symmetric  CHEST/LUNG: Clear to percussion bilaterally; No rales, rhonchi, wheezing, or rubs  HEART: Regular rate and rhythm; No murmurs, rubs, or gallops  ABDOMEN: Soft, Nontender, Nondistended; Bowel sounds present  EXTREMITIES:  2+ Peripheral Pulses, No clubbing, cyanosis, or edema  SKIN: No rashes or lesions    LABS:        CAPILLARY BLOOD GLUCOSE  286 (12 Aug 2017 15:45)  337 (12 Aug 2017 10:58)  378 (12 Aug 2017 07:24)  169 (11 Aug 2017 22:08)  217 (11 Aug 2017 17:39)        Lipid panel:           Thyroid:  Diabetes Tests:  Parathyroid Panel:  Adrenals:  RADIOLOGY & ADDITIONAL TESTS:    Imaging Personally Reviewed:  [ ] YES  [ ] NO    Consultant(s) Notes Reviewed:  [ ] YES  [ ] NO    Care Discussed with Consultants/Other Providers [ ] YES  [ ] NO

## 2017-08-12 NOTE — PROGRESS NOTE ADULT - SUBJECTIVE AND OBJECTIVE BOX
Patient is a 62y old  Female who presents with a chief complaint of High blood sugar (09 Aug 2017 06:44)    MEDICAL PROBLEMS:  DIABETIC KETOACIDOSIS  DIABETIC KETOACIDOSIS (  HIGH BLOOD SUGAR  Pancreatic insufficiency  Alcohol abuse  Multiple sclerosis  Myasthenia gravis  Hypertension  Diabetes  DKA (diabetic ketoacidoses)  Type 1 diabetes mellitus with hyperosmolarity without coma  Type 1 diabetes mellitus with ketoacidosis without coma  Diabetic ketoacidosis without coma associated with drug or chemical induced diabetes mellitus  Bacteremia  Hyponatremia  Acute kidney failure  Hypertension  Multiple sclerosis  Alcohol abuse  Pancreatic insufficiency  DKA (diabetic ketoacidoses)      INTERVAL HPI/OVERNIGHT EVENTS: Hyponatremia, hyperglycemia, still constipated    MEDICATIONS  (STANDING):  dextrose 50% Injectable 50 milliLiter(s) IV Push every 15 minutes  enoxaparin Injectable 40 milliGRAM(s) SubCutaneous daily  insulin lispro (HumaLOG) corrective regimen sliding scale   SubCutaneous three times a day before meals  dextrose 5%. 1000 milliLiter(s) (50 mL/Hr) IV Continuous <Continuous>  dextrose 50% Injectable 12.5 Gram(s) IV Push once  dextrose 50% Injectable 25 Gram(s) IV Push once  insulin lispro (HumaLOG) corrective regimen sliding scale   SubCutaneous at bedtime  insulin glargine Injectable (LANTUS) 22 Unit(s) SubCutaneous every morning  senna 2 Tablet(s) Oral at bedtime  docusate sodium 100 milliGRAM(s) Oral two times a day  insulin lispro Injectable (HumaLOG) 9 Unit(s) SubCutaneous three times a day before meals  clonazePAM Tablet 1 milliGRAM(s) Oral at bedtime  cefTRIAXone   IVPB 1 Gram(s) IV Intermittent every 24 hours  polyethylene glycol 3350 17 Gram(s) Oral two times a day  sodium chloride 1 Gram(s) Oral three times a day    MEDICATIONS  (PRN):  dextrose 50% Injectable 25 milliLiter(s) IV Push every 15 minutes PRN hypoglycemia  dextrose Gel 1 Dose(s) Oral once PRN Blood Glucose LESS THAN 70 milliGRAM(s)/deciliter  glucagon  Injectable 1 milliGRAM(s) IntraMuscular once PRN Glucose LESS THAN 70 milligrams/deciliter  acetaminophen   Tablet. 650 milliGRAM(s) Oral every 6 hours PRN Headache    Allergies    No Known Allergies    Intolerances      Vital Signs Last 24 Hrs  T(C): 36.7 (12 Aug 2017 12:16), Max: 37.1 (12 Aug 2017 00:17)  T(F): 98 (12 Aug 2017 12:16), Max: 98.8 (12 Aug 2017 00:17)  HR: 83 (12 Aug 2017 15:35) (80 - 87)  BP: 158/93 (12 Aug 2017 15:35) (105/72 - 158/93)  BP(mean): --  RR: 18 (12 Aug 2017 15:35) (16 - 18)  SpO2: 98% (12 Aug 2017 15:35) (96% - 98%)    PHYSICAL EXAM:  GENERAL: NAD, well-groomed, well-developed  HEAD:  Atraumatic, Normocephalic  EYES: EOMI, PERRLA, conjunctiva and sclera clear  ENMT: No tonsillar erythema, exudates, or enlargement; Moist mucous membranes, Good dentition, No lesions  NECK: Supple, No JVD, Normal thyroid  NERVOUS SYSTEM:  Alert & Oriented X3, Good concentration; Motor Strength 5/5 B/L upper and lower extremities; DTRs 2+ intact and symmetric  CHEST/LUNG: Clear to percussion bilaterally; No rales, rhonchi, wheezing, or rubs  HEART: Regular rate and rhythm; No murmurs, rubs, or gallops  ABDOMEN: Soft, Nontender, Nondistended; Bowel sounds present  EXTREMITIES:  2+ Peripheral Pulses, No clubbing, cyanosis, or edema  LYMPH: No lymphadenopathy noted  SKIN: No rashes or lesions    08-11 @ 07:01  -  08-12 @ 07:00  --------------------------------------------------------  IN: 620 mL / OUT: 0 mL / NET: 620 mL        LABS:                        11.7   6.2   )-----------( 337      ( 12 Aug 2017 06:41 )             35.5     08-12    126<L>  |  91<L>  |  12  ----------------------------<  367<H>  4.7   |  26  |  0.66    Ca    8.7      12 Aug 2017 06:41  Phos  2.6     08-12  Mg     2.4     08-11          CAPILLARY BLOOD GLUCOSE  286 (12 Aug 2017 15:45)  337 (12 Aug 2017 10:58)  378 (12 Aug 2017 07:24)  169 (11 Aug 2017 22:08)        CPK Mass Assay %: 4.1 % (08-08 @ 18:03)  Troponin I, Serum: <.015 ng/mL (08-08 @ 18:03)    Cultures:            RADIOLOGY & ADDITIONAL TESTS:    Imaging Personally Reviewed:  [X] YES  [ ] NO    Consultant(s) Notes Reviewed:  [X] YES  [ ] NO    Care Discussed with Consultants/Other Providers [X] YES  [ ] NO

## 2017-08-13 LAB
ANION GAP SERPL CALC-SCNC: 9 MMOL/L — SIGNIFICANT CHANGE UP (ref 5–17)
BUN SERPL-MCNC: 12 MG/DL — SIGNIFICANT CHANGE UP (ref 7–23)
CALCIUM SERPL-MCNC: 8.4 MG/DL — LOW (ref 8.5–10.1)
CHLORIDE SERPL-SCNC: 90 MMOL/L — LOW (ref 96–108)
CO2 SERPL-SCNC: 25 MMOL/L — SIGNIFICANT CHANGE UP (ref 22–31)
CREAT SERPL-MCNC: 0.64 MG/DL — SIGNIFICANT CHANGE UP (ref 0.5–1.3)
GLUCOSE SERPL-MCNC: 349 MG/DL — HIGH (ref 70–99)
HCT VFR BLD CALC: 35.3 % — SIGNIFICANT CHANGE UP (ref 34.5–45)
HGB BLD-MCNC: 11.2 G/DL — LOW (ref 11.5–15.5)
MCHC RBC-ENTMCNC: 28.3 PG — SIGNIFICANT CHANGE UP (ref 27–34)
MCHC RBC-ENTMCNC: 31.9 GM/DL — LOW (ref 32–36)
MCV RBC AUTO: 88.8 FL — SIGNIFICANT CHANGE UP (ref 80–100)
PLATELET # BLD AUTO: 432 K/UL — HIGH (ref 150–400)
POTASSIUM SERPL-MCNC: 5 MMOL/L — SIGNIFICANT CHANGE UP (ref 3.5–5.3)
POTASSIUM SERPL-SCNC: 5 MMOL/L — SIGNIFICANT CHANGE UP (ref 3.5–5.3)
RBC # BLD: 3.98 M/UL — SIGNIFICANT CHANGE UP (ref 3.8–5.2)
RBC # FLD: 15.6 % — HIGH (ref 11–15)
SODIUM SERPL-SCNC: 124 MMOL/L — LOW (ref 135–145)
WBC # BLD: 6.5 K/UL — SIGNIFICANT CHANGE UP (ref 3.8–10.5)
WBC # FLD AUTO: 6.5 K/UL — SIGNIFICANT CHANGE UP (ref 3.8–10.5)

## 2017-08-13 RX ADMIN — Medication 9 UNIT(S): at 08:01

## 2017-08-13 RX ADMIN — Medication 1 MILLIGRAM(S): at 22:26

## 2017-08-13 RX ADMIN — Medication 9 UNIT(S): at 11:37

## 2017-08-13 RX ADMIN — CEFTRIAXONE 100 GRAM(S): 500 INJECTION, POWDER, FOR SOLUTION INTRAMUSCULAR; INTRAVENOUS at 16:17

## 2017-08-13 RX ADMIN — Medication 100 MILLIGRAM(S): at 17:11

## 2017-08-13 RX ADMIN — Medication 4: at 22:27

## 2017-08-13 RX ADMIN — Medication 12: at 08:01

## 2017-08-13 RX ADMIN — Medication 9 UNIT(S): at 16:06

## 2017-08-13 RX ADMIN — Medication 1 ENEMA: at 15:54

## 2017-08-13 RX ADMIN — Medication 2: at 16:06

## 2017-08-13 RX ADMIN — SODIUM CHLORIDE 1 GRAM(S): 9 INJECTION INTRAMUSCULAR; INTRAVENOUS; SUBCUTANEOUS at 07:19

## 2017-08-13 RX ADMIN — SODIUM CHLORIDE 1 GRAM(S): 9 INJECTION INTRAMUSCULAR; INTRAVENOUS; SUBCUTANEOUS at 13:54

## 2017-08-13 RX ADMIN — Medication 10: at 11:37

## 2017-08-13 RX ADMIN — Medication 100 MILLIGRAM(S): at 07:19

## 2017-08-13 RX ADMIN — SODIUM CHLORIDE 1 GRAM(S): 9 INJECTION INTRAMUSCULAR; INTRAVENOUS; SUBCUTANEOUS at 22:26

## 2017-08-13 RX ADMIN — ENOXAPARIN SODIUM 40 MILLIGRAM(S): 100 INJECTION SUBCUTANEOUS at 11:38

## 2017-08-13 RX ADMIN — INSULIN GLARGINE 22 UNIT(S): 100 INJECTION, SOLUTION SUBCUTANEOUS at 10:28

## 2017-08-13 NOTE — PROGRESS NOTE ADULT - SUBJECTIVE AND OBJECTIVE BOX
Patient is a 62y old  Female who presents with a chief complaint of High blood sugar (09 Aug 2017 06:44)      Interval History: finger sticks are increased .   patient is antibiotics and yesterday prandial lispro 9 units was added to Lantus 22 units and Lispro sliding scale coverage with meals . patient is status post Diabetic Ketoacidosis resolved .PO intake is with fluctuations         MEDICATIONS  (STANDING):  dextrose 50% Injectable 50 milliLiter(s) IV Push every 15 minutes  enoxaparin Injectable 40 milliGRAM(s) SubCutaneous daily  insulin lispro (HumaLOG) corrective regimen sliding scale   SubCutaneous three times a day before meals  dextrose 5%. 1000 milliLiter(s) (50 mL/Hr) IV Continuous <Continuous>  dextrose 50% Injectable 12.5 Gram(s) IV Push once  dextrose 50% Injectable 25 Gram(s) IV Push once  insulin lispro (HumaLOG) corrective regimen sliding scale   SubCutaneous at bedtime  insulin glargine Injectable (LANTUS) 22 Unit(s) SubCutaneous every morning  senna 2 Tablet(s) Oral at bedtime  docusate sodium 100 milliGRAM(s) Oral two times a day  insulin lispro Injectable (HumaLOG) 9 Unit(s) SubCutaneous three times a day before meals  clonazePAM Tablet 1 milliGRAM(s) Oral at bedtime  cefTRIAXone   IVPB 1 Gram(s) IV Intermittent every 24 hours  polyethylene glycol 3350 17 Gram(s) Oral two times a day  sodium chloride 1 Gram(s) Oral three times a day    MEDICATIONS  (PRN):  dextrose 50% Injectable 25 milliLiter(s) IV Push every 15 minutes PRN hypoglycemia  dextrose Gel 1 Dose(s) Oral once PRN Blood Glucose LESS THAN 70 milliGRAM(s)/deciliter  glucagon  Injectable 1 milliGRAM(s) IntraMuscular once PRN Glucose LESS THAN 70 milligrams/deciliter  acetaminophen   Tablet. 650 milliGRAM(s) Oral every 6 hours PRN Headache      Allergies    No Known Allergies    Intolerances        REVIEW OF SYSTEMS:  CONSTITUTIONAL: listless   EYES: No eye pain, visual disturbances, or discharge  ENMT:  No difficulty hearing, tinnitus, vertigo; No sinus or throat pain  NECK: No pain or stiffness  RESPIRATORY: No cough, wheezing, chills or hemoptysis; No shortness of breath  CARDIOVASCULAR: No chest pain, palpitations, dizziness, or leg swelling  GASTROINTESTINAL: No abdominal or epigastric pain. No nausea, vomiting, or hematemesis; No diarrhea or constipation. No melena or hematochezia.  GENITOURINARY: No dysuria, frequency, hematuria, or incontinence  ENDOCRINE: No heat or cold intolerance; No hair loss  MUSCULOSKELETAL: No joint pain or swelling; No muscle, back, or extremity pain  PSYCHIATRIC: depression,  HEME/LYMPH: No easy bruising, or bleeding gums  ALLERY AND IMMUNOLOGIC: No hives or eczema    Vital Signs Last 24 Hrs  T(C): 37.1 (13 Aug 2017 05:15), Max: 37.3 (13 Aug 2017 00:25)  T(F): 98.8 (13 Aug 2017 05:15), Max: 99.2 (13 Aug 2017 00:25)  HR: 80 (13 Aug 2017 05:15) (80 - 91)  BP: 137/73 (13 Aug 2017 05:15) (137/73 - 158/93)  BP(mean): --  RR: 17 (13 Aug 2017 05:15) (17 - 18)  SpO2: 96% (13 Aug 2017 05:15) (96% - 98%)    PHYSICAL EXAM:  GENERAL: rundown weak  HEAD:  Atraumatic, Normocephalic  EYES: EOMI, PERRLA, conjunctiva and sclera clear  ENMT: No tonsillar erythema, exudates, or enlargement; Moist mucous membranes, Good dentition, No lesions  NECK: Supple, No JVD, Normal thyroid  NERVOUS SYSTEM:  Alert & Oriented X3, Good concentration; Motor Strength 5/5 B/L upper and lower extremities; DTRs 2+ intact and symmetric  CHEST/LUNG: Clear to percussion bilaterally; No rales, rhonchi, wheezing, or rubs  HEART: Regular rate and rhythm; No murmurs, rubs, or gallops    LABS:        CAPILLARY BLOOD GLUCOSE  363 (13 Aug 2017 11:36)  420 (13 Aug 2017 08:00)  355 (12 Aug 2017 22:36)  286 (12 Aug 2017 15:45)        Lipid panel:           Thyroid:  Diabetes Tests:  Parathyroid Panel:  Adrenals:  RADIOLOGY & ADDITIONAL TESTS:    Imaging Personally Reviewed:  [ ] YES  [ ] NO    Consultant(s) Notes Reviewed:  [ ] YES  [ ] NO    Care Discussed with Consultants/Other Providers [ ] YES  [ ] NO

## 2017-08-13 NOTE — PROGRESS NOTE ADULT - SUBJECTIVE AND OBJECTIVE BOX
Patient is a 62y old  Female who presents with a chief complaint of High blood sugar (09 Aug 2017 06:44)    MEDICAL PROBLEMS:  DIABETIC KETOACIDOSIS  DIABETIC KETOACIDOSIS (  HIGH BLOOD SUGAR  Pancreatic insufficiency  Alcohol abuse  Multiple sclerosis  Myasthenia gravis  Hypertension  Diabetes  DKA (diabetic ketoacidoses)  Type 1 diabetes mellitus with hyperosmolarity without coma  Type 1 diabetes mellitus with ketoacidosis without coma  Diabetic ketoacidosis without coma associated with drug or chemical induced diabetes mellitus  Bacteremia  Hyponatremia  Acute kidney failure  Hypertension  Multiple sclerosis  Alcohol abuse  Pancreatic insufficiency  DKA (diabetic ketoacidoses)      INTERVAL HPI/OVERNIGHT EVENTS: Still constipated. Hyponatremia: 124    MEDICATIONS  (STANDING):  dextrose 50% Injectable 50 milliLiter(s) IV Push every 15 minutes  enoxaparin Injectable 40 milliGRAM(s) SubCutaneous daily  insulin lispro (HumaLOG) corrective regimen sliding scale   SubCutaneous three times a day before meals  dextrose 5%. 1000 milliLiter(s) (50 mL/Hr) IV Continuous <Continuous>  dextrose 50% Injectable 12.5 Gram(s) IV Push once  dextrose 50% Injectable 25 Gram(s) IV Push once  insulin lispro (HumaLOG) corrective regimen sliding scale   SubCutaneous at bedtime  insulin glargine Injectable (LANTUS) 22 Unit(s) SubCutaneous every morning  senna 2 Tablet(s) Oral at bedtime  docusate sodium 100 milliGRAM(s) Oral two times a day  insulin lispro Injectable (HumaLOG) 9 Unit(s) SubCutaneous three times a day before meals  clonazePAM Tablet 1 milliGRAM(s) Oral at bedtime  cefTRIAXone   IVPB 1 Gram(s) IV Intermittent every 24 hours  polyethylene glycol 3350 17 Gram(s) Oral two times a day  sodium chloride 1 Gram(s) Oral three times a day  sodium biphosphate Rectal Enema 1 Enema Rectal once    MEDICATIONS  (PRN):  dextrose 50% Injectable 25 milliLiter(s) IV Push every 15 minutes PRN hypoglycemia  dextrose Gel 1 Dose(s) Oral once PRN Blood Glucose LESS THAN 70 milliGRAM(s)/deciliter  glucagon  Injectable 1 milliGRAM(s) IntraMuscular once PRN Glucose LESS THAN 70 milligrams/deciliter  acetaminophen   Tablet. 650 milliGRAM(s) Oral every 6 hours PRN Headache    Allergies    No Known Allergies    Intolerances      Vital Signs Last 24 Hrs  T(C): 36.6 (13 Aug 2017 12:28), Max: 37.3 (13 Aug 2017 00:25)  T(F): 97.8 (13 Aug 2017 12:28), Max: 99.2 (13 Aug 2017 00:25)  HR: 96 (13 Aug 2017 12:28) (80 - 96)  BP: 144/88 (13 Aug 2017 12:28) (137/73 - 158/93)  BP(mean): --  RR: 17 (13 Aug 2017 12:28) (17 - 18)  SpO2: 99% (13 Aug 2017 12:28) (96% - 99%)    PHYSICAL EXAM:  GENERAL: NAD, well-groomed, well-developed  HEAD:  Atraumatic, Normocephalic  EYES: EOMI, PERRLA, conjunctiva and sclera clear  ENMT: No tonsillar erythema, exudates, or enlargement; Moist mucous membranes, Good dentition, No lesions  NECK: Supple, No JVD, Normal thyroid  NERVOUS SYSTEM:  Alert & Oriented X3, Good concentration; Motor Strength 5/5 B/L upper and lower extremities; DTRs 2+ intact and symmetric  CHEST/LUNG: Clear to percussion bilaterally; No rales, rhonchi, wheezing, or rubs  HEART: Regular rate and rhythm; No murmurs, rubs, or gallops  ABDOMEN: Soft, Nontender, Nondistended; Bowel sounds present  EXTREMITIES:  2+ Peripheral Pulses, No clubbing, cyanosis, or edema  LYMPH: No lymphadenopathy noted  SKIN: No rashes or lesions    08-12 @ 07:01  -  08-13 @ 07:00  --------------------------------------------------------  IN: 240 mL / OUT: 0 mL / NET: 240 mL        LABS:                        11.2   6.5   )-----------( 432      ( 13 Aug 2017 06:19 )             35.3     08-13    124<L>  |  90<L>  |  12  ----------------------------<  349<H>  5.0   |  25  |  0.64    Ca    8.4<L>      13 Aug 2017 06:19  Phos  2.6     08-12          CAPILLARY BLOOD GLUCOSE  363 (13 Aug 2017 11:36)  420 (13 Aug 2017 08:00)  355 (12 Aug 2017 22:36)  286 (12 Aug 2017 15:45)        CPK Mass Assay %: 4.1 % (08-08 @ 18:03)  Troponin I, Serum: <.015 ng/mL (08-08 @ 18:03)    Cultures:            RADIOLOGY & ADDITIONAL TESTS:    Imaging Personally Reviewed:  [X] YES  [ ] NO    Consultant(s) Notes Reviewed:  [X] YES  [ ] NO    Care Discussed with Consultants/Other Providers [X] YES  [ ] NO

## 2017-08-14 LAB
ANION GAP SERPL CALC-SCNC: 12 MMOL/L — SIGNIFICANT CHANGE UP (ref 5–17)
BUN SERPL-MCNC: 11 MG/DL — SIGNIFICANT CHANGE UP (ref 7–23)
CALCIUM SERPL-MCNC: 8.5 MG/DL — SIGNIFICANT CHANGE UP (ref 8.5–10.1)
CHLORIDE SERPL-SCNC: 93 MMOL/L — LOW (ref 96–108)
CO2 SERPL-SCNC: 23 MMOL/L — SIGNIFICANT CHANGE UP (ref 22–31)
CREAT SERPL-MCNC: 0.64 MG/DL — SIGNIFICANT CHANGE UP (ref 0.5–1.3)
GLUCOSE SERPL-MCNC: 282 MG/DL — HIGH (ref 70–99)
HCT VFR BLD CALC: 36.4 % — SIGNIFICANT CHANGE UP (ref 34.5–45)
HGB BLD-MCNC: 11.6 G/DL — SIGNIFICANT CHANGE UP (ref 11.5–15.5)
MCHC RBC-ENTMCNC: 28.5 PG — SIGNIFICANT CHANGE UP (ref 27–34)
MCHC RBC-ENTMCNC: 31.9 GM/DL — LOW (ref 32–36)
MCV RBC AUTO: 89.3 FL — SIGNIFICANT CHANGE UP (ref 80–100)
PLATELET # BLD AUTO: 594 K/UL — HIGH (ref 150–400)
POTASSIUM SERPL-MCNC: 4.5 MMOL/L — SIGNIFICANT CHANGE UP (ref 3.5–5.3)
POTASSIUM SERPL-SCNC: 4.5 MMOL/L — SIGNIFICANT CHANGE UP (ref 3.5–5.3)
RBC # BLD: 4.08 M/UL — SIGNIFICANT CHANGE UP (ref 3.8–5.2)
RBC # FLD: 15.4 % — HIGH (ref 11–15)
SODIUM SERPL-SCNC: 128 MMOL/L — LOW (ref 135–145)
WBC # BLD: 7 K/UL — SIGNIFICANT CHANGE UP (ref 3.8–10.5)
WBC # FLD AUTO: 7 K/UL — SIGNIFICANT CHANGE UP (ref 3.8–10.5)

## 2017-08-14 PROCEDURE — 74020: CPT | Mod: 26

## 2017-08-14 RX ORDER — PANTOPRAZOLE SODIUM 20 MG/1
40 TABLET, DELAYED RELEASE ORAL
Qty: 0 | Refills: 0 | Status: DISCONTINUED | OUTPATIENT
Start: 2017-08-14 | End: 2017-08-16

## 2017-08-14 RX ORDER — LIPASE/PROTEASE/AMYLASE 16-48-48K
1 CAPSULE,DELAYED RELEASE (ENTERIC COATED) ORAL
Qty: 0 | Refills: 0 | Status: DISCONTINUED | OUTPATIENT
Start: 2017-08-14 | End: 2017-08-16

## 2017-08-14 RX ORDER — METOCLOPRAMIDE HCL 10 MG
10 TABLET ORAL EVERY 6 HOURS
Qty: 0 | Refills: 0 | Status: COMPLETED | OUTPATIENT
Start: 2017-08-14 | End: 2017-08-16

## 2017-08-14 RX ORDER — MULTIVIT WITH MIN/MFOLATE/K2 340-15/3 G
296 POWDER (GRAM) ORAL ONCE
Qty: 0 | Refills: 0 | Status: COMPLETED | OUTPATIENT
Start: 2017-08-14 | End: 2017-08-14

## 2017-08-14 RX ORDER — LACTULOSE 10 G/15ML
10 SOLUTION ORAL
Qty: 0 | Refills: 0 | Status: DISCONTINUED | OUTPATIENT
Start: 2017-08-14 | End: 2017-08-15

## 2017-08-14 RX ADMIN — Medication 9 UNIT(S): at 08:21

## 2017-08-14 RX ADMIN — Medication 100 MILLIGRAM(S): at 17:43

## 2017-08-14 RX ADMIN — Medication 296 MILLILITER(S): at 11:51

## 2017-08-14 RX ADMIN — LACTULOSE 10 GRAM(S): 10 SOLUTION ORAL at 21:37

## 2017-08-14 RX ADMIN — SODIUM CHLORIDE 1 GRAM(S): 9 INJECTION INTRAMUSCULAR; INTRAVENOUS; SUBCUTANEOUS at 13:35

## 2017-08-14 RX ADMIN — Medication 1 ENEMA: at 12:30

## 2017-08-14 RX ADMIN — Medication 1 MILLIGRAM(S): at 23:19

## 2017-08-14 RX ADMIN — Medication 6: at 11:52

## 2017-08-14 RX ADMIN — Medication 1 CAPSULE(S): at 17:43

## 2017-08-14 RX ADMIN — INSULIN GLARGINE 22 UNIT(S): 100 INJECTION, SOLUTION SUBCUTANEOUS at 11:54

## 2017-08-14 RX ADMIN — Medication 10 MILLIGRAM(S): at 17:45

## 2017-08-14 RX ADMIN — SENNA PLUS 2 TABLET(S): 8.6 TABLET ORAL at 21:37

## 2017-08-14 RX ADMIN — Medication 6: at 08:21

## 2017-08-14 RX ADMIN — Medication 9 UNIT(S): at 11:53

## 2017-08-14 RX ADMIN — ENOXAPARIN SODIUM 40 MILLIGRAM(S): 100 INJECTION SUBCUTANEOUS at 11:56

## 2017-08-14 RX ADMIN — SODIUM CHLORIDE 1 GRAM(S): 9 INJECTION INTRAMUSCULAR; INTRAVENOUS; SUBCUTANEOUS at 21:37

## 2017-08-14 RX ADMIN — Medication 10 MILLIGRAM(S): at 23:19

## 2017-08-14 RX ADMIN — SODIUM CHLORIDE 1 GRAM(S): 9 INJECTION INTRAMUSCULAR; INTRAVENOUS; SUBCUTANEOUS at 05:58

## 2017-08-14 RX ADMIN — Medication 100 MILLIGRAM(S): at 06:01

## 2017-08-14 RX ADMIN — Medication 6: at 21:36

## 2017-08-14 RX ADMIN — CEFTRIAXONE 100 GRAM(S): 500 INJECTION, POWDER, FOR SOLUTION INTRAMUSCULAR; INTRAVENOUS at 17:43

## 2017-08-14 NOTE — PROGRESS NOTE ADULT - SUBJECTIVE AND OBJECTIVE BOX
Patient is a 62y old  Female who presents with a chief complaint of High blood sugar (09 Aug 2017 06:44)    MEDICAL PROBLEMS:  DIABETIC KETOACIDOSIS  DIABETIC KETOACIDOSIS (  HIGH BLOOD SUGAR  Pancreatic insufficiency  Alcohol abuse  Multiple sclerosis  Myasthenia gravis  Hypertension  Diabetes  DKA (diabetic ketoacidoses)  Type 1 diabetes mellitus with hyperosmolarity without coma  Type 1 diabetes mellitus with ketoacidosis without coma  Diabetic ketoacidosis without coma associated with drug or chemical induced diabetes mellitus  Bacteremia  Hyponatremia  Acute kidney failure  Hypertension  Multiple sclerosis  Alcohol abuse  Pancreatic insufficiency  DKA (diabetic ketoacidoses)      INTERVAL HPI/OVERNIGHT EVENTS: Hyponatremia is improving. Still has constipation    MEDICATIONS  (STANDING):  dextrose 50% Injectable 50 milliLiter(s) IV Push every 15 minutes  enoxaparin Injectable 40 milliGRAM(s) SubCutaneous daily  insulin lispro (HumaLOG) corrective regimen sliding scale   SubCutaneous three times a day before meals  dextrose 5%. 1000 milliLiter(s) (50 mL/Hr) IV Continuous <Continuous>  dextrose 50% Injectable 12.5 Gram(s) IV Push once  dextrose 50% Injectable 25 Gram(s) IV Push once  insulin lispro (HumaLOG) corrective regimen sliding scale   SubCutaneous at bedtime  insulin glargine Injectable (LANTUS) 22 Unit(s) SubCutaneous every morning  senna 2 Tablet(s) Oral at bedtime  docusate sodium 100 milliGRAM(s) Oral two times a day  insulin lispro Injectable (HumaLOG) 9 Unit(s) SubCutaneous three times a day before meals  clonazePAM Tablet 1 milliGRAM(s) Oral at bedtime  cefTRIAXone   IVPB 1 Gram(s) IV Intermittent every 24 hours  polyethylene glycol 3350 17 Gram(s) Oral two times a day  sodium chloride 1 Gram(s) Oral three times a day  magnesium citrate Solution 296 milliLiter(s) Oral once  sodium biphosphate Rectal Enema 1 Enema Rectal once    MEDICATIONS  (PRN):  dextrose 50% Injectable 25 milliLiter(s) IV Push every 15 minutes PRN hypoglycemia  dextrose Gel 1 Dose(s) Oral once PRN Blood Glucose LESS THAN 70 milliGRAM(s)/deciliter  glucagon  Injectable 1 milliGRAM(s) IntraMuscular once PRN Glucose LESS THAN 70 milligrams/deciliter  acetaminophen   Tablet. 650 milliGRAM(s) Oral every 6 hours PRN Headache    Allergies    No Known Allergies    Intolerances      Vital Signs Last 24 Hrs  T(C): 36.4 (14 Aug 2017 05:54), Max: 37.2 (13 Aug 2017 17:00)  T(F): 97.5 (14 Aug 2017 05:54), Max: 99 (13 Aug 2017 17:00)  HR: 88 (14 Aug 2017 05:54) (87 - 99)  BP: 145/83 (14 Aug 2017 05:54) (119/87 - 150/89)  BP(mean): --  RR: 18 (14 Aug 2017 05:54) (17 - 18)  SpO2: 100% (14 Aug 2017 05:54) (97% - 100%)    PHYSICAL EXAM:  GENERAL: NAD, well-groomed, well-developed  HEAD:  Atraumatic, Normocephalic  EYES: EOMI, PERRLA, conjunctiva and sclera clear  ENMT: No tonsillar erythema, exudates, or enlargement; Moist mucous membranes, Good dentition, No lesions  NECK: Supple, No JVD, Normal thyroid  NERVOUS SYSTEM:  Alert & Oriented X3, Good concentration; Motor Strength 5/5 B/L upper and lower extremities; DTRs 2+ intact and symmetric  CHEST/LUNG: Clear to percussion bilaterally; No rales, rhonchi, wheezing, or rubs  HEART: Regular rate and rhythm; No murmurs, rubs, or gallops  ABDOMEN: Soft, Nontender, Nondistended; Bowel sounds present  EXTREMITIES:  2+ Peripheral Pulses, No clubbing, cyanosis, or edema  LYMPH: No lymphadenopathy noted  SKIN: No rashes or lesions    08-13 @ 07:01  -  08-14 @ 07:00  --------------------------------------------------------  IN: 300 mL / OUT: 0 mL / NET: 300 mL        LABS:                        11.6   7.0   )-----------( 594      ( 14 Aug 2017 09:35 )             36.4     08-14    128<L>  |  93<L>  |  11  ----------------------------<  282<H>  4.5   |  23  |  0.64    Ca    8.5      14 Aug 2017 09:35          CAPILLARY BLOOD GLUCOSE  290 (14 Aug 2017 08:18)  303 (13 Aug 2017 21:33)  170 (13 Aug 2017 16:05)  363 (13 Aug 2017 11:36)        CPK Mass Assay %: 4.1 % (08-08 @ 18:03)  Troponin I, Serum: <.015 ng/mL (08-08 @ 18:03)    Cultures:            RADIOLOGY & ADDITIONAL TESTS:    Imaging Personally Reviewed:  [X] YES  [ ] NO    Consultant(s) Notes Reviewed:  [X] YES  [ ] NO    Care Discussed with Consultants/Other Providers [X] YES  [ ] NO

## 2017-08-14 NOTE — PHARMACY COMMUNICATION NOTE - COMMENTS
s/w dr waldrop - lactulose dosed for constipation and cirrhosis;  will discontinue in a few days and will monitor
Spoke with MD, regular insulin IVP for high potassium level.

## 2017-08-14 NOTE — PROGRESS NOTE ADULT - SUBJECTIVE AND OBJECTIVE BOX
62F hx HTN IDDM with insulin pump , MS Hgb A1c  > 11, chronic pancreatitis, ex ETOH abuse, last drink 14 yrs ago. Admit with poluria/polyphagia/polydipsia.  FS glucoses' running close to 400 over the last few days.    Severe DKA.  given iv insulin fluids and a dose of vancomycin  cultures were sent.    Her abd pain is gone, and oral contrast CT shows no significant finding for pain.    all events noted   doing well        Allergies    No Known Allergies    Intolerances        MEDICATIONS  (STANDING):  dextrose 50% Injectable 50 milliLiter(s) IV Push every 15 minutes  enoxaparin Injectable 40 milliGRAM(s) SubCutaneous daily  insulin lispro (HumaLOG) corrective regimen sliding scale   SubCutaneous three times a day before meals  dextrose 5%. 1000 milliLiter(s) (50 mL/Hr) IV Continuous <Continuous>  dextrose 50% Injectable 12.5 Gram(s) IV Push once  dextrose 50% Injectable 25 Gram(s) IV Push once  insulin lispro (HumaLOG) corrective regimen sliding scale   SubCutaneous at bedtime  insulin glargine Injectable (LANTUS) 22 Unit(s) SubCutaneous every morning  senna 2 Tablet(s) Oral at bedtime  docusate sodium 100 milliGRAM(s) Oral two times a day  insulin lispro Injectable (HumaLOG) 9 Unit(s) SubCutaneous three times a day before meals  clonazePAM Tablet 1 milliGRAM(s) Oral at bedtime  cefTRIAXone   IVPB 1 Gram(s) IV Intermittent every 24 hours  polyethylene glycol 3350 17 Gram(s) Oral two times a day  sodium chloride 1 Gram(s) Oral three times a day  metoclopramide 10 milliGRAM(s) Oral every 6 hours  pantoprazole    Tablet 40 milliGRAM(s) Oral before breakfast  enalapril 5 milliGRAM(s) Oral daily  amylase/lipase/protease  (CREON  6,000 Units) 1 Capsule(s) Oral three times a day with meals    MEDICATIONS  (PRN):  dextrose 50% Injectable 25 milliLiter(s) IV Push every 15 minutes PRN hypoglycemia  dextrose Gel 1 Dose(s) Oral once PRN Blood Glucose LESS THAN 70 milliGRAM(s)/deciliter  glucagon  Injectable 1 milliGRAM(s) IntraMuscular once PRN Glucose LESS THAN 70 milligrams/deciliter  acetaminophen   Tablet. 650 milliGRAM(s) Oral every 6 hours PRN Headache      REVIEW OF SYSTEMS:  cryiong   belly is still hard and distended  feels impacted  VITAL SIGNS:  T(C): 36.6 (08-14-17 @ 17:22), Max: 36.6 (08-13-17 @ 23:44)  T(F): 97.8 (08-14-17 @ 17:22), Max: 97.9 (08-13-17 @ 23:44)  HR: 85 (08-14-17 @ 17:22) (84 - 88)  BP: 139/73 (08-14-17 @ 17:22) (120/75 - 145/85)  RR: 16 (08-14-17 @ 17:22) (16 - 18)  SpO2: 98% (08-14-17 @ 17:22) (98% - 100%)  Wt(kg): --    PHYSICAL EXAM:    GENERAL: not in any distress  HEENT: Neck is supple, normocephalic, atraumatic   CHEST/LUNG: Clear to percussion bilaterally; No rales, rhonchi, wheezing  HEART: Regular rate and rhythm; No murmurs, rubs, or gallops  ABDOMEN:firm non tender distended  ; Bowel sounds present, no rebound   EXTREMITIES:  2+ Peripheral Pulses, No clubbing, cyanosis, or edema  GENITOURINARY: NEGATIVE   SKIN: No rashes or lesions  BACK: no pressor sore   NERVOUS SYSTEM:  Alert & Oriented X3, Good concentration  PSYCH: normal affect     LABS:                         11.6   7.0   )-----------( 594      ( 14 Aug 2017 09:35 )             36.4     08-14    128<L>  |  93<L>  |  11  ----------------------------<  282<H>  4.5   |  23  |  0.64    Ca    8.5      14 Aug 2017 09:35                                              Radiology:      < from: Xray Abdomen Multiple Views (08.14.17 @ 17:08) >  FINDINGS AND   IMPRESSION: There is a very large amount stool throughout the colon   compatible with fecal impaction. No gross free air identified. Coarse   calcifications overlie the pancreas, better evaluated on recent CT. There   are degenerative changes of the thoracic spine.                BRANDI NICOLE M.D., ATTENDING RADIOLOGIST  This document has been electronically signed. Aug 14 2017  5:38PM          < end of copied text >

## 2017-08-14 NOTE — PROGRESS NOTE ADULT - SUBJECTIVE AND OBJECTIVE BOX
Patient is a 62y old  Female who presents with a chief complaint of High blood sugar (09 Aug 2017 06:44)      Interval History: finger sticks are stable and with slight downward trend   continued on increased Lantus and prandial lispro and Lispro sliding scale coverage with meals   stable for discharge from Endocrine perspective       MEDICATIONS  (STANDING):  dextrose 50% Injectable 50 milliLiter(s) IV Push every 15 minutes  enoxaparin Injectable 40 milliGRAM(s) SubCutaneous daily  insulin lispro (HumaLOG) corrective regimen sliding scale   SubCutaneous three times a day before meals  dextrose 5%. 1000 milliLiter(s) (50 mL/Hr) IV Continuous <Continuous>  dextrose 50% Injectable 12.5 Gram(s) IV Push once  dextrose 50% Injectable 25 Gram(s) IV Push once  insulin lispro (HumaLOG) corrective regimen sliding scale   SubCutaneous at bedtime  insulin glargine Injectable (LANTUS) 22 Unit(s) SubCutaneous every morning  senna 2 Tablet(s) Oral at bedtime  docusate sodium 100 milliGRAM(s) Oral two times a day  insulin lispro Injectable (HumaLOG) 9 Unit(s) SubCutaneous three times a day before meals  clonazePAM Tablet 1 milliGRAM(s) Oral at bedtime  cefTRIAXone   IVPB 1 Gram(s) IV Intermittent every 24 hours  polyethylene glycol 3350 17 Gram(s) Oral two times a day  sodium chloride 1 Gram(s) Oral three times a day  magnesium citrate Solution 296 milliLiter(s) Oral once  sodium biphosphate Rectal Enema 1 Enema Rectal once    MEDICATIONS  (PRN):  dextrose 50% Injectable 25 milliLiter(s) IV Push every 15 minutes PRN hypoglycemia  dextrose Gel 1 Dose(s) Oral once PRN Blood Glucose LESS THAN 70 milliGRAM(s)/deciliter  glucagon  Injectable 1 milliGRAM(s) IntraMuscular once PRN Glucose LESS THAN 70 milligrams/deciliter  acetaminophen   Tablet. 650 milliGRAM(s) Oral every 6 hours PRN Headache      Allergies    No Known Allergies    Intolerances        REVIEW OF SYSTEMS:  CONSTITUTIONAL: noc  EYES: No eye pain, visual disturbances, or discharge  ENMT:  No difficulty hearing, tinnitus, vertigo; No sinus or throat pain  NECK: No pain or stiffness  RESPIRATORY: No cough, wheezing, chills or hemoptysis; No shortness of breath  CARDIOVASCULAR: No chest pain, palpitations, dizziness, or leg swelling  GASTROINTESTINAL: No abdominal or epigastric pain. No nausea, vomiting, or hematemesis; No diarrhea or constipation. No melena or hematochezia.  GENITOURINARY: No dysuria, frequency, hematuria, or incontinence  NEUROLOGICAL: No headaches, memory loss, loss of strength, numbness, or tremors  SKIN: No itching, burning, rashes, or lesions   LYMPH NODES: No enlarged glands  ENDOCRINE: No heat or cold intolerance; No hair loss  MUSCULOSKELETAL: No joint pain or swelling; No muscle, back, or extremity pain  PSYCHIATRIC: No depression, anxiety, mood swings, or difficulty sleeping  HEME/LYMPH: No easy bruising, or bleeding gums  ALLERY AND IMMUNOLOGIC: No hives or eczema    Vital Signs Last 24 Hrs  T(C): 36 (14 Aug 2017 10:30), Max: 37.2 (13 Aug 2017 17:00)  T(F): 96.8 (14 Aug 2017 10:30), Max: 99 (13 Aug 2017 17:00)  HR: 84 (14 Aug 2017 10:30) (84 - 99)  BP: 120/75 (14 Aug 2017 10:30) (119/87 - 150/89)  BP(mean): --  RR: 18 (14 Aug 2017 10:30) (17 - 18)  SpO2: 99% (14 Aug 2017 10:30) (97% - 100%)    PHYSICAL EXAM:  GENERAL: stable   HEAD:  Atraumatic, Normocephalic  EYES: EOMI, PERRLA, conjunctiva and sclera clear  ENMT: No tonsillar erythema, exudates, or enlargement; Moist mucous membranes, Good dentition, No lesions  NECK: Supple, No JVD, Normal thyroid  NERVOUS SYSTEM:  Alert & Oriented X3, Good concentration; Motor Strength 5/5 B/L upper and lower extremities; DTRs 2+ intact and symmetric  CHEST/LUNG: Clear to percussion bilaterally; No rales, rhonchi, wheezing, or rubs  HEART: Regular rate and rhythm; No murmurs, rubs, or gallops  ABDOMEN: Soft, Nontender, Nondistended; Bowel sounds present  EXTREMITIES:  2+ Peripheral Pulses, No clubbing, cyanosis, or edema  SKIN: No rashes or lesions    LABS:        CAPILLARY BLOOD GLUCOSE  290 (14 Aug 2017 08:18)  303 (13 Aug 2017 21:33)  170 (13 Aug 2017 16:05)  363 (13 Aug 2017 11:36)        Lipid panel:           Thyroid:  Diabetes Tests:  Parathyroid Panel:  Adrenals:  RADIOLOGY & ADDITIONAL TESTS:    Imaging Personally Reviewed:  [ ] YES  [ ] NO    Consultant(s) Notes Reviewed:  [ ] YES  [ ] NO    Care Discussed with Consultants/Other Providers [ ] YES  [ ] NO

## 2017-08-15 DIAGNOSIS — N17.9 ACUTE KIDNEY FAILURE, UNSPECIFIED: ICD-10-CM

## 2017-08-15 DIAGNOSIS — I10 ESSENTIAL (PRIMARY) HYPERTENSION: ICD-10-CM

## 2017-08-15 LAB
ANION GAP SERPL CALC-SCNC: 11 MMOL/L — SIGNIFICANT CHANGE UP (ref 5–17)
ANION GAP SERPL CALC-SCNC: 13 MMOL/L — SIGNIFICANT CHANGE UP (ref 5–17)
BUN SERPL-MCNC: 27 MG/DL — HIGH (ref 7–23)
BUN SERPL-MCNC: 30 MG/DL — HIGH (ref 7–23)
CALCIUM SERPL-MCNC: 8.2 MG/DL — LOW (ref 8.5–10.1)
CALCIUM SERPL-MCNC: 8.2 MG/DL — LOW (ref 8.5–10.1)
CHLORIDE SERPL-SCNC: 85 MMOL/L — LOW (ref 96–108)
CHLORIDE SERPL-SCNC: 85 MMOL/L — LOW (ref 96–108)
CO2 SERPL-SCNC: 24 MMOL/L — SIGNIFICANT CHANGE UP (ref 22–31)
CO2 SERPL-SCNC: 28 MMOL/L — SIGNIFICANT CHANGE UP (ref 22–31)
CREAT SERPL-MCNC: 0.81 MG/DL — SIGNIFICANT CHANGE UP (ref 0.5–1.3)
CREAT SERPL-MCNC: 1.03 MG/DL — SIGNIFICANT CHANGE UP (ref 0.5–1.3)
GLUCOSE SERPL-MCNC: 426 MG/DL — HIGH (ref 70–99)
GLUCOSE SERPL-MCNC: 811 MG/DL — CRITICAL HIGH (ref 70–99)
HCT VFR BLD CALC: 31.6 % — LOW (ref 34.5–45)
HGB BLD-MCNC: 10.2 G/DL — LOW (ref 11.5–15.5)
MCHC RBC-ENTMCNC: 29.3 PG — SIGNIFICANT CHANGE UP (ref 27–34)
MCHC RBC-ENTMCNC: 32.3 GM/DL — SIGNIFICANT CHANGE UP (ref 32–36)
MCV RBC AUTO: 90.8 FL — SIGNIFICANT CHANGE UP (ref 80–100)
PLATELET # BLD AUTO: 571 K/UL — HIGH (ref 150–400)
POTASSIUM SERPL-MCNC: 5.3 MMOL/L — SIGNIFICANT CHANGE UP (ref 3.5–5.3)
POTASSIUM SERPL-MCNC: 5.5 MMOL/L — HIGH (ref 3.5–5.3)
POTASSIUM SERPL-SCNC: 5.3 MMOL/L — SIGNIFICANT CHANGE UP (ref 3.5–5.3)
POTASSIUM SERPL-SCNC: 5.5 MMOL/L — HIGH (ref 3.5–5.3)
PROCALCITONIN SERPL-MCNC: 0.26 NG/ML — HIGH (ref 0–0.04)
RBC # BLD: 3.48 M/UL — LOW (ref 3.8–5.2)
RBC # FLD: 16.1 % — HIGH (ref 11–15)
SODIUM SERPL-SCNC: 122 MMOL/L — LOW (ref 135–145)
SODIUM SERPL-SCNC: 124 MMOL/L — LOW (ref 135–145)
WBC # BLD: 6.7 K/UL — SIGNIFICANT CHANGE UP (ref 3.8–10.5)
WBC # FLD AUTO: 6.7 K/UL — SIGNIFICANT CHANGE UP (ref 3.8–10.5)

## 2017-08-15 RX ORDER — SODIUM CHLORIDE 9 MG/ML
1000 INJECTION INTRAMUSCULAR; INTRAVENOUS; SUBCUTANEOUS
Qty: 0 | Refills: 0 | Status: DISCONTINUED | OUTPATIENT
Start: 2017-08-15 | End: 2017-08-16

## 2017-08-15 RX ORDER — CEFDINIR 250 MG/5ML
300 POWDER, FOR SUSPENSION ORAL
Qty: 0 | Refills: 0 | Status: DISCONTINUED | OUTPATIENT
Start: 2017-08-15 | End: 2017-08-16

## 2017-08-15 RX ORDER — LACTULOSE 10 G/15ML
10 SOLUTION ORAL DAILY
Qty: 0 | Refills: 0 | Status: DISCONTINUED | OUTPATIENT
Start: 2017-08-15 | End: 2017-08-15

## 2017-08-15 RX ADMIN — Medication 12: at 15:51

## 2017-08-15 RX ADMIN — Medication 100 MILLIGRAM(S): at 05:29

## 2017-08-15 RX ADMIN — Medication 1 MILLIGRAM(S): at 23:48

## 2017-08-15 RX ADMIN — Medication 1 CAPSULE(S): at 17:14

## 2017-08-15 RX ADMIN — SODIUM CHLORIDE 125 MILLILITER(S): 9 INJECTION INTRAMUSCULAR; INTRAVENOUS; SUBCUTANEOUS at 13:03

## 2017-08-15 RX ADMIN — LACTULOSE 10 GRAM(S): 10 SOLUTION ORAL at 05:29

## 2017-08-15 RX ADMIN — Medication 10 MILLIGRAM(S): at 05:29

## 2017-08-15 RX ADMIN — Medication 9 UNIT(S): at 15:51

## 2017-08-15 RX ADMIN — Medication 5 MILLIGRAM(S): at 05:30

## 2017-08-15 RX ADMIN — Medication 1 CAPSULE(S): at 11:07

## 2017-08-15 RX ADMIN — Medication 12: at 08:22

## 2017-08-15 RX ADMIN — Medication 9 UNIT(S): at 11:13

## 2017-08-15 RX ADMIN — Medication 100 MILLIGRAM(S): at 17:14

## 2017-08-15 RX ADMIN — INSULIN GLARGINE 22 UNIT(S): 100 INJECTION, SOLUTION SUBCUTANEOUS at 08:21

## 2017-08-15 RX ADMIN — CEFDINIR 300 MILLIGRAM(S): 250 POWDER, FOR SUSPENSION ORAL at 17:14

## 2017-08-15 RX ADMIN — Medication 10 MILLIGRAM(S): at 23:49

## 2017-08-15 RX ADMIN — SODIUM CHLORIDE 1 GRAM(S): 9 INJECTION INTRAMUSCULAR; INTRAVENOUS; SUBCUTANEOUS at 05:29

## 2017-08-15 RX ADMIN — Medication 4: at 21:53

## 2017-08-15 RX ADMIN — POLYETHYLENE GLYCOL 3350 17 GRAM(S): 17 POWDER, FOR SOLUTION ORAL at 17:14

## 2017-08-15 RX ADMIN — Medication 10 MILLIGRAM(S): at 17:14

## 2017-08-15 RX ADMIN — Medication 10 MILLIGRAM(S): at 11:07

## 2017-08-15 RX ADMIN — Medication 1 CAPSULE(S): at 07:30

## 2017-08-15 RX ADMIN — Medication 12: at 11:13

## 2017-08-15 RX ADMIN — POLYETHYLENE GLYCOL 3350 17 GRAM(S): 17 POWDER, FOR SOLUTION ORAL at 05:29

## 2017-08-15 RX ADMIN — ENOXAPARIN SODIUM 40 MILLIGRAM(S): 100 INJECTION SUBCUTANEOUS at 11:07

## 2017-08-15 RX ADMIN — Medication 9 UNIT(S): at 08:22

## 2017-08-15 RX ADMIN — SENNA PLUS 2 TABLET(S): 8.6 TABLET ORAL at 21:53

## 2017-08-15 NOTE — PROGRESS NOTE ADULT - SUBJECTIVE AND OBJECTIVE BOX
62F hx HTN IDDM with insulin pump , MS Hgb A1c  > 11, chronic pancreatitis, ex ETOH abuse, last drink 14 yrs ago. Admit with poluria/polyphagia/polydipsia.  FS glucoses' running close to 400 over the last few days.    Severe DKA.  given iv insulin fluids and a dose of vancomycin  cultures were sent.    Her abd pain is gone, and oral contrast CT shows no significant finding for pain.  She does have a lot of stool. (08 Aug 2017 22:57)  blood culture here e coli; zosyn vanco and then ctx ;    sp  6 days iv antibiotics   lots of bm yesterday sp lactulose  Allergies    No Known Allergies    Intolerances        MEDICATIONS  (STANDING):  dextrose 50% Injectable 50 milliLiter(s) IV Push every 15 minutes  enoxaparin Injectable 40 milliGRAM(s) SubCutaneous daily  insulin lispro (HumaLOG) corrective regimen sliding scale   SubCutaneous three times a day before meals  dextrose 50% Injectable 12.5 Gram(s) IV Push once  dextrose 50% Injectable 25 Gram(s) IV Push once  insulin lispro (HumaLOG) corrective regimen sliding scale   SubCutaneous at bedtime  insulin glargine Injectable (LANTUS) 22 Unit(s) SubCutaneous every morning  senna 2 Tablet(s) Oral at bedtime  docusate sodium 100 milliGRAM(s) Oral two times a day  insulin lispro Injectable (HumaLOG) 9 Unit(s) SubCutaneous three times a day before meals  clonazePAM Tablet 1 milliGRAM(s) Oral at bedtime  polyethylene glycol 3350 17 Gram(s) Oral two times a day  sodium chloride 1 Gram(s) Oral three times a day  metoclopramide 10 milliGRAM(s) Oral every 6 hours  pantoprazole    Tablet 40 milliGRAM(s) Oral before breakfast  enalapril 5 milliGRAM(s) Oral daily  amylase/lipase/protease  (CREON  6,000 Units) 1 Capsule(s) Oral three times a day with meals    MEDICATIONS  (PRN):  dextrose 50% Injectable 25 milliLiter(s) IV Push every 15 minutes PRN hypoglycemia  dextrose Gel 1 Dose(s) Oral once PRN Blood Glucose LESS THAN 70 milliGRAM(s)/deciliter  glucagon  Injectable 1 milliGRAM(s) IntraMuscular once PRN Glucose LESS THAN 70 milligrams/deciliter  acetaminophen   Tablet. 650 milliGRAM(s) Oral every 6 hours PRN Headache      REVIEW OF SYSTEMS:    feels great as had lots of bms last night   VITAL SIGNS:  T(C): 36.6 (08-15-17 @ 05:41), Max: 36.6 (08-14-17 @ 17:22)  T(F): 97.9 (08-15-17 @ 05:41), Max: 97.9 (08-15-17 @ 05:41)  HR: 85 (08-15-17 @ 05:41) (85 - 92)  BP: 119/68 (08-15-17 @ 05:41) (119/68 - 155/75)  RR: 18 (08-15-17 @ 05:41) (16 - 18)  SpO2: 97% (08-15-17 @ 05:41) (97% - 98%)  Wt(kg): --    PHYSICAL EXAM:    GENERAL: not in any distress  HEENT: Neck is supple, normocephalic, atraumatic   CHEST/LUNG: Clear to percussion bilaterally; No rales, rhonchi, wheezing  HEART: Regular rate and rhythm; No murmurs, rubs, or gallops  ABDOMEN: Softer nonender, still distended   IES:  2+ Peripheral Pulses, No clubbing, cyanosis, or edema  GENITOURINARY: NEGATIVE   SKIN: No rashes or lesions  BACK: no pressor sore   NERVOUS SYSTEM:  Alert & Oriented X3, Good concentration  PSYCH: normal affect     LABS:                         10.2   6.7   )-----------( 571      ( 15 Aug 2017 07:23 )             31.6     08-15    122<L>  |  85<L>  |  27<H>  ----------------------------<  811<HH>  5.5<H>   |  24  |  1.03    Ca    8.2<L>      15 Aug 2017 07:23                                              Radiology:

## 2017-08-15 NOTE — PROVIDER CONTACT NOTE (CRITICAL VALUE NOTIFICATION) - TEST AND RESULT REPORTED:
Glucose 811
blood culture growth in aerobatic bottle gram negative rods
blood culture growth in anaerobic and aerobic bottle gram negative rods
Phosphorous - 1.0

## 2017-08-15 NOTE — PROVIDER CONTACT NOTE (CRITICAL VALUE NOTIFICATION) - ACTION/TREATMENT ORDERED:
Cover as per sliding scale and standing order
As per pA willl assess and treat. follow insulin coverage on EMAR and lantus
Replace electrolyte

## 2017-08-15 NOTE — PROGRESS NOTE ADULT - SUBJECTIVE AND OBJECTIVE BOX
Patient is a 62y old  Female who presents with a chief complaint of High blood sugar (09 Aug 2017 06:44)      INTERVAL HPI/OVERNIGHT EVENTS: Elevated blood sugar.    MEDICATIONS  (STANDING):  dextrose 50% Injectable 50 milliLiter(s) IV Push every 15 minutes  enoxaparin Injectable 40 milliGRAM(s) SubCutaneous daily  insulin lispro (HumaLOG) corrective regimen sliding scale   SubCutaneous three times a day before meals  dextrose 5%. 1000 milliLiter(s) (50 mL/Hr) IV Continuous <Continuous>  dextrose 50% Injectable 12.5 Gram(s) IV Push once  dextrose 50% Injectable 25 Gram(s) IV Push once  insulin lispro (HumaLOG) corrective regimen sliding scale   SubCutaneous at bedtime  insulin glargine Injectable (LANTUS) 22 Unit(s) SubCutaneous every morning  senna 2 Tablet(s) Oral at bedtime  docusate sodium 100 milliGRAM(s) Oral two times a day  insulin lispro Injectable (HumaLOG) 9 Unit(s) SubCutaneous three times a day before meals  clonazePAM Tablet 1 milliGRAM(s) Oral at bedtime  polyethylene glycol 3350 17 Gram(s) Oral two times a day  sodium chloride 1 Gram(s) Oral three times a day  metoclopramide 10 milliGRAM(s) Oral every 6 hours  pantoprazole    Tablet 40 milliGRAM(s) Oral before breakfast  enalapril 5 milliGRAM(s) Oral daily  amylase/lipase/protease  (CREON  6,000 Units) 1 Capsule(s) Oral three times a day with meals    MEDICATIONS  (PRN):  dextrose 50% Injectable 25 milliLiter(s) IV Push every 15 minutes PRN hypoglycemia  dextrose Gel 1 Dose(s) Oral once PRN Blood Glucose LESS THAN 70 milliGRAM(s)/deciliter  glucagon  Injectable 1 milliGRAM(s) IntraMuscular once PRN Glucose LESS THAN 70 milligrams/deciliter  acetaminophen   Tablet. 650 milliGRAM(s) Oral every 6 hours PRN Headache      Allergies    No Known Allergies    Intolerances        REVIEW OF SYSTEMS: Had multiple bowel movements since yesterday.  CONSTITUTIONAL: No fever, weight loss, or fatigue  EYES: No eye pain, visual disturbances, or discharge  ENMT:  No difficulty hearing, tinnitus, vertigo; No sinus or throat pain  NECK: No pain or stiffness  BREASTS: No pain, masses, or nipple discharge  RESPIRATORY: No cough, wheezing, chills or hemoptysis; No shortness of breath  CARDIOVASCULAR: No chest pain, palpitations, dizziness, or leg swelling  GASTROINTESTINAL: No abdominal or epigastric pain. No nausea, vomiting, or hematemesis; No diarrhea or constipation. No melena or hematochezia.  GENITOURINARY: No dysuria, frequency, hematuria, or incontinence  NEUROLOGICAL: No headaches, memory loss, loss of strength, numbness, or tremors  SKIN: No itching, burning, rashes, or lesions   LYMPH NODES: No enlarged glands  ENDOCRINE: No heat or cold intolerance; No hair loss  MUSCULOSKELETAL: No joint pain or swelling; No muscle, back, or extremity pain  PSYCHIATRIC: No depression, anxiety, mood swings, or difficulty sleeping  HEME/LYMPH: No easy bruising, or bleeding gums  ALLERGY AND IMMUNOLOGIC: No hives or eczema    Vital Signs Last 24 Hrs  T(C): 36.6 (15 Aug 2017 05:41), Max: 36.6 (14 Aug 2017 17:22)  T(F): 97.9 (15 Aug 2017 05:41), Max: 97.9 (15 Aug 2017 05:41)  HR: 85 (15 Aug 2017 05:41) (84 - 92)  BP: 119/68 (15 Aug 2017 05:41) (119/68 - 155/75)  BP(mean): --  RR: 18 (15 Aug 2017 05:41) (16 - 18)  SpO2: 97% (15 Aug 2017 05:41) (97% - 99%)    PHYSICAL EXAM:  GENERAL: NAD,  well-developed. Thin built  HEAD:  Atraumatic, Normocephalic  EYES: EOMI, FEDERICO, conjunctiva and sclera clear  ENMT:  Moist mucous membranes, Good dentition, No lesions  NECK: Supple, No JVD, Normal thyroid  NERVOUS SYSTEM:  Alert & Oriented X3, Good concentration; Motor Strength 5/5 B/L upper and lower extremities;  CHEST/LUNG: Clear to percussion bilaterally; No rales, rhonchi, wheezing, or rubs  HEART: Regular rate and rhythm; No murmurs, rubs, or gallops  ABDOMEN: Soft, Nontender, Nondistended; Bowel sounds present  EXTREMITIES:  2+ Peripheral Pulses, No clubbing, cyanosis, or edema  LYMPH: No lymphadenopathy noted  SKIN: No rashes or lesions    LABS:                        10.2   6.7   )-----------( 571      ( 15 Aug 2017 07:23 )             31.6     08-15    122<L>  |  85<L>  |  27<H>  ----------------------------<  811<HH>  5.5<H>   |  24  |  1.03    Ca    8.2<L>      15 Aug 2017 07:23          CAPILLARY BLOOD GLUCOSE  400 (14 Aug 2017 21:38)  98 (14 Aug 2017 17:41)  269 (14 Aug 2017 11:50)              Procalcitonin, Serum: 10.62 ng/mL (08-09 @ 09:26)  Hemoglobin A1C, Whole Blood: 12.5 % (08-09 @ 08:03)        Urine Culture:      Culture Results:   Growth in aerobic and anaerobic bottles: Escherichia coli  See previous culture [id number] (08.09.17 @ 01:01)          RADIOLOGY & ADDITIONAL TESTS:  < from: Xray Abdomen Multiple Views (08.14.17 @ 17:08) >    EXAM:  ABD COMP DECUB & OR ERECT                            PROCEDURE DATE:  08/14/2017          INTERPRETATION:  Supine and upright views of the abdomen    Clinical indications: No bowel movement in 70s, evaluate for fecal   impaction    Comparison: CT of the abdomen and pelvis dated 8/8/2017    FINDINGS AND   IMPRESSION: There is a very large amount stool throughout the colon   compatible with fecal impaction. No gross free air identified. Coarse   calcifications overlie the pancreas, better evaluated on recent CT. There   are degenerative changes of the thoracic spine.    BRANDI NICOLE M.D., ATTENDING RADIOLOGIST  This document has been electronically signed. Aug 14 2017  5:38PM      Imaging Personally Reviewed:  [x ] YES  [ ] NO    Consultant(s) Notes Reviewed:  [ ] YES  [ ] NO    Care Discussed with Consultants/Other Providers [ ] YES  [ ] NO    PROBLEMS:    DIABETIC KETOACIDOSIS (  HIGH BLOOD SUGAR  DKA (diabetic ketoacidoses)  Type 1 diabetes mellitus with hyperosmolarity without coma  Type 1 diabetes mellitus with ketoacidosis without coma  Diabetic ketoacidosis without coma associated with drug or chemical induced diabetes mellitus  Bacteremia  Hyponatremia  Acute kidney failure  Hypertension  Multiple sclerosis  Alcohol abuse  Pancreatic insufficiency        Care discussed with family,         [  ]   yes  [  ]  No    imp:    stable[ ]    unstable[x  ]     improving [   ]       unchanged  [  ]                Plans:  Continue present plans  [  ]               New consult [  ]   specialty  .......               order rochelle[  ]    test name.                  Discharge Planning  [  ]

## 2017-08-15 NOTE — CONSULT NOTE ADULT - SUBJECTIVE AND OBJECTIVE BOX
As per chart:    Patient is a 62y old  Female who presents with a chief complaint of High blood sugar (09 Aug 2017 06:44)    HPI:  Histrionic patient.    62F hx HTN IDDM with insulin pump , MS Hgb A1c  > 11, chronic pancreatitis, ex ETOH abuse, last drink 14 yrs ago. Admit with poluria/polyphagia/polydipsia.  FS glucoses' running close to 400 over the last few days.      Severe DKA.  given iv insulin fluids and a dose of vancomycin  cultures were sent.    Her abd pain is gone, and oral contrast CT shows no significant finding for pain.  She does have a lot of stool. (08 Aug 2017 22:57)      Patient currently awake alert ; noted  Na 122 K 5.5.    PAST MEDICAL & SURGICAL HISTORY:  Pancreatic insufficiency  Alcohol abuse  Multiple sclerosis  Hypertension  Diabetes  H/O cervical spine surgery    FAMILY HISTORY:  No pertinent family history in first degree relatives    No Known Allergies    MEDICATIONS  (STANDING):  dextrose 50% Injectable 50 milliLiter(s) IV Push every 15 minutes  enoxaparin Injectable 40 milliGRAM(s) SubCutaneous daily  insulin lispro (HumaLOG) corrective regimen sliding scale   SubCutaneous three times a day before meals  dextrose 50% Injectable 12.5 Gram(s) IV Push once  dextrose 50% Injectable 25 Gram(s) IV Push once  insulin lispro (HumaLOG) corrective regimen sliding scale   SubCutaneous at bedtime  insulin glargine Injectable (LANTUS) 22 Unit(s) SubCutaneous every morning  senna 2 Tablet(s) Oral at bedtime  docusate sodium 100 milliGRAM(s) Oral two times a day  insulin lispro Injectable (HumaLOG) 9 Unit(s) SubCutaneous three times a day before meals  clonazePAM Tablet 1 milliGRAM(s) Oral at bedtime  polyethylene glycol 3350 17 Gram(s) Oral two times a day  metoclopramide 10 milliGRAM(s) Oral every 6 hours  pantoprazole    Tablet 40 milliGRAM(s) Oral before breakfast  amylase/lipase/protease  (CREON  6,000 Units) 1 Capsule(s) Oral three times a day with meals  cefdinir 300 milliGRAM(s) Oral two times a day  sodium chloride 0.9%. 1000 milliLiter(s) (125 mL/Hr) IV Continuous <Continuous>    MEDICATIONS  (PRN):  dextrose 50% Injectable 25 milliLiter(s) IV Push every 15 minutes PRN hypoglycemia  dextrose Gel 1 Dose(s) Oral once PRN Blood Glucose LESS THAN 70 milliGRAM(s)/deciliter  glucagon  Injectable 1 milliGRAM(s) IntraMuscular once PRN Glucose LESS THAN 70 milligrams/deciliter  acetaminophen   Tablet. 650 milliGRAM(s) Oral every 6 hours PRN Headache    Vital Signs Last 24 Hrs  T(C): 37.1 (15 Aug 2017 14:46), Max: 37.1 (15 Aug 2017 14:46)  T(F): 98.8 (15 Aug 2017 14:46), Max: 98.8 (15 Aug 2017 14:46)  HR: 81 (15 Aug 2017 14:46) (81 - 92)  BP: 108/71 (15 Aug 2017 14:46) (108/71 - 155/75)  BP(mean): --  RR: 17 (15 Aug 2017 14:46) (16 - 18)  SpO2: 99% (15 Aug 2017 14:46) (97% - 99%)    CAPILLARY BLOOD GLUCOSE  515 (15 Aug 2017 11:42)  545 (15 Aug 2017 11:41)  400 (14 Aug 2017 21:38)  98 (14 Aug 2017 17:41)        PHYSICAL EXAM:      T(C): 37.1 (15 Aug 2017 14:46), Max: 37.1 (15 Aug 2017 14:46)  HR: 81 (15 Aug 2017 14:46) (81 - 92)  BP: 108/71 (15 Aug 2017 14:46) (108/71 - 155/75)  RR: 17 (15 Aug 2017 14:46) (16 - 18)  SpO2: 99% (15 Aug 2017 14:46) (97% - 99%)  Wt(kg): --  Respiratory: clear anteriorly, decreased BS at bases  Cardiovascular: S1 S2  Gastrointestinal: soft NT ND +BS  Extremities:   TR edema              08-15    122<L>  |  85<L>  |  27<H>  ----------------------------<  811<HH>  5.5<H>   |  24  |  1.03    Ca    8.2<L>      15 Aug 2017 07:23                            10.2   6.7   )-----------( 571      ( 15 Aug 2017 07:23 )             31.6             Assessment and Plan    REYNALDO with pseudohyponatremia and hyperglycemic associated hyperkalemia.  Insulin, IVF;   Will follow course.

## 2017-08-15 NOTE — PROGRESS NOTE ADULT - SUBJECTIVE AND OBJECTIVE BOX
Patient is a 62y old  Female who presents with a chief complaint of High blood sugar (09 Aug 2017 06:44)      Interval History: finger sticks are increased. Also patient with non-compliance with diet . finger sticks are increased and patient with pseudo hyponatremia.   she says Lactulose is giving her hyperglycemia ?  patient may have intentions to deliberately take increased sugar and keep hyperglycemic levels and to avoid discharge        MEDICATIONS  (STANDING):  dextrose 50% Injectable 50 milliLiter(s) IV Push every 15 minutes  enoxaparin Injectable 40 milliGRAM(s) SubCutaneous daily  insulin lispro (HumaLOG) corrective regimen sliding scale   SubCutaneous three times a day before meals  dextrose 50% Injectable 12.5 Gram(s) IV Push once  dextrose 50% Injectable 25 Gram(s) IV Push once  insulin lispro (HumaLOG) corrective regimen sliding scale   SubCutaneous at bedtime  insulin glargine Injectable (LANTUS) 22 Unit(s) SubCutaneous every morning  senna 2 Tablet(s) Oral at bedtime  docusate sodium 100 milliGRAM(s) Oral two times a day  insulin lispro Injectable (HumaLOG) 9 Unit(s) SubCutaneous three times a day before meals  clonazePAM Tablet 1 milliGRAM(s) Oral at bedtime  polyethylene glycol 3350 17 Gram(s) Oral two times a day  metoclopramide 10 milliGRAM(s) Oral every 6 hours  pantoprazole    Tablet 40 milliGRAM(s) Oral before breakfast  amylase/lipase/protease  (CREON  6,000 Units) 1 Capsule(s) Oral three times a day with meals  cefdinir 300 milliGRAM(s) Oral two times a day  sodium chloride 0.9%. 1000 milliLiter(s) (125 mL/Hr) IV Continuous <Continuous>    MEDICATIONS  (PRN):  dextrose 50% Injectable 25 milliLiter(s) IV Push every 15 minutes PRN hypoglycemia  dextrose Gel 1 Dose(s) Oral once PRN Blood Glucose LESS THAN 70 milliGRAM(s)/deciliter  glucagon  Injectable 1 milliGRAM(s) IntraMuscular once PRN Glucose LESS THAN 70 milligrams/deciliter  acetaminophen   Tablet. 650 milliGRAM(s) Oral every 6 hours PRN Headache      Allergies    No Known Allergies    Intolerances        REVIEW OF SYSTEMS:  CONSTITUTIONAL: No Complaints . feels sleepy   EYES: No eye pain, visual disturbances, or discharge  ENMT:  No difficulty hearing, tinnitus, vertigo; No sinus or throat pain  NECK: No pain or stiffness  RESPIRATORY: No cough, wheezing, chills or hemoptysis; No shortness of breath  CARDIOVASCULAR: No chest pain, palpitations, dizziness, or leg swelling  GASTROINTESTINAL: No abdominal or epigastric pain. No nausea, vomiting, or hematemesis; No diarrhea or constipation. No melena or hematochezia.  GENITOURINARY: No dysuria, frequency, hematuria, or incontinence  NEUROLOGICAL: No headaches, memory loss, loss of strength, numbness, or tremors  SKIN: No itching, burning, rashes, or lesions   LYMPH NODES: No enlarged glands  ENDOCRINE: No heat or cold intolerance; No hair loss  MUSCULOSKELETAL: No joint pain or swelling; No muscle, back, or extremity pain  PSYCHIATRIC: No depression, anxiety, mood swings, or difficulty sleeping  HEME/LYMPH: No easy bruising, or bleeding gums  ALLERY AND IMMUNOLOGIC: No hives or eczema    Vital Signs Last 24 Hrs  T(C): 36.4 (15 Aug 2017 17:13), Max: 37.1 (15 Aug 2017 14:46)  T(F): 97.6 (15 Aug 2017 17:13), Max: 98.8 (15 Aug 2017 14:46)  HR: 80 (15 Aug 2017 17:13) (80 - 92)  BP: 130/77 (15 Aug 2017 17:13) (108/71 - 155/75)  BP(mean): --  RR: 16 (15 Aug 2017 17:13) (16 - 18)  SpO2: 97% (15 Aug 2017 17:13) (97% - 99%)    PHYSICAL EXAM:  GENERAL: no changes  HEAD:  Atraumatic, Normocephalic  EYES: EOMI, PERRLA, conjunctiva and sclera clear  ENMT: No tonsillar erythema, exudates, or enlargement; Moist mucous membranes, Good dentition, No lesions  NECK: Supple, No JVD, Normal thyroid  NERVOUS SYSTEM:  Alert & Oriented X3, Good concentration; Motor Strength 5/5 B/L upper and lower extremities; DTRs 2+ intact and symmetric  CHEST/LUNG: Clear to percussion bilaterally; No rales, rhonchi, wheezing, or rubs  HEART: Regular rate and rhythm; No murmurs, rubs, or gallops  ABDOMEN: Soft, Nontender, Nondistended; Bowel sounds present  EXTREMITIES:  2+ Peripheral Pulses, No clubbing, cyanosis, or edema  SKIN: No rashes or lesions    LABS:        CAPILLARY BLOOD GLUCOSE  454 (15 Aug 2017 15:50)  460 (15 Aug 2017 15:49)  515 (15 Aug 2017 11:42)  545 (15 Aug 2017 11:41)  400 (14 Aug 2017 21:38)        Lipid panel:           Thyroid:  Diabetes Tests:  Parathyroid Panel:  Adrenals:  RADIOLOGY & ADDITIONAL TESTS:    Imaging Personally Reviewed:  [ ] YES  [ ] NO    Consultant(s) Notes Reviewed:  [ ] YES  [ ] NO    Care Discussed with Consultants/Other Providers [ ] YES  [ ] NO

## 2017-08-16 ENCOUNTER — TRANSCRIPTION ENCOUNTER (OUTPATIENT)
Age: 62
End: 2017-08-16

## 2017-08-16 VITALS
RESPIRATION RATE: 16 BRPM | HEART RATE: 86 BPM | TEMPERATURE: 98 F | OXYGEN SATURATION: 97 % | SYSTOLIC BLOOD PRESSURE: 138 MMHG | DIASTOLIC BLOOD PRESSURE: 69 MMHG

## 2017-08-16 DIAGNOSIS — A41.51 SEPSIS DUE TO ESCHERICHIA COLI [E. COLI]: ICD-10-CM

## 2017-08-16 LAB
ANION GAP SERPL CALC-SCNC: 13 MMOL/L — SIGNIFICANT CHANGE UP (ref 5–17)
BUN SERPL-MCNC: 19 MG/DL — SIGNIFICANT CHANGE UP (ref 7–23)
CALCIUM SERPL-MCNC: 8.8 MG/DL — SIGNIFICANT CHANGE UP (ref 8.5–10.1)
CHLORIDE SERPL-SCNC: 95 MMOL/L — LOW (ref 96–108)
CO2 SERPL-SCNC: 21 MMOL/L — LOW (ref 22–31)
CREAT SERPL-MCNC: 0.62 MG/DL — SIGNIFICANT CHANGE UP (ref 0.5–1.3)
GLUCOSE SERPL-MCNC: 189 MG/DL — HIGH (ref 70–99)
HCT VFR BLD CALC: 33.6 % — LOW (ref 34.5–45)
HGB BLD-MCNC: 11 G/DL — LOW (ref 11.5–15.5)
MAGNESIUM SERPL-MCNC: 2.4 MG/DL — SIGNIFICANT CHANGE UP (ref 1.6–2.6)
MCHC RBC-ENTMCNC: 29.8 PG — SIGNIFICANT CHANGE UP (ref 27–34)
MCHC RBC-ENTMCNC: 32.8 GM/DL — SIGNIFICANT CHANGE UP (ref 32–36)
MCV RBC AUTO: 90.8 FL — SIGNIFICANT CHANGE UP (ref 80–100)
PHOSPHATE SERPL-MCNC: 3.5 MG/DL — SIGNIFICANT CHANGE UP (ref 2.5–4.5)
PLATELET # BLD AUTO: 689 K/UL — HIGH (ref 150–400)
POTASSIUM SERPL-MCNC: 5.3 MMOL/L — SIGNIFICANT CHANGE UP (ref 3.5–5.3)
POTASSIUM SERPL-SCNC: 5.3 MMOL/L — SIGNIFICANT CHANGE UP (ref 3.5–5.3)
RBC # BLD: 3.71 M/UL — LOW (ref 3.8–5.2)
RBC # FLD: 15.7 % — HIGH (ref 11–15)
SODIUM SERPL-SCNC: 129 MMOL/L — LOW (ref 135–145)
WBC # BLD: 9.5 K/UL — SIGNIFICANT CHANGE UP (ref 3.8–10.5)
WBC # FLD AUTO: 9.5 K/UL — SIGNIFICANT CHANGE UP (ref 3.8–10.5)

## 2017-08-16 RX ORDER — GLATIRAMER ACETATE 20 MG/ML
1 INJECTION, SOLUTION SUBCUTANEOUS
Qty: 0 | Refills: 0 | COMMUNITY

## 2017-08-16 RX ORDER — CEFDINIR 250 MG/5ML
1 POWDER, FOR SUSPENSION ORAL
Qty: 14 | Refills: 0
Start: 2017-08-16 | End: 2017-08-23

## 2017-08-16 RX ORDER — DOCUSATE SODIUM 100 MG
1 CAPSULE ORAL
Qty: 60 | Refills: 0
Start: 2017-08-16 | End: 2017-09-15

## 2017-08-16 RX ORDER — CITALOPRAM 10 MG/1
1 TABLET, FILM COATED ORAL
Qty: 60 | Refills: 0
Start: 2017-08-16 | End: 2017-10-15

## 2017-08-16 RX ORDER — METOCLOPRAMIDE HCL 10 MG
1 TABLET ORAL
Qty: 28 | Refills: 0
Start: 2017-08-16 | End: 2017-08-23

## 2017-08-16 RX ADMIN — PANTOPRAZOLE SODIUM 40 MILLIGRAM(S): 20 TABLET, DELAYED RELEASE ORAL at 08:37

## 2017-08-16 RX ADMIN — Medication 9 UNIT(S): at 08:37

## 2017-08-16 RX ADMIN — Medication 9 UNIT(S): at 12:08

## 2017-08-16 RX ADMIN — Medication 1 CAPSULE(S): at 12:08

## 2017-08-16 RX ADMIN — Medication 8: at 12:08

## 2017-08-16 RX ADMIN — Medication 1 CAPSULE(S): at 08:38

## 2017-08-16 RX ADMIN — Medication 10 MILLIGRAM(S): at 05:31

## 2017-08-16 RX ADMIN — Medication 10 MILLIGRAM(S): at 12:11

## 2017-08-16 RX ADMIN — INSULIN GLARGINE 22 UNIT(S): 100 INJECTION, SOLUTION SUBCUTANEOUS at 12:12

## 2017-08-16 RX ADMIN — ENOXAPARIN SODIUM 40 MILLIGRAM(S): 100 INJECTION SUBCUTANEOUS at 12:08

## 2017-08-16 RX ADMIN — CEFDINIR 300 MILLIGRAM(S): 250 POWDER, FOR SUSPENSION ORAL at 05:31

## 2017-08-16 RX ADMIN — SODIUM CHLORIDE 125 MILLILITER(S): 9 INJECTION INTRAMUSCULAR; INTRAVENOUS; SUBCUTANEOUS at 05:32

## 2017-08-16 RX ADMIN — Medication 100 MILLIGRAM(S): at 05:31

## 2017-08-16 RX ADMIN — Medication 6: at 08:37

## 2017-08-16 RX ADMIN — POLYETHYLENE GLYCOL 3350 17 GRAM(S): 17 POWDER, FOR SOLUTION ORAL at 05:32

## 2017-08-16 NOTE — DISCHARGE NOTE ADULT - SECONDARY DIAGNOSIS.
Type 1 diabetes mellitus with ketoacidosis without coma Alcohol abuse Acute renal failure, unspecified acute renal failure type Bacteremia Pancreatic insufficiency Essential hypertension

## 2017-08-16 NOTE — DISCHARGE NOTE ADULT - HOSPITAL COURSE
Histrionic patient. 62F hx HTN IDDM with insulin pump , MS Hgb A1c  > 11, chronic pancreatitis, ex ETOH abuse, last drink 14 yrs ago. Admit with poluria/polyphagia/polydipsia.  FS glucoses' running close to 400 over the last few days. Severe DKA.  given iv insulin fluids and a dose of vancomycin  cultures were sent. Her abd pain is gone, and oral contrast CT shows no significant finding for pain. She does have a lot of stool. Patient has since been having BMs and DKA resolved. Pt stable for discharge with follow up with Dr. Miller for insulin pump. Pt to continue with PO ABX for 7 days.

## 2017-08-16 NOTE — PROGRESS NOTE ADULT - PROBLEM SELECTOR PLAN 1
Diabetic Ketoacidosis resolved   Continue with the same regimen while inpatient   encourage more Nutrition   Patient can resume  home regimen upon discharge but needs tighter control of diabetes
GAP closed. Fingersticks improved.  RISS. c/w Pre-meal insulin regimen  Endocrine input regarding insulin pump / discharge regimen.
Resolved
Continue with the same regimen while inpatient   cause of glucose toxicity unknown ,   encourage more Nutrition and may need to be on increased insulin   WBC normal and patient is afebrile
Continue with the same regimen while inpatient   patient can be discharged on the same regimen   Patient should have strict diet control and do exercise as tolerated upon discharge   can be discharged
Diabetic Ketoacidosis resolved   may need Lantus increased or prandial lispro added
GAP closed. Fingersticks improved.  Lantus 22units now (44 units total infused while on pump)  Stop insulin gtt 1 hour after lantus dose.  RISS. Diet advancement. Pre-meal insulin regimen  Endocrine input regarding insulin pump / regimen.  Repeat BMP 12pm. replete lytes as needed.
Resolved
adjust prandial lispro 8 units to achieve target range blood glucose   while inpatient better to have FS< 150 and preferably in 120-140 range   Diabetic Ketoacidosis now resolved
all the medications for GI are discontinued   will have to restrict carb intake and if we can demonstrate  decreasing blood glucose on that , she can be discharged   Patient can resume  home regimen upon discharge
stable finger sticks and with decreasing trend   patient can be discharged on the same regimen with out-patient Follow up   Patient should have strict diet control and do exercise as tolerated upon discharge

## 2017-08-16 NOTE — PROGRESS NOTE ADULT - PROBLEM SELECTOR PLAN 3
Resolved
Returning to baseline level.  Initially pseudohyponatremia from hyperglycemia.  Monitor daily.
Returning to baseline level.  Initially pseudohyponatremia from hyperglycemia.  Monitor daily.
SECONDARY TO HYPERGLYCEMIA

## 2017-08-16 NOTE — DISCHARGE NOTE ADULT - CARE PROVIDERS DIRECT ADDRESSES
,DirectAddress_Unknown,DirectAddress_Unknown,[email or web address]

## 2017-08-16 NOTE — DISCHARGE NOTE ADULT - PATIENT PORTAL LINK FT
“You can access the Follow[facility] Patient Portal, offered by [facility], by registering with the following website: [email or web address]”

## 2017-08-16 NOTE — DISCHARGE NOTE ADULT - PLAN OF CARE
Follow up with Dr. Miller for Insulin pump Continue home meds until follow up with Dr. Miller Continue home diabetic medications  Follow up with PCP  Low-carbohydrate diet  ADA Recipes - [email or web address] Cessation Keep hydrated Continue PO ABX for 7 more days Continue home meds Continue home blood pressure medications  Follow up with PCP  Low-sodium diet  AHA Recipes - [email or web address] Resolved

## 2017-08-16 NOTE — DISCHARGE NOTE ADULT - MEDICATION SUMMARY - MEDICATIONS TO TAKE
I will START or STAY ON the medications listed below when I get home from the hospital:    enalapril 5 mg oral tablet  -- 1 tab(s) by mouth once a day  -- Indication: For HTN    clonazePAM 1 mg oral tablet  -- 1 tab(s) by mouth once a day (at bedtime)  -- Indication: For Anxiety    DULoxetine 60 mg oral delayed release capsule  -- 1 cap(s) by mouth once a day (at bedtime)  -- Indication: For Antidepressant    insulin glargine  -- 5 unit(s) subcutaneous once a day (at bedtime)  -- Indication: For DM    NovoLOG  -- 5 unit(s) subcutaneous 3 times a day with meals , per blood sugar level.  -- Indication: For DM    metoclopramide 10 mg oral tablet  -- 1 tab(s) by mouth every 6 hours  -- Indication: For DIABETIC GASTROPARESIS    nystatin 100,000 units/mL oral suspension  -- 5 milliliter(s) by mouth every 6 hours  -- Indication: For Rash    cefdinir 300 mg oral capsule  -- 1 cap(s) by mouth 2 times a day  -- Indication: For Infection    docosanol 10% topical cream  -- 0.5 application on skin 5 times a day  -- Indication: For Rash    Creon  --  by mouth   -- Indication: For Pancreatic insufficiency    docusate sodium 100 mg oral capsule  -- 1 cap(s) by mouth 2 times a day  -- Indication: For Constipation I will START or STAY ON the medications listed below when I get home from the hospital:    enalapril 5 mg oral tablet  -- 1 tab(s) by mouth once a day  -- Indication: For HTN    clonazePAM 1 mg oral tablet  -- 1 tab(s) by mouth once a day (at bedtime)  -- Indication: For Anxiety    CeleXA 20 mg oral tablet  -- 1 tab(s) by mouth once a day  -- Indication: For Depression    insulin glargine  -- 5 unit(s) subcutaneous once a day (at bedtime)  -- Indication: For DM    NovoLOG  -- 5 unit(s) subcutaneous 3 times a day with meals , per blood sugar level.  -- Indication: For DM    metoclopramide 10 mg oral tablet  -- 1 tab(s) by mouth every 6 hours  -- Indication: For DIABETIC GASTROPARESIS    nystatin 100,000 units/mL oral suspension  -- 5 milliliter(s) by mouth every 6 hours  -- Indication: For Rash    cefdinir 300 mg oral capsule  -- 1 cap(s) by mouth 2 times a day  -- Indication: For Infection    docosanol 10% topical cream  -- 0.5 application on skin 5 times a day  -- Indication: For Rash    Creon  --  by mouth   -- Indication: For Pancreatic insufficiency    docusate sodium 100 mg oral capsule  -- 1 cap(s) by mouth 2 times a day  -- Indication: For Constipation

## 2017-08-16 NOTE — PROGRESS NOTE ADULT - PROVIDER SPECIALTY LIST ADULT
Critical Care
Critical Care
Endocrinology
Internal Medicine
Nephrology
Infectious Disease

## 2017-08-16 NOTE — PROGRESS NOTE ADULT - PROBLEM SELECTOR PROBLEM 4
Pancreatic insufficiency
Pancreatic insufficiency
Multiple sclerosis
Pancreatic insufficiency
Pancreatic insufficiency
Acute renal failure, unspecified acute renal failure type
Pancreatic insufficiency

## 2017-08-16 NOTE — DISCHARGE NOTE ADULT - CARE PROVIDER_API CALL
Tess Gramajo ([facility]), Internal Medicine  [address] [place] [address]  Phone: [phone]  Fax: [phone]    Ramón Miller), EndocrinologyMetabDiabetes; Internal Medicine  [address] [facility]  [address]  [place] [address]  Phone: [phone]  Fax: [phone]    Max Steve), Internal Medicine; Nephrology  [address] [facility]  [address] [place] [id number]  Phone: [phone]  Fax: [phone]

## 2017-08-16 NOTE — PROGRESS NOTE ADULT - SUBJECTIVE AND OBJECTIVE BOX
Patient seen in follow up for REYNALDO electrolyte  imbalance; improving trend.    MEDICATIONS  (STANDING):  dextrose 50% Injectable 50 milliLiter(s) IV Push every 15 minutes  enoxaparin Injectable 40 milliGRAM(s) SubCutaneous daily  insulin lispro (HumaLOG) corrective regimen sliding scale   SubCutaneous three times a day before meals  dextrose 50% Injectable 12.5 Gram(s) IV Push once  dextrose 50% Injectable 25 Gram(s) IV Push once  insulin lispro (HumaLOG) corrective regimen sliding scale   SubCutaneous at bedtime  insulin glargine Injectable (LANTUS) 22 Unit(s) SubCutaneous every morning  senna 2 Tablet(s) Oral at bedtime  docusate sodium 100 milliGRAM(s) Oral two times a day  insulin lispro Injectable (HumaLOG) 9 Unit(s) SubCutaneous three times a day before meals  clonazePAM Tablet 1 milliGRAM(s) Oral at bedtime  polyethylene glycol 3350 17 Gram(s) Oral two times a day  pantoprazole    Tablet 40 milliGRAM(s) Oral before breakfast  amylase/lipase/protease  (CREON  6,000 Units) 1 Capsule(s) Oral three times a day with meals  cefdinir 300 milliGRAM(s) Oral two times a day  sodium chloride 0.9%. 1000 milliLiter(s) (125 mL/Hr) IV Continuous <Continuous>    MEDICATIONS  (PRN):  dextrose 50% Injectable 25 milliLiter(s) IV Push every 15 minutes PRN hypoglycemia  dextrose Gel 1 Dose(s) Oral once PRN Blood Glucose LESS THAN 70 milliGRAM(s)/deciliter  glucagon  Injectable 1 milliGRAM(s) IntraMuscular once PRN Glucose LESS THAN 70 milligrams/deciliter  acetaminophen   Tablet. 650 milliGRAM(s) Oral every 6 hours PRN Headache    PHYSICAL EXAM:      T(C): 36.7 (08-16-17 @ 11:24), Max: 37.1 (08-15-17 @ 14:46)  HR: 86 (08-16-17 @ 11:24) (71 - 89)  BP: 138/69 (08-16-17 @ 11:24) (108/71 - 138/69)  RR: 16 (08-16-17 @ 11:24) (16 - 18)  SpO2: 97% (08-16-17 @ 11:24) (97% - 100%)  Wt(kg): --  Respiratory: clear anteriorly, decreased BS at bases  Cardiovascular: S1 S2  Gastrointestinal: soft NT ND +BS  Extremities:  no edema                                    11.0   9.5   )-----------( 689      ( 16 Aug 2017 08:01 )             33.6     08-16    129<L>  |  95<L>  |  19  ----------------------------<  189<H>  5.3   |  21<L>  |  0.62    Ca    8.8      16 Aug 2017 08:01  Phos  3.5     08-16  Mg     2.4     08-16            Assessment and Plan:  REYNALDO pre renal azotemia; hyponatremia, pseudo component.   Free water restriction; IVF NS; will follow course.

## 2017-08-16 NOTE — DISCHARGE NOTE ADULT - CARE PLAN
Principal Discharge DX:	DKA (diabetic ketoacidoses)  Goal:	Follow up with Dr. Miller for Insulin pump  Instructions for follow-up, activity and diet:	Continue home meds until follow up with Dr. Miller  Secondary Diagnosis:	Type 1 diabetes mellitus with ketoacidosis without coma  Instructions for follow-up, activity and diet:	Continue home diabetic medications  Follow up with PCP  Low-carbohydrate diet  ADA Recipes - [email or web address]  Secondary Diagnosis:	Alcohol abuse  Instructions for follow-up, activity and diet:	Cessation  Secondary Diagnosis:	Acute renal failure, unspecified acute renal failure type  Instructions for follow-up, activity and diet:	Keep hydrated  Secondary Diagnosis:	Bacteremia  Instructions for follow-up, activity and diet:	Continue PO ABX for 7 more days  Secondary Diagnosis:	Pancreatic insufficiency  Instructions for follow-up, activity and diet:	Continue home meds  Secondary Diagnosis:	Essential hypertension  Instructions for follow-up, activity and diet:	Continue home blood pressure medications  Follow up with PCP  Low-sodium diet  AHA Recipes - [email or web address] Principal Discharge DX:	DKA (diabetic ketoacidoses)  Goal:	Follow up with Dr. Miller for Insulin pump  Instructions for follow-up, activity and diet:	Continue home meds until follow up with Dr. Miller  Secondary Diagnosis:	Type 1 diabetes mellitus with ketoacidosis without coma  Instructions for follow-up, activity and diet:	Continue home diabetic medications  Follow up with PCP  Low-carbohydrate diet  ADA Recipes - [email or web address]  Secondary Diagnosis:	Alcohol abuse  Instructions for follow-up, activity and diet:	Cessation  Secondary Diagnosis:	Acute renal failure, unspecified acute renal failure type  Instructions for follow-up, activity and diet:	Keep hydrated  Secondary Diagnosis:	Bacteremia  Instructions for follow-up, activity and diet:	Continue PO ABX for 7 more days  Secondary Diagnosis:	Pancreatic insufficiency  Goal:	Resolved  Instructions for follow-up, activity and diet:	Continue home meds  Secondary Diagnosis:	Essential hypertension  Instructions for follow-up, activity and diet:	Continue home blood pressure medications  Follow up with PCP  Low-sodium diet  AHA Recipes - [email or web address]

## 2017-08-16 NOTE — PROGRESS NOTE ADULT - PROBLEM SELECTOR PLAN 4
Restart creon.  Diet advancement.  No abd pain. no utility in repeating lipase if symptoms improved.  Unremarkable CT abd/pelvis.
Stable
Resolving
Restart creon.  Diet advancement.  No abd pain. no utility in repeating lipase if symptoms improved.  Unremarkable CT abd/pelvis.

## 2017-08-16 NOTE — PROGRESS NOTE ADULT - PROBLEM SELECTOR PROBLEM 1
Bacteremia
DKA (diabetic ketoacidoses)
Type 1 diabetes mellitus with hyperosmolarity without coma
DKA (diabetic ketoacidoses)
Diabetic ketoacidosis without coma associated with drug or chemical induced diabetes mellitus
Diabetic ketoacidosis without coma associated with drug or chemical induced diabetes mellitus
Type 1 diabetes mellitus with hyperosmolarity without coma
Type 1 diabetes mellitus with ketoacidosis without coma

## 2017-08-16 NOTE — PROGRESS NOTE ADULT - PROBLEM SELECTOR PROBLEM 5
Multiple sclerosis
Multiple sclerosis
Pancreatic insufficiency
Multiple sclerosis
Multiple sclerosis

## 2017-08-16 NOTE — PROGRESS NOTE ADULT - SUBJECTIVE AND OBJECTIVE BOX
Patient is a 62y old  Female who presents with a chief complaint of High blood sugar (16 Aug 2017 10:33)      INTERVAL HPI/OVERNIGHT EVENTS: Resolving Hyponatremia. Asymptomatic    MEDICATIONS  (STANDING):  dextrose 50% Injectable 50 milliLiter(s) IV Push every 15 minutes  enoxaparin Injectable 40 milliGRAM(s) SubCutaneous daily  insulin lispro (HumaLOG) corrective regimen sliding scale   SubCutaneous three times a day before meals  dextrose 50% Injectable 12.5 Gram(s) IV Push once  dextrose 50% Injectable 25 Gram(s) IV Push once  insulin lispro (HumaLOG) corrective regimen sliding scale   SubCutaneous at bedtime  insulin glargine Injectable (LANTUS) 22 Unit(s) SubCutaneous every morning  senna 2 Tablet(s) Oral at bedtime  docusate sodium 100 milliGRAM(s) Oral two times a day  insulin lispro Injectable (HumaLOG) 9 Unit(s) SubCutaneous three times a day before meals  clonazePAM Tablet 1 milliGRAM(s) Oral at bedtime  polyethylene glycol 3350 17 Gram(s) Oral two times a day  metoclopramide 10 milliGRAM(s) Oral every 6 hours  pantoprazole    Tablet 40 milliGRAM(s) Oral before breakfast  amylase/lipase/protease  (CREON  6,000 Units) 1 Capsule(s) Oral three times a day with meals  cefdinir 300 milliGRAM(s) Oral two times a day  sodium chloride 0.9%. 1000 milliLiter(s) (125 mL/Hr) IV Continuous <Continuous>    MEDICATIONS  (PRN):  dextrose 50% Injectable 25 milliLiter(s) IV Push every 15 minutes PRN hypoglycemia  dextrose Gel 1 Dose(s) Oral once PRN Blood Glucose LESS THAN 70 milliGRAM(s)/deciliter  glucagon  Injectable 1 milliGRAM(s) IntraMuscular once PRN Glucose LESS THAN 70 milligrams/deciliter  acetaminophen   Tablet. 650 milliGRAM(s) Oral every 6 hours PRN Headache      Allergies    No Known Allergies    Intolerances        REVIEW OF SYSTEMS:  CONSTITUTIONAL: No fever, weight loss, or fatigue  EYES: No eye pain, visual disturbances, or discharge  ENMT:  No difficulty hearing, tinnitus, vertigo; No sinus or throat pain  NECK: No pain or stiffness  BREASTS: No pain, masses, or nipple discharge  RESPIRATORY: No cough, wheezing, chills or hemoptysis; No shortness of breath  CARDIOVASCULAR: No chest pain, palpitations, dizziness, or leg swelling  GASTROINTESTINAL: No abdominal or epigastric pain. No nausea, vomiting, or hematemesis; No diarrhea or constipation. No melena or hematochezia.  GENITOURINARY: No dysuria, frequency, hematuria, or incontinence  NEUROLOGICAL: No headaches, memory loss, loss of strength, numbness, or tremors  SKIN: No itching, burning, rashes, or lesions   LYMPH NODES: No enlarged glands  ENDOCRINE: No heat or cold intolerance; No hair loss  MUSCULOSKELETAL: No joint pain or swelling; No muscle, back, or extremity pain  PSYCHIATRIC: No depression, anxiety, mood swings, or difficulty sleeping  HEME/LYMPH: No easy bruising, or bleeding gums  ALLERGY AND IMMUNOLOGIC: No hives or eczema    Vital Signs Last 24 Hrs  T(C): 36.6 (16 Aug 2017 05:38), Max: 37.1 (15 Aug 2017 14:46)  T(F): 97.9 (16 Aug 2017 05:38), Max: 98.8 (15 Aug 2017 14:46)  HR: 71 (16 Aug 2017 05:38) (71 - 89)  BP: 127/70 (16 Aug 2017 05:38) (108/71 - 130/77)  BP(mean): --  RR: 16 (16 Aug 2017 05:38) (16 - 18)  SpO2: 100% (16 Aug 2017 05:38) (97% - 100%)    PHYSICAL EXAM:  GENERAL: NAD, well-groomed, Cachectic, Thin Built  HEAD:  Atraumatic, Normocephalic  EYES: EOMI, FEDERICO, conjunctiva and sclera clear  ENMT:  Moist mucous membranes, Good dentition, No lesions  NECK: Supple, No JVD, Normal thyroid  NERVOUS SYSTEM:  Alert & Oriented X3, Good concentration; Motor Strength 5/5 B/L upper and lower extremities.  CHEST/LUNG: Clear to percussion bilaterally; No rales, rhonchi, wheezing, or rubs  HEART: Regular rate and rhythm; No murmurs, rubs, or gallops  ABDOMEN: Soft, Nontender, Nondistended; Bowel sounds present  EXTREMITIES:  2+ Peripheral Pulses, No clubbing, cyanosis, or edema  LYMPH: No lymphadenopathy noted  SKIN: No rashes or lesions    LABS:                        11.0   9.5   )-----------( 689      ( 16 Aug 2017 08:01 )             33.6     08-16    129<L>  |  95<L>  |  19  ----------------------------<  189<H>  5.3   |  21<L>  |  0.62    Ca    8.8      16 Aug 2017 08:01  Phos  3.5     08-16  Mg     2.4     08-16          CAPILLARY BLOOD GLUCOSE  262 (16 Aug 2017 08:25)  337 (15 Aug 2017 21:52)  454 (15 Aug 2017 15:50)  460 (15 Aug 2017 15:49)  515 (15 Aug 2017 11:42)  545 (15 Aug 2017 11:41)              Procalcitonin, Serum: 0.26 ng/mL (08-15 @ 14:29)        Urine Culture:      Blood Culture:      RADIOLOGY & ADDITIONAL TESTS:    Imaging Personally Reviewed:  [ ] YES  [ ] NO    Consultant(s) Notes Reviewed:  [ ] YES  [ ] NO    Care Discussed with Consultants/Other Providers [ ] YES  [ ] NO    PROBLEMS:    DIABETIC KETOACIDOSIS (  HIGH BLOOD SUGAR  DKA (diabetic ketoacidoses)  Essential hypertension  Acute renal failure, unspecified acute renal failure type    Type 1 diabetes mellitus with ketoacidosis without coma  Diabetic ketoacidosis without coma associated with drug or chemical induced diabetes mellitus  Bacteremia  Hyponatremia  Acute kidney failure  Hypertension  Multiple sclerosis  Alcohol abuse  Pancreatic insufficiency  DKA (diabetic ketoacidoses)      Care discussed with family,         [  ]   yes  [  ]  No    imp:    stable[ ]    unstable[  ]     improving [   ]       unchanged  [  ]                Plans:  Continue present plans  [  ]               New consult [  ]   specialty  .......               order rochelle[  ]    test name.                  Discharge Planning  [  ]

## 2017-08-16 NOTE — PROGRESS NOTE ADULT - PROBLEM SELECTOR PROBLEM 3
Acute kidney failure
Acute kidney failure
Acute renal failure, unspecified acute renal failure type
Hyponatremia
Acute kidney failure
Hyponatremia
Hyponatremia

## 2017-08-16 NOTE — DISCHARGE NOTE ADULT - MEDICATION SUMMARY - MEDICATIONS TO STOP TAKING
I will STOP taking the medications listed below when I get home from the hospital:    influenza virus vaccine, inactivated  --    Glatopa 20 mg/mL subcutaneous solution  -- 1 unit(s) subcutaneous once a day (at bedtime) I will STOP taking the medications listed below when I get home from the hospital:    DULoxetine 60 mg oral delayed release capsule  -- 1 cap(s) by mouth once a day (at bedtime)    influenza virus vaccine, inactivated  --    Glatopa 20 mg/mL subcutaneous solution  -- 1 unit(s) subcutaneous once a day (at bedtime)

## 2017-08-16 NOTE — PROGRESS NOTE ADULT - SUBJECTIVE AND OBJECTIVE BOX
Patient is a 62y old  Female who presents with a chief complaint of High blood sugar (16 Aug 2017 10:33)      Interval History: more diet compliant , finger sticks are in 300's   on Lantus 22 units and prandial lispro 9 units and Lispro sliding scale coverage with meals     MEDICATIONS  (STANDING):  dextrose 50% Injectable 50 milliLiter(s) IV Push every 15 minutes  enoxaparin Injectable 40 milliGRAM(s) SubCutaneous daily  insulin lispro (HumaLOG) corrective regimen sliding scale   SubCutaneous three times a day before meals  dextrose 50% Injectable 12.5 Gram(s) IV Push once  dextrose 50% Injectable 25 Gram(s) IV Push once  insulin lispro (HumaLOG) corrective regimen sliding scale   SubCutaneous at bedtime  insulin glargine Injectable (LANTUS) 22 Unit(s) SubCutaneous every morning  senna 2 Tablet(s) Oral at bedtime  docusate sodium 100 milliGRAM(s) Oral two times a day  insulin lispro Injectable (HumaLOG) 9 Unit(s) SubCutaneous three times a day before meals  clonazePAM Tablet 1 milliGRAM(s) Oral at bedtime  polyethylene glycol 3350 17 Gram(s) Oral two times a day  pantoprazole    Tablet 40 milliGRAM(s) Oral before breakfast  amylase/lipase/protease  (CREON  6,000 Units) 1 Capsule(s) Oral three times a day with meals  cefdinir 300 milliGRAM(s) Oral two times a day  sodium chloride 0.9%. 1000 milliLiter(s) (125 mL/Hr) IV Continuous <Continuous>    MEDICATIONS  (PRN):  dextrose 50% Injectable 25 milliLiter(s) IV Push every 15 minutes PRN hypoglycemia  dextrose Gel 1 Dose(s) Oral once PRN Blood Glucose LESS THAN 70 milliGRAM(s)/deciliter  glucagon  Injectable 1 milliGRAM(s) IntraMuscular once PRN Glucose LESS THAN 70 milligrams/deciliter  acetaminophen   Tablet. 650 milliGRAM(s) Oral every 6 hours PRN Headache      Allergies    No Known Allergies    Intolerances        REVIEW OF SYSTEMS:  CONSTITUTIONAL:   EYES: No eye pain, visual disturbances, or discharge  ENMT:  No difficulty hearing, tinnitus, vertigo; No sinus or throat pain  NECK: No pain or stiffness  RESPIRATORY: No cough, wheezing, chills or hemoptysis; No shortness of breath  CARDIOVASCULAR: No chest pain, palpitations, dizziness, or leg swelling  GASTROINTESTINAL: No abdominal or epigastric pain. No nausea, vomiting, or hematemesis; No diarrhea or constipation. No melena or hematochezia.  GENITOURINARY: No dysuria, frequency, hematuria, or incontinence  NEUROLOGICAL: No headaches, memory loss, loss of strength, numbness, or tremors  SKIN: No itching, burning, rashes, or lesions   LYMPH NODES: No enlarged glands  ENDOCRINE: No heat or cold intolerance; No hair loss  MUSCULOSKELETAL: No joint pain or swelling; No muscle, back, or extremity pain  PSYCHIATRIC: No depression, anxiety, mood swings, or difficulty sleeping  HEME/LYMPH: No easy bruising, or bleeding gums  ALLERY AND IMMUNOLOGIC: No hives or eczema    Vital Signs Last 24 Hrs  T(C): 36.7 (16 Aug 2017 11:24), Max: 36.7 (15 Aug 2017 23:20)  T(F): 98 (16 Aug 2017 11:24), Max: 98 (15 Aug 2017 23:20)  HR: 86 (16 Aug 2017 11:24) (71 - 89)  BP: 138/69 (16 Aug 2017 11:24) (126/75 - 138/69)  BP(mean): --  RR: 16 (16 Aug 2017 11:24) (16 - 18)  SpO2: 97% (16 Aug 2017 11:24) (97% - 100%)    PHYSICAL EXAM:  GENERAL:   HEAD:  Atraumatic, Normocephalic  EYES: EOMI, PERRLA, conjunctiva and sclera clear  ENMT: No tonsillar erythema, exudates, or enlargement; Moist mucous membranes, Good dentition, No lesions  NECK: Supple, No JVD, Normal thyroid  NERVOUS SYSTEM:  Alert & Oriented X3, Good concentration; Motor Strength 5/5 B/L upper and lower extremities; DTRs 2+ intact and symmetric  CHEST/LUNG: Clear to percussion bilaterally; No rales, rhonchi, wheezing, or rubs  HEART: Regular rate and rhythm; No murmurs, rubs, or gallops  ABDOMEN: Soft, Nontender, Nondistended; Bowel sounds present  EXTREMITIES:  2+ Peripheral Pulses, No clubbing, cyanosis, or edema  SKIN: No rashes or lesions    LABS:        CAPILLARY BLOOD GLUCOSE  306 (16 Aug 2017 12:03)  262 (16 Aug 2017 08:25)  337 (15 Aug 2017 21:52)        Lipid panel:           Thyroid:  Diabetes Tests:  Parathyroid Panel:  Adrenals:  RADIOLOGY & ADDITIONAL TESTS:    Imaging Personally Reviewed:  [ ] YES  [ ] NO    Consultant(s) Notes Reviewed:  [ ] YES  [ ] NO    Care Discussed with Consultants/Other Providers [ ] YES  [ ] NO

## 2017-08-16 NOTE — PROGRESS NOTE ADULT - ASSESSMENT
62F hx of DM on insulin pump p/w severe diabetic ketoacidosis without clear inciting event requiring insulin gtt and frequent electrolyte / metabolic monitoring.
62F hx of DM on insulin pump p/w severe diabetic ketoacidosis without clear inciting event requiring insulin gtt and frequent electrolyte / metabolic monitoring..
Add NaCl for hyponatremia. Lantus for DM. IV Rocephin for bacteremia. Monitor constipation
Continue NaCl. Fleet enema. Continue IV Abx for bacteremia
Continue NaCl. Fleet enema. Continue IV Abx for bacteremia. D/C Lactulose. Endocrine f/u. May need to be on Insulin Pump.
Continue NaCl. Fleet enema. Continue IV Abx for bacteremia. D/C Lactulose. Endocrine f/u. May need to be on Insulin Pump. Pt. medically stable for discharge. Discharge on Omnicef for 8 days. Was admitted for Severe DKA and Sepsis 2/2 E.coli Present on admission.  To f/u with endocrinology for insulin pump activation.
Fleet enema STAT. Patient is advised to reduce fluid intake. Continue NaCl tab.
diabetes I
diabetes I  status post Diabetic Ketoacidosis
diabetes I uncontrolled
diabetes uncontrolled
hyperglycemia
resolving sepsis   failure to thrive ; patient persisting with weight loss   start lactulose  ? gi follow up
resolving sepsis ; po cefdinir x 8 more days ;; end date is 8/22/2017  failure to thrive ; patient persisting with weight loss   dc  lactulose today ; only once a night   has hyponatremia   dr sue is seeing patient now   ? gi follow up
resolving sepsis   failure to thrive ; patient persisting with weight loss   see orders   will follow with you thanks

## 2017-08-18 DIAGNOSIS — E87.1 HYPO-OSMOLALITY AND HYPONATREMIA: ICD-10-CM

## 2017-08-18 DIAGNOSIS — K86.1 OTHER CHRONIC PANCREATITIS: ICD-10-CM

## 2017-08-18 DIAGNOSIS — I10 ESSENTIAL (PRIMARY) HYPERTENSION: ICD-10-CM

## 2017-08-18 DIAGNOSIS — Z79.4 LONG TERM (CURRENT) USE OF INSULIN: ICD-10-CM

## 2017-08-18 DIAGNOSIS — Z96.41 PRESENCE OF INSULIN PUMP (EXTERNAL) (INTERNAL): ICD-10-CM

## 2017-08-18 DIAGNOSIS — K59.00 CONSTIPATION, UNSPECIFIED: ICD-10-CM

## 2017-08-18 DIAGNOSIS — G35 MULTIPLE SCLEROSIS: ICD-10-CM

## 2017-08-18 DIAGNOSIS — R62.7 ADULT FAILURE TO THRIVE: ICD-10-CM

## 2017-08-18 DIAGNOSIS — A41.51 SEPSIS DUE TO ESCHERICHIA COLI [E. COLI]: ICD-10-CM

## 2017-08-18 DIAGNOSIS — E13.10 OTHER SPECIFIED DIABETES MELLITUS WITH KETOACIDOSIS WITHOUT COMA: ICD-10-CM

## 2017-08-18 DIAGNOSIS — E43 UNSPECIFIED SEVERE PROTEIN-CALORIE MALNUTRITION: ICD-10-CM

## 2017-08-18 DIAGNOSIS — N17.9 ACUTE KIDNEY FAILURE, UNSPECIFIED: ICD-10-CM

## 2017-08-18 DIAGNOSIS — G70.00 MYASTHENIA GRAVIS WITHOUT (ACUTE) EXACERBATION: ICD-10-CM

## 2017-08-18 DIAGNOSIS — F10.10 ALCOHOL ABUSE, UNCOMPLICATED: ICD-10-CM

## 2017-08-18 PROBLEM — Z00.00 ENCOUNTER FOR PREVENTIVE HEALTH EXAMINATION: Status: ACTIVE | Noted: 2017-08-18

## 2017-08-23 NOTE — PROGRESS NOTE ADULT - SUBJECTIVE AND OBJECTIVE BOX
ordered   Pt stable - diarrhea decreased  Appetite improved  Pt has h/o of IBS-D on Viberzi at home      MEDICATIONS  (STANDING):  dextrose 5%. 1000milliLiter(s) IV Continuous <Continuous>  dextrose 50% Injectable 12.5Gram(s) IV Push once  dextrose 50% Injectable 25Gram(s) IV Push once  influenza   Vaccine 0.5milliLiter(s) IntraMuscular once  ALBUTerol    90 MICROgram(s) HFA Inhaler 1Puff(s) Inhalation every 4 hours  amylase/lipase/protease  (CREON  6,000 Units) 1Capsule(s) Oral three times a day with meals  metroNIDAZOLE  IVPB 500milliGRAM(s) IV Intermittent every 8 hours  metroNIDAZOLE  IVPB  IV Intermittent   insulin glargine Injectable (LANTUS) 10Unit(s) SubCutaneous <User Schedule>  cefTRIAXone   IVPB 2Gram(s) IV Intermittent every 24 hours  pantoprazole  Injectable 40milliGRAM(s) IV Push daily  heparin  Injectable 5000Unit(s) SubCutaneous every 12 hours  dextrose 5% + sodium chloride 0.9% with potassium chloride 20 mEq/L 1000milliLiter(s) IV Continuous <Continuous>  insulin lispro (HumaLOG) corrective regimen sliding scale  SubCutaneous three times a day before meals  insulin lispro (HumaLOG) corrective regimen sliding scale  SubCutaneous at bedtime    MEDICATIONS  (PRN):  dextrose Gel 1Dose(s) Oral once PRN Blood Glucose LESS THAN 70 milliGRAM(s)/deciliter  glucagon  Injectable 1milliGRAM(s) IntraMuscular once PRN Glucose LESS THAN 70 milligrams/deciliter  ondansetron Injectable 4milliGRAM(s) IV Push every 8 hours PRN Nausea and/or Vomiting  acetaminophen   Tablet 650milliGRAM(s) Oral every 6 hours PRN For Temp greater than 38 C (100.4 F)      Allergies    No Known Allergies    Intolerances        Vital Signs Last 24 Hrs  T(C): 37.8, Max: 37.8 (02-12 @ 07:08)  T(F): 100, Max: 100 (02-12 @ 07:08)  HR: 81 (78 - 108)  BP: 120/69 (98/60 - 136/80)  BP(mean): 82 (67 - 94)  RR: 20 (14 - 28)  SpO2: 100% (100% - 100%)    PHYSICAL EXAM:  General: NAD.  CVS: S1, S2  Chest: air entry bilaterally present  Abd: BS present, soft, non-tender      LABS:                        12.5   12.7  )-----------( 321      ( 12 Feb 2017 03:39 )             35.1     12 Feb 2017 03:39    139    |  105    |  15     ----------------------------<  313    3.9     |  25     |  0.78     Ca    7.7        12 Feb 2017 03:39  Phos  1.5       12 Feb 2017 03:39  Mg     1.9       12 Feb 2017 03:39      on Flagyl/Rocephin  continue Protonix  On full fluids - ok to advance as tolerated

## 2017-09-13 ENCOUNTER — APPOINTMENT (OUTPATIENT)
Dept: OBGYN | Facility: CLINIC | Age: 62
End: 2017-09-13

## 2017-10-31 NOTE — PATIENT PROFILE ADULT. - AS SC BRADEN SENSORY
(4) no impairment Consent (Scalp)/Introductory Paragraph: The rationale for Mohs was explained to the patient and consent was obtained. The risks, benefits and alternatives to therapy were discussed in detail. Specifically, the risks of changes in hair growth pattern secondary to repair, infection, scarring, bleeding, prolonged wound healing, incomplete removal, allergy to anesthesia, nerve injury and recurrence were addressed. Prior to the procedure, the treatment site was clearly identified and confirmed by the patient. All components of Universal Protocol/PAUSE Rule completed.

## 2018-07-03 NOTE — PHYSICAL THERAPY INITIAL EVALUATION ADULT - SIT-TO-STAND BALANCE
HAS OV 7/12/18 WITH TW    ----- Message from Mukul Kruse MD sent at 7/3/2018  1:23 PM EDT -----  MM/LABS 12MOS
to be assessed

## 2018-07-10 NOTE — PROGRESS NOTE ADULT - PROBLEM SELECTOR PROBLEM 10
Chronic pancreatitis, unspecified pancreatitis type
Normal vision: sees adequately in most situations; can see medication labels, newsprint

## 2018-07-19 NOTE — H&P ADULT. - ASSESSMENT
(4) no limitation 61 years old female is admitted for hypoglycemia, h/o DM II, PNA, r/o sepsis, elevated lactate, MD  Plan: IV D5.45% NS. IV Abx. Monitor lactate level. Blood and urine culture.

## 2018-09-19 PROBLEM — K86.89 OTHER SPECIFIED DISEASES OF PANCREAS: Chronic | Status: ACTIVE | Noted: 2017-08-08

## 2018-09-19 PROBLEM — F10.10 ALCOHOL ABUSE, UNCOMPLICATED: Chronic | Status: ACTIVE | Noted: 2017-08-08

## 2018-10-11 ENCOUNTER — APPOINTMENT (OUTPATIENT)
Dept: OBGYN | Facility: CLINIC | Age: 63
End: 2018-10-11
Payer: MEDICARE

## 2018-10-11 VITALS
HEIGHT: 61 IN | WEIGHT: 109 LBS | DIASTOLIC BLOOD PRESSURE: 80 MMHG | SYSTOLIC BLOOD PRESSURE: 140 MMHG | BODY MASS INDEX: 20.58 KG/M2

## 2018-10-11 DIAGNOSIS — Z86.19 PERSONAL HISTORY OF OTHER INFECTIOUS AND PARASITIC DISEASES: ICD-10-CM

## 2018-10-11 DIAGNOSIS — Z87.19 PERSONAL HISTORY OF OTHER DISEASES OF THE DIGESTIVE SYSTEM: ICD-10-CM

## 2018-10-11 DIAGNOSIS — F17.200 NICOTINE DEPENDENCE, UNSPECIFIED, UNCOMPLICATED: ICD-10-CM

## 2018-10-11 DIAGNOSIS — Z86.69 PERSONAL HISTORY OF OTHER DISEASES OF THE NERVOUS SYSTEM AND SENSE ORGANS: ICD-10-CM

## 2018-10-11 DIAGNOSIS — Z87.39 PERSONAL HISTORY OF OTHER DISEASES OF THE MUSCULOSKELETAL SYSTEM AND CONNECTIVE TISSUE: ICD-10-CM

## 2018-10-11 DIAGNOSIS — Z80.3 FAMILY HISTORY OF MALIGNANT NEOPLASM OF BREAST: ICD-10-CM

## 2018-10-11 PROCEDURE — G0101: CPT

## 2018-10-15 ENCOUNTER — APPOINTMENT (OUTPATIENT)
Dept: OBGYN | Facility: CLINIC | Age: 63
End: 2018-10-15
Payer: MEDICARE

## 2018-10-15 PROCEDURE — 36415 COLL VENOUS BLD VENIPUNCTURE: CPT

## 2018-10-17 LAB — CYTOLOGY CVX/VAG DOC THIN PREP: NORMAL

## 2019-01-02 NOTE — PATIENT PROFILE ADULT. - PURPOSEFUL PROACTIVE ROUNDING
Pt currently in PCCU following procedure on the right hip. Pt is currently on room air, no isolation. Pt admitted from [facility] in [facility], with plan to return there at [place].  CAN sent updated clinicals via allscripts to the snf and placed c Patient

## 2019-04-23 NOTE — ED ADULT NURSE NOTE - BREATHING, MLM
CERTIFICATE OF RETURN TO WORK    April 23, 2019      Ronit Ramirez  [address]  [facility] [address]                      This is to certify that Ronit Ramirez has been under my care from 4/23/2019, please is use her from our starting on for 20 to/2019 2/24/2019 and may return to work on Thursday, April 25.    RESTRICTIONS: None.      REMARKS: None.          Sincerely, MD Angel Gonzalez MD  [address] [facility] [address]  [phone]    
        CERTIFICATE OF RETURN TO WORK    April 23, 2019      Ronit Ramirez  [address]  [facility] [address]                      This is to certify that Ronit Ramirez has been under my care from today, April 23, please excuse her from work starting yesterday, April 22 through tomorrow, April 24 and can return to regular work on Thursday, April 25.    RESTRICTIONS: None      REMARKS: None          Sincerely,  MD Angel Gonzalez MD  [address] [facility] [address]  [phone]    
Spontaneous, unlabored and symmetrical

## 2019-07-10 NOTE — PROGRESS NOTE ADULT - ASSESSMENT
Problem List Items Addressed This Visit     Acute URI    Acute conjunctivitis of both eyes - Primary    Relevant Medications    moxifloxacin (VIGAMOX) 0.5 % ophthalmic solution    Acute pharyngitis    Relevant Medications    clarithromycin (BIAXIN) 500 MG tablet    Cough due to bronchospasm    Relevant Medications    clarithromycin (BIAXIN) 500 MG tablet    Upper respiratory tract infection               Established Patient Office Visit    Patient:  Elena Lay  :1958    Chief Complaint:   Chief Complaint   Patient presents with   • Eye Problem       History Of Present Illness:  Elena is at 60 year old female   HPI Patient was seen in the [facility] on Monday due to a 24-hour of cough congestion eye complaints fever chills was thought initially to have a summer flu but was treated with antibiotics.  She believes the antibiotics were amoxicillin no eyedrops were given.  She states her symptoms have not improved she has had continuance of her cough her eye discharge was gooey at times and postnasal drip.  She denies any fever chills.    New Medical Concerns:    Past Medical History:  Past Medical History:   Diagnosis Date   • Abnormal EKG     SHORT KS INTERVAL    • Cephalalgia     2000   • Chronic fatigue    • Condition not found     Past Hospitalization: Yes,   • Condition not found     Past Surgeries: Yes   • Condition not found     Intolerant of estrogen replacement therapy   • Disorder of metabolism     2007 /day 2007 [id number]   • Diverticulitis     2013-18 X4   • DVT of leg (deep venous thrombosis) (CMS/HCC)      NEGATIVE WORK UP SAW DR RAMIREZ   • Dyslipidemia    • History of hysterectomy      00 phorectomy   • HTN (hypertension)    • Lymphoma (CMS/HCC)     DERMATOLOGY LOW GRADE B CELL LYMPHOMA    • Mitral valve prolapse    • NAFLD (nonalcoholic fatty liver disease)    • Obesity    • S/P tonsillectomy     Yes   • Sinus bradycardia     full work up  Dr. Garsia    • Stress    • Stress headache     9/2006        Allergies:  ALLERGIES:   Allergen Reactions   • Latex Other (See Comments) and RASH   • Latex   (Environmental) Other (See Comments)     Unknown     • Sulfamethoxazole-Trimethoprim Nausea & Vomiting       Medications:  Outpatient Encounter Medications as of 7/10/2019   Medication Sig Dispense Refill   • amoxicillin (AMOXIL) 875 MG tablet Take 1 tablet by mouth 2 times daily for 10 days. 20 tablet 0   • olmesartan-hydrochlorothiazide (BENICAR HCT) 40-25 MG per tablet Take 1 tablet by mouth daily. 90 tablet 1   • moxifloxacin (VIGAMOX) 0.5 % ophthalmic solution Place 1 drop into both eyes 3 times daily for 5 days. 3 mL 0   • clarithromycin (BIAXIN) 500 MG tablet Take 1 tablet by mouth 2 times daily for 7 days. 14 tablet 0   • montelukast (SINGULAIR) 10 MG tablet Take 1 tablet by mouth nightly for 10 days. 20 tablet 0   • [DISCONTINUED] DICLOFENAC SODIUM PO        No facility-administered encounter medications on file as of 7/10/2019.        Social History:  Social History     Tobacco Use   • Smoking status: Never Smoker   • Smokeless tobacco: Never Used   Substance Use Topics   • Alcohol use: No     Frequency: Never   • Drug use: No       Family History:  Family History   Problem Relation Age of Onset   • Cancer Father         unkown health hx   • Cancer Other    • Depression Other    • Obesity Other    • Other Other         Drug/Alcohol Problem       Review of Systems:  Review of Systems   Constitutional: Positive for fatigue. Negative for activity change, chills, diaphoresis and fever.   Eyes: Positive for discharge and itching.   Endocrine: Negative.      Constitutional: Negative for fever, chills, change in appetite or fatigue.  Skin: Negative for rash or wounds.  HEENT: Negative for eye drainage, rhinorrhea, ear pain, sore throat or neck pain.  Respiratory: Negative cough, wheezing or shortness of breath.  Cardiovascular: Negative for chest pain, chest pressure,  diabetes I palpitations or diaphoresis.  Gastrointestinal: Negative for nausea, vomiting, diarrhea, abdominal pain, black or tarry stools.  Genitourinary: Negative for dysuria, urgency, frequency, hematuria or flank pain.  Extremities:  Negative for joint swelling or joint pain.  Muscular Skeletal c/o:  Neurologic:  Negative for change in sensory or motor function.  Negative for headache, change in gait, vertigo, vision or speech. Gait   Endocrine: Negative for heat or cold intolerance, weight loss or gain.  Hematological: Negative for bleeding, bruising or lymphadenopathy.  Psychiatric: Negative for change in affect, anxiety, depression, mentation or sleep disturbance.    Physical Examination:    Vitals:    07/10/19 1403   BP: 132/68   Pulse: 52   Resp: 20   Temp: 98.8 °F (37.1 °C)   TempSrc: Axillary   SpO2: 98%   Weight: 78.5 kg (173 lb)   Height: 5' 3\" (1.6 m)   PF: 300 L/min       Examination of the patient reveals:  Physical Exam       Constitutional: appears well-developed and well-nourished. Alert and oriented.   Head: Normocephalic and atraumatic.   Nose: Nose normal.   Mouth/Throat: Mucous membranes are moist.  Normal oropharynx  Eyes: Pupils are equal, round, and reactive to light.  Conjunctiva erythematous with some noted minimal clear color discharge  Neck: Normal range of motion. Neck supple. No thyromegaly No JVD  Cardiovascular: Regular rate and rhythm, normal S1, S2 no S3 or S4. No murmur, rub or gallops  Pulmonary: Effort normal and breath sounds normal. No wheezes, rales or rhonchi    Neurological: grossly normal.   Skin: Skin is warm and dry.   Psych: normal mood and affect  Extremities: no significant edema  Pulses: LE DP/PT palpable    I personally reviewed and interpreted recent laboratory values in the chart.    Labs:  No results found for: HGBA1C  No results found for: CHOLESTEROL  No results found for: HDL  No results found for: TRIGLYCERIDE  No results found for: CALCLDL    ASSESSMENT AND  PLAN:  Patient Active Problem List   Diagnosis   • S/P bariatric surgery   • Essential hypertension   • Status post gastric bypass for obesity   • Obesity (BMI 30-39.9)   • No-show for appointment   • Acute right hip pain   • Trochanteric bursitis of right hip   • Acute URI   • Acute conjunctivitis of both eyes   • Acute pharyngitis   • Cough due to bronchospasm   • Upper respiratory tract infection     No problem-specific Assessment & Plan notes found for this encounter.    Acute conjunctivitis of both eyes, unspecified acute conjunctivitis type    - moxifloxacin (VIGAMOX) 0.5 % ophthalmic solution; Place 1 drop into both eyes 3 times daily for 5 days.  Dispense: 3 mL; Refill: 0    Acute pharyngitis, unspecified etiology    - clarithromycin (BIAXIN) 500 MG tablet; Take 1 tablet by mouth 2 times daily for 7 days.  Dispense: 14 tablet; Refill: 0    Cough due to bronchospasm    - clarithromycin (BIAXIN) 500 MG tablet; Take 1 tablet by mouth 2 times daily for 7 days.  Dispense: 14 tablet; Refill: 0    Upper respiratory tract infection, unspecified type      Acute URI      No diagnosis found.  No follow-ups on file.    Gary LEE Internist

## 2019-11-01 ENCOUNTER — TRANSCRIPTION ENCOUNTER (OUTPATIENT)
Age: 64
End: 2019-11-01

## 2020-08-17 NOTE — PROGRESS NOTE ADULT - SUBJECTIVE AND OBJECTIVE BOX
INTERVAL HPI/OVERNIGHT EVENTS:  Pt is a 60 yo lady with a pmhx of DM1 w prior insulin pump since removed, HTN, HL, MS who presents to the ED with N/v/d for 2 days. She ate tacos on Wednesday, and then started having n/v/d that night until now, unable to keep anything down. No fevers, no focal abdominal pain, no dysuria, no chest pain, no short of breath.   Hospital course complicated with Salmonella sepsis(urine, stool and blood), pneumonia with suspected lung abscess and hypoglycemia episode. Was in ICU, now on regular floor. S/P colonoscopy with Polypectomy on 02/15/17.  Awake and comfortable without distress. Out of bed to chair.    Vital Signs Last 24 Hrs  T(C): 36.7, Max: 37.7 (02-16 @ 18:17)  T(F): 98, Max: 99.8 (02-16 @ 18:17)  HR: 105 (87 - 115)  BP: 123/78 (123/78 - 158/88)  BP(mean): --  RR: 16 (15 - 18)  SpO2: 97% (97% - 100%)    PHYSICAL EXAM:  GEN:         Awake, responsive and comfortable.  HEENT:    Normal.    RESP:       no wheezing.  CVS:             Regular rate and rhythm.   ABD:         Soft, non-tender, non-distended;   :             No costovertebral angle tenderness  EXTR:            No clubbing, cyanosis or edema  CNS:              Intact sensory and motor function.    MEDICATIONS  (STANDING):  dextrose 5%. 1000milliLiter(s) IV Continuous <Continuous>  dextrose 50% Injectable 12.5Gram(s) IV Push once  dextrose 50% Injectable 25Gram(s) IV Push once  influenza   Vaccine 0.5milliLiter(s) IntraMuscular once  ALBUTerol    90 MICROgram(s) HFA Inhaler 1Puff(s) Inhalation every 4 hours  amylase/lipase/protease  (CREON  6,000 Units) 1Capsule(s) Oral three times a day with meals  cefTRIAXone   IVPB 2Gram(s) IV Intermittent every 24 hours  heparin  Injectable 5000Unit(s) SubCutaneous every 12 hours  insulin lispro (HumaLOG) corrective regimen sliding scale  SubCutaneous three times a day before meals  insulin lispro (HumaLOG) corrective regimen sliding scale  SubCutaneous at bedtime  nystatin    Suspension [id number](s) Oral every 6 hours  sodium chloride 0.9%. 1000milliLiter(s) IV Continuous <Continuous>  acyclovir   Tablet 400milliGRAM(s) Oral three times a day  pantoprazole    Tablet 40milliGRAM(s) Oral before breakfast  docosanol 10% Cream 0.5Application(s) Topical five times a day  glatiramer Injectable 20milliGRAM(s) SubCutaneous at bedtime  lactobacillus acidophilus 1Tablet(s) Oral two times a day with meals  insulin glargine Injectable (LANTUS) 5Unit(s) SubCutaneous <User Schedule>    MEDICATIONS  (PRN):  dextrose Gel 1Dose(s) Oral once PRN Blood Glucose LESS THAN 70 milliGRAM(s)/deciliter  glucagon  Injectable 1milliGRAM(s) IntraMuscular once PRN Glucose LESS THAN 70 milligrams/deciliter  ondansetron Injectable 4milliGRAM(s) IV Push every 8 hours PRN Nausea and/or Vomiting  acetaminophen   Tablet 650milliGRAM(s) Oral every 6 hours PRN For Temp greater than 38 C (100.4 F)  guaiFENesin    Syrup 200milliGRAM(s) Oral every 6 hours PRN Cough    ASSESSMENT AND PLAN:  ·	Multilobar pneumonia. clinically improving.  ·	Salmonella sepsis. improving  ·	Leukocytosis. improved.  ·	Hypokalemia. better.  ·	DM with hypoglycemia.  ·	Multiple sclerosis.  ·	HTN.  ·	Herpes  labialis.     Pulmonary wise improved and stable.  Follow up Chest CT in 6-8 weeks. Dupixent Counseling: I discussed with the patient the risks of dupilumab including but not limited to eye infection and irritation, cold sores, injection site reactions, worsening of asthma, allergic reactions and increased risk of parasitic infection.  Live vaccines should be avoided while taking dupilumab. Dupilumab will also interact with certain medications such as warfarin and cyclosporine. The patient understands that monitoring is required and they must alert us or the primary physician if symptoms of infection or other concerning signs are noted.

## 2021-03-08 ENCOUNTER — NON-APPOINTMENT (OUTPATIENT)
Age: 66
End: 2021-03-08

## 2021-03-08 ENCOUNTER — APPOINTMENT (OUTPATIENT)
Dept: OBGYN | Facility: CLINIC | Age: 66
End: 2021-03-08
Payer: MEDICARE

## 2021-03-08 VITALS
HEIGHT: 61 IN | DIASTOLIC BLOOD PRESSURE: 78 MMHG | WEIGHT: 132 LBS | SYSTOLIC BLOOD PRESSURE: 132 MMHG | BODY MASS INDEX: 24.92 KG/M2

## 2021-03-08 DIAGNOSIS — Z01.411 ENCOUNTER FOR GYNECOLOGICAL EXAMINATION (GENERAL) (ROUTINE) WITH ABNORMAL FINDINGS: ICD-10-CM

## 2021-03-08 DIAGNOSIS — N95.2 POSTMENOPAUSAL ATROPHIC VAGINITIS: ICD-10-CM

## 2021-03-08 PROCEDURE — G0101: CPT

## 2021-03-08 RX ORDER — ENALAPRIL MALEATE 10 MG/1
10 TABLET ORAL
Refills: 0 | Status: ACTIVE | COMMUNITY

## 2021-03-08 RX ORDER — FEXOFENADINE HCL 60 MG
CAPSULE ORAL
Refills: 0 | Status: ACTIVE | COMMUNITY

## 2021-03-08 RX ORDER — ESTRADIOL 10 UG/1
10 TABLET VAGINAL
Qty: 1 | Refills: 3 | Status: ACTIVE | COMMUNITY
Start: 2021-03-08 | End: 1900-01-01

## 2021-03-08 RX ORDER — CLONAZEPAM 0.5 MG/1
0.5 TABLET ORAL
Refills: 0 | Status: ACTIVE | COMMUNITY

## 2021-03-08 RX ORDER — PREGABALIN 200 MG/1
200 CAPSULE ORAL
Refills: 0 | Status: ACTIVE | COMMUNITY

## 2021-03-08 RX ORDER — INSULIN ASPART 100 [IU]/ML
100 INJECTION, SOLUTION INTRAVENOUS; SUBCUTANEOUS
Refills: 0 | Status: ACTIVE | COMMUNITY

## 2021-03-08 RX ORDER — INSULIN GLARGINE 100 [IU]/ML
100 INJECTION, SOLUTION SUBCUTANEOUS
Refills: 0 | Status: ACTIVE | COMMUNITY

## 2021-03-08 NOTE — HISTORY OF PRESENT ILLNESS
[FreeTextEntry1] : patient presents today for routine annual exam.\par  [postmenopausal] : postmenopausal [TextBox_4] : vaginal dryness and pain  [Mammogramdate] : 10/2018 [BreastSonogramDate] : 10/2018 [PapSmeardate] : 10/2018

## 2021-03-08 NOTE — PHYSICAL EXAM
[Appropriately responsive] : appropriately responsive [Alert] : alert [No Acute Distress] : no acute distress [No Lymphadenopathy] : no lymphadenopathy [Regular Rate Rhythm] : regular rate rhythm [No Murmurs] : no murmurs [Clear to Auscultation B/L] : clear to auscultation bilaterally [Soft] : soft [Non-tender] : non-tender [Non-distended] : non-distended [No HSM] : No HSM [No Lesions] : no lesions [No Mass] : no mass [Oriented x3] : oriented x3 [Examination Of The Breasts] : a normal appearance [Normal] : normal [No Masses] : no breast masses were palpable [Atrophy] : atrophy [Cystocele] : a cystocele [Rectocele] : a rectocele

## 2021-03-15 LAB — CYTOLOGY CVX/VAG DOC THIN PREP: NORMAL

## 2021-05-11 RX ORDER — PANCRELIPASE 120000; 24000; 76000 [USP'U]/1; [USP'U]/1; [USP'U]/1
24000-76000 CAPSULE, DELAYED RELEASE PELLETS ORAL
Qty: 240 | Refills: 5 | Status: ACTIVE | COMMUNITY
Start: 1900-01-01 | End: 1900-01-01

## 2021-05-21 NOTE — PROGRESS NOTE ADULT - PROVIDER SPECIALTY LIST ADULT
Internal Medicine Patient's POA is calling that the patient fall again and is at [place] and went to [facility] for rehab. Jero the POA is moving the patient to [facility] for assisted living. They want to keep provider as a his PCP. They will be dropping off papers to have patient's medical records sent over.

## 2021-10-17 NOTE — H&P ADULT - PMH
[facility]    Progress Note      Assessment and Plan:   1. Esophageal and peptic ulcer disease    Recommendations: As per GI, PPI, serial hemoglobin until follow-up. 2.  Chronic respiratory failure–Pseudomonas in the sputum.   Mild pulmo 10/17/2021    ALT 26 10/17/2021     Chest x-ray–Mild cardiomegaly and mild pulmonary congestion improved somewhat since previous study.  Small bilateral pleural effusions right more than left about the same.  Difficult to exclude underlying bibasilar pneum Alcohol abuse    Diabetes    Hypertension    Multiple sclerosis    Pancreatic insufficiency

## 2022-02-18 NOTE — ED ADULT TRIAGE NOTE - CCCP TRG CHIEF CMPLNT
[address] [facility] PROGRESS NOTE    Patient: Sherrell Lord [id number] Date: 2/18/2022   YOB: 1945 Admission Date: 2/17/2022 12:59 PM   MRN: [id number] Inpatient LOS: 0 day(s)   Room:  [address] [place] [address] Day:  Hospital Day: 2     Consultant(s):  IP Consult              Start     Ordered    02/17/22 1757  Inpatient consult to GI     Provider:  Maxine Dudley MD    02/17/22 1756 02/17/22 1757  Inpatient consult to Nephrology         Provider:  Anisha Saunders MD    02/17/22 1756                                                             History and Subjective complaints   Patient seen and examined by me in follow up. Interval history and Overnight events:  None  Subjective:  No gross GI bleeding. Some generalized weakness and fatigue. Tolerating diet, Denies Nausea and Denies Vomiting      Hospital Course  Patients interval history reviewed/EHR notes reviewed. Sherrell Lord is a 68year old male who presented on 2/17/2022 with complaints of Weakness     Active Problems:    Acute on chronic anemia    Anemia due to stage 5 chronic kidney disease, not on chronic dialysis (CMS/[facility])    Fecal occult blood test positive    CKD (chronic kidney disease) stage 5, GFR less than 15 ml/min (CMS/[facility])      Reviewed Pertinent Histories: Medical History, Surgical History, Social History, Family History. ROS: No fever or chills. , Denies chest pain, Denies shortness of breath, Denies cough , No diarrhea or constipation and No urinary complaints. Pertinent systems negative except as above.     Medications: Reviewed     Scheduled Medications:    pantoprazole, 40 mg, Daily  atorvastatin, 40 mg, Daily  B complex-vitamin C-folic acid, 1 tablet, Daily  [Held by provider] aspirin, 81 mg, Daily  calcitRIOL, 0.25 mcg, QHS  cetirizine, 10 mg, Daily  cholecalciferol, 50 mcg, Daily  dilTIAZem, 240 mg, Daily  docusate sodium, 100 mg, BID  ferrous sulfate, 325 mg, BID  lanthanum, 500 mg, TID "  metoPROLOL tartrate, 50 mg, BID  sodium chloride (PF), 2 mL, 2 times per day  insulin lispro, , TID WC  insulin lispro, , Nightly      Continuous Infusions:    â¢ sodium chloride 0.9% infusion     â¢ sodium chloride 0.9% infusion 25 mL/hr at 02/18/22 0918   â¢ sodium chloride 0.9% infusion Stopped (02/18/22 0927)     PRN Medications:  sodium chloride, diphenhydrAMINE, sodium chloride, albuterol, HYDROcodone-acetaminophen, sodium chloride, dextrose, dextrose, glucagon, dextrose, dextrose, sodium chloride, sodium chloride, prochlorperazine, acetaminophen, polyethylene glycol, docusate sodium-sennosides, bisacodyl      Physical Examination       Vital 24 Hour Range Most Recent Value   Temperature Temp  Min: 97.4 Â°F (36.3 Â°C)  Max: 98.9 Â°F (37.2 Â°C) 98.2 Â°F (36.8 Â°C)   Pulse Pulse  Min: 71  Max: 87 82   Respiratory Resp  Min: 10  Max: 20 18   Blood Pressure BP  Min: 99/53  Max: 127/63 122/60   Pulse Oximetry SpO2  Min: 97 %  Max: 100 % 99 %   Arterial BP No data recorded     O2 No data recorded       Intake and Output:      Intake/Output Summary (Last 24 hours) at 2/18/2022 1007  Last data filed at 2/18/2022 7831  Gross per 24 hour   Intake 327.2 ml   Output 650 ml   Net -322.8 ml       Last Stool Occurrence:       Vital Most Recent Value First Value   Weight 64.4 kg (142 lb) Weight: 64.4 kg (142 lb)   Height 5' 10"" (177.8 cm) Height: 5' 10\"" (177.8 cm)   BMI 20.37 N/A     General: Looks acutely ill well developed, well nourished and no apparent distress  CV: irregularly irregular and Controlled rate. Resp: clear to auscultation bilaterally  Abd: soft, nontender, nondistended, no hepatosplenomegaly and bowel sounds  present  Ext: no rashes, lesions, or ulcers noted, no clubbing, cyanosis, or ischemia, normal gait and station and Generalized weakness. Chronic pitting edema present bilateral lower extremities and radial pulses equal bilaterally and dorsalis pedis pulses equal bilaterally.   Left AV fistula ,in arm with " nausea, vomiting, diarrhea no significant bruit. Skin: no rashes, lesions, or ulcers noted  Neuro: CN 2-12 grossly intact and no focal deficits noted  Psych: normal judgement and insight, oriented to time, place and person and normal mood and affect    Test Results   ABG  No results found  CMP  Recent Labs   Lab 02/18/22  0504 02/17/22  1303   SODIUM 145 140   POTASSIUM 3.5 4.1   CHLORIDE 113* 106   CO2 18* 17*   ANIONGAP 18 21*   BUN 84* 90*   CREATININE 5.15* 5.26*   GLUCOSE 73 193*   CALCIUM 8.5 8.6   ALBUMIN  --  2.8*   AST  --  23   GPT  --  26   ALKPT  --  101   BILIRUBIN  --  0.4     CBC  Recent Labs   Lab 02/18/22  0504 02/18/22  0155 02/17/22  1835 02/17/22  1303   WBC 8.0  --   --  10.4   RBC 2.26*  --   --  1.97*   HGB 6.4* 6.7* 6.8* 5.6*   HCT 18.7* 19.5* 21.1* 17.4*     --   --  212     CARDIAC  No results found    Invalid input(s): TROPI, MMP  ENDO  Recent Labs   Lab 02/17/22  1834   TSH 0.785   VB12 486     LIPIDS  No results found  URINALYSIS  Recent Labs   Lab 02/18/22  0118   USPG 1.015   UPH 5.0   UKET Negative   UPROT Negative   UGLU Negative   UBILI Negative   UROB 0.2   UWBC Negative   UNITR Negative   URBC Negative     MICROBIOLOGY  Blood Culture  No results found. However, due to the size of the patient record, not all encounters were searched. Please check Results Review for a complete set of results. Urine Culture  No results found. However, due to the size of the patient record, not all encounters were searched. Please check Results Review for a complete set of results. Imaging Studies:  FL PACS Record Reportable    Result Date: 1/28/2022  Narrative: EXAMINATION: FL FLUOROSCOPY SURGERY + REPORT DATE: 1/28/2022 HISTORY: kyphoplasty COMPARISON: MRI from 1/10/2022 and plain radiographs from 11/24/2021 TECHNIQUE: 3 intraoperative radiographs were submitted for interpretation FINDINGS: 5 lumbar-type vertebral bodies were documented previously.  In the interim, the patient has undergone vertebroplasty at L3 and L4.  At L3, there is an approximately 20% superior endplate central depression deformity and at L4 is approximately 50% biconcave deformity. An IVC filter remains in place. Impression: IMPRESSION: As above. EKG (Most recent)  Results for orders placed or performed during the hospital encounter of 02/17/22   ECG   Result Value Ref Range    Systolic Blood Pressure 99     Diastolic Blood Pressure 53     Ventricular Rate EKG/Min (BPM) 65     Atrial Rate (BPM) 65     QRS-Interval (MSEC) 72     QT-Interval (MSEC) 406     QTc 422     R Axis (Degrees) 43     T Axis (Degrees) 48     REPORT TEXT       Atrial fibrillation  Nonspecific T wave abnormality  Abnormal ECG  Confirmed by ONUR VALERIO MD ([id number]) on 2/18/2022 6:28:12 AM        -----  Labs: The Laboratory values listed below have been reviewed and pertinent findings discussed in the Assessment and Plan. Laboratory values:   Recent Labs   Lab 02/18/22  0504 02/18/22  0155 02/17/22  1835 02/17/22  1303   WBC 8.0  --   --  10.4   HGB 6.4* 6.7* 6.8* 5.6*   HCT 18.7* 19.5* 21.1* 17.4*     --   --  212       Recent Labs   Lab 02/18/22  0504 02/17/22  1303   SODIUM 145 140   POTASSIUM 3.5 4.1   CHLORIDE 113* 106   CO2 18* 17*   CALCIUM 8.5 8.6   GLUCOSE 73 193*   BUN 84* 90*   CREATININE 5.15* 5.26*        Recent Labs   Lab 02/17/22  1303   ALBUMIN 2.8*   AST 23   GPT 26   BILIRUBIN 0.4     Lab Results   Component Value Date    USPG 1.015 02/18/2022    UPROT Negative 02/18/2022    UWBC Negative 02/18/2022    URBC Negative 02/18/2022    UPH 5.0 02/18/2022    UBACTRA None Seen 11/03/2021             Radiology: Imaging studies have been reviewed and pertinent findings discussed in the Assessment and Plan. No results found for any visits on 02/17/22 (from the past 48 hour(s)).     Tubes, Devices, Monitoring     Telemetry: On      Hernandez: No    Assessment and Plan   Cassidy House is a 68year old male  who was admitted on 2/17/2022     Active Problems:  Active Problems:    Acute on chronic anemia    Anemia due to stage 5 chronic kidney disease, not on chronic dialysis (CMS/[facility])    Fecal occult blood test positive    CKD (chronic kidney disease) stage 5, GFR less than 15 ml/min (CMS/[facility])     The patient is currently in stable  condition. We plan to proceed as follows:  Â· admitted  under monitored order sets. Hemodynamic and frequent, hemogram monitoring, initially every 8 hours . Hemoglobin 5.6-> 6.4 after 1 units of packed RBC . 1 unit today, Blood transfusion  to keep hemoglobin above 7. .Blood Transfusion Order Sets. Discussed risks, benefits, and possible complications of blood transfusion. The patient consented. Â· Caution IV(Intravenous) fluids, 0.9, normal saline at 25 cc per hour . Â· Started on IV Protonix. Â· Hold Coumadin. Â· GI  consult with Dr. Estevan Russell  and appreciate recommendations. Although patient has known anemia of chronic disease with frequent blood transfusions, today's hemoglobin of 5.6 is way below his baseline. He has positive fecal occult blood testing in ER but then again the patient is taking iron supplement. There is no overt GI bleeding. He has been seen by GI on  1/11/22 and there is an outpatient plan for endoscopy. Â·  Check iron studies, including ferritin 2087, iron 121, TIBC 113, saturation, as well as B12 and folate, and TSH normal 0.6. [facility] Nephrology consult with Dr. Arturo Beverly . PermCath to be placed and  plan for dialysis. Â·  and  to follow discharge planning . Consults:    IP CONSULT TO SOCIAL WORK  IP CONSULT TO GI  IP CONSULT TO NEPHROLOGY  IP CONSULT TO SOCIAL WORK  Chronic/Concurrent Diagnosis:  â¢ 02/17/2022 S/P kyphoplasty L3,L4  2/17/2022 1/28/2022 KYPHOPLASTY OR VERTEBROPLASTY, SPINE, 2 LEVELS L3,L4-Jalen Carlos MD   â¢ A-V fistula (CMS/[facility]), left arm 02/03/2022 2/17/2022    2/3/2022 1LEFT ARM A/V FISTULA for dialysis  Dr. Kris Carrero at [facility].     â¢ Anemia associated with chronic renal failure 9/17/2021 08/13/2021 hemoglobin 7.9,Hx multiple blood transfusions   â¢ Anemia due to stage 5 chronic kidney disease, not on chronic dialysis (CMS/[facility]) 8/16/2021   â¢ Atrial fibrillation (CMS/[facility]) 1/30/2013    Permanent, coumadin, [id number] score of 3    â¢ Benign hypertension with chronic kidney disease 1/30/2013   â¢ Blood clot associated with vein wall inflammation    â¢ Chronic anticoagulation 01/30/2013    Persistent A. fib. Rate controlled-w/ hx DVT he would be ChadsVasc 4. Anticoag. on Coumadin, diltiazem+metoprolol. ECHO:4/10: LVEF 50%. RVSP 26.2 mmHg. Aortic root dilatation 3.4 cm, 8/13/18: LVEF 60%. Myocardial perf scan:4/09: Normal. LVEF >60%. â¢ Chronic atrial fibrillation (CMS/[facility]) 1/30/2013    Permanent, coumadin, [id number] score of 3    â¢ Chronic kidney disease, stage III (moderate) (CMS/[facility])     5./25/18, creatinine 1.46, GFR 47   â¢ CKD (chronic kidney disease) stage 4-5 8/16/2021    09/10/2021 creatinine 3.5 GFR 16, taken off dialysis (only few weekson HD) mid Aug/21-F/u with Dr. Colton Suárez   â¢ CKD (chronic kidney disease) stage 4->5 8/16/2021    09/10/2021 creatinine 3.5 GFR 16, taken off dialysis (only few weekson HD) mid Aug/21-F/u with Dr. Colton Suárez CT guided L. renal biopsy 07/15/21. Acute tubular injury, diffuse. Acute interstitial nephritis with eosinophils, patchy. Background HTNsive nephropathy and mild chronic tubulointerstitial changes Evidence of nodularglomerulosclerosis.  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/2018 â¢ Hx DVT (deep venous thrombosis) (CMS/[facility])     LEFT LOWER EXTREMITY   â¢ Hx Pneumatosis intestinalis 03/04/2013    Pneumatosis intestinalis 2/2 Ischemic colitis, right diverticulitis S/P right hemicolectomy   â¢ Hx SBO (small bowel obstruction) (CMS/[facility]) 11/2/2018   â¢ Iron deficiency anemia secondary to inadequate dietary iron intake 7/30/2021   â¢ Mixed hyperlipidemia 8/10/2017   â¢ Non-rheumatic mitral regurgitation 1/24/2018   â¢ Permanent atrial fibrillation (CMS/[facility]) 12/01/2015    Persistent A. fib. Rate controlled-w/ hx DVT he would be ChadsVasc 4. Anticoag. on Coumadin, diltiazem+metoprolol. ECHO:4/10: LVEF 50%. RVSP 26.2 mmHg. Aortic root dilatation 3.4 cm, 8/13/18: LVEF 60%. Myocardial perf scan:4/09: Normal. LVEF >60%. â¢ Pneumonia    â¢ S/P right hemicolectomy     Pneumatosis intestinalis 2/2 Ischemic colitis, right diverticulitis S/P right hemicolectomy   â¢ Skin cancer     BCC left cheek   â¢ Transfusion history     last 1/28/2022   â¢ Type II or unspecified type diabetes mellitus without mention of complication, not stated as uncontrolled     10./16/18, A1c 7.3   â¢ Type II or unspecified type diabetes mellitus without mention of complication, not stated as uncontrolled 1/30/2013    09/10/2021 A1c 7.6   â¢ Urinary tract infection    â¢ Wears glasses       The patient's chronic medical problems are currently in stable condition. We plan to proceed as follows:  Reviewed  home medications, resume as appropriate. Â· Monitor blood sugars and cover with sliding scale insulin. Best Practice:  Not applicable    Diet:  Liquid Clear Diet  Therapy Orders:  No orders of the defined types were placed in this encounter. Smoking status- non smoker    Nutrition status- appropriate  Body mass index is 20.37 kg/mÂ². - appropriate weight BMI 18.5-24  Prophylaxis:  DVT(Deep Venous Thrombosis): Coumadin on hold due to anemia and possible GI bleeding. SCDandTEDS.  GI(Gastrointestinal):  Protonix .     Limited [facility] proficiency with :  Yes, an  (via two-way interactive audio/visual electronic device) American Sign Language         Advanced Directives     CODE STATUS: Full Resuscitation            Discharge Plan     The patients treatment plans were discussed with patient, wife, RN, , consultant(s) . Recommendations for Discharge   [facility] Day Number: Hospital Day:@ Hospital Day: 2    Anticipated discharge destination: HomeTo be determined and Any services. Barriers to Discharge: Patient is not medically ready and needs to remain in the hospital today due to Ongoing treatment and workup. CODE STATUS: Full Resuscitation . Total time spent:>30 minutes. Signed:  Francis Arevalo MD ,[facility]/Hospitalist  Board Certified Internal Medicine  2/18/2022  10:07 AM    (Contact by secure chat)   Available on [email or web address] for any questions. Please call for urgent matters. After 7pm please page the Hospitalist on-call.

## 2022-05-10 RX ORDER — ESOMEPRAZOLE MAGNESIUM 40 MG/1
40 CAPSULE, DELAYED RELEASE ORAL DAILY
Qty: 90 | Refills: 3 | Status: ACTIVE | COMMUNITY
Start: 1900-01-01 | End: 1900-01-01

## 2022-08-01 ENCOUNTER — APPOINTMENT (OUTPATIENT)
Dept: GASTROENTEROLOGY | Facility: CLINIC | Age: 67
End: 2022-08-01

## 2022-08-01 ENCOUNTER — TRANSCRIPTION ENCOUNTER (OUTPATIENT)
Age: 67
End: 2022-08-01

## 2022-08-01 VITALS
DIASTOLIC BLOOD PRESSURE: 75 MMHG | HEIGHT: 61 IN | OXYGEN SATURATION: 96 % | TEMPERATURE: 97.7 F | BODY MASS INDEX: 22.47 KG/M2 | HEART RATE: 109 BPM | WEIGHT: 119 LBS | SYSTOLIC BLOOD PRESSURE: 120 MMHG

## 2022-08-01 DIAGNOSIS — U07.1 COVID-19: ICD-10-CM

## 2022-08-01 DIAGNOSIS — Z12.11 ENCOUNTER FOR SCREENING FOR MALIGNANT NEOPLASM OF COLON: ICD-10-CM

## 2022-08-01 DIAGNOSIS — Z83.71 ENCOUNTER FOR SCREENING FOR MALIGNANT NEOPLASM OF COLON: ICD-10-CM

## 2022-08-01 DIAGNOSIS — K58.0 IRRITABLE BOWEL SYNDROME WITH DIARRHEA: ICD-10-CM

## 2022-08-01 DIAGNOSIS — J44.9 CHRONIC OBSTRUCTIVE PULMONARY DISEASE, UNSPECIFIED: ICD-10-CM

## 2022-08-01 DIAGNOSIS — R10.13 EPIGASTRIC PAIN: ICD-10-CM

## 2022-08-01 DIAGNOSIS — R05.3 CHRONIC COUGH: ICD-10-CM

## 2022-08-01 DIAGNOSIS — Z86.010 PERSONAL HISTORY OF COLONIC POLYPS: ICD-10-CM

## 2022-08-01 PROCEDURE — 99204 OFFICE O/P NEW MOD 45 MIN: CPT | Mod: CS

## 2022-08-01 RX ORDER — FLUTICASONE FUROATE, UMECLIDINIUM BROMIDE AND VILANTEROL TRIFENATATE 200; 62.5; 25 UG/1; UG/1; UG/1
200-62.5-25 POWDER RESPIRATORY (INHALATION)
Qty: 60 | Refills: 0 | Status: ACTIVE | COMMUNITY
Start: 2022-07-13

## 2022-08-01 RX ORDER — ALBUTEROL SULFATE 90 UG/1
108 (90 BASE) INHALANT RESPIRATORY (INHALATION)
Qty: 8 | Refills: 0 | Status: ACTIVE | COMMUNITY
Start: 2022-06-15

## 2022-08-01 RX ORDER — BROMFENAC 0.76 MG/ML
0.07 SOLUTION/ DROPS OPHTHALMIC
Qty: 5 | Refills: 0 | Status: ACTIVE | COMMUNITY
Start: 2022-05-22

## 2022-08-01 RX ORDER — DIMETHYL FUMARATE 240 MG/1
240 CAPSULE, DELAYED RELEASE ORAL
Qty: 60 | Refills: 0 | Status: ACTIVE | COMMUNITY
Start: 2022-04-21

## 2022-08-01 RX ORDER — LIDOCAINE 36 MG/1
1.8 PATCH TOPICAL
Qty: 30 | Refills: 0 | Status: ACTIVE | COMMUNITY
Start: 2021-12-14

## 2022-08-01 RX ORDER — AMOXICILLIN AND CLAVULANATE POTASSIUM 875; 125 MG/1; MG/1
875-125 TABLET, COATED ORAL
Qty: 20 | Refills: 0 | Status: ACTIVE | COMMUNITY
Start: 2022-06-15

## 2022-08-01 RX ORDER — BROMPHENIRAMINE MALEATE, PSEUDOEPHEDRINE HYDROCHLORIDE, 2; 30; 10 MG/5ML; MG/5ML; MG/5ML
30-2-10 SYRUP ORAL
Qty: 118 | Refills: 0 | Status: ACTIVE | COMMUNITY
Start: 2022-06-10

## 2022-08-01 RX ORDER — FLUTICASONE PROPIONATE 50 UG/1
50 SPRAY, METERED NASAL
Qty: 16 | Refills: 0 | Status: ACTIVE | COMMUNITY
Start: 2022-06-27

## 2022-08-01 RX ORDER — PREDNISOLONE ACETATE 10 MG/ML
1 SUSPENSION/ DROPS OPHTHALMIC
Qty: 5 | Refills: 0 | Status: ACTIVE | COMMUNITY
Start: 2022-04-15

## 2022-08-01 RX ORDER — INSULIN GLARGINE-YFGN 100 [IU]/ML
100 INJECTION, SOLUTION SUBCUTANEOUS
Qty: 45 | Refills: 0 | Status: ACTIVE | COMMUNITY
Start: 2022-02-05

## 2022-08-01 RX ORDER — BENZONATATE 100 MG/1
100 CAPSULE ORAL
Qty: 30 | Refills: 0 | Status: ACTIVE | COMMUNITY
Start: 2022-06-22

## 2022-08-01 RX ORDER — VALACYCLOVIR 1 G/1
1 TABLET, FILM COATED ORAL
Qty: 21 | Refills: 0 | Status: ACTIVE | COMMUNITY
Start: 2022-03-28

## 2022-08-01 RX ORDER — INSULIN ASPART 100 [IU]/ML
100 INJECTION, SOLUTION INTRAVENOUS; SUBCUTANEOUS
Qty: 45 | Refills: 0 | Status: ACTIVE | COMMUNITY
Start: 2022-03-04

## 2022-08-01 RX ORDER — MUPIROCIN 2 G/100G
2 CREAM TOPICAL
Qty: 15 | Refills: 0 | Status: ACTIVE | COMMUNITY
Start: 2022-03-30

## 2022-08-01 RX ORDER — ACYCLOVIR 50 MG/G
5 CREAM TOPICAL
Qty: 5 | Refills: 0 | Status: ACTIVE | COMMUNITY
Start: 2022-06-10

## 2022-08-01 RX ORDER — CICLOPIROX 7.7 MG/G
0.77 GEL TOPICAL
Qty: 100 | Refills: 0 | Status: ACTIVE | COMMUNITY
Start: 2022-02-11

## 2022-08-01 RX ORDER — CLONAZEPAM 1 MG/1
1 TABLET ORAL
Qty: 30 | Refills: 0 | Status: ACTIVE | COMMUNITY
Start: 2022-05-13

## 2022-08-01 NOTE — ASSESSMENT
[FreeTextEntry1] : Impression: Exacerbation of GERD.  Probable functional heartburn.  History of polyps/family history of colon polyps due for follow-up colonoscopy.  IBS-D stable.  Cough is bronchial and unrelated to GERD.  Hoarseness is secondary to coughing rather than GERD.\par \par Plan: Continue esomeprazole every morning, add famotidine 40 mg nightly.  Schedule follow-up surveillance colonoscopy with MiraLAX prep.  Do EGD as well.  May benefit from neuromodulators but I am reluctant to use given history of COPD

## 2022-08-01 NOTE — HISTORY OF PRESENT ILLNESS
[Heartburn] : heartburn worsened [Nausea] : denies nausea [Vomiting] : denies vomiting [Diarrhea] : stable diarrhea [Constipation] : denies constipation [Yellow Skin Or Eyes (Jaundice)] : denies jaundice [Abdominal Pain] : denies abdominal pain [Abdominal Swelling] : denies abdominal swelling [Rectal Pain] : denies rectal pain [GERD] : gastroesophageal reflux disease [Peptic Ulcer Disease] : no peptic ulcer disease [Pancreatitis] : no pancreatitis [Cholelithiasis] : no cholelithiasis [Kidney Stone] : no kidney stone [Inflammatory Bowel Disease] : no inflammatory bowel disease [Irritable Bowel Syndrome] : irritable bowel syndrome [Diverticulitis] : no diverticulitis [Alcohol Abuse] : no alcohol abuse [Malignancy] : no malignancy [Appendectomy] : no appendectomy [Cholecystectomy] : no cholecystectomy [de-identified] : 67-year-old female with chronic GERD/dyspepsia, history of colon polyps and family history of colon polyps here complaining of exacerbation of reflux symptoms.  Has been taking esomeprazole 40 mg every morning chronically with good results, after cataract surgery after which steroid eyedrops were prescribed she began experiencing worsening heartburn.  She is also developed a chronic cough with hoarseness.  Occasionally takes esomeprazole twice daily with minimal relief.  Last colonoscopy around 2014/15 was given a recall notice in 2019 which she never followed through with.  Had COVID-pneumonia since that time.  Denies shortness of breath or dyspnea on exertion.  Denies chest pain.  Denies cardiac history.

## 2022-08-03 DIAGNOSIS — Z01.812 ENCOUNTER FOR PREPROCEDURAL LABORATORY EXAMINATION: ICD-10-CM

## 2022-08-03 DIAGNOSIS — Z20.822 ENCOUNTER FOR PREPROCEDURAL LABORATORY EXAMINATION: ICD-10-CM

## 2022-09-21 ENCOUNTER — APPOINTMENT (OUTPATIENT)
Dept: GASTROENTEROLOGY | Facility: AMBULATORY MEDICAL SERVICES | Age: 67
End: 2022-09-21

## 2022-09-21 PROCEDURE — 43239 EGD BIOPSY SINGLE/MULTIPLE: CPT | Mod: 59

## 2022-09-21 PROCEDURE — 45385 COLONOSCOPY W/LESION REMOVAL: CPT | Mod: PT

## 2022-09-23 NOTE — PROGRESS NOTE ADULT - PROBLEM SELECTOR PLAN 2
Working eye exam report; Called patient - LVM for patient to call back, Portal message sent     repeat BC neg  Echo -no endocarditis

## 2023-03-30 ENCOUNTER — NON-APPOINTMENT (OUTPATIENT)
Age: 68
End: 2023-03-30

## 2023-05-15 ENCOUNTER — NON-APPOINTMENT (OUTPATIENT)
Age: 68
End: 2023-05-15

## 2023-06-13 ENCOUNTER — NON-APPOINTMENT (OUTPATIENT)
Age: 68
End: 2023-06-13

## 2023-08-04 ENCOUNTER — RX RENEWAL (OUTPATIENT)
Age: 68
End: 2023-08-04

## 2023-10-03 NOTE — REVIEW OF SYSTEMS
[Sore Throat] : sore throat [Hoarseness] : hoarseness [Cough] : cough [As Noted in HPI] : as noted in HPI [Anxiety] : anxiety [Depression] : depression [Negative] : Heme/Lymph none needed

## 2023-10-24 NOTE — PHYSICAL THERAPY INITIAL EVALUATION ADULT - FUNCTIONAL LIMITATIONS, PT EVAL
self-care/work/home management/community/leisure Mucosal Advancement Flap Text: Given the location of the defect, shape of the defect and the proximity to free margins a mucosal advancement flap was deemed most appropriate. Incisions were made with a 15 blade scalpel in the appropriate fashion along the cutaneous vermilion border and the mucosal lip. The remaining actinically damaged mucosal tissue was excised.  The mucosal advancement flap was then elevated to the gingival sulcus with care taken to preserve the neurovascular structures and advanced into the primary defect. Care was taken to ensure that precise realignment of the vermilion border was achieved.

## 2023-11-02 RX ORDER — FAMOTIDINE 40 MG/1
40 TABLET, FILM COATED ORAL
Qty: 90 | Refills: 3 | Status: ACTIVE | COMMUNITY
Start: 2022-08-01 | End: 1900-01-01

## 2023-11-27 NOTE — PROGRESS NOTE ADULT - PROBLEM SELECTOR PLAN 2
Airway    Performed by: Chuck Thompson MD  Authorized by: Chuck Thompson MD    Final Airway Type:  Supraglottic airway  Final Supraglottic Airway:  [place]  SGA Size*:  4.5  Attempts*:  1  Location:  OR  Urgency:  Elective  Difficult Airway: No    Indications for Airway Management:  Anesthesia  Mask Difficulty Assessment:  1 - vent by mask   Inflated with 10cc air       Etiology secondary to (?), Continue present care.

## 2023-11-28 RX ORDER — DIPHENOXYLATE HYDROCHLORIDE AND ATROPINE SULFATE 2.5; .025 MG/1; MG/1
2.5-0.025 TABLET ORAL 3 TIMES DAILY
Qty: 90 | Refills: 4 | Status: ACTIVE | COMMUNITY
Start: 2023-11-28 | End: 1900-01-01

## 2024-04-01 NOTE — PROGRESS NOTE ADULT - ASSESSMENT
HPI:   Patient is here for medication refill( adderall). Medication list has been reviewed along with the patient.She reports to be compliant with the patient and has no issues taking it. He denies having shortness of breath, chest pain, palpitation, leg swelling, nausea vomiting and other complains. No other concerns and issues at present  Problem List:  Patient Active Problem List   Diagnosis    KARO (generalized anxiety disorder)    Attention deficit hyperactivity disorder (ADHD), combined type     ROS: Negative except as noted above.     Current Meds:  Current Outpatient Medications   Medication Sig Dispense Refill    clonazePAM (KLONOPIN) 1 MG tablet Take 1 tablet (1 mg total) by mouth daily as needed for Anxiety. 30 tablet 1    dextroamphetamine-amphetamine 30 mg Tab Take 1 tablet (30 mg total) by mouth 2 (two) times a day. 60 tablet 0    dextroamphetamine-amphetamine 30 mg Tab Take 1 tablet (30 mg total) by mouth 2 (two) times a day. 60 tablet 0    dextroamphetamine-amphetamine 30 mg Tab Take 1 tablet (30 mg total) by mouth 2 (two) times a day. 60 tablet 0     No current facility-administered medications for this visit.      PE:  BP: 110/74  Pulse: 101 Resp: 18   Temp: 96.5 °F (35.8 °C)  Weight: 86.5 kg (190 lb 11.2 oz) Body mass index is 33.78 kg/m².    Wt Readings from Last 5 Encounters:   04/01/24 86.5 kg (190 lb 11.2 oz)   11/11/23 77.1 kg (170 lb)   09/07/23 83.3 kg (183 lb 10.3 oz)   06/08/23 85.5 kg (188 lb 7.9 oz)   11/01/21 76.2 kg (168 lb)     General appearance: alert and cooperative, not in acute distress  Head: normocephalic, without obvious abnormality, atraumatic  Eyes: conjunctivae/corneas clear. PERRL, EOM's intact.  Ears: clear tympanic membranes   Neck: no adenopathy, supple, symmetrical, trachea midline and thyroid not enlarged, symmetric, no tenderness/mass/nodules, no JVD  Throat: lips, mucosa, and tongue normal; teeth and gums normal; no thrush  Chest: no reproducible chest pain   Heart:  regular rate and rhythm, S1, S2 normal, no murmur, click, rub or gallop  Lungs: unlabored respiration, bilateral equal air entry, normal vesicular breath sound heard, no wheezing, rhonchi   Abdomen: soft, non-tender, non-distended; bowel sounds +; no masses,  no organomegaly, no ascites   Extremities: normal, atraumatic, no cyanosis or edema noted B/L upper and lower extremities.  Skin: skin color, texture, turgor normal. No rashes or lesions noted.  Neurologic: grossly intact      Lab:  Lab Results   Component Value Date    WBC 7.37 06/08/2023    HGB 12.2 06/08/2023    HCT 37.0 06/08/2023    MCV 86 06/08/2023     06/08/2023     06/08/2023    K 4.1 06/08/2023     06/08/2023    CO2 29 06/08/2023    BUN 11 06/08/2023    GLU 95 06/08/2023    CALCIUM 9.1 06/08/2023    AST 18 06/08/2023    ALT 19 06/08/2023    CHOL 174 06/08/2023    HDL 49 06/08/2023    LDLCALC 103.2 06/08/2023    TRIG 109 06/08/2023    TSH 2.181 06/08/2023       Impression:  1. Attention deficit hyperactivity disorder (ADHD), combined type  - LA  reviewed. No inconsistencies found  Refilling medication for 1 month  - dextroamphetamine-amphetamine 30 mg Tab; Take 1 tablet (30 mg total) by mouth 2 (two) times a day.  Dispense: 60 tablet; Refill: 0    2. KARO (generalized anxiety disorder)  On klonopin to be continued.      Rtc in 3 months for medication management.  Giles Hernadez MD  Answers submitted by the patient for this visit:  Review of Systems Questionnaire (Submitted on 4/1/2024)  activity change: No  unexpected weight change: No  neck pain: No  hearing loss: No  rhinorrhea: No  trouble swallowing: No  eye discharge: No  visual disturbance: No  chest tightness: No  wheezing: No  chest pain: No  palpitations: No  blood in stool: No  constipation: No  vomiting: No  diarrhea: No  polydipsia: No  polyuria: No  difficulty urinating: No  hematuria: No  menstrual problem: No  dysuria: No  joint swelling: No  arthralgias:  No  headaches: No  weakness: No  confusion: No  dysphoric mood: No     Pt is a 60 yo lady with a pmhx of DM1 w prior insulin pump since removed, HTN, HL, MS who presents to the ED with N/v/d for 2 days. She ate tacos on Wednesday, and then started having n/v/d that night until now, unable to keep anything down. No fevers, no focal abdominal pain, no dysuria, no chest pain, no short of breath. (04 Feb 2017 13:13)  Salmonella gastroenteritis and sepsis/bacteremia/ligular lung abscess. Lung abscess ruled out. Multilobar pneumonia. GI f/U noted, will need Colonoscopy. Scheduled for colonoscopy today. Continue with Rocephin.

## 2024-06-27 ENCOUNTER — TRANSCRIPTION ENCOUNTER (OUTPATIENT)
Age: 69
End: 2024-06-27

## 2024-06-27 ENCOUNTER — INPATIENT (INPATIENT)
Facility: HOSPITAL | Age: 69
LOS: 13 days | Discharge: NURSING, REHAB OR LONG-TERM CARE | End: 2024-07-11
Attending: STUDENT IN AN ORGANIZED HEALTH CARE EDUCATION/TRAINING PROGRAM | Admitting: STUDENT IN AN ORGANIZED HEALTH CARE EDUCATION/TRAINING PROGRAM
Payer: MEDICARE

## 2024-06-27 VITALS
DIASTOLIC BLOOD PRESSURE: 73 MMHG | OXYGEN SATURATION: 91 % | WEIGHT: 130.07 LBS | SYSTOLIC BLOOD PRESSURE: 132 MMHG | HEIGHT: 66 IN | RESPIRATION RATE: 20 BRPM | HEART RATE: 72 BPM | TEMPERATURE: 98 F

## 2024-06-27 DIAGNOSIS — Z98.89 UNDEFINED: Chronic | ICD-10-CM

## 2024-06-27 LAB
ALBUMIN SERPL ELPH-MCNC: 2.3 G/DL — LOW (ref 3.3–5)
ALP SERPL-CCNC: 104 U/L — SIGNIFICANT CHANGE UP (ref 40–120)
ALT FLD-CCNC: 26 U/L — SIGNIFICANT CHANGE UP (ref 12–78)
ANION GAP SERPL CALC-SCNC: 6 MMOL/L — SIGNIFICANT CHANGE UP (ref 5–17)
APTT BLD: 31.7 SEC — SIGNIFICANT CHANGE UP (ref 24.5–35.6)
AST SERPL-CCNC: 13 U/L — LOW (ref 15–37)
BASOPHILS # BLD AUTO: 0.02 K/UL — SIGNIFICANT CHANGE UP (ref 0–0.2)
BASOPHILS NFR BLD AUTO: 0.3 % — SIGNIFICANT CHANGE UP (ref 0–2)
BILIRUB SERPL-MCNC: 0.3 MG/DL — SIGNIFICANT CHANGE UP (ref 0.2–1.2)
BLD GP AB SCN SERPL QL: SIGNIFICANT CHANGE UP
BUN SERPL-MCNC: 31 MG/DL — HIGH (ref 7–23)
CALCIUM SERPL-MCNC: 8.8 MG/DL — SIGNIFICANT CHANGE UP (ref 8.5–10.1)
CHLORIDE SERPL-SCNC: 105 MMOL/L — SIGNIFICANT CHANGE UP (ref 96–108)
CO2 SERPL-SCNC: 24 MMOL/L — SIGNIFICANT CHANGE UP (ref 22–31)
CREAT SERPL-MCNC: 0.86 MG/DL — SIGNIFICANT CHANGE UP (ref 0.5–1.3)
EGFR: 74 ML/MIN/1.73M2 — SIGNIFICANT CHANGE UP
EOSINOPHIL # BLD AUTO: 0.07 K/UL — SIGNIFICANT CHANGE UP (ref 0–0.5)
EOSINOPHIL NFR BLD AUTO: 1.1 % — SIGNIFICANT CHANGE UP (ref 0–6)
GLUCOSE BLDC GLUCOMTR-MCNC: 195 MG/DL — HIGH (ref 70–99)
GLUCOSE BLDC GLUCOMTR-MCNC: 231 MG/DL — HIGH (ref 70–99)
GLUCOSE SERPL-MCNC: 274 MG/DL — HIGH (ref 70–99)
HCT VFR BLD CALC: 37.9 % — SIGNIFICANT CHANGE UP (ref 34.5–45)
HGB BLD-MCNC: 11.9 G/DL — SIGNIFICANT CHANGE UP (ref 11.5–15.5)
IMM GRANULOCYTES NFR BLD AUTO: 1 % — HIGH (ref 0–0.9)
INR BLD: 0.78 RATIO — LOW (ref 0.85–1.18)
LYMPHOCYTES # BLD AUTO: 0.08 K/UL — LOW (ref 1–3.3)
LYMPHOCYTES # BLD AUTO: 1.3 % — LOW (ref 13–44)
MCHC RBC-ENTMCNC: 26.1 PG — LOW (ref 27–34)
MCHC RBC-ENTMCNC: 31.4 G/DL — LOW (ref 32–36)
MCV RBC AUTO: 83.1 FL — SIGNIFICANT CHANGE UP (ref 80–100)
MONOCYTES # BLD AUTO: 0.38 K/UL — SIGNIFICANT CHANGE UP (ref 0–0.9)
MONOCYTES NFR BLD AUTO: 6.1 % — SIGNIFICANT CHANGE UP (ref 2–14)
NEUTROPHILS # BLD AUTO: 5.67 K/UL — SIGNIFICANT CHANGE UP (ref 1.8–7.4)
NEUTROPHILS NFR BLD AUTO: 90.2 % — HIGH (ref 43–77)
NRBC # BLD: 0 /100 WBCS — SIGNIFICANT CHANGE UP (ref 0–0)
PLATELET # BLD AUTO: 299 K/UL — SIGNIFICANT CHANGE UP (ref 150–400)
POTASSIUM SERPL-MCNC: 4.1 MMOL/L — SIGNIFICANT CHANGE UP (ref 3.5–5.3)
POTASSIUM SERPL-SCNC: 4.1 MMOL/L — SIGNIFICANT CHANGE UP (ref 3.5–5.3)
PROT SERPL-MCNC: 6.1 GM/DL — SIGNIFICANT CHANGE UP (ref 6–8.3)
PROTHROM AB SERPL-ACNC: 9.4 SEC — LOW (ref 9.5–13)
RBC # BLD: 4.56 M/UL — SIGNIFICANT CHANGE UP (ref 3.8–5.2)
RBC # FLD: 16.6 % — HIGH (ref 10.3–14.5)
SODIUM SERPL-SCNC: 135 MMOL/L — SIGNIFICANT CHANGE UP (ref 135–145)
WBC # BLD: 6.28 K/UL — SIGNIFICANT CHANGE UP (ref 3.8–10.5)
WBC # FLD AUTO: 6.28 K/UL — SIGNIFICANT CHANGE UP (ref 3.8–10.5)

## 2024-06-27 PROCEDURE — 73700 CT LOWER EXTREMITY W/O DYE: CPT | Mod: 26,LT

## 2024-06-27 PROCEDURE — 99285 EMERGENCY DEPT VISIT HI MDM: CPT

## 2024-06-27 PROCEDURE — 73502 X-RAY EXAM HIP UNI 2-3 VIEWS: CPT | Mod: 26,LT

## 2024-06-27 PROCEDURE — 73552 X-RAY EXAM OF FEMUR 2/>: CPT | Mod: 26,LT

## 2024-06-27 PROCEDURE — 71045 X-RAY EXAM CHEST 1 VIEW: CPT | Mod: 26

## 2024-06-27 PROCEDURE — 27245 TREAT THIGH FRACTURE: CPT | Mod: LT

## 2024-06-27 PROCEDURE — 99223 1ST HOSP IP/OBS HIGH 75: CPT

## 2024-06-27 PROCEDURE — 93010 ELECTROCARDIOGRAM REPORT: CPT

## 2024-06-27 PROCEDURE — 71275 CT ANGIOGRAPHY CHEST: CPT | Mod: 26

## 2024-06-27 RX ORDER — MORPHINE SULFATE 100 MG/1
4 TABLET, EXTENDED RELEASE ORAL ONCE
Refills: 0 | Status: DISCONTINUED | OUTPATIENT
Start: 2024-06-27 | End: 2024-06-27

## 2024-06-27 RX ORDER — INSULIN GLARGINE 100 [IU]/ML
5 INJECTION, SOLUTION SUBCUTANEOUS AT BEDTIME
Refills: 0 | Status: DISCONTINUED | OUTPATIENT
Start: 2024-06-27 | End: 2024-06-29

## 2024-06-27 RX ORDER — INSULIN LISPRO 100 [IU]/ML
INJECTION, SOLUTION SUBCUTANEOUS
Refills: 0 | Status: DISCONTINUED | OUTPATIENT
Start: 2024-06-27 | End: 2024-06-29

## 2024-06-27 RX ORDER — ENALAPRIL MALEATE 20 MG
5 TABLET ORAL AT BEDTIME
Refills: 0 | Status: DISCONTINUED | OUTPATIENT
Start: 2024-06-27 | End: 2024-07-11

## 2024-06-27 RX ORDER — DEXTROSE MONOHYDRATE 100 MG/ML
125 INJECTION, SOLUTION INTRAVENOUS ONCE
Refills: 0 | Status: DISCONTINUED | OUTPATIENT
Start: 2024-06-27 | End: 2024-07-11

## 2024-06-27 RX ORDER — DEXTROSE 30 % IN WATER 30 %
25 VIAL (ML) INTRAVENOUS ONCE
Refills: 0 | Status: DISCONTINUED | OUTPATIENT
Start: 2024-06-27 | End: 2024-07-11

## 2024-06-27 RX ORDER — PREGABALIN 50 MG/1
200 CAPSULE ORAL AT BEDTIME
Refills: 0 | Status: DISCONTINUED | OUTPATIENT
Start: 2024-06-27 | End: 2024-07-04

## 2024-06-27 RX ORDER — DEXTROSE 30 % IN WATER 30 %
12.5 VIAL (ML) INTRAVENOUS ONCE
Refills: 0 | Status: DISCONTINUED | OUTPATIENT
Start: 2024-06-27 | End: 2024-07-11

## 2024-06-27 RX ORDER — DEXTROSE MONOHYDRATE AND SODIUM CHLORIDE 5; .3 G/100ML; G/100ML
1000 INJECTION, SOLUTION INTRAVENOUS
Refills: 0 | Status: DISCONTINUED | OUTPATIENT
Start: 2024-06-27 | End: 2024-07-11

## 2024-06-27 RX ORDER — LIPASE/PROTEASE/AMYLASE 16-48-48K
0 CAPSULE,DELAYED RELEASE (ENTERIC COATED) ORAL
Qty: 0 | Refills: 0 | DISCHARGE

## 2024-06-27 RX ORDER — ENOXAPARIN SODIUM 100 MG/ML
60 INJECTION SUBCUTANEOUS ONCE
Refills: 0 | Status: COMPLETED | OUTPATIENT
Start: 2024-06-27 | End: 2024-06-27

## 2024-06-27 RX ORDER — INSULIN ASPART 100 [IU]/ML
5 INJECTION, SOLUTION SUBCUTANEOUS
Qty: 0 | Refills: 0 | DISCHARGE

## 2024-06-27 RX ORDER — INSULIN LISPRO 100 [IU]/ML
INJECTION, SOLUTION SUBCUTANEOUS AT BEDTIME
Refills: 0 | Status: DISCONTINUED | OUTPATIENT
Start: 2024-06-27 | End: 2024-07-11

## 2024-06-27 RX ORDER — DILTIAZEM HYDROCHLORIDE 240 MG/1
120 CAPSULE, EXTENDED RELEASE ORAL DAILY
Refills: 0 | Status: DISCONTINUED | OUTPATIENT
Start: 2024-06-27 | End: 2024-07-03

## 2024-06-27 RX ORDER — ENALAPRIL MALEATE 20 MG
1 TABLET ORAL
Refills: 0 | DISCHARGE

## 2024-06-27 RX ORDER — ENOXAPARIN SODIUM 100 MG/ML
70 INJECTION SUBCUTANEOUS
Refills: 0 | Status: DISCONTINUED | OUTPATIENT
Start: 2024-06-27 | End: 2024-06-27

## 2024-06-27 RX ORDER — DIPHENOXYLATE HCL/ATROPINE 2.5-.025MG
2 TABLET ORAL
Refills: 0 | DISCHARGE

## 2024-06-27 RX ORDER — PREGABALIN 50 MG/1
1 CAPSULE ORAL
Refills: 0 | DISCHARGE

## 2024-06-27 RX ORDER — CLONAZEPAM 2 MG/1
0.5 TABLET ORAL AT BEDTIME
Refills: 0 | Status: DISCONTINUED | OUTPATIENT
Start: 2024-06-27 | End: 2024-07-04

## 2024-06-27 RX ORDER — ACETAMINOPHEN 325 MG
1000 TABLET ORAL ONCE
Refills: 0 | Status: COMPLETED | OUTPATIENT
Start: 2024-06-27 | End: 2024-06-27

## 2024-06-27 RX ORDER — DILTIAZEM HYDROCHLORIDE 240 MG/1
1 CAPSULE, EXTENDED RELEASE ORAL
Refills: 0 | DISCHARGE

## 2024-06-27 RX ORDER — MORPHINE SULFATE 100 MG/1
1 TABLET, EXTENDED RELEASE ORAL EVERY 6 HOURS
Refills: 0 | Status: DISCONTINUED | OUTPATIENT
Start: 2024-06-27 | End: 2024-06-29

## 2024-06-27 RX ORDER — METOPROLOL TARTRATE 50 MG
1 TABLET ORAL
Refills: 0 | DISCHARGE

## 2024-06-27 RX ORDER — DEXTROSE 30 % IN WATER 30 %
15 VIAL (ML) INTRAVENOUS ONCE
Refills: 0 | Status: DISCONTINUED | OUTPATIENT
Start: 2024-06-27 | End: 2024-07-11

## 2024-06-27 RX ORDER — METOPROLOL TARTRATE 50 MG
50 TABLET ORAL AT BEDTIME
Refills: 0 | Status: DISCONTINUED | OUTPATIENT
Start: 2024-06-27 | End: 2024-07-11

## 2024-06-27 RX ORDER — MAGNESIUM, ALUMINUM HYDROXIDE 400-400
30 TABLET,CHEWABLE ORAL EVERY 4 HOURS
Refills: 0 | Status: DISCONTINUED | OUTPATIENT
Start: 2024-06-27 | End: 2024-07-07

## 2024-06-27 RX ORDER — GLUCAGON HYDROCHLORIDE 1 MG/ML
1 INJECTION, POWDER, FOR SOLUTION INTRAMUSCULAR; INTRAVENOUS; SUBCUTANEOUS ONCE
Refills: 0 | Status: DISCONTINUED | OUTPATIENT
Start: 2024-06-27 | End: 2024-07-11

## 2024-06-27 RX ORDER — POVIDONE-IODINE
1 FLAKES (GRAM) MISCELLANEOUS ONCE
Refills: 0 | Status: COMPLETED | OUTPATIENT
Start: 2024-06-27 | End: 2024-06-28

## 2024-06-27 RX ORDER — FAMOTIDINE 40 MG
1 TABLET ORAL
Refills: 0 | DISCHARGE

## 2024-06-27 RX ORDER — ONDANSETRON HYDROCHLORIDE 2 MG/ML
4 INJECTION INTRAMUSCULAR; INTRAVENOUS EVERY 8 HOURS
Refills: 0 | Status: DISCONTINUED | OUTPATIENT
Start: 2024-06-27 | End: 2024-07-07

## 2024-06-27 RX ORDER — MORPHINE SULFATE 100 MG/1
0.5 TABLET, EXTENDED RELEASE ORAL EVERY 6 HOURS
Refills: 0 | Status: DISCONTINUED | OUTPATIENT
Start: 2024-06-27 | End: 2024-06-27

## 2024-06-27 RX ORDER — IPRATROPIUM BROMIDE AND ALBUTEROL SULFATE .5; 3 MG/3ML; MG/3ML
3 SOLUTION RESPIRATORY (INHALATION) ONCE
Refills: 0 | Status: COMPLETED | OUTPATIENT
Start: 2024-06-27 | End: 2024-06-27

## 2024-06-27 RX ORDER — ACETAMINOPHEN 325 MG
650 TABLET ORAL EVERY 6 HOURS
Refills: 0 | Status: DISCONTINUED | OUTPATIENT
Start: 2024-06-27 | End: 2024-07-11

## 2024-06-27 RX ADMIN — MORPHINE SULFATE 4 MILLIGRAM(S): 100 TABLET, EXTENDED RELEASE ORAL at 10:36

## 2024-06-27 RX ADMIN — Medication 1000 MILLIGRAM(S): at 10:52

## 2024-06-27 RX ADMIN — PREGABALIN 200 MILLIGRAM(S): 50 CAPSULE ORAL at 22:08

## 2024-06-27 RX ADMIN — MORPHINE SULFATE 4 MILLIGRAM(S): 100 TABLET, EXTENDED RELEASE ORAL at 11:30

## 2024-06-27 RX ADMIN — Medication 650 MILLIGRAM(S): at 18:40

## 2024-06-27 RX ADMIN — Medication 5 MILLIGRAM(S): at 22:08

## 2024-06-27 RX ADMIN — IPRATROPIUM BROMIDE AND ALBUTEROL SULFATE 3 MILLILITER(S): .5; 3 SOLUTION RESPIRATORY (INHALATION) at 14:01

## 2024-06-27 RX ADMIN — MORPHINE SULFATE 4 MILLIGRAM(S): 100 TABLET, EXTENDED RELEASE ORAL at 14:00

## 2024-06-27 RX ADMIN — Medication 50 MILLIGRAM(S): at 22:08

## 2024-06-27 RX ADMIN — Medication 400 MILLIGRAM(S): at 10:37

## 2024-06-27 RX ADMIN — DILTIAZEM HYDROCHLORIDE 120 MILLIGRAM(S): 240 CAPSULE, EXTENDED RELEASE ORAL at 18:41

## 2024-06-27 RX ADMIN — MORPHINE SULFATE 4 MILLIGRAM(S): 100 TABLET, EXTENDED RELEASE ORAL at 15:44

## 2024-06-27 RX ADMIN — INSULIN LISPRO 2: 100 INJECTION, SOLUTION SUBCUTANEOUS at 17:13

## 2024-06-27 RX ADMIN — INSULIN GLARGINE 5 UNIT(S): 100 INJECTION, SOLUTION SUBCUTANEOUS at 21:04

## 2024-06-27 RX ADMIN — Medication 650 MILLIGRAM(S): at 18:42

## 2024-06-27 RX ADMIN — CLONAZEPAM 0.5 MILLIGRAM(S): 2 TABLET ORAL at 22:08

## 2024-06-27 RX ADMIN — Medication 1000 MILLIGRAM(S): at 11:30

## 2024-06-27 NOTE — H&P ADULT - NSHPLABSRESULTS_GEN_ALL_CORE
Personally reviewed available labs, imaging and ekg     Labs  CBC Full  -  ( 27 Jun 2024 10:40 )  WBC Count : 6.28 K/uL  RBC Count : 4.56 M/uL  Hemoglobin : 11.9 g/dL  Hematocrit : 37.9 %  Platelet Count - Automated : 299 K/uL  Mean Cell Volume : 83.1 fl  Mean Cell Hemoglobin : 26.1 pg  Mean Cell Hemoglobin Concentration : 31.4 g/dL  Auto Neutrophil # : 5.67 K/uL  Auto Lymphocyte # : 0.08 K/uL  Auto Monocyte # : 0.38 K/uL  Auto Eosinophil # : 0.07 K/uL  Auto Basophil # : 0.02 K/uL  Auto Neutrophil % : 90.2 %  Auto Lymphocyte % : 1.3 %  Auto Monocyte % : 6.1 %  Auto Eosinophil % : 1.1 %  Auto Basophil % : 0.3 %    06-27    135  |  105  |  31<H>  ----------------------------<  274<H>  4.1   |  24  |  0.86    Ca    8.8      27 Jun 2024 10:40    TPro  6.1  /  Alb  2.3<L>  /  TBili  0.3  /  DBili  x   /  AST  13<L>  /  ALT  26  /  AlkPhos  104  06-27    PT/INR - ( 27 Jun 2024 10:40 )   PT: 9.4 sec;   INR: 0.78 ratio         PTT - ( 27 Jun 2024 10:40 )  PTT:31.7 sec  Urinalysis Basic - ( 27 Jun 2024 10:40 )    Color: x / Appearance: x / SG: x / pH: x  Gluc: 274 mg/dL / Ketone: x  / Bili: x / Urobili: x   Blood: x / Protein: x / Nitrite: x   Leuk Esterase: x / RBC: x / WBC x   Sq Epi: x / Non Sq Epi: x / Bacteria: x      CAPILLARY BLOOD GLUCOSE      POCT Blood Glucose.: 244 mg/dL (27 Jun 2024 09:45)              Imaging  < from: Xray Hip w/ Pelvis 2 or 3 Views, Left (06.27.24 @ 12:45) >    IMPRESSION:    Minimally displaced left intertrochanteric fracture.    Mild pulmonary venous congestion/perihilar interstitial edema    --- End of Report ---            Ok Fonseca DO  This document has been electronically signed. Jun 27 2024  2:05PM    < end of copied text >          EKG Personally reviewed available labs, imaging and ekg     Labs  CBC Full  -  ( 27 Jun 2024 10:40 )  WBC Count : 6.28 K/uL  RBC Count : 4.56 M/uL  Hemoglobin : 11.9 g/dL  Hematocrit : 37.9 %  Platelet Count - Automated : 299 K/uL  Mean Cell Volume : 83.1 fl  Mean Cell Hemoglobin : 26.1 pg  Mean Cell Hemoglobin Concentration : 31.4 g/dL  Auto Neutrophil # : 5.67 K/uL  Auto Lymphocyte # : 0.08 K/uL  Auto Monocyte # : 0.38 K/uL  Auto Eosinophil # : 0.07 K/uL  Auto Basophil # : 0.02 K/uL  Auto Neutrophil % : 90.2 %  Auto Lymphocyte % : 1.3 %  Auto Monocyte % : 6.1 %  Auto Eosinophil % : 1.1 %  Auto Basophil % : 0.3 %    06-27    135  |  105  |  31<H>  ----------------------------<  274<H>  4.1   |  24  |  0.86    Ca    8.8      27 Jun 2024 10:40    TPro  6.1  /  Alb  2.3<L>  /  TBili  0.3  /  DBili  x   /  AST  13<L>  /  ALT  26  /  AlkPhos  104  06-27    PT/INR - ( 27 Jun 2024 10:40 )   PT: 9.4 sec;   INR: 0.78 ratio         PTT - ( 27 Jun 2024 10:40 )  PTT:31.7 sec  Urinalysis Basic - ( 27 Jun 2024 10:40 )    Color: x / Appearance: x / SG: x / pH: x  Gluc: 274 mg/dL / Ketone: x  / Bili: x / Urobili: x   Blood: x / Protein: x / Nitrite: x   Leuk Esterase: x / RBC: x / WBC x   Sq Epi: x / Non Sq Epi: x / Bacteria: x      CAPILLARY BLOOD GLUCOSE      POCT Blood Glucose.: 244 mg/dL (27 Jun 2024 09:45)              Imaging  < from: Xray Hip w/ Pelvis 2 or 3 Views, Left (06.27.24 @ 12:45) >    IMPRESSION:    Minimally displaced left intertrochanteric fracture.    Mild pulmonary venous congestion/perihilar interstitial edema    --- End of Report ---            Ok Fonseca DO  This document has been electronically signed. Jun 27 2024  2:05PM    < end of copied text >    EKG- NSR at 76 bpm. QTc- 447 ms

## 2024-06-27 NOTE — ED PROVIDER NOTE - PROGRESS NOTE DETAILS
Attending note (Beau): Patient's pain significantly improved minimal pain at present after second dose of medication.  Noted to have inotrope fracture minimally displaced.  Case discussed with orthopedics who recommended admission to medicine, case discussed with hospitalist Dr. Andino accepts admission

## 2024-06-27 NOTE — ED ADULT NURSE NOTE - CHPI ED NUR SEVERITY2
Group Topic: BH Recovery Skills    Date: 11/5/2020  Start Time: 0410  End Time: 0500  Facilitators: Morgan Swann CNA    Focus: Handout on negative thinking patterns and affirmations  Number in attendance: 14      Method: Group  Attendance: Pt did not attend group  Offered and pt refused          PAIN SCALE 9 OF 10.

## 2024-06-27 NOTE — ED PROVIDER NOTE - PHYSICAL EXAMINATION
On Physical Exam:  General: well appearing, in NAD, speaking clearly in full sentences and without difficulty; cooperative with exam  HEENT: anicteric sclera, PERRL, airway patent  Neck: no neck tenderness, no nuchal rigidity, no JVD  Cardiac: normal s1, s2; RRR; no MGR  Lungs: CTABL  Abdomen: soft nontender/nondistended  : no bladder tenderness or distension  Skin: intact, no rash  Extremities: no peripheral edema,; L hip with lateral and prox femur tenderness, soft compartments throughout, limited movement of hip/knee due to pain; dp/pt pulse palpable, cap refill < 2 sec in L toes  Neuro: sensation intact throughout LLE

## 2024-06-27 NOTE — ED PROVIDER NOTE - CLINICAL SUMMARY MEDICAL DECISION MAKING FREE TEXT BOX
Attending note (Beau): 68-year-old female history of lung cancer jugular thrombosis on Lovenox hypertension diabetes presents with left hip pain after trip and fall onto left hip with no other injuries reported no head strike.  Tender in the left hip and left proximal thigh concerning for possible proximal femur fracture left lower extremity is neurovascularly intact with soft compartments no concern for acute ischemic limb or compartment syndrome.  Obtain screening/presurgical lab panel x-rays of chest pelvis, left hip, and left femur.  Pain medication as needed.  Reassess after imaging to determine disposition.

## 2024-06-27 NOTE — ED PROVIDER NOTE - OBJECTIVE STATEMENT
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  Patient states she was walking down steps and slipped on the last step and landing directly on the left hip with inability to get up after fall due to pain in the left hip.  Denies any head strike or LOC. Attending note (Beau): 68-year-old female with history of lung cancer (s/p radiation and chemotherapy, now on immunotherapy), HTN, IDDM, jugular vein thrombosis on Lovenox presenting with left hip pain after slip and fall.  Patient states she was walking down steps and slipped on the last step and landing directly on the left hip with inability to get up after fall due to pain in the left hip.  Denies any head strike or LOC.

## 2024-06-27 NOTE — PATIENT PROFILE ADULT - FALL HARM RISK - HARM RISK INTERVENTIONS

## 2024-06-27 NOTE — H&P ADULT - ASSESSMENT
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    #LT hip IT fracture s/p mechanical trip and fall  - Appreciate ortho recs  - Pain control with morphine PRN  - FU CT L femur to rule out pathologic lesions  - Patient is medically optimized for surgical fixation of LT hip  - Plan for IM fixation on 6.28    #HTN      #IDDM  - c/w SSI    #Jugular vein thrombosis  - will c/w lovenox    DVT ppx- Lovenox  Diet- DASH/Consistent Carb  Dispo- medically active Patient is a 68-year-old female with history of lung cancer (on immunotherapy), HTN, IDDM, jugular vein thrombosis on Lovenox who presents with LT-sided hip pain after sustaining a fall.    #LT hip IT fracture s/p mechanical trip and fall  - Appreciate ortho recs  - Pain control with morphine PRN for moderate and severe  - F/u CT L femur to rule out pathologic lesions  - Patient is medically optimized for surgical fixation of LT hip  - Plan for IM fixation on 6/28    #Lung cancer  - on immunotherapy. Last dose on 6/26    #HTN  - c/w home metoprolol, diltiazem and enalparil    #IDDM  - c/w basal lantus (reduced dose) and SSI    #Jugular vein thrombosis  - will c/w lovenox    DVT ppx- Lovenox  Diet- DASH/Consistent Carb. NPO after MN  Dispo- medically active

## 2024-06-27 NOTE — ED ADULT TRIAGE NOTE - CHIEF COMPLAINT QUOTE
Left Hip pain, Missed last step in back yard today, smoking a cigarette upon ems arrival No LOC   H/O Lung Ca DM COPD

## 2024-06-27 NOTE — ED PROVIDER NOTE - NS ED ROS FT
Review of Systems:  -General: no fever   -Pulmonary: no cough, no shortness of breath  -Cardiac: no chest pain, no palpitations  -Gastrointestinal: no abdominal pain, no nausea, no vomiting, and no diarrhea.  -Genitourinary: no blood or pain with urination  -Musculoskeletal: no back or neck pain; + L hip pain  -Skin: no rashes  -Endocrine: +h/o diabetes

## 2024-06-27 NOTE — PATIENT PROFILE ADULT - FUNCTIONAL ASSESSMENT - BASIC MOBILITY 6.
2-calculated by average/Not able to assess (calculate score using [facility] averaging method)

## 2024-06-27 NOTE — CONSULT NOTE ADULT - SUBJECTIVE AND OBJECTIVE BOX
Orthopedics Consult Note:    This is a 68y Female who presents to the ED today with c/o left hip pain and inability to bear weight s/p mechanical trip and fall today. Pt denies any other injuries. Pt denies head trauma or LOC. Pt denies any numbness, tingling or paresthesias. Pt is a community ambulator without use of any assistive devices at baseline. Pt has hx of lung cancer diagnosed 3/2023 and is s/p radiation and chemo, and is currently on immunotherapy. She follows with oncologist Aquilino Jenkins at [facility]. Pt denies hx of known metastases. Pt is current 1/2 ppd smoker. Pt last ate/drank last night. Pt is on lovenox for IJV thrombus in 4/2024, last dose last night.     Past Medical & Surgical History:  PATIENT FELL, LEFT HIP PAIN    No pertinent family history in first degree relatives    MEWS Score    Diabetes    Hypertension    Myasthenia gravis    Multiple sclerosis    Alcohol abuse    Pancreatic insufficiency    Intertrochanteric fracture of left hip    H/O cervical spine surgery    PATIENT FELL, LEFT HIP PAIN    90+    [email or web address]        Allergies:  No Known Allergies      Vital Signs:  T(C): 36.8 (06-27-24 @ 13:41), Max: 36.8 (06-27-24 @ 13:41)  HR: 81 (06-27-24 @ 13:41) (72 - 81)  BP: 133/67 (06-27-24 @ 13:41) (132/73 - 133/67)  RR: 18 (06-27-24 @ 13:41) (18 - 20)  SpO2: 94% (06-27-24 @ 13:41) (91% - 94%)    Labs:  CBC ( 06-27-24 @ 10:40 )                   11.9  6.28 )-----------( 299                37.9      Chem ( 06-27-24 @ 10:40 )  135  |  105  |  31  ----------------------------<  274  4.1   |  24  |  0.86      PT/INR ( 06-27-24 @ 10:40 )   PT: 9.4;   INR: 0.78      PTT ( 06-27-24 @ 10:40 )  PTT: 31.7      Imaging:  X-rays of the pelvis, left hip and femur demonstrate an intertrochanteric femur fracture.    PE left hip:  LLE is short and ER. +mild swelling, no ecchymosis, no erythema, skin intact, normothermic. +TTP over groin and greater troch. LROM 2' pain. Moving ankle and all toes, +EHL/FHL/TA/GS. SILT throughout richard/saph/sp/dp/tib. DP and PT pulses palpbale. compartments soft, calves NTTP    Secondary Assessment:  NC/AT, NTTP of clavicles, NTTP of C-spine,T-spine, or L-spine in the midline and paraspinal areas; NTTP of pelvis  LUE: NTTP of Shoulder, Elbow, Wrist, Hand; NT with AROM/PROM of Shoulder, Elbow, Wrist, Hand; AIN/PIN/Med/Uln/Msc/Rad/Ax intact  RUE: NTTP of Shoulder, Elbow, Wrist, Hand; NT with AROM/PROM of Shoulder, Elbow, Wrist, Hand; AIN/PIN/Med/Uln/Msc/Rad/Ax intact   RLE: Able to SLR, NT with Log Roll, NT with Heel Strike, NTTP of Hip, Knee, Ankle, Foot; NT with AROM/PROM of Hip, Knee, Ankle, Foot; Q/H/GSC/TA/EHL/FHL intact      Assessment:  68y Female with a left intertrochanteric hip fracture s/p mechanical trip and fall    Plan:  Pt and family advised that surgical fixation of left hip fracture with Intramedullary Nail is recommended.  Surgical procedure was discussed in detail with patient and family including all R/B/As; Pt expressed understanding and consent for surgery was obtained.  Admit to Medical service for optimization and medical clearance. please document  FU CT L femur to rule out pathologic lesions prior to OR  f/u labs/imaging.  Complete bedrest.  Pain control.  Ice application.  DVT prophylaxis: ok for lovenox today  NPO and hold chemical anticoagulation after midnight with plan for possible OR tomorrow pending medical optimization and clearance.   IVF while NPO.    Case discussed with Dr. Garza who agrees with plan.

## 2024-06-27 NOTE — CONSULT NOTE ADULT - ATTENDING COMMENTS
68 F with Left Hip IT fracture.   OR for IMN   NPO   IVF  Medical clearance  Hold A/C for OR      Risks and benefits of surgery including but not  limited to bleeding infection, damage to surrounding structures, pseudoarthrosis, hardware failure, hardware migration were discussed with patient., Informed consent was obtained.

## 2024-06-27 NOTE — H&P ADULT - NSHPREVIEWOFSYSTEMS_GEN_ALL_CORE
CONSTITUTIONAL: No fever, no chills  EYES: No eye pain, no visual disturbance  Mouth: no pain in mouth, no cuts  RESPIRATORY: No cough, No sob  CARDIOVASCULAR: No CP, no palpitations  GASTROINTESTINAL: no abdominal pain, no n/v/d  GENITOURINARY: No dysuria, no hematuria  NEUROLOGICAL: No headaches, no weakness  SKIN: No itching, no rashes  MUSCULOSKELETAL: + LT hip pain, no joint swelling  PSYCHIATRY: no insomnia, no irritability

## 2024-06-27 NOTE — H&P ADULT - NSHPPHYSICALEXAM_GEN_ALL_CORE
Vital Signs Last 24 Hrs  T(F): 98.3 (27 Jun 2024 13:41), Max: 98.3 (27 Jun 2024 13:41)  HR: 81 (27 Jun 2024 13:41) (72 - 81)  BP: 133/67 (27 Jun 2024 13:41) (132/73 - 133/67)  RR: 18 (27 Jun 2024 13:41) (18 - 20)  SpO2: 94% (27 Jun 2024 13:41) (91% - 94%)    Physical Exam:  Constitutional: NAD, awake and alert  Respiratory: cta b/l no wheezing no rhonchi  Cardiovascular: +s1/s2 no edema b/l le  Gastrointestinal: soft nt nd bs+  Vascular: 2+ peripheral pulses  Neurological: A/O x 3, no focal deficits  Musculoskeletal: 5/5 strength b/l upper and lower extremities. LT hip tender to palpation

## 2024-06-27 NOTE — ED ADULT TRIAGE NOTE - AS PAIN REST
Patient is scheduled to have an MRI on Friday, it was suggested that she be given something to relax her, before procedure. Cash N Carry    Also needed is a new order for Prolia, last infusion 8/22. 4 (moderate pain)

## 2024-06-27 NOTE — H&P ADULT - HISTORY OF PRESENT ILLNESS
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 She was walking down steps when she slipped and landed on her left hip. Denies LOC and head trauma. Pt denies any numbness, tingling or paresthesias.    In the ED, her vitals are WNL. Labs WNL. Xray LT hip w/ left intertrochanteric fracture.

## 2024-06-27 NOTE — ED ADULT NURSE NOTE - OBJECTIVE STATEMENT
68 year old female with PMH of Lung Ca,COPD and MS. Pt with c/o of left hip pain s/p fall at homein AM. 68 year old female with PMH of Lung Ca, COPD and MS. Pt with c/o of left hip pain s/p fall at home in AM. Patient is A&Ox4 denies hitting head at time of fall, reports taking enoxaparin daily.

## 2024-06-27 NOTE — H&P ADULT - NSICDXPASTMEDICALHX_GEN_ALL_CORE_FT
PAST MEDICAL HISTORY:  Alcohol abuse     Diabetes     Hypertension     Multiple sclerosis     Pancreatic insufficiency

## 2024-06-28 DIAGNOSIS — S72.002A FRACTURE OF UNSPECIFIED PART OF NECK OF LEFT FEMUR, INITIAL ENCOUNTER FOR CLOSED FRACTURE: ICD-10-CM

## 2024-06-28 LAB
A1C WITH ESTIMATED AVERAGE GLUCOSE RESULT: 9.5 % — HIGH (ref 4–5.6)
ALBUMIN SERPL ELPH-MCNC: 2.2 G/DL — LOW (ref 3.3–5)
ALP SERPL-CCNC: 106 U/L — SIGNIFICANT CHANGE UP (ref 40–120)
ALT FLD-CCNC: 22 U/L — SIGNIFICANT CHANGE UP (ref 12–78)
ANION GAP SERPL CALC-SCNC: 1 MMOL/L — LOW (ref 5–17)
ANION GAP SERPL CALC-SCNC: 11 MMOL/L — SIGNIFICANT CHANGE UP (ref 5–17)
APTT BLD: 31.5 SEC — SIGNIFICANT CHANGE UP (ref 24.5–35.6)
AST SERPL-CCNC: 10 U/L — LOW (ref 15–37)
BILIRUB SERPL-MCNC: 0.3 MG/DL — SIGNIFICANT CHANGE UP (ref 0.2–1.2)
BUN SERPL-MCNC: 17 MG/DL — SIGNIFICANT CHANGE UP (ref 7–23)
BUN SERPL-MCNC: 24 MG/DL — HIGH (ref 7–23)
CALCIUM SERPL-MCNC: 8.7 MG/DL — SIGNIFICANT CHANGE UP (ref 8.5–10.1)
CALCIUM SERPL-MCNC: 8.8 MG/DL — SIGNIFICANT CHANGE UP (ref 8.5–10.1)
CHLORIDE SERPL-SCNC: 102 MMOL/L — SIGNIFICANT CHANGE UP (ref 96–108)
CHLORIDE SERPL-SCNC: 107 MMOL/L — SIGNIFICANT CHANGE UP (ref 96–108)
CO2 SERPL-SCNC: 23 MMOL/L — SIGNIFICANT CHANGE UP (ref 22–31)
CO2 SERPL-SCNC: 29 MMOL/L — SIGNIFICANT CHANGE UP (ref 22–31)
CREAT SERPL-MCNC: 0.78 MG/DL — SIGNIFICANT CHANGE UP (ref 0.5–1.3)
CREAT SERPL-MCNC: 0.8 MG/DL — SIGNIFICANT CHANGE UP (ref 0.5–1.3)
EGFR: 80 ML/MIN/1.73M2 — SIGNIFICANT CHANGE UP
EGFR: 83 ML/MIN/1.73M2 — SIGNIFICANT CHANGE UP
ESTIMATED AVERAGE GLUCOSE: 226 MG/DL — HIGH (ref 68–114)
GLUCOSE BLDC GLUCOMTR-MCNC: 280 MG/DL — HIGH (ref 70–99)
GLUCOSE BLDC GLUCOMTR-MCNC: 308 MG/DL — HIGH (ref 70–99)
GLUCOSE BLDC GLUCOMTR-MCNC: 77 MG/DL — SIGNIFICANT CHANGE UP (ref 70–99)
GLUCOSE BLDC GLUCOMTR-MCNC: 77 MG/DL — SIGNIFICANT CHANGE UP (ref 70–99)
GLUCOSE BLDC GLUCOMTR-MCNC: 87 MG/DL — SIGNIFICANT CHANGE UP (ref 70–99)
GLUCOSE SERPL-MCNC: 134 MG/DL — HIGH (ref 70–99)
GLUCOSE SERPL-MCNC: 236 MG/DL — HIGH (ref 70–99)
HCT VFR BLD CALC: 38.1 % — SIGNIFICANT CHANGE UP (ref 34.5–45)
HCT VFR BLD CALC: 40.6 % — SIGNIFICANT CHANGE UP (ref 34.5–45)
HGB BLD-MCNC: 12.2 G/DL — SIGNIFICANT CHANGE UP (ref 11.5–15.5)
HGB BLD-MCNC: 12.8 G/DL — SIGNIFICANT CHANGE UP (ref 11.5–15.5)
INR BLD: 0.86 RATIO — SIGNIFICANT CHANGE UP (ref 0.85–1.18)
MCHC RBC-ENTMCNC: 26.4 PG — LOW (ref 27–34)
MCHC RBC-ENTMCNC: 26.6 PG — LOW (ref 27–34)
MCHC RBC-ENTMCNC: 31.5 G/DL — LOW (ref 32–36)
MCHC RBC-ENTMCNC: 32 G/DL — SIGNIFICANT CHANGE UP (ref 32–36)
MCV RBC AUTO: 83 FL — SIGNIFICANT CHANGE UP (ref 80–100)
MCV RBC AUTO: 83.9 FL — SIGNIFICANT CHANGE UP (ref 80–100)
NRBC # BLD: 0 /100 WBCS — SIGNIFICANT CHANGE UP (ref 0–0)
NRBC # BLD: 0 /100 WBCS — SIGNIFICANT CHANGE UP (ref 0–0)
PLATELET # BLD AUTO: 269 K/UL — SIGNIFICANT CHANGE UP (ref 150–400)
PLATELET # BLD AUTO: 284 K/UL — SIGNIFICANT CHANGE UP (ref 150–400)
POTASSIUM SERPL-MCNC: 3.6 MMOL/L — SIGNIFICANT CHANGE UP (ref 3.5–5.3)
POTASSIUM SERPL-MCNC: 4.1 MMOL/L — SIGNIFICANT CHANGE UP (ref 3.5–5.3)
POTASSIUM SERPL-SCNC: 3.6 MMOL/L — SIGNIFICANT CHANGE UP (ref 3.5–5.3)
POTASSIUM SERPL-SCNC: 4.1 MMOL/L — SIGNIFICANT CHANGE UP (ref 3.5–5.3)
PROT SERPL-MCNC: 6.1 GM/DL — SIGNIFICANT CHANGE UP (ref 6–8.3)
PROTHROM AB SERPL-ACNC: 10.4 SEC — SIGNIFICANT CHANGE UP (ref 9.5–13)
RBC # BLD: 4.59 M/UL — SIGNIFICANT CHANGE UP (ref 3.8–5.2)
RBC # BLD: 4.84 M/UL — SIGNIFICANT CHANGE UP (ref 3.8–5.2)
RBC # FLD: 16.4 % — HIGH (ref 10.3–14.5)
RBC # FLD: 16.7 % — HIGH (ref 10.3–14.5)
SODIUM SERPL-SCNC: 136 MMOL/L — SIGNIFICANT CHANGE UP (ref 135–145)
SODIUM SERPL-SCNC: 137 MMOL/L — SIGNIFICANT CHANGE UP (ref 135–145)
WBC # BLD: 15.16 K/UL — HIGH (ref 3.8–10.5)
WBC # BLD: 8.68 K/UL — SIGNIFICANT CHANGE UP (ref 3.8–10.5)
WBC # FLD AUTO: 15.16 K/UL — HIGH (ref 3.8–10.5)
WBC # FLD AUTO: 8.68 K/UL — SIGNIFICANT CHANGE UP (ref 3.8–10.5)

## 2024-06-28 PROCEDURE — 99232 SBSQ HOSP IP/OBS MODERATE 35: CPT

## 2024-06-28 DEVICE — SCREW LOKG 5X35MM STRL: Type: IMPLANTABLE DEVICE | Site: LEFT | Status: FUNCTIONAL

## 2024-06-28 DEVICE — STRYKER TROCHANTERIC NAIL 10MM X 170MM 125 DEGREE: Type: IMPLANTABLE DEVICE | Site: LEFT | Status: FUNCTIONAL

## 2024-06-28 DEVICE — K-WIRE STRYKER 3.2MM X 450MM: Type: IMPLANTABLE DEVICE | Site: LEFT | Status: FUNCTIONAL

## 2024-06-28 DEVICE — SCREW LAG GAMMA 3 TI 10.5X95MM: Type: IMPLANTABLE DEVICE | Site: LEFT | Status: FUNCTIONAL

## 2024-06-28 RX ORDER — SODIUM CHLORIDE 0.9 % (FLUSH) 0.9 %
1000 SYRINGE (ML) INJECTION
Refills: 0 | Status: COMPLETED | OUTPATIENT
Start: 2024-06-28 | End: 2024-06-29

## 2024-06-28 RX ORDER — POLYETHYLENE GLYCOL 3350 1 G/G
17 POWDER ORAL AT BEDTIME
Refills: 0 | Status: DISCONTINUED | OUTPATIENT
Start: 2024-06-28 | End: 2024-07-11

## 2024-06-28 RX ORDER — ALBUTEROL 90 MCG
2 AEROSOL REFILL (GRAM) INHALATION EVERY 6 HOURS
Refills: 0 | Status: DISCONTINUED | OUTPATIENT
Start: 2024-06-28 | End: 2024-07-11

## 2024-06-28 RX ORDER — GUAIFENESIN 100 %
600 POWDER (GRAM) MISCELLANEOUS EVERY 12 HOURS
Refills: 0 | Status: DISCONTINUED | OUTPATIENT
Start: 2024-06-28 | End: 2024-07-11

## 2024-06-28 RX ORDER — DEXTROSE MONOHYDRATE AND SODIUM CHLORIDE 5; .3 G/100ML; G/100ML
1000 INJECTION, SOLUTION INTRAVENOUS
Refills: 0 | Status: DISCONTINUED | OUTPATIENT
Start: 2024-06-28 | End: 2024-06-29

## 2024-06-28 RX ORDER — ONDANSETRON HYDROCHLORIDE 2 MG/ML
4 INJECTION INTRAMUSCULAR; INTRAVENOUS EVERY 6 HOURS
Refills: 0 | Status: DISCONTINUED | OUTPATIENT
Start: 2024-06-28 | End: 2024-07-07

## 2024-06-28 RX ORDER — FENTANYL CITRATE 50 UG/ML
25 INJECTION, SOLUTION INTRAMUSCULAR; INTRAVENOUS
Refills: 0 | Status: DISCONTINUED | OUTPATIENT
Start: 2024-06-28 | End: 2024-06-29

## 2024-06-28 RX ORDER — OXYCODONE HYDROCHLORIDE 100 MG/5ML
5 SOLUTION ORAL EVERY 4 HOURS
Refills: 0 | Status: DISCONTINUED | OUTPATIENT
Start: 2024-06-28 | End: 2024-07-05

## 2024-06-28 RX ORDER — ACETAMINOPHEN 325 MG
650 TABLET ORAL EVERY 6 HOURS
Refills: 0 | Status: DISCONTINUED | OUTPATIENT
Start: 2024-06-28 | End: 2024-07-11

## 2024-06-28 RX ORDER — OXYCODONE HYDROCHLORIDE 100 MG/5ML
2.5 SOLUTION ORAL EVERY 4 HOURS
Refills: 0 | Status: DISCONTINUED | OUTPATIENT
Start: 2024-06-28 | End: 2024-06-28

## 2024-06-28 RX ORDER — ACETAMINOPHEN 325 MG
1000 TABLET ORAL ONCE
Refills: 0 | Status: COMPLETED | OUTPATIENT
Start: 2024-06-28 | End: 2024-06-28

## 2024-06-28 RX ORDER — ENOXAPARIN SODIUM 100 MG/ML
60 INJECTION SUBCUTANEOUS EVERY 12 HOURS
Refills: 0 | Status: DISCONTINUED | OUTPATIENT
Start: 2024-06-29 | End: 2024-07-11

## 2024-06-28 RX ORDER — SENNOSIDES 8.6 MG
2 TABLET ORAL AT BEDTIME
Refills: 0 | Status: DISCONTINUED | OUTPATIENT
Start: 2024-06-28 | End: 2024-07-11

## 2024-06-28 RX ORDER — CEFAZOLIN 10 G/1
2000 INJECTION, POWDER, FOR SOLUTION INTRAVENOUS EVERY 8 HOURS
Refills: 0 | Status: COMPLETED | OUTPATIENT
Start: 2024-06-28 | End: 2024-06-29

## 2024-06-28 RX ORDER — TRAMADOL HYDROCHLORIDE 50 MG/1
25 TABLET, FILM COATED ORAL EVERY 6 HOURS
Refills: 0 | Status: DISCONTINUED | OUTPATIENT
Start: 2024-06-28 | End: 2024-06-28

## 2024-06-28 RX ORDER — BUDESONIDE/FORMOTEROL FUMARATE 160-4.5MCG
2 HFA AEROSOL WITH ADAPTER (GRAM) INHALATION
Refills: 0 | Status: DISCONTINUED | OUTPATIENT
Start: 2024-06-28 | End: 2024-07-11

## 2024-06-28 RX ADMIN — Medication 2 PUFF(S): at 18:31

## 2024-06-28 RX ADMIN — Medication 600 MILLIGRAM(S): at 17:56

## 2024-06-28 RX ADMIN — Medication 1 APPLICATION(S): at 20:30

## 2024-06-28 RX ADMIN — Medication 5 MILLIGRAM(S): at 17:57

## 2024-06-28 RX ADMIN — Medication 650 MILLIGRAM(S): at 17:38

## 2024-06-28 RX ADMIN — Medication 75 MILLILITER(S): at 15:37

## 2024-06-28 RX ADMIN — FENTANYL CITRATE 25 MICROGRAM(S): 50 INJECTION, SOLUTION INTRAMUSCULAR; INTRAVENOUS at 22:36

## 2024-06-28 RX ADMIN — DEXTROSE MONOHYDRATE AND SODIUM CHLORIDE 75 MILLILITER(S): 5; .3 INJECTION, SOLUTION INTRAVENOUS at 22:36

## 2024-06-28 RX ADMIN — DILTIAZEM HYDROCHLORIDE 120 MILLIGRAM(S): 240 CAPSULE, EXTENDED RELEASE ORAL at 06:06

## 2024-06-28 RX ADMIN — Medication 400 MILLIGRAM(S): at 22:38

## 2024-06-28 RX ADMIN — Medication 1 APPLICATION(S): at 09:05

## 2024-06-28 RX ADMIN — Medication 650 MILLIGRAM(S): at 17:41

## 2024-06-28 RX ADMIN — FENTANYL CITRATE 25 MICROGRAM(S): 50 INJECTION, SOLUTION INTRAMUSCULAR; INTRAVENOUS at 22:49

## 2024-06-28 NOTE — DIETITIAN INITIAL EVALUATION ADULT - OTHER INFO
Pt asleep at time of visit, pending OR today (06/28).  Per H&P pt with poor appetite and PO intake x 4 days PTA. Consumed >75% of meals documented yesterday (06/27).  Pt with T2DM; insulin PTA. HbA1c 9.5% indicates poor blood glucose management.  RD remains available.

## 2024-06-28 NOTE — DISCHARGE NOTE PROVIDER - HOSPITAL COURSE
y/o F w/  female with history of lung cancer (on immunotherapy), HTN, IDDM, jugular vein thrombosis on AC with full dose Lovenox who presents with LT-sided hip pain after sustaining a fall.    Lt Hip Fx, s/p mechanical fall >  S/p LT hip IMN 06/29  --CT L femur: Acute minimally displaced/impacted intertrochanteric left proximal   femoral fracture without convincing CT evidence for underlying aggressive osseous neoplasm.  --seen by ortho- prn analgesics- remove staples on POD 14 by ortho office     Sepsis sec to Pna- smoker- quit smoking 11 days ago since hospitalization per pt  - Sputum cx--Numerous Escherichia coli    blood cx--NGTD   - CTA chest negative for PE  -seen by pulm  s/p tx with IV zosyn, IV CTX and azithro for pna   seen by pulm , for wheezing was placed omn duonebs, symbicort , wheezing improved  LE duplex neg for DVT   LUE swelling, duplex neg   seen by ID, s/p abx-  O2 via NC    Lung cancer-  on immunotherapy. Last dose on 6/26  - will need outpatient follow up with oncology  --patient will not be on immunotherapy while in rehab , pt aware and she is in agreement   she is currently not undergoing any chemo - keep fu with onc    HTN-  c/w home metoprolol, diltiazem and enalapril    DM2- uncontrolled  HbA1c 9.5%  continue with ISS/ pre meal , increase lantus dose as BG high    #Jugular vein thrombosis  - will c/w full dose lovenox    IBS , on lomotil at home , can continue     pt is accepted to [facility] , for dc to [facility]- pt is advised to keep FU with primary Onc, she is aware she will not get immunotherapy while she is at Rehab, and it will delay her cancer tretament and may cause progression fo malignancy- she is aware and she is in agreement to go to [facility] and verbalized understanding her immunotherapy will be held during rehab y/o F w/  female with history of lung cancer (on immunotherapy), HTN, IDDM, jugular vein thrombosis on AC with full dose Lovenox who presents with LT-sided hip pain after sustaining a fall.    Lt Hip Fx, s/p mechanical fall >  S/p LT hip IMN 06/29  --CT L femur: Acute minimally displaced/impacted intertrochanteric left proximal   femoral fracture without convincing CT evidence for underlying aggressive osseous neoplasm.  --seen by ortho- prn analgesics- remove staples on POD 14 by ortho office     Sepsis sec to Pna- smoker- quit smoking 11 days ago since hospitalization per pt  - Sputum cx--Numerous Escherichia coli    blood cx--NGTD   - CTA chest negative for PE  -seen by pulm  s/p tx with IV zosyn, IV CTX and azithro for pna   seen by pulm , for wheezing was placed omn duonebs, symbicort , wheezing improved  LE duplex neg for DVT   LUE swelling, duplex neg   seen by ID, s/p abx-  O2 via NC    Lung cancer-  on immunotherapy. Last dose on 6/26  - will need outpatient follow up with oncology  --patient will not be on immunotherapy while in rehab , pt aware and she is in agreement   she is currently not undergoing any chemo - keep fu with onc Dr. Aquilino Jenkins at [facility]    HTN-  c/w home metoprolol, diltiazem and enalapril    DM2- uncontrolled  HbA1c 9.5%  continue with ISS/ pre meal , increase lantus dose as BG high    #Jugular vein thrombosis  - will c/w full dose lovenox    IBS , on lomotil at home , can continue     MS- c/w home meds (Neuro: Dr. Dawit Lopez)    Malnutrition- encourage po intake- protein supplements    pt is accepted to [facility] , for dc to [facility]- pt is advised to keep FU with primary Onc, she is aware she will not get immunotherapy while she is at Rehab, and it will delay her cancer treatment and may cause progression fo malignancy- she is aware and she is in agreement to go to [facility] and verbalized understanding her immunotherapy will be held during rehab.    Pt is AAOx3 and she does not want me to call or speak with any family members re; her treatment plan. y/o F w/  female with history of lung cancer (on immunotherapy), HTN, IDDM, jugular vein thrombosis on AC with full dose Lovenox who presents with LT-sided hip pain after sustaining a fall.    Lt Hip Fx, s/p mechanical fall >  S/p LT hip IMN 06/29  --CT L femur: Acute minimally displaced/impacted intertrochanteric left proximal   femoral fracture without convincing CT evidence for underlying aggressive osseous neoplasm.  --seen by ortho- prn analgesics- remove staples on POD 14 by ortho office     Sepsis sec to Pna- smoker- quit smoking 11 days ago since hospitalization per pt  - Sputum cx--Numerous Escherichia coli    blood cx--NGTD   - CTA chest negative for PE  -seen by pulm  s/p tx with IV zosyn, IV CTX and azithro for pna   seen by pulm , for wheezing was placed omn duonebs, symbicort , wheezing improved  LE duplex neg for DVT   LUE swelling, duplex neg   seen by ID, s/p abx-  O2 via NC    Lung cancer-  on immunotherapy. Last dose on 6/26  - will need outpatient follow up with oncology  --patient will not be on immunotherapy while in rehab , pt aware and she is in agreement   she is currently not undergoing any chemo - keep fu with onc Dr. Aquilino Jenkins at [facility]    HTN-  c/w home metoprolol, diltiazem and enalapril    DM2- uncontrolled  HbA1c 9.5%  continue with ISS/ pre meal , increase lantus dose as BG high    #Jugular vein thrombosis  - will c/w full dose lovenox    IBS , on lomotil at home , can continue     MS- c/w home meds (Neuro: Dr. Dawit Lopez)    Malnutrition- encourage po intake- protein supplements    pt is accepted to [facility] , for dc to [facility]- pt is advised to keep FU with primary Onc, she is aware she will not get immunotherapy while she is at Rehab, and it will delay her cancer treatment and may cause progression fo malignancy- she is aware and she is in agreement to go to [facility] and verbalized understanding her immunotherapy will be held during rehab.    On 7/11/24 this case was reviewed with Dr. Petit, the patient is medically stable and optimized for discharge. All medications were reviewed and appropriate prescriptions were sent to mutually agreed upon pharmacy.

## 2024-06-28 NOTE — DISCHARGE NOTE PROVIDER - POSTFACE STATEMENT FOR MINUTES SPENT
LOV: 6/25/21   Last Refill: 6/3/21 external reported
So I'll fill this for a couple of months but he needs to see a Rheumatologist. He has IHP now? So he cant go back to [facility], I can  place a referral for [address] [place] [address], Dr. Mery Borja or [place], and it may take time to get in with them because theres only 3.  He may try to contact [facility], Dr. Ernesto Marshall, [facility], [address] [facility]? thay have more than [facility] does, and stephanie be able to get in sooner
minutes on the discharge service.

## 2024-06-28 NOTE — DIETITIAN INITIAL EVALUATION ADULT - NS FNS DIET ORDER
Diet, NPO after Midnight:      NPO Start Date: 27-Jun-2024,   NPO Start Time: 23:59  Except Medications (06-27-24 @ 14:34)

## 2024-06-28 NOTE — DISCHARGE NOTE PROVIDER - CARE PROVIDER_API CALL
Wally Garza  Orthopaedic Surgery  [address]  [place] [address]  Phone: [phone]  Fax: ()-  Follow Up Time:    Wally Garza  Orthopaedic Surgery  [address]  [place] [address]  Phone: [phone]  Fax: ()-  Follow Up Time:     Aquilino Jenkins)  Internal Medicine  [address] [place] [address], [address]  [place] [address]  Phone: [phone]  Fax: ()-  Follow Up Time: 2 weeks    ,   follow with Neurologist for MS  Phone: (   )    -  Fax: (   )    -  Follow Up Time: 2 weeks

## 2024-06-28 NOTE — PROGRESS NOTE ADULT - SUBJECTIVE AND OBJECTIVE BOX
Patient seen and examined at bedside.  No acute complaints at this time. Pain well controlled. Denies chest pain, shortness of breath, nausea or vomiting.     LABS:                        12.2   8.68  )-----------( 284      ( 28 Jun 2024 04:00 )             38.1     06-28    137  |  107  |  24<H>  ----------------------------<  134<H>  4.1   |  29  |  0.80    Ca    8.8      28 Jun 2024 04:00    TPro  6.1  /  Alb  2.2<L>  /  TBili  0.3  /  DBili  x   /  AST  10<L>  /  ALT  22  /  AlkPhos  106  06-28    PT/INR - ( 28 Jun 2024 04:00 )   PT: 10.4 sec;   INR: 0.86 ratio         PTT - ( 28 Jun 2024 04:00 )  PTT:31.5 sec      Physical Exam:  T(C): 37 (06-28-24 @ 04:49), Max: 37.2 (06-27-24 @ 18:17)  HR: 84 (06-28-24 @ 04:49) (72 - 90)  BP: 129/80 (06-28-24 @ 04:49) (129/80 - 162/87)  RR: 20 (06-28-24 @ 04:49) (18 - 24)  SpO2: 97% (06-28-24 @ 04:49) (76% - 97%)    General: NAD, resting comfortably in bed  LLE  SCDs present bilaterally  Compartments soft and compressible  No calf tenderness bilaterally  Motor: +TA/EHL/FHL/GSC  Sensory: +SILT SPN/DPN/ASHLY/SAPH/TIB  2+ DP    A/P:  68y F with L IT Fx. Plan for OR Today with Dr. Garza     -Plan for L Hip IMN for today   -Keep NPO   -PT/OT   -NWB LLE, bed rest   -Pain Control  -HOLD DVT ppx;   -FU AM Labs  -Rest, ice as needed  -Incentive Spirometry  -Medically optimized per OR     Discussed with Attending who is aware and agrees with above plan.

## 2024-06-28 NOTE — DISCHARGE NOTE PROVIDER - PROVIDER TOKENS
PROVIDER:[TOKEN:[[id number]]] PROVIDER:[TOKEN:[[id number]]],PROVIDER:[TOKEN:[[id number]],FOLLOWUP:[2 weeks]],FREE:[LAST:[],PHONE:[(   )    -],FAX:[(   )    -],ADDRESS:[follow with Neurologist for MS],FOLLOWUP:[2 weeks]]

## 2024-06-28 NOTE — DISCHARGE NOTE PROVIDER - NSDCMRMEDTOKEN_GEN_ALL_CORE_FT
clonazePAM 1 mg oral tablet: 0.5 tab(s) orally once a day (at bedtime)  DilTIAZem (Eqv-Cardizem CD) 120 mg/24 hours oral capsule, extended release: 1 cap(s) orally once a day (at bedtime)  diphenoxylate-atropine 2.5 mg-0.025 mg oral tablet: 2 tab(s) orally 3 times a day  enalapril 5 mg oral tablet: 1 tab(s) orally once a day (at bedtime)  famotidine 40 mg oral tablet: 1 tab(s) orally once a day (at bedtime)  Lantus 100 units/mL subcutaneous solution: 8 unit(s) subcutaneous once a day (at bedtime)  Lovenox 80 mg/0.8 mL injectable solution: 70 milligram(s) subcutaneously once a day (at bedtime)  Metoprolol Succinate ER 50 mg oral tablet, extended release: 1 tab(s) orally once a day (at bedtime)  pregabalin 200 mg oral capsule: 1 cap(s) orally once a day (at bedtime)  torsemide 10 mg oral tablet: 1 tab(s) orally once a day (at bedtime)   acetaminophen 325 mg oral tablet: 2 tab(s) orally every 6 hours As needed Temp greater or equal to 38C (100.4F), Mild Pain (1 - 3)  albuterol 90 mcg/inh inhalation aerosol: 2 puff(s) inhaled every 6 hours As needed Shortness of Breath and/or Wheezing  budesonide-formoterol 80 mcg-4.5 mcg/inh inhalation aerosol: 2 puff(s) inhaled 2 times a day  DilTIAZem (Eqv-Cardizem CD) 120 mg/24 hours oral capsule, extended release: 1 cap(s) orally once a day (at bedtime)  dimethyl fumarate 240 mg oral delayed release capsule: 1 cap(s) orally 2 times a day  diphenoxylate-atropine 2.5 mg-0.025 mg oral tablet: 2 tab(s) orally 3 times a day  enalapril 5 mg oral tablet: 1 tab(s) orally once a day (at bedtime)  enoxaparin: 60 milligram(s) subcutaneous 2 times a day 60 mg Subcutaneous Q 12 hour (1mg/kg body weight q 12 hr)  famotidine 40 mg oral tablet: 1 tab(s) orally once a day (at bedtime)  guaiFENesin 600 mg oral tablet, extended release: 1 tab(s) orally every 12 hours  insulin glargine 100 units/mL subcutaneous solution: 10 unit(s) subcutaneous 2 times a day  insulin lispro 100 units/mL injectable solution: 1 dose(s) injectable 4 times a day (before meals and at bedtime) As per sliding scale coverage  ipratropium-albuterol 0.5 mg-2.5 mg/3 mL inhalation solution: 3 milliliter(s) inhaled every 6 hours  lactobacillus acidophilus oral capsule: 1 cap(s) orally 3 times a day  melatonin 3 mg oral tablet: 1 tab(s) orally once a day (at bedtime) As needed Insomnia  Metoprolol Succinate ER 50 mg oral tablet, extended release: 1 tab(s) orally once a day (at bedtime)  polyethylene glycol 3350 oral powder for reconstitution: 17 gram(s) orally once a day (at bedtime)  pregabalin 200 mg oral capsule: 1 cap(s) orally once a day (at bedtime)  senna leaf extract oral tablet: 2 tab(s) orally once a day (at bedtime)  sodium chloride 3% inhalation solution: 4 milliliter(s) inhaled every 6 hours   albuterol 90 mcg/inh inhalation aerosol: 2 puff(s) inhaled every 6 hours As needed Shortness of Breath and/or Wheezing  amoxicillin-clavulanate 875 mg-125 mg oral tablet: 1 tab(s) orally 2 times a day Till 7/18  budesonide-formoterol 80 mcg-4.5 mcg/inh inhalation aerosol: 2 puff(s) inhaled 2 times a day  DilTIAZem (Eqv-Cardizem CD) 120 mg/24 hours oral capsule, extended release: 1 cap(s) orally once a day (at bedtime)  dimethyl fumarate 240 mg oral delayed release capsule: 1 cap(s) orally 2 times a day  diphenoxylate-atropine 2.5 mg-0.025 mg oral tablet: 2 tab(s) orally 3 times a day  enalapril 5 mg oral tablet: 1 tab(s) orally once a day (at bedtime)  enoxaparin: 60 milligram(s) subcutaneous 2 times a day 60 mg Subcutaneous Q 12 hour (1mg/kg body weight q 12 hr)  famotidine 40 mg oral tablet: 1 tab(s) orally once a day (at bedtime)  guaiFENesin 600 mg oral tablet, extended release: 1 tab(s) orally every 12 hours  insulin glargine 100 units/mL subcutaneous solution: 8 unit(s) subcutaneous once a day (at bedtime)  insulin lispro 100 units/mL injectable solution: 1 dose(s) injectable 4 times a day (before meals and at bedtime) As per sliding scale coverage  ipratropium-albuterol 0.5 mg-2.5 mg/3 mL inhalation solution: 3 milliliter(s) inhaled every 6 hours  lactobacillus acidophilus oral capsule: 1 cap(s) orally 3 times a day  melatonin 3 mg oral tablet: 1 tab(s) orally once a day (at bedtime) As needed Insomnia  Metoprolol Succinate ER 50 mg oral tablet, extended release: 1 tab(s) orally once a day (at bedtime)  polyethylene glycol 3350 oral powder for reconstitution: 17 gram(s) orally once a day (at bedtime)  pregabalin 200 mg oral capsule: 1 cap(s) orally once a day (at bedtime)  senna leaf extract oral tablet: 2 tab(s) orally once a day (at bedtime)  sodium chloride 3% inhalation solution: 4 milliliter(s) inhaled every 6 hours

## 2024-06-28 NOTE — PROGRESS NOTE ADULT - SUBJECTIVE AND OBJECTIVE BOX
Patient is a 68y old  Female who presents with a chief complaint of INTERTROCHANTERIC FRACTURE OF LEFT HIP     (28 Jun 2024 13:15)      INTERVAL HPI/OVERNIGHT EVENTS: No acute events overnight. HD stable.     MEDICATIONS  (STANDING):  budesonide  80 MICROgram(s)/formoterol 4.5 MICROgram(s) Inhaler 2 Puff(s) Inhalation two times a day  clonazePAM  Tablet 0.5 milliGRAM(s) Oral at bedtime  dextrose 10% Bolus 125 milliLiter(s) IV Bolus once  dextrose 5%. 1000 milliLiter(s) (100 mL/Hr) IV Continuous <Continuous>  dextrose 5%. 1000 milliLiter(s) (50 mL/Hr) IV Continuous <Continuous>  dextrose 50% Injectable 12.5 Gram(s) IV Push once  dextrose 50% Injectable 25 Gram(s) IV Push once  diltiazem    milliGRAM(s) Oral daily  enalapril 5 milliGRAM(s) Oral at bedtime  glucagon  Injectable 1 milliGRAM(s) IntraMuscular once  guaiFENesin  milliGRAM(s) Oral every 12 hours  insulin glargine Injectable (LANTUS) 5 Unit(s) SubCutaneous at bedtime  insulin lispro (ADMELOG) corrective regimen sliding scale   SubCutaneous three times a day before meals  insulin lispro (ADMELOG) corrective regimen sliding scale   SubCutaneous at bedtime  metoprolol succinate ER 50 milliGRAM(s) Oral at bedtime  povidone iodine 5% Nasal Swab 1 Application(s) Both Nostrils once  pregabalin 200 milliGRAM(s) Oral at bedtime    MEDICATIONS  (PRN):  acetaminophen     Tablet .. 650 milliGRAM(s) Oral every 6 hours PRN Temp greater or equal to 38C (100.4F), Mild Pain (1 - 3)  albuterol    90 MICROgram(s) HFA Inhaler 2 Puff(s) Inhalation every 6 hours PRN Shortness of Breath and/or Wheezing  aluminum hydroxide/magnesium hydroxide/simethicone Suspension 30 milliLiter(s) Oral every 4 hours PRN Dyspepsia  dextrose Oral Gel 15 Gram(s) Oral once PRN Blood Glucose LESS THAN 70 milliGRAM(s)/deciliter  melatonin 3 milliGRAM(s) Oral at bedtime PRN Insomnia  morphine  - Injectable 0.5 milliGRAM(s) IV Push every 6 hours PRN Moderate Pain (4 - 6)  morphine  - Injectable 1 milliGRAM(s) IV Push every 6 hours PRN Severe Pain (7 - 10)  ondansetron Injectable 4 milliGRAM(s) IV Push every 8 hours PRN Nausea and/or Vomiting      Allergies    No Known Allergies    Intolerances        REVIEW OF SYSTEMS: all negative with exception of above    Vital Signs Last 24 Hrs  T(C): 37.1 (28 Jun 2024 10:58), Max: 37.2 (27 Jun 2024 18:17)  T(F): 98.8 (28 Jun 2024 10:58), Max: 98.9 (27 Jun 2024 18:17)  HR: 86 (28 Jun 2024 10:58) (84 - 90)  BP: 132/75 (28 Jun 2024 10:58) (129/80 - 162/87)  BP(mean): 98 (27 Jun 2024 15:50) (98 - 98)  RR: 20 (28 Jun 2024 10:58) (20 - 24)  SpO2: 92% (28 Jun 2024 10:58) (76% - 97%)    Parameters below as of 28 Jun 2024 10:58  Patient On (Oxygen Delivery Method): nasal cannula  O2 Flow (L/min): 2      PHYSICAL EXAM:  Constitutional: NAD, awake and alert  Respiratory: cta b/l no wheezing no rhonchi  Cardiovascular: +s1/s2 no edema b/l le  Gastrointestinal: soft nt nd bs+  Vascular: 2+ peripheral pulses  Neurological: A/O x 3, no focal deficits  Musculoskeletal: 5/5 strength b/l upper and lower extremities. LT hip tender to palpation    LABS:                        12.2   8.68  )-----------( 284      ( 28 Jun 2024 04:00 )             38.1     06-28    137  |  107  |  24<H>  ----------------------------<  134<H>  4.1   |  29  |  0.80    Ca    8.8      28 Jun 2024 04:00    TPro  6.1  /  Alb  2.2<L>  /  TBili  0.3  /  DBili  x   /  AST  10<L>  /  ALT  22  /  AlkPhos  106  06-28    PT/INR - ( 28 Jun 2024 04:00 )   PT: 10.4 sec;   INR: 0.86 ratio         PTT - ( 28 Jun 2024 04:00 )  PTT:31.5 sec  Urinalysis Basic - ( 28 Jun 2024 04:00 )    Color: x / Appearance: x / SG: x / pH: x  Gluc: 134 mg/dL / Ketone: x  / Bili: x / Urobili: x   Blood: x / Protein: x / Nitrite: x   Leuk Esterase: x / RBC: x / WBC x   Sq Epi: x / Non Sq Epi: x / Bacteria: x      CAPILLARY BLOOD GLUCOSE      POCT Blood Glucose.: 77 mg/dL (28 Jun 2024 11:43)  POCT Blood Glucose.: 77 mg/dL (28 Jun 2024 08:18)  POCT Blood Glucose.: 195 mg/dL (27 Jun 2024 20:56)  POCT Blood Glucose.: 231 mg/dL (27 Jun 2024 17:08)      RADIOLOGY & ADDITIONAL TESTS:    Imaging Personally Reviewed:  [ ] YES  [ ] NO    Consultant(s) Notes Reviewed:  [ ] YES  [ ] NO    Care Discussed with Consultants/Other Providers [ ] YES  [ ] NO

## 2024-06-28 NOTE — DIETITIAN INITIAL EVALUATION ADULT - DIET TYPE
Reinitiate PO diet when medically appropriate; add Glucerna x 2/day (provides 440 kcal, 20 g protein)

## 2024-06-28 NOTE — PROGRESS NOTE ADULT - ASSESSMENT
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    #LT hip IT fracture s/p mechanical trip and fall  - Appreciate ortho recs  - Pain control with morphine PRN for moderate and severe  - F/u CT L femur to rule out pathologic lesions  - Patient is medically optimized for surgical fixation of LT hip  - Plan for IM fixation today    #Lung cancer  - on immunotherapy. Last dose on 6/26    #HTN  - c/w home metoprolol, diltiazem and enalapril    #IDDM  - c/w basal lantus (reduced dose) and SSI    #Jugular vein thrombosis  - will c/w lovenox    DVT ppx- Lovenox  Diet- DASH/Consistent Carb. NPO after MN  Dispo- medically active

## 2024-06-28 NOTE — DISCHARGE NOTE PROVIDER - NSDCFUADDINST_GEN_ALL_CORE_FT
IM Nail DC Instructions:    1. ACTIVITY: Weight bearing as tolerated with assistive devices (i.e. cane/walker).  2. DVT/PE Prophylaxis: Continue anticoagulation medication. See Med Rec for duration and dose.  3. PHYSICAL THERAPY: You should receive physical therapy daily.  4. FOLLOW UP: Follow up outpatient with your Orthopedic Surgeon, Dr. Garza in 14 Days. Please call the office for your appointment.  5. STAPLES: Remove by office POD14   6. BANDAGE: Change bandage on POD7 to dry gauze and tegaderm or paper tape. Can also change PRN if saturated. Do NOT remove on arrival to inspect wound.   IM Nail DC Instructions:    1. ACTIVITY: Weight bearing as tolerated with assistive devices (i.e. cane/walker).  2. DVT/PE Prophylaxis: Continue anticoagulation medication. See Med Rec for duration and dose.  3. PHYSICAL THERAPY: You should receive physical therapy daily.  4. FOLLOW UP: Follow up outpatient with your Orthopedic Surgeon, Dr. Garza in 14 Days. Please call the office for your appointment.  5. STAPLES: Remove by office POD14  (surgery on 06/29/24)  6. BANDAGE: Change bandage on POD7 to dry gauze and tegaderm or paper tape. Can also change PRN if saturated. Do NOT remove on arrival to inspect wound.

## 2024-06-28 NOTE — DISCHARGE NOTE PROVIDER - CARE PROVIDERS DIRECT ADDRESSES
,DirectAddress_Unknown ,DirectAddress_Unknown,[email or web address],DirectAddress_Unknown

## 2024-06-28 NOTE — DIETITIAN INITIAL EVALUATION ADULT - PERTINENT LABORATORY DATA
06-28    137  |  107  |  24<H>  ----------------------------<  134<H>  4.1   |  29  |  0.80    Ca    8.8      28 Jun 2024 04:00    TPro  6.1  /  Alb  2.2<L>  /  TBili  0.3  /  DBili  x   /  AST  10<L>  /  ALT  22  /  AlkPhos  106  06-28  POCT Blood Glucose.: 77 mg/dL (06-28-24 @ 11:43)  A1C with Estimated Average Glucose Result: 9.5 % (06-28-24 @ 04:00)

## 2024-06-28 NOTE — DIETITIAN INITIAL EVALUATION ADULT - PERTINENT MEDS FT
MEDICATIONS  (STANDING):  budesonide  80 MICROgram(s)/formoterol 4.5 MICROgram(s) Inhaler 2 Puff(s) Inhalation two times a day  clonazePAM  Tablet 0.5 milliGRAM(s) Oral at bedtime  dextrose 10% Bolus 125 milliLiter(s) IV Bolus once  dextrose 5%. 1000 milliLiter(s) (100 mL/Hr) IV Continuous <Continuous>  dextrose 5%. 1000 milliLiter(s) (50 mL/Hr) IV Continuous <Continuous>  dextrose 50% Injectable 12.5 Gram(s) IV Push once  dextrose 50% Injectable 25 Gram(s) IV Push once  diltiazem    milliGRAM(s) Oral daily  enalapril 5 milliGRAM(s) Oral at bedtime  glucagon  Injectable 1 milliGRAM(s) IntraMuscular once  guaiFENesin  milliGRAM(s) Oral every 12 hours  insulin glargine Injectable (LANTUS) 5 Unit(s) SubCutaneous at bedtime  insulin lispro (ADMELOG) corrective regimen sliding scale   SubCutaneous three times a day before meals  insulin lispro (ADMELOG) corrective regimen sliding scale   SubCutaneous at bedtime  metoprolol succinate ER 50 milliGRAM(s) Oral at bedtime  povidone iodine 5% Nasal Swab 1 Application(s) Both Nostrils once  pregabalin 200 milliGRAM(s) Oral at bedtime    MEDICATIONS  (PRN):  acetaminophen     Tablet .. 650 milliGRAM(s) Oral every 6 hours PRN Temp greater or equal to 38C (100.4F), Mild Pain (1 - 3)  albuterol    90 MICROgram(s) HFA Inhaler 2 Puff(s) Inhalation every 6 hours PRN Shortness of Breath and/or Wheezing  aluminum hydroxide/magnesium hydroxide/simethicone Suspension 30 milliLiter(s) Oral every 4 hours PRN Dyspepsia  dextrose Oral Gel 15 Gram(s) Oral once PRN Blood Glucose LESS THAN 70 milliGRAM(s)/deciliter  melatonin 3 milliGRAM(s) Oral at bedtime PRN Insomnia  morphine  - Injectable 1 milliGRAM(s) IV Push every 6 hours PRN Severe Pain (7 - 10)  morphine  - Injectable 0.5 milliGRAM(s) IV Push every 6 hours PRN Moderate Pain (4 - 6)  ondansetron Injectable 4 milliGRAM(s) IV Push every 8 hours PRN Nausea and/or Vomiting

## 2024-06-28 NOTE — DISCHARGE NOTE PROVIDER - NSDCCPCAREPLAN_GEN_ALL_CORE_FT
PRINCIPAL DISCHARGE DIAGNOSIS  Diagnosis: Intertrochanteric fracture of left hip  Assessment and Plan of Treatment:      PRINCIPAL DISCHARGE DIAGNOSIS  Diagnosis: Intertrochanteric fracture of left hip  Assessment and Plan of Treatment:       SECONDARY DISCHARGE DIAGNOSES  Diagnosis: Acute respiratory failure with hypoxia  Assessment and Plan of Treatment: due to penumonia    Diagnosis: Pneumonia, aspiration  Assessment and Plan of Treatment: continue with oral antibiotics    Diagnosis: Jugular vein thrombosis  Assessment and Plan of Treatment: c/w therapeutic lovenox

## 2024-06-28 NOTE — DIETITIAN INITIAL EVALUATION ADULT - FUNCTIONAL SCREEN CURRENT LEVEL: SWALLOWING (IF SCORE 2 OR MORE FOR ANY ITEM, CONSULT REHAB SERVICES), MLM)
Patient notified, he will increase Metoprolol to 50mg. New rx sent in. 0 = swallows foods/liquids without difficulty

## 2024-06-28 NOTE — BRIEF OPERATIVE NOTE - NSICDXBRIEFPROCEDURE_GEN_ALL_CORE_FT
PROCEDURES:  Insertion of locking intramedullary dillan into left hip 28-Jun-2024 22:06:53  Bill Aguero

## 2024-06-28 NOTE — DISCHARGE NOTE PROVIDER - ATTENDING DISCHARGE PHYSICAL EXAMINATION:
Vital Signs Last 24 Hrs  T(F): 98 (11 Jul 2024 04:00), Max: 98.8 (10 Jul 2024 23:00)  HR: 72 (11 Jul 2024 06:00) (71 - 94)  BP: 115/73 (11 Jul 2024 04:00) (115/73 - 145/75)  RR: 18 (11 Jul 2024 04:00) (18 - 18)  SpO2: 96% (11 Jul 2024 06:00) (96% - 98%)    Physical Exam:  Constitutional: NAD, awake and alert  Respiratory: cta b/l no wheezing no rhonchi  Cardiovascular: +s1/s2 no edema b/l le  Gastrointestinal: soft nt nd bs+  Vascular: 2+ peripheral pulses  Neurological: A/O x 3, no focal deficits

## 2024-06-29 LAB
ACETONE SERPL-MCNC: ABNORMAL
ANION GAP SERPL CALC-SCNC: 10 MMOL/L — SIGNIFICANT CHANGE UP (ref 5–17)
ANION GAP SERPL CALC-SCNC: 8 MMOL/L — SIGNIFICANT CHANGE UP (ref 5–17)
APPEARANCE UR: CLEAR — SIGNIFICANT CHANGE UP
B-OH-BUTYR SERPL-SCNC: 1.1 MMOL/L — HIGH
BACTERIA # UR AUTO: ABNORMAL /HPF
BASE EXCESS BLDA CALC-SCNC: 0.5 MMOL/L — SIGNIFICANT CHANGE UP (ref -2–3)
BILIRUB UR-MCNC: NEGATIVE — SIGNIFICANT CHANGE UP
BLOOD GAS COMMENTS ARTERIAL: SIGNIFICANT CHANGE UP
BUN SERPL-MCNC: 25 MG/DL — HIGH (ref 7–23)
BUN SERPL-MCNC: 27 MG/DL — HIGH (ref 7–23)
CALCIUM SERPL-MCNC: 8.2 MG/DL — LOW (ref 8.5–10.1)
CALCIUM SERPL-MCNC: 8.6 MG/DL — SIGNIFICANT CHANGE UP (ref 8.5–10.1)
CHLORIDE SERPL-SCNC: 94 MMOL/L — LOW (ref 96–108)
CHLORIDE SERPL-SCNC: 97 MMOL/L — SIGNIFICANT CHANGE UP (ref 96–108)
CO2 BLDA-SCNC: 27 MMOL/L — HIGH (ref 19–24)
CO2 SERPL-SCNC: 26 MMOL/L — SIGNIFICANT CHANGE UP (ref 22–31)
CO2 SERPL-SCNC: 27 MMOL/L — SIGNIFICANT CHANGE UP (ref 22–31)
COLOR SPEC: YELLOW — SIGNIFICANT CHANGE UP
CREAT SERPL-MCNC: 1.17 MG/DL — SIGNIFICANT CHANGE UP (ref 0.5–1.3)
CREAT SERPL-MCNC: 1.27 MG/DL — SIGNIFICANT CHANGE UP (ref 0.5–1.3)
DIFF PNL FLD: NEGATIVE — SIGNIFICANT CHANGE UP
EGFR: 46 ML/MIN/1.73M2 — LOW
EGFR: 51 ML/MIN/1.73M2 — LOW
EPI CELLS # UR: PRESENT
GAS PNL BLDA: SIGNIFICANT CHANGE UP
GLUCOSE BLDC GLUCOMTR-MCNC: 209 MG/DL — HIGH (ref 70–99)
GLUCOSE BLDC GLUCOMTR-MCNC: 302 MG/DL — HIGH (ref 70–99)
GLUCOSE BLDC GLUCOMTR-MCNC: 491 MG/DL — CRITICAL HIGH (ref 70–99)
GLUCOSE BLDC GLUCOMTR-MCNC: 517 MG/DL — CRITICAL HIGH (ref 70–99)
GLUCOSE BLDC GLUCOMTR-MCNC: 552 MG/DL — CRITICAL HIGH (ref 70–99)
GLUCOSE BLDC GLUCOMTR-MCNC: 565 MG/DL — CRITICAL HIGH (ref 70–99)
GLUCOSE BLDC GLUCOMTR-MCNC: >600 MG/DL — CRITICAL HIGH (ref 70–99)
GLUCOSE SERPL-MCNC: 361 MG/DL — HIGH (ref 70–99)
GLUCOSE SERPL-MCNC: 679 MG/DL — CRITICAL HIGH (ref 70–99)
GLUCOSE UR QL: >=1000 MG/DL
GRAN CASTS # UR COMP ASSIST: PRESENT
HCO3 BLDA-SCNC: 25 MMOL/L — SIGNIFICANT CHANGE UP (ref 21–28)
HCT VFR BLD CALC: 37.9 % — SIGNIFICANT CHANGE UP (ref 34.5–45)
HGB BLD-MCNC: 12.1 G/DL — SIGNIFICANT CHANGE UP (ref 11.5–15.5)
HOROWITZ INDEX BLDA+IHG-RTO: 32 — SIGNIFICANT CHANGE UP
KETONES UR-MCNC: 15 MG/DL
LEUKOCYTE ESTERASE UR-ACNC: NEGATIVE — SIGNIFICANT CHANGE UP
MCHC RBC-ENTMCNC: 26.5 PG — LOW (ref 27–34)
MCHC RBC-ENTMCNC: 31.9 G/DL — LOW (ref 32–36)
MCV RBC AUTO: 82.9 FL — SIGNIFICANT CHANGE UP (ref 80–100)
NITRITE UR-MCNC: NEGATIVE — SIGNIFICANT CHANGE UP
NRBC # BLD: 0 /100 WBCS — SIGNIFICANT CHANGE UP (ref 0–0)
PCO2 BLDA: 41 MMHG — SIGNIFICANT CHANGE UP (ref 32–46)
PH BLDA: 7.4 — SIGNIFICANT CHANGE UP (ref 7.35–7.45)
PH UR: 5.5 — SIGNIFICANT CHANGE UP (ref 5–8)
PLATELET # BLD AUTO: 281 K/UL — SIGNIFICANT CHANGE UP (ref 150–400)
PO2 BLDA: 82 MMHG — LOW (ref 83–108)
POTASSIUM SERPL-MCNC: 3.8 MMOL/L — SIGNIFICANT CHANGE UP (ref 3.5–5.3)
POTASSIUM SERPL-MCNC: 4.4 MMOL/L — SIGNIFICANT CHANGE UP (ref 3.5–5.3)
POTASSIUM SERPL-SCNC: 3.8 MMOL/L — SIGNIFICANT CHANGE UP (ref 3.5–5.3)
POTASSIUM SERPL-SCNC: 4.4 MMOL/L — SIGNIFICANT CHANGE UP (ref 3.5–5.3)
PROT UR-MCNC: 300 MG/DL
RBC # BLD: 4.57 M/UL — SIGNIFICANT CHANGE UP (ref 3.8–5.2)
RBC # FLD: 16.2 % — HIGH (ref 10.3–14.5)
RBC CASTS # UR COMP ASSIST: 1 /HPF — SIGNIFICANT CHANGE UP (ref 0–4)
SAO2 % BLDA: 95 % — SIGNIFICANT CHANGE UP (ref 94–98)
SODIUM SERPL-SCNC: 130 MMOL/L — LOW (ref 135–145)
SODIUM SERPL-SCNC: 132 MMOL/L — LOW (ref 135–145)
SP GR SPEC: 1.03 — SIGNIFICANT CHANGE UP (ref 1–1.03)
UROBILINOGEN FLD QL: 0.2 MG/DL — SIGNIFICANT CHANGE UP (ref 0.2–1)
WBC # BLD: 9.66 K/UL — SIGNIFICANT CHANGE UP (ref 3.8–10.5)
WBC # FLD AUTO: 9.66 K/UL — SIGNIFICANT CHANGE UP (ref 3.8–10.5)
WBC UR QL: 5 /HPF — SIGNIFICANT CHANGE UP (ref 0–5)

## 2024-06-29 PROCEDURE — 99223 1ST HOSP IP/OBS HIGH 75: CPT

## 2024-06-29 PROCEDURE — 99232 SBSQ HOSP IP/OBS MODERATE 35: CPT

## 2024-06-29 RX ORDER — POTASSIUM CHLORIDE 600 MG/1
40 TABLET, FILM COATED, EXTENDED RELEASE ORAL ONCE
Refills: 0 | Status: COMPLETED | OUTPATIENT
Start: 2024-06-29 | End: 2024-06-29

## 2024-06-29 RX ORDER — SODIUM CHLORIDE 0.9 % (FLUSH) 0.9 %
1000 SYRINGE (ML) INJECTION
Refills: 0 | Status: DISCONTINUED | OUTPATIENT
Start: 2024-06-29 | End: 2024-06-29

## 2024-06-29 RX ORDER — INSULIN LISPRO 100 [IU]/ML
4 INJECTION, SOLUTION SUBCUTANEOUS ONCE
Refills: 0 | Status: COMPLETED | OUTPATIENT
Start: 2024-06-29 | End: 2024-06-29

## 2024-06-29 RX ORDER — INSULIN GLARGINE 100 [IU]/ML
5 INJECTION, SOLUTION SUBCUTANEOUS ONCE
Refills: 0 | Status: COMPLETED | OUTPATIENT
Start: 2024-06-29 | End: 2024-06-29

## 2024-06-29 RX ORDER — INSULIN GLARGINE 100 [IU]/ML
8 INJECTION, SOLUTION SUBCUTANEOUS AT BEDTIME
Refills: 0 | Status: DISCONTINUED | OUTPATIENT
Start: 2024-06-29 | End: 2024-06-29

## 2024-06-29 RX ORDER — INSULIN REGULAR, HUMAN 100/ML
5 VIAL (ML) INJECTION ONCE
Refills: 0 | Status: COMPLETED | OUTPATIENT
Start: 2024-06-29 | End: 2024-06-29

## 2024-06-29 RX ORDER — INSULIN GLARGINE 100 [IU]/ML
12 INJECTION, SOLUTION SUBCUTANEOUS AT BEDTIME
Refills: 0 | Status: DISCONTINUED | OUTPATIENT
Start: 2024-06-29 | End: 2024-07-02

## 2024-06-29 RX ORDER — DEXTROSE MONOHYDRATE AND SODIUM CHLORIDE 5; .3 G/100ML; G/100ML
1000 INJECTION, SOLUTION INTRAVENOUS
Refills: 0 | Status: COMPLETED | OUTPATIENT
Start: 2024-06-29 | End: 2024-06-29

## 2024-06-29 RX ORDER — SODIUM CHLORIDE 0.9 % (FLUSH) 0.9 %
500 SYRINGE (ML) INJECTION
Refills: 0 | Status: DISCONTINUED | OUTPATIENT
Start: 2024-06-29 | End: 2024-07-02

## 2024-06-29 RX ORDER — INSULIN LISPRO 100 [IU]/ML
INJECTION, SOLUTION SUBCUTANEOUS
Refills: 0 | Status: DISCONTINUED | OUTPATIENT
Start: 2024-06-29 | End: 2024-07-11

## 2024-06-29 RX ORDER — INSULIN LISPRO 100 [IU]/ML
4 INJECTION, SOLUTION SUBCUTANEOUS
Refills: 0 | Status: DISCONTINUED | OUTPATIENT
Start: 2024-06-29 | End: 2024-07-06

## 2024-06-29 RX ORDER — SODIUM CHLORIDE 0.9 % (FLUSH) 0.9 %
500 SYRINGE (ML) INJECTION
Refills: 0 | Status: COMPLETED | OUTPATIENT
Start: 2024-06-29 | End: 2024-06-29

## 2024-06-29 RX ADMIN — INSULIN LISPRO 6: 100 INJECTION, SOLUTION SUBCUTANEOUS at 08:32

## 2024-06-29 RX ADMIN — Medication 650 MILLIGRAM(S): at 17:57

## 2024-06-29 RX ADMIN — INSULIN LISPRO 6: 100 INJECTION, SOLUTION SUBCUTANEOUS at 11:47

## 2024-06-29 RX ADMIN — OXYCODONE HYDROCHLORIDE 5 MILLIGRAM(S): 100 SOLUTION ORAL at 03:36

## 2024-06-29 RX ADMIN — OXYCODONE HYDROCHLORIDE 5 MILLIGRAM(S): 100 SOLUTION ORAL at 22:05

## 2024-06-29 RX ADMIN — INSULIN GLARGINE 5 UNIT(S): 100 INJECTION, SOLUTION SUBCUTANEOUS at 11:43

## 2024-06-29 RX ADMIN — Medication 2 PUFF(S): at 06:18

## 2024-06-29 RX ADMIN — CLONAZEPAM 0.5 MILLIGRAM(S): 2 TABLET ORAL at 21:54

## 2024-06-29 RX ADMIN — INSULIN LISPRO 2: 100 INJECTION, SOLUTION SUBCUTANEOUS at 00:14

## 2024-06-29 RX ADMIN — Medication 650 MILLIGRAM(S): at 06:13

## 2024-06-29 RX ADMIN — PREGABALIN 200 MILLIGRAM(S): 50 CAPSULE ORAL at 21:53

## 2024-06-29 RX ADMIN — Medication 5 MILLIGRAM(S): at 21:53

## 2024-06-29 RX ADMIN — MORPHINE SULFATE 1 MILLIGRAM(S): 100 TABLET, EXTENDED RELEASE ORAL at 01:45

## 2024-06-29 RX ADMIN — Medication 650 MILLIGRAM(S): at 12:56

## 2024-06-29 RX ADMIN — Medication 650 MILLIGRAM(S): at 00:16

## 2024-06-29 RX ADMIN — INSULIN LISPRO 8: 100 INJECTION, SOLUTION SUBCUTANEOUS at 16:42

## 2024-06-29 RX ADMIN — DEXTROSE MONOHYDRATE AND SODIUM CHLORIDE 75 MILLILITER(S): 5; .3 INJECTION, SOLUTION INTRAVENOUS at 11:44

## 2024-06-29 RX ADMIN — Medication 500 MILLILITER(S): at 08:44

## 2024-06-29 RX ADMIN — Medication 5 UNIT(S): at 10:43

## 2024-06-29 RX ADMIN — OXYCODONE HYDROCHLORIDE 5 MILLIGRAM(S): 100 SOLUTION ORAL at 07:00

## 2024-06-29 RX ADMIN — POTASSIUM CHLORIDE 40 MILLIEQUIVALENT(S): 600 TABLET, FILM COATED, EXTENDED RELEASE ORAL at 10:19

## 2024-06-29 RX ADMIN — Medication 650 MILLIGRAM(S): at 01:00

## 2024-06-29 RX ADMIN — INSULIN LISPRO 4 UNIT(S): 100 INJECTION, SOLUTION SUBCUTANEOUS at 16:41

## 2024-06-29 RX ADMIN — MORPHINE SULFATE 1 MILLIGRAM(S): 100 TABLET, EXTENDED RELEASE ORAL at 02:45

## 2024-06-29 RX ADMIN — Medication 500 MILLILITER(S): at 11:00

## 2024-06-29 RX ADMIN — DILTIAZEM HYDROCHLORIDE 120 MILLIGRAM(S): 240 CAPSULE, EXTENDED RELEASE ORAL at 06:16

## 2024-06-29 RX ADMIN — Medication 600 MILLIGRAM(S): at 06:15

## 2024-06-29 RX ADMIN — INSULIN LISPRO 4 UNIT(S): 100 INJECTION, SOLUTION SUBCUTANEOUS at 08:43

## 2024-06-29 RX ADMIN — CEFAZOLIN 100 MILLIGRAM(S): 10 INJECTION, POWDER, FOR SOLUTION INTRAVENOUS at 06:19

## 2024-06-29 RX ADMIN — OXYCODONE HYDROCHLORIDE 5 MILLIGRAM(S): 100 SOLUTION ORAL at 23:00

## 2024-06-29 RX ADMIN — Medication 650 MILLIGRAM(S): at 13:56

## 2024-06-29 RX ADMIN — Medication 650 MILLIGRAM(S): at 07:13

## 2024-06-29 RX ADMIN — DEXTROSE MONOHYDRATE AND SODIUM CHLORIDE 75 MILLILITER(S): 5; .3 INJECTION, SOLUTION INTRAVENOUS at 21:50

## 2024-06-29 RX ADMIN — Medication 2 PUFF(S): at 17:11

## 2024-06-29 RX ADMIN — INSULIN GLARGINE 12 UNIT(S): 100 INJECTION, SOLUTION SUBCUTANEOUS at 22:04

## 2024-06-29 RX ADMIN — Medication 650 MILLIGRAM(S): at 17:10

## 2024-06-29 RX ADMIN — Medication 600 MILLIGRAM(S): at 17:11

## 2024-06-29 RX ADMIN — CEFAZOLIN 100 MILLIGRAM(S): 10 INJECTION, POWDER, FOR SOLUTION INTRAVENOUS at 16:01

## 2024-06-29 RX ADMIN — Medication 50 MILLIGRAM(S): at 21:53

## 2024-06-29 RX ADMIN — INSULIN GLARGINE 5 UNIT(S): 100 INJECTION, SOLUTION SUBCUTANEOUS at 00:13

## 2024-06-29 RX ADMIN — ENOXAPARIN SODIUM 60 MILLIGRAM(S): 100 INJECTION SUBCUTANEOUS at 18:55

## 2024-06-29 NOTE — CONSULT NOTE ADULT - ASSESSMENT
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 Now POD 1 sp L Hip IMN. Hyperglycemic this am. ICU consulted.     Reccs:    - No anion gap, normal pH no active evidence of DKA  - Patient endorses increased PO intake after surgery yesterday and into this am  - Recommend to give additional IVF, 5 units regular insulin IVP, Replete potassium with increased Insulin administration  - Patient states is on 8 units basal insulin nightly  - Would also give additional 5 units basal insulin now, and then increase this evening basal insulin dose to 8 units.   - Monitor FS, SSI  - Recommend Repeat BMP later today   - Does not require ICU level of care at this time. Available for questions, please re consult as needed.  - Plan discussed with ICU MD Wilson, Hospitalist KERMIT Villaseñor

## 2024-06-29 NOTE — CONSULT NOTE ADULT - SUBJECTIVE AND OBJECTIVE BOX
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 Now POD 1 sp L Hip IMN. Hyperglycemic this am. ICU consulted.         PAST MEDICAL & SURGICAL HISTORY:  Diabetes      Hypertension      Multiple sclerosis      Alcohol abuse      Pancreatic insufficiency      H/O cervical spine surgery        Allergies    No Known Allergies    Intolerances      FAMILY HISTORY:  No pertinent family history in first degree relatives        Review of Systems:  CONSTITUTIONAL: No fever, chills, or fatigue  EYES: No eye pain, visual disturbances, or discharge  ENMT:  No difficulty hearing, tinnitus, vertigo; No sinus or throat pain  NECK: No pain or stiffness  RESPIRATORY: No cough, wheezing, chills or hemoptysis; No shortness of breath  CARDIOVASCULAR: No chest pain, palpitations, dizziness, or leg swelling  GASTROINTESTINAL: No abdominal or epigastric pain. No nausea, vomiting, or hematemesis; No diarrhea or constipation. No melena or hematochezia.  GENITOURINARY: No dysuria, frequency, hematuria, or incontinence  NEUROLOGICAL: No headaches, memory loss, loss of strength, numbness, or tremors  SKIN: No itching, burning, rashes, or lesions   MUSCULOSKELETAL: No joint pain or swelling; No muscle, back, or extremity pain  PSYCHIATRIC: No depression, anxiety, mood swings, or difficulty sleeping      Medications:  ceFAZolin   IVPB 2000 milliGRAM(s) IV Intermittent every 8 hours    diltiazem    milliGRAM(s) Oral daily  enalapril 5 milliGRAM(s) Oral at bedtime  metoprolol succinate ER 50 milliGRAM(s) Oral at bedtime    albuterol    90 MICROgram(s) HFA Inhaler 2 Puff(s) Inhalation every 6 hours PRN  budesonide  80 MICROgram(s)/formoterol 4.5 MICROgram(s) Inhaler 2 Puff(s) Inhalation two times a day  guaiFENesin  milliGRAM(s) Oral every 12 hours    acetaminophen     Tablet .. 650 milliGRAM(s) Oral every 6 hours PRN  acetaminophen     Tablet .. 650 milliGRAM(s) Oral every 6 hours  clonazePAM  Tablet 0.5 milliGRAM(s) Oral at bedtime  melatonin 3 milliGRAM(s) Oral at bedtime PRN  morphine  - Injectable 1 milliGRAM(s) IV Push every 6 hours PRN  morphine  - Injectable 0.5 milliGRAM(s) IV Push every 6 hours PRN  ondansetron Injectable 4 milliGRAM(s) IV Push every 8 hours PRN  ondansetron Injectable 4 milliGRAM(s) IV Push every 6 hours PRN  oxyCODONE    IR 2.5 milliGRAM(s) Oral every 4 hours PRN  oxyCODONE    IR 5 milliGRAM(s) Oral every 4 hours PRN  pregabalin 200 milliGRAM(s) Oral at bedtime  traMADol 25 milliGRAM(s) Oral every 6 hours PRN      enoxaparin Injectable 60 milliGRAM(s) SubCutaneous every 12 hours    aluminum hydroxide/magnesium hydroxide/simethicone Suspension 30 milliLiter(s) Oral every 4 hours PRN  magnesium hydroxide Suspension 30 milliLiter(s) Oral daily PRN  polyethylene glycol 3350 17 Gram(s) Oral at bedtime  senna 2 Tablet(s) Oral at bedtime      dextrose 50% Injectable 12.5 Gram(s) IV Push once  dextrose 50% Injectable 25 Gram(s) IV Push once  dextrose Oral Gel 15 Gram(s) Oral once PRN  glucagon  Injectable 1 milliGRAM(s) IntraMuscular once  insulin lispro (ADMELOG) corrective regimen sliding scale   SubCutaneous three times a day before meals  insulin lispro (ADMELOG) corrective regimen sliding scale   SubCutaneous at bedtime    dextrose 10% Bolus 125 milliLiter(s) IV Bolus once  dextrose 5%. 1000 milliLiter(s) IV Continuous <Continuous>  dextrose 5%. 1000 milliLiter(s) IV Continuous <Continuous>  lactated ringers. 1000 milliLiter(s) IV Continuous <Continuous>  sodium chloride 0.9%. 500 milliLiter(s) IV Continuous <Continuous>        Vital Signs Last 24 Hrs  T(C): 36.7 (29 Jun 2024 08:26), Max: 37.4 (28 Jun 2024 16:41)  T(F): 98 (29 Jun 2024 08:26), Max: 99.4 (28 Jun 2024 16:41)  HR: 92 (29 Jun 2024 08:26) (88 - 105)  BP: 114/68 (29 Jun 2024 08:26) (106/70 - 188/83)  BP(mean): --  RR: 18 (29 Jun 2024 08:26) (12 - 21)  SpO2: 95% (29 Jun 2024 08:26) (92% - 100%)    Parameters below as of 29 Jun 2024 08:26  Patient On (Oxygen Delivery Method): nasal cannula  O2 Flow (L/min): 2      ABG - ( 29 Jun 2024 08:50 )  pH, Arterial: 7.40  pH, Blood: x     /  pCO2: 41    /  pO2: 82    / HCO3: 25    / Base Excess: 0.5   /  SaO2: 95.0                I&O's Detail        LABS:                        12.1   9.66  )-----------( 281      ( 29 Jun 2024 06:42 )             37.9     06-29    130<L>  |  94<L>  |  25<H>  ----------------------------<  679<HH>  3.8   |  26  |  1.17    Ca    8.2<L>      29 Jun 2024 09:00    TPro  6.1  /  Alb  2.2<L>  /  TBili  0.3  /  DBili  x   /  AST  10<L>  /  ALT  22  /  AlkPhos  106  06-28          CAPILLARY BLOOD GLUCOSE      POCT Blood Glucose.: 491 mg/dL (29 Jun 2024 09:47)    PT/INR - ( 28 Jun 2024 04:00 )   PT: 10.4 sec;   INR: 0.86 ratio         PTT - ( 28 Jun 2024 04:00 )  PTT:31.5 sec  Urinalysis Basic - ( 29 Jun 2024 09:00 )    Color: x / Appearance: x / SG: x / pH: x  Gluc: 679 mg/dL / Ketone: x  / Bili: x / Urobili: x   Blood: x / Protein: x / Nitrite: x   Leuk Esterase: x / RBC: x / WBC x   Sq Epi: x / Non Sq Epi: x / Bacteria: x      CULTURES:      Physical Examination:    General: No acute distress.  Alert, oriented, interactive, nonfocal    HEENT: Pupils equal, reactive to light.  Symmetric. + NC in place    PULM: Clear to auscultation bilaterally, no significant sputum production    CVS: Regular rate and rhythm, no murmurs, rubs, or gallops    ABD: Soft, nondistended, nontender, + BS    EXT: No edema, nontender    SKIN: Warm and well perfused, no rashes noted.      RADIOLOGY: ***    [X] Reviewed     CRITICAL CARE TIME SPENT: ***

## 2024-06-29 NOTE — PROVIDER CONTACT NOTE (OTHER) - ASSESSMENT
114/68, HR92, RR18, T98.0F, 95% O2 sat on 2lnc  Patient denied any discomfort-No acute distress noted

## 2024-06-29 NOTE — PROGRESS NOTE ADULT - SUBJECTIVE AND OBJECTIVE BOX
Patient is a 68y old  Female who presents with a chief complaint of mechanical fall (2024 11:02)    INTERVAL HPI/OVERNIGHT EVENTS: No acute events overnight. HD stable.     MEDICATIONS  (STANDING):  acetaminophen     Tablet .. 650 milliGRAM(s) Oral every 6 hours  budesonide  80 MICROgram(s)/formoterol 4.5 MICROgram(s) Inhaler 2 Puff(s) Inhalation two times a day  ceFAZolin   IVPB 2000 milliGRAM(s) IV Intermittent every 8 hours  clonazePAM  Tablet 0.5 milliGRAM(s) Oral at bedtime  dextrose 10% Bolus 125 milliLiter(s) IV Bolus once  dextrose 5%. 1000 milliLiter(s) (100 mL/Hr) IV Continuous <Continuous>  dextrose 5%. 1000 milliLiter(s) (50 mL/Hr) IV Continuous <Continuous>  dextrose 50% Injectable 12.5 Gram(s) IV Push once  dextrose 50% Injectable 25 Gram(s) IV Push once  diltiazem    milliGRAM(s) Oral daily  enalapril 5 milliGRAM(s) Oral at bedtime  enoxaparin Injectable 60 milliGRAM(s) SubCutaneous every 12 hours  glucagon  Injectable 1 milliGRAM(s) IntraMuscular once  guaiFENesin  milliGRAM(s) Oral every 12 hours  insulin glargine Injectable (LANTUS) 12 Unit(s) SubCutaneous at bedtime  insulin lispro (ADMELOG) corrective regimen sliding scale   SubCutaneous at bedtime  insulin lispro (ADMELOG) corrective regimen sliding scale   SubCutaneous three times a day before meals  insulin lispro Injectable (ADMELOG) 4 Unit(s) SubCutaneous three times a day before meals  lactated ringers. 1000 milliLiter(s) (75 mL/Hr) IV Continuous <Continuous>  metoprolol succinate ER 50 milliGRAM(s) Oral at bedtime  polyethylene glycol 3350 17 Gram(s) Oral at bedtime  pregabalin 200 milliGRAM(s) Oral at bedtime  senna 2 Tablet(s) Oral at bedtime  sodium chloride 0.9%. 500 milliLiter(s) (500 mL/Hr) IV Continuous <Continuous>    MEDICATIONS  (PRN):  acetaminophen     Tablet .. 650 milliGRAM(s) Oral every 6 hours PRN Temp greater or equal to 38C (100.4F), Mild Pain (1 - 3)  albuterol    90 MICROgram(s) HFA Inhaler 2 Puff(s) Inhalation every 6 hours PRN Shortness of Breath and/or Wheezing  aluminum hydroxide/magnesium hydroxide/simethicone Suspension 30 milliLiter(s) Oral every 4 hours PRN Dyspepsia  dextrose Oral Gel 15 Gram(s) Oral once PRN Blood Glucose LESS THAN 70 milliGRAM(s)/deciliter  magnesium hydroxide Suspension 30 milliLiter(s) Oral daily PRN Constipation  melatonin 3 milliGRAM(s) Oral at bedtime PRN Insomnia  morphine  - Injectable 0.5 milliGRAM(s) IV Push every 6 hours PRN Moderate Pain (4 - 6)  morphine  - Injectable 1 milliGRAM(s) IV Push every 6 hours PRN Severe Pain (7 - 10)  ondansetron Injectable 4 milliGRAM(s) IV Push every 8 hours PRN Nausea and/or Vomiting  ondansetron Injectable 4 milliGRAM(s) IV Push every 6 hours PRN Nausea and/or Vomiting  oxyCODONE    IR 2.5 milliGRAM(s) Oral every 4 hours PRN Moderate Pain (4 - 6)  oxyCODONE    IR 5 milliGRAM(s) Oral every 4 hours PRN Severe Pain (7 - 10)  traMADol 25 milliGRAM(s) Oral every 6 hours PRN Mild Pain (1 - 3)      Allergies    No Known Allergies    Intolerances        REVIEW OF SYSTEMS: all negative with exception of above    Vital Signs Last 24 Hrs  T(C): 36.7 (2024 11:11), Max: 37.4 (2024 16:41)  T(F): 98 (2024 11:11), Max: 99.4 (2024 16:41)  HR: 79 (2024 11:11) (79 - 105)  BP: 111/75 (2024 11:11) (106/70 - 188/83)  BP(mean): --  RR: 18 (2024 11:11) (12 - 21)  SpO2: 94% (2024 11:11) (92% - 100%)    Parameters below as of 2024 11:11  Patient On (Oxygen Delivery Method): nasal cannula        PHYSICAL EXAM:  GENERAL: NAD, well-groomed  NERVOUS SYSTEM:  Alert & Oriented X3, Good concentration; Motor Strength 5/5 B/L upper and lower extremities; DTRs 2+ intact and symmetric  CHEST/LUNG: Clear to percussion bilaterally; No rales, rhonchi, wheezing, or rubs  HEART: Regular rate and rhythm; No murmurs, rubs, or gallops  ABDOMEN: Soft, Nontender, Nondistended; Bowel sounds present  EXTREMITIES:  2+ Peripheral Pulses, No clubbing, cyanosis, or edema    LABS:                        12.1   9.66  )-----------( 281      ( 2024 06:42 )             37.9     06-29    130<L>  |  94<L>  |  25<H>  ----------------------------<  679<HH>  3.8   |  26  |  1.17    Ca    8.2<L>      2024 09:00    TPro  6.1  /  Alb  2.2<L>  /  TBili  0.3  /  DBili  x   /  AST  10<L>  /  ALT  22  /  AlkPhos  106  06-28    PT/INR - ( 2024 04:00 )   PT: 10.4 sec;   INR: 0.86 ratio         PTT - ( 2024 04:00 )  PTT:31.5 sec  Urinalysis Basic - ( 2024 11:15 )    Color: Yellow / Appearance: Clear / S.029 / pH: x  Gluc: x / Ketone: 15 mg/dL  / Bili: Negative / Urobili: 0.2 mg/dL   Blood: x / Protein: 300 mg/dL / Nitrite: Negative   Leuk Esterase: Negative / RBC: 1 /HPF / WBC 5 /HPF   Sq Epi: x / Non Sq Epi: x / Bacteria: Few /HPF      CAPILLARY BLOOD GLUCOSE      POCT Blood Glucose.: 552 mg/dL (2024 11:21)  POCT Blood Glucose.: 517 mg/dL (2024 11:19)  POCT Blood Glucose.: 491 mg/dL (2024 09:47)  POCT Blood Glucose.: 565 mg/dL (2024 09:02)  POCT Blood Glucose.: >600 mg/dL (2024 08:24)  POCT Blood Glucose.: >600 mg/dL (2024 08:22)  POCT Blood Glucose.: >600 mg/dL (2024 08:20)  POCT Blood Glucose.: 308 mg/dL (2024 23:31)  POCT Blood Glucose.: 280 mg/dL (2024 23:03)  POCT Blood Glucose.: 87 mg/dL (2024 16:41)      RADIOLOGY & ADDITIONAL TESTS:    Imaging Personally Reviewed:  [ ] YES  [ ] NO    Consultant(s) Notes Reviewed:  [ ] YES  [ ] NO    Care Discussed with Consultants/Other Providers [ ] YES  [ ] NO

## 2024-06-29 NOTE — CONSULT NOTE ADULT - CRITICAL CARE ATTENDING COMMENT
68F w/ lung Ca, HTN, DM. Presents s/p fall w/ L proximal femur fracture. POD1 L Hip IMN. Developed hyperglycemia this morning. ICU consulted    pt relates that after OR she had some food overnight and then breakfast this morning that was no diabetes friendly and that caused her sugar to increase    - review of labs shows pt is hyperglycemic to 500-600, no AG, mild ketosis  - recommend IVF hydration   - s/p IV insulin, would give lantus 5U x1 now and restart lantus 8 U (home dose) tonight  - please be cautious about pt's diet  - continue med ISS and premeal as well  - at this time, pt does not require ICU level of care; please call back with any further questions or if clinical status changes    Plan of care discussed w/ pt

## 2024-06-29 NOTE — PROGRESS NOTE ADULT - SUBJECTIVE AND OBJECTIVE BOX
Patient seen and examined at bedside.  No acute complaints at this time. Pain well controlled. Denies chest pain, shortness of breath, nausea or vomiting.     LABS:                        12.8   15.16 )-----------( 269      ( 28 Jun 2024 22:10 )             40.6     06-28    136  |  102  |  17  ----------------------------<  236<H>  3.6   |  23  |  0.78    Ca    8.7      28 Jun 2024 22:10    TPro  6.1  /  Alb  2.2<L>  /  TBili  0.3  /  DBili  x   /  AST  10<L>  /  ALT  22  /  AlkPhos  106  06-28    PT/INR - ( 28 Jun 2024 04:00 )   PT: 10.4 sec;   INR: 0.86 ratio         PTT - ( 28 Jun 2024 04:00 )  PTT:31.5 sec  Urinalysis Basic - ( 28 Jun 2024 22:10 )    Color: x / Appearance: x / SG: x / pH: x  Gluc: 236 mg/dL / Ketone: x  / Bili: x / Urobili: x   Blood: x / Protein: x / Nitrite: x   Leuk Esterase: x / RBC: x / WBC x   Sq Epi: x / Non Sq Epi: x / Bacteria: x        VITAL SIGNS:  T(C): 36.6 (06-29-24 @ 08:06), Max: 37.4 (06-28-24 @ 16:41)  HR: 96 (06-29-24 @ 08:06) (86 - 105)  BP: 126/84 (06-29-24 @ 08:06) (106/70 - 188/83)  RR: 18 (06-29-24 @ 08:06) (12 - 21)  SpO2: 94% (06-29-24 @ 08:06) (92% - 100%)    General: NAD, resting comfortably in bed  LLE  incision c/d/i  SCDs present bilaterally  Compartments soft and compressible  No calf tenderness bilaterally  Motor: +TA/EHL/FHL/GSC  Sensory: +SILT SPN/DPN/ASHLY/SAPH/TIB  2+ DP    A/P:  68y F with POD 1 sp L Hip IMN     -PT/OT: WBAT    -FU PT Recs   -Pain Control  -Start DVT therapeutic lovenox ppx tonight   -FU AM Labs  -Rest, ice as needed  -Incentive Spirometry    Discussed with Attending who is aware and agrees with above plan.

## 2024-06-29 NOTE — CHART NOTE - NSCHARTNOTEFT_GEN_A_CORE
Provider called by primary nurse. Patient with documented High FS. Will give 10 units Lispro at this time along with IV NS bolus. Will send labs to rule out DKA.

## 2024-06-29 NOTE — PROGRESS NOTE ADULT - ASSESSMENT
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    #LT hip IT fracture s/p mechanical trip and fall  - Appreciate ortho recs  - Pain control with morphine PRN for moderate and severe  - F/u CT L femur to rule out pathologic lesions  - Patient is medically optimized for surgical fixation of LT hip  - S/p IM fixation    #Lung cancer  - on immunotherapy. Last dose on 6/26    #HTN  - c/w home metoprolol, diltiazem and enalapril    #IDDM  - c/w basal lantus (reduced dose) and SSI  - uncontrolled  - No anion gap, normal pH no active evidence of DKA  - Appreciate ICU recs- will increase basal dose of lantus    #Jugular vein thrombosis  - will c/w lovenox    DVT ppx- Lovenox  Diet- DASH/Consistent Carb. NPO after MN  Dispo- medically active

## 2024-06-29 NOTE — PROVIDER CONTACT NOTE (CRITICAL VALUE NOTIFICATION) - ASSESSMENT
Patient asymptomatic, denied any discomfort, Bolus of 500ml completed-receiving IVF NS at 75cc/hr per order

## 2024-06-29 NOTE — PROVIDER CONTACT NOTE (OTHER) - ACTION/TREATMENT ORDERED:
10 Unit of insulin SQ, 500 ML Bolus of NS, Normal saline at 75cc/hr for 16hours, Labs and urinalysis Stat ordered

## 2024-06-30 LAB
ALBUMIN SERPL ELPH-MCNC: 1.7 G/DL — LOW (ref 3.3–5)
ALP SERPL-CCNC: 103 U/L — SIGNIFICANT CHANGE UP (ref 40–120)
ALT FLD-CCNC: 13 U/L — SIGNIFICANT CHANGE UP (ref 12–78)
ANION GAP SERPL CALC-SCNC: 6 MMOL/L — SIGNIFICANT CHANGE UP (ref 5–17)
AST SERPL-CCNC: 15 U/L — SIGNIFICANT CHANGE UP (ref 15–37)
BILIRUB SERPL-MCNC: 0.5 MG/DL — SIGNIFICANT CHANGE UP (ref 0.2–1.2)
BUN SERPL-MCNC: 22 MG/DL — SIGNIFICANT CHANGE UP (ref 7–23)
CALCIUM SERPL-MCNC: 8.7 MG/DL — SIGNIFICANT CHANGE UP (ref 8.5–10.1)
CHLORIDE SERPL-SCNC: 97 MMOL/L — SIGNIFICANT CHANGE UP (ref 96–108)
CO2 SERPL-SCNC: 29 MMOL/L — SIGNIFICANT CHANGE UP (ref 22–31)
CREAT SERPL-MCNC: 0.85 MG/DL — SIGNIFICANT CHANGE UP (ref 0.5–1.3)
EGFR: 75 ML/MIN/1.73M2 — SIGNIFICANT CHANGE UP
GLUCOSE BLDC GLUCOMTR-MCNC: 123 MG/DL — HIGH (ref 70–99)
GLUCOSE BLDC GLUCOMTR-MCNC: 130 MG/DL — HIGH (ref 70–99)
GLUCOSE BLDC GLUCOMTR-MCNC: 143 MG/DL — HIGH (ref 70–99)
GLUCOSE BLDC GLUCOMTR-MCNC: 270 MG/DL — HIGH (ref 70–99)
GLUCOSE BLDC GLUCOMTR-MCNC: 54 MG/DL — CRITICAL LOW (ref 70–99)
GLUCOSE BLDC GLUCOMTR-MCNC: 89 MG/DL — SIGNIFICANT CHANGE UP (ref 70–99)
GLUCOSE SERPL-MCNC: 293 MG/DL — HIGH (ref 70–99)
HCT VFR BLD CALC: 39.2 % — SIGNIFICANT CHANGE UP (ref 34.5–45)
HGB BLD-MCNC: 12.2 G/DL — SIGNIFICANT CHANGE UP (ref 11.5–15.5)
MCHC RBC-ENTMCNC: 26.1 PG — LOW (ref 27–34)
MCHC RBC-ENTMCNC: 31.1 G/DL — LOW (ref 32–36)
MCV RBC AUTO: 83.8 FL — SIGNIFICANT CHANGE UP (ref 80–100)
NRBC # BLD: 0 /100 WBCS — SIGNIFICANT CHANGE UP (ref 0–0)
PLATELET # BLD AUTO: 272 K/UL — SIGNIFICANT CHANGE UP (ref 150–400)
POTASSIUM SERPL-MCNC: 4.8 MMOL/L — SIGNIFICANT CHANGE UP (ref 3.5–5.3)
POTASSIUM SERPL-SCNC: 4.8 MMOL/L — SIGNIFICANT CHANGE UP (ref 3.5–5.3)
PROT SERPL-MCNC: 6 GM/DL — SIGNIFICANT CHANGE UP (ref 6–8.3)
RBC # BLD: 4.68 M/UL — SIGNIFICANT CHANGE UP (ref 3.8–5.2)
RBC # FLD: 16.2 % — HIGH (ref 10.3–14.5)
SODIUM SERPL-SCNC: 132 MMOL/L — LOW (ref 135–145)
WBC # BLD: 5.47 K/UL — SIGNIFICANT CHANGE UP (ref 3.8–10.5)
WBC # FLD AUTO: 5.47 K/UL — SIGNIFICANT CHANGE UP (ref 3.8–10.5)

## 2024-06-30 PROCEDURE — 99232 SBSQ HOSP IP/OBS MODERATE 35: CPT

## 2024-06-30 RX ORDER — DIPHENOXYLATE HCL/ATROPINE 2.5-.025MG
1 TABLET ORAL ONCE
Refills: 0 | Status: DISCONTINUED | OUTPATIENT
Start: 2024-06-30 | End: 2024-06-30

## 2024-06-30 RX ORDER — BENZONATATE 100 MG/1
100 TABLET ORAL EVERY 8 HOURS
Refills: 0 | Status: DISCONTINUED | OUTPATIENT
Start: 2024-06-30 | End: 2024-07-03

## 2024-06-30 RX ADMIN — Medication 30 MILLILITER(S): at 22:57

## 2024-06-30 RX ADMIN — OXYCODONE HYDROCHLORIDE 5 MILLIGRAM(S): 100 SOLUTION ORAL at 13:00

## 2024-06-30 RX ADMIN — Medication 5 MILLIGRAM(S): at 21:43

## 2024-06-30 RX ADMIN — Medication 50 MILLIGRAM(S): at 21:43

## 2024-06-30 RX ADMIN — Medication 2 PUFF(S): at 17:32

## 2024-06-30 RX ADMIN — Medication 650 MILLIGRAM(S): at 17:59

## 2024-06-30 RX ADMIN — Medication 650 MILLIGRAM(S): at 05:56

## 2024-06-30 RX ADMIN — Medication 1 TABLET(S): at 23:20

## 2024-06-30 RX ADMIN — BENZONATATE 100 MILLIGRAM(S): 100 TABLET ORAL at 13:33

## 2024-06-30 RX ADMIN — Medication 650 MILLIGRAM(S): at 13:00

## 2024-06-30 RX ADMIN — Medication 650 MILLIGRAM(S): at 06:50

## 2024-06-30 RX ADMIN — INSULIN LISPRO 4 UNIT(S): 100 INJECTION, SOLUTION SUBCUTANEOUS at 12:15

## 2024-06-30 RX ADMIN — ENOXAPARIN SODIUM 60 MILLIGRAM(S): 100 INJECTION SUBCUTANEOUS at 19:50

## 2024-06-30 RX ADMIN — BENZONATATE 100 MILLIGRAM(S): 100 TABLET ORAL at 21:46

## 2024-06-30 RX ADMIN — INSULIN LISPRO 6: 100 INJECTION, SOLUTION SUBCUTANEOUS at 08:05

## 2024-06-30 RX ADMIN — Medication 650 MILLIGRAM(S): at 19:08

## 2024-06-30 RX ADMIN — PREGABALIN 200 MILLIGRAM(S): 50 CAPSULE ORAL at 21:43

## 2024-06-30 RX ADMIN — INSULIN LISPRO 4 UNIT(S): 100 INJECTION, SOLUTION SUBCUTANEOUS at 08:05

## 2024-06-30 RX ADMIN — Medication 650 MILLIGRAM(S): at 12:15

## 2024-06-30 RX ADMIN — OXYCODONE HYDROCHLORIDE 5 MILLIGRAM(S): 100 SOLUTION ORAL at 12:15

## 2024-06-30 RX ADMIN — Medication 650 MILLIGRAM(S): at 00:00

## 2024-06-30 RX ADMIN — Medication 650 MILLIGRAM(S): at 01:00

## 2024-06-30 RX ADMIN — Medication 2 PUFF(S): at 05:58

## 2024-06-30 RX ADMIN — CLONAZEPAM 0.5 MILLIGRAM(S): 2 TABLET ORAL at 21:43

## 2024-06-30 RX ADMIN — OXYCODONE HYDROCHLORIDE 5 MILLIGRAM(S): 100 SOLUTION ORAL at 20:40

## 2024-06-30 RX ADMIN — OXYCODONE HYDROCHLORIDE 5 MILLIGRAM(S): 100 SOLUTION ORAL at 19:49

## 2024-06-30 NOTE — PHYSICAL THERAPY INITIAL EVALUATION ADULT - GAIT TRAINING, PT EVAL
To be able to perform ambulation independently using cane for 200 feet, using proper technique using AD, with proper posture and functional distance at home in 2-4 weeks.

## 2024-06-30 NOTE — PHYSICAL THERAPY INITIAL EVALUATION ADULT - GENERAL OBSERVATIONS, REHAB EVAL
chart reviewed, pt encountered on supine, AxOx4, + O2 via NC (removed before session), + cardiac monitor. as per RN, blood sugar is controlled.

## 2024-06-30 NOTE — PROGRESS NOTE ADULT - ASSESSMENT
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    #LT hip IT fracture s/p mechanical trip and fall  - Appreciate ortho recs  - Pain control with morphine PRN for moderate and severe  - F/u CT L femur to rule out pathologic lesions  - Patient is medically optimized for surgical fixation of LT hip  - S/p IM fixation    #Lung cancer  - on immunotherapy. Last dose on 6/26    #HTN  - c/w home metoprolol, diltiazem and enalapril    #IDDM  - c/w basal lantus (reduced dose) and SSI  - controlled  - No anion gap, normal pH no active evidence of DKA  - c/w lantus    #Jugular vein thrombosis  - will c/w lovenox    DVT ppx- Lovenox  Diet- DASH/Consistent Carb. NPO after MN  Dispo- medically active

## 2024-06-30 NOTE — PHYSICAL THERAPY INITIAL EVALUATION ADULT - RANGE OF MOTION EXAMINATION, REHAB EVAL
L hip affected by pain, L knee and L ankle WFL/bilateral upper extremity ROM was WFL (within functional limits)/Right LE ROM was WFL (within functional limits)/deficits as listed below

## 2024-06-30 NOTE — PHYSICAL THERAPY INITIAL EVALUATION ADULT - ADDITIONAL COMMENTS
as per patient, she lives in a PH with her  with 4 stairs to enter the house with L HR to access home, as per , she can stay on the main level if needed. has 13 stairs with L HR to access bedrooms. pt was independent with no use of AD.

## 2024-06-30 NOTE — PROGRESS NOTE ADULT - SUBJECTIVE AND OBJECTIVE BOX
Patient is a 68y old  Female who presents with a chief complaint of mechanical fall (30 Jun 2024 07:22)    INTERVAL HPI/OVERNIGHT EVENTS: No acute events overnight. HD stable.     MEDICATIONS  (STANDING):  acetaminophen     Tablet .. 650 milliGRAM(s) Oral every 6 hours  budesonide  80 MICROgram(s)/formoterol 4.5 MICROgram(s) Inhaler 2 Puff(s) Inhalation two times a day  clonazePAM  Tablet 0.5 milliGRAM(s) Oral at bedtime  dextrose 10% Bolus 125 milliLiter(s) IV Bolus once  dextrose 5%. 1000 milliLiter(s) (100 mL/Hr) IV Continuous <Continuous>  dextrose 5%. 1000 milliLiter(s) (50 mL/Hr) IV Continuous <Continuous>  dextrose 50% Injectable 12.5 Gram(s) IV Push once  dextrose 50% Injectable 25 Gram(s) IV Push once  diltiazem    milliGRAM(s) Oral daily  enalapril 5 milliGRAM(s) Oral at bedtime  enoxaparin Injectable 60 milliGRAM(s) SubCutaneous every 12 hours  glucagon  Injectable 1 milliGRAM(s) IntraMuscular once  guaiFENesin  milliGRAM(s) Oral every 12 hours  insulin glargine Injectable (LANTUS) 12 Unit(s) SubCutaneous at bedtime  insulin lispro (ADMELOG) corrective regimen sliding scale   SubCutaneous three times a day before meals  insulin lispro (ADMELOG) corrective regimen sliding scale   SubCutaneous at bedtime  insulin lispro Injectable (ADMELOG) 4 Unit(s) SubCutaneous three times a day before meals  metoprolol succinate ER 50 milliGRAM(s) Oral at bedtime  polyethylene glycol 3350 17 Gram(s) Oral at bedtime  pregabalin 200 milliGRAM(s) Oral at bedtime  senna 2 Tablet(s) Oral at bedtime  sodium chloride 0.9%. 500 milliLiter(s) (500 mL/Hr) IV Continuous <Continuous>    MEDICATIONS  (PRN):  acetaminophen     Tablet .. 650 milliGRAM(s) Oral every 6 hours PRN Temp greater or equal to 38C (100.4F), Mild Pain (1 - 3)  albuterol    90 MICROgram(s) HFA Inhaler 2 Puff(s) Inhalation every 6 hours PRN Shortness of Breath and/or Wheezing  aluminum hydroxide/magnesium hydroxide/simethicone Suspension 30 milliLiter(s) Oral every 4 hours PRN Dyspepsia  dextrose Oral Gel 15 Gram(s) Oral once PRN Blood Glucose LESS THAN 70 milliGRAM(s)/deciliter  magnesium hydroxide Suspension 30 milliLiter(s) Oral daily PRN Constipation  melatonin 3 milliGRAM(s) Oral at bedtime PRN Insomnia  morphine  - Injectable 0.5 milliGRAM(s) IV Push every 6 hours PRN Moderate Pain (4 - 6)  morphine  - Injectable 1 milliGRAM(s) IV Push every 6 hours PRN Severe Pain (7 - 10)  ondansetron Injectable 4 milliGRAM(s) IV Push every 8 hours PRN Nausea and/or Vomiting  ondansetron Injectable 4 milliGRAM(s) IV Push every 6 hours PRN Nausea and/or Vomiting  oxyCODONE    IR 2.5 milliGRAM(s) Oral every 4 hours PRN Moderate Pain (4 - 6)  oxyCODONE    IR 5 milliGRAM(s) Oral every 4 hours PRN Severe Pain (7 - 10)  traMADol 25 milliGRAM(s) Oral every 6 hours PRN Mild Pain (1 - 3)      Allergies    No Known Allergies    Intolerances        REVIEW OF SYSTEMS: all negative with exception of above    Vital Signs Last 24 Hrs  T(C): 36.6 (30 Jun 2024 11:33), Max: 36.8 (29 Jun 2024 23:47)  T(F): 97.9 (30 Jun 2024 11:33), Max: 98.2 (29 Jun 2024 23:47)  HR: 80 (30 Jun 2024 11:33) (79 - 87)  BP: 119/71 (30 Jun 2024 11:33) (119/71 - 182/75)  BP(mean): --  RR: 18 (30 Jun 2024 11:33) (18 - 18)  SpO2: 92% (30 Jun 2024 11:33) (92% - 95%)    Parameters below as of 30 Jun 2024 11:33  Patient On (Oxygen Delivery Method): nasal cannula    PHYSICAL EXAM:  GENERAL: NAD, well-groomed  NERVOUS SYSTEM:  Alert & Oriented X3, Good concentration; Motor Strength 5/5 B/L upper and lower extremities; DTRs 2+ intact and symmetric  CHEST/LUNG: Clear to percussion bilaterally; No rales, rhonchi, wheezing, or rubs  HEART: Regular rate and rhythm; No murmurs, rubs, or gallops  ABDOMEN: Soft, Nontender, Nondistended; Bowel sounds present  EXTREMITIES:  2+ Peripheral Pulses, No clubbing, cyanosis, or edema    LABS:                        12.2   5.47  )-----------( 272      ( 30 Jun 2024 06:02 )             39.2     06-30    132<L>  |  97  |  22  ----------------------------<  293<H>  4.8   |  29  |  0.85    Ca    8.7      30 Jun 2024 06:02    TPro  6.0  /  Alb  1.7<L>  /  TBili  0.5  /  DBili  x   /  AST  15  /  ALT  13  /  AlkPhos  103  06-30      Urinalysis Basic - ( 30 Jun 2024 06:02 )    Color: x / Appearance: x / SG: x / pH: x  Gluc: 293 mg/dL / Ketone: x  / Bili: x / Urobili: x   Blood: x / Protein: x / Nitrite: x   Leuk Esterase: x / RBC: x / WBC x   Sq Epi: x / Non Sq Epi: x / Bacteria: x      CAPILLARY BLOOD GLUCOSE      POCT Blood Glucose.: 143 mg/dL (30 Jun 2024 12:05)  POCT Blood Glucose.: 270 mg/dL (30 Jun 2024 07:45)  POCT Blood Glucose.: 209 mg/dL (29 Jun 2024 21:15)  POCT Blood Glucose.: 302 mg/dL (29 Jun 2024 16:25)      RADIOLOGY & ADDITIONAL TESTS:    Imaging Personally Reviewed:  [ ] YES  [ ] NO    Consultant(s) Notes Reviewed:  [ ] YES  [ ] NO    Care Discussed with Consultants/Other Providers [ ] YES  [ ] NO

## 2024-06-30 NOTE — PROGRESS NOTE ADULT - SUBJECTIVE AND OBJECTIVE BOX
Patient seen and examined at bedside.  No acute complaints at this time. Pain well controlled. Denies chest pain, shortness of breath, nausea or vomiting.     LABS:                        12.2   5.47  )-----------( 272      ( 30 Jun 2024 06:02 )             39.2     06-30    132<L>  |  97  |  22  ----------------------------<  293<H>  4.8   |  29  |  0.85    Ca    8.7      30 Jun 2024 06:02    TPro  6.0  /  Alb  1.7<L>  /  TBili  0.5  /  DBili  x   /  AST  15  /  ALT  13  /  AlkPhos  103  06-30      Urinalysis Basic - ( 30 Jun 2024 06:02 )    Color: x / Appearance: x / SG: x / pH: x  Gluc: 293 mg/dL / Ketone: x  / Bili: x / Urobili: x   Blood: x / Protein: x / Nitrite: x   Leuk Esterase: x / RBC: x / WBC x   Sq Epi: x / Non Sq Epi: x / Bacteria: x        VITAL SIGNS:  T(C): 36.4 (06-30-24 @ 05:01), Max: 36.8 (06-29-24 @ 23:47)  HR: 79 (06-30-24 @ 05:01) (79 - 96)  BP: 157/88 (06-30-24 @ 05:01) (111/75 - 182/75)  RR: 18 (06-30-24 @ 05:01) (18 - 18)  SpO2: 95% (06-30-24 @ 05:01) (94% - 95%)    General: NAD, resting comfortably in bed  LLE  incision c/d/i  SCDs present bilaterally  Compartments soft and compressible  No calf tenderness bilaterally  Motor: +TA/EHL/FHL/GSC  Sensory: +SILT SPN/DPN/ASHLY/SAPH/TIB  2+ DP    A/P:  68y F with POD 2 sp L Hip IMN. Monitor Glucose levels as they exceeded 600 yesterday. Improved this am.      -PT/OT: WBAT    -FU PT Recs   -Pain Control  -Cont DVT ppx: therapeutic lovenox   -FU AM Labs  -Rest, ice as needed  -Incentive Spirometry    Discussed with Attending who is aware and agrees with above plan.

## 2024-06-30 NOTE — PHYSICAL THERAPY INITIAL EVALUATION ADULT - BALANCE TRAINING, PT EVAL
Pt will improve static/dynamic standing balance to WFL to perform all functional mobility without LOB and increase safety, by 2-4weeks

## 2024-06-30 NOTE — PHYSICAL THERAPY INITIAL EVALUATION ADULT - IMPAIRMENTS FOUND, PT EVAL
gait, locomotion, and balance/integumentary integrity/joint integrity and mobility/muscle strength/ROM

## 2024-07-01 DIAGNOSIS — R09.02 HYPOXEMIA: ICD-10-CM

## 2024-07-01 LAB
ALBUMIN SERPL ELPH-MCNC: 1.6 G/DL — LOW (ref 3.3–5)
ALP SERPL-CCNC: 90 U/L — SIGNIFICANT CHANGE UP (ref 40–120)
ALT FLD-CCNC: 12 U/L — SIGNIFICANT CHANGE UP (ref 12–78)
ANION GAP SERPL CALC-SCNC: 15 MMOL/L — SIGNIFICANT CHANGE UP (ref 5–17)
ANION GAP SERPL CALC-SCNC: 8 MMOL/L — SIGNIFICANT CHANGE UP (ref 5–17)
ANISOCYTOSIS BLD QL: SLIGHT — SIGNIFICANT CHANGE UP
AST SERPL-CCNC: 7 U/L — LOW (ref 15–37)
BASE EXCESS BLDA CALC-SCNC: 6.1 MMOL/L — HIGH (ref -2–3)
BASOPHILS # BLD AUTO: 0 K/UL — SIGNIFICANT CHANGE UP (ref 0–0.2)
BASOPHILS NFR BLD AUTO: 0 % — SIGNIFICANT CHANGE UP (ref 0–2)
BILIRUB SERPL-MCNC: 0.5 MG/DL — SIGNIFICANT CHANGE UP (ref 0.2–1.2)
BLOOD GAS COMMENTS ARTERIAL: SIGNIFICANT CHANGE UP
BUN SERPL-MCNC: 21 MG/DL — SIGNIFICANT CHANGE UP (ref 7–23)
BUN SERPL-MCNC: 25 MG/DL — HIGH (ref 7–23)
CALCIUM SERPL-MCNC: 8.2 MG/DL — LOW (ref 8.5–10.1)
CALCIUM SERPL-MCNC: 8.9 MG/DL — SIGNIFICANT CHANGE UP (ref 8.5–10.1)
CHLORIDE SERPL-SCNC: 89 MMOL/L — LOW (ref 96–108)
CHLORIDE SERPL-SCNC: 92 MMOL/L — LOW (ref 96–108)
CO2 BLDA-SCNC: 33 MMOL/L — HIGH (ref 19–24)
CO2 SERPL-SCNC: 23 MMOL/L — SIGNIFICANT CHANGE UP (ref 22–31)
CO2 SERPL-SCNC: 31 MMOL/L — SIGNIFICANT CHANGE UP (ref 22–31)
CREAT SERPL-MCNC: 0.74 MG/DL — SIGNIFICANT CHANGE UP (ref 0.5–1.3)
CREAT SERPL-MCNC: 1.13 MG/DL — SIGNIFICANT CHANGE UP (ref 0.5–1.3)
EGFR: 53 ML/MIN/1.73M2 — LOW
EGFR: 88 ML/MIN/1.73M2 — SIGNIFICANT CHANGE UP
EOSINOPHIL # BLD AUTO: 0 K/UL — SIGNIFICANT CHANGE UP (ref 0–0.5)
EOSINOPHIL NFR BLD AUTO: 0 % — SIGNIFICANT CHANGE UP (ref 0–6)
GAS PNL BLDA: SIGNIFICANT CHANGE UP
GLUCOSE BLDC GLUCOMTR-MCNC: 164 MG/DL — HIGH (ref 70–99)
GLUCOSE BLDC GLUCOMTR-MCNC: 187 MG/DL — HIGH (ref 70–99)
GLUCOSE BLDC GLUCOMTR-MCNC: 222 MG/DL — HIGH (ref 70–99)
GLUCOSE BLDC GLUCOMTR-MCNC: 298 MG/DL — HIGH (ref 70–99)
GLUCOSE BLDC GLUCOMTR-MCNC: 369 MG/DL — HIGH (ref 70–99)
GLUCOSE BLDC GLUCOMTR-MCNC: 415 MG/DL — HIGH (ref 70–99)
GLUCOSE BLDC GLUCOMTR-MCNC: 426 MG/DL — HIGH (ref 70–99)
GLUCOSE SERPL-MCNC: 144 MG/DL — HIGH (ref 70–99)
GLUCOSE SERPL-MCNC: 312 MG/DL — HIGH (ref 70–99)
HCO3 BLDA-SCNC: 31 MMOL/L — HIGH (ref 21–28)
HCT VFR BLD CALC: 35.7 % — SIGNIFICANT CHANGE UP (ref 34.5–45)
HCT VFR BLD CALC: 38.6 % — SIGNIFICANT CHANGE UP (ref 34.5–45)
HGB BLD-MCNC: 11.7 G/DL — SIGNIFICANT CHANGE UP (ref 11.5–15.5)
HGB BLD-MCNC: 12.5 G/DL — SIGNIFICANT CHANGE UP (ref 11.5–15.5)
HOROWITZ INDEX BLDA+IHG-RTO: 100 — SIGNIFICANT CHANGE UP
LACTATE SERPL-SCNC: 1 MMOL/L — SIGNIFICANT CHANGE UP (ref 0.7–2)
LYMPHOCYTES # BLD AUTO: 0.32 K/UL — LOW (ref 1–3.3)
LYMPHOCYTES # BLD AUTO: 4 % — LOW (ref 13–44)
MANUAL SMEAR VERIFICATION: SIGNIFICANT CHANGE UP
MCHC RBC-ENTMCNC: 26.4 PG — LOW (ref 27–34)
MCHC RBC-ENTMCNC: 26.6 PG — LOW (ref 27–34)
MCHC RBC-ENTMCNC: 32.4 G/DL — SIGNIFICANT CHANGE UP (ref 32–36)
MCHC RBC-ENTMCNC: 32.8 G/DL — SIGNIFICANT CHANGE UP (ref 32–36)
MCV RBC AUTO: 80.4 FL — SIGNIFICANT CHANGE UP (ref 80–100)
MCV RBC AUTO: 82.1 FL — SIGNIFICANT CHANGE UP (ref 80–100)
MICROCYTES BLD QL: SLIGHT — SIGNIFICANT CHANGE UP
MONOCYTES # BLD AUTO: 0.4 K/UL — SIGNIFICANT CHANGE UP (ref 0–0.9)
MONOCYTES NFR BLD AUTO: 5 % — SIGNIFICANT CHANGE UP (ref 2–14)
NEUTROPHILS # BLD AUTO: 7.27 K/UL — SIGNIFICANT CHANGE UP (ref 1.8–7.4)
NEUTROPHILS NFR BLD AUTO: 87 % — HIGH (ref 43–77)
NEUTS BAND # BLD: 4 % — SIGNIFICANT CHANGE UP (ref 0–8)
NRBC # BLD: 0 /100 WBCS — SIGNIFICANT CHANGE UP (ref 0–0)
NRBC # BLD: 0 /100 WBCS — SIGNIFICANT CHANGE UP (ref 0–0)
NRBC # BLD: SIGNIFICANT CHANGE UP /100 WBCS (ref 0–0)
PCO2 BLDA: 46 MMHG — SIGNIFICANT CHANGE UP (ref 32–46)
PH BLDA: 7.44 — SIGNIFICANT CHANGE UP (ref 7.35–7.45)
PLAT MORPH BLD: NORMAL — SIGNIFICANT CHANGE UP
PLATELET # BLD AUTO: 299 K/UL — SIGNIFICANT CHANGE UP (ref 150–400)
PLATELET # BLD AUTO: 316 K/UL — SIGNIFICANT CHANGE UP (ref 150–400)
PLATELET COUNT - ESTIMATE: NORMAL — SIGNIFICANT CHANGE UP
PO2 BLDA: 152 MMHG — HIGH (ref 83–108)
POTASSIUM SERPL-MCNC: 4.1 MMOL/L — SIGNIFICANT CHANGE UP (ref 3.5–5.3)
POTASSIUM SERPL-MCNC: 4.2 MMOL/L — SIGNIFICANT CHANGE UP (ref 3.5–5.3)
POTASSIUM SERPL-SCNC: 4.1 MMOL/L — SIGNIFICANT CHANGE UP (ref 3.5–5.3)
POTASSIUM SERPL-SCNC: 4.2 MMOL/L — SIGNIFICANT CHANGE UP (ref 3.5–5.3)
PROT SERPL-MCNC: 5.9 GM/DL — LOW (ref 6–8.3)
RAPID RVP RESULT: SIGNIFICANT CHANGE UP
RBC # BLD: 4.44 M/UL — SIGNIFICANT CHANGE UP (ref 3.8–5.2)
RBC # BLD: 4.7 M/UL — SIGNIFICANT CHANGE UP (ref 3.8–5.2)
RBC # FLD: 15.9 % — HIGH (ref 10.3–14.5)
RBC # FLD: 15.9 % — HIGH (ref 10.3–14.5)
RBC BLD AUTO: SIGNIFICANT CHANGE UP
SAO2 % BLDA: 97.1 % — SIGNIFICANT CHANGE UP (ref 94–98)
SARS-COV-2 RNA SPEC QL NAA+PROBE: SIGNIFICANT CHANGE UP
SODIUM SERPL-SCNC: 127 MMOL/L — LOW (ref 135–145)
SODIUM SERPL-SCNC: 131 MMOL/L — LOW (ref 135–145)
WBC # BLD: 5.67 K/UL — SIGNIFICANT CHANGE UP (ref 3.8–10.5)
WBC # BLD: 7.99 K/UL — SIGNIFICANT CHANGE UP (ref 3.8–10.5)
WBC # FLD AUTO: 5.67 K/UL — SIGNIFICANT CHANGE UP (ref 3.8–10.5)
WBC # FLD AUTO: 7.99 K/UL — SIGNIFICANT CHANGE UP (ref 3.8–10.5)

## 2024-07-01 PROCEDURE — 71275 CT ANGIOGRAPHY CHEST: CPT | Mod: 26

## 2024-07-01 PROCEDURE — 99232 SBSQ HOSP IP/OBS MODERATE 35: CPT

## 2024-07-01 PROCEDURE — 71045 X-RAY EXAM CHEST 1 VIEW: CPT | Mod: 26

## 2024-07-01 PROCEDURE — 99222 1ST HOSP IP/OBS MODERATE 55: CPT

## 2024-07-01 PROCEDURE — 99291 CRITICAL CARE FIRST HOUR: CPT

## 2024-07-01 RX ORDER — PIPERACILLIN SODIUM AND TAZOBACTAM SODIUM 3; .375 G/15ML; G/15ML
3.38 INJECTION, POWDER, LYOPHILIZED, FOR SOLUTION INTRAVENOUS ONCE
Refills: 0 | Status: COMPLETED | OUTPATIENT
Start: 2024-07-01 | End: 2024-07-01

## 2024-07-01 RX ORDER — AZITHROMYCIN 250 MG/1
500 TABLET, FILM COATED ORAL EVERY 24 HOURS
Refills: 0 | Status: DISCONTINUED | OUTPATIENT
Start: 2024-07-01 | End: 2024-07-02

## 2024-07-01 RX ORDER — SODIUM CHLORIDE 0.9 % (FLUSH) 0.9 %
1000 SYRINGE (ML) INJECTION
Refills: 0 | Status: DISCONTINUED | OUTPATIENT
Start: 2024-07-01 | End: 2024-07-02

## 2024-07-01 RX ORDER — PIPERACILLIN SODIUM AND TAZOBACTAM SODIUM 3; .375 G/15ML; G/15ML
3.38 INJECTION, POWDER, LYOPHILIZED, FOR SOLUTION INTRAVENOUS EVERY 12 HOURS
Refills: 0 | Status: DISCONTINUED | OUTPATIENT
Start: 2024-07-02 | End: 2024-07-05

## 2024-07-01 RX ORDER — SODIUM CHLORIDE 0.9 % (FLUSH) 0.9 %
1 SYRINGE (ML) INJECTION
Refills: 0 | Status: COMPLETED | OUTPATIENT
Start: 2024-07-01 | End: 2024-07-04

## 2024-07-01 RX ORDER — SODIUM CHLORIDE 0.9 % (FLUSH) 0.9 %
1 SYRINGE (ML) INJECTION
Refills: 0 | Status: DISCONTINUED | OUTPATIENT
Start: 2024-07-01 | End: 2024-07-01

## 2024-07-01 RX ORDER — ACETAMINOPHEN 325 MG
1000 TABLET ORAL ONCE
Refills: 0 | Status: COMPLETED | OUTPATIENT
Start: 2024-07-01 | End: 2024-07-01

## 2024-07-01 RX ADMIN — INSULIN LISPRO 4: 100 INJECTION, SOLUTION SUBCUTANEOUS at 12:01

## 2024-07-01 RX ADMIN — PIPERACILLIN SODIUM AND TAZOBACTAM SODIUM 25 GRAM(S): 3; .375 INJECTION, POWDER, LYOPHILIZED, FOR SOLUTION INTRAVENOUS at 18:15

## 2024-07-01 RX ADMIN — Medication 400 MILLIGRAM(S): at 15:11

## 2024-07-01 RX ADMIN — Medication 1000 MILLIGRAM(S): at 16:15

## 2024-07-01 RX ADMIN — Medication 650 MILLIGRAM(S): at 17:22

## 2024-07-01 RX ADMIN — AZITHROMYCIN 255 MILLIGRAM(S): 250 TABLET, FILM COATED ORAL at 17:21

## 2024-07-01 RX ADMIN — PREGABALIN 200 MILLIGRAM(S): 50 CAPSULE ORAL at 22:08

## 2024-07-01 RX ADMIN — INSULIN LISPRO 4 UNIT(S): 100 INJECTION, SOLUTION SUBCUTANEOUS at 12:00

## 2024-07-01 RX ADMIN — INSULIN GLARGINE 12 UNIT(S): 100 INJECTION, SOLUTION SUBCUTANEOUS at 22:10

## 2024-07-01 RX ADMIN — Medication 650 MILLIGRAM(S): at 18:30

## 2024-07-01 RX ADMIN — Medication 650 MILLIGRAM(S): at 23:24

## 2024-07-01 RX ADMIN — Medication 50 MILLIGRAM(S): at 22:09

## 2024-07-01 RX ADMIN — CLONAZEPAM 0.5 MILLIGRAM(S): 2 TABLET ORAL at 22:09

## 2024-07-01 RX ADMIN — INSULIN LISPRO 4 UNIT(S): 100 INJECTION, SOLUTION SUBCUTANEOUS at 07:49

## 2024-07-01 RX ADMIN — INSULIN LISPRO 4: 100 INJECTION, SOLUTION SUBCUTANEOUS at 22:10

## 2024-07-01 RX ADMIN — INSULIN LISPRO 6: 100 INJECTION, SOLUTION SUBCUTANEOUS at 07:50

## 2024-07-01 RX ADMIN — Medication 2 PUFF(S): at 11:56

## 2024-07-01 RX ADMIN — INSULIN LISPRO 4 UNIT(S): 100 INJECTION, SOLUTION SUBCUTANEOUS at 17:12

## 2024-07-01 RX ADMIN — ENOXAPARIN SODIUM 60 MILLIGRAM(S): 100 INJECTION SUBCUTANEOUS at 11:56

## 2024-07-01 RX ADMIN — PIPERACILLIN SODIUM AND TAZOBACTAM SODIUM 200 GRAM(S): 3; .375 INJECTION, POWDER, LYOPHILIZED, FOR SOLUTION INTRAVENOUS at 15:52

## 2024-07-01 RX ADMIN — INSULIN LISPRO 2: 100 INJECTION, SOLUTION SUBCUTANEOUS at 17:12

## 2024-07-01 RX ADMIN — ENOXAPARIN SODIUM 60 MILLIGRAM(S): 100 INJECTION SUBCUTANEOUS at 23:23

## 2024-07-01 RX ADMIN — Medication 5 MILLIGRAM(S): at 23:18

## 2024-07-01 RX ADMIN — Medication 75 MILLILITER(S): at 11:56

## 2024-07-01 RX ADMIN — Medication 2 PUFF(S): at 22:11

## 2024-07-01 RX ADMIN — Medication 1 GRAM(S): at 17:21

## 2024-07-01 RX ADMIN — ONDANSETRON HYDROCHLORIDE 4 MILLIGRAM(S): 2 INJECTION INTRAMUSCULAR; INTRAVENOUS at 08:03

## 2024-07-01 NOTE — PROGRESS NOTE ADULT - SUBJECTIVE AND OBJECTIVE BOX
SUBJECTIVE:       Pt seen and examined at bedside. No acute events overnight. Patient doing well with no complaints overall. Reports pain well-controlled with medication. No CP, SOB, N/V, F/C, new N/T.    Vital Signs (24 Hrs):  T(C): 37.4 (07-01-24 @ 06:18), Max: 37.7 (07-01-24 @ 04:51)  HR: 100 (07-01-24 @ 04:51) (79 - 100)  BP: 159/85 (07-01-24 @ 04:51) (119/71 - 174/93)  RR: 18 (07-01-24 @ 04:51) (18 - 20)  SpO2: 94% (07-01-24 @ 04:51) (77% - 96%)  Wt(kg): --    LABS:                          12.2   5.47  )-----------( 272      ( 30 Jun 2024 06:02 )             39.2     06-30    132<L>  |  97  |  22  ----------------------------<  293<H>  4.8   |  29  |  0.85    Ca    8.7      30 Jun 2024 06:02    TPro  6.0  /  Alb  1.7<L>  /  TBili  0.5  /  DBili  x   /  AST  15  /  ALT  13  /  AlkPhos  103  06-30    LIVER FUNCTIONS - ( 30 Jun 2024 06:02 )  Alb: 1.7 g/dL / Pro: 6.0 gm/dL / ALK PHOS: 103 U/L / ALT: 13 U/L / AST: 15 U/L / GGT: x               General: NAD, resting comfortably in bed  LLE  incision c/d/i  SCDs present bilaterally  Compartments soft and compressible  No calf tenderness bilaterally  Motor: +TA/EHL/FHL/GSC  Sensory: +SILT SPN/DPN/ASHLY/SAPH/TIB  2+ DP    A/P:  68y F with POD 3 sp L Hip IMN. Monitor Glucose levels.      -PT/OT: WBAT    -FU PT Recs   -Pain Control  -Cont DVT ppx: therapeutic lovenox   -FU AM Labs  -Rest, ice as needed  -Incentive Spirometry    Discussed with Attending who is aware and agrees with above plan.

## 2024-07-01 NOTE — CONSULT NOTE ADULT - ASSESSMENT
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 Now S/P L Hip IMN. RRT today 7/1 for acute hypoxemic respiratory failure. Pulmonary consulted.     DX: Lung Cancer, Pneumonia, Left hip IMN    Recommendations:     - RRT today for hypoxemia, MS change and fever to 102. Placed on nonrebreather  - Acute hypoxemic respiratory failure likely 2/2 PNA with SIRS criteria, Left sided infiltrate on CXR  - Does have underlying Lung cancer on immunotherapy last dose 6/26. Will need to F/U with oncologist upon DC from hospital.   - Not on home 02 at baseline.  - Continue to titrate down FiO2 as tolerates for goal 02>90%  - F/U CTA chest pending to R/O PE, further eval Lung Parenchyma   - Would send off infectious workup, broad spectrum ABX for now, fever control- ID consulted appreciate reccs  - More awake and alert later in day after fever controlled and returned form CT exam.      Plan discussed with Pulmonary attending Dr. Stovall

## 2024-07-01 NOTE — PROGRESS NOTE ADULT - SUBJECTIVE AND OBJECTIVE BOX
Patient is a 68y old  Female who presents with a chief complaint of mechanical fall (01 Jul 2024 07:27)    INTERVAL HPI/OVERNIGHT EVENTS: Today morning, patient had RRT called for hypoxia.    MEDICATIONS  (STANDING):  acetaminophen     Tablet .. 650 milliGRAM(s) Oral every 6 hours  budesonide  80 MICROgram(s)/formoterol 4.5 MICROgram(s) Inhaler 2 Puff(s) Inhalation two times a day  clonazePAM  Tablet 0.5 milliGRAM(s) Oral at bedtime  dextrose 10% Bolus 125 milliLiter(s) IV Bolus once  dextrose 5%. 1000 milliLiter(s) (100 mL/Hr) IV Continuous <Continuous>  dextrose 5%. 1000 milliLiter(s) (50 mL/Hr) IV Continuous <Continuous>  dextrose 50% Injectable 12.5 Gram(s) IV Push once  dextrose 50% Injectable 25 Gram(s) IV Push once  diltiazem    milliGRAM(s) Oral daily  enalapril 5 milliGRAM(s) Oral at bedtime  enoxaparin Injectable 60 milliGRAM(s) SubCutaneous every 12 hours  glucagon  Injectable 1 milliGRAM(s) IntraMuscular once  guaiFENesin  milliGRAM(s) Oral every 12 hours  insulin glargine Injectable (LANTUS) 12 Unit(s) SubCutaneous at bedtime  insulin lispro (ADMELOG) corrective regimen sliding scale   SubCutaneous at bedtime  insulin lispro (ADMELOG) corrective regimen sliding scale   SubCutaneous three times a day before meals  insulin lispro Injectable (ADMELOG) 4 Unit(s) SubCutaneous three times a day before meals  metoprolol succinate ER 50 milliGRAM(s) Oral at bedtime  polyethylene glycol 3350 17 Gram(s) Oral at bedtime  pregabalin 200 milliGRAM(s) Oral at bedtime  senna 2 Tablet(s) Oral at bedtime  sodium chloride 0.9%. 1000 milliLiter(s) (75 mL/Hr) IV Continuous <Continuous>  sodium chloride 0.9%. 500 milliLiter(s) (500 mL/Hr) IV Continuous <Continuous>    MEDICATIONS  (PRN):  acetaminophen     Tablet .. 650 milliGRAM(s) Oral every 6 hours PRN Temp greater or equal to 38C (100.4F), Mild Pain (1 - 3)  albuterol    90 MICROgram(s) HFA Inhaler 2 Puff(s) Inhalation every 6 hours PRN Shortness of Breath and/or Wheezing  aluminum hydroxide/magnesium hydroxide/simethicone Suspension 30 milliLiter(s) Oral every 4 hours PRN Dyspepsia  benzonatate 100 milliGRAM(s) Oral every 8 hours PRN Cough  dextrose Oral Gel 15 Gram(s) Oral once PRN Blood Glucose LESS THAN 70 milliGRAM(s)/deciliter  magnesium hydroxide Suspension 30 milliLiter(s) Oral daily PRN Constipation  melatonin 3 milliGRAM(s) Oral at bedtime PRN Insomnia  morphine  - Injectable 0.5 milliGRAM(s) IV Push every 6 hours PRN Moderate Pain (4 - 6)  morphine  - Injectable 1 milliGRAM(s) IV Push every 6 hours PRN Severe Pain (7 - 10)  ondansetron Injectable 4 milliGRAM(s) IV Push every 8 hours PRN Nausea and/or Vomiting  ondansetron Injectable 4 milliGRAM(s) IV Push every 6 hours PRN Nausea and/or Vomiting  oxyCODONE    IR 2.5 milliGRAM(s) Oral every 4 hours PRN Moderate Pain (4 - 6)  oxyCODONE    IR 5 milliGRAM(s) Oral every 4 hours PRN Severe Pain (7 - 10)  traMADol 25 milliGRAM(s) Oral every 6 hours PRN Mild Pain (1 - 3)      Allergies    No Known Allergies    Intolerances        REVIEW OF SYSTEMS: all negative with exception of above    Vital Signs Last 24 Hrs  T(C): 36.9 (01 Jul 2024 11:53), Max: 37.7 (01 Jul 2024 04:51)  T(F): 98.5 (01 Jul 2024 11:53), Max: 99.8 (01 Jul 2024 04:51)  HR: 109 (01 Jul 2024 11:53) (79 - 109)  BP: 97/62 (01 Jul 2024 11:53) (97/62 - 174/93)  BP(mean): --  RR: 19 (01 Jul 2024 11:53) (18 - 19)  SpO2: 90% (01 Jul 2024 11:53) (77% - 94%)    Parameters below as of 01 Jul 2024 11:53  Patient On (Oxygen Delivery Method): nasal cannula        PHYSICAL EXAM:  GENERAL: NAD, well-groomed  NERVOUS SYSTEM:  Alert & Oriented X3, Good concentration; Motor Strength 5/5 B/L upper and lower extremities; DTRs 2+ intact and symmetric  CHEST/LUNG: Clear to percussion bilaterally; No rales, rhonchi, wheezing, or rubs  HEART: Regular rate and rhythm; No murmurs, rubs, or gallops  ABDOMEN: Soft, Nontender, Nondistended; Bowel sounds present  EXTREMITIES:  2+ Peripheral Pulses, No clubbing, cyanosis, or edema      LABS:                        12.5   5.67  )-----------( 299      ( 01 Jul 2024 07:07 )             38.6     07-01    127<L>  |  89<L>  |  21  ----------------------------<  312<H>  4.1   |  23  |  0.74    Ca    8.9      01 Jul 2024 07:07    TPro  6.0  /  Alb  1.7<L>  /  TBili  0.5  /  DBili  x   /  AST  15  /  ALT  13  /  AlkPhos  103  06-30      Urinalysis Basic - ( 01 Jul 2024 07:07 )    Color: x / Appearance: x / SG: x / pH: x  Gluc: 312 mg/dL / Ketone: x  / Bili: x / Urobili: x   Blood: x / Protein: x / Nitrite: x   Leuk Esterase: x / RBC: x / WBC x   Sq Epi: x / Non Sq Epi: x / Bacteria: x      CAPILLARY BLOOD GLUCOSE      POCT Blood Glucose.: 222 mg/dL (01 Jul 2024 11:49)  POCT Blood Glucose.: 298 mg/dL (01 Jul 2024 07:31)  POCT Blood Glucose.: 130 mg/dL (30 Jun 2024 23:57)  POCT Blood Glucose.: 123 mg/dL (30 Jun 2024 20:49)  POCT Blood Glucose.: 89 mg/dL (30 Jun 2024 17:25)  POCT Blood Glucose.: 54 mg/dL (30 Jun 2024 17:06)      RADIOLOGY & ADDITIONAL TESTS:    Imaging Personally Reviewed:  [ ] YES  [ ] NO    Consultant(s) Notes Reviewed:  [ ] YES  [ ] NO    Care Discussed with Consultants/Other Providers [ ] YES  [ ] NO

## 2024-07-01 NOTE — CONSULT NOTE ADULT - ASSESSMENT
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 She was walking down steps when she slipped and landed on her left hip. Denies LOC and head trauma. Pt denies any numbness, tingling or paresthesias.  In the ED, her vitals are WNL. Labs WNL. Xray LT hip w/ left intertrochanteric fracture.   (27 Jun 2024 15:31)   s/p L Hip IMN on 6/28    patient had RRT earlier today for hypoxemia and is now on NRB face mask for oxygen  and also she had fever of 102    ID consultation is requested today for the fever and SIRS work up.    T-max-102 today  No leukocytosis  CTA- was reported negative for PE    etiology of fever unclear at this time. however will need to r/o infectious etiology in light of patient's immunesuppressed status     plan-  check blood cx x 2  check sputum cx  check UA and urine cx.  check RVP  check urine legionella AG as well.  check LE US and r/o DVT.  patient might have  also aspirated and hence possible pneumonitis as well.  advise to start empiric broad spectrum antibiotic , zosyn pending further results.  hypoxemia management as per medicine team.    All labs and imaging and chart notes reviewed.     All above discussed with patient and patient verbalizes full understanding of all above and agrees with above plan of care.    Thank you for this consultation.    d/w .    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 She was walking down steps when she slipped and landed on her left hip. Denies LOC and head trauma. Pt denies any numbness, tingling or paresthesias.  In the ED, her vitals are WNL. Labs WNL. Xray LT hip w/ left intertrochanteric fracture.   (27 Jun 2024 15:31)   s/p L Hip IMN on 6/28    patient had RRT earlier today for hypoxemia and is now on NRB face mask for oxygen  and also she had fever of 102    ID consultation is requested today for the fever and SIRS work up.    T-max-102 today  No leukocytosis  CTA- was reported negative for PE    etiology of fever unclear at this time. however will need to r/o infectious etiology in light of patient's immunesuppressed status     plan-  check blood cx x 2  check sputum cx  check UA and urine cx.  check RVP  check urine legionella AG as well .  advise to start zithromax pending urine legionella AG result.  check LE US and r/o DVT.  patient might have  also aspirated and hence possible pneumonitis as well.  advise to start empiric broad spectrum antibiotic , zosyn pending further results.  hypoxemia management as per medicine team.  malignancy management as per patient's oncologist .    All labs and imaging and chart notes reviewed.     All above discussed with patient and patient verbalizes full understanding of all above and agrees with above plan of care.    Thank you for this consultation.    d/w .    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 She was walking down steps when she slipped and landed on her left hip. Denies LOC and head trauma. Pt denies any numbness, tingling or paresthesias.  In the ED, her vitals are WNL. Labs WNL. Xray LT hip w/ left intertrochanteric fracture.   (27 Jun 2024 15:31)   s/p L Hip IMN on 6/28    patient had RRT earlier today for hypoxemia and is now on NRB face mask for oxygen  and also she had fever of 102    ID consultation is requested today for the fever and SIRS work up.    T-max-102 today  No leukocytosis  CTA- was reported negative for PE    etiology of fever unclear at this time. however will need to r/o infectious etiology in light of patient's immunesuppressed status     plan-  check blood cx x 2  check sputum cx  check UA and urine cx.  check RVP  check urine legionella AG as well .  advise to start zithromax pending urine legionella AG result.  check LE US and r/o DVT.  patient might have  also aspirated and hence possible pneumonitis as well.  advise to start empiric broad spectrum antibiotic , zosyn pending further results.  hypoxemia management as per medicine team.  malignancy management as per patient's oncologist .  left hip surgical site management as per ortho team.    All labs and imaging and chart notes reviewed.     All above discussed with patient and patient verbalizes full understanding of all above and agrees with above plan of care.    Thank you for this consultation.    d/w .    Elsa Lombardi MD  Infectious Disease Attending    for any questions please do not hesitate to contact me either via teams or by calling [phone]

## 2024-07-01 NOTE — PROGRESS NOTE ADULT - ASSESSMENT
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    #LT hip IT fracture s/p mechanical trip and fall  - Appreciate ortho recs  - Pain control with morphine PRN for moderate and severe  - F/u CT L femur to rule out pathologic lesions  - Patient is medically optimized for surgical fixation of LT hip  - S/p IM fixation    #Hypoxia  - F/u ABG and CXR  - CTA chest negative for PE  - Pulm c/s placed  - FULL CODE    #Lung cancer  - on immunotherapy. Last dose on 6/26    #HTN  - c/w home metoprolol, diltiazem and enalapril    #IDDM  - c/w basal lantus (reduced dose) and SSI  - controlled  - No anion gap, normal pH no active evidence of DKA  - c/w lantus    #Jugular vein thrombosis  - will c/w lovenox    DVT ppx- Lovenox  Diet- DASH/Consistent Carb. NPO after MN  Dispo- medically active

## 2024-07-01 NOTE — CONSULT NOTE ADULT - SUBJECTIVE AND OBJECTIVE BOX
HPI:  Patient is a 68-year-old female with history of lung cancer (on immunotherapy), HTN, IDDM, jugular vein thrombosis on Lovenox who presents with LT-sided hip pain after sustaining a fall. She was walking down steps when she slipped and landed on her left hip. Denies LOC and head trauma. Pt denies any numbness, tingling or paresthesias.    In the ED, her vitals are WNL. Labs WNL. Xray LT hip w/ left intertrochanteric fracture.   (27 Jun 2024 15:31)      PAST MEDICAL & SURGICAL HISTORY:  Diabetes      Hypertension      Multiple sclerosis      Alcohol abuse      Pancreatic insufficiency      H/O cervical spine surgery          Allergies    No Known Allergies    Intolerances        ANTIMICROBIALS:  piperacillin/tazobactam IVPB. 3.375 once  piperacillin/tazobactam IVPB.- 3.375 once      OTHER MEDS:  acetaminophen     Tablet .. 650 milliGRAM(s) Oral every 6 hours  acetaminophen     Tablet .. 650 milliGRAM(s) Oral every 6 hours PRN  albuterol    90 MICROgram(s) HFA Inhaler 2 Puff(s) Inhalation every 6 hours PRN  aluminum hydroxide/magnesium hydroxide/simethicone Suspension 30 milliLiter(s) Oral every 4 hours PRN  benzonatate 100 milliGRAM(s) Oral every 8 hours PRN  budesonide  80 MICROgram(s)/formoterol 4.5 MICROgram(s) Inhaler 2 Puff(s) Inhalation two times a day  clonazePAM  Tablet 0.5 milliGRAM(s) Oral at bedtime  dextrose 10% Bolus 125 milliLiter(s) IV Bolus once  dextrose 5%. 1000 milliLiter(s) IV Continuous <Continuous>  dextrose 5%. 1000 milliLiter(s) IV Continuous <Continuous>  dextrose 50% Injectable 12.5 Gram(s) IV Push once  dextrose 50% Injectable 25 Gram(s) IV Push once  dextrose Oral Gel 15 Gram(s) Oral once PRN  diltiazem    milliGRAM(s) Oral daily  enalapril 5 milliGRAM(s) Oral at bedtime  enoxaparin Injectable 60 milliGRAM(s) SubCutaneous every 12 hours  glucagon  Injectable 1 milliGRAM(s) IntraMuscular once  guaiFENesin  milliGRAM(s) Oral every 12 hours  insulin glargine Injectable (LANTUS) 12 Unit(s) SubCutaneous at bedtime  insulin lispro (ADMELOG) corrective regimen sliding scale   SubCutaneous at bedtime  insulin lispro (ADMELOG) corrective regimen sliding scale   SubCutaneous three times a day before meals  insulin lispro Injectable (ADMELOG) 4 Unit(s) SubCutaneous three times a day before meals  magnesium hydroxide Suspension 30 milliLiter(s) Oral daily PRN  melatonin 3 milliGRAM(s) Oral at bedtime PRN  metoprolol succinate ER 50 milliGRAM(s) Oral at bedtime  morphine  - Injectable 0.5 milliGRAM(s) IV Push every 6 hours PRN  morphine  - Injectable 1 milliGRAM(s) IV Push every 6 hours PRN  ondansetron Injectable 4 milliGRAM(s) IV Push every 8 hours PRN  ondansetron Injectable 4 milliGRAM(s) IV Push every 6 hours PRN  oxyCODONE    IR 2.5 milliGRAM(s) Oral every 4 hours PRN  oxyCODONE    IR 5 milliGRAM(s) Oral every 4 hours PRN  polyethylene glycol 3350 17 Gram(s) Oral at bedtime  pregabalin 200 milliGRAM(s) Oral at bedtime  senna 2 Tablet(s) Oral at bedtime  sodium chloride 1 Gram(s) Oral two times a day  sodium chloride 0.9%. 1000 milliLiter(s) IV Continuous <Continuous>  sodium chloride 0.9%. 500 milliLiter(s) IV Continuous <Continuous>  traMADol 25 milliGRAM(s) Oral every 6 hours PRN      SOCIAL HISTORY:    Marital Status:    Occupation:   Lives with:     Substance Use (street drugs):   Tobacco Usage:    Alcohol Usage: Social EtOH    FAMILY HISTORY:  No pertinent family history in first degree relatives        ROS:  Unobtainable because:   All other systems negative     Constitutional: no fever, no chills, no weight loss, no night sweats  Eye: no eye pain, no redness, no vision changes  ENT:  no sore throat, no rhinorrhea  Cardiovascular:  no chest pain, no palpitation  Respiratory:  no SOB, no cough  GI:  no abd pain, no vomiting, no diarrhea  urinary: no dysuria, no hematuria, no flank pain  : no  discharge or bleeding  musculoskeletal:  no joint pain, no joint swelling  skin:  no rash  neurology:  no headache, no seizure, no change in mental status  psych: no anxiety, no depression     Physical Exam:    General:    NAD, non toxic  Head: atraumatic, normocephalic  Eyes: normal sclera and conjunctiva  ENT:   no oropharyngeal lesions, no LAD, neck supple  Cardio:    regular S1,S2, no murmur  Respiratory:   clear to auscultation b/l, no wheezing  abd:   soft, BS +, not tender, no hepatosplenomegaly  :     no CVAT, no suprapubic tenderness, no johnson  Musculoskeletal : no joint swelling, no edema  Skin:    no rash  vascular:  normal pulses  Neurologic:     no focal deficits  psych: normal affect, no suicidal ideation      Drug Dosing Weight  Height (cm): 167.6 (27 Jun 2024 09:40)  Weight (kg): 59 (27 Jun 2024 09:40)  BMI (kg/m2): 21 (27 Jun 2024 09:40)  BSA (m2): 1.67 (27 Jun 2024 09:40)    Vital Signs Last 24 Hrs  T(F): 102.1 (07-01-24 @ 14:46), Max: 102.1 (07-01-24 @ 14:46)    Vital Signs Last 24 Hrs  HR: 109 (07-01-24 @ 11:53) (79 - 109)  BP: 97/62 (07-01-24 @ 11:53) (97/62 - 174/93)  RR: 19 (07-01-24 @ 11:53)  SpO2: 90% (07-01-24 @ 11:53) (77% - 94%)  Wt(kg): --                          12.5   5.67  )-----------( 299      ( 01 Jul 2024 07:07 )             38.6       07-01    127<L>  |  89<L>  |  21  ----------------------------<  312<H>  4.1   |  23  |  0.74    Ca    8.9      01 Jul 2024 07:07    TPro  6.0  /  Alb  1.7<L>  /  TBili  0.5  /  DBili  x   /  AST  15  /  ALT  13  /  AlkPhos  103  06-30      Urinalysis Basic - ( 01 Jul 2024 07:07 )    Color: x / Appearance: x / SG: x / pH: x  Gluc: 312 mg/dL / Ketone: x  / Bili: x / Urobili: x   Blood: x / Protein: x / Nitrite: x   Leuk Esterase: x / RBC: x / WBC x   Sq Epi: x / Non Sq Epi: x / Bacteria: x        MICROBIOLOGY:  v              RADIOLOGY:       HPI:  Patient is a 68-year-old female with history of lung cancer (on immunotherapy), HTN, IDDM, jugular vein thrombosis on Lovenox who presents with LT-sided hip pain after sustaining a fall. She was walking down steps when she slipped and landed on her left hip. Denies LOC and head trauma. Pt denies any numbness, tingling or paresthesias.  In the ED, her vitals are WNL. Labs WNL. Xray LT hip w/ left intertrochanteric fracture.   (27 Jun 2024 15:31)   s/p L Hip IMN on 6/28    patient had RRT earlier today for hypoxemia and is now on NRB face mask for oxygen  and also she had fever of 102    ID consultation is requested today for the fever and SIRS work up.    her  is at her bedside and he states she was fine and this happened suddenly earlier today    patient is awake, alert and she answers my questions but is weak.  she states she feels better now on the Face mask for oxygenation, she denies any chest pain, denies any pain at left hip surgical site, denies any abd. pain, denies any nausea or vomiting,     as per patient's  who is at bedside patient completed chemo and radiation last year and now is in immunotherapy only    PAST MEDICAL & SURGICAL HISTORY:  Diabetes      Hypertension      Multiple sclerosis      Alcohol abuse      Pancreatic insufficiency      H/O cervical spine surgery          Allergies-    No Known Drug  Allergies        ANTIMICROBIALS:  piperacillin/tazobactam IVPB. 3.375 once  piperacillin/tazobactam IVPB.- 3.375 once      OTHER MEDS:  acetaminophen     Tablet .. 650 milliGRAM(s) Oral every 6 hours  acetaminophen     Tablet .. 650 milliGRAM(s) Oral every 6 hours PRN  albuterol    90 MICROgram(s) HFA Inhaler 2 Puff(s) Inhalation every 6 hours PRN  aluminum hydroxide/magnesium hydroxide/simethicone Suspension 30 milliLiter(s) Oral every 4 hours PRN  benzonatate 100 milliGRAM(s) Oral every 8 hours PRN  budesonide  80 MICROgram(s)/formoterol 4.5 MICROgram(s) Inhaler 2 Puff(s) Inhalation two times a day  clonazePAM  Tablet 0.5 milliGRAM(s) Oral at bedtime  dextrose 10% Bolus 125 milliLiter(s) IV Bolus once  dextrose 5%. 1000 milliLiter(s) IV Continuous <Continuous>  dextrose 5%. 1000 milliLiter(s) IV Continuous <Continuous>  dextrose 50% Injectable 12.5 Gram(s) IV Push once  dextrose 50% Injectable 25 Gram(s) IV Push once  dextrose Oral Gel 15 Gram(s) Oral once PRN  diltiazem    milliGRAM(s) Oral daily  enalapril 5 milliGRAM(s) Oral at bedtime  enoxaparin Injectable 60 milliGRAM(s) SubCutaneous every 12 hours  glucagon  Injectable 1 milliGRAM(s) IntraMuscular once  guaiFENesin  milliGRAM(s) Oral every 12 hours  insulin glargine Injectable (LANTUS) 12 Unit(s) SubCutaneous at bedtime  insulin lispro (ADMELOG) corrective regimen sliding scale   SubCutaneous at bedtime  insulin lispro (ADMELOG) corrective regimen sliding scale   SubCutaneous three times a day before meals  insulin lispro Injectable (ADMELOG) 4 Unit(s) SubCutaneous three times a day before meals  magnesium hydroxide Suspension 30 milliLiter(s) Oral daily PRN  melatonin 3 milliGRAM(s) Oral at bedtime PRN  metoprolol succinate ER 50 milliGRAM(s) Oral at bedtime  morphine  - Injectable 0.5 milliGRAM(s) IV Push every 6 hours PRN  morphine  - Injectable 1 milliGRAM(s) IV Push every 6 hours PRN  ondansetron Injectable 4 milliGRAM(s) IV Push every 8 hours PRN  ondansetron Injectable 4 milliGRAM(s) IV Push every 6 hours PRN  oxyCODONE    IR 2.5 milliGRAM(s) Oral every 4 hours PRN  oxyCODONE    IR 5 milliGRAM(s) Oral every 4 hours PRN  polyethylene glycol 3350 17 Gram(s) Oral at bedtime  pregabalin 200 milliGRAM(s) Oral at bedtime  senna 2 Tablet(s) Oral at bedtime  sodium chloride 1 Gram(s) Oral two times a day  sodium chloride 0.9%. 1000 milliLiter(s) IV Continuous <Continuous>  sodium chloride 0.9%. 500 milliLiter(s) IV Continuous <Continuous>  traMADol 25 milliGRAM(s) Oral every 6 hours PRN      SOCIAL HISTORY:    Lives with:     Tobacco Usage:  current tobacco user      FAMILY HISTORY:  No pertinent family history in first degree relatives      ROS:    Constitutional: + fever, no chills, no weight loss, no night sweats  Eye: no eye pain, no redness, no vision changes  ENT:  no sore throat, no rhinorrhea  Cardiovascular:  no chest pain, no palpitation  Respiratory:  + SOB, mild cough  GI:  no abd pain, no vomiting, no diarrhea  urinary: no dysuria, no hematuria, no flank pain  : no  discharge or bleeding  musculoskeletal:  no joint pain, no joint swelling  denies pain at left hip surgical site  skin:  no rash  neurology:  no headache, no seizure, no change in mental status  psych: no anxiety, no depression     Physical Exam:    General:    NAD, non toxic  Head: atraumatic, normocephalic  Eyes: normal sclera and conjunctiva  ENT:   no oropharyngeal lesions, no LAD, neck supple  Cardio:    regular S1,S2  Respiratory:   no wheezing, decreased breath sounds at bases  abd:   soft, BS +, not tender, no distention  :     no CVAT, no suprapubic tenderness, no johnson  Musculoskeletal : no joint swelling, no edema  left hip surgical site covered with post surgical dressing, no erythema  Skin:    no rash  vascular:  normal pulses  Neurologic:    AAO x 3  psych: normal affect      Drug Dosing Weight  Height (cm): 167.6 (27 Jun 2024 09:40)  Weight (kg): 59 (27 Jun 2024 09:40)  BMI (kg/m2): 21 (27 Jun 2024 09:40)  BSA (m2): 1.67 (27 Jun 2024 09:40)    Vital Signs Last 24 Hrs  T(F): 102.1 (07-01-24 @ 14:46), Max: 102.1 (07-01-24 @ 14:46)    Vital Signs Last 24 Hrs  HR: 109 (07-01-24 @ 11:53) (79 - 109)  BP: 97/62 (07-01-24 @ 11:53) (97/62 - 174/93)  RR: 19 (07-01-24 @ 11:53)  SpO2: 90% (07-01-24 @ 11:53) (77% - 94%)  Wt(kg): --                          12.5   5.67  )-----------( 299      ( 01 Jul 2024 07:07 )             38.6       07-01    127<L>  |  89<L>  |  21  ----------------------------<  312<H>  4.1   |  23  |  0.74    Ca    8.9      01 Jul 2024 07:07    TPro  6.0  /  Alb  1.7<L>  /  TBili  0.5  /  DBili  x   /  AST  15  /  ALT  13  /  AlkPhos  103  06-30      Urinalysis Basic - ( 01 Jul 2024 07:07 )    Color: x / Appearance: x / SG: x / pH: x  Gluc: 312 mg/dL / Ketone: x  / Bili: x / Urobili: x   Blood: x / Protein: x / Nitrite: x   Leuk Esterase: x / RBC: x / WBC x   Sq Epi: x / Non Sq Epi: x / Bacteria: x        MICROBIOLOGY:    RADIOLOGY:  < from: CT Femur No Cont, Left (06.27.24 @ 15:20) >    ACC: [id number] EXAM:  CT FEMUR ONLY LT   ORDERED BY: MALIKA LOPEZ     PROCEDURE DATE:  06/27/2024          INTERPRETATION:  CT OF THE LEFT FEMUR    CLINICAL INFORMATION: Lung cancer history, for evaluation of pathologic   fracture    COMPARISON: Radiographs performed earlier on same date.    TECHNIQUE: Axial CT images were obtained of the left femur with coronal   and sagittal reconstructions. 3-D volume rendered reformats were also   provided. No intravenous contrast was administered.    FINDINGS:    Osseous: Acute mildly comminuted and minimally displaced/impacted oblique   longitudinal intertrochanteric left proximal femoral fracture. No   dislocation. No significant left hip arthrosis or effusion. Mild to   moderate knee arthrosis without suprapatellar effusion. Bony   mineralization within normal limits. No definite aggressive osteolytic   lesion or periosteal reaction.    Soft tissues: No sizable hyperattenuating hematoma or drainable   encapsulated fluid collection. No pelvic free fluid. Distended urinary   bladder.    IMPRESSION:    Acute minimally displaced/impacted intertrochanteric left proximal   femoral fracture without convincing CT evidence for underlying aggressive   osseous neoplasm.    --- End of Report ---    Kai Pop MD  This document has been electronically signed. Jun 27 2024  3:48PM        < from: CT Angio Chest PE Protocol w/ IV Cont (06.27.24 @ 21:40) >    ACC: [id number] EXAM:  CT ANGIO CHEST PULM ART [facility]   ORDERED BY: Binh Petit     PROCEDURE DATE:  06/27/2024          INTERPRETATION:  CLINICAL INFORMATION:  Left Hip pain, Missed last step   in back yard today, smoking a cigarette upon ems arrival No LOC H/O Lung   Ca DM COPD    COMPARISON: CT chest 3/29/2017    CONTRAST/COMPLICATIONS:  IV Contrast: Omnipaque 350  95 cc administered   05 cc discarded  Oral Contrast: NONE  Complications: None reported at time of study completion    PROCEDURE:  CT Angiography of the Chest.  Sagittal and coronal reformats were performed as well as 3D (MIP)   reconstructions.    FINDINGS:    LUNGS AND AIRWAYS: Secretions in the trachea and right mainstem bronchus.   Paraseptal and centrilobular emphysema, unchanged. Mucoid impacted distal   airways in right upper and both lower lobes. Interval resolution of   consolidation in the lingula of left upper lobe. New patchy opacities   throughout the right lung and in the left lower lobe. Juxtapleural   irregular 2.8 cm spiculated airspace opacity in the medial right lower   lobe is also new (5-73). New 1.4 cm right apical opacity with   calcification. Additional nonspecific region of groundglass attenuation   in the posterior lingula.  PLEURA: Trace bilateral pleural effusions, decreased from prior exam.  MEDIASTINUM AND DELANEY: No lymphadenopathy. Circumferential esophageal wall   thickening. New soft tissue surrounding the distal trachea and main   bronchi. Subcarinal soft tissue, unchanged.  VESSELS:Adequate opacification of the pulmonary vasculature. No filling   defect present to suggest acute pulmonary embolism.  HEART: Heart size is normal. Small pericardial effusion.  CHEST WALL AND LOWER NECK: Within normal limits.  VISUALIZED UPPER ABDOMEN: New soft tissue nodule in the left upper   quadrant measuring 1.7 cm abutting the diaphragm. Sequela of chronic   pancreatitis, unchanged.  BONES: Degenerative changes.    IMPRESSION:  No pulmonary embolism.    New spiculated 2.8 cm medial right lower lobe and 1.4 cm right apical   nodule are suspicious for malignancy. Additional patchy airspace   opacities in the setting of scattered mucoid impacted distal airways are   less likely malignant etiology and more likely infectious/inflammatory.   Follow-up after the appropriate clinical treatment is recommended. New   abnormal soft tissue surrounding the distal trachea and main bronchi in   the mediastinum.  New soft tissue nodule left upper quadrant suspicious for metastatic   disease.  Esophageal wall thickening. Trace bilateral pleural effusions.    --- End of Report ---    Deepti Teixeira MD  This document has been electronically signed. Jun 27 2024 10:38PM    < end of copied text >       HPI:  Patient is a 68-year-old female with history of lung cancer (on immunotherapy), HTN, IDDM, jugular vein thrombosis on Lovenox who presents with LT-sided hip pain after sustaining a fall. She was walking down steps when she slipped and landed on her left hip. Denies LOC and head trauma. Pt denies any numbness, tingling or paresthesias.  In the ED, her vitals are WNL. Labs WNL. Xray LT hip w/ left intertrochanteric fracture.   (27 Jun 2024 15:31)   s/p L Hip IMN on 6/28    patient had RRT earlier today for hypoxemia and is now on NRB face mask for oxygen  and also she had fever of 102    ID consultation is requested today for the fever and SIRS work up.    her  is at her bedside and he states she was fine and this happened suddenly earlier today    patient is awake, alert and she answers my questions but is weak.  she states she feels better now on the Face mask for oxygenation, she denies any chest pain, denies any pain at left hip surgical site, denies any abd. pain, denies any nausea or vomiting,     as per patient's  who is at bedside patient completed chemo and radiation last year and now is in immunotherapy only    PAST MEDICAL & SURGICAL HISTORY:  Diabetes      Hypertension      Multiple sclerosis      Alcohol abuse      Pancreatic insufficiency      H/O cervical spine surgery          Allergies-    No Known Drug  Allergies        ANTIMICROBIALS:  piperacillin/tazobactam IVPB. 3.375 once  piperacillin/tazobactam IVPB.- 3.375 once      OTHER MEDS:  acetaminophen     Tablet .. 650 milliGRAM(s) Oral every 6 hours  acetaminophen     Tablet .. 650 milliGRAM(s) Oral every 6 hours PRN  albuterol    90 MICROgram(s) HFA Inhaler 2 Puff(s) Inhalation every 6 hours PRN  aluminum hydroxide/magnesium hydroxide/simethicone Suspension 30 milliLiter(s) Oral every 4 hours PRN  benzonatate 100 milliGRAM(s) Oral every 8 hours PRN  budesonide  80 MICROgram(s)/formoterol 4.5 MICROgram(s) Inhaler 2 Puff(s) Inhalation two times a day  clonazePAM  Tablet 0.5 milliGRAM(s) Oral at bedtime  dextrose 10% Bolus 125 milliLiter(s) IV Bolus once  dextrose 5%. 1000 milliLiter(s) IV Continuous <Continuous>  dextrose 5%. 1000 milliLiter(s) IV Continuous <Continuous>  dextrose 50% Injectable 12.5 Gram(s) IV Push once  dextrose 50% Injectable 25 Gram(s) IV Push once  dextrose Oral Gel 15 Gram(s) Oral once PRN  diltiazem    milliGRAM(s) Oral daily  enalapril 5 milliGRAM(s) Oral at bedtime  enoxaparin Injectable 60 milliGRAM(s) SubCutaneous every 12 hours  glucagon  Injectable 1 milliGRAM(s) IntraMuscular once  guaiFENesin  milliGRAM(s) Oral every 12 hours  insulin glargine Injectable (LANTUS) 12 Unit(s) SubCutaneous at bedtime  insulin lispro (ADMELOG) corrective regimen sliding scale   SubCutaneous at bedtime  insulin lispro (ADMELOG) corrective regimen sliding scale   SubCutaneous three times a day before meals  insulin lispro Injectable (ADMELOG) 4 Unit(s) SubCutaneous three times a day before meals  magnesium hydroxide Suspension 30 milliLiter(s) Oral daily PRN  melatonin 3 milliGRAM(s) Oral at bedtime PRN  metoprolol succinate ER 50 milliGRAM(s) Oral at bedtime  morphine  - Injectable 0.5 milliGRAM(s) IV Push every 6 hours PRN  morphine  - Injectable 1 milliGRAM(s) IV Push every 6 hours PRN  ondansetron Injectable 4 milliGRAM(s) IV Push every 8 hours PRN  ondansetron Injectable 4 milliGRAM(s) IV Push every 6 hours PRN  oxyCODONE    IR 2.5 milliGRAM(s) Oral every 4 hours PRN  oxyCODONE    IR 5 milliGRAM(s) Oral every 4 hours PRN  polyethylene glycol 3350 17 Gram(s) Oral at bedtime  pregabalin 200 milliGRAM(s) Oral at bedtime  senna 2 Tablet(s) Oral at bedtime  sodium chloride 1 Gram(s) Oral two times a day  sodium chloride 0.9%. 1000 milliLiter(s) IV Continuous <Continuous>  sodium chloride 0.9%. 500 milliLiter(s) IV Continuous <Continuous>  traMADol 25 milliGRAM(s) Oral every 6 hours PRN      SOCIAL HISTORY:    Lives with:     Tobacco Usage:  current tobacco user      FAMILY HISTORY:  No pertinent family history in first degree relatives      ROS:    Constitutional: + fever, no chills, no weight loss, no night sweats  Eye: no eye pain, no redness, no vision changes  ENT:  no sore throat, no rhinorrhea  Cardiovascular:  no chest pain, no palpitation  Respiratory:  + SOB, mild cough  GI:  no abd pain, no vomiting, no diarrhea  urinary: no dysuria, no hematuria, no flank pain  : no  discharge or bleeding  musculoskeletal:  no joint pain, no joint swelling  denies pain at left hip surgical site  skin:  no rash  neurology:  no headache, no seizure, no change in mental status  psych: no anxiety, no depression     Physical Exam:    General:    NAD, non toxic  Head: atraumatic, normocephalic  Eyes: normal sclera and conjunctiva  ENT:   no oropharyngeal lesions, no LAD, neck supple  Cardio:    regular S1,S2  Respiratory:   no wheezing, decreased breath sounds at bases  abd:   soft, BS +, not tender, no distention  :     no CVAT, no suprapubic tenderness, no johnson  Musculoskeletal : no joint swelling, no edema  left hip surgical site covered with post surgical dressing, no erythema, no edema  Skin:    no rash  vascular:  normal pulses  Neurologic:    AAO x 3  psych: normal affect      Drug Dosing Weight  Height (cm): 167.6 (27 Jun 2024 09:40)  Weight (kg): 59 (27 Jun 2024 09:40)  BMI (kg/m2): 21 (27 Jun 2024 09:40)  BSA (m2): 1.67 (27 Jun 2024 09:40)    Vital Signs Last 24 Hrs  T(F): 102.1 (07-01-24 @ 14:46), Max: 102.1 (07-01-24 @ 14:46)    Vital Signs Last 24 Hrs  HR: 109 (07-01-24 @ 11:53) (79 - 109)  BP: 97/62 (07-01-24 @ 11:53) (97/62 - 174/93)  RR: 19 (07-01-24 @ 11:53)  SpO2: 90% (07-01-24 @ 11:53) (77% - 94%)  Wt(kg): --                          12.5   5.67  )-----------( 299      ( 01 Jul 2024 07:07 )             38.6       07-01    127<L>  |  89<L>  |  21  ----------------------------<  312<H>  4.1   |  23  |  0.74    Ca    8.9      01 Jul 2024 07:07    TPro  6.0  /  Alb  1.7<L>  /  TBili  0.5  /  DBili  x   /  AST  15  /  ALT  13  /  AlkPhos  103  06-30      Urinalysis Basic - ( 01 Jul 2024 07:07 )    Color: x / Appearance: x / SG: x / pH: x  Gluc: 312 mg/dL / Ketone: x  / Bili: x / Urobili: x   Blood: x / Protein: x / Nitrite: x   Leuk Esterase: x / RBC: x / WBC x   Sq Epi: x / Non Sq Epi: x / Bacteria: x        MICROBIOLOGY:    RADIOLOGY:  < from: CT Femur No Cont, Left (06.27.24 @ 15:20) >    ACC: [id number] EXAM:  CT FEMUR ONLY LT   ORDERED BY: MALIKA LOPEZ     PROCEDURE DATE:  06/27/2024          INTERPRETATION:  CT OF THE LEFT FEMUR    CLINICAL INFORMATION: Lung cancer history, for evaluation of pathologic   fracture    COMPARISON: Radiographs performed earlier on same date.    TECHNIQUE: Axial CT images were obtained of the left femur with coronal   and sagittal reconstructions. 3-D volume rendered reformats were also   provided. No intravenous contrast was administered.    FINDINGS:    Osseous: Acute mildly comminuted and minimally displaced/impacted oblique   longitudinal intertrochanteric left proximal femoral fracture. No   dislocation. No significant left hip arthrosis or effusion. Mild to   moderate knee arthrosis without suprapatellar effusion. Bony   mineralization within normal limits. No definite aggressive osteolytic   lesion or periosteal reaction.    Soft tissues: No sizable hyperattenuating hematoma or drainable   encapsulated fluid collection. No pelvic free fluid. Distended urinary   bladder.    IMPRESSION:    Acute minimally displaced/impacted intertrochanteric left proximal   femoral fracture without convincing CT evidence for underlying aggressive   osseous neoplasm.    --- End of Report ---    Kai Pop MD  This document has been electronically signed. Jun 27 2024  3:48PM        < from: CT Angio Chest PE Protocol w/ IV Cont (06.27.24 @ 21:40) >    ACC: [id number] EXAM:  CT ANGIO CHEST PULM ART [facility]   ORDERED BY: Binh Petit     PROCEDURE DATE:  06/27/2024          INTERPRETATION:  CLINICAL INFORMATION:  Left Hip pain, Missed last step   in back yard today, smoking a cigarette upon ems arrival No LOC H/O Lung   Ca DM COPD    COMPARISON: CT chest 3/29/2017    CONTRAST/COMPLICATIONS:  IV Contrast: Omnipaque 350  95 cc administered   05 cc discarded  Oral Contrast: NONE  Complications: None reported at time of study completion    PROCEDURE:  CT Angiography of the Chest.  Sagittal and coronal reformats were performed as well as 3D (MIP)   reconstructions.    FINDINGS:    LUNGS AND AIRWAYS: Secretions in the trachea and right mainstem bronchus.   Paraseptal and centrilobular emphysema, unchanged. Mucoid impacted distal   airways in right upper and both lower lobes. Interval resolution of   consolidation in the lingula of left upper lobe. New patchy opacities   throughout the right lung and in the left lower lobe. Juxtapleural   irregular 2.8 cm spiculated airspace opacity in the medial right lower   lobe is also new (5-73). New 1.4 cm right apical opacity with   calcification. Additional nonspecific region of groundglass attenuation   in the posterior lingula.  PLEURA: Trace bilateral pleural effusions, decreased from prior exam.  MEDIASTINUM AND DELANEY: No lymphadenopathy. Circumferential esophageal wall   thickening. New soft tissue surrounding the distal trachea and main   bronchi. Subcarinal soft tissue, unchanged.  VESSELS:Adequate opacification of the pulmonary vasculature. No filling   defect present to suggest acute pulmonary embolism.  HEART: Heart size is normal. Small pericardial effusion.  CHEST WALL AND LOWER NECK: Within normal limits.  VISUALIZED UPPER ABDOMEN: New soft tissue nodule in the left upper   quadrant measuring 1.7 cm abutting the diaphragm. Sequela of chronic   pancreatitis, unchanged.  BONES: Degenerative changes.    IMPRESSION:  No pulmonary embolism.    New spiculated 2.8 cm medial right lower lobe and 1.4 cm right apical   nodule are suspicious for malignancy. Additional patchy airspace   opacities in the setting of scattered mucoid impacted distal airways are   less likely malignant etiology and more likely infectious/inflammatory.   Follow-up after the appropriate clinical treatment is recommended. New   abnormal soft tissue surrounding the distal trachea and main bronchi in   the mediastinum.  New soft tissue nodule left upper quadrant suspicious for metastatic   disease.  Esophageal wall thickening. Trace bilateral pleural effusions.    --- End of Report ---    Deepti Teixeira MD  This document has been electronically signed. Jun 27 2024 10:38PM    < end of copied text >

## 2024-07-01 NOTE — RAPID RESPONSE TEAM SUMMARY - NSSITUATIONBACKGROUNDRRT_GEN_ALL_CORE
Responded to RRT called for hypoxia. O2 sat 70s on 2LNC.   Pt s/p IMN L hip POD #3. Per hospitalist Dr. Petit and Raeann RN at bedside pt had been a+Ox4 yesterday. Today she is lethargic, arousable to verbal stimuli. 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 She was walking down steps when she slipped and landed on her left hip. Denies LOC and head trauma. Pt denies any numbness, tingling or paresthesias.    In the ED, her vitals are WNL. Labs WNL. Xray LT hip w/ left intertrochanteric fracture.   (27 Jun 2024 15:31)

## 2024-07-01 NOTE — CONSULT NOTE ADULT - SUBJECTIVE AND OBJECTIVE BOX
HPI:  Patient is a 68-year-old female with history of lung cancer (on immunotherapy), HTN, IDDM, jugular vein thrombosis on Lovenox who presents with LT-sided hip pain after sustaining a fall. She was walking down steps when she slipped and landed on her left hip. Denies LOC and head trauma. Pt denies any numbness, tingling or paresthesias.    In the ED, her vitals are WNL. Labs WNL. Xray LT hip w/ left intertrochanteric fracture.   (2024 15:31)      PAST MEDICAL & SURGICAL HISTORY:  Diabetes      Hypertension      Multiple sclerosis      Alcohol abuse      Pancreatic insufficiency      H/O cervical spine surgery          FAMILY HISTORY:  No pertinent family history in first degree relatives        SOCIAL HISTORY:  Smokin pack per day current user   EtOH Use: denies  Marital Status:   Occupation:  Exposures:  Recent Travel:    Allergies    No Known Allergies    Intolerances        HOME MEDICATIONS:    REVIEW OF SYSTEMS: Drowsy on exam, ROS limited   Constitutional: No fevers or chills. No weight loss. + Fatigue  Eyes: No itching or discharge from the eyes  ENT: No ear pain. No ear discharge. No nasal congestion. No post nasal drip. No epistaxis. No throat pain. No sore throat. No difficulty swallowing.   CV: No chest pain. No palpitations. No lightheadedness or dizziness.   Resp: No dyspnea at rest. No dyspnea on exertion. No orthopnea. No wheezing. No cough. No stridor. No sputum production. No chest pain with respiration.  GI: No nausea. No vomiting. No diarrhea.  MSK: No joint pain or pain in any extremities  Integumentary: No skin lesions. No pedal edema.  Neurological: No gross motor weakness. No sensory changes.    [ ] All other systems negative  [ ] Unable to assess ROS because ________    OBJECTIVE:  Vital Signs Last 24 Hrs  T(C): 38.9 (2024 14:46), Max: 38.9 (2024 14:46)  T(F): 102.1 (2024 14:46), Max: 102.1 (2024 14:46)  HR: 110 (2024 12:45) (98 - 112)  BP: 122/67 (2024 12:45) (97/62 - 174/93)  BP(mean): --  ABP: --  ABP(mean): --  RR: 18 (2024 12:45) (18 - 19)  SpO2: 98% (2024 12:45) (72% - 98%)    O2 Parameters below as of 2024 12:45  Patient On (Oxygen Delivery Method): mask, nonrebreather  O2 Flow (L/min): 15            06-30 @ 07: @ 07:00  --------------------------------------------------------  IN: 800 mL / OUT: 2500 mL / NET: -1700 mL     @ 07: @ 17:09  --------------------------------------------------------  IN: 118 mL / OUT: 600 mL / NET: -482 mL      CAPILLARY BLOOD GLUCOSE      POCT Blood Glucose.: 164 mg/dL (2024 16:58)      PHYSICAL EXAM:  General: Drowsy on exam, arousable to voice but falls asleep after questioning   HEENT: Atraumatic, normocephalic. PERRL, Conjunctiva and sclera clear   Neck: No JVD. supple  Respiratory: Course BS B/L, no increased WOB  Cardiovascular: S1 S2 normal. No murmurs, rubs or gallops.   Abdomen: Soft, non-tender, non-distended. +BS  Extremities: Hot to touch. Peripheral pulse palpable. No pedal edema.   Skin: No rashes or skin lesions  Neurological: Drowsy, arouses to voice, non focal      HOSPITAL MEDICATIONS:  MEDICATIONS  (STANDING):  acetaminophen     Tablet .. 650 milliGRAM(s) Oral every 6 hours  azithromycin  IVPB 500 milliGRAM(s) IV Intermittent every 24 hours  budesonide  80 MICROgram(s)/formoterol 4.5 MICROgram(s) Inhaler 2 Puff(s) Inhalation two times a day  clonazePAM  Tablet 0.5 milliGRAM(s) Oral at bedtime  dextrose 10% Bolus 125 milliLiter(s) IV Bolus once  dextrose 5%. 1000 milliLiter(s) (100 mL/Hr) IV Continuous <Continuous>  dextrose 5%. 1000 milliLiter(s) (50 mL/Hr) IV Continuous <Continuous>  dextrose 50% Injectable 12.5 Gram(s) IV Push once  dextrose 50% Injectable 25 Gram(s) IV Push once  diltiazem    milliGRAM(s) Oral daily  enalapril 5 milliGRAM(s) Oral at bedtime  enoxaparin Injectable 60 milliGRAM(s) SubCutaneous every 12 hours  glucagon  Injectable 1 milliGRAM(s) IntraMuscular once  guaiFENesin  milliGRAM(s) Oral every 12 hours  insulin glargine Injectable (LANTUS) 12 Unit(s) SubCutaneous at bedtime  insulin lispro (ADMELOG) corrective regimen sliding scale   SubCutaneous three times a day before meals  insulin lispro (ADMELOG) corrective regimen sliding scale   SubCutaneous at bedtime  insulin lispro Injectable (ADMELOG) 4 Unit(s) SubCutaneous three times a day before meals  metoprolol succinate ER 50 milliGRAM(s) Oral at bedtime  piperacillin/tazobactam IVPB.- 3.375 Gram(s) IV Intermittent once  polyethylene glycol 3350 17 Gram(s) Oral at bedtime  pregabalin 200 milliGRAM(s) Oral at bedtime  senna 2 Tablet(s) Oral at bedtime  sodium chloride 1 Gram(s) Oral two times a day  sodium chloride 0.9%. 1000 milliLiter(s) (75 mL/Hr) IV Continuous <Continuous>  sodium chloride 0.9%. 500 milliLiter(s) (500 mL/Hr) IV Continuous <Continuous>    MEDICATIONS  (PRN):  acetaminophen     Tablet .. 650 milliGRAM(s) Oral every 6 hours PRN Temp greater or equal to 38C (100.4F), Mild Pain (1 - 3)  albuterol    90 MICROgram(s) HFA Inhaler 2 Puff(s) Inhalation every 6 hours PRN Shortness of Breath and/or Wheezing  aluminum hydroxide/magnesium hydroxide/simethicone Suspension 30 milliLiter(s) Oral every 4 hours PRN Dyspepsia  benzonatate 100 milliGRAM(s) Oral every 8 hours PRN Cough  dextrose Oral Gel 15 Gram(s) Oral once PRN Blood Glucose LESS THAN 70 milliGRAM(s)/deciliter  magnesium hydroxide Suspension 30 milliLiter(s) Oral daily PRN Constipation  melatonin 3 milliGRAM(s) Oral at bedtime PRN Insomnia  morphine  - Injectable 1 milliGRAM(s) IV Push every 6 hours PRN Severe Pain (7 - 10)  morphine  - Injectable 0.5 milliGRAM(s) IV Push every 6 hours PRN Moderate Pain (4 - 6)  ondansetron Injectable 4 milliGRAM(s) IV Push every 8 hours PRN Nausea and/or Vomiting  ondansetron Injectable 4 milliGRAM(s) IV Push every 6 hours PRN Nausea and/or Vomiting  oxyCODONE    IR 2.5 milliGRAM(s) Oral every 4 hours PRN Moderate Pain (4 - 6)  oxyCODONE    IR 5 milliGRAM(s) Oral every 4 hours PRN Severe Pain (7 - 10)  traMADol 25 milliGRAM(s) Oral every 6 hours PRN Mild Pain (1 - 3)      LABS:                        11.7   x     )-----------( 316      ( 2024 16:40 )             35.7         131<L>  |  92<L>  |  25<H>  ----------------------------<  144<H>  4.2   |  31  |  1.13    Ca    8.2<L>      2024 16:40    TPro  5.9<L>  /  Alb  1.6<L>  /  TBili  0.5  /  DBili  x   /  AST  7<L>  /  ALT  12  /  AlkPhos  90        Urinalysis Basic - ( 2024 16:40 )    Color: x / Appearance: x / SG: x / pH: x  Gluc: 144 mg/dL / Ketone: x  / Bili: x / Urobili: x   Blood: x / Protein: x / Nitrite: x   Leuk Esterase: x / RBC: x / WBC x   Sq Epi: x / Non Sq Epi: x / Bacteria: x      Arterial Blood Gas:   @ 12:16  7.44/46/152/31/97.1/6.1  ABG lactate: --        MICROBIOLOGY:     Culture - Urine (17 @ 02:41)    Specimen Source: .Urine Clean Catch (Midstream)   Culture Results:   Culture grew 3 or more types of organisms which indicate  collection contamination; consider recollection only if clinically  indicated.    Culture - Blood (17 @ 01:01)    Gram Stain:   Growth in aerobic and anaerobic bottles: Gram Negative Rods   Specimen Source: .Blood Blood   Culture Results:   Growth in aerobic and anaerobic bottles: Escherichia coli  See previous culture [id number]    Culture - Blood (17 @ 00:38)    -  Ampicillin/Sulbactam: I 16/8   -  Aztreonam: S <=4   -  Cefazolin: S <=2   -  Cefepime: S <=2   Gram Stain:   Growth in aerobic and anaerobic bottles: Gram Negative Rods   -  Amikacin: S <=8   -  Ampicillin: R >16   -  Trimethoprim/Sulfamethoxazole: S <=0.5/9.5   -  Multidrug (KPC pos) resistant organism: Nondet   -  Staphylococcus aureus: Nondet   -  Methicillin resistant Staphylococcus aureus (MRSA): Nondet   -  Coagulase negative Staphylococcus: Nondet   -  Enterococcus species: Nondet   -  Vancomycin resistant Enterococcus sp.: Nondet   -  Escherichia coli: Detec   -  Klebsiella oxytoca: Nondet   -  Klebsiella pneumoniae: Nondet   -  Serratia marcescens: Nondet   -  Proteus species: Nondet   -  Haemophilus influenzae: Nondet   -  Listeria monocytogenes: Nondet   -  Neisseria meningitidis: Nondet   -  Pseudomonas aeruginosa: Nondet   -  Acinetobacter baumanii: Nondet   -  Enterobacter cloacae complex: Nondet   -  Streptococcus sp. (Not Grp A, B or S pneumoniae): Nondet   -  Streptococcus agalactiae (Group B): Nondet   -  Streptococcus pyogenes (Group A): Nondet   -  Streptococcus pneumoniae: Nondet   -  Candida albicans: Nondet   -  Candida glabrata: Nondet   -  Candida krusei: Nondet   -  Candida parapsilosis: Nondet   -  Candida tropicalis: Nondet   -  Cefoxitin: S <=4   -  Ceftazidime: S <=1   -  Ceftriaxone: S <=1   -  Ciprofloxacin: R >2   -  Ertapenem: S <=0.5   -  Gentamicin: S <=1   -  Imipenem: S <=1   -  Levofloxacin: R >4   -  Meropenem: S <=1   -  Piperacillin/Tazobactam: S <=8   -  Tobramycin: R >8   Specimen Source: .Blood Blood   Organism: Blood Culture PCR   Organism: Escherichia coli   Culture Results:   Growth in aerobic and anaerobic bottles: Escherichia coli  ***Blood Panel PCR results on this specimen are available  approximately 3 hours after the Gram stain result.***  Gram stain, PCR, and/or culture results may not always  correspond due to difference in methodologies.   Organism Identification: Blood Culture PCR  Escherichia coli   Method Type: PCR   Method Type: ETHEL      RADIOLOGY:      < from: CT Angio Chest PE Protocol w/ IV Cont (24 @ 21:40) >  FINDINGS:    LUNGS AND AIRWAYS: Secretions in the trachea and right mainstem bronchus.   Paraseptal and centrilobular emphysema, unchanged. Mucoid impacted distal   airways in right upper and both lower lobes. Interval resolution of   consolidation in the lingula of left upper lobe. New patchy opacities   throughout the right lung and in the left lower lobe. Juxtapleural   irregular 2.8 cm spiculated airspace opacity in the medial right lower   lobe is also new (5-73). New 1.4 cm right apical opacity with   calcification. Additional nonspecific region of groundglass attenuation   in the posterior lingula.  PLEURA: Trace bilateral pleural effusions, decreased from prior exam.  MEDIASTINUM AND DELANEY: No lymphadenopathy. Circumferential esophageal wall   thickening. New soft tissue surrounding the distal trachea and main   bronchi. Subcarinal soft tissue, unchanged.  VESSELS:Adequate opacification of the pulmonary vasculature. No filling   defect present to suggest acute pulmonary embolism.  HEART: Heart size is normal. Small pericardial effusion.  CHEST WALL AND LOWER NECK: Within normal limits.  VISUALIZED UPPER ABDOMEN: New soft tissue nodule in the left upper   quadrant measuring 1.7 cm abutting the diaphragm. Sequela of chronic   pancreatitis, unchanged.  BONES: Degenerative changes.    IMPRESSION:  No pulmonary embolism.    New spiculated 2.8 cm medial right lower lobe and 1.4 cm right apical   nodule are suspicious for malignancy. Additional patchy airspace   opacities in the setting of scattered mucoid impacted distal airways are   less likely malignant etiology and more likely infectious/inflammatory.   Follow-up after the appropriate clinical treatment is recommended. New   abnormal soft tissue surrounding the distal trachea and main bronchi in   the mediastinum.  New soft tissue nodule left upper quadrant suspicious for metastatic   disease.  Esophageal wall thickening. Trace bilateral pleural effusions.    --- End of Report ---    < end of copied text >

## 2024-07-02 LAB
ALBUMIN SERPL ELPH-MCNC: 1.4 G/DL — LOW (ref 3.3–5)
ALP SERPL-CCNC: 98 U/L — SIGNIFICANT CHANGE UP (ref 40–120)
ALT FLD-CCNC: 11 U/L — LOW (ref 12–78)
ANION GAP SERPL CALC-SCNC: 4 MMOL/L — LOW (ref 5–17)
AST SERPL-CCNC: 10 U/L — LOW (ref 15–37)
BILIRUB SERPL-MCNC: 0.3 MG/DL — SIGNIFICANT CHANGE UP (ref 0.2–1.2)
BUN SERPL-MCNC: 24 MG/DL — HIGH (ref 7–23)
CALCIUM SERPL-MCNC: 8.4 MG/DL — LOW (ref 8.5–10.1)
CHLORIDE SERPL-SCNC: 96 MMOL/L — SIGNIFICANT CHANGE UP (ref 96–108)
CO2 SERPL-SCNC: 30 MMOL/L — SIGNIFICANT CHANGE UP (ref 22–31)
CREAT SERPL-MCNC: 1.04 MG/DL — SIGNIFICANT CHANGE UP (ref 0.5–1.3)
EGFR: 59 ML/MIN/1.73M2 — LOW
GLUCOSE BLDC GLUCOMTR-MCNC: 182 MG/DL — HIGH (ref 70–99)
GLUCOSE BLDC GLUCOMTR-MCNC: 233 MG/DL — HIGH (ref 70–99)
GLUCOSE BLDC GLUCOMTR-MCNC: 290 MG/DL — HIGH (ref 70–99)
GLUCOSE BLDC GLUCOMTR-MCNC: 299 MG/DL — HIGH (ref 70–99)
GLUCOSE SERPL-MCNC: 272 MG/DL — HIGH (ref 70–99)
GRAM STN FLD: ABNORMAL
HCT VFR BLD CALC: 34.3 % — LOW (ref 34.5–45)
HGB BLD-MCNC: 10.9 G/DL — LOW (ref 11.5–15.5)
LEGIONELLA AG UR QL: NEGATIVE — SIGNIFICANT CHANGE UP
MCHC RBC-ENTMCNC: 26.5 PG — LOW (ref 27–34)
MCHC RBC-ENTMCNC: 31.8 G/DL — LOW (ref 32–36)
MCV RBC AUTO: 83.3 FL — SIGNIFICANT CHANGE UP (ref 80–100)
NRBC # BLD: 0 /100 WBCS — SIGNIFICANT CHANGE UP (ref 0–0)
PLATELET # BLD AUTO: 295 K/UL — SIGNIFICANT CHANGE UP (ref 150–400)
POTASSIUM SERPL-MCNC: 4.1 MMOL/L — SIGNIFICANT CHANGE UP (ref 3.5–5.3)
POTASSIUM SERPL-SCNC: 4.1 MMOL/L — SIGNIFICANT CHANGE UP (ref 3.5–5.3)
PROT SERPL-MCNC: 5.6 GM/DL — LOW (ref 6–8.3)
RBC # BLD: 4.12 M/UL — SIGNIFICANT CHANGE UP (ref 3.8–5.2)
RBC # FLD: 15.8 % — HIGH (ref 10.3–14.5)
SODIUM SERPL-SCNC: 130 MMOL/L — LOW (ref 135–145)
SPECIMEN SOURCE: SIGNIFICANT CHANGE UP
WBC # BLD: 6.62 K/UL — SIGNIFICANT CHANGE UP (ref 3.8–10.5)
WBC # FLD AUTO: 6.62 K/UL — SIGNIFICANT CHANGE UP (ref 3.8–10.5)

## 2024-07-02 PROCEDURE — 99232 SBSQ HOSP IP/OBS MODERATE 35: CPT

## 2024-07-02 RX ORDER — SODIUM CHLORIDE 0.9 % (FLUSH) 0.9 %
4 SYRINGE (ML) INJECTION EVERY 6 HOURS
Refills: 0 | Status: DISCONTINUED | OUTPATIENT
Start: 2024-07-02 | End: 2024-07-11

## 2024-07-02 RX ORDER — INSULIN GLARGINE 100 [IU]/ML
15 INJECTION, SOLUTION SUBCUTANEOUS AT BEDTIME
Refills: 0 | Status: DISCONTINUED | OUTPATIENT
Start: 2024-07-02 | End: 2024-07-06

## 2024-07-02 RX ORDER — SODIUM CHLORIDE 0.9 % (FLUSH) 0.9 %
500 SYRINGE (ML) INJECTION ONCE
Refills: 0 | Status: COMPLETED | OUTPATIENT
Start: 2024-07-02 | End: 2024-07-02

## 2024-07-02 RX ORDER — IPRATROPIUM BROMIDE AND ALBUTEROL SULFATE .5; 3 MG/3ML; MG/3ML
3 SOLUTION RESPIRATORY (INHALATION) EVERY 6 HOURS
Refills: 0 | Status: DISCONTINUED | OUTPATIENT
Start: 2024-07-02 | End: 2024-07-11

## 2024-07-02 RX ADMIN — Medication 75 MILLILITER(S): at 06:51

## 2024-07-02 RX ADMIN — Medication 650 MILLIGRAM(S): at 00:47

## 2024-07-02 RX ADMIN — IPRATROPIUM BROMIDE AND ALBUTEROL SULFATE 3 MILLILITER(S): .5; 3 SOLUTION RESPIRATORY (INHALATION) at 17:01

## 2024-07-02 RX ADMIN — Medication 75 MILLILITER(S): at 05:49

## 2024-07-02 RX ADMIN — INSULIN LISPRO 4 UNIT(S): 100 INJECTION, SOLUTION SUBCUTANEOUS at 16:50

## 2024-07-02 RX ADMIN — Medication 650 MILLIGRAM(S): at 08:05

## 2024-07-02 RX ADMIN — INSULIN LISPRO 4: 100 INJECTION, SOLUTION SUBCUTANEOUS at 16:51

## 2024-07-02 RX ADMIN — Medication 600 MILLIGRAM(S): at 18:32

## 2024-07-02 RX ADMIN — Medication 650 MILLIGRAM(S): at 18:32

## 2024-07-02 RX ADMIN — ENOXAPARIN SODIUM 60 MILLIGRAM(S): 100 INJECTION SUBCUTANEOUS at 22:41

## 2024-07-02 RX ADMIN — PREGABALIN 200 MILLIGRAM(S): 50 CAPSULE ORAL at 22:42

## 2024-07-02 RX ADMIN — BENZONATATE 100 MILLIGRAM(S): 100 TABLET ORAL at 06:51

## 2024-07-02 RX ADMIN — Medication 650 MILLIGRAM(S): at 12:01

## 2024-07-02 RX ADMIN — CLONAZEPAM 0.5 MILLIGRAM(S): 2 TABLET ORAL at 22:41

## 2024-07-02 RX ADMIN — INSULIN LISPRO 2: 100 INJECTION, SOLUTION SUBCUTANEOUS at 12:04

## 2024-07-02 RX ADMIN — BENZONATATE 100 MILLIGRAM(S): 100 TABLET ORAL at 15:46

## 2024-07-02 RX ADMIN — Medication 500 MILLILITER(S): at 11:00

## 2024-07-02 RX ADMIN — PIPERACILLIN SODIUM AND TAZOBACTAM SODIUM 25 GRAM(S): 3; .375 INJECTION, POWDER, LYOPHILIZED, FOR SOLUTION INTRAVENOUS at 18:32

## 2024-07-02 RX ADMIN — Medication 1 GRAM(S): at 05:45

## 2024-07-02 RX ADMIN — Medication 650 MILLIGRAM(S): at 05:45

## 2024-07-02 RX ADMIN — DILTIAZEM HYDROCHLORIDE 120 MILLIGRAM(S): 240 CAPSULE, EXTENDED RELEASE ORAL at 05:45

## 2024-07-02 RX ADMIN — Medication 650 MILLIGRAM(S): at 13:27

## 2024-07-02 RX ADMIN — AZITHROMYCIN 255 MILLIGRAM(S): 250 TABLET, FILM COATED ORAL at 18:32

## 2024-07-02 RX ADMIN — PIPERACILLIN SODIUM AND TAZOBACTAM SODIUM 25 GRAM(S): 3; .375 INJECTION, POWDER, LYOPHILIZED, FOR SOLUTION INTRAVENOUS at 06:51

## 2024-07-02 RX ADMIN — INSULIN LISPRO 6: 100 INJECTION, SOLUTION SUBCUTANEOUS at 08:10

## 2024-07-02 RX ADMIN — INSULIN LISPRO 4 UNIT(S): 100 INJECTION, SOLUTION SUBCUTANEOUS at 08:10

## 2024-07-02 RX ADMIN — Medication 500 MILLILITER(S): at 13:01

## 2024-07-02 RX ADMIN — Medication 650 MILLIGRAM(S): at 19:57

## 2024-07-02 RX ADMIN — IPRATROPIUM BROMIDE AND ALBUTEROL SULFATE 3 MILLILITER(S): .5; 3 SOLUTION RESPIRATORY (INHALATION) at 23:39

## 2024-07-02 RX ADMIN — Medication 2 PUFF(S): at 19:56

## 2024-07-02 RX ADMIN — INSULIN LISPRO 4 UNIT(S): 100 INJECTION, SOLUTION SUBCUTANEOUS at 12:04

## 2024-07-02 RX ADMIN — Medication 4 MILLILITER(S): at 23:39

## 2024-07-02 RX ADMIN — Medication 1 GRAM(S): at 18:32

## 2024-07-02 RX ADMIN — INSULIN GLARGINE 15 UNIT(S): 100 INJECTION, SOLUTION SUBCUTANEOUS at 22:50

## 2024-07-02 RX ADMIN — Medication 2 PUFF(S): at 05:46

## 2024-07-02 RX ADMIN — ENOXAPARIN SODIUM 60 MILLIGRAM(S): 100 INJECTION SUBCUTANEOUS at 08:11

## 2024-07-02 RX ADMIN — Medication 4 MILLILITER(S): at 17:02

## 2024-07-02 NOTE — CONSULT NOTE ADULT - ASSESSMENT
68-year-old female with history of lung cancer (on immunotherapy), HTN, IDDM, jugular vein thrombosis on Lovenox who presents with LT-sided hip pain after sustaining a fall, found to have left intertrochanteric fracture. Had RRT yesterday for AMS with hypoxia, febrile to 102  CTA Chest neg for PE  CTH without acute findings  started on broad spectrum Abx per ID  Neurology consulted for AMS - per chart appeared to improve after started on broad spectrum Abx  68-year-old female with history of lung cancer (on immunotherapy), HTN, IDDM, jugular vein thrombosis on Lovenox who presents with LT-sided hip pain after sustaining a fall, found to have left intertrochanteric fracture. Had RRT yesterday for AMS with hypoxia, febrile to 102  CTA Chest neg for PE  CTH without acute findings  started on broad spectrum Abx per ID  Neurology consulted for AMS - per chart appeared to improve after started on broad spectrum Abx     AMS likely TME in the setting of sepsis    - can hold off on further inpatient Neurologic workup at this time  - Abx per Pulm/ID  - if mental status fluctuates again can check MRI / eeg but would hold off for now  - care per primary team    d/w  at bedside

## 2024-07-02 NOTE — OCCUPATIONAL THERAPY INITIAL EVALUATION ADULT - GENERAL OBSERVATIONS, REHAB EVAL
Pt was encountered supine in bed; NAD, PIV +, primaift +, portable telemetry +, O2 supplementation (5LO2) via nasal cannula, S/P L hip IMN POD 4, L hip dressing clean, dry and intact, alert, cooperative, followed commands; pt c/o pain in L hip which impacts pts ability to perform functional tasks/transfers and mobility.

## 2024-07-02 NOTE — CONSULT NOTE ADULT - SUBJECTIVE AND OBJECTIVE BOX
Neurology Consult    Reason for Consult: Patient is a 68y old  Female who presents with a chief complaint of mechanical fall (02 Jul 2024 06:38)      HPI:  Patient is a 68-year-old female with history of lung cancer (on immunotherapy), HTN, IDDM, jugular vein thrombosis on Lovenox who presents with LT-sided hip pain after sustaining a fall. She was walking down steps when she slipped and landed on her left hip. Denies LOC and head trauma. Pt denies any numbness, tingling or paresthesias.    In the ED, her vitals are WNL. Labs WNL. Xray LT hip w/ left intertrochanteric fracture.   (27 Jun 2024 15:31)       PAST MEDICAL & SURGICAL HISTORY:  Diabetes      Hypertension      Multiple sclerosis      Alcohol abuse      Pancreatic insufficiency      H/O cervical spine surgery          Allergies: Allergies    No Known Allergies    Intolerances        Social History: Denies toxic habits including tobacco, ETOH or illicit drugs.    Family History: FAMILY HISTORY:  No pertinent family history in first degree relatives    . No family history of strokes    Medications: MEDICATIONS  (STANDING):  acetaminophen     Tablet .. 650 milliGRAM(s) Oral every 6 hours  azithromycin  IVPB 500 milliGRAM(s) IV Intermittent every 24 hours  budesonide  80 MICROgram(s)/formoterol 4.5 MICROgram(s) Inhaler 2 Puff(s) Inhalation two times a day  clonazePAM  Tablet 0.5 milliGRAM(s) Oral at bedtime  dextrose 10% Bolus 125 milliLiter(s) IV Bolus once  dextrose 5%. 1000 milliLiter(s) (50 mL/Hr) IV Continuous <Continuous>  dextrose 5%. 1000 milliLiter(s) (100 mL/Hr) IV Continuous <Continuous>  dextrose 50% Injectable 12.5 Gram(s) IV Push once  dextrose 50% Injectable 25 Gram(s) IV Push once  diltiazem    milliGRAM(s) Oral daily  enalapril 5 milliGRAM(s) Oral at bedtime  enoxaparin Injectable 60 milliGRAM(s) SubCutaneous every 12 hours  glucagon  Injectable 1 milliGRAM(s) IntraMuscular once  guaiFENesin  milliGRAM(s) Oral every 12 hours  insulin glargine Injectable (LANTUS) 12 Unit(s) SubCutaneous at bedtime  insulin lispro (ADMELOG) corrective regimen sliding scale   SubCutaneous three times a day before meals  insulin lispro (ADMELOG) corrective regimen sliding scale   SubCutaneous at bedtime  insulin lispro Injectable (ADMELOG) 4 Unit(s) SubCutaneous three times a day before meals  metoprolol succinate ER 50 milliGRAM(s) Oral at bedtime  piperacillin/tazobactam IVPB.. 3.375 Gram(s) IV Intermittent every 12 hours  polyethylene glycol 3350 17 Gram(s) Oral at bedtime  pregabalin 200 milliGRAM(s) Oral at bedtime  senna 2 Tablet(s) Oral at bedtime  sodium chloride 1 Gram(s) Oral two times a day  sodium chloride 0.9%. 1000 milliLiter(s) (75 mL/Hr) IV Continuous <Continuous>  sodium chloride 0.9%. 500 milliLiter(s) (500 mL/Hr) IV Continuous <Continuous>    MEDICATIONS  (PRN):  acetaminophen     Tablet .. 650 milliGRAM(s) Oral every 6 hours PRN Temp greater or equal to 38C (100.4F), Mild Pain (1 - 3)  albuterol    90 MICROgram(s) HFA Inhaler 2 Puff(s) Inhalation every 6 hours PRN Shortness of Breath and/or Wheezing  aluminum hydroxide/magnesium hydroxide/simethicone Suspension 30 milliLiter(s) Oral every 4 hours PRN Dyspepsia  benzonatate 100 milliGRAM(s) Oral every 8 hours PRN Cough  dextrose Oral Gel 15 Gram(s) Oral once PRN Blood Glucose LESS THAN 70 milliGRAM(s)/deciliter  magnesium hydroxide Suspension 30 milliLiter(s) Oral daily PRN Constipation  melatonin 3 milliGRAM(s) Oral at bedtime PRN Insomnia  morphine  - Injectable 0.5 milliGRAM(s) IV Push every 6 hours PRN Moderate Pain (4 - 6)  morphine  - Injectable 1 milliGRAM(s) IV Push every 6 hours PRN Severe Pain (7 - 10)  ondansetron Injectable 4 milliGRAM(s) IV Push every 8 hours PRN Nausea and/or Vomiting  ondansetron Injectable 4 milliGRAM(s) IV Push every 6 hours PRN Nausea and/or Vomiting  oxyCODONE    IR 2.5 milliGRAM(s) Oral every 4 hours PRN Moderate Pain (4 - 6)  oxyCODONE    IR 5 milliGRAM(s) Oral every 4 hours PRN Severe Pain (7 - 10)  traMADol 25 milliGRAM(s) Oral every 6 hours PRN Mild Pain (1 - 3)      Review of Systems:  CONSTITUTIONAL:  No weight loss, fever, chills, weakness or fatigue.  HEENT:  Eyes:  No visual loss, blurred vision, double vision or yellow sclera. Ears, Nose, Throat:  No hearing loss, sneezing, congestion, runny nose or sore throat.  SKIN:  No rash or itching.  CARDIOVASCULAR:  No chest pain, chest pressure or chest discomfort. No palpitations or edema.  RESPIRATORY:  No shortness of breath, cough or sputum.  GASTROINTESTINAL:  No anorexia, nausea, vomiting or diarrhea. No abdominal pain or blood.  GENITOURINARY:  No burning on urination or incontinence   NEUROLOGICAL:  No headache, dizziness, syncope, paralysis, ataxia, numbness or tingling in the extremities. No change in bowel or bladder control. no limb weakness. no vision changes.   MUSCULOSKELETAL:  No muscle, back pain, joint pain or stiffness.  HEMATOLOGIC:  No anemia, bleeding or bruising.  LYMPHATICS:  No enlarged nodes. No history of splenectomy.  PSYCHIATRIC:  No history of depression or anxiety.  ENDOCRINOLOGIC:  No reports of sweating, cold or heat intolerance. No polyuria or polydipsia.      Vitals:  Vital Signs Last 24 Hrs  T(C): 37 (02 Jul 2024 05:01), Max: 38.9 (01 Jul 2024 14:46)  T(F): 98.6 (02 Jul 2024 05:01), Max: 102.1 (01 Jul 2024 14:46)  HR: 77 (02 Jul 2024 05:01) (77 - 112)  BP: 103/67 (02 Jul 2024 05:01) (97/62 - 122/67)  BP(mean): --  RR: 18 (02 Jul 2024 05:01) (18 - 20)  SpO2: 96% (02 Jul 2024 05:01) (72% - 99%)    Parameters below as of 02 Jul 2024 05:01  Patient On (Oxygen Delivery Method): nasal cannula  O2 Flow (L/min): 5      General Exam:   General Appearance: Appropriately dressed and in no acute distress       Head: Normocephalic, atraumatic and no dysmorphic features  Ear, Nose, and Throat: Moist mucous membranes  CVS: S1S2+  Resp: No SOB, no wheeze or rhonchi  GI: soft NT/ND  Extremities: No edema or cyanosis  Skin: No bruises or rashes     Neurological Exam:  Mental Status: Awake, alert and oriented x 3.  Able to follow simple and complex verbal commands. Able to name and repeat. fluent speech. No obvious aphasia or dysarthria noted.   Cranial Nerves: PERRL, EOMI, VFFC, sensation V1-V3 intact,  no obvious facial asymmetry, equal elevation of palate, scm/trap 5/5, tongue is midline on protrusion. no obvious papilledema on fundoscopic exam. hearing is grossly intact.   Motor: Normal bulk, tone and strength throughout. Fine finger movements were intact and symmetric. no tremors or drift noted.    Sensation: Intact to light touch and pinprick throughout. no right/left confusion. no extinction to tactile on DSS. Romberg was negative.   Reflexes: 1+ throughout at biceps, brachioradialis, triceps, patellars and ankles bilaterally and equal. No clonus. R toe and L toe were both downgoing.  Coordination: No dysmetria on FNF or HKS  Gait: Narrow based and steady. Able to tandem. no limitations in gait.     Data/Labs/Imaging which I personally reviewed.     Labs:     CBC Full  -  ( 02 Jul 2024 05:58 )  WBC Count : 6.62 K/uL  RBC Count : 4.12 M/uL  Hemoglobin : 10.9 g/dL  Hematocrit : 34.3 %  Platelet Count - Automated : 295 K/uL  Mean Cell Volume : 83.3 fl  Mean Cell Hemoglobin : 26.5 pg  Mean Cell Hemoglobin Concentration : 31.8 g/dL  Auto Neutrophil # : x  Auto Lymphocyte # : x  Auto Monocyte # : x  Auto Eosinophil # : x  Auto Basophil # : x  Auto Neutrophil % : x  Auto Lymphocyte % : x  Auto Monocyte % : x  Auto Eosinophil % : x  Auto Basophil % : x    07-02    130<L>  |  96  |  24<H>  ----------------------------<  272<H>  4.1   |  30  |  1.04    Ca    8.4<L>      02 Jul 2024 05:58    TPro  5.6<L>  /  Alb  1.4<L>  /  TBili  0.3  /  DBili  x   /  AST  10<L>  /  ALT  11<L>  /  AlkPhos  98  07-02    LIVER FUNCTIONS - ( 02 Jul 2024 05:58 )  Alb: 1.4 g/dL / Pro: 5.6 gm/dL / ALK PHOS: 98 U/L / ALT: 11 U/L / AST: 10 U/L / GGT: x             Urinalysis Basic - ( 02 Jul 2024 05:58 )    Color: x / Appearance: x / SG: x / pH: x  Gluc: 272 mg/dL / Ketone: x  / Bili: x / Urobili: x   Blood: x / Protein: x / Nitrite: x   Leuk Esterase: x / RBC: x / WBC x   Sq Epi: x / Non Sq Epi: x / Bacteria: x        Culture - Sputum (collected 07-01-24 @ 18:00)  Source: .Sputum Sputum  Gram Stain (07-02-24 @ 03:58):    Moderate polymorphonuclear leukocytes per low power field    No Squamous epithelial cells per low power field    Numerous Gram Negative Rods seen per oil power field    Few Gram positive cocci in pairs seen per oil power field        c< from: CT Head No Cont (08.08.17 @ 21:37) >    EXAM:  CT BRAIN                            PROCEDURE DATE:  08/08/2017          INTERPRETATION:  CLINICAL HISTORY:  Hyperglycemia.    TECHNIQUE:  CT of the head without contrast.  Contiguous transaxial images of the head were acquired from the skull   base to the vertex without the administration of iodinated contrast.   Coronal and sagittal reformatted images are provided.     COMPARISON:  CT head dated 3/27/2017    FINDINGS:    There is no acute intracranial hemorrhage, mass effect or evidence of   acute territorial infarct.  Stable mild chronic microvascular changes.    The ventricles, sulci and cisternal spaces are normal in size and   configuration.  There is no midline shift or abnormal extra-axial fluid   collection.    The calvariumis intact.  There are no osteoblastic or lytic calvarial or   skull base lesions.  The paranasal sinuses and mastoid air cells are   clear.    IMPRESSION:  Stable exam. No acute intracranial hemorrhage or evidence of acute   territorial infarct. Mildchronic microvascular change.              < end of copied text >   Neurology Consult    Reason for Consult: Patient is a 68y old  Female who presents with a chief complaint of mechanical fall (02 Jul 2024 06:38)      HPI:  Patient is a 68-year-old female with history of lung cancer (on immunotherapy), HTN, IDDM, jugular vein thrombosis on Lovenox who presents with LT-sided hip pain after sustaining a fall. She was walking down steps when she slipped and landed on her left hip. Denies LOC and head trauma. Pt denies any numbness, tingling or paresthesias.    In the ED, her vitals are WNL. Labs WNL. Xray LT hip w/ left intertrochanteric fracture.   (27 Jun 2024 15:31)    RRT called yesterday for hypoxia and AMS , found to be febrile     PAST MEDICAL & SURGICAL HISTORY:  Diabetes      Hypertension      Multiple sclerosis      Alcohol abuse      Pancreatic insufficiency      H/O cervical spine surgery          Allergies: Allergies    No Known Allergies    Intolerances        Social History: Denies toxic habits including tobacco, ETOH or illicit drugs.    Family History: FAMILY HISTORY:  No pertinent family history in first degree relatives    . No family history of strokes    Medications: MEDICATIONS  (STANDING):  acetaminophen     Tablet .. 650 milliGRAM(s) Oral every 6 hours  azithromycin  IVPB 500 milliGRAM(s) IV Intermittent every 24 hours  budesonide  80 MICROgram(s)/formoterol 4.5 MICROgram(s) Inhaler 2 Puff(s) Inhalation two times a day  clonazePAM  Tablet 0.5 milliGRAM(s) Oral at bedtime  dextrose 10% Bolus 125 milliLiter(s) IV Bolus once  dextrose 5%. 1000 milliLiter(s) (50 mL/Hr) IV Continuous <Continuous>  dextrose 5%. 1000 milliLiter(s) (100 mL/Hr) IV Continuous <Continuous>  dextrose 50% Injectable 12.5 Gram(s) IV Push once  dextrose 50% Injectable 25 Gram(s) IV Push once  diltiazem    milliGRAM(s) Oral daily  enalapril 5 milliGRAM(s) Oral at bedtime  enoxaparin Injectable 60 milliGRAM(s) SubCutaneous every 12 hours  glucagon  Injectable 1 milliGRAM(s) IntraMuscular once  guaiFENesin  milliGRAM(s) Oral every 12 hours  insulin glargine Injectable (LANTUS) 12 Unit(s) SubCutaneous at bedtime  insulin lispro (ADMELOG) corrective regimen sliding scale   SubCutaneous three times a day before meals  insulin lispro (ADMELOG) corrective regimen sliding scale   SubCutaneous at bedtime  insulin lispro Injectable (ADMELOG) 4 Unit(s) SubCutaneous three times a day before meals  metoprolol succinate ER 50 milliGRAM(s) Oral at bedtime  piperacillin/tazobactam IVPB.. 3.375 Gram(s) IV Intermittent every 12 hours  polyethylene glycol 3350 17 Gram(s) Oral at bedtime  pregabalin 200 milliGRAM(s) Oral at bedtime  senna 2 Tablet(s) Oral at bedtime  sodium chloride 1 Gram(s) Oral two times a day  sodium chloride 0.9%. 1000 milliLiter(s) (75 mL/Hr) IV Continuous <Continuous>  sodium chloride 0.9%. 500 milliLiter(s) (500 mL/Hr) IV Continuous <Continuous>    MEDICATIONS  (PRN):  acetaminophen     Tablet .. 650 milliGRAM(s) Oral every 6 hours PRN Temp greater or equal to 38C (100.4F), Mild Pain (1 - 3)  albuterol    90 MICROgram(s) HFA Inhaler 2 Puff(s) Inhalation every 6 hours PRN Shortness of Breath and/or Wheezing  aluminum hydroxide/magnesium hydroxide/simethicone Suspension 30 milliLiter(s) Oral every 4 hours PRN Dyspepsia  benzonatate 100 milliGRAM(s) Oral every 8 hours PRN Cough  dextrose Oral Gel 15 Gram(s) Oral once PRN Blood Glucose LESS THAN 70 milliGRAM(s)/deciliter  magnesium hydroxide Suspension 30 milliLiter(s) Oral daily PRN Constipation  melatonin 3 milliGRAM(s) Oral at bedtime PRN Insomnia  morphine  - Injectable 0.5 milliGRAM(s) IV Push every 6 hours PRN Moderate Pain (4 - 6)  morphine  - Injectable 1 milliGRAM(s) IV Push every 6 hours PRN Severe Pain (7 - 10)  ondansetron Injectable 4 milliGRAM(s) IV Push every 8 hours PRN Nausea and/or Vomiting  ondansetron Injectable 4 milliGRAM(s) IV Push every 6 hours PRN Nausea and/or Vomiting  oxyCODONE    IR 2.5 milliGRAM(s) Oral every 4 hours PRN Moderate Pain (4 - 6)  oxyCODONE    IR 5 milliGRAM(s) Oral every 4 hours PRN Severe Pain (7 - 10)  traMADol 25 milliGRAM(s) Oral every 6 hours PRN Mild Pain (1 - 3)      Review of Systems:  CONSTITUTIONAL:  No weight loss, fever, chills, weakness or fatigue.  HEENT:  Eyes:  No visual loss, blurred vision, double vision or yellow sclera. Ears, Nose, Throat:  No hearing loss, sneezing, congestion, runny nose or sore throat.  SKIN:  No rash or itching.  CARDIOVASCULAR:  No chest pain, chest pressure or chest discomfort. No palpitations or edema.  RESPIRATORY:  No shortness of breath, cough or sputum.  GASTROINTESTINAL:  No anorexia, nausea, vomiting or diarrhea. No abdominal pain or blood.  GENITOURINARY:  No burning on urination or incontinence   NEUROLOGICAL:  No headache, dizziness, syncope, paralysis, ataxia, numbness or tingling in the extremities. No change in bowel or bladder control. no limb weakness. no vision changes.   MUSCULOSKELETAL:  No muscle, back pain, joint pain or stiffness.  HEMATOLOGIC:  No anemia, bleeding or bruising.  LYMPHATICS:  No enlarged nodes. No history of splenectomy.  PSYCHIATRIC:  No history of depression or anxiety.  ENDOCRINOLOGIC:  No reports of sweating, cold or heat intolerance. No polyuria or polydipsia.      Vitals:  Vital Signs Last 24 Hrs  T(C): 37 (02 Jul 2024 05:01), Max: 38.9 (01 Jul 2024 14:46)  T(F): 98.6 (02 Jul 2024 05:01), Max: 102.1 (01 Jul 2024 14:46)  HR: 77 (02 Jul 2024 05:01) (77 - 112)  BP: 103/67 (02 Jul 2024 05:01) (97/62 - 122/67)  BP(mean): --  RR: 18 (02 Jul 2024 05:01) (18 - 20)  SpO2: 96% (02 Jul 2024 05:01) (72% - 99%)    Parameters below as of 02 Jul 2024 05:01  Patient On (Oxygen Delivery Method): nasal cannula  O2 Flow (L/min): 5      General Exam:   General Appearance: Appropriately dressed and in no acute distress       Head: Normocephalic, atraumatic and no dysmorphic features  Ear, Nose, and Throat: Moist mucous membranes  CVS: S1S2+  Resp: No SOB, no wheeze or rhonchi  GI: soft NT/ND  Extremities: No edema or cyanosis  Skin: No bruises or rashes     Neurological Exam:  Mental Status: Awake, alert and oriented x 3.  Able to follow simple and complex verbal commands. Able to name and repeat. fluent speech. No obvious aphasia or dysarthria noted.   Cranial Nerves: PERRL, EOMI, VFFC, sensation V1-V3 intact,  no obvious facial asymmetry, equal elevation of palate, scm/trap 5/5, tongue is midline on protrusion.  hearing is grossly intact.   Motor: Normal bulk, tone and strength throughout. Fine finger movements were intact and symmetric. no tremors or drift noted.    Sensation: Intact to light touch and pinprick throughout. no right/left confusion. no extinction to tactile on DSS.   Reflexes: 1+ throughout at biceps, brachioradialis, triceps, patellars and ankles bilaterally and equal. No clonus. R toe and L toe were both downgoing.  Coordination: No dysmetria on FNF  Gait: deferred    Data/Labs/Imaging which I personally reviewed.     Labs:     CBC Full  -  ( 02 Jul 2024 05:58 )  WBC Count : 6.62 K/uL  RBC Count : 4.12 M/uL  Hemoglobin : 10.9 g/dL  Hematocrit : 34.3 %  Platelet Count - Automated : 295 K/uL  Mean Cell Volume : 83.3 fl  Mean Cell Hemoglobin : 26.5 pg  Mean Cell Hemoglobin Concentration : 31.8 g/dL  Auto Neutrophil # : x  Auto Lymphocyte # : x  Auto Monocyte # : x  Auto Eosinophil # : x  Auto Basophil # : x  Auto Neutrophil % : x  Auto Lymphocyte % : x  Auto Monocyte % : x  Auto Eosinophil % : x  Auto Basophil % : x    07-02    130<L>  |  96  |  24<H>  ----------------------------<  272<H>  4.1   |  30  |  1.04    Ca    8.4<L>      02 Jul 2024 05:58    TPro  5.6<L>  /  Alb  1.4<L>  /  TBili  0.3  /  DBili  x   /  AST  10<L>  /  ALT  11<L>  /  AlkPhos  98  07-02    LIVER FUNCTIONS - ( 02 Jul 2024 05:58 )  Alb: 1.4 g/dL / Pro: 5.6 gm/dL / ALK PHOS: 98 U/L / ALT: 11 U/L / AST: 10 U/L / GGT: x             Urinalysis Basic - ( 02 Jul 2024 05:58 )    Color: x / Appearance: x / SG: x / pH: x  Gluc: 272 mg/dL / Ketone: x  / Bili: x / Urobili: x   Blood: x / Protein: x / Nitrite: x   Leuk Esterase: x / RBC: x / WBC x   Sq Epi: x / Non Sq Epi: x / Bacteria: x        Culture - Sputum (collected 07-01-24 @ 18:00)  Source: .Sputum Sputum  Gram Stain (07-02-24 @ 03:58):    Moderate polymorphonuclear leukocytes per low power field    No Squamous epithelial cells per low power field    Numerous Gram Negative Rods seen per oil power field    Few Gram positive cocci in pairs seen per oil power field        c< from: CT Head No Cont (08.08.17 @ 21:37) >    EXAM:  CT BRAIN                            PROCEDURE DATE:  08/08/2017          INTERPRETATION:  CLINICAL HISTORY:  Hyperglycemia.    TECHNIQUE:  CT of the head without contrast.  Contiguous transaxial images of the head were acquired from the skull   base to the vertex without the administration of iodinated contrast.   Coronal and sagittal reformatted images are provided.     COMPARISON:  CT head dated 3/27/2017    FINDINGS:    There is no acute intracranial hemorrhage, mass effect or evidence of   acute territorial infarct.  Stable mild chronic microvascular changes.    The ventricles, sulci and cisternal spaces are normal in size and   configuration.  There is no midline shift or abnormal extra-axial fluid   collection.    The calvariumis intact.  There are no osteoblastic or lytic calvarial or   skull base lesions.  The paranasal sinuses and mastoid air cells are   clear.    IMPRESSION:  Stable exam. No acute intracranial hemorrhage or evidence of acute   territorial infarct. Mildchronic microvascular change.              < end of copied text >

## 2024-07-02 NOTE — PROGRESS NOTE ADULT - ASSESSMENT
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 She was walking down steps when she slipped and landed on her left hip. Denies LOC and head trauma. Pt denies any numbness, tingling or paresthesias.  In the ED, her vitals are WNL. Labs WNL. Xray LT hip w/ left intertrochanteric fracture.   (27 Jun 2024 15:31)   s/p L Hip IMN on 6/28    patient had RRT earlier today for hypoxemia and is now on NRB face mask for oxygen  and also she had fever of 102    ID consultation is requested for the fever and SIRS work up.    no fevers since yesterday   No leukocytosis  CTA:  No pulmonary embolism.    New groundglass opacities with patchy areas of consolidation, increased small pleural effusions and bilateral lower lobe mucoid impaction. Suspect a combination of pulmonary edema and aspiration/pneumonia.  Circumferential mid to distal tracheal soft tissue thickening, extending to the scott, is nonspecific although possibly secondary to immunotherapy.  Additional indeterminate findings in this patient with reported history of lung cancer, including right apical and right paraspinal nodular opacities, hilar adenopathy, left upper quadrant soft tissue noduleand ill-defined splenic lesion. Comparison to more recent prior imaging, if   available, would be useful.      plan-  continue zithromax pending urine legionella AG result.  continue Zosyn IV   awaiting for blood cx x 2  awaiting for sputum cx - pending ID   check UA and urine cx   covid 19 negative   check urine legionella AG - pending   check LE US and r/o DVT - pending   patient might have  also aspirated and hence possible pneumonitis  wean off supplemental O2 as able   malignancy management as per patient's oncologist   left hip surgical site management as per ortho team    will discuss with Dr. Lombardi  discussed with Dr. Petit  A/P-  68-year-old female with history of lung cancer (on immunotherapy), HTN, IDDM, jugular vein thrombosis on Lovenox who presents with LT-sided hip pain after sustaining a fall. She was walking down steps when she slipped and landed on her left hip. Denies LOC and head trauma. Pt denies any numbness, tingling or paresthesias.  In the ED, her vitals are WNL. Labs WNL. Xray LT hip w/ left intertrochanteric fracture.   (27 Jun 2024 15:31)   s/p L Hip IMN on 6/28    patient had RRT 7/1/2024 for hypoxemia         no fevers since yesterday   No leukocytosis  sputum cx- prelim multiple e.coli ( sens pending)    CTA:  No pulmonary embolism.    New groundglass opacities with patchy areas of consolidation, increased small pleural effusions and bilateral lower lobe mucoid impaction. Suspect a combination of pulmonary edema and aspiration/pneumonia.  Circumferential mid to distal tracheal soft tissue thickening, extending to the scott, is nonspecific although possibly secondary to immunotherapy.  Additional indeterminate findings in this patient with reported history of lung cancer, including right apical and right paraspinal nodular opacities, hilar adenopathy, left upper quadrant soft tissue noduleand ill-defined splenic lesion. Comparison to more recent prior imaging, if   available, would be useful.      plan-  continue zithromax pending urine legionella AG result.  continue Zosyn IV for pneumonia  awaiting for blood cx x 2  awaiting for sputum cx - pending ID   check UA and urine cx   covid 19 negative   check urine legionella AG - pending   check LE US and r/o DVT - pending   patient might have  also aspirated and hence possible pneumonitis  wean off supplemental O2 as able   malignancy management as per patient's oncologist   left hip surgical site management as per ortho team    discussed with Dr. Lombardi  discussed with Dr. Petit

## 2024-07-02 NOTE — PROGRESS NOTE ADULT - SUBJECTIVE AND OBJECTIVE BOX
Interval Events:    REVIEW OF SYSTEMS:  All negative unless listed within interval HPI       OBJECTIVE:  Vital Signs Last 24 Hrs  T(C): 37 (02 Jul 2024 05:01), Max: 38.9 (01 Jul 2024 14:46)  T(F): 98.6 (02 Jul 2024 05:01), Max: 102.1 (01 Jul 2024 14:46)  HR: 77 (02 Jul 2024 05:01) (77 - 112)  BP: 103/67 (02 Jul 2024 05:01) (97/62 - 122/67)  BP(mean): --  ABP: --  ABP(mean): --  RR: 18 (02 Jul 2024 05:01) (18 - 20)  SpO2: 96% (02 Jul 2024 05:01) (72% - 99%)    O2 Parameters below as of 02 Jul 2024 05:01  Patient On (Oxygen Delivery Method): nasal cannula  O2 Flow (L/min): 5            07-01 @ 07:01  -  07-02 @ 07:00  --------------------------------------------------------  IN: 358 mL / OUT: 2000 mL / NET: -1642 mL      CAPILLARY BLOOD GLUCOSE      POCT Blood Glucose.: 290 mg/dL (02 Jul 2024 07:47)      PHYSICAL EXAM:  General:   HEENT:   Lymph Nodes:  Neck:   Respiratory:   Cardiovascular:   Abdomen:   Extremities:   Skin:   Neurological:  Psychiatry:    HOSPITAL MEDICATIONS:  enoxaparin Injectable 60 milliGRAM(s) SubCutaneous every 12 hours    azithromycin  IVPB 500 milliGRAM(s) IV Intermittent every 24 hours  piperacillin/tazobactam IVPB.. 3.375 Gram(s) IV Intermittent every 12 hours    diltiazem    milliGRAM(s) Oral daily  enalapril 5 milliGRAM(s) Oral at bedtime  metoprolol succinate ER 50 milliGRAM(s) Oral at bedtime    dextrose 50% Injectable 12.5 Gram(s) IV Push once  dextrose 50% Injectable 25 Gram(s) IV Push once  dextrose Oral Gel 15 Gram(s) Oral once PRN  glucagon  Injectable 1 milliGRAM(s) IntraMuscular once  insulin glargine Injectable (LANTUS) 12 Unit(s) SubCutaneous at bedtime  insulin lispro (ADMELOG) corrective regimen sliding scale   SubCutaneous at bedtime  insulin lispro (ADMELOG) corrective regimen sliding scale   SubCutaneous three times a day before meals  insulin lispro Injectable (ADMELOG) 4 Unit(s) SubCutaneous three times a day before meals    albuterol    90 MICROgram(s) HFA Inhaler 2 Puff(s) Inhalation every 6 hours PRN  benzonatate 100 milliGRAM(s) Oral every 8 hours PRN  budesonide  80 MICROgram(s)/formoterol 4.5 MICROgram(s) Inhaler 2 Puff(s) Inhalation two times a day  guaiFENesin  milliGRAM(s) Oral every 12 hours    acetaminophen     Tablet .. 650 milliGRAM(s) Oral every 6 hours PRN  acetaminophen     Tablet .. 650 milliGRAM(s) Oral every 6 hours  clonazePAM  Tablet 0.5 milliGRAM(s) Oral at bedtime  melatonin 3 milliGRAM(s) Oral at bedtime PRN  morphine  - Injectable 0.5 milliGRAM(s) IV Push every 6 hours PRN  morphine  - Injectable 1 milliGRAM(s) IV Push every 6 hours PRN  ondansetron Injectable 4 milliGRAM(s) IV Push every 8 hours PRN  ondansetron Injectable 4 milliGRAM(s) IV Push every 6 hours PRN  oxyCODONE    IR 2.5 milliGRAM(s) Oral every 4 hours PRN  oxyCODONE    IR 5 milliGRAM(s) Oral every 4 hours PRN  pregabalin 200 milliGRAM(s) Oral at bedtime  traMADol 25 milliGRAM(s) Oral every 6 hours PRN    aluminum hydroxide/magnesium hydroxide/simethicone Suspension 30 milliLiter(s) Oral every 4 hours PRN  magnesium hydroxide Suspension 30 milliLiter(s) Oral daily PRN  polyethylene glycol 3350 17 Gram(s) Oral at bedtime  senna 2 Tablet(s) Oral at bedtime        dextrose 10% Bolus 125 milliLiter(s) IV Bolus once  dextrose 5%. 1000 milliLiter(s) IV Continuous <Continuous>  dextrose 5%. 1000 milliLiter(s) IV Continuous <Continuous>  sodium chloride 1 Gram(s) Oral two times a day  sodium chloride 0.9%. 500 milliLiter(s) IV Continuous <Continuous>            LABS:                        10.9   6.62  )-----------( 295      ( 02 Jul 2024 05:58 )             34.3     Hgb Trend: 10.9<--, 11.7<--, 12.5<--, 12.2<--, 12.1<--  07-02    130<L>  |  96  |  24<H>  ----------------------------<  272<H>  4.1   |  30  |  1.04    Ca    8.4<L>      02 Jul 2024 05:58    TPro  5.6<L>  /  Alb  1.4<L>  /  TBili  0.3  /  DBili  x   /  AST  10<L>  /  ALT  11<L>  /  AlkPhos  98  07-02    Creatinine Trend: 1.04<--, 1.13<--, 0.74<--, 0.85<--, 1.27<--, 1.17<--    Urinalysis Basic - ( 02 Jul 2024 05:58 )    Color: x / Appearance: x / SG: x / pH: x  Gluc: 272 mg/dL / Ketone: x  / Bili: x / Urobili: x   Blood: x / Protein: x / Nitrite: x   Leuk Esterase: x / RBC: x / WBC x   Sq Epi: x / Non Sq Epi: x / Bacteria: x      Arterial Blood Gas:  07-01 @ 12:16  7.44/46/152/31/97.1/6.1  ABG lactate: --        MICROBIOLOGY:     Culture - Sputum . (07.01.24 @ 18:00)    Gram Stain:   Moderate polymorphonuclear leukocytes per low power field  No Squamous epithelial cells per low power field  Numerous Gram Negative Rods seen per oil power field  Few Gram positive cocci in pairs seen per oil power field   Specimen Source: .Sputum Sputum    Respiratory Viral Panel with COVID-19 by [facility]O (07.01.24 @ 14:54)    Rapid RVP Result: NotDete   SARS-CoV-2: NotDete:           RADIOLOGY:  [ x] Reviewed       < from: CT Angio Chest PE Protocol w/ IV Cont (07.01.24 @ 14:58) >  FINDINGS:  Motion limited study.    LUNGS, AIRWAYS AND PLEURA: Emphysema is present. There is circumferential   soft tissue thickening surrounding the mid to distal trachea and   extending to the scott. Tracheal mucous secretions are present. There is   narrowing of the right upper lobe bronchus and bronchus intermedius.   There are new multifocal bilateral areas of consolidation, groundglass   and nodularity, predominantly involving the lower lobes, on a background   of lower lobe segmental and subsegmental bronchial impaction. Increased   small pleural effusions. No pneumothorax.    Unchanged indeterminate right lower lobe paraspinal opacity on (4:212),   1.5 cm partially calcified right apical nodular opacity (4:51), and 9 mm   right upper lobe nodule (4:87) when compared to the prior study.    VESSELS: No pulmonary embolism. The great vessels are normal in size.   Mediastinal chest wall collateral vessels are present. There is narrowing   of the right internal jugular vein with several mediastinal venous   collaterals. The SVC is patent.    HEART: Heart size is normal. No pericardial effusion. Coronary artery   calcification and stenting.    MEDIASTINUM AND DELANEY: There is a 1.6 cm short axis right hilar node   (3:50) and 1.1 cm short axis left hilar node (3:57). Esophagus is   collapsed and suboptimally assessed.    CHEST WALL AND LOWER NECK: Diffuse subcutaneousedema    VISUALIZED UPPER ABDOMEN: Nonspecific left adrenal thickening.   Redemonstrated left upper quadrant soft tissue nodule posterior to the   spleen measures 1.7 cm. Ill-defined hypodensity in the posterior spleen,   otherwise incompletely evaluated due to artifact/arms down positioning.    BONES: Degenerative changes. Partially imaged anterior cervical vertebral   fusion.    IMPRESSION:  No pulmonary embolism.    New groundglass opacities with patchy areas of consolidation, increased   small pleural effusions and bilateral lower lobe mucoid impaction.   Suspect a combination of pulmonary edema and aspiration/pneumonia.    Circumferential mid to distal tracheal soft tissue thickening, extending   to the scott, is nonspecific although possibly secondary to   immunotherapy.    Additional indeterminate findings in this patient with reported history   of lung cancer, including right apical and right paraspinal nodular   opacities, hilar adenopathy, left upper quadrant soft tissue noduleand   ill-defined splenic lesion. Comparison to more recent prior imaging, if   available, would be useful.            --- End of Report ---    < end of copied text >   Interval Events: Patient seen and examined at bedside. Awake and alert at baseline MS. Currently on 5 L NC with SpO2 94-95%. Endorses feeling better from yesterday, now with productive cough yellow/brown sputum.     REVIEW OF SYSTEMS:  All negative unless listed within interval HPI       OBJECTIVE:  Vital Signs Last 24 Hrs  T(C): 37 (02 Jul 2024 05:01), Max: 38.9 (01 Jul 2024 14:46)  T(F): 98.6 (02 Jul 2024 05:01), Max: 102.1 (01 Jul 2024 14:46)  HR: 77 (02 Jul 2024 05:01) (77 - 112)  BP: 103/67 (02 Jul 2024 05:01) (97/62 - 122/67)  BP(mean): --  ABP: --  ABP(mean): --  RR: 18 (02 Jul 2024 05:01) (18 - 20)  SpO2: 96% (02 Jul 2024 05:01) (72% - 99%)    O2 Parameters below as of 02 Jul 2024 05:01  Patient On (Oxygen Delivery Method): nasal cannula  O2 Flow (L/min): 5            07-01 @ 07:01  -  07-02 @ 07:00  --------------------------------------------------------  IN: 358 mL / OUT: 2000 mL / NET: -1642 mL      CAPILLARY BLOOD GLUCOSE      POCT Blood Glucose.: 290 mg/dL (02 Jul 2024 07:47)      PHYSICAL EXAM:  General: NAD, sitting upright in bed  HEENT: NC/AT, PERRL, MMM, + NC in place  Neck: Supple, no JVD  Respiratory: B/L Rhonchi, no wheeze  Cardiovascular: RRR, no m/r/g  Abdomen: soft, NTTP, ND, + BS  Extremities: no c/c/e  Skin: no rashes or leasions  Neurological: A&Ox3, non focal  Psychiatry: appropriate affect     HOSPITAL MEDICATIONS:  enoxaparin Injectable 60 milliGRAM(s) SubCutaneous every 12 hours    azithromycin  IVPB 500 milliGRAM(s) IV Intermittent every 24 hours  piperacillin/tazobactam IVPB.. 3.375 Gram(s) IV Intermittent every 12 hours    diltiazem    milliGRAM(s) Oral daily  enalapril 5 milliGRAM(s) Oral at bedtime  metoprolol succinate ER 50 milliGRAM(s) Oral at bedtime    dextrose 50% Injectable 12.5 Gram(s) IV Push once  dextrose 50% Injectable 25 Gram(s) IV Push once  dextrose Oral Gel 15 Gram(s) Oral once PRN  glucagon  Injectable 1 milliGRAM(s) IntraMuscular once  insulin glargine Injectable (LANTUS) 12 Unit(s) SubCutaneous at bedtime  insulin lispro (ADMELOG) corrective regimen sliding scale   SubCutaneous at bedtime  insulin lispro (ADMELOG) corrective regimen sliding scale   SubCutaneous three times a day before meals  insulin lispro Injectable (ADMELOG) 4 Unit(s) SubCutaneous three times a day before meals    albuterol    90 MICROgram(s) HFA Inhaler 2 Puff(s) Inhalation every 6 hours PRN  benzonatate 100 milliGRAM(s) Oral every 8 hours PRN  budesonide  80 MICROgram(s)/formoterol 4.5 MICROgram(s) Inhaler 2 Puff(s) Inhalation two times a day  guaiFENesin  milliGRAM(s) Oral every 12 hours    acetaminophen     Tablet .. 650 milliGRAM(s) Oral every 6 hours PRN  acetaminophen     Tablet .. 650 milliGRAM(s) Oral every 6 hours  clonazePAM  Tablet 0.5 milliGRAM(s) Oral at bedtime  melatonin 3 milliGRAM(s) Oral at bedtime PRN  morphine  - Injectable 0.5 milliGRAM(s) IV Push every 6 hours PRN  morphine  - Injectable 1 milliGRAM(s) IV Push every 6 hours PRN  ondansetron Injectable 4 milliGRAM(s) IV Push every 8 hours PRN  ondansetron Injectable 4 milliGRAM(s) IV Push every 6 hours PRN  oxyCODONE    IR 2.5 milliGRAM(s) Oral every 4 hours PRN  oxyCODONE    IR 5 milliGRAM(s) Oral every 4 hours PRN  pregabalin 200 milliGRAM(s) Oral at bedtime  traMADol 25 milliGRAM(s) Oral every 6 hours PRN    aluminum hydroxide/magnesium hydroxide/simethicone Suspension 30 milliLiter(s) Oral every 4 hours PRN  magnesium hydroxide Suspension 30 milliLiter(s) Oral daily PRN  polyethylene glycol 3350 17 Gram(s) Oral at bedtime  senna 2 Tablet(s) Oral at bedtime        dextrose 10% Bolus 125 milliLiter(s) IV Bolus once  dextrose 5%. 1000 milliLiter(s) IV Continuous <Continuous>  dextrose 5%. 1000 milliLiter(s) IV Continuous <Continuous>  sodium chloride 1 Gram(s) Oral two times a day  sodium chloride 0.9%. 500 milliLiter(s) IV Continuous <Continuous>            LABS:                        10.9   6.62  )-----------( 295      ( 02 Jul 2024 05:58 )             34.3     Hgb Trend: 10.9<--, 11.7<--, 12.5<--, 12.2<--, 12.1<--  07-02    130<L>  |  96  |  24<H>  ----------------------------<  272<H>  4.1   |  30  |  1.04    Ca    8.4<L>      02 Jul 2024 05:58    TPro  5.6<L>  /  Alb  1.4<L>  /  TBili  0.3  /  DBili  x   /  AST  10<L>  /  ALT  11<L>  /  AlkPhos  98  07-02    Creatinine Trend: 1.04<--, 1.13<--, 0.74<--, 0.85<--, 1.27<--, 1.17<--    Urinalysis Basic - ( 02 Jul 2024 05:58 )    Color: x / Appearance: x / SG: x / pH: x  Gluc: 272 mg/dL / Ketone: x  / Bili: x / Urobili: x   Blood: x / Protein: x / Nitrite: x   Leuk Esterase: x / RBC: x / WBC x   Sq Epi: x / Non Sq Epi: x / Bacteria: x      Arterial Blood Gas:  07-01 @ 12:16  7.44/46/152/31/97.1/6.1  ABG lactate: --        MICROBIOLOGY:     Culture - Sputum . (07.01.24 @ 18:00)    Gram Stain:   Moderate polymorphonuclear leukocytes per low power field  No Squamous epithelial cells per low power field  Numerous Gram Negative Rods seen per oil power field  Few Gram positive cocci in pairs seen per oil power field   Specimen Source: .Sputum Sputum    Respiratory Viral Panel with COVID-19 by BAO (07.01.24 @ 14:54)    Rapid RVP Result: [facility]   SARS-CoV-2: [facility]:               RADIOLOGY:  [ x] Reviewed       < from: CT Angio Chest PE Protocol w/ IV Cont (07.01.24 @ 14:58) >  FINDINGS:  Motion limited study.    LUNGS, AIRWAYS AND PLEURA: Emphysema is present. There is circumferential   soft tissue thickening surrounding the mid to distal trachea and   extending to the scott. Tracheal mucous secretions are present. There is   narrowing of the right upper lobe bronchus and bronchus intermedius.   There are new multifocal bilateral areas of consolidation, groundglass   and nodularity, predominantly involving the lower lobes, on a background   of lower lobe segmental and subsegmental bronchial impaction. Increased   small pleural effusions. No pneumothorax.    Unchanged indeterminate right lower lobe paraspinal opacity on (4:212),   1.5 cm partially calcified right apical nodular opacity (4:51), and 9 mm   right upper lobe nodule (4:87) when compared to the prior study.    VESSELS: No pulmonary embolism. The great vessels are normal in size.   Mediastinal chest wall collateral vessels are present. There is narrowing   of the right internal jugular vein with several mediastinal venous   collaterals. The SVC is patent.    HEART: Heart size is normal. No pericardial effusion. Coronary artery   calcification and stenting.    MEDIASTINUM AND DELANEY: There is a 1.6 cm short axis right hilar node   (3:50) and 1.1 cm short axis left hilar node (3:57). Esophagus is   collapsed and suboptimally assessed.    CHEST WALL AND LOWER NECK: Diffuse subcutaneousedema    VISUALIZED UPPER ABDOMEN: Nonspecific left adrenal thickening.   Redemonstrated left upper quadrant soft tissue nodule posterior to the   spleen measures 1.7 cm. Ill-defined hypodensity in the posterior spleen,   otherwise incompletely evaluated due to artifact/arms down positioning.    BONES: Degenerative changes. Partially imaged anterior cervical vertebral   fusion.    IMPRESSION:  No pulmonary embolism.    New groundglass opacities with patchy areas of consolidation, increased   small pleural effusions and bilateral lower lobe mucoid impaction.   Suspect a combination of pulmonary edema and aspiration/pneumonia.    Circumferential mid to distal tracheal soft tissue thickening, extending   to the scott, is nonspecific although possibly secondary to   immunotherapy.    Additional indeterminate findings in this patient with reported history   of lung cancer, including right apical and right paraspinal nodular   opacities, hilar adenopathy, left upper quadrant soft tissue noduleand   ill-defined splenic lesion. Comparison to more recent prior imaging, if   available, would be useful.            --- End of Report ---    < end of copied text >

## 2024-07-02 NOTE — OCCUPATIONAL THERAPY INITIAL EVALUATION ADULT - PERTINENT HX OF CURRENT PROBLEM, REHAB EVAL
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 She was walking down steps when she slipped and landed on her left hip. Denies LOC and head trauma. Pt denies any numbness, tingling or paresthesias.  In the ED, her vitals are WNL. Labs WNL. Xray LT hip w/ left intertrochanteric fracture. Pt is s/p L hip IMN POD 4.

## 2024-07-02 NOTE — OCCUPATIONAL THERAPY INITIAL EVALUATION ADULT - ADDITIONAL COMMENTS
Pt lives with spouse in a private house with 4 steps to enter with rails. Once inside, the pt reports that she is going to stay on main floor when entering which has a bedroom and bathroom. The pt ambulates with no device and does not own any device for ambulation.

## 2024-07-02 NOTE — PROGRESS NOTE ADULT - SUBJECTIVE AND OBJECTIVE BOX
DEEP CESPEDES  MRN-[id number]    Follow Up:  PNA, fever, hypoxemia     Interval History: the pt was seen and examined earlier, not in acute distress, pt states that she is feeling better, cough is improving, pt's  is present. Pt had a fever yesterday, afebrile today, on NC now, gets winded with a conversation. No WBC.     PAST MEDICAL & SURGICAL HISTORY:  Diabetes      Hypertension      Multiple sclerosis      Alcohol abuse      Pancreatic insufficiency      H/O cervical spine surgery          ROS:    [ ] Unobtainable because:  [x ] All other systems negative    Constitutional: no fever, no chills  Head: no trauma  Eyes: no vision changes, no eye pain  ENT:  no sore throat, no rhinorrhea  Cardiovascular:  no chest pain, no palpitation  Respiratory:  less of SOB, cough with improvement   GI:  no abd pain, no vomiting, no diarrhea  urinary: no dysuria, no hematuria, no flank pain  musculoskeletal:  no joint pain, no joint swelling  skin:  no rash  neurology:  no headache, no seizure, no change in mental status  psych: no anxiety, no depression         Allergies  No Known Allergies        ANTIMICROBIALS:  azithromycin  IVPB 500 every 24 hours  piperacillin/tazobactam IVPB.. 3.375 every 12 hours      OTHER MEDS:  acetaminophen     Tablet .. 650 milliGRAM(s) Oral every 6 hours PRN  acetaminophen     Tablet .. 650 milliGRAM(s) Oral every 6 hours  albuterol    90 MICROgram(s) HFA Inhaler 2 Puff(s) Inhalation every 6 hours PRN  aluminum hydroxide/magnesium hydroxide/simethicone Suspension 30 milliLiter(s) Oral every 4 hours PRN  benzonatate 100 milliGRAM(s) Oral every 8 hours PRN  budesonide  80 MICROgram(s)/formoterol 4.5 MICROgram(s) Inhaler 2 Puff(s) Inhalation two times a day  clonazePAM  Tablet 0.5 milliGRAM(s) Oral at bedtime  dextrose 10% Bolus 125 milliLiter(s) IV Bolus once  dextrose 5%. 1000 milliLiter(s) IV Continuous <Continuous>  dextrose 5%. 1000 milliLiter(s) IV Continuous <Continuous>  dextrose 50% Injectable 12.5 Gram(s) IV Push once  dextrose 50% Injectable 25 Gram(s) IV Push once  dextrose Oral Gel 15 Gram(s) Oral once PRN  diltiazem    milliGRAM(s) Oral daily  enalapril 5 milliGRAM(s) Oral at bedtime  enoxaparin Injectable 60 milliGRAM(s) SubCutaneous every 12 hours  glucagon  Injectable 1 milliGRAM(s) IntraMuscular once  guaiFENesin  milliGRAM(s) Oral every 12 hours  insulin glargine Injectable (LANTUS) 15 Unit(s) SubCutaneous at bedtime  insulin lispro (ADMELOG) corrective regimen sliding scale   SubCutaneous at bedtime  insulin lispro (ADMELOG) corrective regimen sliding scale   SubCutaneous three times a day before meals  insulin lispro Injectable (ADMELOG) 4 Unit(s) SubCutaneous three times a day before meals  magnesium hydroxide Suspension 30 milliLiter(s) Oral daily PRN  melatonin 3 milliGRAM(s) Oral at bedtime PRN  metoprolol succinate ER 50 milliGRAM(s) Oral at bedtime  morphine  - Injectable 0.5 milliGRAM(s) IV Push every 6 hours PRN  morphine  - Injectable 1 milliGRAM(s) IV Push every 6 hours PRN  ondansetron Injectable 4 milliGRAM(s) IV Push every 8 hours PRN  ondansetron Injectable 4 milliGRAM(s) IV Push every 6 hours PRN  oxyCODONE    IR 2.5 milliGRAM(s) Oral every 4 hours PRN  oxyCODONE    IR 5 milliGRAM(s) Oral every 4 hours PRN  polyethylene glycol 3350 17 Gram(s) Oral at bedtime  pregabalin 200 milliGRAM(s) Oral at bedtime  senna 2 Tablet(s) Oral at bedtime  sodium chloride 1 Gram(s) Oral two times a day  traMADol 25 milliGRAM(s) Oral every 6 hours PRN      Vital Signs Last 24 Hrs  T(C): 36.9 (02 Jul 2024 10:39), Max: 38.9 (01 Jul 2024 14:46)  T(F): 98.4 (02 Jul 2024 10:39), Max: 102.1 (01 Jul 2024 14:46)  HR: 76 (02 Jul 2024 13:18) (62 - 92)  BP: 109/69 (02 Jul 2024 13:18) (82/51 - 109/69)  BP(mean): --  RR: 19 (02 Jul 2024 10:39) (18 - 20)  SpO2: 94% (02 Jul 2024 11:09) (93% - 99%)    Parameters below as of 02 Jul 2024 11:09  Patient On (Oxygen Delivery Method): nasal cannula, 5 L/min        Physical Exam:  General:    NAD, non toxic, 5L NC, thing female  Head: atraumatic, normocephalic  Eyes: normal sclera and conjunctiva  ENT:   no oropharyngeal lesions, no LAD, neck supple  Cardio:    regular S1,S2  Respiratory:   no wheezing, decreased breath sounds at bases  abd:   soft, BS +, not tender, no distention  :     no CVAT, no suprapubic tenderness, no johnson  Musculoskeletal : no joint swelling, no edema, wasted, left hip surgical site covered with post surgical dressing, no erythema, no edema  Skin:    no rash, PIV ok  Neurologic:  awake and alert, answers questions and follows commands   psych: normal affect    WBC Count: 6.62 K/uL (07-02 @ 05:58)  WBC Count: 7.99 K/uL (07-01 @ 16:40)  WBC Count: 5.67 K/uL (07-01 @ 07:07)  WBC Count: 5.47 K/uL (06-30 @ 06:02)  WBC Count: 9.66 K/uL (06-29 @ 06:42)  WBC Count: 15.16 K/uL (06-28 @ 22:10)  WBC Count: 8.68 K/uL (06-28 @ 04:00)                            10.9   6.62  )-----------( 295      ( 02 Jul 2024 05:58 )             34.3       07-02    130<L>  |  96  |  24<H>  ----------------------------<  272<H>  4.1   |  30  |  1.04    Ca    8.4<L>      02 Jul 2024 05:58    TPro  5.6<L>  /  Alb  1.4<L>  /  TBili  0.3  /  DBili  x   /  AST  10<L>  /  ALT  11<L>  /  AlkPhos  98  07-02      Urinalysis Basic - ( 02 Jul 2024 05:58 )    Color: x / Appearance: x / SG: x / pH: x  Gluc: 272 mg/dL / Ketone: x  / Bili: x / Urobili: x   Blood: x / Protein: x / Nitrite: x   Leuk Esterase: x / RBC: x / WBC x   Sq Epi: x / Non Sq Epi: x / Bacteria: x        Creatinine Trend: 1.04<--, 1.13<--, 0.74<--, 0.85<--, 1.27<--, 1.17<--  Lactate, Blood: 1.0 mmol/L (07-01-24 @ 16:40)      MICROBIOLOGY:  v  .Sputum Sputum  07-01-24 --  --    Moderate polymorphonuclear leukocytes per low power field  No Squamous epithelial cells per low power field  Numerous Gram Negative Rods seen per oil power field  Few Gram positive cocci in pairs seen per oil power field    Rapid RVP Result: NotDetec (07-01 @ 14:54)      SARS-CoV-2: NotDetec (07-01-24 @ 14:54)  Rapid RVP Result: NotDetec (07-01-24 @ 14:54)    SARS-CoV-2: NotDetec (01 Jul 2024 14:54)    RADIOLOGY:    < from: CT Angio Chest PE Protocol w/ IV Cont (07.01.24 @ 14:58) >    ACC: [id number] EXAM:  CT ANGIO CHEST PULM ART [facility]   ORDERED BY: Marguerite Stovall     PROCEDURE DATE:  07/01/2024          INTERPRETATION:  CLINICAL INFORMATION: Hypoxia. Patient postop day 3 from   left hip intramedullary nail. Lung cancer on immunotherapy.    COMPARISON: CT angiogram chest 6/27/2024, 3/29/2017.    CONTRAST/COMPLICATIONS:  IV Contrast: Omnipaque 350  70 cc administered   30 cc discarded  Oral Contrast: NONE  Complications: None reported at time of study completion    PROCEDURE:  CTAngiography of the Chest.  Sagittal and coronal reformats were performed as well as 3D (MIP)   reconstructions.    FINDINGS:  Motion limited study.    LUNGS, AIRWAYS AND PLEURA: Emphysema is present. There is circumferential   soft tissue thickening surrounding the mid to distal trachea and   extending to the scott. Tracheal mucous secretions are present. There is   narrowing of the right upper lobe bronchus and bronchus intermedius.   There are new multifocal bilateral areas of consolidation, groundglass   and nodularity, predominantly involving the lower lobes, on a background   of lower lobe segmental and subsegmental bronchial impaction. Increased   small pleural effusions. No pneumothorax.    Unchanged indeterminate right lower lobe paraspinal opacity on (4:212),   1.5 cm partially calcified right apical nodular opacity (4:51), and 9 mm   right upper lobe nodule (4:87) when compared to the prior study.    VESSELS: No pulmonary embolism. The great vessels are normal in size.   Mediastinal chest wall collateral vessels are present. There is narrowing   of the right internal jugular vein with several mediastinal venous   collaterals. The SVC is patent.    HEART: Heart size is normal. No pericardial effusion. Coronary artery   calcification and stenting.    MEDIASTINUM AND DELANEY: There is a 1.6 cm short axis right hilar node   (3:50) and 1.1 cm short axis left hilar node (3:57). Esophagus is   collapsed and suboptimally assessed.    CHEST WALL AND LOWER NECK: Diffuse subcutaneousedema    VISUALIZED UPPER ABDOMEN: Nonspecific left adrenal thickening.   Redemonstrated left upper quadrant soft tissue nodule posterior to the   spleen measures 1.7 cm. Ill-defined hypodensity in the posterior spleen,   otherwise incompletely evaluated due to artifact/arms down positioning.    BONES: Degenerative changes. Partially imaged anterior cervical vertebral   fusion.    IMPRESSION:  No pulmonary embolism.    New groundglass opacities with patchy areas of consolidation, increased   small pleural effusions and bilateral lower lobe mucoid impaction.   Suspect a combination of pulmonary edema and aspiration/pneumonia.    Circumferential mid to distal tracheal soft tissue thickening, extending   to the scott, is nonspecific although possibly secondary to   immunotherapy.    Additional indeterminate findings in this patient with reported history   of lung cancer, including right apical and right paraspinal nodular   opacities, hilar adenopathy, left upper quadrant soft tissue noduleand   ill-defined splenic lesion. Comparison to more recent prior imaging, if   available, would be useful.            --- End of Report ---          Ok PARMAR  This document has been electronically signed.  Arlene Jorge MD  This document has been electronically signed. Jul 1 2024  4:09PM    < end of copied text >   DEEP CESPEDES  MRN-[id number]    Follow Up:  PNA, fever, hypoxemia     Interval History: the pt was seen and examined earlier, not in acute distress, pt states that she is feeling better, cough is improving, pt's  is present. Pt had a fever yesterday, afebrile today, on NC now, gets winded with a conversation. No WBC.     PAST MEDICAL & SURGICAL HISTORY:  Diabetes      Hypertension      Multiple sclerosis      Alcohol abuse      Pancreatic insufficiency      H/O cervical spine surgery          ROS:    [ ] Unobtainable because:  [x ] All other systems negative    Constitutional: no fever, no chills  Head: no trauma  Eyes: no vision changes, no eye pain  ENT:  no sore throat, no rhinorrhea  Cardiovascular:  no chest pain, no palpitation  Respiratory:  less of SOB, cough with improvement   GI:  no abd pain, no vomiting, no diarrhea  urinary: no dysuria, no hematuria, no flank pain  musculoskeletal:  no joint pain, no joint swelling  skin:  no rash  neurology:  no headache, no seizure, no change in mental status  psych: no anxiety, no depression         Allergies  No Known Allergies        ANTIMICROBIALS:  azithromycin  IVPB 500 every 24 hours  piperacillin/tazobactam IVPB.. 3.375 every 12 hours      OTHER MEDS:  acetaminophen     Tablet .. 650 milliGRAM(s) Oral every 6 hours PRN  acetaminophen     Tablet .. 650 milliGRAM(s) Oral every 6 hours  albuterol    90 MICROgram(s) HFA Inhaler 2 Puff(s) Inhalation every 6 hours PRN  aluminum hydroxide/magnesium hydroxide/simethicone Suspension 30 milliLiter(s) Oral every 4 hours PRN  benzonatate 100 milliGRAM(s) Oral every 8 hours PRN  budesonide  80 MICROgram(s)/formoterol 4.5 MICROgram(s) Inhaler 2 Puff(s) Inhalation two times a day  clonazePAM  Tablet 0.5 milliGRAM(s) Oral at bedtime  dextrose 10% Bolus 125 milliLiter(s) IV Bolus once  dextrose 5%. 1000 milliLiter(s) IV Continuous <Continuous>  dextrose 5%. 1000 milliLiter(s) IV Continuous <Continuous>  dextrose 50% Injectable 12.5 Gram(s) IV Push once  dextrose 50% Injectable 25 Gram(s) IV Push once  dextrose Oral Gel 15 Gram(s) Oral once PRN  diltiazem    milliGRAM(s) Oral daily  enalapril 5 milliGRAM(s) Oral at bedtime  enoxaparin Injectable 60 milliGRAM(s) SubCutaneous every 12 hours  glucagon  Injectable 1 milliGRAM(s) IntraMuscular once  guaiFENesin  milliGRAM(s) Oral every 12 hours  insulin glargine Injectable (LANTUS) 15 Unit(s) SubCutaneous at bedtime  insulin lispro (ADMELOG) corrective regimen sliding scale   SubCutaneous at bedtime  insulin lispro (ADMELOG) corrective regimen sliding scale   SubCutaneous three times a day before meals  insulin lispro Injectable (ADMELOG) 4 Unit(s) SubCutaneous three times a day before meals  magnesium hydroxide Suspension 30 milliLiter(s) Oral daily PRN  melatonin 3 milliGRAM(s) Oral at bedtime PRN  metoprolol succinate ER 50 milliGRAM(s) Oral at bedtime  morphine  - Injectable 0.5 milliGRAM(s) IV Push every 6 hours PRN  morphine  - Injectable 1 milliGRAM(s) IV Push every 6 hours PRN  ondansetron Injectable 4 milliGRAM(s) IV Push every 8 hours PRN  ondansetron Injectable 4 milliGRAM(s) IV Push every 6 hours PRN  oxyCODONE    IR 2.5 milliGRAM(s) Oral every 4 hours PRN  oxyCODONE    IR 5 milliGRAM(s) Oral every 4 hours PRN  polyethylene glycol 3350 17 Gram(s) Oral at bedtime  pregabalin 200 milliGRAM(s) Oral at bedtime  senna 2 Tablet(s) Oral at bedtime  sodium chloride 1 Gram(s) Oral two times a day  traMADol 25 milliGRAM(s) Oral every 6 hours PRN      Vital Signs Last 24 Hrs  T(C): 36.9 (02 Jul 2024 10:39), Max: 38.9 (01 Jul 2024 14:46)  T(F): 98.4 (02 Jul 2024 10:39), Max: 102.1 (01 Jul 2024 14:46)  HR: 76 (02 Jul 2024 13:18) (62 - 92)  BP: 109/69 (02 Jul 2024 13:18) (82/51 - 109/69)  BP(mean): --  RR: 19 (02 Jul 2024 10:39) (18 - 20)  SpO2: 94% (02 Jul 2024 11:09) (93% - 99%)    Parameters below as of 02 Jul 2024 11:09  Patient On (Oxygen Delivery Method): nasal cannula, 5 L/min        Physical Exam:  General:    NAD, non toxic, 5L NC, thing female  Head: atraumatic, normocephalic  Eyes: normal sclera and conjunctiva  ENT:   no oropharyngeal lesions, no LAD, neck supple  Cardio:    regular S1,S2  Respiratory:   no wheezing, decreased breath sounds at bases  abd:   soft, BS +, not tender, no distention  :     no CVAT, no suprapubic tenderness, no johnson  Musculoskeletal : no joint swelling, no edema, wasted, left hip surgical site covered with post surgical dressing, no erythema, no edema  Skin:    no rash, PIV ok  Neurologic:  awake and alert, answers questions and follows commands   psych: normal affect    WBC Count: 6.62 K/uL (07-02 @ 05:58)  WBC Count: 7.99 K/uL (07-01 @ 16:40)  WBC Count: 5.67 K/uL (07-01 @ 07:07)  WBC Count: 5.47 K/uL (06-30 @ 06:02)  WBC Count: 9.66 K/uL (06-29 @ 06:42)  WBC Count: 15.16 K/uL (06-28 @ 22:10)  WBC Count: 8.68 K/uL (06-28 @ 04:00)                            10.9   6.62  )-----------( 295      ( 02 Jul 2024 05:58 )             34.3       07-02    130<L>  |  96  |  24<H>  ----------------------------<  272<H>  4.1   |  30  |  1.04    Ca    8.4<L>      02 Jul 2024 05:58    TPro  5.6<L>  /  Alb  1.4<L>  /  TBili  0.3  /  DBili  x   /  AST  10<L>  /  ALT  11<L>  /  AlkPhos  98  07-02      Urinalysis Basic - ( 02 Jul 2024 05:58 )    Color: x / Appearance: x / SG: x / pH: x  Gluc: 272 mg/dL / Ketone: x  / Bili: x / Urobili: x   Blood: x / Protein: x / Nitrite: x   Leuk Esterase: x / RBC: x / WBC x   Sq Epi: x / Non Sq Epi: x / Bacteria: x        Creatinine Trend: 1.04<--, 1.13<--, 0.74<--, 0.85<--, 1.27<--, 1.17<--  Lactate, Blood: 1.0 mmol/L (07-01-24 @ 16:40)      MICROBIOLOGY:    .Sputum Sputum  07-01-24 --  --    Moderate polymorphonuclear leukocytes per low power field  No Squamous epithelial cells per low power field  Numerous Gram Negative Rods seen per oil power field  Few Gram positive cocci in pairs seen per oil power field    Rapid RVP Result: NotDetec (07-01 @ 14:54)      SARS-CoV-2: NotDetec (07-01-24 @ 14:54)  Rapid RVP Result: NotDetec (07-01-24 @ 14:54)    SARS-CoV-2: NotDetec (01 Jul 2024 14:54)    RADIOLOGY:    < from: CT Angio Chest PE Protocol w/ IV Cont (07.01.24 @ 14:58) >    ACC: [id number] [facility]  [facility]   ORDERED BY: Marguerite Stovall     PROCEDURE DATE:  07/01/2024          INTERPRETATION:  CLINICAL INFORMATION: Hypoxia. Patient postop day 3 from   left hip intramedullary nail. Lung cancer on immunotherapy.    COMPARISON: CT angiogram chest 6/27/2024, 3/29/2017.    CONTRAST/COMPLICATIONS:  IV Contrast: Omnipaque 350  70 cc administered   30 cc discarded  Oral Contrast: NONE  Complications: None reported at time of study completion    PROCEDURE:  CTAngiography of the Chest.  Sagittal and coronal reformats were performed as well as 3D (MIP)   reconstructions.    FINDINGS:  Motion limited study.    LUNGS, AIRWAYS AND PLEURA: Emphysema is present. There is circumferential   soft tissue thickening surrounding the mid to distal trachea and   extending to the scott. Tracheal mucous secretions are present. There is   narrowing of the right upper lobe bronchus and bronchus intermedius.   There are new multifocal bilateral areas of consolidation, groundglass   and nodularity, predominantly involving the lower lobes, on a background   of lower lobe segmental and subsegmental bronchial impaction. Increased   small pleural effusions. No pneumothorax.    Unchanged indeterminate right lower lobe paraspinal opacity on (4:212),   1.5 cm partially calcified right apical nodular opacity (4:51), and 9 mm   right upper lobe nodule (4:87) when compared to the prior study.    VESSELS: No pulmonary embolism. The great vessels are normal in size.   Mediastinal chest wall collateral vessels are present. There is narrowing   of the right internal jugular vein with several mediastinal venous   collaterals. The SVC is patent.    HEART: Heart size is normal. No pericardial effusion. Coronary artery   calcification and stenting.    MEDIASTINUM AND DELANEY: There is a 1.6 cm short axis right hilar node   (3:50) and 1.1 cm short axis left hilar node (3:57). Esophagus is   collapsed and suboptimally assessed.    CHEST WALL AND LOWER NECK: Diffuse subcutaneousedema    VISUALIZED UPPER ABDOMEN: Nonspecific left adrenal thickening.   Redemonstrated left upper quadrant soft tissue nodule posterior to the   spleen measures 1.7 cm. Ill-defined hypodensity in the posterior spleen,   otherwise incompletely evaluated due to artifact/arms down positioning.    BONES: Degenerative changes. Partially imaged anterior cervical vertebral   fusion.    IMPRESSION:  No pulmonary embolism.    New groundglass opacities with patchy areas of consolidation, increased   small pleural effusions and bilateral lower lobe mucoid impaction.   Suspect a combination of pulmonary edema and aspiration/pneumonia.    Circumferential mid to distal tracheal soft tissue thickening, extending   to the scott, is nonspecific although possibly secondary to   immunotherapy.    Additional indeterminate findings in this patient with reported history   of lung cancer, including right apical and right paraspinal nodular   opacities, hilar adenopathy, left upper quadrant soft tissue noduleand   ill-defined splenic lesion. Comparison to more recent prior imaging, if   available, would be useful.            --- End of Report ---          Ok PARMAR  This document has been electronically signed.  Arlene Jorge MD  This document has been electronically signed. Jul 1 2024  4:09PM    < end of copied text >

## 2024-07-02 NOTE — PROGRESS NOTE ADULT - SUBJECTIVE AND OBJECTIVE BOX
SUBJECTIVE:       Pt seen and examined at bedside. No acute events overnight. Patient doing well with no complaints overall. Reports pain well-controlled with medication. No CP, SOB, N/V, F/C, new N/T.  Febrile yesterday.     Vital Signs (24 Hrs):  T(C): 37 (07-02-24 @ 05:01), Max: 38.9 (07-01-24 @ 14:46)  HR: 77 (07-02-24 @ 05:01) (77 - 112)  BP: 103/67 (07-02-24 @ 05:01) (97/62 - 122/67)  RR: 18 (07-02-24 @ 05:01) (18 - 20)  SpO2: 96% (07-02-24 @ 05:01) (72% - 99%)  Wt(kg): --    LABS:                          11.7   7.99  )-----------( 316      ( 01 Jul 2024 16:40 )             35.7     07-01    131<L>  |  92<L>  |  25<H>  ----------------------------<  144<H>  4.2   |  31  |  1.13    Ca    8.2<L>      01 Jul 2024 16:40    TPro  5.9<L>  /  Alb  1.6<L>  /  TBili  0.5  /  DBili  x   /  AST  7<L>  /  ALT  12  /  AlkPhos  90  07-01    LIVER FUNCTIONS - ( 01 Jul 2024 16:40 )  Alb: 1.6 g/dL / Pro: 5.9 gm/dL / ALK PHOS: 90 U/L / ALT: 12 U/L / AST: 7 U/L / GGT: x                          General: NAD, resting comfortably in bed  LLE  incision c/d/i  SCDs present bilaterally  Compartments soft and compressible  No calf tenderness bilaterally  Motor: +TA/EHL/FHL/GSC  Sensory: +SILT SPN/DPN/ASHLY/SAPH/TIB  2+ DP    A/P:  68y F with POD 4 sp L Hip IMN. Monitor Glucose levels.      -PT/OT: WBAT    -FU PT Recs   -Pain Control  -Cont DVT ppx: therapeutic lovenox   -FU AM Labs  -Rest, ice as needed  -Incentive Spirometry    Discussed with Attending who is aware and agrees with above plan.

## 2024-07-02 NOTE — PROGRESS NOTE ADULT - SUBJECTIVE AND OBJECTIVE BOX
Patient is a 68y old  Female who presents with a chief complaint of mechanical fall (02 Jul 2024 10:45)    INTERVAL HPI/OVERNIGHT EVENTS: No acute events overnight. Patient was hypotensive to 80s/40s and continues to require NC 5L.     MEDICATIONS  (STANDING):  acetaminophen     Tablet .. 650 milliGRAM(s) Oral every 6 hours  azithromycin  IVPB 500 milliGRAM(s) IV Intermittent every 24 hours  budesonide  80 MICROgram(s)/formoterol 4.5 MICROgram(s) Inhaler 2 Puff(s) Inhalation two times a day  clonazePAM  Tablet 0.5 milliGRAM(s) Oral at bedtime  dextrose 10% Bolus 125 milliLiter(s) IV Bolus once  dextrose 5%. 1000 milliLiter(s) (100 mL/Hr) IV Continuous <Continuous>  dextrose 5%. 1000 milliLiter(s) (50 mL/Hr) IV Continuous <Continuous>  dextrose 50% Injectable 12.5 Gram(s) IV Push once  dextrose 50% Injectable 25 Gram(s) IV Push once  diltiazem    milliGRAM(s) Oral daily  enalapril 5 milliGRAM(s) Oral at bedtime  enoxaparin Injectable 60 milliGRAM(s) SubCutaneous every 12 hours  glucagon  Injectable 1 milliGRAM(s) IntraMuscular once  guaiFENesin  milliGRAM(s) Oral every 12 hours  insulin glargine Injectable (LANTUS) 12 Unit(s) SubCutaneous at bedtime  insulin lispro (ADMELOG) corrective regimen sliding scale   SubCutaneous at bedtime  insulin lispro (ADMELOG) corrective regimen sliding scale   SubCutaneous three times a day before meals  insulin lispro Injectable (ADMELOG) 4 Unit(s) SubCutaneous three times a day before meals  metoprolol succinate ER 50 milliGRAM(s) Oral at bedtime  piperacillin/tazobactam IVPB.. 3.375 Gram(s) IV Intermittent every 12 hours  polyethylene glycol 3350 17 Gram(s) Oral at bedtime  pregabalin 200 milliGRAM(s) Oral at bedtime  senna 2 Tablet(s) Oral at bedtime  sodium chloride 1 Gram(s) Oral two times a day  sodium chloride 0.9% Bolus 500 milliLiter(s) IV Bolus once  sodium chloride 0.9%. 500 milliLiter(s) (500 mL/Hr) IV Continuous <Continuous>    MEDICATIONS  (PRN):  acetaminophen     Tablet .. 650 milliGRAM(s) Oral every 6 hours PRN Temp greater or equal to 38C (100.4F), Mild Pain (1 - 3)  albuterol    90 MICROgram(s) HFA Inhaler 2 Puff(s) Inhalation every 6 hours PRN Shortness of Breath and/or Wheezing  aluminum hydroxide/magnesium hydroxide/simethicone Suspension 30 milliLiter(s) Oral every 4 hours PRN Dyspepsia  benzonatate 100 milliGRAM(s) Oral every 8 hours PRN Cough  dextrose Oral Gel 15 Gram(s) Oral once PRN Blood Glucose LESS THAN 70 milliGRAM(s)/deciliter  magnesium hydroxide Suspension 30 milliLiter(s) Oral daily PRN Constipation  melatonin 3 milliGRAM(s) Oral at bedtime PRN Insomnia  morphine  - Injectable 0.5 milliGRAM(s) IV Push every 6 hours PRN Moderate Pain (4 - 6)  morphine  - Injectable 1 milliGRAM(s) IV Push every 6 hours PRN Severe Pain (7 - 10)  ondansetron Injectable 4 milliGRAM(s) IV Push every 8 hours PRN Nausea and/or Vomiting  ondansetron Injectable 4 milliGRAM(s) IV Push every 6 hours PRN Nausea and/or Vomiting  oxyCODONE    IR 2.5 milliGRAM(s) Oral every 4 hours PRN Moderate Pain (4 - 6)  oxyCODONE    IR 5 milliGRAM(s) Oral every 4 hours PRN Severe Pain (7 - 10)  traMADol 25 milliGRAM(s) Oral every 6 hours PRN Mild Pain (1 - 3)      Allergies    No Known Allergies    Intolerances        REVIEW OF SYSTEMS: all negative with exception of above    Vital Signs Last 24 Hrs  T(C): 36.9 (02 Jul 2024 10:39), Max: 38.9 (01 Jul 2024 14:46)  T(F): 98.4 (02 Jul 2024 10:39), Max: 102.1 (01 Jul 2024 14:46)  HR: 68 (02 Jul 2024 11:52) (68 - 110)  BP: 88/46 (02 Jul 2024 11:52) (82/51 - 122/67)  BP(mean): --  RR: 19 (02 Jul 2024 10:39) (18 - 20)  SpO2: 93% (02 Jul 2024 10:39) (93% - 99%)    Parameters below as of 02 Jul 2024 10:39  Patient On (Oxygen Delivery Method): nasal cannula  O2 Flow (L/min): 5      PHYSICAL EXAM:  GENERAL: NAD, well-groomed  NERVOUS SYSTEM:  Alert & Oriented X3, Good concentration; Motor Strength 5/5 B/L upper and lower extremities; DTRs 2+ intact and symmetric  CHEST/LUNG: Clear to percussion bilaterally; No rales, rhonchi, wheezing, or rubs  HEART: Regular rate and rhythm; No murmurs, rubs, or gallops  ABDOMEN: Soft, Nontender, Nondistended; Bowel sounds present  EXTREMITIES:  LLE incision c/d/i. 2+ Peripheral Pulses, No clubbing, cyanosis, or edema    LABS:                        10.9   6.62  )-----------( 295      ( 02 Jul 2024 05:58 )             34.3     07-02    130<L>  |  96  |  24<H>  ----------------------------<  272<H>  4.1   |  30  |  1.04    Ca    8.4<L>      02 Jul 2024 05:58    TPro  5.6<L>  /  Alb  1.4<L>  /  TBili  0.3  /  DBili  x   /  AST  10<L>  /  ALT  11<L>  /  AlkPhos  98  07-02      Urinalysis Basic - ( 02 Jul 2024 05:58 )    Color: x / Appearance: x / SG: x / pH: x  Gluc: 272 mg/dL / Ketone: x  / Bili: x / Urobili: x   Blood: x / Protein: x / Nitrite: x   Leuk Esterase: x / RBC: x / WBC x   Sq Epi: x / Non Sq Epi: x / Bacteria: x      CAPILLARY BLOOD GLUCOSE      POCT Blood Glucose.: 182 mg/dL (02 Jul 2024 12:02)  POCT Blood Glucose.: 290 mg/dL (02 Jul 2024 07:47)  POCT Blood Glucose.: 369 mg/dL (01 Jul 2024 23:19)  POCT Blood Glucose.: 415 mg/dL (01 Jul 2024 22:03)  POCT Blood Glucose.: 426 mg/dL (01 Jul 2024 22:01)  POCT Blood Glucose.: 187 mg/dL (01 Jul 2024 17:42)  POCT Blood Glucose.: 164 mg/dL (01 Jul 2024 16:58)      RADIOLOGY & ADDITIONAL TESTS:    Imaging Personally Reviewed:  [ ] YES  [ ] NO    Consultant(s) Notes Reviewed:  [ ] YES  [ ] NO    Care Discussed with Consultants/Other Providers [ ] YES  [ ] NO

## 2024-07-02 NOTE — PROGRESS NOTE ADULT - ASSESSMENT
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 Now S/P L Hip IMN. RRT today 7/1 for acute hypoxemic respiratory failure. Pulmonary consulted.     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 Now S/P L Hip IMN. RRT today 7/1 for acute hypoxemic respiratory failure. Pulmonary consulted.     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 Now S/P L Hip IMN. RRT today 7/1 for acute hypoxemic respiratory failure. Pulmonary consulted.     DX: Lung Cancer, Pneumonia, Left hip IMN    Recommendations:     - MS now improved at baseline  - Off non rebreather and on 5 L NC with SpO2 94-95%  - Acute hypoxemic respiratory failure likely 2/2 PNA with SIRS criteria, Left sided infiltrate on CXR  - Does have underlying Lung cancer on immunotherapy last dose 6/26. Will need to F/U with oncologist upon DC from hospital.   - Not on home 02 at baseline.  - Continue to titrate down FiO2 as tolerates for goal 02>90%  - CTA chest negative for PE, does show B/L LL PNA with mucoid impaction  - F/U infectious workup, broad spectrum ABX for now, fever control- ID consulted appreciate reccs  - Sputum culture with GNR, GPC thus far  - Would start duoneb q6 with hypersal Nebs used with Aerobika to facilitate secretion mobilization  - Chest PT    Plan discussed with Pulmonary attending Dr. Stovall

## 2024-07-02 NOTE — PROGRESS NOTE ADULT - ASSESSMENT
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    #LT hip IT fracture s/p mechanical trip and fall  - Appreciate ortho recs  - Pain control with morphine PRN for moderate and severe  - F/u CT L femur to rule out pathologic lesions  - Patient is medically optimized for surgical fixation of LT hip  - S/p LT hip IMN POD4    #Sepsis likely 2/2 PNA  - Sputum cx w/ numerous GNR  - F/u blood cx  - CTA chest negative for PE  - Pulm recs appreciated  - F/u US duplex LE to r/o DVT  - Will c/w Zosyn and AZT  - ID following    #Lung cancer  - on immunotherapy. Last dose on 6/26    #HTN  - c/w home metoprolol, diltiazem and enalapril    #IDDM  - c/w basal lantus (reduced dose) and SSI  - controlled  - No anion gap, normal pH no active evidence of DKA  - increased lantus 15 units    #Jugular vein thrombosis  - will c/w lovenox    DVT ppx- Lovenox  Diet- DASH/Consistent Carb. 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    #LT hip IT fracture s/p mechanical trip and fall  - Appreciate ortho recs  - Pain control with morphine PRN for moderate and severe  - F/u CT L femur to rule out pathologic lesions  - Patient is medically optimized for surgical fixation of LT hip  - S/p LT hip IMN POD4    #Sepsis likely 2/2 PNA  - Sputum cx w/ numerous GNR  - F/u blood cx  - CTA chest negative for PE  - Pulm recs appreciated  - F/u US duplex LE to r/o DVT  - Will c/w Zosyn and AZT  - ID following    #Lung cancer  - on immunotherapy. Last dose on 6/26  - will need outpatient follow up with oncology    #HTN  - c/w home metoprolol, diltiazem and enalapril    #IDDM  - c/w basal lantus (reduced dose) and SSI  - controlled  - No anion gap, normal pH no active evidence of DKA  - increased lantus 15 units    #Jugular vein thrombosis  - will c/w lovenox    DVT ppx- Lovenox  Diet- DASH/Consistent Carb. NPO after MN  Dispo- medically active  - FULL CODE

## 2024-07-03 LAB
-  AMOXICILLIN/CLAVULANIC ACID: SIGNIFICANT CHANGE UP
-  AMPICILLIN/SULBACTAM: SIGNIFICANT CHANGE UP
-  AMPICILLIN: SIGNIFICANT CHANGE UP
-  AZTREONAM: SIGNIFICANT CHANGE UP
-  CEFAZOLIN: SIGNIFICANT CHANGE UP
-  CEFEPIME: SIGNIFICANT CHANGE UP
-  CEFOXITIN: SIGNIFICANT CHANGE UP
-  CEFTRIAXONE: SIGNIFICANT CHANGE UP
-  CIPROFLOXACIN: SIGNIFICANT CHANGE UP
-  ERTAPENEM: SIGNIFICANT CHANGE UP
-  GENTAMICIN: SIGNIFICANT CHANGE UP
-  IMIPENEM: SIGNIFICANT CHANGE UP
-  LEVOFLOXACIN: SIGNIFICANT CHANGE UP
-  MEROPENEM: SIGNIFICANT CHANGE UP
-  PIPERACILLIN/TAZOBACTAM: SIGNIFICANT CHANGE UP
-  TOBRAMYCIN: SIGNIFICANT CHANGE UP
-  TRIMETHOPRIM/SULFAMETHOXAZOLE: SIGNIFICANT CHANGE UP
ALBUMIN SERPL ELPH-MCNC: 1.3 G/DL — LOW (ref 3.3–5)
ALP SERPL-CCNC: 88 U/L — SIGNIFICANT CHANGE UP (ref 40–120)
ALT FLD-CCNC: 10 U/L — LOW (ref 12–78)
ANION GAP SERPL CALC-SCNC: 6 MMOL/L — SIGNIFICANT CHANGE UP (ref 5–17)
AST SERPL-CCNC: 10 U/L — LOW (ref 15–37)
BILIRUB SERPL-MCNC: 0.3 MG/DL — SIGNIFICANT CHANGE UP (ref 0.2–1.2)
BUN SERPL-MCNC: 20 MG/DL — SIGNIFICANT CHANGE UP (ref 7–23)
CALCIUM SERPL-MCNC: 8.4 MG/DL — LOW (ref 8.5–10.1)
CHLORIDE SERPL-SCNC: 99 MMOL/L — SIGNIFICANT CHANGE UP (ref 96–108)
CO2 SERPL-SCNC: 26 MMOL/L — SIGNIFICANT CHANGE UP (ref 22–31)
CREAT SERPL-MCNC: 0.99 MG/DL — SIGNIFICANT CHANGE UP (ref 0.5–1.3)
CULTURE RESULTS: ABNORMAL
EGFR: 62 ML/MIN/1.73M2 — SIGNIFICANT CHANGE UP
GLUCOSE BLDC GLUCOMTR-MCNC: 154 MG/DL — HIGH (ref 70–99)
GLUCOSE BLDC GLUCOMTR-MCNC: 161 MG/DL — HIGH (ref 70–99)
GLUCOSE BLDC GLUCOMTR-MCNC: 365 MG/DL — HIGH (ref 70–99)
GLUCOSE BLDC GLUCOMTR-MCNC: 381 MG/DL — HIGH (ref 70–99)
GLUCOSE SERPL-MCNC: 150 MG/DL — HIGH (ref 70–99)
GRAM STN FLD: ABNORMAL
HCT VFR BLD CALC: 33 % — LOW (ref 34.5–45)
HGB BLD-MCNC: 10.4 G/DL — LOW (ref 11.5–15.5)
MCHC RBC-ENTMCNC: 26.6 PG — LOW (ref 27–34)
MCHC RBC-ENTMCNC: 31.5 G/DL — LOW (ref 32–36)
MCV RBC AUTO: 84.4 FL — SIGNIFICANT CHANGE UP (ref 80–100)
METHOD TYPE: SIGNIFICANT CHANGE UP
NRBC # BLD: 0 /100 WBCS — SIGNIFICANT CHANGE UP (ref 0–0)
ORGANISM # SPEC MICROSCOPIC CNT: ABNORMAL
ORGANISM # SPEC MICROSCOPIC CNT: SIGNIFICANT CHANGE UP
PLATELET # BLD AUTO: 305 K/UL — SIGNIFICANT CHANGE UP (ref 150–400)
POTASSIUM SERPL-MCNC: 3.9 MMOL/L — SIGNIFICANT CHANGE UP (ref 3.5–5.3)
POTASSIUM SERPL-SCNC: 3.9 MMOL/L — SIGNIFICANT CHANGE UP (ref 3.5–5.3)
PROT SERPL-MCNC: 5.3 GM/DL — LOW (ref 6–8.3)
RBC # BLD: 3.91 M/UL — SIGNIFICANT CHANGE UP (ref 3.8–5.2)
RBC # FLD: 15.8 % — HIGH (ref 10.3–14.5)
SODIUM SERPL-SCNC: 131 MMOL/L — LOW (ref 135–145)
SPECIMEN SOURCE: SIGNIFICANT CHANGE UP
WBC # BLD: 6.15 K/UL — SIGNIFICANT CHANGE UP (ref 3.8–10.5)
WBC # FLD AUTO: 6.15 K/UL — SIGNIFICANT CHANGE UP (ref 3.8–10.5)

## 2024-07-03 PROCEDURE — 99233 SBSQ HOSP IP/OBS HIGH 50: CPT

## 2024-07-03 PROCEDURE — 99232 SBSQ HOSP IP/OBS MODERATE 35: CPT

## 2024-07-03 PROCEDURE — 93970 EXTREMITY STUDY: CPT | Mod: 26

## 2024-07-03 RX ORDER — DIPHENOXYLATE HCL/ATROPINE 2.5-.025MG
1 TABLET ORAL AT BEDTIME
Refills: 0 | Status: DISCONTINUED | OUTPATIENT
Start: 2024-07-03 | End: 2024-07-10

## 2024-07-03 RX ORDER — DILTIAZEM HYDROCHLORIDE 240 MG/1
120 CAPSULE, EXTENDED RELEASE ORAL AT BEDTIME
Refills: 0 | Status: DISCONTINUED | OUTPATIENT
Start: 2024-07-03 | End: 2024-07-11

## 2024-07-03 RX ORDER — SIMETHICONE 40MG/0.6ML
80 SUSPENSION, DROPS(FINAL DOSAGE FORM)(ML) ORAL EVERY 8 HOURS
Refills: 0 | Status: COMPLETED | OUTPATIENT
Start: 2024-07-03 | End: 2024-07-06

## 2024-07-03 RX ADMIN — Medication 1 TABLET(S): at 22:22

## 2024-07-03 RX ADMIN — CLONAZEPAM 0.5 MILLIGRAM(S): 2 TABLET ORAL at 22:22

## 2024-07-03 RX ADMIN — PIPERACILLIN SODIUM AND TAZOBACTAM SODIUM 25 GRAM(S): 3; .375 INJECTION, POWDER, LYOPHILIZED, FOR SOLUTION INTRAVENOUS at 17:01

## 2024-07-03 RX ADMIN — IPRATROPIUM BROMIDE AND ALBUTEROL SULFATE 3 MILLILITER(S): .5; 3 SOLUTION RESPIRATORY (INHALATION) at 17:04

## 2024-07-03 RX ADMIN — Medication 4 MILLILITER(S): at 14:01

## 2024-07-03 RX ADMIN — Medication 50 MILLIGRAM(S): at 01:15

## 2024-07-03 RX ADMIN — Medication 2 PUFF(S): at 06:32

## 2024-07-03 RX ADMIN — Medication 650 MILLIGRAM(S): at 18:00

## 2024-07-03 RX ADMIN — Medication 1 GRAM(S): at 17:00

## 2024-07-03 RX ADMIN — Medication 1 GRAM(S): at 06:31

## 2024-07-03 RX ADMIN — Medication 5 MILLIGRAM(S): at 22:23

## 2024-07-03 RX ADMIN — Medication 650 MILLIGRAM(S): at 17:00

## 2024-07-03 RX ADMIN — Medication 80 MILLIGRAM(S): at 22:22

## 2024-07-03 RX ADMIN — Medication 1 TABLET(S): at 17:00

## 2024-07-03 RX ADMIN — INSULIN LISPRO 10: 100 INJECTION, SOLUTION SUBCUTANEOUS at 16:53

## 2024-07-03 RX ADMIN — INSULIN LISPRO 4 UNIT(S): 100 INJECTION, SOLUTION SUBCUTANEOUS at 16:53

## 2024-07-03 RX ADMIN — IPRATROPIUM BROMIDE AND ALBUTEROL SULFATE 3 MILLILITER(S): .5; 3 SOLUTION RESPIRATORY (INHALATION) at 05:23

## 2024-07-03 RX ADMIN — PIPERACILLIN SODIUM AND TAZOBACTAM SODIUM 25 GRAM(S): 3; .375 INJECTION, POWDER, LYOPHILIZED, FOR SOLUTION INTRAVENOUS at 06:31

## 2024-07-03 RX ADMIN — ENOXAPARIN SODIUM 60 MILLIGRAM(S): 100 INJECTION SUBCUTANEOUS at 21:00

## 2024-07-03 RX ADMIN — INSULIN LISPRO 4 UNIT(S): 100 INJECTION, SOLUTION SUBCUTANEOUS at 08:31

## 2024-07-03 RX ADMIN — Medication 650 MILLIGRAM(S): at 23:27

## 2024-07-03 RX ADMIN — Medication 4 MILLILITER(S): at 17:06

## 2024-07-03 RX ADMIN — Medication 650 MILLIGRAM(S): at 01:49

## 2024-07-03 RX ADMIN — INSULIN LISPRO 3: 100 INJECTION, SOLUTION SUBCUTANEOUS at 22:21

## 2024-07-03 RX ADMIN — Medication 600 MILLIGRAM(S): at 17:00

## 2024-07-03 RX ADMIN — Medication 4 MILLILITER(S): at 05:23

## 2024-07-03 RX ADMIN — PREGABALIN 200 MILLIGRAM(S): 50 CAPSULE ORAL at 22:22

## 2024-07-03 RX ADMIN — INSULIN GLARGINE 15 UNIT(S): 100 INJECTION, SOLUTION SUBCUTANEOUS at 22:21

## 2024-07-03 RX ADMIN — Medication 2 PUFF(S): at 17:01

## 2024-07-03 RX ADMIN — DILTIAZEM HYDROCHLORIDE 120 MILLIGRAM(S): 240 CAPSULE, EXTENDED RELEASE ORAL at 22:23

## 2024-07-03 RX ADMIN — INSULIN LISPRO 2: 100 INJECTION, SOLUTION SUBCUTANEOUS at 08:32

## 2024-07-03 RX ADMIN — IPRATROPIUM BROMIDE AND ALBUTEROL SULFATE 3 MILLILITER(S): .5; 3 SOLUTION RESPIRATORY (INHALATION) at 13:43

## 2024-07-03 RX ADMIN — Medication 600 MILLIGRAM(S): at 06:32

## 2024-07-03 RX ADMIN — Medication 50 MILLIGRAM(S): at 22:23

## 2024-07-03 RX ADMIN — Medication 650 MILLIGRAM(S): at 01:15

## 2024-07-03 NOTE — PROGRESS NOTE ADULT - SUBJECTIVE AND OBJECTIVE BOX
Interval Events:    REVIEW OF SYSTEMS:  All negative unless listed within interval HPI      OBJECTIVE:  Vital Signs Last 24 Hrs  T(C): 37 (03 Jul 2024 05:03), Max: 37.1 (02 Jul 2024 18:00)  T(F): 98.6 (03 Jul 2024 05:03), Max: 98.8 (02 Jul 2024 23:50)  HR: 82 (03 Jul 2024 05:41) (62 - 90)  BP: 124/72 (03 Jul 2024 05:03) (82/51 - 128/73)  BP(mean): --  ABP: --  ABP(mean): --  RR: 18 (03 Jul 2024 05:03) (18 - 19)  SpO2: 100% (03 Jul 2024 05:41) (92% - 100%)    O2 Parameters below as of 03 Jul 2024 05:41  Patient On (Oxygen Delivery Method): nasal cannula              07-02 @ 07:01  -  07-03 @ 07:00  --------------------------------------------------------  IN: 590 mL / OUT: 1300 mL / NET: -710 mL      CAPILLARY BLOOD GLUCOSE      POCT Blood Glucose.: 161 mg/dL (03 Jul 2024 08:15)      PHYSICAL EXAM:  General: NAD, sitting upright in bed  HEENT: NC/AT, PERRL, MMM, + NC in place  Neck: Supple, no JVD  Respiratory: B/L Rhonchi, no wheeze  Cardiovascular: RRR, no m/r/g  Abdomen: soft, NTTP, ND, + BS  Extremities: no c/c/e  Skin: no rashes or leasions  Neurological: A&Ox3, non focal  Psychiatry: appropriate affect   HOSPITAL MEDICATIONS:  enoxaparin Injectable 60 milliGRAM(s) SubCutaneous every 12 hours    piperacillin/tazobactam IVPB.. 3.375 Gram(s) IV Intermittent every 12 hours    diltiazem    milliGRAM(s) Oral at bedtime  enalapril 5 milliGRAM(s) Oral at bedtime  metoprolol succinate ER 50 milliGRAM(s) Oral at bedtime    dextrose 50% Injectable 12.5 Gram(s) IV Push once  dextrose 50% Injectable 25 Gram(s) IV Push once  dextrose Oral Gel 15 Gram(s) Oral once PRN  glucagon  Injectable 1 milliGRAM(s) IntraMuscular once  insulin glargine Injectable (LANTUS) 15 Unit(s) SubCutaneous at bedtime  insulin lispro (ADMELOG) corrective regimen sliding scale   SubCutaneous at bedtime  insulin lispro (ADMELOG) corrective regimen sliding scale   SubCutaneous three times a day before meals  insulin lispro Injectable (ADMELOG) 4 Unit(s) SubCutaneous three times a day before meals    albuterol    90 MICROgram(s) HFA Inhaler 2 Puff(s) Inhalation every 6 hours PRN  albuterol/ipratropium for Nebulization 3 milliLiter(s) Nebulizer every 6 hours  benzonatate 100 milliGRAM(s) Oral every 8 hours PRN  budesonide  80 MICROgram(s)/formoterol 4.5 MICROgram(s) Inhaler 2 Puff(s) Inhalation two times a day  guaiFENesin  milliGRAM(s) Oral every 12 hours  sodium chloride 3%  Inhalation 4 milliLiter(s) Inhalation every 6 hours    acetaminophen     Tablet .. 650 milliGRAM(s) Oral every 6 hours PRN  acetaminophen     Tablet .. 650 milliGRAM(s) Oral every 6 hours  clonazePAM  Tablet 0.5 milliGRAM(s) Oral at bedtime  melatonin 3 milliGRAM(s) Oral at bedtime PRN  morphine  - Injectable 0.5 milliGRAM(s) IV Push every 6 hours PRN  morphine  - Injectable 1 milliGRAM(s) IV Push every 6 hours PRN  ondansetron Injectable 4 milliGRAM(s) IV Push every 8 hours PRN  ondansetron Injectable 4 milliGRAM(s) IV Push every 6 hours PRN  oxyCODONE    IR 2.5 milliGRAM(s) Oral every 4 hours PRN  oxyCODONE    IR 5 milliGRAM(s) Oral every 4 hours PRN  pregabalin 200 milliGRAM(s) Oral at bedtime  traMADol 25 milliGRAM(s) Oral every 6 hours PRN    aluminum hydroxide/magnesium hydroxide/simethicone Suspension 30 milliLiter(s) Oral every 4 hours PRN  magnesium hydroxide Suspension 30 milliLiter(s) Oral daily PRN  polyethylene glycol 3350 17 Gram(s) Oral at bedtime  senna 2 Tablet(s) Oral at bedtime        dextrose 10% Bolus 125 milliLiter(s) IV Bolus once  dextrose 5%. 1000 milliLiter(s) IV Continuous <Continuous>  dextrose 5%. 1000 milliLiter(s) IV Continuous <Continuous>  sodium chloride 1 Gram(s) Oral two times a day            LABS:                        10.4   6.15  )-----------( 305      ( 03 Jul 2024 06:45 )             33.0     Hgb Trend: 10.4<--, 10.9<--, 11.7<--, 12.5<--, 12.2<--  07-03    131<L>  |  99  |  20  ----------------------------<  150<H>  3.9   |  26  |  0.99    Ca    8.4<L>      03 Jul 2024 06:45    TPro  5.3<L>  /  Alb  1.3<L>  /  TBili  0.3  /  DBili  x   /  AST  10<L>  /  ALT  10<L>  /  AlkPhos  88  07-03    Creatinine Trend: 0.99<--, 1.04<--, 1.13<--, 0.74<--, 0.85<--, 1.27<--    Urinalysis Basic - ( 03 Jul 2024 06:45 )    Color: x / Appearance: x / SG: x / pH: x  Gluc: 150 mg/dL / Ketone: x  / Bili: x / Urobili: x   Blood: x / Protein: x / Nitrite: x   Leuk Esterase: x / RBC: x / WBC x   Sq Epi: x / Non Sq Epi: x / Bacteria: x      Arterial Blood Gas:  07-01 @ 12:16  7.44/46/152/31/97.1/6.1  ABG lactate: --        MICROBIOLOGY:     Culture - Sputum . (07.01.24 @ 18:00)    Gram Stain:   Moderate polymorphonuclear leukocytes per low power field  No Squamous epithelial cells per low power field  Numerous Gram Negative Rods seen per oil power field  Few Gram positive cocci in pairs seen per oil power field   Specimen Source: .Sputum Sputum   Culture Results:   Numerous Escherichia coli  Normal Respiratory Jordana present    Culture - Blood (07.01.24 @ 16:40)    Specimen Source: .Blood Blood-Peripheral   Culture Results:   No growth at 24 hours    Culture - Blood (07.01.24 @ 16:35)    Specimen Source: .Blood Blood-Peripheral   Culture Results:   No growth at 24 hours        RADIOLOGY:  [ x] Reviewed  Interval Events: Patient seen and examined at bedside. C/O continued cough with yellow/ brown sputum. But endorses some overall improvement in symptoms. No acute events. Titrated from 4L NC to 3L NC with SpO2 94-95%.     REVIEW OF SYSTEMS:  All negative unless listed within interval HPI      OBJECTIVE:  Vital Signs Last 24 Hrs  T(C): 37 (03 Jul 2024 05:03), Max: 37.1 (02 Jul 2024 18:00)  T(F): 98.6 (03 Jul 2024 05:03), Max: 98.8 (02 Jul 2024 23:50)  HR: 82 (03 Jul 2024 05:41) (62 - 90)  BP: 124/72 (03 Jul 2024 05:03) (82/51 - 128/73)  BP(mean): --  ABP: --  ABP(mean): --  RR: 18 (03 Jul 2024 05:03) (18 - 19)  SpO2: 100% (03 Jul 2024 05:41) (92% - 100%)    O2 Parameters below as of 03 Jul 2024 05:41  Patient On (Oxygen Delivery Method): nasal cannula              07-02 @ 07:01  -  07-03 @ 07:00  --------------------------------------------------------  IN: 590 mL / OUT: 1300 mL / NET: -710 mL      CAPILLARY BLOOD GLUCOSE      POCT Blood Glucose.: 161 mg/dL (03 Jul 2024 08:15)      PHYSICAL EXAM:  General: NAD, sitting upright in bed  HEENT: NC/AT, PERRL, MMM, + NC in place  Neck: Supple, no JVD  Respiratory: B/L Rhonchi, no wheeze  Cardiovascular: RRR, no m/r/g  Abdomen: soft, NTTP, ND, + BS  Extremities: no c/c/e  Skin: no rashes or leasions  Neurological: A&Ox3, non focal  Psychiatry: appropriate affect   HOSPITAL MEDICATIONS:  enoxaparin Injectable 60 milliGRAM(s) SubCutaneous every 12 hours    piperacillin/tazobactam IVPB.. 3.375 Gram(s) IV Intermittent every 12 hours    diltiazem    milliGRAM(s) Oral at bedtime  enalapril 5 milliGRAM(s) Oral at bedtime  metoprolol succinate ER 50 milliGRAM(s) Oral at bedtime    dextrose 50% Injectable 12.5 Gram(s) IV Push once  dextrose 50% Injectable 25 Gram(s) IV Push once  dextrose Oral Gel 15 Gram(s) Oral once PRN  glucagon  Injectable 1 milliGRAM(s) IntraMuscular once  insulin glargine Injectable (LANTUS) 15 Unit(s) SubCutaneous at bedtime  insulin lispro (ADMELOG) corrective regimen sliding scale   SubCutaneous at bedtime  insulin lispro (ADMELOG) corrective regimen sliding scale   SubCutaneous three times a day before meals  insulin lispro Injectable (ADMELOG) 4 Unit(s) SubCutaneous three times a day before meals    albuterol    90 MICROgram(s) HFA Inhaler 2 Puff(s) Inhalation every 6 hours PRN  albuterol/ipratropium for Nebulization 3 milliLiter(s) Nebulizer every 6 hours  benzonatate 100 milliGRAM(s) Oral every 8 hours PRN  budesonide  80 MICROgram(s)/formoterol 4.5 MICROgram(s) Inhaler 2 Puff(s) Inhalation two times a day  guaiFENesin  milliGRAM(s) Oral every 12 hours  sodium chloride 3%  Inhalation 4 milliLiter(s) Inhalation every 6 hours    acetaminophen     Tablet .. 650 milliGRAM(s) Oral every 6 hours PRN  acetaminophen     Tablet .. 650 milliGRAM(s) Oral every 6 hours  clonazePAM  Tablet 0.5 milliGRAM(s) Oral at bedtime  melatonin 3 milliGRAM(s) Oral at bedtime PRN  morphine  - Injectable 0.5 milliGRAM(s) IV Push every 6 hours PRN  morphine  - Injectable 1 milliGRAM(s) IV Push every 6 hours PRN  ondansetron Injectable 4 milliGRAM(s) IV Push every 8 hours PRN  ondansetron Injectable 4 milliGRAM(s) IV Push every 6 hours PRN  oxyCODONE    IR 2.5 milliGRAM(s) Oral every 4 hours PRN  oxyCODONE    IR 5 milliGRAM(s) Oral every 4 hours PRN  pregabalin 200 milliGRAM(s) Oral at bedtime  traMADol 25 milliGRAM(s) Oral every 6 hours PRN    aluminum hydroxide/magnesium hydroxide/simethicone Suspension 30 milliLiter(s) Oral every 4 hours PRN  magnesium hydroxide Suspension 30 milliLiter(s) Oral daily PRN  polyethylene glycol 3350 17 Gram(s) Oral at bedtime  senna 2 Tablet(s) Oral at bedtime        dextrose 10% Bolus 125 milliLiter(s) IV Bolus once  dextrose 5%. 1000 milliLiter(s) IV Continuous <Continuous>  dextrose 5%. 1000 milliLiter(s) IV Continuous <Continuous>  sodium chloride 1 Gram(s) Oral two times a day            LABS:                        10.4   6.15  )-----------( 305      ( 03 Jul 2024 06:45 )             33.0     Hgb Trend: 10.4<--, 10.9<--, 11.7<--, 12.5<--, 12.2<--  07-03    131<L>  |  99  |  20  ----------------------------<  150<H>  3.9   |  26  |  0.99    Ca    8.4<L>      03 Jul 2024 06:45    TPro  5.3<L>  /  Alb  1.3<L>  /  TBili  0.3  /  DBili  x   /  AST  10<L>  /  ALT  10<L>  /  AlkPhos  88  07-03    Creatinine Trend: 0.99<--, 1.04<--, 1.13<--, 0.74<--, 0.85<--, 1.27<--    Urinalysis Basic - ( 03 Jul 2024 06:45 )    Color: x / Appearance: x / SG: x / pH: x  Gluc: 150 mg/dL / Ketone: x  / Bili: x / Urobili: x   Blood: x / Protein: x / Nitrite: x   Leuk Esterase: x / RBC: x / WBC x   Sq Epi: x / Non Sq Epi: x / Bacteria: x      Arterial Blood Gas:  07-01 @ 12:16  7.44/46/152/31/97.1/6.1  ABG lactate: --        MICROBIOLOGY:     Culture - Sputum . (07.01.24 @ 18:00)    Gram Stain:   Moderate polymorphonuclear leukocytes per low power field  No Squamous epithelial cells per low power field  Numerous Gram Negative Rods seen per oil power field  Few Gram positive cocci in pairs seen per oil power field   Specimen Source: .Sputum Sputum   Culture Results:   Numerous Escherichia coli  Normal Respiratory Jordana present    Culture - Blood (07.01.24 @ 16:40)    Specimen Source: .Blood Blood-Peripheral   Culture Results:   No growth at 24 hours    Culture - Blood (07.01.24 @ 16:35)    Specimen Source: .Blood Blood-Peripheral   Culture Results:   No growth at 24 hours        RADIOLOGY:  [ x] Reviewed

## 2024-07-03 NOTE — PROGRESS NOTE ADULT - SUBJECTIVE AND OBJECTIVE BOX
DEEP CESPEDSE  MRN-[id number]    Follow Up:  PNA, fever, hypoxemia     Interval History: the pt was seen and examined earlier, not in acute distress, pt is awake and alert, appropriate, appears more comfortable today. Pt is afebrile since 7/1, NC, no wbc.     PAST MEDICAL & SURGICAL HISTORY:  Diabetes      Hypertension      Multiple sclerosis      Alcohol abuse      Pancreatic insufficiency      H/O cervical spine surgery          ROS:    [ ] Unobtainable because:  [x ] All other systems negative    Constitutional: no fever, no chills  Head: no trauma  Eyes: no vision changes, no eye pain  ENT:  no sore throat, no rhinorrhea  Cardiovascular:  no chest pain, no palpitation  Respiratory:  not SOB with supplemental O2, + occasional cough  GI:  no abd pain, no vomiting, no diarrhea  urinary: no dysuria, no hematuria, no flank pain  musculoskeletal:  no joint pain, no joint swelling  skin:  no rash  neurology:  no headache, no seizure, no change in mental status  psych: no anxiety, no depression         Allergies  No Known Allergies        ANTIMICROBIALS:  piperacillin/tazobactam IVPB.. 3.375 every 12 hours      OTHER MEDS:  acetaminophen     Tablet .. 650 milliGRAM(s) Oral every 6 hours PRN  acetaminophen     Tablet .. 650 milliGRAM(s) Oral every 6 hours  albuterol    90 MICROgram(s) HFA Inhaler 2 Puff(s) Inhalation every 6 hours PRN  albuterol/ipratropium for Nebulization 3 milliLiter(s) Nebulizer every 6 hours  aluminum hydroxide/magnesium hydroxide/simethicone Suspension 30 milliLiter(s) Oral every 4 hours PRN  benzonatate 100 milliGRAM(s) Oral every 8 hours PRN  budesonide  80 MICROgram(s)/formoterol 4.5 MICROgram(s) Inhaler 2 Puff(s) Inhalation two times a day  clonazePAM  Tablet 0.5 milliGRAM(s) Oral at bedtime  dextrose 10% Bolus 125 milliLiter(s) IV Bolus once  dextrose 5%. 1000 milliLiter(s) IV Continuous <Continuous>  dextrose 5%. 1000 milliLiter(s) IV Continuous <Continuous>  dextrose 50% Injectable 12.5 Gram(s) IV Push once  dextrose 50% Injectable 25 Gram(s) IV Push once  dextrose Oral Gel 15 Gram(s) Oral once PRN  diltiazem    milliGRAM(s) Oral at bedtime  enalapril 5 milliGRAM(s) Oral at bedtime  enoxaparin Injectable 60 milliGRAM(s) SubCutaneous every 12 hours  glucagon  Injectable 1 milliGRAM(s) IntraMuscular once  guaiFENesin  milliGRAM(s) Oral every 12 hours  insulin glargine Injectable (LANTUS) 15 Unit(s) SubCutaneous at bedtime  insulin lispro (ADMELOG) corrective regimen sliding scale   SubCutaneous three times a day before meals  insulin lispro (ADMELOG) corrective regimen sliding scale   SubCutaneous at bedtime  insulin lispro Injectable (ADMELOG) 4 Unit(s) SubCutaneous three times a day before meals  magnesium hydroxide Suspension 30 milliLiter(s) Oral daily PRN  melatonin 3 milliGRAM(s) Oral at bedtime PRN  metoprolol succinate ER 50 milliGRAM(s) Oral at bedtime  morphine  - Injectable 0.5 milliGRAM(s) IV Push every 6 hours PRN  morphine  - Injectable 1 milliGRAM(s) IV Push every 6 hours PRN  ondansetron Injectable 4 milliGRAM(s) IV Push every 8 hours PRN  ondansetron Injectable 4 milliGRAM(s) IV Push every 6 hours PRN  oxyCODONE    IR 2.5 milliGRAM(s) Oral every 4 hours PRN  oxyCODONE    IR 5 milliGRAM(s) Oral every 4 hours PRN  polyethylene glycol 3350 17 Gram(s) Oral at bedtime  pregabalin 200 milliGRAM(s) Oral at bedtime  senna 2 Tablet(s) Oral at bedtime  sodium chloride 1 Gram(s) Oral two times a day  sodium chloride 3%  Inhalation 4 milliLiter(s) Inhalation every 6 hours  traMADol 25 milliGRAM(s) Oral every 6 hours PRN      Vital Signs Last 24 Hrs  T(C): 36.6 (03 Jul 2024 11:15), Max: 37.1 (02 Jul 2024 18:00)  T(F): 97.9 (03 Jul 2024 11:15), Max: 98.8 (02 Jul 2024 23:50)  HR: 92 (03 Jul 2024 13:59) (75 - 94)  BP: 155/83 (03 Jul 2024 11:15) (93/65 - 155/83)  BP(mean): --  RR: 20 (03 Jul 2024 11:15) (18 - 20)  SpO2: 93% (03 Jul 2024 13:59) (92% - 100%)    Parameters below as of 03 Jul 2024 13:59  Patient On (Oxygen Delivery Method): nasal cannula, 2L        Physical Exam:  General:    NAD, non toxic, 4L NC, thing female  Head: atraumatic, normocephalic  Eyes: normal sclera and conjunctiva  ENT:   no oropharyngeal lesions, no LAD, neck supple  Cardio:    regular S1,S2  Respiratory:   no wheezing, decreased breath sounds at bases bilaterally, no rhonchi   abd:   soft, BS +, not tender, no distention  :     no CVAT, no suprapubic tenderness, no johnson  Musculoskeletal : no joint swelling, no edema, wasted, left hip surgical site is c/d/i  Skin:    no rash, PIV ok  Neurologic:  awake and alert, answers questions and follows commands   psych: normal affect    WBC Count: 6.15 K/uL (07-03 @ 06:45)  WBC Count: 6.62 K/uL (07-02 @ 05:58)  WBC Count: 7.99 K/uL (07-01 @ 16:40)  WBC Count: 5.67 K/uL (07-01 @ 07:07)  WBC Count: 5.47 K/uL (06-30 @ 06:02)  WBC Count: 9.66 K/uL (06-29 @ 06:42)  WBC Count: 15.16 K/uL (06-28 @ 22:10)                            10.4   6.15  )-----------( 305      ( 03 Jul 2024 06:45 )             33.0       07-03    131<L>  |  99  |  20  ----------------------------<  150<H>  3.9   |  26  |  0.99    Ca    8.4<L>      03 Jul 2024 06:45    TPro  5.3<L>  /  Alb  1.3<L>  /  TBili  0.3  /  DBili  x   /  AST  10<L>  /  ALT  10<L>  /  AlkPhos  88  07-03      Urinalysis Basic - ( 03 Jul 2024 06:45 )    Color: x / Appearance: x / SG: x / pH: x  Gluc: 150 mg/dL / Ketone: x  / Bili: x / Urobili: x   Blood: x / Protein: x / Nitrite: x   Leuk Esterase: x / RBC: x / WBC x   Sq Epi: x / Non Sq Epi: x / Bacteria: x        Creatinine Trend: 0.99<--, 1.04<--, 1.13<--, 0.74<--, 0.85<--, 1.27<--  Lactate, Blood: 1.0 mmol/L (07-01-24 @ 16:40)      MICROBIOLOGY:  v  .Sputum Sputum  07-01-24   Numerous Escherichia coli  Normal Respiratory Jordana present  --    Moderate polymorphonuclear leukocytes per low power field  No Squamous epithelial cells per low power field  Numerous Gram Negative Rods seen per oil power field  Few Gram positive cocci in pairs seen per oil power field      .Blood Blood-Peripheral  07-01-24   No growth at 24 hours  --  --      .Blood Blood-Peripheral  07-01-24   No growth at 24 hours  --  --      Rapid RVP Result: NotDetec (07-01 @ 14:54)    SARS-CoV-2: NotDetec (07-01-24 @ 14:54)  Rapid RVP Result: NotDetec (07-01-24 @ 14:54)    SARS-CoV-2: NotDetec (01 Jul 2024 14:54)    RADIOLOGY:

## 2024-07-03 NOTE — PROGRESS NOTE ADULT - ASSESSMENT
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 Now S/P L Hip IMN. RRT today 7/1 for acute hypoxemic respiratory failure. Pulmonary consulted.     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 Now S/P L Hip IMN. RRT today 7/1 for acute hypoxemic respiratory failure. Pulmonary consulted.     DX: Lung Cancer, Pneumonia, Left hip IMN    Recommendations:    - MS now improved at baseline  - Titrated from 4 L to 3 L NC NC with SpO2 94-95%  - Acute hypoxemic respiratory failure likely 2/2 PNA with SIRS criteria, Left sided infiltrate on CXR  - Does have underlying Lung cancer on immunotherapy last dose 6/26. Will need to F/U with oncologist upon DC from hospital.   - Not on home 02 at baseline.  - Continue to titrate down FiO2 as tolerates for goal 02>90%  - CTA chest negative for PE, does show B/L LL PNA with mucoid impaction  - Sputum culture with e. Coli  - F/U infectious workup, broad spectrum ABX for now, fever control- ID consulted appreciate reccs  - Would start duoneb q6 with hypersal Nebs used with Aerobika to facilitate secretion mobilization  - Chest PT    Plan discussed with Pulmonary attending Dr. Stovall

## 2024-07-03 NOTE — PROGRESS NOTE ADULT - ASSESSMENT
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 She was walking down steps when she slipped and landed on her left hip. Denies LOC and head trauma. Pt denies any numbness, tingling or paresthesias.  In the ED, her vitals are WNL. Labs WNL. Xray LT hip w/ left intertrochanteric fracture.   (27 Jun 2024 15:31)   s/p L Hip IMN on 6/28    patient had RRT 7/1/2024 for hypoxemia         remains afebrile  No leukocytosis  sputum cx- e.coli , sensitivity is pending   BCs NGTD x 2 7/1  urine Legionella negative   US venous doppler study - negative for DVT    CTA:  No pulmonary embolism.    New groundglass opacities with patchy areas of consolidation, increased small pleural effusions and bilateral lower lobe mucoid impaction. Suspect a combination of pulmonary edema and aspiration/pneumonia.  Circumferential mid to distal tracheal soft tissue thickening, extending to the scott, is nonspecific although possibly secondary to immunotherapy.  Additional indeterminate findings in this patient with reported history of lung cancer, including right apical and right paraspinal nodular opacities, hilar adenopathy, left upper quadrant soft tissue noduleand ill-defined splenic lesion. Comparison to more recent prior imaging, if   available, would be useful.      plan-  stop zithromax, urine legionella AG is negative   continue Zosyn IV for pneumonia (day #3)  follow BCs NGTD  awaiting for sputum cx sensitivity, grew E. Coli  check UA and urine cx  - not collected, pt denies having urinary symptoms   covid 19 negative   wean off supplemental O2 as able   malignancy management as per patient's oncologist   left hip surgical site management as per ortho team  ortho and pulmonology follow ups are appreciated     discussed with Dr. 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 She was walking down steps when she slipped and landed on her left hip. Denies LOC and head trauma. Pt denies any numbness, tingling or paresthesias.  In the ED, her vitals are WNL. Labs WNL. Xray LT hip w/ left intertrochanteric fracture.   (27 Jun 2024 15:31)   s/p L Hip IMN on 6/28    patient had RRT 7/1/2024 for hypoxemia         remains afebrile  No leukocytosis  sputum cx- e.coli , sensitivity is pending   BCs NGTD x 2 7/1  urine Legionella negative   US venous doppler study - negative for DVT    CTA:  No pulmonary embolism.    New groundglass opacities with patchy areas of consolidation, increased small pleural effusions and bilateral lower lobe mucoid impaction. Suspect a combination of pulmonary edema and aspiration/pneumonia.  Circumferential mid to distal tracheal soft tissue thickening, extending to the scott, is nonspecific although possibly secondary to immunotherapy.  Additional indeterminate findings in this patient with reported history of lung cancer, including right apical and right paraspinal nodular opacities, hilar adenopathy, left upper quadrant soft tissue noduleand ill-defined splenic lesion. Comparison to more recent prior imaging, if   available, would be useful.      plan-  stop zithromax, urine legionella AG is negative   continue Zosyn IV for pneumonia (day #3), if GFR >20 should be q8hrs, if <20 then q12hrs   follow BCs NGTD  awaiting for sputum cx sensitivity, grew E. Coli  check UA and urine cx  - not collected, pt denies having urinary symptoms   covid 19 negative   wean off supplemental O2 as able   malignancy management as per patient's oncologist   left hip surgical site management as per ortho team  ortho and pulmonology follow ups are appreciated     discussed with Dr. Ortega

## 2024-07-03 NOTE — PROGRESS NOTE ADULT - ASSESSMENT
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    #LT hip IT fracture s/p mechanical trip and fall  - Appreciate ortho recs  - Pain control with morphine PRN for moderate and severe  - F/u CT L femur to rule out pathologic lesions  - Patient is medically optimized for surgical fixation of LT hip  - S/p LT hip IMN POD4    #Sepsis likely 2/2 PNA  - Sputum cx w/ numerous GNR  - F/u blood cx  - CTA chest negative for PE  - Pulm recs appreciated  - F/u US duplex LE to r/o DVT  - Will c/w Zosyn    legionella neg, azithromycin was d/c'd   - ID following    #Lung cancer  - on immunotherapy. Last dose on 6/26  - will need outpatient follow up with oncology    #HTN  - c/w home metoprolol, diltiazem and enalapril    #IDDM  - c/w basal lantus (reduced dose) and SSI  - controlled  - No anion gap, normal pH no active evidence of DKA  - increased lantus 15 units    #Jugular vein thrombosis  - will c/w lovenox    Preventative Measures   DVT ppx- Lovenox  FULL CODE

## 2024-07-03 NOTE — PROGRESS NOTE ADULT - SUBJECTIVE AND OBJECTIVE BOX
Patient is a 68y old  Female who presents with a chief complaint of mechanical fall (02 Jul 2024 10:45)    INTERVAL HPI/OVERNIGHT EVENTS:   Patient seen and examined bedside.   no overnight events   SpO2 dropped to 88% on 2L NC >>and improved to 92% on 4L NC   states she is coughing and bringing up phlegm     REVIEW OF SYSTEMS: remaining ROS negative     MEDICATIONS  (STANDING):  acetaminophen     Tablet .. 650 milliGRAM(s) Oral every 6 hours  azithromycin  IVPB 500 milliGRAM(s) IV Intermittent every 24 hours  budesonide  80 MICROgram(s)/formoterol 4.5 MICROgram(s) Inhaler 2 Puff(s) Inhalation two times a day  clonazePAM  Tablet 0.5 milliGRAM(s) Oral at bedtime  dextrose 10% Bolus 125 milliLiter(s) IV Bolus once  dextrose 5%. 1000 milliLiter(s) (100 mL/Hr) IV Continuous <Continuous>  dextrose 5%. 1000 milliLiter(s) (50 mL/Hr) IV Continuous <Continuous>  dextrose 50% Injectable 12.5 Gram(s) IV Push once  dextrose 50% Injectable 25 Gram(s) IV Push once  diltiazem    milliGRAM(s) Oral daily  enalapril 5 milliGRAM(s) Oral at bedtime  enoxaparin Injectable 60 milliGRAM(s) SubCutaneous every 12 hours  glucagon  Injectable 1 milliGRAM(s) IntraMuscular once  guaiFENesin  milliGRAM(s) Oral every 12 hours  insulin glargine Injectable (LANTUS) 12 Unit(s) SubCutaneous at bedtime  insulin lispro (ADMELOG) corrective regimen sliding scale   SubCutaneous at bedtime  insulin lispro (ADMELOG) corrective regimen sliding scale   SubCutaneous three times a day before meals  insulin lispro Injectable (ADMELOG) 4 Unit(s) SubCutaneous three times a day before meals  metoprolol succinate ER 50 milliGRAM(s) Oral at bedtime  piperacillin/tazobactam IVPB.. 3.375 Gram(s) IV Intermittent every 12 hours  polyethylene glycol 3350 17 Gram(s) Oral at bedtime  pregabalin 200 milliGRAM(s) Oral at bedtime  senna 2 Tablet(s) Oral at bedtime  sodium chloride 1 Gram(s) Oral two times a day  sodium chloride 0.9% Bolus 500 milliLiter(s) IV Bolus once  sodium chloride 0.9%. 500 milliLiter(s) (500 mL/Hr) IV Continuous <Continuous>    MEDICATIONS  (PRN):  acetaminophen     Tablet .. 650 milliGRAM(s) Oral every 6 hours PRN Temp greater or equal to 38C (100.4F), Mild Pain (1 - 3)  albuterol    90 MICROgram(s) HFA Inhaler 2 Puff(s) Inhalation every 6 hours PRN Shortness of Breath and/or Wheezing  aluminum hydroxide/magnesium hydroxide/simethicone Suspension 30 milliLiter(s) Oral every 4 hours PRN Dyspepsia  benzonatate 100 milliGRAM(s) Oral every 8 hours PRN Cough  dextrose Oral Gel 15 Gram(s) Oral once PRN Blood Glucose LESS THAN 70 milliGRAM(s)/deciliter  magnesium hydroxide Suspension 30 milliLiter(s) Oral daily PRN Constipation  melatonin 3 milliGRAM(s) Oral at bedtime PRN Insomnia  morphine  - Injectable 0.5 milliGRAM(s) IV Push every 6 hours PRN Moderate Pain (4 - 6)  morphine  - Injectable 1 milliGRAM(s) IV Push every 6 hours PRN Severe Pain (7 - 10)  ondansetron Injectable 4 milliGRAM(s) IV Push every 8 hours PRN Nausea and/or Vomiting  ondansetron Injectable 4 milliGRAM(s) IV Push every 6 hours PRN Nausea and/or Vomiting  oxyCODONE    IR 2.5 milliGRAM(s) Oral every 4 hours PRN Moderate Pain (4 - 6)  oxyCODONE    IR 5 milliGRAM(s) Oral every 4 hours PRN Severe Pain (7 - 10)  traMADol 25 milliGRAM(s) Oral every 6 hours PRN Mild Pain (1 - 3)      Allergies    No Known Allergies    Intolerances        Vital Signs Last 24 Hrs  T(C): 36.9 (03 Jul 2024 16:12), Max: 37.1 (02 Jul 2024 23:50)  T(F): 98.5 (03 Jul 2024 16:12), Max: 98.8 (02 Jul 2024 23:50)  HR: 96 (03 Jul 2024 17:17) (81 - 102)  BP: 152/78 (03 Jul 2024 16:12) (124/72 - 155/83)  BP(mean): --  RR: 18 (03 Jul 2024 16:12) (18 - 20)  SpO2: 95% (03 Jul 2024 17:17) (93% - 100%)    Parameters below as of 03 Jul 2024 17:17  Patient On (Oxygen Delivery Method): nasal cannula, 3L          PHYSICAL EXAM:  GENERAL: NAD, thin appearing, no increased WOB, speaking in full sentences, not using accessory muscles   HEAD:  Atraumatic, Normocephalic  EYES: EOMI, PERRLA, conjunctiva and sclera clear  ENMT: No tonsillar erythema, exudates, or enlargement; Moist mucous membranes,    NECK: Supple, No JVD,    NERVOUS SYSTEM:  Alert & Oriented X3, Good concentration; nonfocal   CHEST/LUNG: good b/l air entry, b/l course BS   HEART: Regular rate and rhythm; No murmurs, rubs, or gallops  ABDOMEN: Soft, Nontender, Nondistended; Bowel sounds present  EXTREMITIES:  2+ Peripheral Pulses b/l, No clubbing, cyanosis, calf tenderness or edema b/l          LABS:                                   10.4   6.15  )-----------( 305      ( 03 Jul 2024 06:45 )             33.0   07-03    131<L>  |  99  |  20  ----------------------------<  150<H>  3.9   |  26  |  0.99    Ca    8.4<L>      03 Jul 2024 06:45    TPro  5.3<L>  /  Alb  1.3<L>  /  TBili  0.3  /  DBili  x   /  AST  10<L>  /  ALT  10<L>  /  AlkPhos  88  07-03        Urinalysis Basic - ( 02 Jul 2024 05:58 )    Color: x / Appearance: x / SG: x / pH: x  Gluc: 272 mg/dL / Ketone: x  / Bili: x / Urobili: x   Blood: x / Protein: x / Nitrite: x   Leuk Esterase: x / RBC: x / WBC x   Sq Epi: x / Non Sq Epi: x / Bacteria: x        RADIOLOGY & ADDITIONAL TESTS:    Imaging Personally Reviewed:  [ ] YES  [ ] NO    Consultant(s) Notes Reviewed:  [ x] YES  [ ] NO    Care Discussed with Consultants/Other Providers [ x] YES  [ ] NO  Care plan and all findings were discussed in detail with patient.  All questions and concerns addressed

## 2024-07-03 NOTE — PROGRESS NOTE ADULT - SUBJECTIVE AND OBJECTIVE BOX
SUBJECTIVE:      Pt seen and examined at bedside. No acute events overnight. Patient's mental status is improving and is now on Nasal cannula instead of nonrebreather. Patient doing well with no complaints overall. Reports pain well-controlled with medication. No CP, N/V, F/C, new N/T.      Vital Signs (24 Hrs):  T(C): 37 (07-03-24 @ 05:03), Max: 37.1 (07-02-24 @ 18:00)  HR: 81 (07-03-24 @ 05:03) (62 - 90)  BP: 124/72 (07-03-24 @ 05:03) (82/51 - 128/73)  RR: 18 (07-03-24 @ 05:03) (18 - 19)  SpO2: 97% (07-03-24 @ 05:03) (92% - 97%)  Wt(kg): --    LABS:                          10.9   6.62  )-----------( 295      ( 02 Jul 2024 05:58 )             34.3     07-02    130<L>  |  96  |  24<H>  ----------------------------<  272<H>  4.1   |  30  |  1.04    Ca    8.4<L>      02 Jul 2024 05:58    TPro  5.6<L>  /  Alb  1.4<L>  /  TBili  0.3  /  DBili  x   /  AST  10<L>  /  ALT  11<L>  /  AlkPhos  98  07-02    LIVER FUNCTIONS - ( 02 Jul 2024 05:58 )  Alb: 1.4 g/dL / Pro: 5.6 gm/dL / ALK PHOS: 98 U/L / ALT: 11 U/L / AST: 10 U/L / GGT: x           General: NAD, resting comfortably in bed  LLE  incision c/d/i  SCDs present bilaterally  Compartments soft and compressible  No calf tenderness bilaterally  Motor: +TA/EHL/FHL/GSC  Sensory: +SILT SPN/DPN/ASHLY/SAPH/TIB  2+ DP    A/P:  68y F with POD 5 sp L Hip IMN. Monitor Glucose levels.      -PT/OT: WBAT    -Pain Control  -Cont DVT ppx: therapeutic lovenox per medicine team  -FU AM Labs  -Rest, ice as needed  -Incentive Spirometry  -Dispo per medicine team and PT recommending BONITA  Discussed with Attending who is aware and agrees with above plan.

## 2024-07-04 LAB
ANION GAP SERPL CALC-SCNC: 2 MMOL/L — LOW (ref 5–17)
BUN SERPL-MCNC: 16 MG/DL — SIGNIFICANT CHANGE UP (ref 7–23)
CALCIUM SERPL-MCNC: 8.7 MG/DL — SIGNIFICANT CHANGE UP (ref 8.5–10.1)
CHLORIDE SERPL-SCNC: 98 MMOL/L — SIGNIFICANT CHANGE UP (ref 96–108)
CO2 SERPL-SCNC: 33 MMOL/L — HIGH (ref 22–31)
CREAT SERPL-MCNC: 0.7 MG/DL — SIGNIFICANT CHANGE UP (ref 0.5–1.3)
EGFR: 94 ML/MIN/1.73M2 — SIGNIFICANT CHANGE UP
GLUCOSE BLDC GLUCOMTR-MCNC: 120 MG/DL — HIGH (ref 70–99)
GLUCOSE BLDC GLUCOMTR-MCNC: 281 MG/DL — HIGH (ref 70–99)
GLUCOSE BLDC GLUCOMTR-MCNC: 291 MG/DL — HIGH (ref 70–99)
GLUCOSE BLDC GLUCOMTR-MCNC: 87 MG/DL — SIGNIFICANT CHANGE UP (ref 70–99)
GLUCOSE SERPL-MCNC: 134 MG/DL — HIGH (ref 70–99)
HCT VFR BLD CALC: 35.5 % — SIGNIFICANT CHANGE UP (ref 34.5–45)
HGB BLD-MCNC: 11.1 G/DL — LOW (ref 11.5–15.5)
MAGNESIUM SERPL-MCNC: 1.9 MG/DL — SIGNIFICANT CHANGE UP (ref 1.6–2.6)
MCHC RBC-ENTMCNC: 26.2 PG — LOW (ref 27–34)
MCHC RBC-ENTMCNC: 31.3 G/DL — LOW (ref 32–36)
MCV RBC AUTO: 83.9 FL — SIGNIFICANT CHANGE UP (ref 80–100)
NRBC # BLD: 0 /100 WBCS — SIGNIFICANT CHANGE UP (ref 0–0)
PHOSPHATE SERPL-MCNC: 2.5 MG/DL — SIGNIFICANT CHANGE UP (ref 2.5–4.5)
PLATELET # BLD AUTO: 337 K/UL — SIGNIFICANT CHANGE UP (ref 150–400)
POTASSIUM SERPL-MCNC: 3.4 MMOL/L — LOW (ref 3.5–5.3)
POTASSIUM SERPL-SCNC: 3.4 MMOL/L — LOW (ref 3.5–5.3)
RBC # BLD: 4.23 M/UL — SIGNIFICANT CHANGE UP (ref 3.8–5.2)
RBC # FLD: 15.3 % — HIGH (ref 10.3–14.5)
SODIUM SERPL-SCNC: 133 MMOL/L — LOW (ref 135–145)
WBC # BLD: 5.15 K/UL — SIGNIFICANT CHANGE UP (ref 3.8–10.5)
WBC # FLD AUTO: 5.15 K/UL — SIGNIFICANT CHANGE UP (ref 3.8–10.5)

## 2024-07-04 PROCEDURE — 99233 SBSQ HOSP IP/OBS HIGH 50: CPT

## 2024-07-04 RX ORDER — POTASSIUM CHLORIDE 600 MG/1
40 TABLET, FILM COATED, EXTENDED RELEASE ORAL ONCE
Refills: 0 | Status: COMPLETED | OUTPATIENT
Start: 2024-07-04 | End: 2024-07-04

## 2024-07-04 RX ORDER — IPRATROPIUM BROMIDE AND ALBUTEROL SULFATE .5; 3 MG/3ML; MG/3ML
3 SOLUTION RESPIRATORY (INHALATION) ONCE
Refills: 0 | Status: COMPLETED | OUTPATIENT
Start: 2024-07-04 | End: 2024-07-04

## 2024-07-04 RX ORDER — LOPERAMIDE HCL 2 MG
2 CAPSULE ORAL
Refills: 0 | Status: DISCONTINUED | OUTPATIENT
Start: 2024-07-04 | End: 2024-07-06

## 2024-07-04 RX ADMIN — Medication 650 MILLIGRAM(S): at 00:27

## 2024-07-04 RX ADMIN — IPRATROPIUM BROMIDE AND ALBUTEROL SULFATE 3 MILLILITER(S): .5; 3 SOLUTION RESPIRATORY (INHALATION) at 14:59

## 2024-07-04 RX ADMIN — Medication 1 TABLET(S): at 17:05

## 2024-07-04 RX ADMIN — Medication 650 MILLIGRAM(S): at 13:03

## 2024-07-04 RX ADMIN — IPRATROPIUM BROMIDE AND ALBUTEROL SULFATE 3 MILLILITER(S): .5; 3 SOLUTION RESPIRATORY (INHALATION) at 23:10

## 2024-07-04 RX ADMIN — Medication 80 MILLIGRAM(S): at 13:02

## 2024-07-04 RX ADMIN — Medication 1 TABLET(S): at 22:26

## 2024-07-04 RX ADMIN — INSULIN GLARGINE 15 UNIT(S): 100 INJECTION, SOLUTION SUBCUTANEOUS at 22:25

## 2024-07-04 RX ADMIN — IPRATROPIUM BROMIDE AND ALBUTEROL SULFATE 3 MILLILITER(S): .5; 3 SOLUTION RESPIRATORY (INHALATION) at 11:03

## 2024-07-04 RX ADMIN — Medication 2 PUFF(S): at 17:05

## 2024-07-04 RX ADMIN — Medication 1 GRAM(S): at 06:56

## 2024-07-04 RX ADMIN — Medication 650 MILLIGRAM(S): at 08:17

## 2024-07-04 RX ADMIN — CLONAZEPAM 0.5 MILLIGRAM(S): 2 TABLET ORAL at 22:26

## 2024-07-04 RX ADMIN — Medication 650 MILLIGRAM(S): at 12:17

## 2024-07-04 RX ADMIN — Medication 5 MILLIGRAM(S): at 22:38

## 2024-07-04 RX ADMIN — PREGABALIN 200 MILLIGRAM(S): 50 CAPSULE ORAL at 22:26

## 2024-07-04 RX ADMIN — Medication 80 MILLIGRAM(S): at 22:26

## 2024-07-04 RX ADMIN — Medication 4 MILLILITER(S): at 11:03

## 2024-07-04 RX ADMIN — Medication 2 PUFF(S): at 06:56

## 2024-07-04 RX ADMIN — Medication 4 MILLILITER(S): at 23:11

## 2024-07-04 RX ADMIN — INSULIN LISPRO 4 UNIT(S): 100 INJECTION, SOLUTION SUBCUTANEOUS at 12:18

## 2024-07-04 RX ADMIN — Medication 4 MILLILITER(S): at 17:19

## 2024-07-04 RX ADMIN — Medication 2 MILLIGRAM(S): at 17:05

## 2024-07-04 RX ADMIN — Medication 650 MILLIGRAM(S): at 06:55

## 2024-07-04 RX ADMIN — INSULIN LISPRO 6: 100 INJECTION, SOLUTION SUBCUTANEOUS at 12:18

## 2024-07-04 RX ADMIN — DILTIAZEM HYDROCHLORIDE 120 MILLIGRAM(S): 240 CAPSULE, EXTENDED RELEASE ORAL at 22:38

## 2024-07-04 RX ADMIN — ENOXAPARIN SODIUM 60 MILLIGRAM(S): 100 INJECTION SUBCUTANEOUS at 20:11

## 2024-07-04 RX ADMIN — Medication 600 MILLIGRAM(S): at 17:05

## 2024-07-04 RX ADMIN — PIPERACILLIN SODIUM AND TAZOBACTAM SODIUM 25 GRAM(S): 3; .375 INJECTION, POWDER, LYOPHILIZED, FOR SOLUTION INTRAVENOUS at 17:05

## 2024-07-04 RX ADMIN — IPRATROPIUM BROMIDE AND ALBUTEROL SULFATE 3 MILLILITER(S): .5; 3 SOLUTION RESPIRATORY (INHALATION) at 17:18

## 2024-07-04 RX ADMIN — Medication 50 MILLIGRAM(S): at 22:39

## 2024-07-04 RX ADMIN — Medication 650 MILLIGRAM(S): at 23:56

## 2024-07-04 RX ADMIN — INSULIN LISPRO 1: 100 INJECTION, SOLUTION SUBCUTANEOUS at 22:26

## 2024-07-04 RX ADMIN — Medication 80 MILLIGRAM(S): at 06:57

## 2024-07-04 RX ADMIN — Medication 600 MILLIGRAM(S): at 06:56

## 2024-07-04 RX ADMIN — PIPERACILLIN SODIUM AND TAZOBACTAM SODIUM 25 GRAM(S): 3; .375 INJECTION, POWDER, LYOPHILIZED, FOR SOLUTION INTRAVENOUS at 06:55

## 2024-07-04 RX ADMIN — Medication 1 TABLET(S): at 11:24

## 2024-07-04 RX ADMIN — POTASSIUM CHLORIDE 40 MILLIEQUIVALENT(S): 600 TABLET, FILM COATED, EXTENDED RELEASE ORAL at 11:24

## 2024-07-04 NOTE — PROGRESS NOTE ADULT - SUBJECTIVE AND OBJECTIVE BOX
SUBJECTIVE:      Pt seen and examined at bedside. No acute events overnight. Patient's mental status is improving and is now on Nasal cannula instead of nonrebreather. Patient doing well with no complaints overall. Reports pain well-controlled with medication. No CP, N/V, F/C, new N/T.      Vital Signs (24 Hrs):  T(C): 36.6 (07-04-24 @ 05:06), Max: 36.9 (07-03-24 @ 16:12)  HR: 83 (07-04-24 @ 05:06) (83 - 102)  BP: 124/73 (07-04-24 @ 05:06) (124/73 - 175/96)  RR: 18 (07-04-24 @ 05:06) (18 - 20)  SpO2: 98% (07-04-24 @ 05:06) (93% - 98%)  Wt(kg): --    LABS:                          11.1   5.15  )-----------( 337      ( 04 Jul 2024 06:07 )             35.5     07-03    131<L>  |  99  |  20  ----------------------------<  150<H>  3.9   |  26  |  0.99    Ca    8.4<L>      03 Jul 2024 06:45    TPro  5.3<L>  /  Alb  1.3<L>  /  TBili  0.3  /  DBili  x   /  AST  10<L>  /  ALT  10<L>  /  AlkPhos  88  07-03    LIVER FUNCTIONS - ( 03 Jul 2024 06:45 )  Alb: 1.3 g/dL / Pro: 5.3 gm/dL / ALK PHOS: 88 U/L / ALT: 10 U/L / AST: 10 U/L / GGT: x             General: NAD, resting comfortably in bed  LLE  incision c/d/i  SCDs present bilaterally  Compartments soft and compressible  No calf tenderness bilaterally  Motor: +TA/EHL/FHL/GSC  Sensory: +SILT SPN/DPN/ASHLY/SAPH/TIB  2+ DP    A/P:  68y F with POD 6 sp L Hip IMN. Monitor Glucose levels.      -PT/OT: WBAT    -Pain Control  -Cont DVT ppx: therapeutic lovenox per medicine team  -FU AM Labs  -Rest, ice as needed  -Incentive Spirometry  -Dispo per medicine team and PT recommending [facility]ONITA  Discussed with Attending who is aware and agrees with above plan.

## 2024-07-05 LAB
ANION GAP SERPL CALC-SCNC: 4 MMOL/L — LOW (ref 5–17)
BUN SERPL-MCNC: 12 MG/DL — SIGNIFICANT CHANGE UP (ref 7–23)
CALCIUM SERPL-MCNC: 8.6 MG/DL — SIGNIFICANT CHANGE UP (ref 8.5–10.1)
CHLORIDE SERPL-SCNC: 101 MMOL/L — SIGNIFICANT CHANGE UP (ref 96–108)
CO2 SERPL-SCNC: 30 MMOL/L — SIGNIFICANT CHANGE UP (ref 22–31)
CREAT SERPL-MCNC: 0.61 MG/DL — SIGNIFICANT CHANGE UP (ref 0.5–1.3)
EGFR: 97 ML/MIN/1.73M2 — SIGNIFICANT CHANGE UP
GLUCOSE BLDC GLUCOMTR-MCNC: 274 MG/DL — HIGH (ref 70–99)
GLUCOSE BLDC GLUCOMTR-MCNC: 326 MG/DL — HIGH (ref 70–99)
GLUCOSE BLDC GLUCOMTR-MCNC: 70 MG/DL — SIGNIFICANT CHANGE UP (ref 70–99)
GLUCOSE BLDC GLUCOMTR-MCNC: 89 MG/DL — SIGNIFICANT CHANGE UP (ref 70–99)
GLUCOSE SERPL-MCNC: 84 MG/DL — SIGNIFICANT CHANGE UP (ref 70–99)
HCT VFR BLD CALC: 35.1 % — SIGNIFICANT CHANGE UP (ref 34.5–45)
HGB BLD-MCNC: 10.8 G/DL — LOW (ref 11.5–15.5)
MAGNESIUM SERPL-MCNC: 2 MG/DL — SIGNIFICANT CHANGE UP (ref 1.6–2.6)
MCHC RBC-ENTMCNC: 25.8 PG — LOW (ref 27–34)
MCHC RBC-ENTMCNC: 30.8 G/DL — LOW (ref 32–36)
MCV RBC AUTO: 84 FL — SIGNIFICANT CHANGE UP (ref 80–100)
NRBC # BLD: 0 /100 WBCS — SIGNIFICANT CHANGE UP (ref 0–0)
PHOSPHATE SERPL-MCNC: 3 MG/DL — SIGNIFICANT CHANGE UP (ref 2.5–4.5)
PLATELET # BLD AUTO: 353 K/UL — SIGNIFICANT CHANGE UP (ref 150–400)
POTASSIUM SERPL-MCNC: 3.9 MMOL/L — SIGNIFICANT CHANGE UP (ref 3.5–5.3)
POTASSIUM SERPL-SCNC: 3.9 MMOL/L — SIGNIFICANT CHANGE UP (ref 3.5–5.3)
RBC # BLD: 4.18 M/UL — SIGNIFICANT CHANGE UP (ref 3.8–5.2)
RBC # FLD: 15.3 % — HIGH (ref 10.3–14.5)
SODIUM SERPL-SCNC: 135 MMOL/L — SIGNIFICANT CHANGE UP (ref 135–145)
WBC # BLD: 5.74 K/UL — SIGNIFICANT CHANGE UP (ref 3.8–10.5)
WBC # FLD AUTO: 5.74 K/UL — SIGNIFICANT CHANGE UP (ref 3.8–10.5)

## 2024-07-05 PROCEDURE — 99232 SBSQ HOSP IP/OBS MODERATE 35: CPT

## 2024-07-05 PROCEDURE — 93971 EXTREMITY STUDY: CPT | Mod: 26,LT

## 2024-07-05 RX ORDER — CEFTRIAXONE SODIUM 500 MG
1000 VIAL (EA) INJECTION EVERY 24 HOURS
Refills: 0 | Status: COMPLETED | OUTPATIENT
Start: 2024-07-05 | End: 2024-07-07

## 2024-07-05 RX ORDER — PIPERACILLIN SODIUM AND TAZOBACTAM SODIUM 3; .375 G/15ML; G/15ML
3.38 INJECTION, POWDER, LYOPHILIZED, FOR SOLUTION INTRAVENOUS EVERY 8 HOURS
Refills: 0 | Status: DISCONTINUED | OUTPATIENT
Start: 2024-07-05 | End: 2024-07-05

## 2024-07-05 RX ORDER — DIMETHYL FUMARATE 240 MG/1
240 CAPSULE, DELAYED RELEASE ORAL
Refills: 0 | Status: DISCONTINUED | OUTPATIENT
Start: 2024-07-05 | End: 2024-07-11

## 2024-07-05 RX ADMIN — INSULIN GLARGINE 15 UNIT(S): 100 INJECTION, SOLUTION SUBCUTANEOUS at 21:38

## 2024-07-05 RX ADMIN — Medication 100 MILLIGRAM(S): at 12:43

## 2024-07-05 RX ADMIN — INSULIN LISPRO 6: 100 INJECTION, SOLUTION SUBCUTANEOUS at 11:17

## 2024-07-05 RX ADMIN — INSULIN LISPRO 4 UNIT(S): 100 INJECTION, SOLUTION SUBCUTANEOUS at 11:17

## 2024-07-05 RX ADMIN — DIMETHYL FUMARATE 240 MILLIGRAM(S): 240 CAPSULE, DELAYED RELEASE ORAL at 18:05

## 2024-07-05 RX ADMIN — Medication 4 MILLILITER(S): at 17:09

## 2024-07-05 RX ADMIN — Medication 650 MILLIGRAM(S): at 12:44

## 2024-07-05 RX ADMIN — OXYCODONE HYDROCHLORIDE 5 MILLIGRAM(S): 100 SOLUTION ORAL at 16:43

## 2024-07-05 RX ADMIN — Medication 4 MILLILITER(S): at 11:14

## 2024-07-05 RX ADMIN — Medication 650 MILLIGRAM(S): at 13:08

## 2024-07-05 RX ADMIN — Medication 50 MILLIGRAM(S): at 21:41

## 2024-07-05 RX ADMIN — ENOXAPARIN SODIUM 60 MILLIGRAM(S): 100 INJECTION SUBCUTANEOUS at 10:50

## 2024-07-05 RX ADMIN — Medication 650 MILLIGRAM(S): at 06:19

## 2024-07-05 RX ADMIN — IPRATROPIUM BROMIDE AND ALBUTEROL SULFATE 3 MILLILITER(S): .5; 3 SOLUTION RESPIRATORY (INHALATION) at 17:09

## 2024-07-05 RX ADMIN — DILTIAZEM HYDROCHLORIDE 120 MILLIGRAM(S): 240 CAPSULE, EXTENDED RELEASE ORAL at 21:42

## 2024-07-05 RX ADMIN — IPRATROPIUM BROMIDE AND ALBUTEROL SULFATE 3 MILLILITER(S): .5; 3 SOLUTION RESPIRATORY (INHALATION) at 11:14

## 2024-07-05 RX ADMIN — Medication 1 TABLET(S): at 12:45

## 2024-07-05 RX ADMIN — Medication 650 MILLIGRAM(S): at 00:56

## 2024-07-05 RX ADMIN — IPRATROPIUM BROMIDE AND ALBUTEROL SULFATE 3 MILLILITER(S): .5; 3 SOLUTION RESPIRATORY (INHALATION) at 06:01

## 2024-07-05 RX ADMIN — Medication 80 MILLIGRAM(S): at 21:42

## 2024-07-05 RX ADMIN — OXYCODONE HYDROCHLORIDE 5 MILLIGRAM(S): 100 SOLUTION ORAL at 23:57

## 2024-07-05 RX ADMIN — PIPERACILLIN SODIUM AND TAZOBACTAM SODIUM 25 GRAM(S): 3; .375 INJECTION, POWDER, LYOPHILIZED, FOR SOLUTION INTRAVENOUS at 06:18

## 2024-07-05 RX ADMIN — Medication 2 PUFF(S): at 06:20

## 2024-07-05 RX ADMIN — INSULIN LISPRO 2: 100 INJECTION, SOLUTION SUBCUTANEOUS at 21:39

## 2024-07-05 RX ADMIN — Medication 4 MILLILITER(S): at 06:01

## 2024-07-05 RX ADMIN — Medication 5 MILLIGRAM(S): at 21:42

## 2024-07-05 RX ADMIN — Medication 1 TABLET(S): at 21:38

## 2024-07-05 RX ADMIN — Medication 80 MILLIGRAM(S): at 06:19

## 2024-07-05 RX ADMIN — Medication 1 TABLET(S): at 17:29

## 2024-07-05 RX ADMIN — Medication 1 TABLET(S): at 09:10

## 2024-07-05 RX ADMIN — Medication 2 MILLIGRAM(S): at 06:19

## 2024-07-05 RX ADMIN — Medication 80 MILLIGRAM(S): at 13:18

## 2024-07-05 RX ADMIN — Medication 650 MILLIGRAM(S): at 23:56

## 2024-07-05 RX ADMIN — Medication 2 MILLIGRAM(S): at 17:29

## 2024-07-05 RX ADMIN — Medication 2 PUFF(S): at 17:29

## 2024-07-05 RX ADMIN — Medication 600 MILLIGRAM(S): at 06:19

## 2024-07-05 RX ADMIN — Medication 600 MILLIGRAM(S): at 17:28

## 2024-07-05 RX ADMIN — Medication 650 MILLIGRAM(S): at 17:28

## 2024-07-05 RX ADMIN — ENOXAPARIN SODIUM 60 MILLIGRAM(S): 100 INJECTION SUBCUTANEOUS at 20:41

## 2024-07-05 NOTE — PROGRESS NOTE ADULT - SUBJECTIVE AND OBJECTIVE BOX
DEEP CESPEDES  MRN-[id number]    Follow Up:  PNA, fracture     Interval History: the pt was seen and examined, not in acute distress, pt is awake and alert, appropriate, states that she is feeling better, pt's  is present. Pt is afebrile, NC, no wbc.     PAST MEDICAL & SURGICAL HISTORY:  Diabetes      Hypertension      Multiple sclerosis      Alcohol abuse      Pancreatic insufficiency      H/O cervical spine surgery          ROS:    [ ] Unobtainable because:  [x ] All other systems negative    Constitutional: no fever, no chills  Head: no trauma  Eyes: no vision changes, no eye pain  ENT:  no sore throat, no rhinorrhea  Cardiovascular:  no chest pain, no palpitation  Respiratory:  no SOB, no cough  GI:  no abd pain, no vomiting, no diarrhea  urinary: no dysuria, no hematuria, no flank pain  musculoskeletal:  no joint pain, no joint swelling  skin:  no rash  neurology:  no headache, no seizure, no change in mental status  psych: no anxiety, no depression         Allergies  No Known Allergies        ANTIMICROBIALS:  piperacillin/tazobactam IVPB.. 3.375 every 8 hours      OTHER MEDS:  acetaminophen     Tablet .. 650 milliGRAM(s) Oral every 6 hours PRN  acetaminophen     Tablet .. 650 milliGRAM(s) Oral every 6 hours  albuterol    90 MICROgram(s) HFA Inhaler 2 Puff(s) Inhalation every 6 hours PRN  albuterol/ipratropium for Nebulization 3 milliLiter(s) Nebulizer every 6 hours  aluminum hydroxide/magnesium hydroxide/simethicone Suspension 30 milliLiter(s) Oral every 4 hours PRN  budesonide  80 MICROgram(s)/formoterol 4.5 MICROgram(s) Inhaler 2 Puff(s) Inhalation two times a day  dextrose 10% Bolus 125 milliLiter(s) IV Bolus once  dextrose 5%. 1000 milliLiter(s) IV Continuous <Continuous>  dextrose 5%. 1000 milliLiter(s) IV Continuous <Continuous>  dextrose 50% Injectable 12.5 Gram(s) IV Push once  dextrose 50% Injectable 25 Gram(s) IV Push once  dextrose Oral Gel 15 Gram(s) Oral once PRN  diltiazem    milliGRAM(s) Oral at bedtime  diphenoxylate/atropine 1 Tablet(s) Oral at bedtime  enalapril 5 milliGRAM(s) Oral at bedtime  enoxaparin Injectable 60 milliGRAM(s) SubCutaneous every 12 hours  glucagon  Injectable 1 milliGRAM(s) IntraMuscular once  guaiFENesin  milliGRAM(s) Oral every 12 hours  insulin glargine Injectable (LANTUS) 15 Unit(s) SubCutaneous at bedtime  insulin lispro (ADMELOG) corrective regimen sliding scale   SubCutaneous at bedtime  insulin lispro (ADMELOG) corrective regimen sliding scale   SubCutaneous three times a day before meals  insulin lispro Injectable (ADMELOG) 4 Unit(s) SubCutaneous three times a day before meals  lactobacillus acidophilus 1 Tablet(s) Oral three times a day with meals  loperamide 2 milliGRAM(s) Oral two times a day  magnesium hydroxide Suspension 30 milliLiter(s) Oral daily PRN  melatonin 3 milliGRAM(s) Oral at bedtime PRN  metoprolol succinate ER 50 milliGRAM(s) Oral at bedtime  ondansetron Injectable 4 milliGRAM(s) IV Push every 8 hours PRN  ondansetron Injectable 4 milliGRAM(s) IV Push every 6 hours PRN  oxyCODONE    IR 2.5 milliGRAM(s) Oral every 4 hours PRN  oxyCODONE    IR 5 milliGRAM(s) Oral every 4 hours PRN  polyethylene glycol 3350 17 Gram(s) Oral at bedtime  senna 2 Tablet(s) Oral at bedtime  simethicone 80 milliGRAM(s) Chew every 8 hours  sodium chloride 3%  Inhalation 4 milliLiter(s) Inhalation every 6 hours  traMADol 25 milliGRAM(s) Oral every 6 hours PRN      Vital Signs Last 24 Hrs  T(C): 36.6 (05 Jul 2024 11:11), Max: 37.1 (05 Jul 2024 00:13)  T(F): 97.8 (05 Jul 2024 11:11), Max: 98.7 (05 Jul 2024 00:13)  HR: 89 (05 Jul 2024 11:11) (75 - 104)  BP: 129/76 (05 Jul 2024 11:11) (117/67 - 170/89)  BP(mean): --  RR: 19 (05 Jul 2024 11:11) (18 - 19)  SpO2: 92% (05 Jul 2024 11:11) (92% - 100%)    Parameters below as of 05 Jul 2024 11:11  Patient On (Oxygen Delivery Method): nasal cannula  O2 Flow (L/min): 3      Physical Exam:  General:    NAD, non toxic, 4L NC, thing female  Head: atraumatic, normocephalic  Eyes: normal sclera and conjunctiva  ENT:   no oropharyngeal lesions, no LAD, neck supple  Cardio:    regular S1,S2  Respiratory:   no wheezing, breath sounds mildly coarse b/l, no rhonchi  abd:   soft, BS +, not tender, no distention  :     no CVAT, no suprapubic tenderness, no johnson  Musculoskeletal : no joint swelling, no edema, wasted, left hip surgical site - there is out lined drainage stain on the dressing, left arm with swelling, not tender, no erythema   Skin:    no rash, PIV ok  Neurologic:  awake and alert, answers questions and follows commands   psych: normal affect    WBC Count: 5.74 K/uL (07-05 @ 06:55)  WBC Count: 5.15 K/uL (07-04 @ 06:07)  WBC Count: 6.15 K/uL (07-03 @ 06:45)  WBC Count: 6.62 K/uL (07-02 @ 05:58)  WBC Count: 7.99 K/uL (07-01 @ 16:40)  WBC Count: 5.67 K/uL (07-01 @ 07:07)  WBC Count: 5.47 K/uL (06-30 @ 06:02)                            10.8   5.74  )-----------( 353      ( 05 Jul 2024 06:55 )             35.1       07-05    135  |  101  |  12  ----------------------------<  84  3.9   |  30  |  0.61    Ca    8.6      05 Jul 2024 06:55  Phos  3.0     07-05  Mg     2.0     07-05        Urinalysis Basic - ( 05 Jul 2024 06:55 )    Color: x / Appearance: x / SG: x / pH: x  Gluc: 84 mg/dL / Ketone: x  / Bili: x / Urobili: x   Blood: x / Protein: x / Nitrite: x   Leuk Esterase: x / RBC: x / WBC x   Sq Epi: x / Non Sq Epi: x / Bacteria: x        Creatinine Trend: 0.61<--, 0.70<--, 0.99<--, 1.04<--, 1.13<--, 0.74<--      MICROBIOLOGY:  v  .Sputum Sputum  07-01-24   Numerous Escherichia coli  Normal Respiratory Jordana present  --  Escherichia coli      .Blood Blood-Peripheral  07-01-24   No growth at 72 Hours  --  --      .Blood Blood-Peripheral  07-01-24   No growth at 72 Hours  --  --    Rapid RVP Result: NotDetec (07-01 @ 14:54)    SARS-CoV-2: NotDetec (07-01-24 @ 14:54)  Rapid RVP Result: NotDetec (07-01-24 @ 14:54)    SARS-CoV-2: NotDetec (01 Jul 2024 14:54)    RADIOLOGY:     DEEP CESPEDES  MRN-[id number]    Follow Up:  PNA, fracture     Interval History: the pt was seen and examined, not in acute distress, pt is awake and alert, appropriate, states that she is feeling better, pt's  is present. Pt is afebrile, NC, no wbc.     PAST MEDICAL & SURGICAL HISTORY:  Diabetes      Hypertension      Multiple sclerosis      Alcohol abuse      Pancreatic insufficiency      H/O cervical spine surgery          ROS:    [ ] Unobtainable because:  [x ] All other systems negative    Constitutional: no fever, no chills  Head: no trauma  Eyes: no vision changes, no eye pain  ENT:  no sore throat, no rhinorrhea  Cardiovascular:  no chest pain, no palpitation  Respiratory:  no SOB, no cough  GI:  no abd pain, no vomiting, no diarrhea  urinary: no dysuria, no hematuria, no flank pain  musculoskeletal:  no joint pain, no joint swelling  skin:  no rash  neurology:  no headache, no seizure, no change in mental status  psych: no anxiety, no depression         Allergies  No Known Allergies        ANTIMICROBIALS:  piperacillin/tazobactam IVPB.. 3.375 every 8 hours      OTHER MEDS:  acetaminophen     Tablet .. 650 milliGRAM(s) Oral every 6 hours PRN  acetaminophen     Tablet .. 650 milliGRAM(s) Oral every 6 hours  albuterol    90 MICROgram(s) HFA Inhaler 2 Puff(s) Inhalation every 6 hours PRN  albuterol/ipratropium for Nebulization 3 milliLiter(s) Nebulizer every 6 hours  aluminum hydroxide/magnesium hydroxide/simethicone Suspension 30 milliLiter(s) Oral every 4 hours PRN  budesonide  80 MICROgram(s)/formoterol 4.5 MICROgram(s) Inhaler 2 Puff(s) Inhalation two times a day  dextrose 10% Bolus 125 milliLiter(s) IV Bolus once  dextrose 5%. 1000 milliLiter(s) IV Continuous <Continuous>  dextrose 5%. 1000 milliLiter(s) IV Continuous <Continuous>  dextrose 50% Injectable 12.5 Gram(s) IV Push once  dextrose 50% Injectable 25 Gram(s) IV Push once  dextrose Oral Gel 15 Gram(s) Oral once PRN  diltiazem    milliGRAM(s) Oral at bedtime  diphenoxylate/atropine 1 Tablet(s) Oral at bedtime  enalapril 5 milliGRAM(s) Oral at bedtime  enoxaparin Injectable 60 milliGRAM(s) SubCutaneous every 12 hours  glucagon  Injectable 1 milliGRAM(s) IntraMuscular once  guaiFENesin  milliGRAM(s) Oral every 12 hours  insulin glargine Injectable (LANTUS) 15 Unit(s) SubCutaneous at bedtime  insulin lispro (ADMELOG) corrective regimen sliding scale   SubCutaneous at bedtime  insulin lispro (ADMELOG) corrective regimen sliding scale   SubCutaneous three times a day before meals  insulin lispro Injectable (ADMELOG) 4 Unit(s) SubCutaneous three times a day before meals  lactobacillus acidophilus 1 Tablet(s) Oral three times a day with meals  loperamide 2 milliGRAM(s) Oral two times a day  magnesium hydroxide Suspension 30 milliLiter(s) Oral daily PRN  melatonin 3 milliGRAM(s) Oral at bedtime PRN  metoprolol succinate ER 50 milliGRAM(s) Oral at bedtime  ondansetron Injectable 4 milliGRAM(s) IV Push every 8 hours PRN  ondansetron Injectable 4 milliGRAM(s) IV Push every 6 hours PRN  oxyCODONE    IR 2.5 milliGRAM(s) Oral every 4 hours PRN  oxyCODONE    IR 5 milliGRAM(s) Oral every 4 hours PRN  polyethylene glycol 3350 17 Gram(s) Oral at bedtime  senna 2 Tablet(s) Oral at bedtime  simethicone 80 milliGRAM(s) Chew every 8 hours  sodium chloride 3%  Inhalation 4 milliLiter(s) Inhalation every 6 hours  traMADol 25 milliGRAM(s) Oral every 6 hours PRN      Vital Signs Last 24 Hrs  T(C): 36.6 (05 Jul 2024 11:11), Max: 37.1 (05 Jul 2024 00:13)  T(F): 97.8 (05 Jul 2024 11:11), Max: 98.7 (05 Jul 2024 00:13)  HR: 89 (05 Jul 2024 11:11) (75 - 104)  BP: 129/76 (05 Jul 2024 11:11) (117/67 - 170/89)  BP(mean): --  RR: 19 (05 Jul 2024 11:11) (18 - 19)  SpO2: 92% (05 Jul 2024 11:11) (92% - 100%)    Parameters below as of 05 Jul 2024 11:11  Patient On (Oxygen Delivery Method): nasal cannula  O2 Flow (L/min): 3      Physical Exam:  General:    NAD, non toxic, 4L NC, thing female  Head: atraumatic, normocephalic  Eyes: normal sclera and conjunctiva  ENT:   no oropharyngeal lesions, no LAD, neck supple  Cardio:    regular S1,S2  Respiratory:   no wheezing, breath sounds mildly coarse b/l, no rhonchi  abd:   soft, BS +, not tender, no distention  :     no CVAT, no suprapubic tenderness, no johnson  Musculoskeletal : no joint swelling, no edema, wasted, left hip surgical site - there is out lined drainage stain on the dressing, left arm with swelling, not tender, no erythema   Skin:    no rash, PIV ok  Neurologic:  awake and alert, answers questions and follows commands   psych: normal affect    WBC Count: 5.74 K/uL (07-05 @ 06:55)  WBC Count: 5.15 K/uL (07-04 @ 06:07)  WBC Count: 6.15 K/uL (07-03 @ 06:45)  WBC Count: 6.62 K/uL (07-02 @ 05:58)  WBC Count: 7.99 K/uL (07-01 @ 16:40)  WBC Count: 5.67 K/uL (07-01 @ 07:07)  WBC Count: 5.47 K/uL (06-30 @ 06:02)                            10.8   5.74  )-----------( 353      ( 05 Jul 2024 06:55 )             35.1       07-05    135  |  101  |  12  ----------------------------<  84  3.9   |  30  |  0.61    Ca    8.6      05 Jul 2024 06:55  Phos  3.0     07-05  Mg     2.0     07-05        Urinalysis Basic - ( 05 Jul 2024 06:55 )    Color: x / Appearance: x / SG: x / pH: x  Gluc: 84 mg/dL / Ketone: x  / Bili: x / Urobili: x   Blood: x / Protein: x / Nitrite: x   Leuk Esterase: x / RBC: x / WBC x   Sq Epi: x / Non Sq Epi: x / Bacteria: x        Creatinine Trend: 0.61<--, 0.70<--, 0.99<--, 1.04<--, 1.13<--, 0.74<--      MICROBIOLOGY:    .Sputum Sputum  07-01-24   Numerous Escherichia coli  Normal Respiratory Jordana present  --  Escherichia coli      .Blood Blood-Peripheral  07-01-24   No growth at 72 Hours  --  --      .Blood Blood-Peripheral  07-01-24   No growth at 72 Hours  --  --    Rapid RVP Result: NotDetec (07-01 @ 14:54)    SARS-CoV-2: NotDetec (07-01-24 @ 14:54)  Rapid RVP Result: NotDetec (07-01-24 @ 14:54)    SARS-CoV-2: NotDetec (01 Jul 2024 14:54)    RADIOLOGY:

## 2024-07-05 NOTE — CHART NOTE - NSCHARTNOTEFT_GEN_A_CORE
Pt admitted c left hip pain s/p fall at home; now s/p left hip IMN (6/28) for fx. Course complicated by acute respiratory failure; sepsis likely 2/2 PNA; of note, pt has hx of underlying lung cancer (on immunotherapy).  Per ortho perspective, pt is medically stable for d/c.  Provided DM education to pt &  who was bedside at time of visit.  Reviewed CHO food & beverages & importance of CHO portion control; relationship of DM to both heart & kidney diseases.  Also discussed importance of additional protein in diet to maintain wt & lean body mass.  Appropriate questions asked & answered, educational material provided.      Factors impacting intake: [ X] none [ ] nausea  [ ] vomiting [ ] diarrhea [ ] constipation  [ ]chewing problems [ ] swallowing issues  [ ] other:     Diet Prescription: Diet, Regular:   Consistent Carbohydrate {No Snacks}  DASH/TLC {Sodium & Cholesterol Restricted}  No Dairy  No Lactose (07-04-24 @ 14:45)    Intake:   pt eating well; % most meals; pt willing to try Glucerna    Current Weight:     45 kg (6/27)  % Weight Change:  no new wts recorded; unable to determine    2+ edema noted left hip    Pertinent Medications: MEDICATIONS  (STANDING):  acetaminophen     Tablet .. 650 milliGRAM(s) Oral every 6 hours  albuterol/ipratropium for Nebulization 3 milliLiter(s) Nebulizer every 6 hours  budesonide  80 MICROgram(s)/formoterol 4.5 MICROgram(s) Inhaler 2 Puff(s) Inhalation two times a day  dextrose 10% Bolus 125 milliLiter(s) IV Bolus once  dextrose 5%. 1000 milliLiter(s) (100 mL/Hr) IV Continuous <Continuous>  dextrose 5%. 1000 milliLiter(s) (50 mL/Hr) IV Continuous <Continuous>  dextrose 50% Injectable 12.5 Gram(s) IV Push once  dextrose 50% Injectable 25 Gram(s) IV Push once  diltiazem    milliGRAM(s) Oral at bedtime  diphenoxylate/atropine 1 Tablet(s) Oral at bedtime  enalapril 5 milliGRAM(s) Oral at bedtime  enoxaparin Injectable 60 milliGRAM(s) SubCutaneous every 12 hours  glucagon  Injectable 1 milliGRAM(s) IntraMuscular once  guaiFENesin  milliGRAM(s) Oral every 12 hours  insulin glargine Injectable (LANTUS) 15 Unit(s) SubCutaneous at bedtime  insulin lispro (ADMELOG) corrective regimen sliding scale   SubCutaneous three times a day before meals  insulin lispro (ADMELOG) corrective regimen sliding scale   SubCutaneous at bedtime  insulin lispro Injectable (ADMELOG) 4 Unit(s) SubCutaneous three times a day before meals  lactobacillus acidophilus 1 Tablet(s) Oral three times a day with meals  loperamide 2 milliGRAM(s) Oral two times a day  metoprolol succinate ER 50 milliGRAM(s) Oral at bedtime  piperacillin/tazobactam IVPB.. 3.375 Gram(s) IV Intermittent every 8 hours  polyethylene glycol 3350 17 Gram(s) Oral at bedtime  senna 2 Tablet(s) Oral at bedtime  simethicone 80 milliGRAM(s) Chew every 8 hours  sodium chloride 3%  Inhalation 4 milliLiter(s) Inhalation every 6 hours    MEDICATIONS  (PRN):  acetaminophen     Tablet .. 650 milliGRAM(s) Oral every 6 hours PRN Temp greater or equal to 38C (100.4F), Mild Pain (1 - 3)  albuterol    90 MICROgram(s) HFA Inhaler 2 Puff(s) Inhalation every 6 hours PRN Shortness of Breath and/or Wheezing  aluminum hydroxide/magnesium hydroxide/simethicone Suspension 30 milliLiter(s) Oral every 4 hours PRN Dyspepsia  dextrose Oral Gel 15 Gram(s) Oral once PRN Blood Glucose LESS THAN 70 milliGRAM(s)/deciliter  magnesium hydroxide Suspension 30 milliLiter(s) Oral daily PRN Constipation  melatonin 3 milliGRAM(s) Oral at bedtime PRN Insomnia  ondansetron Injectable 4 milliGRAM(s) IV Push every 8 hours PRN Nausea and/or Vomiting  ondansetron Injectable 4 milliGRAM(s) IV Push every 6 hours PRN Nausea and/or Vomiting  oxyCODONE    IR 2.5 milliGRAM(s) Oral every 4 hours PRN Moderate Pain (4 - 6)  oxyCODONE    IR 5 milliGRAM(s) Oral every 4 hours PRN Severe Pain (7 - 10)  traMADol 25 milliGRAM(s) Oral every 6 hours PRN Mild Pain (1 - 3)    Pertinent Labs: 07-05 Na135 mmol/L Glu 84 mg/dL K+ 3.9 mmol/L Cr  0.61 mg/dL BUN 12 mg/dL 07-05 Phos 3.0 mg/dL 07-03 Alb 1.3 g/dL<L>  06-28-24 A1C 9.5%; 07-04 POCT: 120, 291, 87, 281    Skin:   no pressure ulcers noted    Estimated Needs:   [X ] no change since previous assessment  (6/28)  [ ] recalculated:     Previous Nutrition Diagnosis:   [ x]  Severe Malnutrition in context of chronic illness  Etiology:  Inadequate energy/protein intake + increased needs related to hx of lung cancer, uncontrolled DM  Signs & Symptoms:  Physical findings of sever fat depletion & muscle wasting as noted on 6/28    GOAL:  Pt to consume >75% of meals/supplements during LOS - goal met c meals; no supplement has been added yet      Nutrition Diagnosis is [x ] ongoing  [ ] resolved [ ] not applicable     New Nutrition Diagnosis: [ x] not applicable      Interventions:   Liberalize diet; CCHO c snack (no dairy, no lactose)  Recommend  [ ] Change Diet To:  [X ] Nutrition Supplement:  add Glucerna x 2/day (provides 440 kcal, 20 g protein) - chocolate flavor  [ ] Nutrition Support  [X ] Other: DM education provided as noted above    Monitoring and Evaluation:   [X ] PO intake [ x ] Tolerance to diet prescription [ x ] weights [ x ] labs[ x ] follow up per protocol  [ ] other:

## 2024-07-05 NOTE — PROGRESS NOTE ADULT - SUBJECTIVE AND OBJECTIVE BOX
Patient is a 68y old  Female who presents with a chief complaint of mechanical fall (02 Jul 2024 10:45)    INTERVAL HPI/OVERNIGHT EVENTS:   Patient seen and examined bedside.   no overnight events       REVIEW OF SYSTEMS: remaining ROS negative     MEDICATIONS  (STANDING):  acetaminophen     Tablet .. 650 milliGRAM(s) Oral every 6 hours  azithromycin  IVPB 500 milliGRAM(s) IV Intermittent every 24 hours  budesonide  80 MICROgram(s)/formoterol 4.5 MICROgram(s) Inhaler 2 Puff(s) Inhalation two times a day  clonazePAM  Tablet 0.5 milliGRAM(s) Oral at bedtime  dextrose 10% Bolus 125 milliLiter(s) IV Bolus once  dextrose 5%. 1000 milliLiter(s) (100 mL/Hr) IV Continuous <Continuous>  dextrose 5%. 1000 milliLiter(s) (50 mL/Hr) IV Continuous <Continuous>  dextrose 50% Injectable 12.5 Gram(s) IV Push once  dextrose 50% Injectable 25 Gram(s) IV Push once  diltiazem    milliGRAM(s) Oral daily  enalapril 5 milliGRAM(s) Oral at bedtime  enoxaparin Injectable 60 milliGRAM(s) SubCutaneous every 12 hours  glucagon  Injectable 1 milliGRAM(s) IntraMuscular once  guaiFENesin  milliGRAM(s) Oral every 12 hours  insulin glargine Injectable (LANTUS) 12 Unit(s) SubCutaneous at bedtime  insulin lispro (ADMELOG) corrective regimen sliding scale   SubCutaneous at bedtime  insulin lispro (ADMELOG) corrective regimen sliding scale   SubCutaneous three times a day before meals  insulin lispro Injectable (ADMELOG) 4 Unit(s) SubCutaneous three times a day before meals  metoprolol succinate ER 50 milliGRAM(s) Oral at bedtime  piperacillin/tazobactam IVPB.. 3.375 Gram(s) IV Intermittent every 12 hours  polyethylene glycol 3350 17 Gram(s) Oral at bedtime  pregabalin 200 milliGRAM(s) Oral at bedtime  senna 2 Tablet(s) Oral at bedtime  sodium chloride 1 Gram(s) Oral two times a day  sodium chloride 0.9% Bolus 500 milliLiter(s) IV Bolus once  sodium chloride 0.9%. 500 milliLiter(s) (500 mL/Hr) IV Continuous <Continuous>    MEDICATIONS  (PRN):  acetaminophen     Tablet .. 650 milliGRAM(s) Oral every 6 hours PRN Temp greater or equal to 38C (100.4F), Mild Pain (1 - 3)  albuterol    90 MICROgram(s) HFA Inhaler 2 Puff(s) Inhalation every 6 hours PRN Shortness of Breath and/or Wheezing  aluminum hydroxide/magnesium hydroxide/simethicone Suspension 30 milliLiter(s) Oral every 4 hours PRN Dyspepsia  benzonatate 100 milliGRAM(s) Oral every 8 hours PRN Cough  dextrose Oral Gel 15 Gram(s) Oral once PRN Blood Glucose LESS THAN 70 milliGRAM(s)/deciliter  magnesium hydroxide Suspension 30 milliLiter(s) Oral daily PRN Constipation  melatonin 3 milliGRAM(s) Oral at bedtime PRN Insomnia  morphine  - Injectable 0.5 milliGRAM(s) IV Push every 6 hours PRN Moderate Pain (4 - 6)  morphine  - Injectable 1 milliGRAM(s) IV Push every 6 hours PRN Severe Pain (7 - 10)  ondansetron Injectable 4 milliGRAM(s) IV Push every 8 hours PRN Nausea and/or Vomiting  ondansetron Injectable 4 milliGRAM(s) IV Push every 6 hours PRN Nausea and/or Vomiting  oxyCODONE    IR 2.5 milliGRAM(s) Oral every 4 hours PRN Moderate Pain (4 - 6)  oxyCODONE    IR 5 milliGRAM(s) Oral every 4 hours PRN Severe Pain (7 - 10)  traMADol 25 milliGRAM(s) Oral every 6 hours PRN Mild Pain (1 - 3)      Allergies    No Known Allergies    Intolerances        Vital Signs Last 24 Hrs  T(C): 36.6 (05 Jul 2024 16:23), Max: 37.1 (05 Jul 2024 00:13)  T(F): 97.9 (05 Jul 2024 16:23), Max: 98.7 (05 Jul 2024 00:13)  HR: 88 (05 Jul 2024 17:09) (75 - 99)  BP: 139/77 (05 Jul 2024 16:23) (119/67 - 170/89)  BP(mean): --  RR: 18 (05 Jul 2024 16:23) (18 - 19)  SpO2: 96% (05 Jul 2024 17:09) (92% - 96%)    Parameters below as of 05 Jul 2024 17:09  Patient On (Oxygen Delivery Method): nasal cannula              PHYSICAL EXAM:  GENERAL: NAD, thin appearing, no increased WOB, speaking in full sentences, not using accessory muscles   HEAD:  Atraumatic, Normocephalic  EYES: EOMI, PERRLA, conjunctiva and sclera clear  ENMT: No tonsillar erythema, exudates, or enlargement; Moist mucous membranes,    NECK: Supple, No JVD,    NERVOUS SYSTEM:  Alert & Oriented X3, Good concentration; nonfocal   CHEST/LUNG: good b/l air entry, b/l course BS   HEART: Regular rate and rhythm; No murmurs, rubs, or gallops  ABDOMEN: Soft, Nontender, Nondistended; Bowel sounds present  EXTREMITIES:  2+ Peripheral Pulses b/l, No clubbing, cyanosis, calf tenderness or edema b/l          LABS:                                              10.8   5.74  )-----------( 353      ( 05 Jul 2024 06:55 )             35.1   07-05    135  |  101  |  12  ----------------------------<  84  3.9   |  30  |  0.61    Ca    8.6      05 Jul 2024 06:55  Phos  3.0     07-05  Mg     2.0     07-05          Urinalysis Basic - ( 02 Jul 2024 05:58 )    Color: x / Appearance: x / SG: x / pH: x  Gluc: 272 mg/dL / Ketone: x  / Bili: x / Urobili: x   Blood: x / Protein: x / Nitrite: x   Leuk Esterase: x / RBC: x / WBC x   Sq Epi: x / Non Sq Epi: x / Bacteria: x        RADIOLOGY & ADDITIONAL TESTS:    Imaging Personally Reviewed:  [ ] YES  [ ] NO    Consultant(s) Notes Reviewed:  [ x] YES  [ ] NO    Care Discussed with Consultants/Other Providers [ x] YES  [ ] NO  Care plan and all findings were discussed in detail with patient.  All questions and concerns addressed

## 2024-07-05 NOTE — PROGRESS NOTE ADULT - ASSESSMENT
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 She was walking down steps when she slipped and landed on her left hip. Denies LOC and head trauma. Pt denies any numbness, tingling or paresthesias.  In the ED, her vitals are WNL. Labs WNL. Xray LT hip w/ left intertrochanteric fracture.   (27 Jun 2024 15:31)   s/p L Hip IMN on 6/28    patient had RRT 7/1/2024 for hypoxemia         remains afebrile  No leukocytosis  sputum cx- e.coli , sensitivity reviewed   [facility] NGTD x 2 7/1  urine Legionella negative   US venous doppler study of b/l LEs - negative for DVT    CTA:  No pulmonary embolism.    New groundglass opacities with patchy areas of consolidation, increased small pleural effusions and bilateral lower lobe mucoid impaction. Suspect a combination of pulmonary edema and aspiration/pneumonia.  Circumferential mid to distal tracheal soft tissue thickening, extending to the scott, is nonspecific although possibly secondary to immunotherapy.  Additional indeterminate findings in this patient with reported history of lung cancer, including right apical and right paraspinal nodular opacities, hilar adenopathy, left upper quadrant soft tissue noduleand ill-defined splenic lesion. Comparison to more recent prior imaging, if   available, would be useful.      plan-  off zithromax, urine legionella AG is negative   stop Zosyn IV for pneumonia - completed 4 days  start Ceftriaxone IV  if pt's respiratory status continues to improve, ok to change abx to po Augmentin to complete 7 days course total   follow [facility] NGTD  follow sputum cx, grew E. Coli  check UA and urine cx  - not collected, pt denies having urinary symptoms   covid 19 negative   wean off supplemental O2 as able   malignancy management as per patient's oncologist   left hip surgical site management as per ortho team  ortho and pulmonology follow ups are appreciated   obtain US venous doppler study of Left UE r/o DVT       discussed with Dr. Lombardi  discussed with Dr. 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 She was walking down steps when she slipped and landed on her left hip. Denies LOC and head trauma. Pt denies any numbness, tingling or paresthesias.  In the ED, her vitals are WNL. Labs WNL. Xray LT hip w/ left intertrochanteric fracture.   (27 Jun 2024 15:31)   s/p L Hip IMN on 6/28    patient had RRT 7/1/2024 for hypoxemia       remains afebrile  No leukocytosis  sputum cx- e.coli , sensitivity reviewed (pan-sensitive)  [facility] NGTD x 2 7/1  urine Legionella negative   US venous doppler study of b/l LEs - negative for DVT    CTA:  No pulmonary embolism.    New groundglass opacities with patchy areas of consolidation, increased small pleural effusions and bilateral lower lobe mucoid impaction. Suspect a combination of pulmonary edema and aspiration/pneumonia.  Circumferential mid to distal tracheal soft tissue thickening, extending to the scott, is nonspecific although possibly secondary to immunotherapy.  Additional indeterminate findings in this patient with reported history of lung cancer, including right apical and right paraspinal nodular opacities, hilar adenopathy, left upper quadrant soft tissue noduleand ill-defined splenic lesion. Comparison to more recent prior imaging, if   available, would be useful.      plan-  off zithromax, urine legionella AG is negative   stop Zosyn IV for pneumonia - completed 4 days  start Ceftriaxone IV as the e.coli in sputum cx is sens to this   if pt's respiratory status continues to improve, ok to change abx to po Augmentin to complete 7 days course total   follow [facility] NGTD  follow sputum cx, grew E. Coli  covid 19 negative   wean off supplemental O2 as able   malignancy management as per patient's oncologist   left hip surgical site management as per ortho team  ortho and pulmonology follow ups are appreciated   obtain US venous doppler study of Left UE r/o DVT       discussed with Dr. Lombardi  discussed with Dr. Moreno

## 2024-07-05 NOTE — PROGRESS NOTE ADULT - NS ATTEND AMEND GEN_ALL_CORE FT
All labs and cultures and imaging and pertinent chart notes reviewed by me.    case d/w Np Dileep at length and agree with her assessment and plan.    Elsa Lombardi MD  Infectious Disease Attending    for any questions please do not hesitate to contact me either via teams or by calling [phone]
I have reviewed all pertinent clinical information and agree with the NP's note.  Any new labs, recent cultures, new imaging (if applicable) and vitals have been reviewed today.  All necessary adjustments to management have been made.  Agree with the above assessment and plan.    stop azithromycin   continue Zosyn IV for pneumonia (day #3), if GFR >20 should be q8hrs, if <20 then q12hrs   follow [facility] NGTD  awaiting for sputum cx sensitivity, grew E. Coli  check UA and urine cx  - not collected, pt denies having urinary symptoms   covid 19 negative   wean off supplemental O2 as able   malignancy management as per patient's oncologist   left hip surgical site management as per ortho team  ortho and pulmonology follow ups are appreciated     Discussed plan with patients primary team.    Justin Ortega DO  Chief, Infectious Disease at [facility]  Reachable via Microsoft Teams or ID office: [phone]  Weekdays: After 5pm, please call [phone] for all inquiries and new consults  Weekends: Message on-call infectious disease physician via teams (love Reeves)
All labs and cultures and imaging and pertinent chart notes reviewed by me.    case d/w Np Dileep at length and agree with her assessment and plan.    urine legionella AG- negative  d/c zithromax  continue with zosyn.    Elsa Lombardi MD  Infectious Disease Attending    for any questions please do not hesitate to contact me either via teams or by calling [phone]

## 2024-07-05 NOTE — PROGRESS NOTE ADULT - ASSESSMENT
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 Now S/P L Hip IMN. RRT today 7/1 for acute hypoxemic respiratory failure. Pulmonary consulted.     DX: Lung Cancer, Pneumonia, Left hip IMN    Recommendations:    - MS now improved at baseline  - 3 L NC NC with SpO2 94-95%  - Continue to titrate down FiO2 as tolerates for goal 02>90%  - CTA chest negative for PE, does show B/L LL PNA with mucoid impaction  - Would start duoneb q6 with hypersal Nebs used with Aerobika to facilitate secretion mobilization  - Chest PT  - Sputum culture with e. Coli  - ABX changed from zosyn to ceftriaxone per ID   - continue incentive spirometer   - Does have underlying Lung cancer on immunotherapy last dose 6/26. Will need to F/U with oncologist upon DC from hospital.         Plan discussed with Pulmonary attending Dr. Stovall

## 2024-07-05 NOTE — PROGRESS NOTE ADULT - SUBJECTIVE AND OBJECTIVE BOX
CHIEF COMPLAINT: PATIENT FELL, LEFT HIP PAIN  INTERTROCHANTERIC FRACTURE OF LEFT HIP  ==============    INTERVAL EVENTS:  ==================  - T(F): , Max: 98.7 (07-05-24 @ 00:13)  - SpO2:  (92% - 95%)   - going to US Left arm       REVIEW OF SYSTEMS:  ==================  -NEGATIVE except for those listed above     HPI:  ======  H&P Adult (06-27-24 @ 15:31)      OBJECTIVE:  ===========  ICU Vital Signs Last 24 Hrs  T(C): 36.6 (05 Jul 2024 16:23), Max: 37.1 (05 Jul 2024 00:13)  T(F): 97.9 (05 Jul 2024 16:23), Max: 98.7 (05 Jul 2024 00:13)  HR: 98 (05 Jul 2024 16:23) (75 - 99)  BP: 139/77 (05 Jul 2024 16:23) (119/67 - 170/89)  RR: 18 (05 Jul 2024 16:23) (18 - 19)  SpO2: 93% (05 Jul 2024 16:23) (92% - 95%)    O2 Parameters below as of 05 Jul 2024 16:23  Patient On (Oxygen Delivery Method): nasal cannula  O2 Flow (L/min): 3      07-04 @ 07:01  -  07-05 @ 07:00  --------------------------------------------------------  IN: 0 mL / OUT: 1000 mL / NET: -1000 mL    07-05 @ 07:01  -  07-05 @ 17:48  --------------------------------------------------------  IN: 600 mL / OUT: 600 mL / NET: 0 mL      CAPILLARY BLOOD GLUCOSE  POCT Blood Glucose.: 89 mg/dL (05 Jul 2024 16:42)      PHYSICAL EXAM:  ==============  GENERAL: NAD, well-groomed, well-developed  HEAD:  Atraumatic, Normocephalic  EYES: EOMI, PERRLA, conjunctiva and sclera clear  ENMT: No tonsillar erythema, exudates, or enlargement; Moist mucous membranes, Good dentition, No lesions  NECK: Supple, No JVD, Normal thyroid  NERVOUS SYSTEM:  Alert & Oriented X3, Good concentration; Motor Strength 5/5 B/L upper and lower extremities; DTRs 2+ intact and symmetric  CHEST/LUNG: coarse BS, occasional wheeze   HEART: Regular rate and rhythm; No murmurs, rubs, or gallops  ABDOMEN: Soft, Nontender, Nondistended; Bowel sounds present  EXTREMITIES:  2+ Peripheral Pulses, No clubbing, cyanosis, or edema, left arm + swelling   LYMPH: No lymphadenopathy noted  SKIN: No rashes or lesions      HOSPITAL MEDICATIONS:  ======================  enoxaparin Injectable 60 milliGRAM(s) SubCutaneous every 12 hours  cefTRIAXone   IVPB 1000 milliGRAM(s) IV Intermittent every 24 hours  diltiazem    milliGRAM(s) Oral at bedtime  enalapril 5 milliGRAM(s) Oral at bedtime  metoprolol succinate ER 50 milliGRAM(s) Oral at bedtime  dextrose 50% Injectable 12.5 Gram(s) IV Push once  dextrose 50% Injectable 25 Gram(s) IV Push once  dextrose Oral Gel 15 Gram(s) Oral once PRN  glucagon  Injectable 1 milliGRAM(s) IntraMuscular once  insulin glargine Injectable (LANTUS) 15 Unit(s) SubCutaneous at bedtime  insulin lispro (ADMELOG) corrective regimen sliding scale   SubCutaneous at bedtime  insulin lispro (ADMELOG) corrective regimen sliding scale   SubCutaneous three times a day before meals  insulin lispro Injectable (ADMELOG) 4 Unit(s) SubCutaneous three times a day before meals  albuterol    90 MICROgram(s) HFA Inhaler 2 Puff(s) Inhalation every 6 hours PRN  albuterol/ipratropium for Nebulization 3 milliLiter(s) Nebulizer every 6 hours  budesonide  80 MICROgram(s)/formoterol 4.5 MICROgram(s) Inhaler 2 Puff(s) Inhalation two times a day  guaiFENesin  milliGRAM(s) Oral every 12 hours  sodium chloride 3%  Inhalation 4 milliLiter(s) Inhalation every 6 hours    acetaminophen     Tablet .. 650 milliGRAM(s) Oral every 6 hours PRN  acetaminophen     Tablet .. 650 milliGRAM(s) Oral every 6 hours  melatonin 3 milliGRAM(s) Oral at bedtime PRN  ondansetron Injectable 4 milliGRAM(s) IV Push every 8 hours PRN  ondansetron Injectable 4 milliGRAM(s) IV Push every 6 hours PRN  oxyCODONE    IR 2.5 milliGRAM(s) Oral every 4 hours PRN  oxyCODONE    IR 5 milliGRAM(s) Oral every 4 hours PRN  traMADol 25 milliGRAM(s) Oral every 6 hours PRN  aluminum hydroxide/magnesium hydroxide/simethicone Suspension 30 milliLiter(s) Oral every 4 hours PRN  diphenoxylate/atropine 1 Tablet(s) Oral at bedtime  loperamide 2 milliGRAM(s) Oral two times a day  magnesium hydroxide Suspension 30 milliLiter(s) Oral daily PRN  polyethylene glycol 3350 17 Gram(s) Oral at bedtime  senna 2 Tablet(s) Oral at bedtime  simethicone 80 milliGRAM(s) Chew every 8 hours  dextrose 10% Bolus 125 milliLiter(s) IV Bolus once  dextrose 5%. 1000 milliLiter(s) IV Continuous <Continuous>  dextrose 5%. 1000 milliLiter(s) IV Continuous <Continuous>  dimethyl fumarate DR Capsule 240 milliGRAM(s) Oral two times a day  lactobacillus acidophilus 1 Tablet(s) Oral three times a day with meals      LABS:  ======                          10.8   5.74  )-----------( 353      ( 05 Jul 2024 06:55 )             35.1     07-05    135  |  101  |  12  ----------------------------<  84  3.9   |  30  |  0.61    Ca    8.6      05 Jul 2024 06:55  Phos  3.0     07-05  Mg     2.0     07-05        Lactate, Blood: 1.0    Urinalysis Basic - ( 05 Jul 2024 06:55 )    Color: x / Appearance: x / SG: x / pH: x  Gluc: 84 mg/dL / Ketone: x  / Bili: x / Urobili: x   Blood: x / Protein: x / Nitrite: x   Leuk Esterase: x / RBC: x / WBC x   Sq Epi: x / Non Sq Epi: x / Bacteria: x              MICROBIOLOGY:     Culture - Sputum  Source: .Sputum Sputum  Gram Stain (07-03-24 @ 16:37):    Moderate polymorphonuclear leukocytes per low power field    No Squamous epithelial cells per low power field    Numerous Gram Negative Rods seen per oil power field    Few Gram positive cocci in pairs seen per oil power field  Final Report (07-03-24 @ 16:37):    Numerous Escherichia coli    Normal Respiratory Jordana present  Organism: Escherichia coli (07-03-24 @ 16:37)  Organism: Escherichia coli (07-03-24 @ 16:37)    Sensitivities:      -  Levofloxacin: S <=0.5      -  Tobramycin: S <=2      -  Aztreonam: S <=4      -  Gentamicin: S <=2      -  Cefazolin: S <=2      -  Cefepime: S <=2      -  Piperacillin/Tazobactam: S <=8      -  Ciprofloxacin: S <=0.25      -  Imipenem: S <=1      -  Ceftriaxone: S <=1      -  Ampicillin: R >16 These ampicillin results predict results for amoxicillin      Method Type: ETHEL      -  Meropenem: S <=1      -  Ampicillin/Sulbactam: I 16/8      -  Cefoxitin: S <=8      -  Amoxicillin/Clavulanic Acid: S <=8/4      -  Trimethoprim/Sulfamethoxazole: R >2/38      -  Ertapenem: S <=0.5    Culture - Blood  Source: .Blood Blood-Peripheral  Preliminary Report (07-04-24 @ 23:00):    No growth at 72 Hours    Culture - Blood  Source: .Blood Blood-Peripheral  Preliminary Report (07-04-24 @ 23:00):    No growth at 72 Hours    cefTRIAXone   IVPB 1000 every 24 hours    Legionella Antigen, Urine: Negative (07-01-24 @ 18:00)      Rapid RVP Result: NotDetec (07-01-24 @ 14:54)      RADIOLOGY:  ==========      < from: US Duplex Venous Upper Ext Ltd, Left (07.05.24 @ 15:21) >  IMPRESSION:  No evidence of acute left upper extremity deep venous thrombosis.    Chronic post thrombotic change in the left internal jugular vein.    < end of copied text >        PULMONARY:  ============    albuterol    90 MICROgram(s) HFA Inhaler 2 Inhalation every 6 hours PRN  albuterol/ipratropium for Nebulization 3 Nebulizer every 6 hours  budesonide  80 MICROgram(s)/formoterol 4.5 MICROgram(s) Inhaler 2 Inhalation two times a day  guaiFENesin  Oral every 12 hours  sodium chloride 3%  Inhalation 4 Inhalation every 6 hours      CARDIAC:  ========

## 2024-07-05 NOTE — PROGRESS NOTE ADULT - SUBJECTIVE AND OBJECTIVE BOX
SUBJECTIVE:      Pt seen and examined at bedside. No acute events overnight. Patient's mental status is improving and is now on Nasal cannula instead of nonrebreather. Patient doing well with no complaints overall. Reports pain well-controlled with medication. No CP, N/V, F/C, new N/T.      Vital Signs (24 Hrs):  T(C): 36.6 (07-05-24 @ 04:58), Max: 37.1 (07-05-24 @ 00:13)  HR: 75 (07-05-24 @ 06:04) (71 - 104)  BP: 119/67 (07-05-24 @ 04:58) (117/65 - 170/89)  RR: 18 (07-05-24 @ 04:58) (18 - 20)  SpO2: 93% (07-05-24 @ 06:04) (92% - 100%)  Wt(kg): --    LABS:                          11.1   5.15  )-----------( 337      ( 04 Jul 2024 06:07 )             35.5     07-04    133<L>  |  98  |  16  ----------------------------<  134<H>  3.4<L>   |  33<H>  |  0.70    Ca    8.7      04 Jul 2024 06:07  Phos  2.5     07-04  Mg     1.9     07-04    TPro  5.3<L>  /  Alb  1.3<L>  /  TBili  0.3  /  DBili  x   /  AST  10<L>  /  ALT  10<L>  /  AlkPhos  88  07-03    LIVER FUNCTIONS - ( 03 Jul 2024 06:45 )  Alb: 1.3 g/dL / Pro: 5.3 gm/dL / ALK PHOS: 88 U/L / ALT: 10 U/L / AST: 10 U/L / GGT: x                      General: NAD, resting comfortably in bed  LLE  incision c/d/i  SCDs present bilaterally  Compartments soft and compressible  No calf tenderness bilaterally  Motor: +TA/EHL/FHL/GSC  Sensory: +SILT SPN/DPN/ASHLY/SAPH/TIB  2+ DP    A/P:  68y F with POD 7 sp L Hip IMN. Monitor Glucose levels.      -PT/OT: WBAT    -Pain Control  -Cont DVT ppx: therapeutic lovenox per medicine team  -FU AM Labs  -Rest, ice as needed  -Incentive Spirometry  -Dispo per medicine team and PT recommending [facility]  - Stable for ortho DC  Discussed with Attending who is aware and agrees with above plan.

## 2024-07-05 NOTE — PROGRESS NOTE ADULT - ASSESSMENT
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    #LT hip IT fracture s/p mechanical trip and fall  - Appreciate ortho recs  - Pain control with morphine PRN for moderate and severe  - F/u CT L femur to rule out pathologic lesions  - Patient is medically optimized for surgical fixation of LT hip  - S/p LT hip IMN POD4    #Sepsis likely 2/2 PNA  - Sputum cx w/ numerous GNR  - F/u blood cx  - CTA chest negative for PE  - Pulm recs appreciated  - F/u US duplex LE to r/o DVT  - 7/5 zosyn changed to ceftriaxone for 3 more days    legionella neg, azithromycin was d/c'd   - ID following    #Lung cancer  - on immunotherapy. Last dose on 6/26  - will need outpatient follow up with oncology    #HTN  - c/w home metoprolol, diltiazem and enalapril    #IDDM  - c/w basal lantus (reduced dose) and SSI  - controlled  - No anion gap, normal pH no active evidence of DKA  - increased lantus 15 units    #Jugular vein thrombosis  - will c/w lovenox    Preventative Measures   DVT ppx- Lovenox  FULL CODE

## 2024-07-06 LAB
ANION GAP SERPL CALC-SCNC: 1 MMOL/L — LOW (ref 5–17)
BUN SERPL-MCNC: 16 MG/DL — SIGNIFICANT CHANGE UP (ref 7–23)
CALCIUM SERPL-MCNC: 9.1 MG/DL — SIGNIFICANT CHANGE UP (ref 8.5–10.1)
CHLORIDE SERPL-SCNC: 98 MMOL/L — SIGNIFICANT CHANGE UP (ref 96–108)
CO2 SERPL-SCNC: 36 MMOL/L — HIGH (ref 22–31)
CREAT SERPL-MCNC: 0.68 MG/DL — SIGNIFICANT CHANGE UP (ref 0.5–1.3)
CULTURE RESULTS: SIGNIFICANT CHANGE UP
CULTURE RESULTS: SIGNIFICANT CHANGE UP
EGFR: 95 ML/MIN/1.73M2 — SIGNIFICANT CHANGE UP
GLUCOSE BLDC GLUCOMTR-MCNC: 132 MG/DL — HIGH (ref 70–99)
GLUCOSE BLDC GLUCOMTR-MCNC: 159 MG/DL — HIGH (ref 70–99)
GLUCOSE BLDC GLUCOMTR-MCNC: 241 MG/DL — HIGH (ref 70–99)
GLUCOSE BLDC GLUCOMTR-MCNC: 259 MG/DL — HIGH (ref 70–99)
GLUCOSE BLDC GLUCOMTR-MCNC: 37 MG/DL — CRITICAL LOW (ref 70–99)
GLUCOSE BLDC GLUCOMTR-MCNC: 39 MG/DL — CRITICAL LOW (ref 70–99)
GLUCOSE SERPL-MCNC: 35 MG/DL — CRITICAL LOW (ref 70–99)
HCT VFR BLD CALC: 36.3 % — SIGNIFICANT CHANGE UP (ref 34.5–45)
HGB BLD-MCNC: 11.1 G/DL — LOW (ref 11.5–15.5)
MCHC RBC-ENTMCNC: 25.9 PG — LOW (ref 27–34)
MCHC RBC-ENTMCNC: 30.6 G/DL — LOW (ref 32–36)
MCV RBC AUTO: 84.6 FL — SIGNIFICANT CHANGE UP (ref 80–100)
NRBC # BLD: 0 /100 WBCS — SIGNIFICANT CHANGE UP (ref 0–0)
PLATELET # BLD AUTO: 386 K/UL — SIGNIFICANT CHANGE UP (ref 150–400)
POTASSIUM SERPL-MCNC: 4.7 MMOL/L — SIGNIFICANT CHANGE UP (ref 3.5–5.3)
POTASSIUM SERPL-SCNC: 4.7 MMOL/L — SIGNIFICANT CHANGE UP (ref 3.5–5.3)
RBC # BLD: 4.29 M/UL — SIGNIFICANT CHANGE UP (ref 3.8–5.2)
RBC # FLD: 15.3 % — HIGH (ref 10.3–14.5)
SODIUM SERPL-SCNC: 135 MMOL/L — SIGNIFICANT CHANGE UP (ref 135–145)
SPECIMEN SOURCE: SIGNIFICANT CHANGE UP
SPECIMEN SOURCE: SIGNIFICANT CHANGE UP
WBC # BLD: 7.03 K/UL — SIGNIFICANT CHANGE UP (ref 3.8–10.5)
WBC # FLD AUTO: 7.03 K/UL — SIGNIFICANT CHANGE UP (ref 3.8–10.5)

## 2024-07-06 PROCEDURE — 99232 SBSQ HOSP IP/OBS MODERATE 35: CPT

## 2024-07-06 PROCEDURE — 99292 CRITICAL CARE ADDL 30 MIN: CPT

## 2024-07-06 RX ORDER — DEXTROSE 30 % IN WATER 30 %
50 VIAL (ML) INTRAVENOUS ONCE
Refills: 0 | Status: COMPLETED | OUTPATIENT
Start: 2024-07-06 | End: 2024-07-06

## 2024-07-06 RX ORDER — OXYCODONE HYDROCHLORIDE 100 MG/5ML
5 SOLUTION ORAL ONCE
Refills: 0 | Status: DISCONTINUED | OUTPATIENT
Start: 2024-07-06 | End: 2024-07-06

## 2024-07-06 RX ORDER — INSULIN LISPRO 100 [IU]/ML
2 INJECTION, SOLUTION SUBCUTANEOUS
Refills: 0 | Status: DISCONTINUED | OUTPATIENT
Start: 2024-07-06 | End: 2024-07-11

## 2024-07-06 RX ADMIN — Medication 4 MILLILITER(S): at 06:37

## 2024-07-06 RX ADMIN — Medication 4 MILLILITER(S): at 17:17

## 2024-07-06 RX ADMIN — Medication 650 MILLIGRAM(S): at 06:34

## 2024-07-06 RX ADMIN — DIMETHYL FUMARATE 240 MILLIGRAM(S): 240 CAPSULE, DELAYED RELEASE ORAL at 17:41

## 2024-07-06 RX ADMIN — Medication 650 MILLIGRAM(S): at 17:25

## 2024-07-06 RX ADMIN — Medication 1 TABLET(S): at 22:12

## 2024-07-06 RX ADMIN — IPRATROPIUM BROMIDE AND ALBUTEROL SULFATE 3 MILLILITER(S): .5; 3 SOLUTION RESPIRATORY (INHALATION) at 06:36

## 2024-07-06 RX ADMIN — OXYCODONE HYDROCHLORIDE 5 MILLIGRAM(S): 100 SOLUTION ORAL at 00:56

## 2024-07-06 RX ADMIN — Medication 1 TABLET(S): at 14:20

## 2024-07-06 RX ADMIN — DIMETHYL FUMARATE 240 MILLIGRAM(S): 240 CAPSULE, DELAYED RELEASE ORAL at 06:36

## 2024-07-06 RX ADMIN — INSULIN LISPRO 6: 100 INJECTION, SOLUTION SUBCUTANEOUS at 13:11

## 2024-07-06 RX ADMIN — ENOXAPARIN SODIUM 60 MILLIGRAM(S): 100 INJECTION SUBCUTANEOUS at 22:09

## 2024-07-06 RX ADMIN — Medication 4 MILLILITER(S): at 11:13

## 2024-07-06 RX ADMIN — Medication 50 MILLILITER(S): at 08:31

## 2024-07-06 RX ADMIN — Medication 600 MILLIGRAM(S): at 17:35

## 2024-07-06 RX ADMIN — Medication 5 MILLIGRAM(S): at 22:09

## 2024-07-06 RX ADMIN — Medication 1 TABLET(S): at 17:42

## 2024-07-06 RX ADMIN — Medication 100 MILLIGRAM(S): at 17:35

## 2024-07-06 RX ADMIN — Medication 2 PUFF(S): at 19:25

## 2024-07-06 RX ADMIN — IPRATROPIUM BROMIDE AND ALBUTEROL SULFATE 3 MILLILITER(S): .5; 3 SOLUTION RESPIRATORY (INHALATION) at 11:13

## 2024-07-06 RX ADMIN — Medication 650 MILLIGRAM(S): at 13:19

## 2024-07-06 RX ADMIN — OXYCODONE HYDROCHLORIDE 5 MILLIGRAM(S): 100 SOLUTION ORAL at 23:00

## 2024-07-06 RX ADMIN — Medication 650 MILLIGRAM(S): at 07:14

## 2024-07-06 RX ADMIN — Medication 600 MILLIGRAM(S): at 06:35

## 2024-07-06 RX ADMIN — Medication 50 MILLIGRAM(S): at 23:19

## 2024-07-06 RX ADMIN — IPRATROPIUM BROMIDE AND ALBUTEROL SULFATE 3 MILLILITER(S): .5; 3 SOLUTION RESPIRATORY (INHALATION) at 17:16

## 2024-07-06 RX ADMIN — Medication 2 MILLIGRAM(S): at 06:34

## 2024-07-06 RX ADMIN — Medication 2 PUFF(S): at 06:38

## 2024-07-06 RX ADMIN — OXYCODONE HYDROCHLORIDE 5 MILLIGRAM(S): 100 SOLUTION ORAL at 22:08

## 2024-07-06 RX ADMIN — INSULIN LISPRO 2 UNIT(S): 100 INJECTION, SOLUTION SUBCUTANEOUS at 13:13

## 2024-07-06 RX ADMIN — Medication 650 MILLIGRAM(S): at 17:33

## 2024-07-06 RX ADMIN — DILTIAZEM HYDROCHLORIDE 120 MILLIGRAM(S): 240 CAPSULE, EXTENDED RELEASE ORAL at 22:09

## 2024-07-06 RX ADMIN — Medication 650 MILLIGRAM(S): at 00:56

## 2024-07-06 RX ADMIN — Medication 80 MILLIGRAM(S): at 06:34

## 2024-07-06 RX ADMIN — Medication 1 TABLET(S): at 09:30

## 2024-07-06 RX ADMIN — ENOXAPARIN SODIUM 60 MILLIGRAM(S): 100 INJECTION SUBCUTANEOUS at 09:29

## 2024-07-06 NOTE — CHART NOTE - NSCHARTNOTEFT_GEN_A_CORE
Called to evaluate patient for fingerstick 35.  Patient experiencing generalized weakness, in addition lightheadedness.  No chest pain, SOB.  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 She was walking down steps when she slipped and landed on her left hip. Denies LOC and head trauma. Pt denies any numbness, tingling or paresthesias.    In the ED, her vitals are WNL. Labs WNL. Xray LT hip w/ left intertrochanteric fracture.   (27 Jun 2024 15:31)      acetaminophen     Tablet .. 650 milliGRAM(s) Oral every 6 hours PRN  acetaminophen     Tablet .. 650 milliGRAM(s) Oral every 6 hours  albuterol    90 MICROgram(s) HFA Inhaler 2 Puff(s) Inhalation every 6 hours PRN  albuterol/ipratropium for Nebulization 3 milliLiter(s) Nebulizer every 6 hours  aluminum hydroxide/magnesium hydroxide/simethicone Suspension 30 milliLiter(s) Oral every 4 hours PRN  budesonide  80 MICROgram(s)/formoterol 4.5 MICROgram(s) Inhaler 2 Puff(s) Inhalation two times a day  cefTRIAXone   IVPB 1000 milliGRAM(s) IV Intermittent every 24 hours  dextrose 10% Bolus 125 milliLiter(s) IV Bolus once  dextrose 5%. 1000 milliLiter(s) IV Continuous <Continuous>  dextrose 5%. 1000 milliLiter(s) IV Continuous <Continuous>  dextrose 50% Injectable 12.5 Gram(s) IV Push once  dextrose 50% Injectable 25 Gram(s) IV Push once  dextrose Oral Gel 15 Gram(s) Oral once PRN  diltiazem    milliGRAM(s) Oral at bedtime  dimethyl fumarate DR Capsule 240 milliGRAM(s) Oral two times a day  diphenoxylate/atropine 1 Tablet(s) Oral at bedtime  enalapril 5 milliGRAM(s) Oral at bedtime  enoxaparin Injectable 60 milliGRAM(s) SubCutaneous every 12 hours  glucagon  Injectable 1 milliGRAM(s) IntraMuscular once  guaiFENesin  milliGRAM(s) Oral every 12 hours  insulin lispro (ADMELOG) corrective regimen sliding scale   SubCutaneous three times a day before meals  insulin lispro (ADMELOG) corrective regimen sliding scale   SubCutaneous at bedtime  insulin lispro Injectable (ADMELOG) 2 Unit(s) SubCutaneous three times a day before meals  lactobacillus acidophilus 1 Tablet(s) Oral three times a day with meals  loperamide 2 milliGRAM(s) Oral two times a day  magnesium hydroxide Suspension 30 milliLiter(s) Oral daily PRN  melatonin 3 milliGRAM(s) Oral at bedtime PRN  metoprolol succinate ER 50 milliGRAM(s) Oral at bedtime  ondansetron Injectable 4 milliGRAM(s) IV Push every 8 hours PRN  ondansetron Injectable 4 milliGRAM(s) IV Push every 6 hours PRN  polyethylene glycol 3350 17 Gram(s) Oral at bedtime  senna 2 Tablet(s) Oral at bedtime  simethicone 80 milliGRAM(s) Chew every 8 hours  sodium chloride 3%  Inhalation 4 milliLiter(s) Inhalation every 6 hours      T(F): 97.3 (07-06-24 @ 04:46), Max: 98.4 (07-05-24 @ 23:31)  HR: 84 (07-06-24 @ 06:39) (81 - 98)  BP: 142/74 (07-06-24 @ 04:46) (124/72 - 146/66)  RR: 18 (07-06-24 @ 04:46) (18 - 19)  SpO2: 97% (07-06-24 @ 06:39) (92% - 97%)  Wt(kg): --      LABS:                        11.1   7.03  )-----------( 386      ( 06 Jul 2024 07:04 )             36.3     07-06    135  |  98  |  16  ----------------------------<  35<LL>  4.7   |  36<H>  |  0.68    Ca    9.1      06 Jul 2024 07:04  Phos  3.0     07-05  Mg     2.0     07-05            I&O's Detail    05 Jul 2024 07:01  -  06 Jul 2024 07:00  --------------------------------------------------------  IN:    Oral Fluid: 600 mL  Total IN: 600 mL    OUT:    Voided (mL): 2200 mL  Total OUT: 2200 mL    Total NET: -1600 mL            Assessment:  HPI:  Patient is a 68-year-old female with history of lung cancer (on immunotherapy), HTN, IDDM, jugular vein thrombosis on Lovenox who presents with LT-sided hip pain after sustaining a fall. She was walking down steps when she slipped and landed on her left hip. Denies LOC and head trauma. Pt denies any numbness, tingling or paresthesias.    In the ED, her vitals are WNL. Labs WNL. Xray LT hip w/ left intertrochanteric fracture.   (27 Jun 2024 15:31)      Diabetes    Hypertension    Myasthenia gravis    Multiple sclerosis    Alcohol abuse    Pancreatic insufficiency        Plan:  - D50 x stat, repeat fingerstick in 30 mins  - d/c Lantus 15U  - decreased pre-meal 4U --> 2Units TID On review of labs, patient's serum glucose 35, confirmed on fingerstick 37.  Patient experiencing generalized weakness, in addition lightheadedness.  No chest pain, SOB.  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 She was walking down steps when she slipped and landed on her left hip. Denies LOC and head trauma. Pt denies any numbness, tingling or paresthesias.    In the ED, her vitals are WNL. Labs WNL. Xray LT hip w/ left intertrochanteric fracture.   (27 Jun 2024 15:31)      acetaminophen     Tablet .. 650 milliGRAM(s) Oral every 6 hours PRN  acetaminophen     Tablet .. 650 milliGRAM(s) Oral every 6 hours  albuterol    90 MICROgram(s) HFA Inhaler 2 Puff(s) Inhalation every 6 hours PRN  albuterol/ipratropium for Nebulization 3 milliLiter(s) Nebulizer every 6 hours  aluminum hydroxide/magnesium hydroxide/simethicone Suspension 30 milliLiter(s) Oral every 4 hours PRN  budesonide  80 MICROgram(s)/formoterol 4.5 MICROgram(s) Inhaler 2 Puff(s) Inhalation two times a day  cefTRIAXone   IVPB 1000 milliGRAM(s) IV Intermittent every 24 hours  dextrose 10% Bolus 125 milliLiter(s) IV Bolus once  dextrose 5%. 1000 milliLiter(s) IV Continuous <Continuous>  dextrose 5%. 1000 milliLiter(s) IV Continuous <Continuous>  dextrose 50% Injectable 12.5 Gram(s) IV Push once  dextrose 50% Injectable 25 Gram(s) IV Push once  dextrose Oral Gel 15 Gram(s) Oral once PRN  diltiazem    milliGRAM(s) Oral at bedtime  dimethyl fumarate DR Capsule 240 milliGRAM(s) Oral two times a day  diphenoxylate/atropine 1 Tablet(s) Oral at bedtime  enalapril 5 milliGRAM(s) Oral at bedtime  enoxaparin Injectable 60 milliGRAM(s) SubCutaneous every 12 hours  glucagon  Injectable 1 milliGRAM(s) IntraMuscular once  guaiFENesin  milliGRAM(s) Oral every 12 hours  insulin lispro (ADMELOG) corrective regimen sliding scale   SubCutaneous three times a day before meals  insulin lispro (ADMELOG) corrective regimen sliding scale   SubCutaneous at bedtime  insulin lispro Injectable (ADMELOG) 2 Unit(s) SubCutaneous three times a day before meals  lactobacillus acidophilus 1 Tablet(s) Oral three times a day with meals  loperamide 2 milliGRAM(s) Oral two times a day  magnesium hydroxide Suspension 30 milliLiter(s) Oral daily PRN  melatonin 3 milliGRAM(s) Oral at bedtime PRN  metoprolol succinate ER 50 milliGRAM(s) Oral at bedtime  ondansetron Injectable 4 milliGRAM(s) IV Push every 8 hours PRN  ondansetron Injectable 4 milliGRAM(s) IV Push every 6 hours PRN  polyethylene glycol 3350 17 Gram(s) Oral at bedtime  senna 2 Tablet(s) Oral at bedtime  simethicone 80 milliGRAM(s) Chew every 8 hours  sodium chloride 3%  Inhalation 4 milliLiter(s) Inhalation every 6 hours      T(F): 97.3 (07-06-24 @ 04:46), Max: 98.4 (07-05-24 @ 23:31)  HR: 84 (07-06-24 @ 06:39) (81 - 98)  BP: 142/74 (07-06-24 @ 04:46) (124/72 - 146/66)  RR: 18 (07-06-24 @ 04:46) (18 - 19)  SpO2: 97% (07-06-24 @ 06:39) (92% - 97%)  Wt(kg): --      LABS:                        11.1   7.03  )-----------( 386      ( 06 Jul 2024 07:04 )             36.3     07-06    135  |  98  |  16  ----------------------------<  35<LL>  4.7   |  36<H>  |  0.68    Ca    9.1      06 Jul 2024 07:04  Phos  3.0     07-05  Mg     2.0     07-05            I&O's Detail    05 Jul 2024 07:01  -  06 Jul 2024 07:00  --------------------------------------------------------  IN:    Oral Fluid: 600 mL  Total IN: 600 mL    OUT:    Voided (mL): 2200 mL  Total OUT: 2200 mL    Total NET: -1600 mL            Assessment:  HPI:  Patient is a 68-year-old female with history of lung cancer (on immunotherapy), HTN, IDDM, jugular vein thrombosis on Lovenox who presents with LT-sided hip pain after sustaining a fall. She was walking down steps when she slipped and landed on her left hip. Denies LOC and head trauma. Pt denies any numbness, tingling or paresthesias.    In the ED, her vitals are WNL. Labs WNL. Xray LT hip w/ left intertrochanteric fracture.   (27 Jun 2024 15:31)      Diabetes    Hypertension    Myasthenia gravis    Multiple sclerosis    Alcohol abuse    Pancreatic insufficiency        Plan:  - D50 x stat, repeat fingerstick in 30 mins  - d/c Lantus 15U  - decreased pre-meal 4U --> 2Units TID

## 2024-07-06 NOTE — PROGRESS NOTE ADULT - ASSESSMENT
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    #LT hip IT fracture s/p mechanical trip and fall  - Appreciate ortho recs  - Pain control with morphine PRN for moderate and severe  - F/u CT L femur to rule out pathologic lesions  - Patient is medically optimized for surgical fixation of LT hip  - S/p LT hip IMN     #Sepsis likely 2/2 PNA  - Sputum cx w/ numerous GNR  - F/u blood cx  - CTA chest negative for PE  - Pulm recs appreciated  - F/u US duplex LE to r/o DVT  - 7/5 zosyn changed to ceftriaxone for 3 more days    legionella neg, azithromycin was d/c'd   - ID following    #Lung cancer  - on immunotherapy. Last dose on 6/26  - will need outpatient follow up with oncology    #HTN  - c/w home metoprolol, diltiazem and enalapril    #IDDM  - c/w basal lantus (reduced dose) and SSI  - controlled  - No anion gap, normal pH no active evidence of DKA  - increased lantus 15 units    #Jugular vein thrombosis  - will c/w lovenox    Preventative Measures   DVT ppx- Lovenox  FULL CODE    Patient is a 68-year-old female with history of lung cancer (on immunotherapy), HTN, IDDM, jugular vein thrombosis on Lovenox who presents with LT-sided hip pain after sustaining a fall.    #LT hip IT fracture s/p mechanical trip and fall, - S/p LT hip IMN   --CT L femur: Acute minimally displaced/impacted intertrochanteric left proximal   femoral fracture without convincing CT evidence for underlying aggressive   osseous neoplasm.  --ortho following   --pain control PRN       #Sepsis likely 2/2 PNA  - Sputum cx--Numerous Escherichia coli    blood cx--NGTD   - CTA chest negative for PE  Pulm following , for wheezing was placed omn duonebs, symbicort   LE duplex neg for DVT   LUE swelling, duplex neg   legionella neg, azithromycin was d/c'd   - ID following  - 7/5 zosyn changed to ceftriaxone for 3 more days    Titrated to 3 L NC NC with SpO2 94-95%    #Lung cancer  - on immunotherapy. Last dose on 6/26  - will need outpatient follow up with oncology  --patient will not be on immunotherapy while in rehab , she is in agreement   she is currently not undergoing any chemo     #HTN  - c/w home metoprolol, diltiazem and enalapril    #IDDM  A1c 9.5%  continue with insulin regimen     #Jugular vein thrombosis  - will c/w ther lovenox    IBS , on lomotil at home , can continue     Preventative Measures   DVT ppx- ther Lovenox  FULL CODE   fall, aspiration precautions   HOBE

## 2024-07-06 NOTE — PROGRESS NOTE ADULT - SUBJECTIVE AND OBJECTIVE BOX
Patient is a 68y old  Female who presents with a chief complaint of mechanical fall (02 Jul 2024 10:45)    INTERVAL HPI/OVERNIGHT EVENTS:   Patient seen and examined bedside.   no overnight events       REVIEW OF SYSTEMS: remaining ROS negative     MEDICATIONS  (STANDING):  acetaminophen     Tablet .. 650 milliGRAM(s) Oral every 6 hours  azithromycin  IVPB 500 milliGRAM(s) IV Intermittent every 24 hours  budesonide  80 MICROgram(s)/formoterol 4.5 MICROgram(s) Inhaler 2 Puff(s) Inhalation two times a day  clonazePAM  Tablet 0.5 milliGRAM(s) Oral at bedtime  dextrose 10% Bolus 125 milliLiter(s) IV Bolus once  dextrose 5%. 1000 milliLiter(s) (100 mL/Hr) IV Continuous <Continuous>  dextrose 5%. 1000 milliLiter(s) (50 mL/Hr) IV Continuous <Continuous>  dextrose 50% Injectable 12.5 Gram(s) IV Push once  dextrose 50% Injectable 25 Gram(s) IV Push once  diltiazem    milliGRAM(s) Oral daily  enalapril 5 milliGRAM(s) Oral at bedtime  enoxaparin Injectable 60 milliGRAM(s) SubCutaneous every 12 hours  glucagon  Injectable 1 milliGRAM(s) IntraMuscular once  guaiFENesin  milliGRAM(s) Oral every 12 hours  insulin glargine Injectable (LANTUS) 12 Unit(s) SubCutaneous at bedtime  insulin lispro (ADMELOG) corrective regimen sliding scale   SubCutaneous at bedtime  insulin lispro (ADMELOG) corrective regimen sliding scale   SubCutaneous three times a day before meals  insulin lispro Injectable (ADMELOG) 4 Unit(s) SubCutaneous three times a day before meals  metoprolol succinate ER 50 milliGRAM(s) Oral at bedtime  piperacillin/tazobactam IVPB.. 3.375 Gram(s) IV Intermittent every 12 hours  polyethylene glycol 3350 17 Gram(s) Oral at bedtime  pregabalin 200 milliGRAM(s) Oral at bedtime  senna 2 Tablet(s) Oral at bedtime  sodium chloride 1 Gram(s) Oral two times a day  sodium chloride 0.9% Bolus 500 milliLiter(s) IV Bolus once  sodium chloride 0.9%. 500 milliLiter(s) (500 mL/Hr) IV Continuous <Continuous>    MEDICATIONS  (PRN):  acetaminophen     Tablet .. 650 milliGRAM(s) Oral every 6 hours PRN Temp greater or equal to 38C (100.4F), Mild Pain (1 - 3)  albuterol    90 MICROgram(s) HFA Inhaler 2 Puff(s) Inhalation every 6 hours PRN Shortness of Breath and/or Wheezing  aluminum hydroxide/magnesium hydroxide/simethicone Suspension 30 milliLiter(s) Oral every 4 hours PRN Dyspepsia  benzonatate 100 milliGRAM(s) Oral every 8 hours PRN Cough  dextrose Oral Gel 15 Gram(s) Oral once PRN Blood Glucose LESS THAN 70 milliGRAM(s)/deciliter  magnesium hydroxide Suspension 30 milliLiter(s) Oral daily PRN Constipation  melatonin 3 milliGRAM(s) Oral at bedtime PRN Insomnia  morphine  - Injectable 0.5 milliGRAM(s) IV Push every 6 hours PRN Moderate Pain (4 - 6)  morphine  - Injectable 1 milliGRAM(s) IV Push every 6 hours PRN Severe Pain (7 - 10)  ondansetron Injectable 4 milliGRAM(s) IV Push every 8 hours PRN Nausea and/or Vomiting  ondansetron Injectable 4 milliGRAM(s) IV Push every 6 hours PRN Nausea and/or Vomiting  oxyCODONE    IR 2.5 milliGRAM(s) Oral every 4 hours PRN Moderate Pain (4 - 6)  oxyCODONE    IR 5 milliGRAM(s) Oral every 4 hours PRN Severe Pain (7 - 10)  traMADol 25 milliGRAM(s) Oral every 6 hours PRN Mild Pain (1 - 3)      Allergies    No Known Allergies    Intolerances        Vital Signs Last 24 Hrs  T(C): 36.6 (05 Jul 2024 16:23), Max: 37.1 (05 Jul 2024 00:13)  T(F): 97.9 (05 Jul 2024 16:23), Max: 98.7 (05 Jul 2024 00:13)  HR: 88 (05 Jul 2024 17:09) (75 - 99)  BP: 139/77 (05 Jul 2024 16:23) (119/67 - 170/89)  BP(mean): --  RR: 18 (05 Jul 2024 16:23) (18 - 19)  SpO2: 96% (05 Jul 2024 17:09) (92% - 96%)    Parameters below as of 05 Jul 2024 17:09  Patient On (Oxygen Delivery Method): 3L nasal cannula              PHYSICAL EXAM:  GENERAL: NAD, thin appearing, no increased WOB, speaking in full sentences, not using accessory muscles   HEAD:  Atraumatic, Normocephalic  EYES: EOMI, PERRLA, conjunctiva and sclera clear  ENMT: No tonsillar erythema, exudates, or enlargement; Moist mucous membranes,    NECK: Supple, No JVD,    NERVOUS SYSTEM:  Alert & Oriented X3, Good concentration; nonfocal   CHEST/LUNG: good b/l air entry, b/l course BS   HEART: Regular rate and rhythm; No murmurs, rubs, or gallops  ABDOMEN: Soft, Nontender, Nondistended; Bowel sounds present  EXTREMITIES:  2+ Peripheral Pulses b/l, No clubbing, cyanosis, calf tenderness or edema b/l          LABS:                                                         11.1   7.03  )-----------( 386      ( 06 Jul 2024 07:04 )             36.3   07-06    135  |  98  |  16  ----------------------------<  35<LL>  4.7   |  36<H>  |  0.68    Ca    9.1      06 Jul 2024 07:04  Phos  3.0     07-05  Mg     2.0     07-05              Urinalysis Basic - ( 02 Jul 2024 05:58 )    Color: x / Appearance: x / SG: x / pH: x  Gluc: 272 mg/dL / Ketone: x  / Bili: x / Urobili: x   Blood: x / Protein: x / Nitrite: x   Leuk Esterase: x / RBC: x / WBC x   Sq Epi: x / Non Sq Epi: x / Bacteria: x        RADIOLOGY & ADDITIONAL TESTS:    Imaging Personally Reviewed:  [ ] YES  [ ] NO    Consultant(s) Notes Reviewed:  [ x] YES  [ ] NO    Care Discussed with Consultants/Other Providers [ x] YES  [ ] NO  Care plan and all findings were discussed in detail with patient.  All questions and concerns addressed

## 2024-07-06 NOTE — PRE-OP CHECKLIST - CAPILLARY BLOOD GLUCOSE (MG/DL) (70-99)
107

## 2024-07-07 LAB
ANION GAP SERPL CALC-SCNC: 6 MMOL/L — SIGNIFICANT CHANGE UP (ref 5–17)
BUN SERPL-MCNC: 13 MG/DL — SIGNIFICANT CHANGE UP (ref 7–23)
CALCIUM SERPL-MCNC: 8.5 MG/DL — SIGNIFICANT CHANGE UP (ref 8.5–10.1)
CHLORIDE SERPL-SCNC: 91 MMOL/L — LOW (ref 96–108)
CO2 SERPL-SCNC: 32 MMOL/L — HIGH (ref 22–31)
CREAT SERPL-MCNC: 0.68 MG/DL — SIGNIFICANT CHANGE UP (ref 0.5–1.3)
EGFR: 95 ML/MIN/1.73M2 — SIGNIFICANT CHANGE UP
GLUCOSE BLDC GLUCOMTR-MCNC: 141 MG/DL — HIGH (ref 70–99)
GLUCOSE BLDC GLUCOMTR-MCNC: 229 MG/DL — HIGH (ref 70–99)
GLUCOSE BLDC GLUCOMTR-MCNC: 329 MG/DL — HIGH (ref 70–99)
GLUCOSE BLDC GLUCOMTR-MCNC: 88 MG/DL — SIGNIFICANT CHANGE UP (ref 70–99)
GLUCOSE SERPL-MCNC: 315 MG/DL — HIGH (ref 70–99)
HCT VFR BLD CALC: 32.5 % — LOW (ref 34.5–45)
HGB BLD-MCNC: 10.3 G/DL — LOW (ref 11.5–15.5)
MCHC RBC-ENTMCNC: 26.2 PG — LOW (ref 27–34)
MCHC RBC-ENTMCNC: 31.7 G/DL — LOW (ref 32–36)
MCV RBC AUTO: 82.7 FL — SIGNIFICANT CHANGE UP (ref 80–100)
NRBC # BLD: 0 /100 WBCS — SIGNIFICANT CHANGE UP (ref 0–0)
PLATELET # BLD AUTO: 415 K/UL — HIGH (ref 150–400)
POTASSIUM SERPL-MCNC: 4.8 MMOL/L — SIGNIFICANT CHANGE UP (ref 3.5–5.3)
POTASSIUM SERPL-SCNC: 4.8 MMOL/L — SIGNIFICANT CHANGE UP (ref 3.5–5.3)
RBC # BLD: 3.93 M/UL — SIGNIFICANT CHANGE UP (ref 3.8–5.2)
RBC # FLD: 15.1 % — HIGH (ref 10.3–14.5)
SODIUM SERPL-SCNC: 129 MMOL/L — LOW (ref 135–145)
WBC # BLD: 6.48 K/UL — SIGNIFICANT CHANGE UP (ref 3.8–10.5)
WBC # FLD AUTO: 6.48 K/UL — SIGNIFICANT CHANGE UP (ref 3.8–10.5)

## 2024-07-07 PROCEDURE — 99232 SBSQ HOSP IP/OBS MODERATE 35: CPT

## 2024-07-07 RX ORDER — INSULIN GLARGINE 100 [IU]/ML
10 INJECTION, SOLUTION SUBCUTANEOUS EVERY MORNING
Refills: 0 | Status: DISCONTINUED | OUTPATIENT
Start: 2024-07-07 | End: 2024-07-08

## 2024-07-07 RX ADMIN — Medication 1 TABLET(S): at 11:59

## 2024-07-07 RX ADMIN — Medication 4 MILLILITER(S): at 18:18

## 2024-07-07 RX ADMIN — Medication 100 MILLIGRAM(S): at 11:59

## 2024-07-07 RX ADMIN — Medication 1 TABLET(S): at 22:35

## 2024-07-07 RX ADMIN — INSULIN LISPRO 8: 100 INJECTION, SOLUTION SUBCUTANEOUS at 08:08

## 2024-07-07 RX ADMIN — DIMETHYL FUMARATE 240 MILLIGRAM(S): 240 CAPSULE, DELAYED RELEASE ORAL at 06:20

## 2024-07-07 RX ADMIN — Medication 50 MILLIGRAM(S): at 22:39

## 2024-07-07 RX ADMIN — IPRATROPIUM BROMIDE AND ALBUTEROL SULFATE 3 MILLILITER(S): .5; 3 SOLUTION RESPIRATORY (INHALATION) at 18:17

## 2024-07-07 RX ADMIN — DIMETHYL FUMARATE 240 MILLIGRAM(S): 240 CAPSULE, DELAYED RELEASE ORAL at 17:53

## 2024-07-07 RX ADMIN — Medication 650 MILLIGRAM(S): at 07:26

## 2024-07-07 RX ADMIN — Medication 650 MILLIGRAM(S): at 06:23

## 2024-07-07 RX ADMIN — Medication 2 PUFF(S): at 06:21

## 2024-07-07 RX ADMIN — Medication 1 TABLET(S): at 08:08

## 2024-07-07 RX ADMIN — INSULIN GLARGINE 10 UNIT(S): 100 INJECTION, SOLUTION SUBCUTANEOUS at 08:55

## 2024-07-07 RX ADMIN — Medication 2 PUFF(S): at 17:52

## 2024-07-07 RX ADMIN — DILTIAZEM HYDROCHLORIDE 120 MILLIGRAM(S): 240 CAPSULE, EXTENDED RELEASE ORAL at 22:36

## 2024-07-07 RX ADMIN — Medication 600 MILLIGRAM(S): at 17:52

## 2024-07-07 RX ADMIN — INSULIN LISPRO 2 UNIT(S): 100 INJECTION, SOLUTION SUBCUTANEOUS at 08:08

## 2024-07-07 RX ADMIN — ENOXAPARIN SODIUM 60 MILLIGRAM(S): 100 INJECTION SUBCUTANEOUS at 09:51

## 2024-07-07 RX ADMIN — Medication 5 MILLIGRAM(S): at 22:39

## 2024-07-07 RX ADMIN — ENOXAPARIN SODIUM 60 MILLIGRAM(S): 100 INJECTION SUBCUTANEOUS at 22:35

## 2024-07-07 RX ADMIN — Medication 600 MILLIGRAM(S): at 06:21

## 2024-07-07 RX ADMIN — Medication 650 MILLIGRAM(S): at 23:23

## 2024-07-07 RX ADMIN — Medication 1 TABLET(S): at 17:52

## 2024-07-07 RX ADMIN — Medication 650 MILLIGRAM(S): at 23:40

## 2024-07-07 NOTE — PROGRESS NOTE ADULT - SUBJECTIVE AND OBJECTIVE BOX
INTERVAL HPI/OVERNIGHT EVENTS:  Pt seen and examined at bedside.     Allergies/Intolerance: No Known Allergies      MEDICATIONS  (STANDING):  acetaminophen     Tablet .. 650 milliGRAM(s) Oral every 6 hours  albuterol/ipratropium for Nebulization 3 milliLiter(s) Nebulizer every 6 hours  budesonide  80 MICROgram(s)/formoterol 4.5 MICROgram(s) Inhaler 2 Puff(s) Inhalation two times a day  cefTRIAXone   IVPB 1000 milliGRAM(s) IV Intermittent every 24 hours  dextrose 10% Bolus 125 milliLiter(s) IV Bolus once  dextrose 5%. 1000 milliLiter(s) (100 mL/Hr) IV Continuous <Continuous>  dextrose 5%. 1000 milliLiter(s) (50 mL/Hr) IV Continuous <Continuous>  dextrose 50% Injectable 12.5 Gram(s) IV Push once  dextrose 50% Injectable 25 Gram(s) IV Push once  diltiazem    milliGRAM(s) Oral at bedtime  dimethyl fumarate DR Capsule 240 milliGRAM(s) Oral two times a day  diphenoxylate/atropine 1 Tablet(s) Oral at bedtime  enalapril 5 milliGRAM(s) Oral at bedtime  enoxaparin Injectable 60 milliGRAM(s) SubCutaneous every 12 hours  glucagon  Injectable 1 milliGRAM(s) IntraMuscular once  guaiFENesin  milliGRAM(s) Oral every 12 hours  insulin glargine Injectable (LANTUS) 10 Unit(s) SubCutaneous every morning  insulin lispro (ADMELOG) corrective regimen sliding scale   SubCutaneous at bedtime  insulin lispro (ADMELOG) corrective regimen sliding scale   SubCutaneous three times a day before meals  insulin lispro Injectable (ADMELOG) 2 Unit(s) SubCutaneous three times a day before meals  lactobacillus acidophilus 1 Tablet(s) Oral three times a day with meals  metoprolol succinate ER 50 milliGRAM(s) Oral at bedtime  polyethylene glycol 3350 17 Gram(s) Oral at bedtime  senna 2 Tablet(s) Oral at bedtime  sodium chloride 3%  Inhalation 4 milliLiter(s) Inhalation every 6 hours    MEDICATIONS  (PRN):  acetaminophen     Tablet .. 650 milliGRAM(s) Oral every 6 hours PRN Temp greater or equal to 38C (100.4F), Mild Pain (1 - 3)  albuterol    90 MICROgram(s) HFA Inhaler 2 Puff(s) Inhalation every 6 hours PRN Shortness of Breath and/or Wheezing  aluminum hydroxide/magnesium hydroxide/simethicone Suspension 30 milliLiter(s) Oral every 4 hours PRN Dyspepsia  dextrose Oral Gel 15 Gram(s) Oral once PRN Blood Glucose LESS THAN 70 milliGRAM(s)/deciliter  magnesium hydroxide Suspension 30 milliLiter(s) Oral daily PRN Constipation  melatonin 3 milliGRAM(s) Oral at bedtime PRN Insomnia  ondansetron Injectable 4 milliGRAM(s) IV Push every 8 hours PRN Nausea and/or Vomiting  ondansetron Injectable 4 milliGRAM(s) IV Push every 6 hours PRN Nausea and/or Vomiting        ROS: all systems reviewed and wnl      PHYSICAL EXAMINATION:  Vital Signs Last 24 Hrs  T(C): 36.7 (07 Jul 2024 05:05), Max: 36.9 (06 Jul 2024 23:44)  T(F): 98 (07 Jul 2024 05:05), Max: 98.4 (06 Jul 2024 23:44)  HR: 84 (07 Jul 2024 05:05) (80 - 99)  BP: 146/74 (07 Jul 2024 05:05) (129/78 - 169/74)  BP(mean): --  RR: 18 (07 Jul 2024 05:05) (18 - 19)  SpO2: 95% (07 Jul 2024 05:05) (95% - 99%)    Parameters below as of 06 Jul 2024 21:18  Patient On (Oxygen Delivery Method): nasal cannula  O2 Flow (L/min): 3    CAPILLARY BLOOD GLUCOSE      POCT Blood Glucose.: 329 mg/dL (07 Jul 2024 07:57)  POCT Blood Glucose.: 241 mg/dL (06 Jul 2024 22:03)  POCT Blood Glucose.: 132 mg/dL (06 Jul 2024 16:44)  POCT Blood Glucose.: 259 mg/dL (06 Jul 2024 11:15)      07-06 @ 07:01  -  07-07 @ 07:00  --------------------------------------------------------  IN: 1150 mL / OUT: 2400 mL / NET: -1250 mL        GENERAL:   NECK: supple, No JVD  CHEST/LUNG: clear to auscultation bilaterally; no rales, rhonchi, or wheezing b/l  HEART: normal S1, S2  ABDOMEN: BS+, soft, ND, NT   EXTREMITIES:  pulses palpable; no clubbing, cyanosis, or edema b/l LEs    LABS:                        10.3   6.48  )-----------( 415      ( 07 Jul 2024 07:44 )             32.5     07-07    129<L>  |  91<L>  |  13  ----------------------------<  315<H>  4.8   |  32<H>  |  0.68    Ca    8.5      07 Jul 2024 07:44        Urinalysis Basic - ( 07 Jul 2024 07:44 )    Color: x / Appearance: x / SG: x / pH: x  Gluc: 315 mg/dL / Ketone: x  / Bili: x / Urobili: x   Blood: x / Protein: x / Nitrite: x   Leuk Esterase: x / RBC: x / WBC x   Sq Epi: x / Non Sq Epi: x / Bacteria: x

## 2024-07-07 NOTE — PROGRESS NOTE ADULT - ASSESSMENT
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    #LT hip IT fracture s/p mechanical trip and fall, - S/p LT hip IMN   --CT L femur: Acute minimally displaced/impacted intertrochanteric left proximal   femoral fracture without convincing CT evidence for underlying aggressive   osseous neoplasm.  --ortho following   --pain control PRN       #Sepsis likely 2/2 PNA  - Sputum cx--Numerous Escherichia coli    blood cx--NGTD   - CTA chest negative for PE  Pulm following , for wheezing was placed omn duonebs, symbicort   LE duplex neg for DVT   LUE swelling, duplex neg   legionella neg, azithromycin was d/c'd   - ID following  - 7/5 zosyn changed to ceftriaxone for 3 more days    Titrated to 3 L NC NC with SpO2 94-95%    #Lung cancer  - on immunotherapy. Last dose on 6/26  - will need outpatient follow up with oncology  --patient will not be on immunotherapy while in rehab , she is in agreement   she is currently not undergoing any chemo     #HTN  - c/w home metoprolol, diltiazem and enalapril    #IDDM  A1c 9.5%  continue with insulin regimen     #Jugular vein thrombosis  - will c/w ther lovenox    IBS , on lomotil at home , can continue     Preventative Measures   DVT ppx- ther Lovenox  FULL CODE   fall, aspiration precautions   HOBE    Patient is a 68-year-old female with history of lung cancer (on immunotherapy), HTN, IDDM, jugular vein thrombosis on Lovenox who presents with LT-sided hip pain after sustaining a fall.      Plan: LT hip IT fracture s/p mechanical trip and fall, - S/p LT hip IMN   --CT L femur: Acute minimally displaced/impacted intertrochanteric left proximal   femoral fracture without convincing CT evidence for underlying aggressive osseous neoplasm.  --ortho following   --pain control PRN       #Sepsis likely 2/2 PNA  - Sputum cx--Numerous Escherichia coli    blood cx--NGTD   - CTA chest negative for PE  Pulm following , for wheezing was placed omn duonebs, symbicort   LE duplex neg for DVT   LUE swelling, duplex neg   legionella neg, azithromycin was d/c'd   - ID following. Titrated to 3 L NC NC with SpO2 94-95%    #Lung cancer  - on immunotherapy. Last dose on 6/26  - will need outpatient follow up with oncology  --patient will not be on immunotherapy while in rehab , she is in agreement   she is currently not undergoing any chemo     #HTN  - c/w home metoprolol, diltiazem and enalapril    #IDDM  A1c 9.5%  continue with insulin regimen     #Jugular vein thrombosis  - will c/w ther lovenox    IBS , on lomotil at home , can continue     Preventative Measures   DVT ppx- ther Lovenox  FULL CODE   fall, aspiration precautions   HOBE    Patient is a 68-year-old female with history of lung cancer (on immunotherapy), HTN, IDDM, jugular vein thrombosis on Lovenox who presents with LT-sided hip pain after sustaining a fall.      Plan: LT hip IT fracture s/p mechanical trip and fall, - S/p LT hip IMN   --CT L femur: Acute minimally displaced/impacted intertrochanteric left proximal   femoral fracture without convincing CT evidence for underlying aggressive osseous neoplasm.  --ortho following, pain control PRN       #Sepsis likely 2/2 PNA  - Sputum cx--Numerous Escherichia coli    blood cx--NGTD   - CTA chest negative for PE  Pulm following , for wheezing was placed omn duonebs, symbicort   LE duplex neg for DVT   LUE swelling, duplex neg   legionella neg, azithromycin was d/c'd   - ID following. Titrated to 3 L NC NC with SpO2 94-95%    #Lung cancer  - on immunotherapy. Last dose on 6/26  - will need outpatient follow up with oncology  --patient will not be on immunotherapy while in rehab , she is in agreement   she is currently not undergoing any chemo     #HTN  - c/w home metoprolol, diltiazem and enalapril    #IDDM  A1c 9.5%  continue with insulin regimen     #Jugular vein thrombosis  - will c/w ther lovenox    IBS , on lomotil at home , can continue     Preventative Measures   DVT ppx- ther Lovenox  FULL CODE   fall, aspiration precautions     Continue current regimen and Lovenox bid.

## 2024-07-08 LAB
ANION GAP SERPL CALC-SCNC: 7 MMOL/L — SIGNIFICANT CHANGE UP (ref 5–17)
BUN SERPL-MCNC: 14 MG/DL — SIGNIFICANT CHANGE UP (ref 7–23)
CALCIUM SERPL-MCNC: 9 MG/DL — SIGNIFICANT CHANGE UP (ref 8.5–10.1)
CHLORIDE SERPL-SCNC: 92 MMOL/L — LOW (ref 96–108)
CO2 SERPL-SCNC: 31 MMOL/L — SIGNIFICANT CHANGE UP (ref 22–31)
CREAT SERPL-MCNC: 0.54 MG/DL — SIGNIFICANT CHANGE UP (ref 0.5–1.3)
EGFR: 100 ML/MIN/1.73M2 — SIGNIFICANT CHANGE UP
GLUCOSE BLDC GLUCOMTR-MCNC: 205 MG/DL — HIGH (ref 70–99)
GLUCOSE BLDC GLUCOMTR-MCNC: 292 MG/DL — HIGH (ref 70–99)
GLUCOSE BLDC GLUCOMTR-MCNC: 373 MG/DL — HIGH (ref 70–99)
GLUCOSE BLDC GLUCOMTR-MCNC: 374 MG/DL — HIGH (ref 70–99)
GLUCOSE BLDC GLUCOMTR-MCNC: 411 MG/DL — HIGH (ref 70–99)
GLUCOSE BLDC GLUCOMTR-MCNC: 413 MG/DL — HIGH (ref 70–99)
GLUCOSE SERPL-MCNC: 119 MG/DL — HIGH (ref 70–99)
HCT VFR BLD CALC: 31.3 % — LOW (ref 34.5–45)
HGB BLD-MCNC: 9.9 G/DL — LOW (ref 11.5–15.5)
MCHC RBC-ENTMCNC: 25.9 PG — LOW (ref 27–34)
MCHC RBC-ENTMCNC: 31.6 G/DL — LOW (ref 32–36)
MCV RBC AUTO: 81.9 FL — SIGNIFICANT CHANGE UP (ref 80–100)
NRBC # BLD: 0 /100 WBCS — SIGNIFICANT CHANGE UP (ref 0–0)
PLATELET # BLD AUTO: 431 K/UL — HIGH (ref 150–400)
POTASSIUM SERPL-MCNC: 4.1 MMOL/L — SIGNIFICANT CHANGE UP (ref 3.5–5.3)
POTASSIUM SERPL-SCNC: 4.1 MMOL/L — SIGNIFICANT CHANGE UP (ref 3.5–5.3)
RBC # BLD: 3.82 M/UL — SIGNIFICANT CHANGE UP (ref 3.8–5.2)
RBC # FLD: 15.3 % — HIGH (ref 10.3–14.5)
SODIUM SERPL-SCNC: 130 MMOL/L — LOW (ref 135–145)
WBC # BLD: 6.3 K/UL — SIGNIFICANT CHANGE UP (ref 3.8–10.5)
WBC # FLD AUTO: 6.3 K/UL — SIGNIFICANT CHANGE UP (ref 3.8–10.5)

## 2024-07-08 PROCEDURE — 99232 SBSQ HOSP IP/OBS MODERATE 35: CPT

## 2024-07-08 RX ORDER — INSULIN GLARGINE 100 [IU]/ML
8 INJECTION, SOLUTION SUBCUTANEOUS
Refills: 0 | DISCHARGE

## 2024-07-08 RX ORDER — INSULIN GLARGINE 100 [IU]/ML
10 INJECTION, SOLUTION SUBCUTANEOUS
Qty: 0 | Refills: 0 | DISCHARGE
Start: 2024-07-08

## 2024-07-08 RX ORDER — ENOXAPARIN SODIUM 100 MG/ML
60 INJECTION SUBCUTANEOUS
Qty: 0 | Refills: 0 | DISCHARGE
Start: 2024-07-08

## 2024-07-08 RX ORDER — ENOXAPARIN SODIUM 100 MG/ML
70 INJECTION SUBCUTANEOUS
Refills: 0 | DISCHARGE

## 2024-07-08 RX ORDER — SODIUM CHLORIDE 0.9 % (FLUSH) 0.9 %
4 SYRINGE (ML) INJECTION
Qty: 0 | Refills: 0 | DISCHARGE
Start: 2024-07-08

## 2024-07-08 RX ORDER — MAGNESIUM, ALUMINUM HYDROXIDE 400-400
30 TABLET,CHEWABLE ORAL ONCE
Refills: 0 | Status: COMPLETED | OUTPATIENT
Start: 2024-07-08 | End: 2024-07-08

## 2024-07-08 RX ORDER — SENNOSIDES 8.6 MG
2 TABLET ORAL
Qty: 0 | Refills: 0 | DISCHARGE
Start: 2024-07-08

## 2024-07-08 RX ORDER — GUAIFENESIN 100 %
1 POWDER (GRAM) MISCELLANEOUS
Qty: 0 | Refills: 0 | DISCHARGE
Start: 2024-07-08

## 2024-07-08 RX ORDER — TORSEMIDE 20 MG/1
1 TABLET ORAL
Refills: 0 | DISCHARGE

## 2024-07-08 RX ORDER — ALBUTEROL 90 MCG
2 AEROSOL REFILL (GRAM) INHALATION
Qty: 0 | Refills: 0 | DISCHARGE
Start: 2024-07-08

## 2024-07-08 RX ORDER — FAMOTIDINE 40 MG
20 TABLET ORAL DAILY
Refills: 0 | Status: DISCONTINUED | OUTPATIENT
Start: 2024-07-08 | End: 2024-07-11

## 2024-07-08 RX ORDER — IPRATROPIUM BROMIDE AND ALBUTEROL SULFATE .5; 3 MG/3ML; MG/3ML
3 SOLUTION RESPIRATORY (INHALATION)
Qty: 0 | Refills: 0 | DISCHARGE
Start: 2024-07-08

## 2024-07-08 RX ORDER — INSULIN LISPRO 100 [IU]/ML
1 INJECTION, SOLUTION SUBCUTANEOUS
Qty: 0 | Refills: 0 | DISCHARGE
Start: 2024-07-08

## 2024-07-08 RX ORDER — POLYETHYLENE GLYCOL 3350 1 G/G
17 POWDER ORAL
Qty: 0 | Refills: 0 | DISCHARGE
Start: 2024-07-08

## 2024-07-08 RX ORDER — DIMETHYL FUMARATE 240 MG/1
1 CAPSULE, DELAYED RELEASE ORAL
Qty: 0 | Refills: 0 | DISCHARGE
Start: 2024-07-08

## 2024-07-08 RX ORDER — ACETAMINOPHEN 325 MG
2 TABLET ORAL
Qty: 0 | Refills: 0 | DISCHARGE
Start: 2024-07-08

## 2024-07-08 RX ORDER — BUDESONIDE/FORMOTEROL FUMARATE 160-4.5MCG
2 HFA AEROSOL WITH ADAPTER (GRAM) INHALATION
Qty: 0 | Refills: 0 | DISCHARGE
Start: 2024-07-08

## 2024-07-08 RX ORDER — INSULIN GLARGINE 100 [IU]/ML
10 INJECTION, SOLUTION SUBCUTANEOUS
Refills: 0 | Status: DISCONTINUED | OUTPATIENT
Start: 2024-07-08 | End: 2024-07-10

## 2024-07-08 RX ADMIN — INSULIN LISPRO 2 UNIT(S): 100 INJECTION, SOLUTION SUBCUTANEOUS at 13:29

## 2024-07-08 RX ADMIN — Medication 650 MILLIGRAM(S): at 17:59

## 2024-07-08 RX ADMIN — Medication 650 MILLIGRAM(S): at 05:44

## 2024-07-08 RX ADMIN — INSULIN LISPRO 3: 100 INJECTION, SOLUTION SUBCUTANEOUS at 22:30

## 2024-07-08 RX ADMIN — Medication 30 MILLILITER(S): at 22:32

## 2024-07-08 RX ADMIN — Medication 2 PUFF(S): at 17:56

## 2024-07-08 RX ADMIN — DIMETHYL FUMARATE 240 MILLIGRAM(S): 240 CAPSULE, DELAYED RELEASE ORAL at 17:57

## 2024-07-08 RX ADMIN — Medication 1 TABLET(S): at 13:28

## 2024-07-08 RX ADMIN — ENOXAPARIN SODIUM 60 MILLIGRAM(S): 100 INJECTION SUBCUTANEOUS at 13:24

## 2024-07-08 RX ADMIN — DIMETHYL FUMARATE 240 MILLIGRAM(S): 240 CAPSULE, DELAYED RELEASE ORAL at 05:35

## 2024-07-08 RX ADMIN — Medication 650 MILLIGRAM(S): at 21:34

## 2024-07-08 RX ADMIN — INSULIN GLARGINE 10 UNIT(S): 100 INJECTION, SOLUTION SUBCUTANEOUS at 22:31

## 2024-07-08 RX ADMIN — INSULIN LISPRO 6: 100 INJECTION, SOLUTION SUBCUTANEOUS at 18:01

## 2024-07-08 RX ADMIN — Medication 650 MILLIGRAM(S): at 22:34

## 2024-07-08 RX ADMIN — Medication 650 MILLIGRAM(S): at 06:35

## 2024-07-08 RX ADMIN — Medication 650 MILLIGRAM(S): at 13:30

## 2024-07-08 RX ADMIN — Medication 5 MILLIGRAM(S): at 22:30

## 2024-07-08 RX ADMIN — INSULIN LISPRO 2 UNIT(S): 100 INJECTION, SOLUTION SUBCUTANEOUS at 17:53

## 2024-07-08 RX ADMIN — Medication 20 MILLIGRAM(S): at 17:55

## 2024-07-08 RX ADMIN — Medication 600 MILLIGRAM(S): at 05:43

## 2024-07-08 RX ADMIN — Medication 600 MILLIGRAM(S): at 17:55

## 2024-07-08 RX ADMIN — Medication 1 TABLET(S): at 13:29

## 2024-07-08 RX ADMIN — INSULIN LISPRO 2 UNIT(S): 100 INJECTION, SOLUTION SUBCUTANEOUS at 18:02

## 2024-07-08 RX ADMIN — DILTIAZEM HYDROCHLORIDE 120 MILLIGRAM(S): 240 CAPSULE, EXTENDED RELEASE ORAL at 22:29

## 2024-07-08 RX ADMIN — Medication 2 PUFF(S): at 05:45

## 2024-07-08 RX ADMIN — Medication 50 MILLIGRAM(S): at 22:34

## 2024-07-08 RX ADMIN — Medication 1 TABLET(S): at 17:55

## 2024-07-08 RX ADMIN — Medication 3 MILLIGRAM(S): at 21:35

## 2024-07-08 RX ADMIN — ENOXAPARIN SODIUM 60 MILLIGRAM(S): 100 INJECTION SUBCUTANEOUS at 21:35

## 2024-07-08 RX ADMIN — IPRATROPIUM BROMIDE AND ALBUTEROL SULFATE 3 MILLILITER(S): .5; 3 SOLUTION RESPIRATORY (INHALATION) at 05:26

## 2024-07-08 RX ADMIN — Medication 4 MILLILITER(S): at 05:18

## 2024-07-08 RX ADMIN — IPRATROPIUM BROMIDE AND ALBUTEROL SULFATE 3 MILLILITER(S): .5; 3 SOLUTION RESPIRATORY (INHALATION) at 11:22

## 2024-07-08 RX ADMIN — INSULIN LISPRO 10: 100 INJECTION, SOLUTION SUBCUTANEOUS at 13:19

## 2024-07-08 RX ADMIN — Medication 1 TABLET(S): at 22:32

## 2024-07-08 NOTE — PROGRESS NOTE ADULT - ASSESSMENT
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      Plan: LT hip IT fracture s/p mechanical trip and fall, - S/p LT hip IMN   --CT L femur: Acute minimally displaced/impacted intertrochanteric left proximal   femoral fracture without convincing CT evidence for underlying aggressive osseous neoplasm.  --ortho following, pain control PRN       #Sepsis likely 2/2 PNA  - Sputum cx--Numerous Escherichia coli    blood cx--NGTD   - CTA chest negative for PE  Pulm following , for wheezing was placed omn duonebs, symbicort   LE duplex neg for DVT   LUE swelling, duplex neg   legionella neg, azithromycin was d/c'd   - ID following. Titrated to 3 L NC NC with SpO2 94-95%    #Lung cancer  - on immunotherapy. Last dose on 6/26  - will need outpatient follow up with oncology  --patient will not be on immunotherapy while in rehab , she is in agreement   she is currently not undergoing any chemo     #HTN  - c/w home metoprolol, diltiazem and enalapril    #IDDM  A1c 9.5%  continue with insulin regimen     #Jugular vein thrombosis  - will c/w ther lovenox    IBS , on lomotil at home , can continue     Preventative Measures   DVT ppx- ther Lovenox  FULL CODE   fall, aspiration precautions     Continue current regimen and Lovenox bid.  69 y/o F w/  female with history of lung cancer (on immunotherapy), HTN, IDDM, jugular vein thrombosis on AC with full dose Lovenox who presents with LT-sided hip pain after sustaining a fall.    Lt Hip Fx, s/p mechanical fall >  S/p LT hip IMN   --CT L femur: Acute minimally displaced/impacted intertrochanteric left proximal   femoral fracture without convincing CT evidence for underlying aggressive osseous neoplasm.  --seen by ortho- prn analgesics    Sepsis sec to Pna- smoker- quit smoking 11 days ago since hospitalizaion per pt  - Sputum cx--Numerous Escherichia coli    blood cx--NGTD   - CTA chest negative for PE  -seen by pulm  Pulm following , for wheezing was placed omn duonebs, symbicort , wheezing improved  LE duplex neg for DVT   LUE swelling, duplex neg   seen by ID, O2 via NC    Lung cancer-  on immunotherapy. Last dose on 6/26  - will need outpatient follow up with oncology  --patient will not be on immunotherapy while in rehab , pt aware and she is in agreement   she is currently not undergoing any chemo - keep fu with onc    HTN-  c/w home metoprolol, diltiazem and enalapril    DM2- uncontrolled  HbA1c 9.5%  continue with ISS/ pre meal , increase lantus dose as BG high    #Jugular vein thrombosis  - will c/w full dose lovenox    IBS , on lomotil at home , can continue     SQL for dvt ppx

## 2024-07-08 NOTE — PROGRESS NOTE ADULT - SUBJECTIVE AND OBJECTIVE BOX
[facility]  INFECTIOUS DISEASES   [address] [place] [address]  Tel: [phone]     Fax: [phone]  ==============================================================================  MD Justin Carpio, DO Antoinette Falk, NP   ==============================================================================      DEEP CESPEDES  [id number]  68y ([birth year])      Interval History:  Patient seen and examined today.  states she feels better and is being sent to [facility].  she denies any fever or chills.  she is on 2 l NC and saturating 95%.  afebrile  states her  breathing is much improved.      ROS:    [ ] Unobtainable because:  [ ] All other systems negative except as noted    Constitutional: no fever, no chills  Head: no trauma  Eyes: no vision changes, no eye pain  ENT:  no sore throat, no rhinorrhea  Cardiovascular:  no chest pain, no palpitation  Respiratory:  sob much improved, denies cough  GI:  no abd pain, no vomiting, no diarrhea  urinary: no dysuria, no hematuria, no flank pain  musculoskeletal:  no joint pain, no joint swelling  skin:  no rash  neurology:  no headache, no seizure, no change in mental status  psych: no anxiety, no depression         Allergies  No Known Allergies        ANTIMICROBIALS:        Physical Exam:  Vital Signs Last 24 Hrs  T(C): 36.6 (08 Jul 2024 10:43), Max: 37.1 (07 Jul 2024 23:55)  T(F): 97.9 (08 Jul 2024 10:43), Max: 98.8 (07 Jul 2024 23:55)  HR: 88 (08 Jul 2024 12:00) (80 - 96)  BP: 132/78 (08 Jul 2024 11:46) (121/72 - 154/72)  BP(mean): --  RR: 19 (08 Jul 2024 10:43) (18 - 19)  SpO2: 93% (08 Jul 2024 14:15) (93% - 99%)    Parameters below as of 08 Jul 2024 14:15  Patient On (Oxygen Delivery Method): nasal cannula        07-07-24 @ 07:01  -  07-08-24 @ 07:00  --------------------------------------------------------  IN: 500 mL / OUT: 700 mL / NET: -200 mL    07-08-24 @ 07:01  -  07-08-24 @ 14:36  --------------------------------------------------------  IN: 375 mL / OUT: 0 mL / NET: 375 mL      Physical Exam:  General:    NAD, non toxic, 2L NC   Head: atraumatic, normocephalic  Eyes: normal sclera and conjunctiva  ENT:   no oropharyngeal lesions, no LAD, neck supple  Cardio:    regular S1,S2  Respiratory:   no wheezing,better aeration b/l  abd:   soft, BS +, not tender, no distention  :     no CVAT, no suprapubic tenderness, no johnson  Musculoskeletal : no joint swelling, no edema, wasted, left hip surgical site - no erythema, no discharge seen   Skin:    no rash,  Neurologic:  awake and alert, answers questions and follows commands   psych: normal affect      WBC Count: 6.30 K/uL (07-08 @ 06:02)  WBC Count: 6.48 K/uL (07-07 @ 07:44)  WBC Count: 7.03 K/uL (07-06 @ 07:04)  WBC Count: 5.74 K/uL (07-05 @ 06:55)  WBC Count: 5.15 K/uL (07-04 @ 06:07)  WBC Count: 6.15 K/uL (07-03 @ 06:45)  WBC Count: 6.62 K/uL (07-02 @ 05:58)                            9.9    6.30  )-----------( 431      ( 08 Jul 2024 06:02 )             31.3       07-08    130<L>  |  92<L>  |  14  ----------------------------<  119<H>  4.1   |  31  |  0.54    Ca    9.0      08 Jul 2024 06:02        Urinalysis Basic - ( 08 Jul 2024 06:02 )    Color: x / Appearance: x / SG: x / pH: x  Gluc: 119 mg/dL / Ketone: x  / Bili: x / Urobili: x   Blood: x / Protein: x / Nitrite: x   Leuk Esterase: x / RBC: x / WBC x   Sq Epi: x / Non Sq Epi: x / Bacteria: x          Creatinine Trend: 0.54<--, 0.68<--, 0.68<--, 0.61<--, 0.70<--, 0.99<--      MICROBIOLOGY:    .Sputum Sputum  07-01-24   Numerous Escherichia coli  Normal Respiratory Jordana present  --  Escherichia coli      .Blood Blood-Peripheral  07-01-24   No growth at 5 days  --  --      .Blood Blood-Peripheral  07-01-24   No growth at 5 days  --  --            Rapid RVP Result: NotDetec (07-01 @ 14:54)        RADIOLOGY:

## 2024-07-08 NOTE — PROGRESS NOTE ADULT - ASSESSMENT
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 Now S/P L Hip IMN. RRT today 7/1 for acute hypoxemic respiratory failure. Pulmonary consulted.     DX: Lung Cancer, Pneumonia, Left hip IMN    Recommendations:    - MS now improved at baseline  - 3 L NC NC with SpO2 94-95%  - Continue to titrate down FiO2 as tolerates for goal 02>90%  - CTA chest negative for PE, does show B/L LL PNA with mucoid impaction  - C/w duoneb q6 with hypersal Nebs used with Aerobika to facilitate secretion mobilization  - Chest PT  - Sputum culture with e. Coli  - Blood cultures NGTD  - Legionella neg, azithromycin was d/c'd   - Pt completed 4 days of Zosyn IV for pneumonia then switched to Ceftriaxone IV per ID recs now completed a total of 3 days  - continue incentive spirometer   - Does have underlying Lung cancer on immunotherapy last dose 6/26. Will need to F/U with oncologist upon DC from hospital.    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 Now S/P L Hip IMN. RRT today 7/1 for acute hypoxemic respiratory failure. Pulmonary consulted.     DX: Lung Cancer, Pneumonia, Left hip IMN    Recommendations:    - MS now improved at baseline  - 3 L NC NC with SpO2 94-95%  - Continue to titrate down FiO2 as tolerates for goal 02>90%  - CTA chest negative for PE, does show B/L LL PNA with mucoid impaction  - C/w duoneb q6 with hypersal Nebs used with Aerobika to facilitate secretion mobilization  - Chest PT  - Sputum culture with e. Coli  - Blood cultures NGTD  - Legionella neg, azithromycin was d/c'd   - Pt completed 4 days of Zosyn IV for pneumonia then switched to Ceftriaxone IV per ID recs now completed a total of 3 days  - ID following, appreciate recs  - continue incentive spirometer   - Does have underlying Lung cancer on immunotherapy last dose 6/26. Will need to F/U with oncologist upon DC from hospital.     NOTE INCOMPLETE    68-year-old female with history of lung cancer (on immunotherapy), HTN, IDDM, jugular vein thrombosis on Lovenox who presents with LT-sided hip pain after sustaining a fall. Now S/P L Hip IMN. RRT today 7/1 for acute hypoxemic respiratory failure. Pulmonary consulted.     DX: Lung Cancer, Pneumonia, Left hip IMN    Recommendations:    - MS now improved at baseline  - 3 L NC NC with SpO2 94-95%  - Continue to titrate down FiO2 as tolerates for goal 02>90%  - CTA chest negative for PE, does show B/L LL PNA with mucoid impaction  - C/w duoneb q6 with hypersal Nebs used with Aerobika to facilitate secretion mobilization  - Chest PT  - Sputum culture with e. Coli  - Blood cultures NGTD  - Legionella neg, azithromycin was d/c'd   - Pt completed 4 days of Zosyn IV for pneumonia then switched to Ceftriaxone IV per ID recs now completed a total of 3 days. Continue to monitor off abxs.   - ID following, appreciate recs  - continue incentive spirometer   - Does have underlying Lung cancer on immunotherapy last dose 6/26. Will need to F/U with oncologist upon DC from hospital.   - Rest of care per primary team    Case discussed with pulmonary attending, Dr. Stovall

## 2024-07-08 NOTE — PROGRESS NOTE ADULT - SUBJECTIVE AND OBJECTIVE BOX
INTERVAL HPI/OVERNIGHT EVENTS: No acute events overnight. Afebrile. Pt on 3LNC with O 2sat >92%    SUBJECTIVE: Patient seen and examined at bedside. No acute complaints at this time.     ROS: All negative except as listed above.    VITAL SIGNS:  ICU Vital Signs Last 24 Hrs  T(C): 36.9 (08 Jul 2024 04:58), Max: 37.1 (07 Jul 2024 11:40)  T(F): 98.5 (08 Jul 2024 04:58), Max: 98.8 (07 Jul 2024 23:55)  HR: 92 (08 Jul 2024 05:27) (80 - 96)  BP: 154/72 (08 Jul 2024 04:58) (145/71 - 154/72)  BP(mean): --  ABP: --  ABP(mean): --  RR: 18 (08 Jul 2024 04:58) (18 - 19)  SpO2: 96% (08 Jul 2024 05:27) (95% - 99%)    O2 Parameters below as of 08 Jul 2024 05:27  Patient On (Oxygen Delivery Method): nasal cannula, 3 LPM            Plateau pressure:   P/F ratio:     07-07 @ 07:01  -  07-08 @ 07:00  --------------------------------------------------------  IN: 500 mL / OUT: 700 mL / NET: -200 mL      CAPILLARY BLOOD GLUCOSE      POCT Blood Glucose.: 205 mg/dL (08 Jul 2024 08:55)      ECG: reviewed.    PHYSICAL EXAM:    GENERAL: NAD, lying in bed comfortably  HEAD:  Atraumatic, normocephalic  EYES: EOMI, PERRLA, conjunctiva and sclera clear  NECK: Supple, trachea midline, no JVD  HEART: Regular rate and rhythm, no murmurs, rubs, or gallops  LUNGS: Unlabored respirations.  Clear to auscultation bilaterally, no crackles, wheezing, or rhonchi  ABDOMEN: Soft, nontender, nondistended, +BS  EXTREMITIES: 2+ peripheral pulses bilaterally, cap refill<2 secs. No clubbing, cyanosis, or edema  NERVOUS SYSTEM:  A&Ox3, following commands, moving all extremities, no focal deficits   SKIN: No rashes or lesions    MEDICATIONS:  MEDICATIONS  (STANDING):  acetaminophen     Tablet .. 650 milliGRAM(s) Oral every 6 hours  albuterol/ipratropium for Nebulization 3 milliLiter(s) Nebulizer every 6 hours  budesonide  80 MICROgram(s)/formoterol 4.5 MICROgram(s) Inhaler 2 Puff(s) Inhalation two times a day  dextrose 10% Bolus 125 milliLiter(s) IV Bolus once  dextrose 5%. 1000 milliLiter(s) (100 mL/Hr) IV Continuous <Continuous>  dextrose 5%. 1000 milliLiter(s) (50 mL/Hr) IV Continuous <Continuous>  dextrose 50% Injectable 12.5 Gram(s) IV Push once  dextrose 50% Injectable 25 Gram(s) IV Push once  diltiazem    milliGRAM(s) Oral at bedtime  dimethyl fumarate DR Capsule 240 milliGRAM(s) Oral two times a day  diphenoxylate/atropine 1 Tablet(s) Oral at bedtime  enalapril 5 milliGRAM(s) Oral at bedtime  enoxaparin Injectable 60 milliGRAM(s) SubCutaneous every 12 hours  glucagon  Injectable 1 milliGRAM(s) IntraMuscular once  guaiFENesin  milliGRAM(s) Oral every 12 hours  insulin glargine Injectable (LANTUS) 10 Unit(s) SubCutaneous every morning  insulin lispro (ADMELOG) corrective regimen sliding scale   SubCutaneous at bedtime  insulin lispro (ADMELOG) corrective regimen sliding scale   SubCutaneous three times a day before meals  insulin lispro Injectable (ADMELOG) 2 Unit(s) SubCutaneous three times a day before meals  lactobacillus acidophilus 1 Tablet(s) Oral three times a day with meals  metoprolol succinate ER 50 milliGRAM(s) Oral at bedtime  polyethylene glycol 3350 17 Gram(s) Oral at bedtime  senna 2 Tablet(s) Oral at bedtime  sodium chloride 3%  Inhalation 4 milliLiter(s) Inhalation every 6 hours    MEDICATIONS  (PRN):  acetaminophen     Tablet .. 650 milliGRAM(s) Oral every 6 hours PRN Temp greater or equal to 38C (100.4F), Mild Pain (1 - 3)  albuterol    90 MICROgram(s) HFA Inhaler 2 Puff(s) Inhalation every 6 hours PRN Shortness of Breath and/or Wheezing  dextrose Oral Gel 15 Gram(s) Oral once PRN Blood Glucose LESS THAN 70 milliGRAM(s)/deciliter  melatonin 3 milliGRAM(s) Oral at bedtime PRN Insomnia      ALLERGIES:  Allergies    No Known Allergies    Intolerances        LABS:                        9.9    6.30  )-----------( 431      ( 08 Jul 2024 06:02 )             31.3     07-08    130<L>  |  92<L>  |  14  ----------------------------<  119<H>  4.1   |  31  |  0.54    Ca    9.0      08 Jul 2024 06:02        Urinalysis Basic - ( 08 Jul 2024 06:02 )    Color: x / Appearance: x / SG: x / pH: x  Gluc: 119 mg/dL / Ketone: x  / Bili: x / Urobili: x   Blood: x / Protein: x / Nitrite: x   Leuk Esterase: x / RBC: x / WBC x   Sq Epi: x / Non Sq Epi: x / Bacteria: x      ABG:      vBG:    Micro:    Culture - Blood (collected 07-01-24 @ 16:40)  Source: .Blood Blood-Peripheral  Final Report (07-06-24 @ 23:08):    No growth at 5 days    Culture - Blood (collected 07-01-24 @ 16:35)  Source: .Blood Blood-Peripheral  Final Report (07-06-24 @ 23:08):    No growth at 5 days        Culture - Sputum (collected 07-01-24 @ 18:00)  Source: .Sputum Sputum  Gram Stain (07-03-24 @ 16:37):    Moderate polymorphonuclear leukocytes per low power field    No Squamous epithelial cells per low power field    Numerous Gram Negative Rods seen per oil power field    Few Gram positive cocci in pairs seen per oil power field  Final Report (07-03-24 @ 16:37):    Numerous Escherichia coli    Normal Respiratory Jordana present  Organism: Escherichia coli (07-03-24 @ 16:37)  Organism: Escherichia coli (07-03-24 @ 16:37)      Method Type: ETHEL      -  Amoxicillin/Clavulanic Acid: S <=8/4      -  Ampicillin: R >16 These ampicillin results predict results for amoxicillin      -  Ampicillin/Sulbactam: I 16/8      -  Aztreonam: S <=4      -  Cefazolin: S <=2      -  Cefepime: S <=2      -  Cefoxitin: S <=8      -  Ceftriaxone: S <=1      -  Ciprofloxacin: S <=0.25      -  Ertapenem: S <=0.5      -  Gentamicin: S <=2      -  Imipenem: S <=1      -  Levofloxacin: S <=0.5      -  Meropenem: S <=1      -  Piperacillin/Tazobactam: S <=8      -  Tobramycin: S <=2      -  Trimethoprim/Sulfamethoxazole: R >2/38        RADIOLOGY & ADDITIONAL TESTS: Reviewed.   INTERVAL HPI/OVERNIGHT EVENTS: No acute events overnight. Afebrile. Pt on 2LNC with O 2sat >92%    SUBJECTIVE: Patient seen and examined at bedside. No acute complaints at this time.     ROS: All negative except as listed above.    VITAL SIGNS:  ICU Vital Signs Last 24 Hrs  T(C): 36.9 (08 Jul 2024 04:58), Max: 37.1 (07 Jul 2024 11:40)  T(F): 98.5 (08 Jul 2024 04:58), Max: 98.8 (07 Jul 2024 23:55)  HR: 92 (08 Jul 2024 05:27) (80 - 96)  BP: 154/72 (08 Jul 2024 04:58) (145/71 - 154/72)  BP(mean): --  ABP: --  ABP(mean): --  RR: 18 (08 Jul 2024 04:58) (18 - 19)  SpO2: 96% (08 Jul 2024 05:27) (95% - 99%)    O2 Parameters below as of 08 Jul 2024 05:27  Patient On (Oxygen Delivery Method): nasal cannula, 3 LPM            Plateau pressure:   P/F ratio:     07-07 @ 07:01  -  07-08 @ 07:00  --------------------------------------------------------  IN: 500 mL / OUT: 700 mL / NET: -200 mL      CAPILLARY BLOOD GLUCOSE      POCT Blood Glucose.: 205 mg/dL (08 Jul 2024 08:55)      ECG: reviewed.    PHYSICAL EXAM:    GENERAL: NAD, lying in bed comfortably  HEAD:  Atraumatic, normocephalic  EYES: EOMI, PERRLA, conjunctiva and sclera clear  NECK: Supple, trachea midline, no JVD  HEART: Regular rate and rhythm  LUNGS: coarse BS, occasional wheeze  ABDOMEN: Soft, nontender, nondistended, +BS  EXTREMITIES: No clubbing, cyanosis, or edema  NERVOUS SYSTEM:  A&Ox3, following commands, moving all extremities, no focal deficits     MEDICATIONS:  MEDICATIONS  (STANDING):  acetaminophen     Tablet .. 650 milliGRAM(s) Oral every 6 hours  albuterol/ipratropium for Nebulization 3 milliLiter(s) Nebulizer every 6 hours  budesonide  80 MICROgram(s)/formoterol 4.5 MICROgram(s) Inhaler 2 Puff(s) Inhalation two times a day  dextrose 10% Bolus 125 milliLiter(s) IV Bolus once  dextrose 5%. 1000 milliLiter(s) (100 mL/Hr) IV Continuous <Continuous>  dextrose 5%. 1000 milliLiter(s) (50 mL/Hr) IV Continuous <Continuous>  dextrose 50% Injectable 12.5 Gram(s) IV Push once  dextrose 50% Injectable 25 Gram(s) IV Push once  diltiazem    milliGRAM(s) Oral at bedtime  dimethyl fumarate DR Capsule 240 milliGRAM(s) Oral two times a day  diphenoxylate/atropine 1 Tablet(s) Oral at bedtime  enalapril 5 milliGRAM(s) Oral at bedtime  enoxaparin Injectable 60 milliGRAM(s) SubCutaneous every 12 hours  glucagon  Injectable 1 milliGRAM(s) IntraMuscular once  guaiFENesin  milliGRAM(s) Oral every 12 hours  insulin glargine Injectable (LANTUS) 10 Unit(s) SubCutaneous every morning  insulin lispro (ADMELOG) corrective regimen sliding scale   SubCutaneous at bedtime  insulin lispro (ADMELOG) corrective regimen sliding scale   SubCutaneous three times a day before meals  insulin lispro Injectable (ADMELOG) 2 Unit(s) SubCutaneous three times a day before meals  lactobacillus acidophilus 1 Tablet(s) Oral three times a day with meals  metoprolol succinate ER 50 milliGRAM(s) Oral at bedtime  polyethylene glycol 3350 17 Gram(s) Oral at bedtime  senna 2 Tablet(s) Oral at bedtime  sodium chloride 3%  Inhalation 4 milliLiter(s) Inhalation every 6 hours    MEDICATIONS  (PRN):  acetaminophen     Tablet .. 650 milliGRAM(s) Oral every 6 hours PRN Temp greater or equal to 38C (100.4F), Mild Pain (1 - 3)  albuterol    90 MICROgram(s) HFA Inhaler 2 Puff(s) Inhalation every 6 hours PRN Shortness of Breath and/or Wheezing  dextrose Oral Gel 15 Gram(s) Oral once PRN Blood Glucose LESS THAN 70 milliGRAM(s)/deciliter  melatonin 3 milliGRAM(s) Oral at bedtime PRN Insomnia      ALLERGIES:  Allergies    No Known Allergies    Intolerances        LABS:                        9.9    6.30  )-----------( 431      ( 08 Jul 2024 06:02 )             31.3     07-08    130<L>  |  92<L>  |  14  ----------------------------<  119<H>  4.1   |  31  |  0.54    Ca    9.0      08 Jul 2024 06:02        Urinalysis Basic - ( 08 Jul 2024 06:02 )    Color: x / Appearance: x / SG: x / pH: x  Gluc: 119 mg/dL / Ketone: x  / Bili: x / Urobili: x   Blood: x / Protein: x / Nitrite: x   Leuk Esterase: x / RBC: x / WBC x   Sq Epi: x / Non Sq Epi: x / Bacteria: x      ABG:      vBG:    Micro:    Culture - Blood (collected 07-01-24 @ 16:40)  Source: .Blood Blood-Peripheral  Final Report (07-06-24 @ 23:08):    No growth at 5 days    Culture - Blood (collected 07-01-24 @ 16:35)  Source: .Blood Blood-Peripheral  Final Report (07-06-24 @ 23:08):    No growth at 5 days        Culture - Sputum (collected 07-01-24 @ 18:00)  Source: .Sputum Sputum  Gram Stain (07-03-24 @ 16:37):    Moderate polymorphonuclear leukocytes per low power field    No Squamous epithelial cells per low power field    Numerous Gram Negative Rods seen per oil power field    Few Gram positive cocci in pairs seen per oil power field  Final Report (07-03-24 @ 16:37):    Numerous Escherichia coli    Normal Respiratory Jordana present  Organism: Escherichia coli (07-03-24 @ 16:37)  Organism: Escherichia coli (07-03-24 @ 16:37)      Method Type: ETHEL      -  Amoxicillin/Clavulanic Acid: S <=8/4      -  Ampicillin: R >16 These ampicillin results predict results for amoxicillin      -  Ampicillin/Sulbactam: I 16/8      -  Aztreonam: S <=4      -  Cefazolin: S <=2      -  Cefepime: S <=2      -  Cefoxitin: S <=8      -  Ceftriaxone: S <=1      -  Ciprofloxacin: S <=0.25      -  Ertapenem: S <=0.5      -  Gentamicin: S <=2      -  Imipenem: S <=1      -  Levofloxacin: S <=0.5      -  Meropenem: S <=1      -  Piperacillin/Tazobactam: S <=8      -  Tobramycin: S <=2      -  Trimethoprim/Sulfamethoxazole: R >2/38        RADIOLOGY & ADDITIONAL TESTS: Reviewed.

## 2024-07-08 NOTE — PROGRESS NOTE ADULT - SUBJECTIVE AND OBJECTIVE BOX
CHIEF COMPLAINT/INTERVAL HISTORY:    Patient is a 68y old  Female who presents with a chief complaint of mechanical fall (08 Jul 2024 09:39)      HPI:  Patient is a 68-year-old female with history of lung cancer (on immunotherapy), HTN, IDDM, jugular vein thrombosis on Lovenox who presents with LT-sided hip pain after sustaining a fall. She was walking down steps when she slipped and landed on her left hip. Denies LOC and head trauma. Pt denies any numbness, tingling or paresthesias.    In the ED, her vitals are WNL. Labs WNL. Xray LT hip w/ left intertrochanteric fracture.   (27 Jun 2024 15:31)      SUBJECTIVE & OBJECTIVE: Pt seen and examined at bedside. offers no new complaints  poor appetite  remains on NC 3 to 4lpm    Vital Signs Last 24 Hrs  T(C): 36.6 (08 Jul 2024 10:43), Max: 37.1 (07 Jul 2024 23:55)  T(F): 97.9 (08 Jul 2024 10:43), Max: 98.8 (07 Jul 2024 23:55)  HR: 88 (08 Jul 2024 12:00) (80 - 96)  BP: 132/78 (08 Jul 2024 11:46) (121/72 - 154/72)  BP(mean): --  ABP: --  ABP(mean): --  RR: 19 (08 Jul 2024 10:43) (18 - 19)  SpO2: 94% (08 Jul 2024 12:00) (94% - 99%)    O2 Parameters below as of 08 Jul 2024 12:00  Patient On (Oxygen Delivery Method): nasal cannula              MEDICATIONS  (STANDING):  acetaminophen     Tablet .. 650 milliGRAM(s) Oral every 6 hours  albuterol/ipratropium for Nebulization 3 milliLiter(s) Nebulizer every 6 hours  budesonide  80 MICROgram(s)/formoterol 4.5 MICROgram(s) Inhaler 2 Puff(s) Inhalation two times a day  dextrose 10% Bolus 125 milliLiter(s) IV Bolus once  dextrose 5%. 1000 milliLiter(s) (100 mL/Hr) IV Continuous <Continuous>  dextrose 5%. 1000 milliLiter(s) (50 mL/Hr) IV Continuous <Continuous>  dextrose 50% Injectable 12.5 Gram(s) IV Push once  dextrose 50% Injectable 25 Gram(s) IV Push once  diltiazem    milliGRAM(s) Oral at bedtime  dimethyl fumarate DR Capsule 240 milliGRAM(s) Oral two times a day  diphenoxylate/atropine 1 Tablet(s) Oral at bedtime  enalapril 5 milliGRAM(s) Oral at bedtime  enoxaparin Injectable 60 milliGRAM(s) SubCutaneous every 12 hours  glucagon  Injectable 1 milliGRAM(s) IntraMuscular once  guaiFENesin  milliGRAM(s) Oral every 12 hours  insulin glargine Injectable (LANTUS) 10 Unit(s) SubCutaneous every morning  insulin lispro (ADMELOG) corrective regimen sliding scale   SubCutaneous at bedtime  insulin lispro (ADMELOG) corrective regimen sliding scale   SubCutaneous three times a day before meals  insulin lispro Injectable (ADMELOG) 2 Unit(s) SubCutaneous three times a day before meals  lactobacillus acidophilus 1 Tablet(s) Oral three times a day with meals  metoprolol succinate ER 50 milliGRAM(s) Oral at bedtime  polyethylene glycol 3350 17 Gram(s) Oral at bedtime  senna 2 Tablet(s) Oral at bedtime  sodium chloride 3%  Inhalation 4 milliLiter(s) Inhalation every 6 hours    MEDICATIONS  (PRN):  acetaminophen     Tablet .. 650 milliGRAM(s) Oral every 6 hours PRN Temp greater or equal to 38C (100.4F), Mild Pain (1 - 3)  albuterol    90 MICROgram(s) HFA Inhaler 2 Puff(s) Inhalation every 6 hours PRN Shortness of Breath and/or Wheezing  dextrose Oral Gel 15 Gram(s) Oral once PRN Blood Glucose LESS THAN 70 milliGRAM(s)/deciliter  melatonin 3 milliGRAM(s) Oral at bedtime PRN Insomnia        PHYSICAL EXAM:    GENERAL: NAD,malnourished, well-developed, on NC  HEAD:  Atraumatic, Normocephalic  EYES: EOMI, PERRLA, conjunctiva and sclera clear  ENMT: Moist mucous membranes  NECK: Supple, No JVD  NERVOUS SYSTEM:  Alert & Oriented X3, Normal speech  CHEST/LUNG: Clear to auscultation bilaterally; No rales, rhonchi, wheezing, or rubs  HEART:  S1, S2  ABDOMEN: Soft, Nontender,  Bowel sounds present  EXTREMITIES:  no cyanosis, or edema    LABS:                        9.9    6.30  )-----------( 431      ( 08 Jul 2024 06:02 )             31.3     07-08    130<L>  |  92<L>  |  14  ----------------------------<  119<H>  4.1   |  31  |  0.54    Ca    9.0      08 Jul 2024 06:02        Urinalysis Basic - ( 08 Jul 2024 06:02 )    Color: x / Appearance: x / SG: x / pH: x  Gluc: 119 mg/dL / Ketone: x  / Bili: x / Urobili: x   Blood: x / Protein: x / Nitrite: x   Leuk Esterase: x / RBC: x / WBC x   Sq Epi: x / Non Sq Epi: x / Bacteria: x        CAPILLARY BLOOD GLUCOSE      POCT Blood Glucose.: 374 mg/dL (08 Jul 2024 12:31)  POCT Blood Glucose.: 413 mg/dL (08 Jul 2024 12:27)  POCT Blood Glucose.: 205 mg/dL (08 Jul 2024 08:55)  POCT Blood Glucose.: 229 mg/dL (07 Jul 2024 21:35)  POCT Blood Glucose.: 88 mg/dL (07 Jul 2024 16:47)   CHIEF COMPLAINT/INTERVAL HISTORY:    Patient is a 68y old  Female who presents with a chief complaint of mechanical fall (08 Jul 2024 09:39)      HPI:  Patient is a 68-year-old female with history of lung cancer (on immunotherapy), HTN, IDDM, jugular vein thrombosis on Lovenox who presents with LT-sided hip pain after sustaining a fall. She was walking down steps when she slipped and landed on her left hip. Denies LOC and head trauma. Pt denies any numbness, tingling or paresthesias.    In the ED, her vitals are WNL. Labs WNL. Xray LT hip w/ left intertrochanteric fracture.   (27 Jun 2024 15:31)      SUBJECTIVE & OBJECTIVE: Pt seen and examined at bedside. offers no new complaints  poor appetite  remains on NC 3 to 4lpm    Vital Signs Last 24 Hrs  T(C): 36.6 (08 Jul 2024 10:43), Max: 37.1 (07 Jul 2024 23:55)  T(F): 97.9 (08 Jul 2024 10:43), Max: 98.8 (07 Jul 2024 23:55)  HR: 88 (08 Jul 2024 12:00) (80 - 96)  BP: 132/78 (08 Jul 2024 11:46) (121/72 - 154/72)  BP(mean): --  ABP: --  ABP(mean): --  RR: 19 (08 Jul 2024 10:43) (18 - 19)  SpO2: 94% (08 Jul 2024 12:00) (94% - 99%)    O2 Parameters below as of 08 Jul 2024 12:00  Patient On (Oxygen Delivery Method): nasal cannula              MEDICATIONS  (STANDING):  acetaminophen     Tablet .. 650 milliGRAM(s) Oral every 6 hours  albuterol/ipratropium for Nebulization 3 milliLiter(s) Nebulizer every 6 hours  budesonide  80 MICROgram(s)/formoterol 4.5 MICROgram(s) Inhaler 2 Puff(s) Inhalation two times a day  dextrose 10% Bolus 125 milliLiter(s) IV Bolus once  dextrose 5%. 1000 milliLiter(s) (100 mL/Hr) IV Continuous <Continuous>  dextrose 5%. 1000 milliLiter(s) (50 mL/Hr) IV Continuous <Continuous>  dextrose 50% Injectable 12.5 Gram(s) IV Push once  dextrose 50% Injectable 25 Gram(s) IV Push once  diltiazem    milliGRAM(s) Oral at bedtime  dimethyl fumarate DR Capsule 240 milliGRAM(s) Oral two times a day  diphenoxylate/atropine 1 Tablet(s) Oral at bedtime  enalapril 5 milliGRAM(s) Oral at bedtime  enoxaparin Injectable 60 milliGRAM(s) SubCutaneous every 12 hours  glucagon  Injectable 1 milliGRAM(s) IntraMuscular once  guaiFENesin  milliGRAM(s) Oral every 12 hours  insulin glargine Injectable (LANTUS) 10 Unit(s) SubCutaneous every morning  insulin lispro (ADMELOG) corrective regimen sliding scale   SubCutaneous at bedtime  insulin lispro (ADMELOG) corrective regimen sliding scale   SubCutaneous three times a day before meals  insulin lispro Injectable (ADMELOG) 2 Unit(s) SubCutaneous three times a day before meals  lactobacillus acidophilus 1 Tablet(s) Oral three times a day with meals  metoprolol succinate ER 50 milliGRAM(s) Oral at bedtime  polyethylene glycol 3350 17 Gram(s) Oral at bedtime  senna 2 Tablet(s) Oral at bedtime  sodium chloride 3%  Inhalation 4 milliLiter(s) Inhalation every 6 hours    MEDICATIONS  (PRN):  acetaminophen     Tablet .. 650 milliGRAM(s) Oral every 6 hours PRN Temp greater or equal to 38C (100.4F), Mild Pain (1 - 3)  albuterol    90 MICROgram(s) HFA Inhaler 2 Puff(s) Inhalation every 6 hours PRN Shortness of Breath and/or Wheezing  dextrose Oral Gel 15 Gram(s) Oral once PRN Blood Glucose LESS THAN 70 milliGRAM(s)/deciliter  melatonin 3 milliGRAM(s) Oral at bedtime PRN Insomnia        PHYSICAL EXAM:    GENERAL: NAD,malnourished, well-developed, on NC  HEAD:  Atraumatic, Normocephalic  EYES: EOMI, PERRLA, conjunctiva and sclera clear  ENMT: Moist mucous membranes  NECK: Supple  NERVOUS SYSTEM:  Alert & Oriented X3, Normal speech  CHEST/LUNG: Clear to auscultation bilaterally; No rales, rhonchi, wheezing, or rubs  HEART:  S1, S2  ABDOMEN: Soft, Nontender,  Bowel sounds present  EXTREMITIES:  no cyanosis, or edema    LABS:                        9.9    6.30  )-----------( 431      ( 08 Jul 2024 06:02 )             31.3     07-08    130<L>  |  92<L>  |  14  ----------------------------<  119<H>  4.1   |  31  |  0.54    Ca    9.0      08 Jul 2024 06:02        Urinalysis Basic - ( 08 Jul 2024 06:02 )    Color: x / Appearance: x / SG: x / pH: x  Gluc: 119 mg/dL / Ketone: x  / Bili: x / Urobili: x   Blood: x / Protein: x / Nitrite: x   Leuk Esterase: x / RBC: x / WBC x   Sq Epi: x / Non Sq Epi: x / Bacteria: x        CAPILLARY BLOOD GLUCOSE      POCT Blood Glucose.: 374 mg/dL (08 Jul 2024 12:31)  POCT Blood Glucose.: 413 mg/dL (08 Jul 2024 12:27)  POCT Blood Glucose.: 205 mg/dL (08 Jul 2024 08:55)  POCT Blood Glucose.: 229 mg/dL (07 Jul 2024 21:35)  POCT Blood Glucose.: 88 mg/dL (07 Jul 2024 16:47)

## 2024-07-08 NOTE — PROGRESS NOTE ADULT - ASSESSMENT
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 She was walking down steps when she slipped and landed on her left hip. Denies LOC and head trauma. Pt denies any numbness, tingling or paresthesias.  In the ED, her vitals are WNL. Labs WNL. Xray LT hip w/ left intertrochanteric fracture.   (27 Jun 2024 15:31)   s/p L Hip IMN on 6/28    patient had RRT 7/1/2024 for hypoxemia       remains afebrile  No leukocytosis  sputum cx- e.coli , sensitivity reviewed (pan-sensitive)  BCs NGTD x 2 7/1  urine Legionella negative   covid-19  Negative  US venous doppler study of b/l LEs - negative for DVT    CTA Report-  No pulmonary embolism.  New groundglass opacities with patchy areas of consolidation, increased small pleural effusions and bilateral lower lobe mucoid impaction. Suspect a combination of pulmonary edema and aspiration/pneumonia.  Circumferential mid to distal tracheal soft tissue thickening, extending to the scott, is nonspecific although possibly secondary to immunotherapy.  Additional indeterminate findings in this patient with reported history of lung cancer, including right apical and right paraspinal nodular opacities, hilar adenopathy, left upper quadrant soft tissue noduleand ill-defined splenic lesion. Comparison to more recent prior imaging, if   available, would be useful.      plan-  completed 4 days of zosyn  for pneumonia.  yesterday she completed 3 days of ceftriaxone for e.coli pneumonia  if pt's respiratory status continues to improve, ok to change abx to po Augmentin to complete 7 days course as outpatient  malignancy management as per patient's oncologist   left hip surgical site management as per ortho team  ortho and pulmonology follow ups are appreciated     All labs and imaging and chart notes reviewed.     All above discussed with patient and patient verbalizes full understanding of all above and agrees with above plan of care.        Elsa Lombardi MD  Infectious Disease Attending    for any questions please do not hesitate to contact me either via teams or by calling [phone]

## 2024-07-09 LAB
ANION GAP SERPL CALC-SCNC: 5 MMOL/L — SIGNIFICANT CHANGE UP (ref 5–17)
BUN SERPL-MCNC: 19 MG/DL — SIGNIFICANT CHANGE UP (ref 7–23)
CALCIUM SERPL-MCNC: 8.2 MG/DL — LOW (ref 8.5–10.1)
CHLORIDE SERPL-SCNC: 91 MMOL/L — LOW (ref 96–108)
CO2 SERPL-SCNC: 34 MMOL/L — HIGH (ref 22–31)
CREAT SERPL-MCNC: 0.73 MG/DL — SIGNIFICANT CHANGE UP (ref 0.5–1.3)
EGFR: 90 ML/MIN/1.73M2 — SIGNIFICANT CHANGE UP
GLUCOSE BLDC GLUCOMTR-MCNC: 116 MG/DL — HIGH (ref 70–99)
GLUCOSE BLDC GLUCOMTR-MCNC: 140 MG/DL — HIGH (ref 70–99)
GLUCOSE BLDC GLUCOMTR-MCNC: 160 MG/DL — HIGH (ref 70–99)
GLUCOSE BLDC GLUCOMTR-MCNC: 305 MG/DL — HIGH (ref 70–99)
GLUCOSE BLDC GLUCOMTR-MCNC: 307 MG/DL — HIGH (ref 70–99)
GLUCOSE BLDC GLUCOMTR-MCNC: 59 MG/DL — LOW (ref 70–99)
GLUCOSE SERPL-MCNC: 178 MG/DL — HIGH (ref 70–99)
HCT VFR BLD CALC: 27.6 % — LOW (ref 34.5–45)
HGB BLD-MCNC: 8.8 G/DL — LOW (ref 11.5–15.5)
MCHC RBC-ENTMCNC: 26.2 PG — LOW (ref 27–34)
MCHC RBC-ENTMCNC: 31.9 G/DL — LOW (ref 32–36)
MCV RBC AUTO: 82.1 FL — SIGNIFICANT CHANGE UP (ref 80–100)
NRBC # BLD: 0 /100 WBCS — SIGNIFICANT CHANGE UP (ref 0–0)
PLATELET # BLD AUTO: 423 K/UL — HIGH (ref 150–400)
POTASSIUM SERPL-MCNC: 3.8 MMOL/L — SIGNIFICANT CHANGE UP (ref 3.5–5.3)
POTASSIUM SERPL-SCNC: 3.8 MMOL/L — SIGNIFICANT CHANGE UP (ref 3.5–5.3)
RBC # BLD: 3.36 M/UL — LOW (ref 3.8–5.2)
RBC # FLD: 15.4 % — HIGH (ref 10.3–14.5)
SODIUM SERPL-SCNC: 130 MMOL/L — LOW (ref 135–145)
WBC # BLD: 5.67 K/UL — SIGNIFICANT CHANGE UP (ref 3.8–10.5)
WBC # FLD AUTO: 5.67 K/UL — SIGNIFICANT CHANGE UP (ref 3.8–10.5)

## 2024-07-09 PROCEDURE — 99239 HOSP IP/OBS DSCHRG MGMT >30: CPT

## 2024-07-09 RX ORDER — CLONAZEPAM 2 MG/1
0.5 TABLET ORAL ONCE
Refills: 0 | Status: DISCONTINUED | OUTPATIENT
Start: 2024-07-09 | End: 2024-07-09

## 2024-07-09 RX ADMIN — Medication 1 TABLET(S): at 08:58

## 2024-07-09 RX ADMIN — Medication 3 MILLIGRAM(S): at 23:49

## 2024-07-09 RX ADMIN — DILTIAZEM HYDROCHLORIDE 120 MILLIGRAM(S): 240 CAPSULE, EXTENDED RELEASE ORAL at 22:24

## 2024-07-09 RX ADMIN — Medication 4 MILLILITER(S): at 17:14

## 2024-07-09 RX ADMIN — Medication 5 MILLIGRAM(S): at 22:22

## 2024-07-09 RX ADMIN — Medication 50 MILLIGRAM(S): at 22:23

## 2024-07-09 RX ADMIN — Medication 20 MILLIGRAM(S): at 12:29

## 2024-07-09 RX ADMIN — DIMETHYL FUMARATE 240 MILLIGRAM(S): 240 CAPSULE, DELAYED RELEASE ORAL at 18:19

## 2024-07-09 RX ADMIN — INSULIN GLARGINE 10 UNIT(S): 100 INJECTION, SOLUTION SUBCUTANEOUS at 22:30

## 2024-07-09 RX ADMIN — Medication 600 MILLIGRAM(S): at 05:37

## 2024-07-09 RX ADMIN — IPRATROPIUM BROMIDE AND ALBUTEROL SULFATE 3 MILLILITER(S): .5; 3 SOLUTION RESPIRATORY (INHALATION) at 17:13

## 2024-07-09 RX ADMIN — Medication 4 MILLILITER(S): at 11:19

## 2024-07-09 RX ADMIN — Medication 600 MILLIGRAM(S): at 17:10

## 2024-07-09 RX ADMIN — INSULIN LISPRO 2: 100 INJECTION, SOLUTION SUBCUTANEOUS at 22:28

## 2024-07-09 RX ADMIN — Medication 650 MILLIGRAM(S): at 23:47

## 2024-07-09 RX ADMIN — INSULIN LISPRO 2 UNIT(S): 100 INJECTION, SOLUTION SUBCUTANEOUS at 08:56

## 2024-07-09 RX ADMIN — Medication 650 MILLIGRAM(S): at 17:07

## 2024-07-09 RX ADMIN — DIMETHYL FUMARATE 240 MILLIGRAM(S): 240 CAPSULE, DELAYED RELEASE ORAL at 05:38

## 2024-07-09 RX ADMIN — CLONAZEPAM 0.5 MILLIGRAM(S): 2 TABLET ORAL at 01:03

## 2024-07-09 RX ADMIN — Medication 2 PUFF(S): at 17:13

## 2024-07-09 RX ADMIN — Medication 650 MILLIGRAM(S): at 03:44

## 2024-07-09 RX ADMIN — Medication 650 MILLIGRAM(S): at 13:02

## 2024-07-09 RX ADMIN — Medication 2 PUFF(S): at 05:38

## 2024-07-09 RX ADMIN — ENOXAPARIN SODIUM 60 MILLIGRAM(S): 100 INJECTION SUBCUTANEOUS at 22:24

## 2024-07-09 RX ADMIN — INSULIN GLARGINE 10 UNIT(S): 100 INJECTION, SOLUTION SUBCUTANEOUS at 08:57

## 2024-07-09 RX ADMIN — IPRATROPIUM BROMIDE AND ALBUTEROL SULFATE 3 MILLILITER(S): .5; 3 SOLUTION RESPIRATORY (INHALATION) at 11:17

## 2024-07-09 RX ADMIN — Medication 1 TABLET(S): at 17:10

## 2024-07-09 RX ADMIN — Medication 4 MILLILITER(S): at 05:21

## 2024-07-09 RX ADMIN — IPRATROPIUM BROMIDE AND ALBUTEROL SULFATE 3 MILLILITER(S): .5; 3 SOLUTION RESPIRATORY (INHALATION) at 05:21

## 2024-07-09 RX ADMIN — INSULIN LISPRO 2 UNIT(S): 100 INJECTION, SOLUTION SUBCUTANEOUS at 12:18

## 2024-07-09 RX ADMIN — Medication 650 MILLIGRAM(S): at 18:02

## 2024-07-09 RX ADMIN — Medication 650 MILLIGRAM(S): at 12:20

## 2024-07-09 RX ADMIN — ENOXAPARIN SODIUM 60 MILLIGRAM(S): 100 INJECTION SUBCUTANEOUS at 08:57

## 2024-07-09 RX ADMIN — Medication 1 TABLET(S): at 12:20

## 2024-07-09 RX ADMIN — Medication 650 MILLIGRAM(S): at 04:44

## 2024-07-09 RX ADMIN — CLONAZEPAM 0.5 MILLIGRAM(S): 2 TABLET ORAL at 23:48

## 2024-07-09 NOTE — PROGRESS NOTE ADULT - SUBJECTIVE AND OBJECTIVE BOX
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 She was walking down steps when she slipped and landed on her left hip. Denies LOC and head trauma. Pt denies any numbness, tingling or paresthesias.    In the ED, her vitals are WNL. Labs WNL. Xray LT hip w/ left intertrochanteric fracture.   (27 Jun 2024 15:31)      SUBJECTIVE & OBJECTIVE: Pt seen and examined at bedside. offers no complaints  await auth for [facility]  stable on NC     Vital Signs Last 24 Hrs  T(C): 36.7 (09 Jul 2024 10:32), Max: 36.9 (09 Jul 2024 00:21)  T(F): 98.1 (09 Jul 2024 10:32), Max: 98.4 (09 Jul 2024 00:21)  HR: 98 (09 Jul 2024 12:42) (72 - 105)  BP: 163/79 (09 Jul 2024 12:42) (113/62 - 163/79)  BP(mean): --  ABP: --  ABP(mean): --  RR: 18 (09 Jul 2024 10:32) (18 - 18)  SpO2: 93% (09 Jul 2024 12:42) (93% - 100%)    O2 Parameters below as of 09 Jul 2024 12:42  Patient On (Oxygen Delivery Method): nasal cannula              MEDICATIONS  (STANDING):  acetaminophen     Tablet .. 650 milliGRAM(s) Oral every 6 hours  albuterol/ipratropium for Nebulization 3 milliLiter(s) Nebulizer every 6 hours  budesonide  80 MICROgram(s)/formoterol 4.5 MICROgram(s) Inhaler 2 Puff(s) Inhalation two times a day  dextrose 10% Bolus 125 milliLiter(s) IV Bolus once  dextrose 5%. 1000 milliLiter(s) (100 mL/Hr) IV Continuous <Continuous>  dextrose 5%. 1000 milliLiter(s) (50 mL/Hr) IV Continuous <Continuous>  dextrose 50% Injectable 12.5 Gram(s) IV Push once  dextrose 50% Injectable 25 Gram(s) IV Push once  diltiazem    milliGRAM(s) Oral at bedtime  dimethyl fumarate DR Capsule 240 milliGRAM(s) Oral two times a day  diphenoxylate/atropine 1 Tablet(s) Oral at bedtime  enalapril 5 milliGRAM(s) Oral at bedtime  enoxaparin Injectable 60 milliGRAM(s) SubCutaneous every 12 hours  famotidine    Tablet 20 milliGRAM(s) Oral daily  glucagon  Injectable 1 milliGRAM(s) IntraMuscular once  guaiFENesin  milliGRAM(s) Oral every 12 hours  insulin glargine Injectable (LANTUS) 10 Unit(s) SubCutaneous two times a day  insulin lispro (ADMELOG) corrective regimen sliding scale   SubCutaneous at bedtime  insulin lispro (ADMELOG) corrective regimen sliding scale   SubCutaneous three times a day before meals  insulin lispro Injectable (ADMELOG) 2 Unit(s) SubCutaneous three times a day before meals  lactobacillus acidophilus 1 Tablet(s) Oral three times a day with meals  metoprolol succinate ER 50 milliGRAM(s) Oral at bedtime  polyethylene glycol 3350 17 Gram(s) Oral at bedtime  senna 2 Tablet(s) Oral at bedtime  sodium chloride 3%  Inhalation 4 milliLiter(s) Inhalation every 6 hours    MEDICATIONS  (PRN):  acetaminophen     Tablet .. 650 milliGRAM(s) Oral every 6 hours PRN Temp greater or equal to 38C (100.4F), Mild Pain (1 - 3)  albuterol    90 MICROgram(s) HFA Inhaler 2 Puff(s) Inhalation every 6 hours PRN Shortness of Breath and/or Wheezing  dextrose Oral Gel 15 Gram(s) Oral once PRN Blood Glucose LESS THAN 70 milliGRAM(s)/deciliter  melatonin 3 milliGRAM(s) Oral at bedtime PRN Insomnia        PHYSICAL EXAM:    GENERAL: NAD, well-developed  HEAD:  Atraumatic, Normocephalic  EYES: EOMI, PERRLA, conjunctiva and sclera clear  ENMT: Moist mucous membranes  NECK: Supple,  NERVOUS SYSTEM:  Alert & Oriented X3, normal speech  CHEST/LUNG: Clear to auscultation bilaterally; No rales, rhonchi, wheezing, or rubs  HEART: Regular rate and rhythm;   ABDOMEN: Soft, Nontender, Bowel sounds present  EXTREMITIES: no cyanosis, or edema    LABS:                        8.8    5.67  )-----------( 423      ( 09 Jul 2024 06:53 )             27.6     07-09    130<L>  |  91<L>  |  19  ----------------------------<  178<H>  3.8   |  34<H>  |  0.73    Ca    8.2<L>      09 Jul 2024 06:53        Urinalysis Basic - ( 09 Jul 2024 06:53 )    Color: x / Appearance: x / SG: x / pH: x  Gluc: 178 mg/dL / Ketone: x  / Bili: x / Urobili: x   Blood: x / Protein: x / Nitrite: x   Leuk Esterase: x / RBC: x / WBC x   Sq Epi: x / Non Sq Epi: x / Bacteria: x        CAPILLARY BLOOD GLUCOSE      POCT Blood Glucose.: 116 mg/dL (09 Jul 2024 11:22)  POCT Blood Glucose.: 140 mg/dL (09 Jul 2024 08:54)  POCT Blood Glucose.: 160 mg/dL (09 Jul 2024 07:52)  POCT Blood Glucose.: 373 mg/dL (08 Jul 2024 21:52)  POCT Blood Glucose.: 292 mg/dL (08 Jul 2024 17:17)

## 2024-07-09 NOTE — PROGRESS NOTE ADULT - SUBJECTIVE AND OBJECTIVE BOX
INTERVAL HPI/OVERNIGHT EVENTS: NO acute events overnight. Afebrile. Pt on 3L NC with Sat O2 >92%.    SUBJECTIVE: Patient seen and examined at bedside. No acute complaints at this time.     ROS: All negative except as listed above.    VITAL SIGNS:  ICU Vital Signs Last 24 Hrs  T(C): 36.6 (09 Jul 2024 05:06), Max: 37 (08 Jul 2024 16:17)  T(F): 97.8 (09 Jul 2024 05:06), Max: 98.6 (08 Jul 2024 16:17)  HR: 77 (09 Jul 2024 05:30) (77 - 111)  BP: 130/64 (09 Jul 2024 05:06) (121/72 - 154/78)  BP(mean): --  ABP: --  ABP(mean): --  RR: 18 (09 Jul 2024 05:06) (18 - 19)  SpO2: 100% (09 Jul 2024 05:30) (93% - 100%)    O2 Parameters below as of 09 Jul 2024 05:06  Patient On (Oxygen Delivery Method): nasal cannula  O2 Flow (L/min): 3          Plateau pressure:   P/F ratio:     07-08 @ 07:01  -  07-09 @ 07:00  --------------------------------------------------------  IN: 375 mL / OUT: 1050 mL / NET: -675 mL      CAPILLARY BLOOD GLUCOSE      POCT Blood Glucose.: 140 mg/dL (09 Jul 2024 08:54)      ECG: reviewed.    PHYSICAL EXAM:    GENERAL: NAD, lying in bed comfortably  HEAD:  Atraumatic, normocephalic  EYES: EOMI, PERRLA, conjunctiva and sclera clear  NECK: Supple, trachea midline, no JVD  HEART: Regular rate and rhythm  LUNGS: coarse BS, occasional wheeze  ABDOMEN: Soft, nontender, nondistended, +BS  EXTREMITIES: No clubbing, cyanosis, or edema  NERVOUS SYSTEM:  A&Ox3, following commands, moving all extremities, no focal deficits       MEDICATIONS:  MEDICATIONS  (STANDING):  acetaminophen     Tablet .. 650 milliGRAM(s) Oral every 6 hours  albuterol/ipratropium for Nebulization 3 milliLiter(s) Nebulizer every 6 hours  budesonide  80 MICROgram(s)/formoterol 4.5 MICROgram(s) Inhaler 2 Puff(s) Inhalation two times a day  dextrose 10% Bolus 125 milliLiter(s) IV Bolus once  dextrose 5%. 1000 milliLiter(s) (100 mL/Hr) IV Continuous <Continuous>  dextrose 5%. 1000 milliLiter(s) (50 mL/Hr) IV Continuous <Continuous>  dextrose 50% Injectable 12.5 Gram(s) IV Push once  dextrose 50% Injectable 25 Gram(s) IV Push once  diltiazem    milliGRAM(s) Oral at bedtime  dimethyl fumarate DR Capsule 240 milliGRAM(s) Oral two times a day  diphenoxylate/atropine 1 Tablet(s) Oral at bedtime  enalapril 5 milliGRAM(s) Oral at bedtime  enoxaparin Injectable 60 milliGRAM(s) SubCutaneous every 12 hours  famotidine    Tablet 20 milliGRAM(s) Oral daily  glucagon  Injectable 1 milliGRAM(s) IntraMuscular once  guaiFENesin  milliGRAM(s) Oral every 12 hours  insulin glargine Injectable (LANTUS) 10 Unit(s) SubCutaneous two times a day  insulin lispro (ADMELOG) corrective regimen sliding scale   SubCutaneous at bedtime  insulin lispro (ADMELOG) corrective regimen sliding scale   SubCutaneous three times a day before meals  insulin lispro Injectable (ADMELOG) 2 Unit(s) SubCutaneous three times a day before meals  lactobacillus acidophilus 1 Tablet(s) Oral three times a day with meals  metoprolol succinate ER 50 milliGRAM(s) Oral at bedtime  polyethylene glycol 3350 17 Gram(s) Oral at bedtime  senna 2 Tablet(s) Oral at bedtime  sodium chloride 3%  Inhalation 4 milliLiter(s) Inhalation every 6 hours    MEDICATIONS  (PRN):  acetaminophen     Tablet .. 650 milliGRAM(s) Oral every 6 hours PRN Temp greater or equal to 38C (100.4F), Mild Pain (1 - 3)  albuterol    90 MICROgram(s) HFA Inhaler 2 Puff(s) Inhalation every 6 hours PRN Shortness of Breath and/or Wheezing  dextrose Oral Gel 15 Gram(s) Oral once PRN Blood Glucose LESS THAN 70 milliGRAM(s)/deciliter  melatonin 3 milliGRAM(s) Oral at bedtime PRN Insomnia      ALLERGIES:  Allergies    No Known Allergies    Intolerances        LABS:                        8.8    5.67  )-----------( 423      ( 09 Jul 2024 06:53 )             27.6     07-09    130<L>  |  91<L>  |  19  ----------------------------<  178<H>  3.8   |  34<H>  |  0.73    Ca    8.2<L>      09 Jul 2024 06:53        Urinalysis Basic - ( 09 Jul 2024 06:53 )    Color: x / Appearance: x / SG: x / pH: x  Gluc: 178 mg/dL / Ketone: x  / Bili: x / Urobili: x   Blood: x / Protein: x / Nitrite: x   Leuk Esterase: x / RBC: x / WBC x   Sq Epi: x / Non Sq Epi: x / Bacteria: x      ABG:      vBG:    Micro:    Culture - Blood (collected 07-01-24 @ 16:40)  Source: .Blood Blood-Peripheral  Final Report (07-06-24 @ 23:08):    No growth at 5 days    Culture - Blood (collected 07-01-24 @ 16:35)  Source: .Blood Blood-Peripheral  Final Report (07-06-24 @ 23:08):    No growth at 5 days        Culture - Sputum (collected 07-01-24 @ 18:00)  Source: .Sputum Sputum  Gram Stain (07-03-24 @ 16:37):    Moderate polymorphonuclear leukocytes per low power field    No Squamous epithelial cells per low power field    Numerous Gram Negative Rods seen per oil power field    Few Gram positive cocci in pairs seen per oil power field  Final Report (07-03-24 @ 16:37):    Numerous Escherichia coli    Normal Respiratory Jordana present  Organism: Escherichia coli (07-03-24 @ 16:37)  Organism: Escherichia coli (07-03-24 @ 16:37)      Method Type: ETHEL      -  Amoxicillin/Clavulanic Acid: S <=8/4      -  Ampicillin: R >16 These ampicillin results predict results for amoxicillin      -  Ampicillin/Sulbactam: I 16/8      -  Aztreonam: S <=4      -  Cefazolin: S <=2      -  Cefepime: S <=2      -  Cefoxitin: S <=8      -  Ceftriaxone: S <=1      -  Ciprofloxacin: S <=0.25      -  Ertapenem: S <=0.5      -  Gentamicin: S <=2      -  Imipenem: S <=1      -  Levofloxacin: S <=0.5      -  Meropenem: S <=1      -  Piperacillin/Tazobactam: S <=8      -  Tobramycin: S <=2      -  Trimethoprim/Sulfamethoxazole: R >2/38        RADIOLOGY & ADDITIONAL TESTS: Reviewed.

## 2024-07-09 NOTE — PROGRESS NOTE ADULT - ASSESSMENT
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 Now S/P L Hip IMN. RRT today 7/1 for acute hypoxemic respiratory failure. Pulmonary consulted.     DX: Lung Cancer, Pneumonia, Left hip IMN    Recommendations:    - MS now improved at baseline  - 3 L NC NC with SpO2 94-95%  - Continue to titrate down FiO2 as tolerates for goal 02>90%  - CTA chest negative for PE, does show B/L LL PNA with mucoid impaction  - C/w duoneb q6 with hypersal Nebs used with Aerobika to facilitate secretion mobilization  - Chest PT  - Sputum culture with e. Coli  - Blood cultures NGTD  - Legionella neg, azithromycin was d/c'd   - Pt completed 4 days of Zosyn IV for pneumonia then switched to Ceftriaxone IV per ID recs now completed a total of 3 days. Continue to monitor off abxs.   - ID following, appreciate recs  - continue incentive spirometer   - Does have underlying Lung cancer on immunotherapy last dose 6/26. Will need to F/U with oncologist upon DC from hospital.   - Rest of care per primary team    NOTE INCOMPLETE 68-year-old female with history of lung cancer (on immunotherapy), HTN, IDDM, jugular vein thrombosis on Lovenox who presents with LT-sided hip pain after sustaining a fall. Now S/P L Hip IMN. RRT today 7/1 for acute hypoxemic respiratory failure. Pulmonary consulted.     DX: Lung Cancer, Pneumonia, Left hip IMN    Recommendations:    - MS now improved at baseline  - 3 L NC NC with SpO2 94-95%  - Continue to titrate down FiO2 as tolerates for goal 02>90%  - CTA chest negative for PE, does show B/L LL PNA with mucoid impaction  - C/w duoneb q6 with hypersal Nebs used with Aerobika to facilitate secretion mobilization  - Chest PT  - Sputum culture with e. Coli  - Blood cultures NGTD  - Legionella neg, azithromycin was d/c'd   - Pt completed 4 days of Zosyn IV for pneumonia then switched to Ceftriaxone IV per ID recs now completed a total of 3 days. Continue to monitor off abxs.   - ID following, appreciate recs  - continue incentive spirometer   - Does have underlying Lung cancer on immunotherapy last dose 6/26. Will need to F/U with oncologist upon DC from hospital.   - Pt will need to follow up with her outpatient pulmonologist once discharged from rehab.  - Rest of care per primary team    Case discussed with Dr. Stovall.

## 2024-07-09 NOTE — PROGRESS NOTE ADULT - ASSESSMENT
69 y/o F w/  female with history of lung cancer (on immunotherapy), HTN, IDDM, jugular vein thrombosis on AC with full dose Lovenox who presents with LT-sided hip pain after sustaining a fall.    Lt Hip Fx, s/p mechanical fall >  S/p LT hip IMN   --CT L femur: Acute minimally displaced/impacted intertrochanteric left proximal   femoral fracture without convincing CT evidence for underlying aggressive osseous neoplasm.  --seen by ortho- prn analgesics    Sepsis sec to Pna- smoker- quit smoking 11 days ago since hospitalizaion per pt  - Sputum cx--Numerous Escherichia coli    blood cx--NGTD   - CTA chest negative for PE  -seen by pulm  Pulm following , for wheezing was placed omn duonebs, symbicort , wheezing improved  LE duplex neg for DVT   LUE swelling, duplex neg   seen by ID, O2 via NC, IV abx, switch to PO augmentin upon dc to complete abx course per ID     Lung cancer-  on immunotherapy. Last dose on 6/26  - will need outpatient follow up with oncology  --patient will not be on immunotherapy while in rehab , pt aware and she is in agreement   she is currently not undergoing any chemo - keep fu with onc    HTN-  c/w home metoprolol, diltiazem and enalapril    DM2- uncontrolled  HbA1c 9.5%  continue with ISS/ pre meal , lantus    #Jugular vein thrombosis  - will c/w full dose lovenox    IBS , on lomotil at home , can continue     SQL for dvt ppx    Pt is medically ready for DC at this time, await AUTH FOR [facility] ([facility])

## 2024-07-10 LAB
ANION GAP SERPL CALC-SCNC: 7 MMOL/L — SIGNIFICANT CHANGE UP (ref 5–17)
BUN SERPL-MCNC: 18 MG/DL — SIGNIFICANT CHANGE UP (ref 7–23)
CALCIUM SERPL-MCNC: 8.6 MG/DL — SIGNIFICANT CHANGE UP (ref 8.5–10.1)
CHLORIDE SERPL-SCNC: 89 MMOL/L — LOW (ref 96–108)
CO2 SERPL-SCNC: 32 MMOL/L — HIGH (ref 22–31)
CREAT SERPL-MCNC: 0.71 MG/DL — SIGNIFICANT CHANGE UP (ref 0.5–1.3)
EGFR: 93 ML/MIN/1.73M2 — SIGNIFICANT CHANGE UP
GLUCOSE BLDC GLUCOMTR-MCNC: 154 MG/DL — HIGH (ref 70–99)
GLUCOSE BLDC GLUCOMTR-MCNC: 206 MG/DL — HIGH (ref 70–99)
GLUCOSE BLDC GLUCOMTR-MCNC: 342 MG/DL — HIGH (ref 70–99)
GLUCOSE BLDC GLUCOMTR-MCNC: 428 MG/DL — HIGH (ref 70–99)
GLUCOSE BLDC GLUCOMTR-MCNC: 43 MG/DL — CRITICAL LOW (ref 70–99)
GLUCOSE BLDC GLUCOMTR-MCNC: 45 MG/DL — CRITICAL LOW (ref 70–99)
GLUCOSE BLDC GLUCOMTR-MCNC: 504 MG/DL — CRITICAL HIGH (ref 70–99)
GLUCOSE BLDC GLUCOMTR-MCNC: 78 MG/DL — SIGNIFICANT CHANGE UP (ref 70–99)
GLUCOSE BLDC GLUCOMTR-MCNC: 90 MG/DL — SIGNIFICANT CHANGE UP (ref 70–99)
GLUCOSE BLDC GLUCOMTR-MCNC: >600 MG/DL — CRITICAL HIGH (ref 70–99)
GLUCOSE SERPL-MCNC: 182 MG/DL — HIGH (ref 70–99)
HCT VFR BLD CALC: 30.7 % — LOW (ref 34.5–45)
HGB BLD-MCNC: 9.8 G/DL — LOW (ref 11.5–15.5)
MCHC RBC-ENTMCNC: 26.4 PG — LOW (ref 27–34)
MCHC RBC-ENTMCNC: 31.9 G/DL — LOW (ref 32–36)
MCV RBC AUTO: 82.7 FL — SIGNIFICANT CHANGE UP (ref 80–100)
NRBC # BLD: 0 /100 WBCS — SIGNIFICANT CHANGE UP (ref 0–0)
PLATELET # BLD AUTO: 452 K/UL — HIGH (ref 150–400)
POTASSIUM SERPL-MCNC: 3.3 MMOL/L — LOW (ref 3.5–5.3)
POTASSIUM SERPL-SCNC: 3.3 MMOL/L — LOW (ref 3.5–5.3)
RBC # BLD: 3.71 M/UL — LOW (ref 3.8–5.2)
RBC # FLD: 15.3 % — HIGH (ref 10.3–14.5)
SODIUM SERPL-SCNC: 128 MMOL/L — LOW (ref 135–145)
WBC # BLD: 7.46 K/UL — SIGNIFICANT CHANGE UP (ref 3.8–10.5)
WBC # FLD AUTO: 7.46 K/UL — SIGNIFICANT CHANGE UP (ref 3.8–10.5)

## 2024-07-10 PROCEDURE — 99232 SBSQ HOSP IP/OBS MODERATE 35: CPT

## 2024-07-10 RX ORDER — DEXTROSE 30 % IN WATER 30 %
25 VIAL (ML) INTRAVENOUS ONCE
Refills: 0 | Status: COMPLETED | OUTPATIENT
Start: 2024-07-10 | End: 2024-07-10

## 2024-07-10 RX ORDER — AMOXICILLIN/POTASSIUM CLAV 250-125 MG
1 TABLET ORAL
Refills: 0 | Status: DISCONTINUED | OUTPATIENT
Start: 2024-07-10 | End: 2024-07-11

## 2024-07-10 RX ORDER — SODIUM CHLORIDE 0.9 % (FLUSH) 0.9 %
2 SYRINGE (ML) INJECTION EVERY 8 HOURS
Refills: 0 | Status: DISCONTINUED | OUTPATIENT
Start: 2024-07-10 | End: 2024-07-11

## 2024-07-10 RX ORDER — POTASSIUM CHLORIDE 600 MG/1
40 TABLET, FILM COATED, EXTENDED RELEASE ORAL ONCE
Refills: 0 | Status: COMPLETED | OUTPATIENT
Start: 2024-07-10 | End: 2024-07-10

## 2024-07-10 RX ORDER — INSULIN GLARGINE 100 [IU]/ML
8 INJECTION, SOLUTION SUBCUTANEOUS AT BEDTIME
Refills: 0 | Status: DISCONTINUED | OUTPATIENT
Start: 2024-07-10 | End: 2024-07-11

## 2024-07-10 RX ORDER — ACETAMINOPHEN 325 MG
1000 TABLET ORAL ONCE
Refills: 0 | Status: COMPLETED | OUTPATIENT
Start: 2024-07-10 | End: 2024-07-10

## 2024-07-10 RX ADMIN — Medication 600 MILLIGRAM(S): at 06:29

## 2024-07-10 RX ADMIN — Medication 1 TABLET(S): at 17:53

## 2024-07-10 RX ADMIN — Medication 2 PUFF(S): at 17:55

## 2024-07-10 RX ADMIN — Medication 2 GRAM(S): at 22:19

## 2024-07-10 RX ADMIN — Medication 50 MILLIGRAM(S): at 22:20

## 2024-07-10 RX ADMIN — ENOXAPARIN SODIUM 60 MILLIGRAM(S): 100 INJECTION SUBCUTANEOUS at 22:19

## 2024-07-10 RX ADMIN — Medication 1 TABLET(S): at 22:19

## 2024-07-10 RX ADMIN — Medication 2 PUFF(S): at 06:30

## 2024-07-10 RX ADMIN — Medication 650 MILLIGRAM(S): at 16:07

## 2024-07-10 RX ADMIN — Medication 1000 MILLIGRAM(S): at 23:00

## 2024-07-10 RX ADMIN — INSULIN LISPRO 8: 100 INJECTION, SOLUTION SUBCUTANEOUS at 17:50

## 2024-07-10 RX ADMIN — DIMETHYL FUMARATE 240 MILLIGRAM(S): 240 CAPSULE, DELAYED RELEASE ORAL at 17:54

## 2024-07-10 RX ADMIN — Medication 600 MILLIGRAM(S): at 17:53

## 2024-07-10 RX ADMIN — INSULIN LISPRO 4: 100 INJECTION, SOLUTION SUBCUTANEOUS at 22:13

## 2024-07-10 RX ADMIN — Medication 5 MILLIGRAM(S): at 22:19

## 2024-07-10 RX ADMIN — INSULIN LISPRO 2: 100 INJECTION, SOLUTION SUBCUTANEOUS at 10:01

## 2024-07-10 RX ADMIN — INSULIN GLARGINE 8 UNIT(S): 100 INJECTION, SOLUTION SUBCUTANEOUS at 22:13

## 2024-07-10 RX ADMIN — Medication 650 MILLIGRAM(S): at 07:30

## 2024-07-10 RX ADMIN — Medication 400 MILLIGRAM(S): at 22:20

## 2024-07-10 RX ADMIN — DIMETHYL FUMARATE 240 MILLIGRAM(S): 240 CAPSULE, DELAYED RELEASE ORAL at 06:30

## 2024-07-10 RX ADMIN — Medication 2 GRAM(S): at 15:58

## 2024-07-10 RX ADMIN — Medication 650 MILLIGRAM(S): at 06:30

## 2024-07-10 RX ADMIN — Medication 650 MILLIGRAM(S): at 18:00

## 2024-07-10 RX ADMIN — Medication 25 GRAM(S): at 06:06

## 2024-07-10 RX ADMIN — POTASSIUM CHLORIDE 40 MILLIEQUIVALENT(S): 600 TABLET, FILM COATED, EXTENDED RELEASE ORAL at 15:57

## 2024-07-10 RX ADMIN — INSULIN LISPRO 2 UNIT(S): 100 INJECTION, SOLUTION SUBCUTANEOUS at 17:52

## 2024-07-10 RX ADMIN — Medication 2 PUFF(S): at 17:54

## 2024-07-10 RX ADMIN — Medication 1 TABLET(S): at 09:34

## 2024-07-10 RX ADMIN — INSULIN GLARGINE 10 UNIT(S): 100 INJECTION, SOLUTION SUBCUTANEOUS at 10:08

## 2024-07-10 RX ADMIN — Medication 650 MILLIGRAM(S): at 00:47

## 2024-07-10 RX ADMIN — DILTIAZEM HYDROCHLORIDE 120 MILLIGRAM(S): 240 CAPSULE, EXTENDED RELEASE ORAL at 22:19

## 2024-07-10 RX ADMIN — Medication 20 MILLIGRAM(S): at 17:53

## 2024-07-10 RX ADMIN — Medication 650 MILLIGRAM(S): at 16:08

## 2024-07-10 RX ADMIN — ENOXAPARIN SODIUM 60 MILLIGRAM(S): 100 INJECTION SUBCUTANEOUS at 09:32

## 2024-07-10 RX ADMIN — Medication 650 MILLIGRAM(S): at 15:58

## 2024-07-10 NOTE — PROVIDER CONTACT NOTE (HYPOGLYCEMIA EVENT) - NS PROVIDER CONTACT BACKGROUND-HYPO
Age: 68y    Gender: Female    POCT Blood Glucose:  45 mg/dL (07-10-24 @ 06:05)  43 mg/dL (07-10-24 @ 06:00)  305 mg/dL (07-09-24 @ 21:33)  307 mg/dL (07-09-24 @ 18:56)  59 mg/dL (07-09-24 @ 17:00)  116 mg/dL (07-09-24 @ 11:22)  140 mg/dL (07-09-24 @ 08:54)  160 mg/dL (07-09-24 @ 07:52)      eMAR:  insulin glargine Injectable (LANTUS)   10 Unit(s) SubCutaneous (07-09-24 @ 22:30)   10 Unit(s) SubCutaneous (07-09-24 @ 08:57)    insulin lispro (ADMELOG) corrective regimen sliding scale   2 Unit(s) SubCutaneous (07-09-24 @ 22:28)    insulin lispro Injectable (ADMELOG)   2 Unit(s) SubCutaneous (07-09-24 @ 12:18)   2 Unit(s) SubCutaneous (07-09-24 @ 08:56)

## 2024-07-10 NOTE — PROGRESS NOTE ADULT - ASSESSMENT
69 y/o F w/  female with history of lung cancer (on immunotherapy), HTN, IDDM, jugular vein thrombosis on AC with full dose Lovenox who presents with LT-sided hip pain after sustaining a fall.    Lt Hip Fx, s/p mechanical fall >  S/p LT hip IMN   --CT L femur: Acute minimally displaced/impacted intertrochanteric left proximal   femoral fracture without convincing CT evidence for underlying aggressive osseous neoplasm.  --seen by ortho- prn analgesics    Sepsis sec to Pna- smoker- quit smoking 11 days ago since hospitalization per pt  - Sputum cx--Numerous Escherichia coli    blood cx-- NGTD   - CTA chest negative for PE  -seen by pulm  Pulm following , for wheezing was placed omn duoneb, symbicort , wheezing improved  LE duplex neg for DVT   LUE swelling, duplex neg   seen by ID, O2 via NC, IV abx, switch to PO augmentin, abx course per ID     Lung cancer-  on immunotherapy. Last dose on 6/26  - will need outpatient follow up with oncology  --patient will not be on immunotherapy while in rehab , pt aware and she is in agreement   she is currently not undergoing any chemo - keep fu with onc    HTN-  c/w home metoprolol, diltiazem and enalapril    DM2- uncontrolled  HbA1c 9.5%  continue with ISS/ pre meal , lantus    #Jugular vein thrombosis  - will c/w full dose lovenox    IBS , on lomotil at home , can continue     SQL for dvt ppx    Pending stable FS. Endo c/s placed. [facility]

## 2024-07-10 NOTE — PROGRESS NOTE ADULT - ASSESSMENT
Assesment: 68-year-old female with history of lung cancer (on immunotherapy), HTN, IDDM, jugular vein thrombosis on Lovenox who presents with LT-sided hip pain after sustaining a fall. Now S/P L Hip IMN. RRT today 7/1 for acute hypoxemic respiratory failure. Pulmonary consulted.     DX: Lung Cancer, Pneumonia, Left hip IMN    Recommendations:  - 3 L NC NC with SpO2 94-95%. Continue to titrate down FiO2 as tolerates for goal 02>90%  - CTA chest negative for PE, does show B/L LL PNA with mucoid impaction  - C/w duoneb q6 with hypersal Nebs used with Aerobika, Symbicort, Chest PT, incentive spirometry to facilitate secretion mobilization  - Sputum culture with e. Coli; completed 4 days of Zosyn IV for pneumonia then switched to Ceftriaxone IV per ID recs now completed a total of 3 days. Continue to monitor off abxs.   - Blood cultures NGTD, Legionella neg, azithromycin was d/c'd   - ID following, appreciate recs  - Does have underlying Lung cancer on immunotherapy last dose 6/26. Will need to F/U with oncologist upon DC from hospital.   - Pt will also need close follow up with her outpatient pulmonologist once discharged from rehab (pending auth for dispo to [facility])   - Rest of care per primary team    Case discussed with Dr. Stovall.

## 2024-07-10 NOTE — CONSULT NOTE ADULT - CONSULT REASON
L IT fx
Fever/SIRS
Hyponatremia, chronic
AMS
Acute hypoxemic respiratory failure
Hyperglycemia
diabetes uncontrolled

## 2024-07-10 NOTE — PROGRESS NOTE ADULT - SUBJECTIVE AND OBJECTIVE BOX
Patient is a 68y old  Female who presents with a chief complaint of mechanical fall (10 Jul 2024 09:22)    INTERVAL HPI/OVERNIGHT EVENTS: Today morning, patient was hypoglycemic to 40s. Denies lightheadedness or nausea/vomiting.     MEDICATIONS  (STANDING):  acetaminophen     Tablet .. 650 milliGRAM(s) Oral every 6 hours  albuterol/ipratropium for Nebulization 3 milliLiter(s) Nebulizer every 6 hours  budesonide  80 MICROgram(s)/formoterol 4.5 MICROgram(s) Inhaler 2 Puff(s) Inhalation two times a day  dextrose 10% Bolus 125 milliLiter(s) IV Bolus once  dextrose 5%. 1000 milliLiter(s) (100 mL/Hr) IV Continuous <Continuous>  dextrose 5%. 1000 milliLiter(s) (50 mL/Hr) IV Continuous <Continuous>  dextrose 50% Injectable 12.5 Gram(s) IV Push once  dextrose 50% Injectable 25 Gram(s) IV Push once  diltiazem    milliGRAM(s) Oral at bedtime  dimethyl fumarate DR Capsule 240 milliGRAM(s) Oral two times a day  diphenoxylate/atropine 1 Tablet(s) Oral at bedtime  enalapril 5 milliGRAM(s) Oral at bedtime  enoxaparin Injectable 60 milliGRAM(s) SubCutaneous every 12 hours  famotidine    Tablet 20 milliGRAM(s) Oral daily  glucagon  Injectable 1 milliGRAM(s) IntraMuscular once  guaiFENesin  milliGRAM(s) Oral every 12 hours  insulin glargine Injectable (LANTUS) 8 Unit(s) SubCutaneous at bedtime  insulin lispro (ADMELOG) corrective regimen sliding scale   SubCutaneous at bedtime  insulin lispro (ADMELOG) corrective regimen sliding scale   SubCutaneous three times a day before meals  insulin lispro Injectable (ADMELOG) 2 Unit(s) SubCutaneous three times a day before meals  lactobacillus acidophilus 1 Tablet(s) Oral three times a day with meals  metoprolol succinate ER 50 milliGRAM(s) Oral at bedtime  polyethylene glycol 3350 17 Gram(s) Oral at bedtime  senna 2 Tablet(s) Oral at bedtime  sodium chloride 3%  Inhalation 4 milliLiter(s) Inhalation every 6 hours    MEDICATIONS  (PRN):  acetaminophen     Tablet .. 650 milliGRAM(s) Oral every 6 hours PRN Temp greater or equal to 38C (100.4F), Mild Pain (1 - 3)  albuterol    90 MICROgram(s) HFA Inhaler 2 Puff(s) Inhalation every 6 hours PRN Shortness of Breath and/or Wheezing  dextrose Oral Gel 15 Gram(s) Oral once PRN Blood Glucose LESS THAN 70 milliGRAM(s)/deciliter  melatonin 3 milliGRAM(s) Oral at bedtime PRN Insomnia      Allergies    No Known Allergies    Intolerances        REVIEW OF SYSTEMS: all negative with exception of above    Vital Signs Last 24 Hrs  T(C): 36.4 (10 Jul 2024 10:30), Max: 37.1 (10 Jul 2024 04:50)  T(F): 97.6 (10 Jul 2024 10:30), Max: 98.7 (10 Jul 2024 04:50)  HR: 76 (10 Jul 2024 10:30) (76 - 100)  BP: 139/77 (10 Jul 2024 10:30) (139/77 - 160/71)  BP(mean): --  RR: 17 (10 Jul 2024 10:30) (17 - 18)  SpO2: 96% (10 Jul 2024 10:30) (95% - 98%)    Parameters below as of 10 Jul 2024 10:30  Patient On (Oxygen Delivery Method): nasal cannula  O2 Flow (L/min): 3    PHYSICAL EXAM:  GENERAL: NAD, well-groomed  NERVOUS SYSTEM:  Alert & Oriented X3, Good concentration; Motor Strength 5/5 B/L upper and lower extremities; DTRs 2+ intact and symmetric  CHEST/LUNG: Clear to percussion bilaterally; No rales, rhonchi, wheezing, or rubs  HEART: Regular rate and rhythm; No murmurs, rubs, or gallops  ABDOMEN: Soft, Nontender, Nondistended; Bowel sounds present  EXTREMITIES:  2+ Peripheral Pulses, No clubbing, cyanosis, or edema    LABS:                        9.8    7.46  )-----------( 452      ( 10 Jul 2024 06:10 )             30.7     07-10    128<L>  |  89<L>  |  18  ----------------------------<  182<H>  3.3<L>   |  32<H>  |  0.71    Ca    8.6      10 Jul 2024 06:10        Urinalysis Basic - ( 10 Jul 2024 06:10 )    Color: x / Appearance: x / SG: x / pH: x  Gluc: 182 mg/dL / Ketone: x  / Bili: x / Urobili: x   Blood: x / Protein: x / Nitrite: x   Leuk Esterase: x / RBC: x / WBC x   Sq Epi: x / Non Sq Epi: x / Bacteria: x      CAPILLARY BLOOD GLUCOSE      POCT Blood Glucose.: 78 mg/dL (10 Jul 2024 13:07)  POCT Blood Glucose.: 90 mg/dL (10 Jul 2024 11:10)  POCT Blood Glucose.: 154 mg/dL (10 Jul 2024 07:49)  POCT Blood Glucose.: 206 mg/dL (10 Jul 2024 06:26)  POCT Blood Glucose.: >600 mg/dL (10 Jul 2024 06:23)  POCT Blood Glucose.: 45 mg/dL (10 Jul 2024 06:05)  POCT Blood Glucose.: 43 mg/dL (10 Jul 2024 06:00)  POCT Blood Glucose.: 305 mg/dL (09 Jul 2024 21:33)  POCT Blood Glucose.: 307 mg/dL (09 Jul 2024 18:56)  POCT Blood Glucose.: 59 mg/dL (09 Jul 2024 17:00)      RADIOLOGY & ADDITIONAL TESTS:    Imaging Personally Reviewed:  [ ] YES  [ ] NO    Consultant(s) Notes Reviewed:  [ ] YES  [ ] NO    Care Discussed with Consultants/Other Providers [ ] YES  [ ] NO

## 2024-07-10 NOTE — CONSULT NOTE ADULT - CONSULT REQUESTED DATE/TIME
10-Jul-2024 14:42
10-Jul-2024 14:30
29-Jun-2024 10:00
01-Jul-2024 14:51
27-Jun-2024 14:18
02-Jul-2024 08:06
01-Jul-2024 13:00

## 2024-07-10 NOTE — CONSULT NOTE ADULT - SUBJECTIVE AND OBJECTIVE BOX
Patient is a 68y old  Female who presents with a chief complaint of mechanical fall (10 Jul 2024 22:33)      Reason For Consult: uncontrolled diabetes     HPI:  Patient is a 68-year-old female with history of lung cancer (on immunotherapy), HTN, IDDM, jugular vein thrombosis on Lovenox who presents with LT-sided hip pain after sustaining a fall. She was walking down steps when she slipped and landed on her left hip. Denies LOC and head trauma. Pt denies any numbness, tingling or paresthesias.    In the ED, her vitals are WNL. Labs WNL. Xray LT hip w/ left intertrochanteric fracture.   (27 Jun 2024 15:31)  known to Metformin from office   finger sticks are increased secondary to stress   on Lantus 8 units and prandial lispro 2 units and Lispro sliding scale coverage with meals       PAST MEDICAL & SURGICAL HISTORY:  Diabetes      Hypertension      Multiple sclerosis      Alcohol abuse      Pancreatic insufficiency      H/O cervical spine surgery          FAMILY HISTORY:  No pertinent family history in first degree relatives          Social History:    MEDICATIONS  (STANDING):  acetaminophen     Tablet .. 650 milliGRAM(s) Oral every 6 hours  albuterol/ipratropium for Nebulization 3 milliLiter(s) Nebulizer every 6 hours  amoxicillin  875 milliGRAM(s)/clavulanate 1 Tablet(s) Oral two times a day  budesonide  80 MICROgram(s)/formoterol 4.5 MICROgram(s) Inhaler 2 Puff(s) Inhalation two times a day  dextrose 10% Bolus 125 milliLiter(s) IV Bolus once  dextrose 5%. 1000 milliLiter(s) (100 mL/Hr) IV Continuous <Continuous>  dextrose 5%. 1000 milliLiter(s) (50 mL/Hr) IV Continuous <Continuous>  dextrose 50% Injectable 12.5 Gram(s) IV Push once  dextrose 50% Injectable 25 Gram(s) IV Push once  diltiazem    milliGRAM(s) Oral at bedtime  dimethyl fumarate DR Capsule 240 milliGRAM(s) Oral two times a day  enalapril 5 milliGRAM(s) Oral at bedtime  enoxaparin Injectable 60 milliGRAM(s) SubCutaneous every 12 hours  famotidine    Tablet 20 milliGRAM(s) Oral daily  glucagon  Injectable 1 milliGRAM(s) IntraMuscular once  guaiFENesin  milliGRAM(s) Oral every 12 hours  insulin glargine Injectable (LANTUS) 8 Unit(s) SubCutaneous at bedtime  insulin lispro (ADMELOG) corrective regimen sliding scale   SubCutaneous three times a day before meals  insulin lispro (ADMELOG) corrective regimen sliding scale   SubCutaneous at bedtime  insulin lispro Injectable (ADMELOG) 2 Unit(s) SubCutaneous three times a day before meals  lactobacillus acidophilus 1 Tablet(s) Oral three times a day with meals  metoprolol succinate ER 50 milliGRAM(s) Oral at bedtime  polyethylene glycol 3350 17 Gram(s) Oral at bedtime  senna 2 Tablet(s) Oral at bedtime  sodium chloride 2 Gram(s) Oral every 8 hours  sodium chloride 3%  Inhalation 4 milliLiter(s) Inhalation every 6 hours    MEDICATIONS  (PRN):  acetaminophen     Tablet .. 650 milliGRAM(s) Oral every 6 hours PRN Temp greater or equal to 38C (100.4F), Mild Pain (1 - 3)  albuterol    90 MICROgram(s) HFA Inhaler 2 Puff(s) Inhalation every 6 hours PRN Shortness of Breath and/or Wheezing  dextrose Oral Gel 15 Gram(s) Oral once PRN Blood Glucose LESS THAN 70 milliGRAM(s)/deciliter  melatonin 3 milliGRAM(s) Oral at bedtime PRN Insomnia      REVIEW OF SYSTEMS:  CONSTITUTIONAL:  as per HPI  HEENT:  Eyes:  No diplopia or blurred vision.   ENT:  No earache, sore throat or runny nose.  CARDIOVASCULAR:  No chest pain .  RESPIRATORY:  No cough, shortness of breath, PND or orthopnea.  GASTROINTESTINAL:  No nausea, vomiting or diarrhea.  GENITOURINARY:  No dysuria, frequency or urgency. No Blood in urine  MUSCULOSKELETAL:  no joint aches, no muscle pain, myalgia  SKIN:  No change in skin, hair or nails.  NEUROLOGIC:  No paresthesias, fasciculations, seizures or weakness.  PSYCHIATRIC:  No disorder of thought or mood.  ENDOCRINE:  No heat or cold intolerance, polyuria or polydipsia. abnormal weight gain or loss, oral thrush  HEMATOLOGICAL:  No easy bruising or bleeding.     T(C): 36.9 (07-11-24 @ 10:31), Max: 37.1 (07-10-24 @ 23:00)  HR: 70 (07-11-24 @ 11:30) (70 - 92)  BP: 138/72 (07-11-24 @ 10:31) (115/73 - 145/75)  RR: 18 (07-11-24 @ 10:31) (18 - 18)  SpO2: 96% (07-11-24 @ 11:30) (95% - 99%)  Wt(kg): --    PHYSICAL EXAM:  GENERAL: NAD, well-groomed, well-developed  HEAD:  Atraumatic, Normocephalic  EYES: PERRLA, conjunctiva and sclera clear  ENMT: No  exudates,, Moist mucous membranes,, No lesions  NECK: Supple, No JVD,   NERVOUS SYSTEM:  Alert & Oriented   CHEST/LUNG: Clear to percussion bilaterally; No rales, rhonchi, wheezing, or rubs  HEART: Regular rate and rhythm; No murmurs, rubs, or gallops  ABDOMEN: Soft, Nontender, Nondistended; Bowel sounds present  EXTREMITIES:  2+ Peripheral Pulses, No clubbing, cyanosis, or edema  LYMPH: No lymphadenopathy noted  SKIN: No rashes or lesions    CAPILLARY BLOOD GLUCOSE      POCT Blood Glucose.: 204 mg/dL (11 Jul 2024 12:28)  POCT Blood Glucose.: 193 mg/dL (11 Jul 2024 11:20)  POCT Blood Glucose.: 136 mg/dL (11 Jul 2024 08:05)  POCT Blood Glucose.: 428 mg/dL (10 Jul 2024 21:19)  POCT Blood Glucose.: 504 mg/dL (10 Jul 2024 21:17)                            9.2    7.67  )-----------( 441      ( 11 Jul 2024 06:25 )             28.8       CMP:  07-11 @ 06:25  SGPT 18  Albumin 1.7   Alk Phos 136   Anion Gap 7   SGOT 14   Total Bili 0.4   BUN 17   Calcium Total 8.1   CO2 30   Chloride 92   Creatinine 0.61   eGFR if AA --   eGFR if non AA --   Glucose 130   Potassium 3.8   Protein 5.3   Sodium 129      Thyroid Function Tests:      Diabetes Tests:     Parathyroids:     Adrenals:       Radiology:

## 2024-07-10 NOTE — PROGRESS NOTE ADULT - SUBJECTIVE AND OBJECTIVE BOX
INTERVAL HPI/OVERNIGHT EVENTS: on 3L NC, afebrile   SUBJECTIVE: Patient seen and examined at bedside.   ROS: All negative except as listed above.    VITAL SIGNS:  Vital Signs Last 24 Hrs  T(C): 37.1 (10 Jul 2024 04:50), Max: 37.1 (10 Jul 2024 04:50)  T(F): 98.7 (10 Jul 2024 04:50), Max: 98.7 (10 Jul 2024 04:50)  HR: 79 (10 Jul 2024 04:50) (72 - 100)  BP: 143/66 (10 Jul 2024 04:50) (113/62 - 163/79)  BP(mean): --  ABP: --  ABP(mean): --  RR: 18 (10 Jul 2024 04:50) (18 - 18)  SpO2: 98% (10 Jul 2024 04:50) (93% - 98%)    O2 Parameters below as of 10 Jul 2024 04:50  Patient On (Oxygen Delivery Method): nasal cannula  O2 Flow (L/min): 3          Plateau pressure:   P/F ratio:     07-09 @ 07:01  -  07-10 @ 07:00  --------------------------------------------------------  IN: 0 mL / OUT: 650 mL / NET: -650 mL      CAPILLARY BLOOD GLUCOSE      POCT Blood Glucose.: 154 mg/dL (10 Jul 2024 07:49)      ECG: reviewed.    PHYSICAL EXAM:  GENERAL: NAD, lying in bed comfortably  HEAD:  Atraumatic, normocephalic  EYES: EOMI, PERRLA, conjunctiva and sclera clear  NECK: Supple, trachea midline, no JVD  HEART: Regular rate and rhythm  LUNGS: coarse BS, occasional wheeze  ABDOMEN: Soft, nontender, nondistended, +BS  EXTREMITIES: No clubbing, cyanosis, or edema  NERVOUS SYSTEM:  A&Ox3, following commands, moving all extremities, no focal deficits    MEDICATIONS:  MEDICATIONS  (STANDING):  acetaminophen     Tablet .. 650 milliGRAM(s) Oral every 6 hours  albuterol/ipratropium for Nebulization 3 milliLiter(s) Nebulizer every 6 hours  budesonide  80 MICROgram(s)/formoterol 4.5 MICROgram(s) Inhaler 2 Puff(s) Inhalation two times a day  dextrose 10% Bolus 125 milliLiter(s) IV Bolus once  dextrose 5%. 1000 milliLiter(s) (100 mL/Hr) IV Continuous <Continuous>  dextrose 5%. 1000 milliLiter(s) (50 mL/Hr) IV Continuous <Continuous>  dextrose 50% Injectable 12.5 Gram(s) IV Push once  dextrose 50% Injectable 25 Gram(s) IV Push once  diltiazem    milliGRAM(s) Oral at bedtime  dimethyl fumarate DR Capsule 240 milliGRAM(s) Oral two times a day  diphenoxylate/atropine 1 Tablet(s) Oral at bedtime  enalapril 5 milliGRAM(s) Oral at bedtime  enoxaparin Injectable 60 milliGRAM(s) SubCutaneous every 12 hours  famotidine    Tablet 20 milliGRAM(s) Oral daily  glucagon  Injectable 1 milliGRAM(s) IntraMuscular once  guaiFENesin  milliGRAM(s) Oral every 12 hours  insulin glargine Injectable (LANTUS) 10 Unit(s) SubCutaneous two times a day  insulin lispro (ADMELOG) corrective regimen sliding scale   SubCutaneous at bedtime  insulin lispro (ADMELOG) corrective regimen sliding scale   SubCutaneous three times a day before meals  insulin lispro Injectable (ADMELOG) 2 Unit(s) SubCutaneous three times a day before meals  lactobacillus acidophilus 1 Tablet(s) Oral three times a day with meals  metoprolol succinate ER 50 milliGRAM(s) Oral at bedtime  polyethylene glycol 3350 17 Gram(s) Oral at bedtime  senna 2 Tablet(s) Oral at bedtime  sodium chloride 3%  Inhalation 4 milliLiter(s) Inhalation every 6 hours    MEDICATIONS  (PRN):  acetaminophen     Tablet .. 650 milliGRAM(s) Oral every 6 hours PRN Temp greater or equal to 38C (100.4F), Mild Pain (1 - 3)  albuterol    90 MICROgram(s) HFA Inhaler 2 Puff(s) Inhalation every 6 hours PRN Shortness of Breath and/or Wheezing  dextrose Oral Gel 15 Gram(s) Oral once PRN Blood Glucose LESS THAN 70 milliGRAM(s)/deciliter  melatonin 3 milliGRAM(s) Oral at bedtime PRN Insomnia      ALLERGIES:  Allergies    No Known Allergies    Intolerances        LABS:                        9.8    7.46  )-----------( 452      ( 10 Jul 2024 06:10 )             30.7     07-10    128<L>  |  89<L>  |  18  ----------------------------<  182<H>  3.3<L>   |  32<H>  |  0.71    Ca    8.6      10 Jul 2024 06:10        Urinalysis Basic - ( 10 Jul 2024 06:10 )    Color: x / Appearance: x / SG: x / pH: x  Gluc: 182 mg/dL / Ketone: x  / Bili: x / Urobili: x   Blood: x / Protein: x / Nitrite: x   Leuk Esterase: x / RBC: x / WBC x   Sq Epi: x / Non Sq Epi: x / Bacteria: x      ABG:      vBG:    Micro:    Culture - Blood (collected 07-01-24 @ 16:40)  Source: .Blood Blood-Peripheral  Final Report (07-06-24 @ 23:08):    No growth at 5 days    Culture - Blood (collected 07-01-24 @ 16:35)  Source: .Blood Blood-Peripheral  Final Report (07-06-24 @ 23:08):    No growth at 5 days        Culture - Sputum (collected 07-01-24 @ 18:00)  Source: .Sputum Sputum  Gram Stain (07-03-24 @ 16:37):    Moderate polymorphonuclear leukocytes per low power field    No Squamous epithelial cells per low power field    Numerous Gram Negative Rods seen per oil power field    Few Gram positive cocci in pairs seen per oil power field  Final Report (07-03-24 @ 16:37):    Numerous Escherichia coli    Normal Respiratory Jordana present  Organism: Escherichia coli (07-03-24 @ 16:37)  Organism: Escherichia coli (07-03-24 @ 16:37)      Method Type: ETHEL      -  Amoxicillin/Clavulanic Acid: S <=8/4      -  Ampicillin: R >16 These ampicillin results predict results for amoxicillin      -  Ampicillin/Sulbactam: I 16/8      -  Aztreonam: S <=4      -  Cefazolin: S <=2      -  Cefepime: S <=2      -  Cefoxitin: S <=8      -  Ceftriaxone: S <=1      -  Ciprofloxacin: S <=0.25      -  Ertapenem: S <=0.5      -  Gentamicin: S <=2      -  Imipenem: S <=1      -  Levofloxacin: S <=0.5      -  Meropenem: S <=1      -  Piperacillin/Tazobactam: S <=8      -  Tobramycin: S <=2      -  Trimethoprim/Sulfamethoxazole: R >2/38        RADIOLOGY & ADDITIONAL TESTS: Reviewed.

## 2024-07-10 NOTE — CONSULT NOTE ADULT - PROBLEM SELECTOR RECOMMENDATION 9
HbA1C 9.5 in 06/24  Continue with the current  regimen while inpatient for now   stress induced hyperglycemia   while inpatient, finger sticks should be 100-180 Thank You for the courtesy of this consultation !!!

## 2024-07-10 NOTE — CONSULT NOTE ADULT - CONSULT REQUESTED BY NAME
Binh Petit
MD Petit
NOTIFICATION RETURN TO WORK / SCHOOL 
 
9/13/2018 4:32 PM 
 
Mr. Nadya Chacon [address] [place] [id number] To Whom It May Concern: 
 
Nadya Chacon is currently under the care of Sanjay Clemens. He will return to work/school on: 09/17/2018 If there are questions or concerns please have the patient contact our office. Sincerely, Denise Feliciano MD 
 
                                
 

Dr. Petit
KERMIT Calvillo
Petit
ED
Dr Petit

## 2024-07-10 NOTE — CONSULT NOTE ADULT - SUBJECTIVE AND OBJECTIVE BOX
Patient chart reviewed, full consult to follow.     Thank you for the courtesy of this consultation. [facility] NEPHROLOGY SERVICES, [facility]  NEPHROLOGY AND HYPERTENSION  [address]  [address]  [place] [address]  [phone]    MD MERCY STEWART MD YELENA ROSENBERG, MD BINNY KOSHY, MD CHRISTOPHER CAPUTO, MD EDWARD BOVER, MD      Information from chart:  "Patient is a 68y old  Female who presents with a chief complaint of mechanical fall (10 Jul 2024 14:42)    HPI:  Patient is a 68-year-old female with history of lung cancer (on immunotherapy), HTN, IDDM, jugular vein thrombosis on Lovenox who presents with LT-sided hip pain after sustaining a fall. She was walking down steps when she slipped and landed on her left hip. Denies LOC and head trauma. Pt denies any numbness, tingling or paresthesias.    In the ED, her vitals are WNL. Labs WNL. Xray LT hip w/ left intertrochanteric fracture.   (27 Jun 2024 15:31)   "    PAST MEDICAL & SURGICAL HISTORY:  Diabetes      Hypertension      Multiple sclerosis      Alcohol abuse      Pancreatic insufficiency      H/O cervical spine surgery        FAMILY HISTORY:  No pertinent family history in first degree relatives      Allergies    No Known Allergies    Intolerances      Home Medications:  acetaminophen 325 mg oral tablet: 2 tab(s) orally every 6 hours As needed Temp greater or equal to 38C (100.4F), Mild Pain (1 - 3) (08 Jul 2024 13:49)  albuterol 90 mcg/inh inhalation aerosol: 2 puff(s) inhaled every 6 hours As needed Shortness of Breath and/or Wheezing (08 Jul 2024 13:49)  budesonide-formoterol 80 mcg-4.5 mcg/inh inhalation aerosol: 2 puff(s) inhaled 2 times a day (08 Jul 2024 13:49)  DilTIAZem (Eqv-Cardizem CD) 120 mg/24 hours oral capsule, extended release: 1 cap(s) orally once a day (at bedtime) (27 Jun 2024 15:51)  dimethyl fumarate 240 mg oral delayed release capsule: 1 cap(s) orally 2 times a day (08 Jul 2024 13:49)  diphenoxylate-atropine 2.5 mg-0.025 mg oral tablet: 2 tab(s) orally 3 times a day (27 Jun 2024 15:49)  enalapril 5 mg oral tablet: 1 tab(s) orally once a day (at bedtime) (27 Jun 2024 15:50)  enoxaparin: 60 milligram(s) subcutaneous 2 times a day 60 mg Subcutaneous Q 12 hour (1mg/kg body weight q 12 hr) (08 Jul 2024 13:49)  famotidine 40 mg oral tablet: 1 tab(s) orally once a day (at bedtime) (27 Jun 2024 15:50)  guaiFENesin 600 mg oral tablet, extended release: 1 tab(s) orally every 12 hours (08 Jul 2024 13:49)  insulin glargine 100 units/mL subcutaneous solution: 10 unit(s) subcutaneous 2 times a day (08 Jul 2024 13:49)  insulin lispro 100 units/mL injectable solution: 1 dose(s) injectable 4 times a day (before meals and at bedtime) As per sliding scale coverage (08 Jul 2024 13:49)  ipratropium-albuterol 0.5 mg-2.5 mg/3 mL inhalation solution: 3 milliliter(s) inhaled every 6 hours (08 Jul 2024 13:49)  lactobacillus acidophilus oral capsule: 1 cap(s) orally 3 times a day (08 Jul 2024 13:49)  melatonin 3 mg oral tablet: 1 tab(s) orally once a day (at bedtime) As needed Insomnia (08 Jul 2024 13:49)  Metoprolol Succinate ER 50 mg oral tablet, extended release: 1 tab(s) orally once a day (at bedtime) (27 Jun 2024 15:50)  polyethylene glycol 3350 oral powder for reconstitution: 17 gram(s) orally once a day (at bedtime) (08 Jul 2024 13:49)  pregabalin 200 mg oral capsule: 1 cap(s) orally once a day (at bedtime) (27 Jun 2024 15:52)  senna leaf extract oral tablet: 2 tab(s) orally once a day (at bedtime) (08 Jul 2024 13:49)  sodium chloride 3% inhalation solution: 4 milliliter(s) inhaled every 6 hours (08 Jul 2024 13:49)    MEDICATIONS  (STANDING):  acetaminophen     Tablet .. 650 milliGRAM(s) Oral every 6 hours  albuterol/ipratropium for Nebulization 3 milliLiter(s) Nebulizer every 6 hours  amoxicillin  875 milliGRAM(s)/clavulanate 1 Tablet(s) Oral two times a day  budesonide  80 MICROgram(s)/formoterol 4.5 MICROgram(s) Inhaler 2 Puff(s) Inhalation two times a day  dextrose 10% Bolus 125 milliLiter(s) IV Bolus once  dextrose 5%. 1000 milliLiter(s) (100 mL/Hr) IV Continuous <Continuous>  dextrose 5%. 1000 milliLiter(s) (50 mL/Hr) IV Continuous <Continuous>  dextrose 50% Injectable 12.5 Gram(s) IV Push once  dextrose 50% Injectable 25 Gram(s) IV Push once  diltiazem    milliGRAM(s) Oral at bedtime  dimethyl fumarate DR Capsule 240 milliGRAM(s) Oral two times a day  diphenoxylate/atropine 1 Tablet(s) Oral at bedtime  enalapril 5 milliGRAM(s) Oral at bedtime  enoxaparin Injectable 60 milliGRAM(s) SubCutaneous every 12 hours  famotidine    Tablet 20 milliGRAM(s) Oral daily  glucagon  Injectable 1 milliGRAM(s) IntraMuscular once  guaiFENesin  milliGRAM(s) Oral every 12 hours  insulin glargine Injectable (LANTUS) 8 Unit(s) SubCutaneous at bedtime  insulin lispro (ADMELOG) corrective regimen sliding scale   SubCutaneous at bedtime  insulin lispro (ADMELOG) corrective regimen sliding scale   SubCutaneous three times a day before meals  insulin lispro Injectable (ADMELOG) 2 Unit(s) SubCutaneous three times a day before meals  lactobacillus acidophilus 1 Tablet(s) Oral three times a day with meals  metoprolol succinate ER 50 milliGRAM(s) Oral at bedtime  polyethylene glycol 3350 17 Gram(s) Oral at bedtime  senna 2 Tablet(s) Oral at bedtime  sodium chloride 2 Gram(s) Oral every 8 hours  sodium chloride 3%  Inhalation 4 milliLiter(s) Inhalation every 6 hours    MEDICATIONS  (PRN):  acetaminophen     Tablet .. 650 milliGRAM(s) Oral every 6 hours PRN Temp greater or equal to 38C (100.4F), Mild Pain (1 - 3)  albuterol    90 MICROgram(s) HFA Inhaler 2 Puff(s) Inhalation every 6 hours PRN Shortness of Breath and/or Wheezing  dextrose Oral Gel 15 Gram(s) Oral once PRN Blood Glucose LESS THAN 70 milliGRAM(s)/deciliter  melatonin 3 milliGRAM(s) Oral at bedtime PRN Insomnia    Vital Signs Last 24 Hrs  T(C): 36.6 (10 Jul 2024 16:58), Max: 37.1 (10 Jul 2024 04:50)  T(F): 97.9 (10 Jul 2024 16:58), Max: 98.7 (10 Jul 2024 04:50)  HR: 94 (10 Jul 2024 16:58) (76 - 94)  BP: 128/76 (10 Jul 2024 16:58) (128/76 - 160/71)  BP(mean): --  RR: 18 (10 Jul 2024 16:58) (17 - 18)  SpO2: 97% (10 Jul 2024 16:58) (95% - 98%)    Parameters below as of 10 Jul 2024 16:58  Patient On (Oxygen Delivery Method): nasal cannula  O2 Flow (L/min): 3      Daily     Daily     07-09-24 @ 07:01  -  07-10-24 @ 07:00  --------------------------------------------------------  IN: 0 mL / OUT: 650 mL / NET: -650 mL      CAPILLARY BLOOD GLUCOSE      POCT Blood Glucose.: 428 mg/dL (10 Jul 2024 21:19)  POCT Blood Glucose.: 504 mg/dL (10 Jul 2024 21:17)  POCT Blood Glucose.: 342 mg/dL (10 Jul 2024 16:45)  POCT Blood Glucose.: 78 mg/dL (10 Jul 2024 13:07)  POCT Blood Glucose.: 90 mg/dL (10 Jul 2024 11:10)  POCT Blood Glucose.: 154 mg/dL (10 Jul 2024 07:49)  POCT Blood Glucose.: 206 mg/dL (10 Jul 2024 06:26)  POCT Blood Glucose.: >600 mg/dL (10 Jul 2024 06:23)  POCT Blood Glucose.: 45 mg/dL (10 Jul 2024 06:05)  POCT Blood Glucose.: 43 mg/dL (10 Jul 2024 06:00)  POCT Blood Glucose.: 305 mg/dL (09 Jul 2024 21:33)    PHYSICAL EXAM:      T(C): 36.6 (07-10-24 @ 16:58), Max: 37.1 (07-10-24 @ 04:50)  HR: 94 (07-10-24 @ 16:58) (76 - 94)  BP: 128/76 (07-10-24 @ 16:58) (128/76 - 160/71)  RR: 18 (07-10-24 @ 16:58) (17 - 18)  SpO2: 97% (07-10-24 @ 16:58) (95% - 98%)  Wt(kg): --  Lungs clear  Heart S1S2  Abd soft NT ND  Extremities:   tr edema              07-10    128<L>  |  89<L>  |  18  ----------------------------<  182<H>  3.3<L>   |  32<H>  |  0.71    Ca    8.6      10 Jul 2024 06:10                            9.8    7.46  )-----------( 452      ( 10 Jul 2024 06:10 )             30.7     Creatinine Trend: 0.71<--, 0.73<--, 0.54<--, 0.68<--, 0.68<--, 0.61<--  Urinalysis Basic - ( 10 Jul 2024 06:10 )    Color: x / Appearance: x / SG: x / pH: x  Gluc: 182 mg/dL / Ketone: x  / Bili: x / Urobili: x   Blood: x / Protein: x / Nitrite: x   Leuk Esterase: x / RBC: x / WBC x   Sq Epi: x / Non Sq Epi: x / Bacteria: x            Assessment   Chronic hyponatremia exacerbated by post operative stress, poor po intake    Plan  Add Nacl tablets; replete K  Encourage po intake  Insulin adjustments       Max Steve MD          Thank you for the courtesy of this consultation.

## 2024-07-11 ENCOUNTER — TRANSCRIPTION ENCOUNTER (OUTPATIENT)
Age: 69
End: 2024-07-11

## 2024-07-11 VITALS — OXYGEN SATURATION: 96 %

## 2024-07-11 DIAGNOSIS — E11.65 TYPE 2 DIABETES MELLITUS WITH HYPERGLYCEMIA: ICD-10-CM

## 2024-07-11 LAB
ALBUMIN SERPL ELPH-MCNC: 1.7 G/DL — LOW (ref 3.3–5)
ALP SERPL-CCNC: 136 U/L — HIGH (ref 40–120)
ALT FLD-CCNC: 18 U/L — SIGNIFICANT CHANGE UP (ref 12–78)
ANION GAP SERPL CALC-SCNC: 7 MMOL/L — SIGNIFICANT CHANGE UP (ref 5–17)
AST SERPL-CCNC: 14 U/L — LOW (ref 15–37)
BILIRUB SERPL-MCNC: 0.4 MG/DL — SIGNIFICANT CHANGE UP (ref 0.2–1.2)
BUN SERPL-MCNC: 17 MG/DL — SIGNIFICANT CHANGE UP (ref 7–23)
CALCIUM SERPL-MCNC: 8.1 MG/DL — LOW (ref 8.5–10.1)
CHLORIDE SERPL-SCNC: 92 MMOL/L — LOW (ref 96–108)
CO2 SERPL-SCNC: 30 MMOL/L — SIGNIFICANT CHANGE UP (ref 22–31)
CREAT SERPL-MCNC: 0.61 MG/DL — SIGNIFICANT CHANGE UP (ref 0.5–1.3)
EGFR: 97 ML/MIN/1.73M2 — SIGNIFICANT CHANGE UP
GLUCOSE BLDC GLUCOMTR-MCNC: 136 MG/DL — HIGH (ref 70–99)
GLUCOSE BLDC GLUCOMTR-MCNC: 193 MG/DL — HIGH (ref 70–99)
GLUCOSE BLDC GLUCOMTR-MCNC: 204 MG/DL — HIGH (ref 70–99)
GLUCOSE SERPL-MCNC: 130 MG/DL — HIGH (ref 70–99)
HCT VFR BLD CALC: 28.8 % — LOW (ref 34.5–45)
HGB BLD-MCNC: 9.2 G/DL — LOW (ref 11.5–15.5)
MCHC RBC-ENTMCNC: 26.4 PG — LOW (ref 27–34)
MCHC RBC-ENTMCNC: 31.9 G/DL — LOW (ref 32–36)
MCV RBC AUTO: 82.5 FL — SIGNIFICANT CHANGE UP (ref 80–100)
NRBC # BLD: 0 /100 WBCS — SIGNIFICANT CHANGE UP (ref 0–0)
PLATELET # BLD AUTO: 441 K/UL — HIGH (ref 150–400)
POTASSIUM SERPL-MCNC: 3.8 MMOL/L — SIGNIFICANT CHANGE UP (ref 3.5–5.3)
POTASSIUM SERPL-SCNC: 3.8 MMOL/L — SIGNIFICANT CHANGE UP (ref 3.5–5.3)
PROT SERPL-MCNC: 5.3 GM/DL — LOW (ref 6–8.3)
RBC # BLD: 3.49 M/UL — LOW (ref 3.8–5.2)
RBC # FLD: 15.6 % — HIGH (ref 10.3–14.5)
SODIUM SERPL-SCNC: 129 MMOL/L — LOW (ref 135–145)
WBC # BLD: 7.67 K/UL — SIGNIFICANT CHANGE UP (ref 3.8–10.5)
WBC # FLD AUTO: 7.67 K/UL — SIGNIFICANT CHANGE UP (ref 3.8–10.5)

## 2024-07-11 PROCEDURE — 99239 HOSP IP/OBS DSCHRG MGMT >30: CPT

## 2024-07-11 RX ORDER — AMOXICILLIN/POTASSIUM CLAV 250-125 MG
1 TABLET ORAL
Qty: 0 | Refills: 0 | DISCHARGE
Start: 2024-07-11

## 2024-07-11 RX ORDER — MAGNESIUM, ALUMINUM HYDROXIDE 400-400
30 TABLET,CHEWABLE ORAL ONCE
Refills: 0 | Status: COMPLETED | OUTPATIENT
Start: 2024-07-11 | End: 2024-07-11

## 2024-07-11 RX ORDER — INSULIN GLARGINE 100 [IU]/ML
8 INJECTION, SOLUTION SUBCUTANEOUS
Qty: 0 | Refills: 0 | DISCHARGE
Start: 2024-07-11

## 2024-07-11 RX ADMIN — Medication 1 TABLET(S): at 12:34

## 2024-07-11 RX ADMIN — IPRATROPIUM BROMIDE AND ALBUTEROL SULFATE 3 MILLILITER(S): .5; 3 SOLUTION RESPIRATORY (INHALATION) at 05:48

## 2024-07-11 RX ADMIN — INSULIN LISPRO 2 UNIT(S): 100 INJECTION, SOLUTION SUBCUTANEOUS at 12:35

## 2024-07-11 RX ADMIN — Medication 1 TABLET(S): at 05:38

## 2024-07-11 RX ADMIN — ENOXAPARIN SODIUM 60 MILLIGRAM(S): 100 INJECTION SUBCUTANEOUS at 08:48

## 2024-07-11 RX ADMIN — INSULIN LISPRO 4: 100 INJECTION, SOLUTION SUBCUTANEOUS at 12:34

## 2024-07-11 RX ADMIN — Medication 20 MILLIGRAM(S): at 12:34

## 2024-07-11 RX ADMIN — INSULIN LISPRO 2 UNIT(S): 100 INJECTION, SOLUTION SUBCUTANEOUS at 08:48

## 2024-07-11 RX ADMIN — Medication 30 MILLILITER(S): at 09:27

## 2024-07-11 RX ADMIN — Medication 4 MILLILITER(S): at 05:48

## 2024-07-11 RX ADMIN — Medication 4 MILLILITER(S): at 11:24

## 2024-07-11 RX ADMIN — DIMETHYL FUMARATE 240 MILLIGRAM(S): 240 CAPSULE, DELAYED RELEASE ORAL at 05:57

## 2024-07-11 RX ADMIN — Medication 650 MILLIGRAM(S): at 05:38

## 2024-07-11 RX ADMIN — Medication 1 TABLET(S): at 08:48

## 2024-07-11 RX ADMIN — IPRATROPIUM BROMIDE AND ALBUTEROL SULFATE 3 MILLILITER(S): .5; 3 SOLUTION RESPIRATORY (INHALATION) at 11:24

## 2024-07-11 RX ADMIN — Medication 2 GRAM(S): at 05:39

## 2024-07-11 RX ADMIN — Medication 650 MILLIGRAM(S): at 12:34

## 2024-07-11 RX ADMIN — Medication 2 PUFF(S): at 05:38

## 2024-07-11 RX ADMIN — Medication 600 MILLIGRAM(S): at 05:38

## 2024-07-11 NOTE — DISCHARGE NOTE NURSING/CASE MANAGEMENT/SOCIAL WORK - NSDCVIVACCINE_GEN_ALL_CORE_FT
influenza, injectable, quadrivalent, preservative free; 02-Apr-2017 09:07; Willig, Hadassah (RN); [employer]; [id number]; IntraMuscular; Deltoid Left.; 0.5 milliLiter(s); VIS (VIS Published: 07-Aug-2015, VIS Presented: 02-Apr-2017);   pneumococcal polysaccharide PPV23; 01-Apr-2017 11:51; Marylu Malin (AJIT); [employer] [id number]; IntraMuscular; Deltoid Left.; 0.5 milliLiter(s); VIS (VIS Published: 06-Oct-2009, VIS Presented: 01-Apr-2017);   pneumococcal polysaccharide PPV23; 01-Apr-2017 11:56; Marylu Malin (AJIT); [employer] [id number]; IntraMuscular; Deltoid Right.; 0.5 milliLiter(s); VIS (VIS Published: 06-Oct-2009, VIS Presented: 01-Apr-2017);

## 2024-07-11 NOTE — PROGRESS NOTE ADULT - SUBJECTIVE AND OBJECTIVE BOX
[facility] NEPHROLOGY SERVICES, [facility]  NEPHROLOGY AND HYPERTENSION  [address]  [address]  [place] [address]  [phone]    MD MERCY STEWART MD YELENA ROSENBERG, MD BINNY KOSHY, MD CHRISTOPHER CAPUTO, MD EDWARD BOVER, MD          Patient events noted    MEDICATIONS  (STANDING):  acetaminophen     Tablet .. 650 milliGRAM(s) Oral every 6 hours  albuterol/ipratropium for Nebulization 3 milliLiter(s) Nebulizer every 6 hours  amoxicillin  875 milliGRAM(s)/clavulanate 1 Tablet(s) Oral two times a day  budesonide  80 MICROgram(s)/formoterol 4.5 MICROgram(s) Inhaler 2 Puff(s) Inhalation two times a day  dextrose 10% Bolus 125 milliLiter(s) IV Bolus once  dextrose 5%. 1000 milliLiter(s) (100 mL/Hr) IV Continuous <Continuous>  dextrose 5%. 1000 milliLiter(s) (50 mL/Hr) IV Continuous <Continuous>  dextrose 50% Injectable 12.5 Gram(s) IV Push once  dextrose 50% Injectable 25 Gram(s) IV Push once  diltiazem    milliGRAM(s) Oral at bedtime  dimethyl fumarate DR Capsule 240 milliGRAM(s) Oral two times a day  enalapril 5 milliGRAM(s) Oral at bedtime  enoxaparin Injectable 60 milliGRAM(s) SubCutaneous every 12 hours  famotidine    Tablet 20 milliGRAM(s) Oral daily  glucagon  Injectable 1 milliGRAM(s) IntraMuscular once  guaiFENesin  milliGRAM(s) Oral every 12 hours  insulin glargine Injectable (LANTUS) 8 Unit(s) SubCutaneous at bedtime  insulin lispro (ADMELOG) corrective regimen sliding scale   SubCutaneous three times a day before meals  insulin lispro (ADMELOG) corrective regimen sliding scale   SubCutaneous at bedtime  insulin lispro Injectable (ADMELOG) 2 Unit(s) SubCutaneous three times a day before meals  lactobacillus acidophilus 1 Tablet(s) Oral three times a day with meals  metoprolol succinate ER 50 milliGRAM(s) Oral at bedtime  polyethylene glycol 3350 17 Gram(s) Oral at bedtime  senna 2 Tablet(s) Oral at bedtime  sodium chloride 2 Gram(s) Oral every 8 hours  sodium chloride 3%  Inhalation 4 milliLiter(s) Inhalation every 6 hours    MEDICATIONS  (PRN):  acetaminophen     Tablet .. 650 milliGRAM(s) Oral every 6 hours PRN Temp greater or equal to 38C (100.4F), Mild Pain (1 - 3)  albuterol    90 MICROgram(s) HFA Inhaler 2 Puff(s) Inhalation every 6 hours PRN Shortness of Breath and/or Wheezing  dextrose Oral Gel 15 Gram(s) Oral once PRN Blood Glucose LESS THAN 70 milliGRAM(s)/deciliter  melatonin 3 milliGRAM(s) Oral at bedtime PRN Insomnia      PHYSICAL EXAM:      T(C): 36.9 (07-11-24 @ 10:31), Max: 37.1 (07-10-24 @ 23:00)  HR: 70 (07-11-24 @ 11:30) (70 - 92)  BP: 138/72 (07-11-24 @ 10:31) (115/73 - 138/72)  RR: 18 (07-11-24 @ 10:31) (18 - 18)  SpO2: 96% (07-11-24 @ 11:30) (95% - 99%)  Wt(kg): --  Lungs clear  Heart S1S2  Abd soft NT ND  Extremities:   tr edema                                    9.2    7.67  )-----------( 441      ( 11 Jul 2024 06:25 )             28.8     07-11    129<L>  |  92<L>  |  17  ----------------------------<  130<H>  3.8   |  30  |  0.61    Ca    8.1<L>      11 Jul 2024 06:25    TPro  5.3<L>  /  Alb  1.7<L>  /  TBili  0.4  /  DBili  x   /  AST  14<L>  /  ALT  18  /  AlkPhos  136<H>  07-11      LIVER FUNCTIONS - ( 11 Jul 2024 06:25 )  Alb: 1.7 g/dL / Pro: 5.3 gm/dL / ALK PHOS: 136 U/L / ALT: 18 U/L / AST: 14 U/L / GGT: x           Creatinine Trend: 0.61<--, 0.71<--, 0.73<--, 0.54<--, 0.68<--, 0.68<--    Trend Sodium  07-11-24 @ 06:25   -  129<L>  07-10-24 @ 06:10   -  128<L>  07-09-24 @ 06:53   -  130<L>          Assessment   Chronic hyponatremia, exacerbated by post operative stress, poor po intake  Stable     Plan  Add Nacl tablets  Encourage po intake  Insulin adjustments   Discharge home, following with outpatient nephrologist   Max Steve MD

## 2024-07-11 NOTE — PROGRESS NOTE ADULT - PROVIDER SPECIALTY LIST ADULT
Hospitalist
Infectious Disease
Infectious Disease
Orthopedics
Pulmonology
Hospitalist
Infectious Disease
Orthopedics
Orthopedics
Hospitalist
Hospitalist
Nephrology
Pulmonology
Hospitalist
Hospitalist
Infectious Disease
Hospitalist
Endocrinology
Hospitalist

## 2024-07-11 NOTE — PROGRESS NOTE ADULT - REASON FOR ADMISSION
mechanical fall

## 2024-07-11 NOTE — PROGRESS NOTE ADULT - SUBJECTIVE AND OBJECTIVE BOX
INTERVAL HPI/OVERNIGHT EVENTS: on 3L NC, afebrile     SUBJECTIVE: Patient seen and examined at bedside.     ROS: All negative except as listed above.    VITAL SIGNS:  ICU Vital Signs Last 24 Hrs  T(C): 36.7 (11 Jul 2024 04:00), Max: 37.1 (10 Jul 2024 23:00)  T(F): 98 (11 Jul 2024 04:00), Max: 98.8 (10 Jul 2024 23:00)  HR: 72 (11 Jul 2024 06:00) (71 - 94)  BP: 115/73 (11 Jul 2024 04:00) (115/73 - 145/75)  BP(mean): --  ABP: --  ABP(mean): --  RR: 18 (11 Jul 2024 04:00) (17 - 18)  SpO2: 96% (11 Jul 2024 06:00) (96% - 98%)    O2 Parameters below as of 11 Jul 2024 06:00  Patient On (Oxygen Delivery Method): nasal cannula            Plateau pressure:   P/F ratio:     CAPILLARY BLOOD GLUCOSE      POCT Blood Glucose.: 136 mg/dL (11 Jul 2024 08:05)      ECG: reviewed.    PHYSICAL EXAM:    GENERAL: NAD, lying in bed comfortably  HEAD:  Atraumatic, normocephalic  EYES: EOMI, PERRLA, conjunctiva and sclera clear  NECK: Supple, trachea midline, no JVD  HEART: Regular rate and rhythm  LUNGS: coarse BS, occasional wheeze  ABDOMEN: Soft, nontender, nondistended, +BS  EXTREMITIES: No clubbing, cyanosis, or edema  NERVOUS SYSTEM:  A&Ox3, following commands, moving all extremities, no focal deficits    MEDICATIONS:  MEDICATIONS  (STANDING):  acetaminophen     Tablet .. 650 milliGRAM(s) Oral every 6 hours  albuterol/ipratropium for Nebulization 3 milliLiter(s) Nebulizer every 6 hours  aluminum hydroxide/magnesium hydroxide/simethicone Suspension 30 milliLiter(s) Oral once  amoxicillin  875 milliGRAM(s)/clavulanate 1 Tablet(s) Oral two times a day  budesonide  80 MICROgram(s)/formoterol 4.5 MICROgram(s) Inhaler 2 Puff(s) Inhalation two times a day  dextrose 10% Bolus 125 milliLiter(s) IV Bolus once  dextrose 5%. 1000 milliLiter(s) (100 mL/Hr) IV Continuous <Continuous>  dextrose 5%. 1000 milliLiter(s) (50 mL/Hr) IV Continuous <Continuous>  dextrose 50% Injectable 12.5 Gram(s) IV Push once  dextrose 50% Injectable 25 Gram(s) IV Push once  diltiazem    milliGRAM(s) Oral at bedtime  dimethyl fumarate DR Capsule 240 milliGRAM(s) Oral two times a day  enalapril 5 milliGRAM(s) Oral at bedtime  enoxaparin Injectable 60 milliGRAM(s) SubCutaneous every 12 hours  famotidine    Tablet 20 milliGRAM(s) Oral daily  glucagon  Injectable 1 milliGRAM(s) IntraMuscular once  guaiFENesin  milliGRAM(s) Oral every 12 hours  insulin glargine Injectable (LANTUS) 8 Unit(s) SubCutaneous at bedtime  insulin lispro (ADMELOG) corrective regimen sliding scale   SubCutaneous three times a day before meals  insulin lispro (ADMELOG) corrective regimen sliding scale   SubCutaneous at bedtime  insulin lispro Injectable (ADMELOG) 2 Unit(s) SubCutaneous three times a day before meals  lactobacillus acidophilus 1 Tablet(s) Oral three times a day with meals  metoprolol succinate ER 50 milliGRAM(s) Oral at bedtime  polyethylene glycol 3350 17 Gram(s) Oral at bedtime  senna 2 Tablet(s) Oral at bedtime  sodium chloride 2 Gram(s) Oral every 8 hours  sodium chloride 3%  Inhalation 4 milliLiter(s) Inhalation every 6 hours    MEDICATIONS  (PRN):  acetaminophen     Tablet .. 650 milliGRAM(s) Oral every 6 hours PRN Temp greater or equal to 38C (100.4F), Mild Pain (1 - 3)  albuterol    90 MICROgram(s) HFA Inhaler 2 Puff(s) Inhalation every 6 hours PRN Shortness of Breath and/or Wheezing  dextrose Oral Gel 15 Gram(s) Oral once PRN Blood Glucose LESS THAN 70 milliGRAM(s)/deciliter  melatonin 3 milliGRAM(s) Oral at bedtime PRN Insomnia      ALLERGIES:  Allergies    No Known Allergies    Intolerances        LABS:                        9.2    7.67  )-----------( 441      ( 11 Jul 2024 06:25 )             28.8     07-10    128<L>  |  89<L>  |  18  ----------------------------<  182<H>  3.3<L>   |  32<H>  |  0.71    Ca    8.6      10 Jul 2024 06:10        Urinalysis Basic - ( 10 Jul 2024 06:10 )    Color: x / Appearance: x / SG: x / pH: x  Gluc: 182 mg/dL / Ketone: x  / Bili: x / Urobili: x   Blood: x / Protein: x / Nitrite: x   Leuk Esterase: x / RBC: x / WBC x   Sq Epi: x / Non Sq Epi: x / Bacteria: x      ABG:      vBG:    Micro:    Culture - Blood (collected 07-01-24 @ 16:40)  Source: .Blood Blood-Peripheral  Final Report (07-06-24 @ 23:08):    No growth at 5 days    Culture - Blood (collected 07-01-24 @ 16:35)  Source: .Blood Blood-Peripheral  Final Report (07-06-24 @ 23:08):    No growth at 5 days        Culture - Sputum (collected 07-01-24 @ 18:00)  Source: .Sputum Sputum  Gram Stain (07-03-24 @ 16:37):    Moderate polymorphonuclear leukocytes per low power field    No Squamous epithelial cells per low power field    Numerous Gram Negative Rods seen per oil power field    Few Gram positive cocci in pairs seen per oil power field  Final Report (07-03-24 @ 16:37):    Numerous Escherichia coli    Normal Respiratory Jordana present  Organism: Escherichia coli (07-03-24 @ 16:37)  Organism: Escherichia coli (07-03-24 @ 16:37)      Method Type: ETHEL      -  Amoxicillin/Clavulanic Acid: S <=8/4      -  Ampicillin: R >16 These ampicillin results predict results for amoxicillin      -  Ampicillin/Sulbactam: I 16/8      -  Aztreonam: S <=4      -  Cefazolin: S <=2      -  Cefepime: S <=2      -  Cefoxitin: S <=8      -  Ceftriaxone: S <=1      -  Ciprofloxacin: S <=0.25      -  Ertapenem: S <=0.5      -  Gentamicin: S <=2      -  Imipenem: S <=1      -  Levofloxacin: S <=0.5      -  Meropenem: S <=1      -  Piperacillin/Tazobactam: S <=8      -  Tobramycin: S <=2      -  Trimethoprim/Sulfamethoxazole: R >2/38        RADIOLOGY & ADDITIONAL TESTS: Reviewed.

## 2024-07-11 NOTE — PROGRESS NOTE ADULT - SUBJECTIVE AND OBJECTIVE BOX
Patient is a 68y old  Female who presents with a chief complaint of mechanical fall (10 Jul 2024 22:33)      Interval History: finger sticks are now < 180 and more controlled   discharge planning is on     MEDICATIONS  (STANDING):  acetaminophen     Tablet .. 650 milliGRAM(s) Oral every 6 hours  albuterol/ipratropium for Nebulization 3 milliLiter(s) Nebulizer every 6 hours  amoxicillin  875 milliGRAM(s)/clavulanate 1 Tablet(s) Oral two times a day  budesonide  80 MICROgram(s)/formoterol 4.5 MICROgram(s) Inhaler 2 Puff(s) Inhalation two times a day  dextrose 10% Bolus 125 milliLiter(s) IV Bolus once  dextrose 5%. 1000 milliLiter(s) (100 mL/Hr) IV Continuous <Continuous>  dextrose 5%. 1000 milliLiter(s) (50 mL/Hr) IV Continuous <Continuous>  dextrose 50% Injectable 12.5 Gram(s) IV Push once  dextrose 50% Injectable 25 Gram(s) IV Push once  diltiazem    milliGRAM(s) Oral at bedtime  dimethyl fumarate DR Capsule 240 milliGRAM(s) Oral two times a day  enalapril 5 milliGRAM(s) Oral at bedtime  enoxaparin Injectable 60 milliGRAM(s) SubCutaneous every 12 hours  famotidine    Tablet 20 milliGRAM(s) Oral daily  glucagon  Injectable 1 milliGRAM(s) IntraMuscular once  guaiFENesin  milliGRAM(s) Oral every 12 hours  insulin glargine Injectable (LANTUS) 8 Unit(s) SubCutaneous at bedtime  insulin lispro (ADMELOG) corrective regimen sliding scale   SubCutaneous three times a day before meals  insulin lispro (ADMELOG) corrective regimen sliding scale   SubCutaneous at bedtime  insulin lispro Injectable (ADMELOG) 2 Unit(s) SubCutaneous three times a day before meals  lactobacillus acidophilus 1 Tablet(s) Oral three times a day with meals  metoprolol succinate ER 50 milliGRAM(s) Oral at bedtime  polyethylene glycol 3350 17 Gram(s) Oral at bedtime  senna 2 Tablet(s) Oral at bedtime  sodium chloride 2 Gram(s) Oral every 8 hours  sodium chloride 3%  Inhalation 4 milliLiter(s) Inhalation every 6 hours    MEDICATIONS  (PRN):  acetaminophen     Tablet .. 650 milliGRAM(s) Oral every 6 hours PRN Temp greater or equal to 38C (100.4F), Mild Pain (1 - 3)  albuterol    90 MICROgram(s) HFA Inhaler 2 Puff(s) Inhalation every 6 hours PRN Shortness of Breath and/or Wheezing  dextrose Oral Gel 15 Gram(s) Oral once PRN Blood Glucose LESS THAN 70 milliGRAM(s)/deciliter  melatonin 3 milliGRAM(s) Oral at bedtime PRN Insomnia      Allergies    No Known Allergies    Intolerances        REVIEW OF SYSTEMS:  CONSTITUTIONAL: no changes  EYES: No eye pain, visual disturbances, or discharge  ENMT:  No difficulty hearing, No sinus or throat pain  NECK: No pain or stiffness  RESPIRATORY: No cough, wheezing, chills or hemoptysis; No shortness of breath  CARDIOVASCULAR: No chest pain, palpitations or leg swelling  GASTROINTESTINAL: No abdominal or epigastric pain. No nausea, vomiting, or hematemesis; No diarrhea or constipation. No melena or hematochezia.  GENITOURINARY: No dysuria, frequency, hematuria, or incontinence  NEUROLOGICAL: No headaches, memory loss, loss of strength, numbness, or tremors  SKIN: No itching, burning, rashes, or lesions   ENDOCRINE: No heat or cold intolerance; No hair loss  MUSCULOSKELETAL: No joint pain or swelling; No muscle, back, or extremity pain  PSYCHIATRIC: No depression, anxiety, mood swings, or difficulty sleeping  HEME/LYMPH: No easy bruising, or bleeding gums  ALLERY AND IMMUNOLOGIC: No hives or eczema    Vital Signs Last 24 Hrs  T(C): 36.9 (11 Jul 2024 10:31), Max: 37.1 (10 Jul 2024 23:00)  T(F): 98.4 (11 Jul 2024 10:31), Max: 98.8 (10 Jul 2024 23:00)  HR: 70 (11 Jul 2024 11:30) (70 - 92)  BP: 138/72 (11 Jul 2024 10:31) (115/73 - 145/75)  BP(mean): --  RR: 18 (11 Jul 2024 10:31) (18 - 18)  SpO2: 96% (11 Jul 2024 11:30) (95% - 99%)    Parameters below as of 11 Jul 2024 10:31  Patient On (Oxygen Delivery Method): nasal cannula        PHYSICAL EXAM:  GENERAL:   HEAD: Atraumatic, Normocephalic  EYES: PERRLA, conjunctiva and sclera clear  ENMT: No  exudates,; Moist mucous membranes,, No lesions  NECK: Supple, No JVD, Normal thyroid  NERVOUS SYSTEM:  Alert & Oriented,   CHEST/LUNG: Clear to auscultation bilaterally; No rales, rhonchi, wheezing, or rubs  HEART: Regular rate and rhythm; No murmurs, rubs, or gallops  ABDOMEN: Soft, Nontender, Nondistended; Bowel sounds present  EXTREMITIES:  2+ Peripheral Pulses, no edema  SKIN: No rashes or lesions    LABS:      Urinalysis Basic - ( 11 Jul 2024 06:25 )    Color: x / Appearance: x / SG: x / pH: x  Gluc: 130 mg/dL / Ketone: x  / Bili: x / Urobili: x   Blood: x / Protein: x / Nitrite: x   Leuk Esterase: x / RBC: x / WBC x   Sq Epi: x / Non Sq Epi: x / Bacteria: x      CAPILLARY BLOOD GLUCOSE      POCT Blood Glucose.: 204 mg/dL (11 Jul 2024 12:28)  POCT Blood Glucose.: 193 mg/dL (11 Jul 2024 11:20)  POCT Blood Glucose.: 136 mg/dL (11 Jul 2024 08:05)  POCT Blood Glucose.: 428 mg/dL (10 Jul 2024 21:19)  POCT Blood Glucose.: 504 mg/dL (10 Jul 2024 21:17)    Lipid panel:           Thyroid:  Diabetes Tests:  Parathyroid Panel:  Adrenals:  RADIOLOGY & ADDITIONAL TESTS:    Imaging Personally Reviewed:  [ ] YES  [ ] NO    Consultant(s) Notes Reviewed:  [ ] YES  [ ] NO    Care Discussed with Consultants/Other Providers [ ] YES  [ ] NO

## 2024-07-11 NOTE — DISCHARGE NOTE NURSING/CASE MANAGEMENT/SOCIAL WORK - PATIENT PORTAL LINK FT
You can access the FollowMyHealth Patient Portal offered by [facility] by registering at the following website: [email or web address]. By joining [employer]’s FollowMyHealth portal, you will also be able to view your health information using other applications (apps) compatible with our system.

## 2024-07-11 NOTE — PROGRESS NOTE ADULT - ASSESSMENT
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 Now S/P L Hip IMN. RRT today 7/1 for acute hypoxemic respiratory failure. Pulmonary consulted.     DX: Lung Cancer, Pneumonia, Left hip IMN    Recommendations:  - 3 L NC NC with SpO2 94-95%. Continue to titrate down FiO2 as tolerates for goal 02>90%  - CTA chest negative for PE, does show B/L LL PNA with mucoid impaction  - C/w duoneb q6 with hypersal Nebs used with Aerobika, Symbicort, Chest PT, incentive spirometry to facilitate secretion mobilization  - Sputum culture with e. Coli; completed 4 days of Zosyn IV for pneumonia then switched to Ceftriaxone IV per ID recs now completed a total of 3 days. Continue to monitor off abxs.   - Blood cultures NGTD, Legionella neg, azithromycin was d/c'd   - ID following, appreciate recs  - Does have underlying Lung cancer on immunotherapy last dose 6/26. Will need to F/U with oncologist upon DC from hospital.   - Pt will also need close follow up with her outpatient pulmonologist once discharged from rehab (pending auth for dispo to [facility])   - Rest of care per primary team    NOTE INCOMPLETE

## 2024-07-11 NOTE — DISCHARGE NOTE NURSING/CASE MANAGEMENT/SOCIAL WORK - NSDCPEFALRISK_GEN_ALL_CORE
For information on Fall & Injury Prevention, visit: [email or web address] OR  [email or web address] OR  https://www.cdc.gov/steadi/patient.html

## 2024-07-13 DIAGNOSIS — J96.01 ACUTE RESPIRATORY FAILURE WITH HYPOXIA: ICD-10-CM

## 2024-07-13 DIAGNOSIS — E87.1 HYPO-OSMOLALITY AND HYPONATREMIA: ICD-10-CM

## 2024-07-13 DIAGNOSIS — I82.C19 ACUTE EMBOLISM AND THROMBOSIS OF UNSPECIFIED INTERNAL JUGULAR VEIN: ICD-10-CM

## 2024-07-13 DIAGNOSIS — K58.9 IRRITABLE BOWEL SYNDROME WITHOUT DIARRHEA: ICD-10-CM

## 2024-07-13 DIAGNOSIS — J69.0 PNEUMONITIS DUE TO INHALATION OF FOOD AND VOMIT: ICD-10-CM

## 2024-07-13 DIAGNOSIS — Z87.891 PERSONAL HISTORY OF NICOTINE DEPENDENCE: ICD-10-CM

## 2024-07-13 DIAGNOSIS — Z79.4 LONG TERM (CURRENT) USE OF INSULIN: ICD-10-CM

## 2024-07-13 DIAGNOSIS — Z79.899 OTHER LONG TERM (CURRENT) DRUG THERAPY: ICD-10-CM

## 2024-07-13 DIAGNOSIS — G35 MULTIPLE SCLEROSIS: ICD-10-CM

## 2024-07-13 DIAGNOSIS — E46 UNSPECIFIED PROTEIN-CALORIE MALNUTRITION: ICD-10-CM

## 2024-07-13 DIAGNOSIS — Z92.21 PERSONAL HISTORY OF ANTINEOPLASTIC CHEMOTHERAPY: ICD-10-CM

## 2024-07-13 DIAGNOSIS — E11.65 TYPE 2 DIABETES MELLITUS WITH HYPERGLYCEMIA: ICD-10-CM

## 2024-07-13 DIAGNOSIS — A41.51 SEPSIS DUE TO ESCHERICHIA COLI [E. COLI]: ICD-10-CM

## 2024-07-13 DIAGNOSIS — C34.90 MALIGNANT NEOPLASM OF UNSPECIFIED PART OF UNSPECIFIED BRONCHUS OR LUNG: ICD-10-CM

## 2024-07-13 DIAGNOSIS — D73.89 OTHER DISEASES OF SPLEEN: ICD-10-CM

## 2024-07-13 DIAGNOSIS — I10 ESSENTIAL (PRIMARY) HYPERTENSION: ICD-10-CM

## 2024-07-13 DIAGNOSIS — S72.142A DISPLACED INTERTROCHANTERIC FRACTURE OF LEFT FEMUR, INITIAL ENCOUNTER FOR CLOSED FRACTURE: ICD-10-CM
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
## 2024-07-13 DIAGNOSIS — E11.49 TYPE 2 DIABETES MELLITUS WITH OTHER DIABETIC NEUROLOGICAL COMPLICATION: ICD-10-CM
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
## 2024-07-13 DIAGNOSIS — Z92.3 PERSONAL HISTORY OF IRRADIATION: ICD-10-CM

## 2024-07-13 DIAGNOSIS — Z85.118 PERSONAL HISTORY OF OTHER MALIGNANT NEOPLASM OF BRONCHUS AND LUNG: ICD-10-CM

## 2024-07-13 DIAGNOSIS — J15.5 PNEUMONIA DUE TO ESCHERICHIA COLI: ICD-10-CM

## 2024-07-15 NOTE — PHYSICAL THERAPY INITIAL EVALUATION ADULT - SOCIAL CONCERNS
Spoke with patient and she stated that she has had the area to the right side of her mid back for about 6 to 8 weeks. It is currently not draining but it is red. The area does not bother her. Dr Wheeler had put her on ABT for 7 days then extended it for another 5 days.   
None

## 2024-07-16 DIAGNOSIS — I82.C22 CHRONIC EMBOLISM AND THROMBOSIS OF LEFT INTERNAL JUGULAR VEIN: ICD-10-CM

## 2024-08-16 ENCOUNTER — APPOINTMENT (OUTPATIENT)
Dept: ORTHOPEDIC SURGERY | Facility: CLINIC | Age: 69
End: 2024-08-16

## 2024-08-21 ENCOUNTER — APPOINTMENT (OUTPATIENT)
Dept: ORTHOPEDIC SURGERY | Facility: CLINIC | Age: 69
End: 2024-08-21
Payer: MEDICARE

## 2024-08-21 VITALS — HEIGHT: 62 IN | BODY MASS INDEX: 18.4 KG/M2 | WEIGHT: 100 LBS

## 2024-08-21 DIAGNOSIS — Z98.890 OTHER SPECIFIED POSTPROCEDURAL STATES: ICD-10-CM

## 2024-08-21 PROCEDURE — 73502 X-RAY EXAM HIP UNI 2-3 VIEWS: CPT

## 2024-08-21 PROCEDURE — 99024 POSTOP FOLLOW-UP VISIT: CPT

## 2024-08-21 PROCEDURE — 72170 X-RAY EXAM OF PELVIS: CPT | Mod: 59

## 2024-08-21 RX ORDER — TRAMADOL HYDROCHLORIDE 50 MG/1
50 TABLET, COATED ORAL
Qty: 21 | Refills: 0 | Status: ACTIVE | COMMUNITY
Start: 2024-08-21 | End: 1900-01-01

## 2024-08-21 NOTE — ASSESSMENT
[FreeTextEntry1] : 69F 2 months s/p Left hip IMN doing well.  Staples removed X-rays show interval healing c/w HEP and walking FU 1 month repeat x-rays at that time

## 2024-08-21 NOTE — HISTORY OF PRESENT ILLNESS
[de-identified] : 08/21/2024: Patient here for PO#1 of left hip on 6/27/24. Patient was discharged to Rehab completing 4 week in patient course. She is walking with rolling walker, has been doing HEP to transition to cane and stair climbing. Taking tramadol prn.  Doing well.  Pain improving. Weaning off tramadol.

## 2024-09-20 ENCOUNTER — APPOINTMENT (OUTPATIENT)
Dept: ORTHOPEDIC SURGERY | Facility: CLINIC | Age: 69
End: 2024-09-20
Payer: MEDICARE

## 2024-09-20 DIAGNOSIS — Z98.890 OTHER SPECIFIED POSTPROCEDURAL STATES: ICD-10-CM

## 2024-09-20 PROCEDURE — 73502 X-RAY EXAM HIP UNI 2-3 VIEWS: CPT

## 2024-09-20 PROCEDURE — 99213 OFFICE O/P EST LOW 20 MIN: CPT | Mod: 24

## 2024-09-20 NOTE — HISTORY OF PRESENT ILLNESS
[de-identified] : 09/20/2024: Patient here for PO#2. Patient states that she is feeling slightly better. She still has pain and discomfort. Patient in pain more when she does a lot of walking. She has been doing PT at home. Patient states when she is at home, she tries to not use the walker or cane since she wants to become more independent and she is not able to to go up the stairs in her house as of yet to go upstairs since its 13 steps.   08/21/2024: Patient here for PO#1 of left hip on 6/27/24. Patient was discharged to Rehab completing 4 week in patient course. She is walking with rolling walker, has been doing HEP to transition to cane and stair climbing. Taking tramadol prn.  Doing well.  Pain improving. Weaning off tramadol.

## 2024-09-20 NOTE — ASSESSMENT
[FreeTextEntry1] : 69F 3 months s/p Left hip IMN doing well.  X-rays show interval healing c/w HEP and walking FU 6 weeks  repeat x-rays at that time

## 2024-09-25 ENCOUNTER — APPOINTMENT (OUTPATIENT)
Dept: ORTHOPEDIC SURGERY | Facility: CLINIC | Age: 69
End: 2024-09-25

## 2024-10-03 ENCOUNTER — APPOINTMENT (OUTPATIENT)
Dept: ORTHOPEDIC SURGERY | Facility: CLINIC | Age: 69
End: 2024-10-03
Payer: MEDICARE

## 2024-10-03 VITALS — BODY MASS INDEX: 18.4 KG/M2 | HEIGHT: 62 IN | WEIGHT: 100 LBS

## 2024-10-03 DIAGNOSIS — M71.20 SYNOVIAL CYST OF POPLITEAL SPACE [BAKER], UNSPECIFIED KNEE: ICD-10-CM

## 2024-10-03 DIAGNOSIS — M54.16 RADICULOPATHY, LUMBAR REGION: ICD-10-CM

## 2024-10-03 DIAGNOSIS — Z85.118 PERSONAL HISTORY OF OTHER MALIGNANT NEOPLASM OF BRONCHUS AND LUNG: ICD-10-CM

## 2024-10-03 DIAGNOSIS — M17.12 UNILATERAL PRIMARY OSTEOARTHRITIS, LEFT KNEE: ICD-10-CM

## 2024-10-03 PROCEDURE — 73564 X-RAY EXAM KNEE 4 OR MORE: CPT | Mod: LT

## 2024-10-03 PROCEDURE — 99213 OFFICE O/P EST LOW 20 MIN: CPT

## 2024-10-03 NOTE — HISTORY OF PRESENT ILLNESS
[de-identified] : 10/3/24: Patient is here for left knee pain that began 4 weeks ago, not due to injury. Pain is posterior. Had an ultrasound done by oncologist, and advised of a bakers cyst. Increased pain with lifting/extending. Recently had left hip intramedullary nail with Dr. Wally Garza. Has radiating pain into groin/thigh.,

## 2024-10-03 NOTE — ASSESSMENT
[FreeTextEntry1] : 68yo female with left knee pain x 4 weeks, likely lumbar radiculopathy   X-rays reviewed in detail - moderate patellofemoral OA  Due to radiating pain - paints a lumbar radiculopathy picture  Cannot take NSAIDs due to blood thinner, on tramadol rx by dr. alexandre  Recent US showed baker's cyst, negative for blood clot - cannot see report of US - will send referral for US guided aspiration of left baker's cyst of unknown size  History of DM with sugars of 250 today - patient not fit for CSI today  Use of cryotherapy discussed  F/u with Greg   I am working today under the supervision of Dr. Glover and I am following the plan of care of Dr. Glover as described by him on this date. Progress note completed by Mami Tobar PA-C under the supervision of Dr. Glover.

## 2024-10-03 NOTE — IMAGING
[Left] : left knee [All Views] : anteroposterior, lateral, skyline, and anteroposterior standing [Mild tricompartmental OA medial narrowing] : Mild tricompartmental OA medial narrowing [Moderate patellofemoral OA] : Moderate patellofemoral OA [de-identified] : Left Knee Exam: Inspection: No effusion, no warmth, no ecchymosis, positive pitting edema to b/l lower extremities  Palpation: Medial joint line tenderness to palpation, negative Lacy Range of motion: 0-130 with mild anterior crepitus Strength: 5/5 quadriceps and hamstring strength Stability: Ligamentously stable Motor and sensory intact distally Gait: antalgic gait

## 2024-11-01 ENCOUNTER — APPOINTMENT (OUTPATIENT)
Dept: ORTHOPEDIC SURGERY | Facility: CLINIC | Age: 69
End: 2024-11-01

## 2024-12-16 NOTE — ED ADULT NURSE NOTE - NS PRO PASSIVE SMOKE EXP
4 staples to head wound per Dr Marquez. Pt tolerated well. Daughter at bedside with patient.    Yes...

## 2025-01-28 ENCOUNTER — INPATIENT (INPATIENT)
Facility: HOSPITAL | Age: 70
LOS: 2 days | Discharge: HOME | End: 2025-01-31
Attending: STUDENT IN AN ORGANIZED HEALTH CARE EDUCATION/TRAINING PROGRAM | Admitting: STUDENT IN AN ORGANIZED HEALTH CARE EDUCATION/TRAINING PROGRAM
Payer: MEDICARE

## 2025-01-28 VITALS
OXYGEN SATURATION: 95 % | HEART RATE: 108 BPM | RESPIRATION RATE: 19 BRPM | DIASTOLIC BLOOD PRESSURE: 96 MMHG | WEIGHT: 89.95 LBS | HEIGHT: 62 IN | SYSTOLIC BLOOD PRESSURE: 178 MMHG | TEMPERATURE: 98 F

## 2025-01-28 DIAGNOSIS — Z98.89 OTHER SPECIFIED POSTPROCEDURAL STATES: Chronic | ICD-10-CM

## 2025-01-28 DIAGNOSIS — K58.9 IRRITABLE BOWEL SYNDROME, UNSPECIFIED: ICD-10-CM

## 2025-01-28 DIAGNOSIS — G35 MULTIPLE SCLEROSIS: ICD-10-CM

## 2025-01-28 DIAGNOSIS — J44.9 CHRONIC OBSTRUCTIVE PULMONARY DISEASE, UNSPECIFIED: ICD-10-CM

## 2025-01-28 DIAGNOSIS — Z29.9 ENCOUNTER FOR PROPHYLACTIC MEASURES, UNSPECIFIED: ICD-10-CM

## 2025-01-28 DIAGNOSIS — E11.65 TYPE 2 DIABETES MELLITUS WITH HYPERGLYCEMIA: ICD-10-CM

## 2025-01-28 DIAGNOSIS — B33.8 OTHER SPECIFIED VIRAL DISEASES: ICD-10-CM

## 2025-01-28 DIAGNOSIS — I10 ESSENTIAL (PRIMARY) HYPERTENSION: ICD-10-CM

## 2025-01-28 DIAGNOSIS — I82.890 ACUTE EMBOLISM AND THROMBOSIS OF OTHER SPECIFIED VEINS: ICD-10-CM

## 2025-01-28 LAB
ALBUMIN SERPL ELPH-MCNC: 1.4 G/DL — LOW (ref 3.3–5)
ALP SERPL-CCNC: 127 U/L — HIGH (ref 40–120)
ALT FLD-CCNC: 13 U/L — SIGNIFICANT CHANGE UP (ref 12–78)
ANION GAP SERPL CALC-SCNC: 8 MMOL/L — SIGNIFICANT CHANGE UP (ref 5–17)
AST SERPL-CCNC: 10 U/L — LOW (ref 15–37)
BASE EXCESS BLDV CALC-SCNC: 6.9 MMOL/L — HIGH (ref -2–3)
BASOPHILS # BLD AUTO: 0.03 K/UL — SIGNIFICANT CHANGE UP (ref 0–0.2)
BASOPHILS NFR BLD AUTO: 0.6 % — SIGNIFICANT CHANGE UP (ref 0–2)
BILIRUB SERPL-MCNC: 0.3 MG/DL — SIGNIFICANT CHANGE UP (ref 0.2–1.2)
BLOOD GAS COMMENTS, VENOUS: SIGNIFICANT CHANGE UP
BUN SERPL-MCNC: 16 MG/DL — SIGNIFICANT CHANGE UP (ref 7–23)
CALCIUM SERPL-MCNC: 8.2 MG/DL — LOW (ref 8.5–10.1)
CHLORIDE SERPL-SCNC: 95 MMOL/L — LOW (ref 96–108)
CO2 BLDV-SCNC: 36 MMOL/L — HIGH (ref 22–26)
CO2 SERPL-SCNC: 31 MMOL/L — SIGNIFICANT CHANGE UP (ref 22–31)
CREAT SERPL-MCNC: 0.89 MG/DL — SIGNIFICANT CHANGE UP (ref 0.5–1.3)
EGFR: 70 ML/MIN/1.73M2 — SIGNIFICANT CHANGE UP
EOSINOPHIL # BLD AUTO: 0 K/UL — SIGNIFICANT CHANGE UP (ref 0–0.5)
EOSINOPHIL NFR BLD AUTO: 0 % — SIGNIFICANT CHANGE UP (ref 0–6)
FLUAV AG NPH QL: SIGNIFICANT CHANGE UP
FLUBV AG NPH QL: SIGNIFICANT CHANGE UP
GAS PNL BLDV: SIGNIFICANT CHANGE UP
GLUCOSE BLDC GLUCOMTR-MCNC: 324 MG/DL — HIGH (ref 70–99)
GLUCOSE SERPL-MCNC: 532 MG/DL — CRITICAL HIGH (ref 70–99)
HCO3 BLDV-SCNC: 34 MMOL/L — HIGH (ref 22–28)
HCT VFR BLD CALC: 34.3 % — LOW (ref 34.5–45)
HGB BLD-MCNC: 10.9 G/DL — LOW (ref 11.5–15.5)
IMM GRANULOCYTES NFR BLD AUTO: 0.2 % — SIGNIFICANT CHANGE UP (ref 0–0.9)
LACTATE SERPL-SCNC: 1.2 MMOL/L — SIGNIFICANT CHANGE UP (ref 0.7–2)
LYMPHOCYTES # BLD AUTO: 0.18 K/UL — LOW (ref 1–3.3)
LYMPHOCYTES # BLD AUTO: 3.5 % — LOW (ref 13–44)
MAGNESIUM SERPL-MCNC: 1.8 MG/DL — SIGNIFICANT CHANGE UP (ref 1.6–2.6)
MCHC RBC-ENTMCNC: 26.3 PG — LOW (ref 27–34)
MCHC RBC-ENTMCNC: 31.8 G/DL — LOW (ref 32–36)
MCV RBC AUTO: 82.9 FL — SIGNIFICANT CHANGE UP (ref 80–100)
MONOCYTES # BLD AUTO: 0.44 K/UL — SIGNIFICANT CHANGE UP (ref 0–0.9)
MONOCYTES NFR BLD AUTO: 8.6 % — SIGNIFICANT CHANGE UP (ref 2–14)
NEUTROPHILS # BLD AUTO: 4.43 K/UL — SIGNIFICANT CHANGE UP (ref 1.8–7.4)
NEUTROPHILS NFR BLD AUTO: 87.1 % — HIGH (ref 43–77)
NRBC # BLD: 0 /100 WBCS — SIGNIFICANT CHANGE UP (ref 0–0)
NRBC BLD-RTO: 0 /100 WBCS — SIGNIFICANT CHANGE UP (ref 0–0)
PCO2 BLDV: 59 MMHG — HIGH (ref 42–55)
PH BLDV: 7.37 — SIGNIFICANT CHANGE UP (ref 7.32–7.43)
PHOSPHATE SERPL-MCNC: 3 MG/DL — SIGNIFICANT CHANGE UP (ref 2.5–4.5)
PLATELET # BLD AUTO: 287 K/UL — SIGNIFICANT CHANGE UP (ref 150–400)
PO2 BLDV: 39 MMHG — SIGNIFICANT CHANGE UP (ref 25–45)
POTASSIUM SERPL-MCNC: 3.9 MMOL/L — SIGNIFICANT CHANGE UP (ref 3.5–5.3)
POTASSIUM SERPL-SCNC: 3.9 MMOL/L — SIGNIFICANT CHANGE UP (ref 3.5–5.3)
PROT SERPL-MCNC: 5.9 GM/DL — LOW (ref 6–8.3)
RBC # BLD: 4.14 M/UL — SIGNIFICANT CHANGE UP (ref 3.8–5.2)
RBC # FLD: 16.4 % — HIGH (ref 10.3–14.5)
RSV RNA NPH QL NAA+NON-PROBE: DETECTED
SAO2 % BLDV: 65.6 % — LOW (ref 94–98)
SARS-COV-2 RNA SPEC QL NAA+PROBE: SIGNIFICANT CHANGE UP
SODIUM SERPL-SCNC: 134 MMOL/L — LOW (ref 135–145)
WBC # BLD: 5.09 K/UL — SIGNIFICANT CHANGE UP (ref 3.8–10.5)
WBC # FLD AUTO: 5.09 K/UL — SIGNIFICANT CHANGE UP (ref 3.8–10.5)

## 2025-01-28 PROCEDURE — 93010 ELECTROCARDIOGRAM REPORT: CPT

## 2025-01-28 PROCEDURE — 99285 EMERGENCY DEPT VISIT HI MDM: CPT

## 2025-01-28 PROCEDURE — 99223 1ST HOSP IP/OBS HIGH 75: CPT

## 2025-01-28 PROCEDURE — 71045 X-RAY EXAM CHEST 1 VIEW: CPT | Mod: 26

## 2025-01-28 RX ORDER — GLUCAGON 3 MG/1
1 POWDER NASAL ONCE
Refills: 0 | Status: DISCONTINUED | OUTPATIENT
Start: 2025-01-28 | End: 2025-01-31

## 2025-01-28 RX ORDER — DILTIAZEM HYDROCHLORIDE 60 MG/1
120 TABLET ORAL DAILY
Refills: 0 | Status: DISCONTINUED | OUTPATIENT
Start: 2025-01-29 | End: 2025-01-31

## 2025-01-28 RX ORDER — INSULIN LISPRO 100/ML
VIAL (ML) SUBCUTANEOUS
Refills: 0 | Status: DISCONTINUED | OUTPATIENT
Start: 2025-01-28 | End: 2025-01-31

## 2025-01-28 RX ORDER — ACETAMINOPHEN, DIPHENHYDRAMINE HCL, PHENYLEPHRINE HCL 325; 25; 5 MG/1; MG/1; MG/1
3 TABLET ORAL AT BEDTIME
Refills: 0 | Status: DISCONTINUED | OUTPATIENT
Start: 2025-01-28 | End: 2025-01-31

## 2025-01-28 RX ORDER — DIPHENOXYLATE HYDROCHLORIDE AND ATROPINE SULFATE 2.5; .025 MG/1; MG/1
2 TABLET ORAL
Refills: 0 | DISCHARGE

## 2025-01-28 RX ORDER — BACTERIOSTATIC SODIUM CHLORIDE 0.9 %
1000 VIAL (ML) INJECTION ONCE
Refills: 0 | Status: COMPLETED | OUTPATIENT
Start: 2025-01-28 | End: 2025-01-28

## 2025-01-28 RX ORDER — FAMOTIDINE 10 MG/ML
1 INJECTION INTRAVENOUS
Refills: 0 | DISCHARGE

## 2025-01-28 RX ORDER — MAGNESIUM, ALUMINUM HYDROXIDE 200-225/5
30 SUSPENSION, ORAL (FINAL DOSE FORM) ORAL EVERY 4 HOURS
Refills: 0 | Status: DISCONTINUED | OUTPATIENT
Start: 2025-01-28 | End: 2025-01-29

## 2025-01-28 RX ORDER — PREGABALIN CAPSULES, CV 225 MG/1
200 CAPSULE ORAL AT BEDTIME
Refills: 0 | Status: DISCONTINUED | OUTPATIENT
Start: 2025-01-28 | End: 2025-01-31

## 2025-01-28 RX ORDER — FLUTICASONE PROPIONATE AND SALMETEROL 113; 14 UG/1; UG/1
1 POWDER, METERED RESPIRATORY (INHALATION)
Refills: 0 | Status: DISCONTINUED | OUTPATIENT
Start: 2025-01-28 | End: 2025-01-31

## 2025-01-28 RX ORDER — DM/PSEUDOEPHED/ACETAMINOPHEN 10-30-250
25 CAPSULE ORAL ONCE
Refills: 0 | Status: DISCONTINUED | OUTPATIENT
Start: 2025-01-28 | End: 2025-01-31

## 2025-01-28 RX ORDER — TIOTROPIUM BROMIDE MONOHYDRATE 18 UG/1
2 CAPSULE ORAL; RESPIRATORY (INHALATION) DAILY
Refills: 0 | Status: DISCONTINUED | OUTPATIENT
Start: 2025-01-28 | End: 2025-01-31

## 2025-01-28 RX ORDER — INSULIN LISPRO 100/ML
VIAL (ML) SUBCUTANEOUS AT BEDTIME
Refills: 0 | Status: DISCONTINUED | OUTPATIENT
Start: 2025-01-28 | End: 2025-01-31

## 2025-01-28 RX ORDER — DIPHENOXYLATE HYDROCHLORIDE AND ATROPINE SULFATE 2.5; .025 MG/1; MG/1
0 TABLET ORAL
Qty: 0 | Refills: 1 | DISCHARGE

## 2025-01-28 RX ORDER — ENOXAPARIN SODIUM 100 MG/ML
40 INJECTION SUBCUTANEOUS EVERY 12 HOURS
Refills: 0 | Status: DISCONTINUED | OUTPATIENT
Start: 2025-01-28 | End: 2025-01-31

## 2025-01-28 RX ORDER — INSULIN GLARGINE-YFGN 100 [IU]/ML
8 INJECTION, SOLUTION SUBCUTANEOUS ONCE
Refills: 0 | Status: COMPLETED | OUTPATIENT
Start: 2025-01-28 | End: 2025-01-28

## 2025-01-28 RX ORDER — FAMOTIDINE 10 MG/ML
20 INJECTION INTRAVENOUS DAILY
Refills: 0 | Status: DISCONTINUED | OUTPATIENT
Start: 2025-01-28 | End: 2025-01-31

## 2025-01-28 RX ORDER — ACETAMINOPHEN 160 MG/5ML
650 SUSPENSION ORAL EVERY 6 HOURS
Refills: 0 | Status: DISCONTINUED | OUTPATIENT
Start: 2025-01-28 | End: 2025-01-31

## 2025-01-28 RX ORDER — CLONAZEPAM 2 MG
0.5 TABLET ORAL AT BEDTIME
Refills: 0 | Status: DISCONTINUED | OUTPATIENT
Start: 2025-01-28 | End: 2025-01-31

## 2025-01-28 RX ORDER — ALBUTEROL 90 MCG
2 AEROSOL REFILL (GRAM) INHALATION EVERY 6 HOURS
Refills: 0 | Status: DISCONTINUED | OUTPATIENT
Start: 2025-01-28 | End: 2025-01-31

## 2025-01-28 RX ORDER — METOPROLOL SUCCINATE 25 MG
50 TABLET, EXTENDED RELEASE 24 HR ORAL DAILY
Refills: 0 | Status: DISCONTINUED | OUTPATIENT
Start: 2025-01-28 | End: 2025-01-31

## 2025-01-28 RX ORDER — INSULIN GLARGINE-YFGN 100 [IU]/ML
8 INJECTION, SOLUTION SUBCUTANEOUS AT BEDTIME
Refills: 0 | Status: DISCONTINUED | OUTPATIENT
Start: 2025-01-29 | End: 2025-01-29

## 2025-01-28 RX ORDER — FLUTICASONE FUROATE, UMECLIDINIUM BROMIDE AND VILANTEROL TRIFENATATE 200; 62.5; 25 UG/1; UG/1; UG/1
1 POWDER RESPIRATORY (INHALATION)
Refills: 0 | DISCHARGE

## 2025-01-28 RX ORDER — INSULIN LISPRO 100/ML
2 VIAL (ML) SUBCUTANEOUS
Refills: 0 | Status: DISCONTINUED | OUTPATIENT
Start: 2025-01-28 | End: 2025-01-29

## 2025-01-28 RX ORDER — DIPHENOXYLATE HYDROCHLORIDE AND ATROPINE SULFATE 2.5; .025 MG/1; MG/1
1 TABLET ORAL THREE TIMES A DAY
Refills: 0 | Status: DISCONTINUED | OUTPATIENT
Start: 2025-01-29 | End: 2025-01-31

## 2025-01-28 RX ORDER — POTASSIUM CHLORIDE 750 MG/1
10 TABLET, EXTENDED RELEASE ORAL
Refills: 0 | Status: DISCONTINUED | OUTPATIENT
Start: 2025-01-28 | End: 2025-01-28

## 2025-01-28 RX ORDER — POTASSIUM CHLORIDE 750 MG/1
40 TABLET, EXTENDED RELEASE ORAL ONCE
Refills: 0 | Status: COMPLETED | OUTPATIENT
Start: 2025-01-28 | End: 2025-01-28

## 2025-01-28 RX ORDER — ENALAPRIL MALEATE 20 MG
5 TABLET ORAL AT BEDTIME
Refills: 0 | Status: DISCONTINUED | OUTPATIENT
Start: 2025-01-28 | End: 2025-01-31

## 2025-01-28 RX ORDER — CLONAZEPAM 2 MG
1 TABLET ORAL
Refills: 0 | DISCHARGE

## 2025-01-28 RX ADMIN — Medication 4: at 23:43

## 2025-01-28 RX ADMIN — Medication 1000 MILLILITER(S): at 20:25

## 2025-01-28 RX ADMIN — Medication 600 MILLIGRAM(S): at 23:44

## 2025-01-28 RX ADMIN — Medication 1000 MILLILITER(S): at 18:50

## 2025-01-28 RX ADMIN — INSULIN GLARGINE-YFGN 8 UNIT(S): 100 INJECTION, SOLUTION SUBCUTANEOUS at 23:43

## 2025-01-28 RX ADMIN — Medication 50 MILLIGRAM(S): at 23:49

## 2025-01-28 RX ADMIN — Medication 1000 MILLILITER(S): at 19:50

## 2025-01-28 RX ADMIN — Medication 5 MILLIGRAM(S): at 23:42

## 2025-01-28 RX ADMIN — POTASSIUM CHLORIDE 100 MILLIEQUIVALENT(S): 750 TABLET, EXTENDED RELEASE ORAL at 20:25

## 2025-01-28 RX ADMIN — POTASSIUM CHLORIDE 100 MILLIEQUIVALENT(S): 750 TABLET, EXTENDED RELEASE ORAL at 21:46

## 2025-01-28 RX ADMIN — Medication 10 UNIT(S): at 20:51

## 2025-01-28 RX ADMIN — POTASSIUM CHLORIDE 40 MILLIEQUIVALENT(S): 750 TABLET, EXTENDED RELEASE ORAL at 23:42

## 2025-01-28 RX ADMIN — Medication 1000 MILLILITER(S): at 21:25

## 2025-01-28 NOTE — ED ADULT TRIAGE NOTE - CHIEF COMPLAINT QUOTE
BIBA for high sugar,  from EMS. Arrived on 4L NC for COPD, PMH afib, DM, MS BIBA for high sugar,  from EMS. EMS states patient's diabetes meds were recently changed. Arrived on 4L NC for COPD, PMH afib, DM, MS

## 2025-01-28 NOTE — ED PROVIDER NOTE - ATTENDING CONTRIBUTION TO CARE
This patient was evaluated with NP fellow Denis.  The patient's HPI, ROS, and physical exam above were noted and agreed to unless otherwise commented on below.  Nursing notes and vital signs were reviewed.     HPI: This patient is a 69-year-old female presenting to the emergency department today for hyperglycemia.  She has a past medical history of COPD.  She is on 4 L via nasal cannula at baseline.  She is a past medical history of atrial fibrillation, diabetes, and multiple sclerosis.  States that today she checked her sugar and noted that it was high.  For this reason she came to the emergency department for further evaluation.  She states that she was discharged from the hospital yesterday for pneumonia.  Her diabetes medications were changed and her sugar felt elevated today.  For this reason she came to the emergency department for further evaluation.  No nausea, vomiting, diarrhea, or abdominal pain.  No chest pain.  No shortness of breath.  No headaches, lightheadedness, dizziness.  No other complaints at this time.    Pertinent Physical Exam:   GENERAL: The patient appears well and is in no apparent distress.    EYES: Pupils equal and reactive.  Extraocular eye movements are intact.    ENT: Head is atraumatic.  Posterior oropharynx is unremarkable.      RESPIRATORY: Coarse breath sounds bilaterally     CARDIOVASCULAR: The patient has a regular rate and rhythm with no significant murmurs, rubs, or gallops.    ABDOMEN: Abdomen is soft, nondistended, and non-peritoneal.  The patient has no focal areas of tenderness.    SKIN: Skin is intact without evidence of significant lacerations or sores.    MUSCULOSKELETAL: Patient has good range of motion of all extremities.  The patient has good distal cap refill.  The patient has palpable distal pulses.  No obvious edema is noted.    NEUROLOGIC: Cranial nerves II through XII are grossly within normal limits.  Sensory and motor examination is unremarkable.    PSYCHIATRIC: Patient is awake, alert, and oriented ×4.    MDM:

## 2025-01-28 NOTE — ED ADULT NURSE NOTE - CHIEF COMPLAINT QUOTE
BIBA for high sugar,  from EMS. EMS states patient's diabetes meds were recently changed. Arrived on 4L NC for COPD, PMH afib, DM, MS

## 2025-01-28 NOTE — H&P ADULT - PROBLEM SELECTOR PROBLEM 4
[FreeTextEntry1] : This note was written by Shanda Cr on 03/30/2023 acting solely as a scribe for Dr. Alfredo Leal.\par \par All medical record entries made by the Scribe were at my, Dr. Alfredo Leal, direction and personally dictated by me on 03/30/2023. I have personally reviewed the chart and agree that the record accurately reflects my personal performance of the history, physical exam, assessment and plan.  Multiple sclerosis

## 2025-01-28 NOTE — H&P ADULT - NSHPPHYSICALEXAM_GEN_ALL_CORE
GENERAL: NAD, cachectic  HEAD: Atraumatic, Normocephalic  EYES: EOMI. Conjunctiva and sclera clear  NECK: Supple  CHEST/LUNG: Upper airway congestion but  lung fields are clear to auscultation bilaterally; No wheeze, rhonchi, rales  HEART: Regular rate and rhythm; No murmurs  ABDOMEN: Soft, Nontender, Nondistended; Bowel sounds present  EXTREMITIES: No edema  NEURO: AAOx3

## 2025-01-28 NOTE — H&P ADULT - ASSESSMENT
Patient is a 69F, with PMHx of lung cancer, COPD, MS, HTN, IDDM, jugular vein thrombosis, who comes in with elevated glucose. Admitted for uncontrolled DM with hyperglycemia.

## 2025-01-28 NOTE — H&P ADULT - PROBLEM SELECTOR PLAN 6
- Iomotil at home possibly. Please confirm with  in AM. Will continue for now as pt complains of diarrhea

## 2025-01-28 NOTE — ED PROVIDER NOTE - CLINICAL SUMMARY MEDICAL DECISION MAKING FREE TEXT BOX
Salvador Barrios, PATRICK NP Fellow: Patient is 68yo F PMHx DM on insulin, , afib, MS presents from home via EMS with c/o hyperglycemia, BGL in 500s since yesterday. Patient with discharge paper from [facility] discharged yesterday after admission for PNA and RSV, paperwork with decrease in lantus to 2 units nightly. Patient c/o cough. Patient denies fevers, chills, CP, SOB, abd pain, N/V/D, dysuria.   Patient is non-toxic appearing. Physical exam as above, pertinent for wet cough and diffusely rhodochrous lung sounds.   ddx concerning for but not limited to DKA in the setting of pneumonia vs viral syndrome. Less concern for acute abdominal pathology vs uti in the presence of normal abd exam and asymptomatic. Less concern for ACS in the absence of chest pain.  will order ekg, labs, flu/covid, cxr, NS bolus, and reassess.  patient will likely require admission to hospital for further management of elevated blood sugars and endocrine consult.

## 2025-01-28 NOTE — H&P ADULT - HISTORY OF PRESENT ILLNESS
Patient is a 69F, with PMHx of lung cancer, COPD, MS, HTN, IDDM, jugular vein thrombosis, who comes in with elevated glucose. She said her  sent her in for elevated glucose. She was admitted to [facility] from 1/13~1/27 for pneumonia and RSV infection. She denies getting any steroids during her stay. Her glargine was decreased from 8U to 2U. She was discharged yesterday, and her sugar was high so her  sent her to the ED. She endorses shortness of breath. She also endorses diarrhea for the last few days. Patient denies headache, fever, chills, nausea, vomit, chest pain, lightheadedness, abdominal pain, dark/bloody stool, dysuria, hematuria.

## 2025-01-28 NOTE — ED ADULT NURSE NOTE - ED STAT RN HANDOFF DETAILS
report given 2E AJIT Emery. Report endorsed to oncoming RN. Safety checks compld this shift/Safety rounds completed hourly.  IV sites checked Q2+remains WDL. Meds given as ord with no s/s of adverse RXNs. Fall +skin precs in place. Any issues endorsed to oncoming RN for follow up.

## 2025-01-28 NOTE — H&P ADULT - PROBLEM SELECTOR PLAN 7
- C/w home metoprolol, diltiazem and enalapril - C/w home metoprolol, diltiazem and enalapril  - Will give additional dose of Enalapril 5mg tonight for uncontrolled HTN

## 2025-01-28 NOTE — H&P ADULT - PROBLEM SELECTOR PLAN 4
Patient is medically cleared for inpatient psychiatric admission      Dee Hernandez, DO  02/22/24 0941     - Home med Dimethyl fumarate 240mg BID. Attempted to call  to bring the med to the hospital. Phone was not picked up  - Primary team to follow in AM

## 2025-01-28 NOTE — ED PROVIDER NOTE - PHYSICAL EXAMINATION
CONSTITUTIONAL: A&O x3, NAD, resting comfortably  HEAD: Normocephalic, atraumatic.   NECK:  Airway patent. Neck Supple.  CARDIAC: RRR, normal S1/2, no murmurs, rubs, gallops. CW non-TTP, no CW deformity.  RESPIRATORY: diffuse rhonchi, No accessory muscle use.    ABDOMEN: soft, non-distended; non-TTP, No RUQ/RLQ/LUQ/LLQ pain, no rebound tenderness or guarding,  BS + x4 quadrants, no pulsating masses, scars. No CVA tenderness.  MSK: Moving all extremities.  SKIN: Warm, dry, color WNL, no turgor, erythema or rashes. Cap refill < 2 sec.  NEURO: A&Ox3, interactive, cooperative, no focal deficits.

## 2025-01-28 NOTE — ED ADULT NURSE NOTE - OBJECTIVE STATEMENT
69 yr old female AOx4. C/o hyperglycemia. Reports FS over 500 at home. Reports productive cough x1 month. Denies difficulty breathing or SOB, No signs of resp distress on baseline 4L NC. PMH of DM, COPD on 4L NC at baseline, HTN, and MS. Pt denies any pain, CP, SOB, N/V/D, fever/chills ,h/a, dizziness. No neuro deficits

## 2025-01-28 NOTE — H&P ADULT - PROBLEM SELECTOR PLAN 2
- RSV positive. Unsure of original positive date at the other hospital  - CXR with no obvious infiltrates  - Mucinex  - Supportive care

## 2025-01-28 NOTE — ED ADULT NURSE NOTE - NSFALLRISKINTERV_ED_ALL_ED

## 2025-01-28 NOTE — H&P ADULT - PROBLEM SELECTOR PLAN 1
- P/w glucose in 500s  - Her glargine was decreased from 8U to 2U when she was discharged yestserday from [facility]. Also, cannot be sure whether she received steroids for COPD in the setting of RSV infection  - S/p 2L NS bolus in ED  - S/p IV regular insulin 10U in the ED  - Will c/w Lantus 8U + Admelog 2U premeals + ISS moderate sliding scale  - Adjust insulin as needed

## 2025-01-29 LAB
ALBUMIN SERPL ELPH-MCNC: 1.2 G/DL — LOW (ref 3.3–5)
ALP SERPL-CCNC: 112 U/L — SIGNIFICANT CHANGE UP (ref 40–120)
ALT FLD-CCNC: 10 U/L — LOW (ref 12–78)
ANION GAP SERPL CALC-SCNC: 2 MMOL/L — LOW (ref 5–17)
APPEARANCE UR: CLEAR — SIGNIFICANT CHANGE UP
AST SERPL-CCNC: 7 U/L — LOW (ref 15–37)
BILIRUB SERPL-MCNC: 0.4 MG/DL — SIGNIFICANT CHANGE UP (ref 0.2–1.2)
BILIRUB UR-MCNC: NEGATIVE — SIGNIFICANT CHANGE UP
BUN SERPL-MCNC: 11 MG/DL — SIGNIFICANT CHANGE UP (ref 7–23)
CALCIUM SERPL-MCNC: 8.3 MG/DL — LOW (ref 8.5–10.1)
CHLORIDE SERPL-SCNC: 101 MMOL/L — SIGNIFICANT CHANGE UP (ref 96–108)
CO2 SERPL-SCNC: 33 MMOL/L — HIGH (ref 22–31)
COLOR SPEC: YELLOW — SIGNIFICANT CHANGE UP
CREAT SERPL-MCNC: 0.6 MG/DL — SIGNIFICANT CHANGE UP (ref 0.5–1.3)
DIFF PNL FLD: NEGATIVE — SIGNIFICANT CHANGE UP
EGFR: 97 ML/MIN/1.73M2 — SIGNIFICANT CHANGE UP
GLUCOSE BLDC GLUCOMTR-MCNC: 141 MG/DL — HIGH (ref 70–99)
GLUCOSE BLDC GLUCOMTR-MCNC: 182 MG/DL — HIGH (ref 70–99)
GLUCOSE BLDC GLUCOMTR-MCNC: 299 MG/DL — HIGH (ref 70–99)
GLUCOSE BLDC GLUCOMTR-MCNC: 52 MG/DL — CRITICAL LOW (ref 70–99)
GLUCOSE BLDC GLUCOMTR-MCNC: 53 MG/DL — CRITICAL LOW (ref 70–99)
GLUCOSE BLDC GLUCOMTR-MCNC: 73 MG/DL — SIGNIFICANT CHANGE UP (ref 70–99)
GLUCOSE SERPL-MCNC: 69 MG/DL — LOW (ref 70–99)
GLUCOSE UR QL: 500 MG/DL
HCT VFR BLD CALC: 32.9 % — LOW (ref 34.5–45)
HGB BLD-MCNC: 10.4 G/DL — LOW (ref 11.5–15.5)
KETONES UR-MCNC: NEGATIVE MG/DL — SIGNIFICANT CHANGE UP
LEUKOCYTE ESTERASE UR-ACNC: NEGATIVE — SIGNIFICANT CHANGE UP
MCHC RBC-ENTMCNC: 26.3 PG — LOW (ref 27–34)
MCHC RBC-ENTMCNC: 31.6 G/DL — LOW (ref 32–36)
MCV RBC AUTO: 83.1 FL — SIGNIFICANT CHANGE UP (ref 80–100)
NITRITE UR-MCNC: NEGATIVE — SIGNIFICANT CHANGE UP
NRBC # BLD: 0 /100 WBCS — SIGNIFICANT CHANGE UP (ref 0–0)
NRBC BLD-RTO: 0 /100 WBCS — SIGNIFICANT CHANGE UP (ref 0–0)
NT-PROBNP SERPL-SCNC: HIGH PG/ML (ref 0–125)
PH UR: 7.5 — SIGNIFICANT CHANGE UP (ref 5–8)
PLATELET # BLD AUTO: 285 K/UL — SIGNIFICANT CHANGE UP (ref 150–400)
POTASSIUM SERPL-MCNC: 3.8 MMOL/L — SIGNIFICANT CHANGE UP (ref 3.5–5.3)
POTASSIUM SERPL-SCNC: 3.8 MMOL/L — SIGNIFICANT CHANGE UP (ref 3.5–5.3)
PROT SERPL-MCNC: 5.5 GM/DL — LOW (ref 6–8.3)
PROT UR-MCNC: 300 MG/DL
RBC # BLD: 3.96 M/UL — SIGNIFICANT CHANGE UP (ref 3.8–5.2)
RBC # FLD: 16.4 % — HIGH (ref 10.3–14.5)
SODIUM SERPL-SCNC: 136 MMOL/L — SIGNIFICANT CHANGE UP (ref 135–145)
SP GR SPEC: 1.01 — SIGNIFICANT CHANGE UP (ref 1–1.03)
UROBILINOGEN FLD QL: 0.2 MG/DL — SIGNIFICANT CHANGE UP (ref 0.2–1)
WBC # BLD: 5.2 K/UL — SIGNIFICANT CHANGE UP (ref 3.8–10.5)
WBC # FLD AUTO: 5.2 K/UL — SIGNIFICANT CHANGE UP (ref 3.8–10.5)

## 2025-01-29 PROCEDURE — 99233 SBSQ HOSP IP/OBS HIGH 50: CPT

## 2025-01-29 RX ORDER — ONDANSETRON 4 MG/1
4 TABLET, ORALLY DISINTEGRATING ORAL EVERY 6 HOURS
Refills: 0 | Status: DISCONTINUED | OUTPATIENT
Start: 2025-01-29 | End: 2025-01-31

## 2025-01-29 RX ORDER — INSULIN GLARGINE-YFGN 100 [IU]/ML
6 INJECTION, SOLUTION SUBCUTANEOUS AT BEDTIME
Refills: 0 | Status: DISCONTINUED | OUTPATIENT
Start: 2025-01-29 | End: 2025-01-31

## 2025-01-29 RX ORDER — ENALAPRIL MALEATE 20 MG
5 TABLET ORAL ONCE
Refills: 0 | Status: COMPLETED | OUTPATIENT
Start: 2025-01-29 | End: 2025-01-29

## 2025-01-29 RX ORDER — DM/PSEUDOEPHED/ACETAMINOPHEN 10-30-250
25 CAPSULE ORAL ONCE
Refills: 0 | Status: COMPLETED | OUTPATIENT
Start: 2025-01-29 | End: 2025-01-29

## 2025-01-29 RX ORDER — LABETALOL HYDROCHLORIDE 300 MG/1
5 TABLET, FILM COATED ORAL ONCE
Refills: 0 | Status: COMPLETED | OUTPATIENT
Start: 2025-01-29 | End: 2025-01-29

## 2025-01-29 RX ADMIN — Medication 600 MILLIGRAM(S): at 17:06

## 2025-01-29 RX ADMIN — FAMOTIDINE 20 MILLIGRAM(S): 10 INJECTION INTRAVENOUS at 12:03

## 2025-01-29 RX ADMIN — DILTIAZEM HYDROCHLORIDE 120 MILLIGRAM(S): 60 TABLET ORAL at 05:33

## 2025-01-29 RX ADMIN — Medication 2 PUFF(S): at 17:05

## 2025-01-29 RX ADMIN — Medication 5 MILLIGRAM(S): at 03:08

## 2025-01-29 RX ADMIN — Medication 50 MILLIGRAM(S): at 05:33

## 2025-01-29 RX ADMIN — DIPHENOXYLATE HYDROCHLORIDE AND ATROPINE SULFATE 1 TABLET(S): 2.5; .025 TABLET ORAL at 09:47

## 2025-01-29 RX ADMIN — Medication 2 UNIT(S): at 12:03

## 2025-01-29 RX ADMIN — FLUTICASONE PROPIONATE AND SALMETEROL 1 DOSE(S): 113; 14 POWDER, METERED RESPIRATORY (INHALATION) at 05:37

## 2025-01-29 RX ADMIN — PREGABALIN CAPSULES, CV 200 MILLIGRAM(S): 225 CAPSULE ORAL at 22:15

## 2025-01-29 RX ADMIN — Medication 25 GRAM(S): at 17:05

## 2025-01-29 RX ADMIN — DIPHENOXYLATE HYDROCHLORIDE AND ATROPINE SULFATE 1 TABLET(S): 2.5; .025 TABLET ORAL at 13:42

## 2025-01-29 RX ADMIN — Medication 600 MILLIGRAM(S): at 05:33

## 2025-01-29 RX ADMIN — ONDANSETRON 4 MILLIGRAM(S): 4 TABLET, ORALLY DISINTEGRATING ORAL at 12:42

## 2025-01-29 RX ADMIN — Medication 5 MILLIGRAM(S): at 22:15

## 2025-01-29 RX ADMIN — ENOXAPARIN SODIUM 40 MILLIGRAM(S): 100 INJECTION SUBCUTANEOUS at 05:34

## 2025-01-29 RX ADMIN — INSULIN GLARGINE-YFGN 6 UNIT(S): 100 INJECTION, SOLUTION SUBCUTANEOUS at 22:14

## 2025-01-29 RX ADMIN — Medication 2: at 22:14

## 2025-01-29 RX ADMIN — Medication 2 PUFF(S): at 09:48

## 2025-01-29 RX ADMIN — LABETALOL HYDROCHLORIDE 5 MILLIGRAM(S): 300 TABLET, FILM COATED ORAL at 03:07

## 2025-01-29 RX ADMIN — ENOXAPARIN SODIUM 40 MILLIGRAM(S): 100 INJECTION SUBCUTANEOUS at 17:06

## 2025-01-29 RX ADMIN — Medication 2 PUFF(S): at 05:37

## 2025-01-29 RX ADMIN — FLUTICASONE PROPIONATE AND SALMETEROL 1 DOSE(S): 113; 14 POWDER, METERED RESPIRATORY (INHALATION) at 17:06

## 2025-01-29 RX ADMIN — Medication 0.5 MILLIGRAM(S): at 22:15

## 2025-01-29 RX ADMIN — TIOTROPIUM BROMIDE MONOHYDRATE 2 PUFF(S): 18 CAPSULE ORAL; RESPIRATORY (INHALATION) at 12:02

## 2025-01-29 RX ADMIN — DIPHENOXYLATE HYDROCHLORIDE AND ATROPINE SULFATE 1 TABLET(S): 2.5; .025 TABLET ORAL at 22:15

## 2025-01-29 RX ADMIN — Medication 2 PUFF(S): at 12:02

## 2025-01-29 NOTE — CONSULT NOTE ADULT - SUBJECTIVE AND OBJECTIVE BOX
Patient is a 69y old  Female who presents with a chief complaint of Uncontrolled DM with hyperglycemia (29 Jan 2025 08:43)      Reason For Consult: hyperglycemia     HPI:  Patient is a 69F, with PMHx of lung cancer, COPD, MS, HTN, IDDM, jugular vein thrombosis, who comes in with elevated glucose. She said her  sent her in for elevated glucose. She was admitted to [facility] from 1/13~1/27 for pneumonia and RSV infection. She denies getting any steroids during her stay. Her glargine was decreased from 8U to 2U. She was discharged yesterday, and her sugar was high so her  sent her to the ED. She endorses shortness of breath. She also endorses diarrhea for the last few days. Patient denies headache, fever, chills, nausea, vomit, chest pain, lightheadedness, abdominal pain, dark/bloody stool, dysuria, hematuria. (28 Jan 2025 22:40)  Patient has been a longstanding patient of my office.  But she has not been compliant in her visits in the last 1 year.  Very brittle type I diabetic but uses only low-dose Lantus and Humalog.  She has a history of lung cancer for the last 4 years  Recently had pneumonia treated with steroids and has been glucose toxic.  Upon presentation had hyperglycemia but being put on low-dose 4 times daily insulin had episodes of hypoglycemia as the nutrition is very poor.  Currently on 6 units of Lantus and lispro coverage and fingersticks are in the 180s more stable    PAST MEDICAL & SURGICAL HISTORY:  Diabetes      Hypertension      Multiple sclerosis      Alcohol abuse      Pancreatic insufficiency      H/O cervical spine surgery          FAMILY HISTORY:        Social History:    MEDICATIONS  (STANDING):  albuterol    90 MICROgram(s) HFA Inhaler 2 Puff(s) Inhalation every 6 hours  clonazePAM  Tablet 0.5 milliGRAM(s) Oral at bedtime  dextrose 50% Injectable 25 Gram(s) IV Push once  diltiazem    milliGRAM(s) Oral daily  Dimethyl Fumarate DR capsule 240 milliGRAM(s) 1 Capsule(s) Oral two times a day  diphenoxylate/atropine 1 Tablet(s) Oral three times a day  enalapril 5 milliGRAM(s) Oral at bedtime  enoxaparin Injectable 40 milliGRAM(s) SubCutaneous every 12 hours  famotidine    Tablet 20 milliGRAM(s) Oral daily  fluticasone propionate/ salmeterol 100-50 MICROgram(s) Diskus 1 Dose(s) Inhalation two times a day  glucagon  Injectable 1 milliGRAM(s) IntraMuscular once  guaiFENesin  milliGRAM(s) Oral every 12 hours  influenza  Vaccine (HIGH DOSE) 0.5 milliLiter(s) IntraMuscular once  insulin glargine Injectable (LANTUS) 6 Unit(s) SubCutaneous at bedtime  insulin lispro (ADMELOG) corrective regimen sliding scale   SubCutaneous three times a day before meals  insulin lispro (ADMELOG) corrective regimen sliding scale   SubCutaneous at bedtime  metoprolol succinate ER 50 milliGRAM(s) Oral daily  pregabalin 200 milliGRAM(s) Oral at bedtime  tiotropium 2.5 MICROgram(s) Inhaler 2 Puff(s) Inhalation daily    MEDICATIONS  (PRN):  acetaminophen     Tablet .. 650 milliGRAM(s) Oral every 6 hours PRN Temp greater or equal to 38C (100.4F), Mild Pain (1 - 3)  melatonin 3 milliGRAM(s) Oral at bedtime PRN Insomnia  ondansetron Injectable 4 milliGRAM(s) IV Push every 6 hours PRN Nausea and/or Vomiting      REVIEW OF SYSTEMS:  CONSTITUTIONAL:  as per HPI        T(C): 36.9 (01-29-25 @ 17:51), Max: 36.9 (01-29-25 @ 00:08)  HR: 82 (01-29-25 @ 17:51) (77 - 95)  BP: 155/83 (01-29-25 @ 17:51) (127/68 - 184/98)  RR: 14 (01-29-25 @ 17:51) (14 - 19)  SpO2: 97% (01-29-25 @ 17:51) (94% - 99%)  Wt(kg): --    PHYSICAL EXAM: deferred         CAPILLARY BLOOD GLUCOSE      POCT Blood Glucose.: 299 mg/dL (29 Jan 2025 21:38)  POCT Blood Glucose.: 182 mg/dL (29 Jan 2025 17:49)  POCT Blood Glucose.: 53 mg/dL (29 Jan 2025 16:53)  POCT Blood Glucose.: 52 mg/dL (29 Jan 2025 16:49)  POCT Blood Glucose.: 141 mg/dL (29 Jan 2025 11:30)  POCT Blood Glucose.: 73 mg/dL (29 Jan 2025 07:13)  POCT Blood Glucose.: 324 mg/dL (28 Jan 2025 23:40)                            10.4   5.20  )-----------( 285      ( 29 Jan 2025 07:38 )             32.9       CMP:  01-29 @ 07:38  SGPT 10  Albumin 1.2   Alk Phos 112   Anion Gap 2   SGOT 7   Total Bili 0.4   BUN 11   Calcium Total 8.3   CO2 33   Chloride 101   Creatinine 0.60   eGFR if AA --   eGFR if non AA --   Glucose 69   Potassium 3.8   Protein 5.5   Sodium 136      Thyroid Function Tests:      Diabetes Tests:     Parathyroids:     Adrenals:       Radiology:

## 2025-01-29 NOTE — ED ADULT NURSE REASSESSMENT NOTE - NS ED NURSE REASSESS COMMENT FT1
spoke to Dr. Wallace regarding BP, pt had already received night time bp meds, pt is okay to go up to 2E at this time.

## 2025-01-29 NOTE — PROVIDER CONTACT NOTE (HYPOGLYCEMIA EVENT) - NS PROVIDER CONTACT BACKGROUND-HYPO
Age: 69y    Gender: Female    POCT Blood Glucose:  182 mg/dL (01-29-25 @ 17:49)  53 mg/dL (01-29-25 @ 16:53)  52 mg/dL (01-29-25 @ 16:49)  141 mg/dL (01-29-25 @ 11:30)  73 mg/dL (01-29-25 @ 07:13)  324 mg/dL (01-28-25 @ 23:40)  462 mg/dL (01-28-25 @ 20:47)  495 mg/dL (01-28-25 @ 20:46)      eMAR:  dextrose 50% Injectable   25 Gram(s) IV Push (01-29-25 @ 17:05)    insulin glargine Injectable (LANTUS)   8 Unit(s) SubCutaneous (01-28-25 @ 23:43)    insulin lispro (ADMELOG) corrective regimen sliding scale   4 Unit(s) SubCutaneous (01-28-25 @ 23:43)    insulin lispro Injectable (ADMELOG)   2 Unit(s) SubCutaneous (01-29-25 @ 12:03)    insulin regular  human recombinant.   10 Unit(s) IV Push (01-28-25 @ 20:51)

## 2025-01-29 NOTE — PATIENT PROFILE ADULT - FALL HARM RISK - HARM RISK INTERVENTIONS
Assistance with ambulation/Assistance OOB with selected safe patient handling equipment/Communicate Risk of Fall with Harm to all staff/Discuss with provider need for PT consult/Monitor gait and stability/Reinforce activity limits and safety measures with patient and family/Tailored Fall Risk Interventions/Visual Cue: Yellow wristband and red socks/Bed in lowest position, wheels locked, appropriate side rails in place/Call bell, personal items and telephone in reach/Instruct patient to call for assistance before getting out of bed or chair/Non-slip footwear when patient is out of bed/[place]omer to call system/Physically safe environment - no spills, clutter or unnecessary equipment/Purposeful Proactive Rounding/Room/bathroom lighting operational, light cord in reach

## 2025-01-29 NOTE — CONSULT NOTE ADULT - PROBLEM SELECTOR RECOMMENDATION 9
Continue with the current  regimen while inpatient   Patient has gone through hyper and hypoglycemic episodes during this admission.  Currently she needs very low-dose insulin.  With holding all insulin monitor precipitate DKA.  She is on 6 units of Lantus and lispro coverage with meals.  As the nutrition increases very low-dose of prandial lispro may be introduced but for now continue with the current dose.  Goal is to have fingersticks 100-140 in the morning and 1 in  the rest of the day  Thank you for the courtesy of this consultation

## 2025-01-30 LAB
A1C WITH ESTIMATED AVERAGE GLUCOSE RESULT: 8.7 % — HIGH (ref 4–5.6)
ANION GAP SERPL CALC-SCNC: 3 MMOL/L — LOW (ref 5–17)
BUN SERPL-MCNC: 10 MG/DL — SIGNIFICANT CHANGE UP (ref 7–23)
CALCIUM SERPL-MCNC: 8.1 MG/DL — LOW (ref 8.5–10.1)
CHLORIDE SERPL-SCNC: 98 MMOL/L — SIGNIFICANT CHANGE UP (ref 96–108)
CO2 SERPL-SCNC: 31 MMOL/L — SIGNIFICANT CHANGE UP (ref 22–31)
CREAT SERPL-MCNC: 0.64 MG/DL — SIGNIFICANT CHANGE UP (ref 0.5–1.3)
EGFR: 96 ML/MIN/1.73M2 — SIGNIFICANT CHANGE UP
ESTIMATED AVERAGE GLUCOSE: 203 MG/DL — HIGH (ref 68–114)
GLUCOSE BLDC GLUCOMTR-MCNC: 105 MG/DL — HIGH (ref 70–99)
GLUCOSE BLDC GLUCOMTR-MCNC: 234 MG/DL — HIGH (ref 70–99)
GLUCOSE BLDC GLUCOMTR-MCNC: 77 MG/DL — SIGNIFICANT CHANGE UP (ref 70–99)
GLUCOSE BLDC GLUCOMTR-MCNC: 84 MG/DL — SIGNIFICANT CHANGE UP (ref 70–99)
GLUCOSE SERPL-MCNC: 127 MG/DL — HIGH (ref 70–99)
HCT VFR BLD CALC: 30.8 % — LOW (ref 34.5–45)
HGB BLD-MCNC: 9.7 G/DL — LOW (ref 11.5–15.5)
MCHC RBC-ENTMCNC: 26.2 PG — LOW (ref 27–34)
MCHC RBC-ENTMCNC: 31.5 G/DL — LOW (ref 32–36)
MCV RBC AUTO: 83.2 FL — SIGNIFICANT CHANGE UP (ref 80–100)
NRBC # BLD: 0 /100 WBCS — SIGNIFICANT CHANGE UP (ref 0–0)
NRBC BLD-RTO: 0 /100 WBCS — SIGNIFICANT CHANGE UP (ref 0–0)
PLATELET # BLD AUTO: 277 K/UL — SIGNIFICANT CHANGE UP (ref 150–400)
POTASSIUM SERPL-MCNC: 3.9 MMOL/L — SIGNIFICANT CHANGE UP (ref 3.5–5.3)
POTASSIUM SERPL-SCNC: 3.9 MMOL/L — SIGNIFICANT CHANGE UP (ref 3.5–5.3)
RBC # BLD: 3.7 M/UL — LOW (ref 3.8–5.2)
RBC # FLD: 16.3 % — HIGH (ref 10.3–14.5)
SODIUM SERPL-SCNC: 132 MMOL/L — LOW (ref 135–145)
WBC # BLD: 7.38 K/UL — SIGNIFICANT CHANGE UP (ref 3.8–10.5)
WBC # FLD AUTO: 7.38 K/UL — SIGNIFICANT CHANGE UP (ref 3.8–10.5)

## 2025-01-30 PROCEDURE — 99232 SBSQ HOSP IP/OBS MODERATE 35: CPT

## 2025-01-30 RX ADMIN — DIPHENOXYLATE HYDROCHLORIDE AND ATROPINE SULFATE 1 TABLET(S): 2.5; .025 TABLET ORAL at 23:28

## 2025-01-30 RX ADMIN — Medication 4: at 17:23

## 2025-01-30 RX ADMIN — FLUTICASONE PROPIONATE AND SALMETEROL 1 DOSE(S): 113; 14 POWDER, METERED RESPIRATORY (INHALATION) at 05:56

## 2025-01-30 RX ADMIN — FAMOTIDINE 20 MILLIGRAM(S): 10 INJECTION INTRAVENOUS at 12:16

## 2025-01-30 RX ADMIN — ENOXAPARIN SODIUM 40 MILLIGRAM(S): 100 INJECTION SUBCUTANEOUS at 17:23

## 2025-01-30 RX ADMIN — DIPHENOXYLATE HYDROCHLORIDE AND ATROPINE SULFATE 1 TABLET(S): 2.5; .025 TABLET ORAL at 13:21

## 2025-01-30 RX ADMIN — Medication 2 PUFF(S): at 17:25

## 2025-01-30 RX ADMIN — Medication 2 PUFF(S): at 05:56

## 2025-01-30 RX ADMIN — FLUTICASONE PROPIONATE AND SALMETEROL 1 DOSE(S): 113; 14 POWDER, METERED RESPIRATORY (INHALATION) at 17:25

## 2025-01-30 RX ADMIN — Medication 50 MILLIGRAM(S): at 05:57

## 2025-01-30 RX ADMIN — Medication 2 PUFF(S): at 12:17

## 2025-01-30 RX ADMIN — Medication 5 MILLIGRAM(S): at 23:27

## 2025-01-30 RX ADMIN — Medication 2 PUFF(S): at 23:28

## 2025-01-30 RX ADMIN — Medication 600 MILLIGRAM(S): at 05:57

## 2025-01-30 RX ADMIN — DIPHENOXYLATE HYDROCHLORIDE AND ATROPINE SULFATE 1 TABLET(S): 2.5; .025 TABLET ORAL at 06:02

## 2025-01-30 RX ADMIN — TIOTROPIUM BROMIDE MONOHYDRATE 2 PUFF(S): 18 CAPSULE ORAL; RESPIRATORY (INHALATION) at 12:16

## 2025-01-30 RX ADMIN — Medication 0.5 MILLIGRAM(S): at 23:27

## 2025-01-30 RX ADMIN — DILTIAZEM HYDROCHLORIDE 120 MILLIGRAM(S): 60 TABLET ORAL at 05:56

## 2025-01-30 RX ADMIN — Medication 600 MILLIGRAM(S): at 17:23

## 2025-01-30 RX ADMIN — PREGABALIN CAPSULES, CV 200 MILLIGRAM(S): 225 CAPSULE ORAL at 23:27

## 2025-01-30 RX ADMIN — ENOXAPARIN SODIUM 40 MILLIGRAM(S): 100 INJECTION SUBCUTANEOUS at 05:57

## 2025-01-30 NOTE — PHYSICAL THERAPY INITIAL EVALUATION ADULT - ADDITIONAL COMMENTS
pt lives with spouse in a private home, has 4 steps with left HR to enter and 13 steps with left HR to bedroom, pt PLOF is indep in transfers and ambulation with RW, pt able to amb with RW x 100ft, able to negotiate 1 flight of stairs with maximo HR and step to gait, no balance deficit noted at this time. pt reports no change in functional status. no skilled P.T need at this time.

## 2025-01-30 NOTE — PROGRESS NOTE ADULT - PROBLEM SELECTOR PLAN 8
No - FD Lovenox - FD Lovenox     Carmelo [phone] (cell) --> best contact. Number listed on EMR is house number. The [phone] number is patient's cell

## 2025-01-31 ENCOUNTER — TRANSCRIPTION ENCOUNTER (OUTPATIENT)
Age: 70
End: 2025-01-31

## 2025-01-31 VITALS — WEIGHT: 132.28 LBS

## 2025-01-31 LAB
GLUCOSE BLDC GLUCOMTR-MCNC: 242 MG/DL — HIGH (ref 70–99)
GLUCOSE BLDC GLUCOMTR-MCNC: 369 MG/DL — HIGH (ref 70–99)

## 2025-01-31 PROCEDURE — 99239 HOSP IP/OBS DSCHRG MGMT >30: CPT

## 2025-01-31 RX ORDER — ENOXAPARIN SODIUM 100 MG/ML
40 INJECTION SUBCUTANEOUS
Qty: 0 | Refills: 0 | DISCHARGE
Start: 2025-01-31

## 2025-01-31 RX ORDER — INSULIN GLARGINE-YFGN 100 [IU]/ML
6 INJECTION, SOLUTION SUBCUTANEOUS
Qty: 0 | Refills: 0 | DISCHARGE
Start: 2025-01-31

## 2025-01-31 RX ORDER — INSULIN LISPRO 100/ML
0 VIAL (ML) SUBCUTANEOUS
Qty: 0 | Refills: 0 | DISCHARGE
Start: 2025-01-31

## 2025-01-31 RX ADMIN — DILTIAZEM HYDROCHLORIDE 120 MILLIGRAM(S): 60 TABLET ORAL at 05:57

## 2025-01-31 RX ADMIN — Medication 50 MILLIGRAM(S): at 05:58

## 2025-01-31 RX ADMIN — Medication 2 PUFF(S): at 06:00

## 2025-01-31 RX ADMIN — ENOXAPARIN SODIUM 40 MILLIGRAM(S): 100 INJECTION SUBCUTANEOUS at 05:56

## 2025-01-31 RX ADMIN — FAMOTIDINE 20 MILLIGRAM(S): 10 INJECTION INTRAVENOUS at 12:09

## 2025-01-31 RX ADMIN — FLUTICASONE PROPIONATE AND SALMETEROL 1 DOSE(S): 113; 14 POWDER, METERED RESPIRATORY (INHALATION) at 06:01

## 2025-01-31 RX ADMIN — Medication 4: at 08:18

## 2025-01-31 RX ADMIN — DIPHENOXYLATE HYDROCHLORIDE AND ATROPINE SULFATE 1 TABLET(S): 2.5; .025 TABLET ORAL at 05:57

## 2025-01-31 RX ADMIN — Medication 600 MILLIGRAM(S): at 05:56

## 2025-01-31 RX ADMIN — Medication 10: at 11:42

## 2025-01-31 NOTE — DISCHARGE NOTE NURSING/CASE MANAGEMENT/SOCIAL WORK - NSDCVIVACCINE_GEN_ALL_CORE_FT
influenza, injectable, quadrivalent, preservative free; 02-Apr-2017 09:07; Willig, Hadassah (RN); [employer]; [id number]; IntraMuscular; Deltoid Left.; 0.5 milliLiter(s); VIS (VIS Published: 07-Aug-2015, VIS Presented: 02-Apr-2017);   pneumococcal polysaccharide PPV23; 01-Apr-2017 11:51; Marylu Malin (AJIT); [employer] [id number]; IntraMuscular; Deltoid Left.; 0.5 milliLiter(s); VIS (VIS Published: 06-Oct-2009, VIS Presented: 01-Apr-2017);   pneumococcal polysaccharide PPV23; 01-Apr-2017 11:56; Marylu Malin (AJIT); [employer] [id number]; IntraMuscular; Deltoid Right.; 0.5 milliLiter(s); VIS (VIS Published: 06-Oct-2009, VIS Presented: 01-Apr-2017);

## 2025-01-31 NOTE — DIETITIAN NUTRITION RISK NOTIFICATION - TREATMENT: THE FOLLOWING DIET HAS BEEN RECOMMENDED
Diet, Pureed:   Consistent Carbohydrate {Evening Snack}  DASH/TLC {Sodium & Cholesterol Restricted}  Moderately Thick Liquids (MODTHICKLIQS) (01-28-25 @ 22:22) [Active]

## 2025-01-31 NOTE — PROGRESS NOTE ADULT - PROBLEM SELECTOR PLAN 3
- C/w home trelegy (or formulary equivalent)  - Not wheezing  - On chronic home O2 at 2-3 L
- C/w home trelegy (or formulary equivalent)  - Not wheezing  - On chronic home O2 at 2-3 L
- C/w home trelegy (or formulary equivalent)  - Not wheezing

## 2025-01-31 NOTE — DIETITIAN INITIAL EVALUATION ADULT - PROBLEM SELECTOR PLAN 4
- Home med Dimethyl fumarate 240mg BID. Attempted to call  to bring the med to the hospital. Phone was not picked up  - Primary team to follow in AM

## 2025-01-31 NOTE — PROGRESS NOTE ADULT - PROBLEM SELECTOR PLAN 6
- Iomotil at home possibly. Monitor bowel habits

## 2025-01-31 NOTE — PROGRESS NOTE ADULT - PROBLEM SELECTOR PLAN 7
- C/w home metoprolol, diltiazem and enalapril

## 2025-01-31 NOTE — DIETITIAN INITIAL EVALUATION ADULT - PROBLEM SELECTOR PLAN 1
- P/w glucose in 500s  - Her glargine was decreased from 8U to 2U when she was discharged yestserday from [facility]. Also, cannot be sure whether she received steroids for COPD in the setting of RSV infection  - S/p 2L NS bolus in ED  - S/p IV regular insulin 10U in the ED  - Will c/w Lantus 8U + Admelog 2U premeals + ISS moderate sliding scale  - Adjust insulin as needed

## 2025-01-31 NOTE — DISCHARGE NOTE PROVIDER - CARE PROVIDER_API CALL
Amparo Hawkins  Endocrinology/Metab/Diabetes  [address] [place] [address], [address]  [place] [address]  Phone: [phone]  Fax: [phone]  Follow Up Time: 2 weeks

## 2025-01-31 NOTE — DISCHARGE NOTE NURSING/CASE MANAGEMENT/SOCIAL WORK - FINANCIAL ASSISTANCE
[facility] provides services at a reduced cost to those who are determined to be eligible through [facility]’s financial assistance program. Information regarding [facility]’s financial assistance program can be found by going to [email or web address] or by calling [phone].

## 2025-01-31 NOTE — DISCHARGE NOTE NURSING/CASE MANAGEMENT/SOCIAL WORK - NSDCPEFALRISK_GEN_ALL_CORE
For information on Fall & Injury Prevention, visit: [email or web address] OR  [email or web address] OR  https://www.cdc.gov/steadi/patient.html

## 2025-01-31 NOTE — DISCHARGE NOTE PROVIDER - NSDCCPCAREPLAN_GEN_ALL_CORE_FT
PRINCIPAL DISCHARGE DIAGNOSIS  Diagnosis: Hyperglycemia  Assessment and Plan of Treatment: Your finger sticks improved on current regimen of Lantus 6 units at night without pre-meal insulin. It still fluctuates, though hyperglycemia is improving with minimal hypoglycemic episodes. You were also seen by an endocrinologist. You have brittle diabetes. We recommend you follow up with a diabetes doctor (endocrinologist). A name and number will be provided, but you can follow with your own      SECONDARY DISCHARGE DIAGNOSES  Diagnosis: Jugular vein thrombosis  Assessment and Plan of Treatment: Re-dosed your Lovenox to 40 mg twice per day. You weigh 42 kg so this is the more appropriate dosing. Please follow up with your PCP.    Diagnosis: Multiple sclerosis  Assessment and Plan of Treatment: Continue with home medications    Diagnosis: IBS (irritable bowel syndrome)  Assessment and Plan of Treatment: Continue with home medications    Diagnosis: COPD (chronic obstructive pulmonary disease)  Assessment and Plan of Treatment: Continue with home medications     PRINCIPAL DISCHARGE DIAGNOSIS  Diagnosis: Hyperglycemia  Assessment and Plan of Treatment: Your finger sticks improved on current regimen of Lantus 6 units at night without pre-meal insulin. It still fluctuates, though hyperglycemia is improving with minimal hypoglycemic episodes. You were also seen by an endocrinologist. You have brittle diabetes. We recommend you follow up with a diabetes doctor (endocrinologist). A name and number will be provided, but you can follow with your own. In addition given fluctuating diabetes we recommend a sliding scale of short acting insulin prior to meals.  Please check your glucose levels prior to each meal.  Please follow the scale below for short acting insulin before meals. Only administer if eating  Blood glucose 251-300 2 units  Blood glucose 301-350 4 units  Blood glucose 351-400 6 units  Blood glucose Greater than 400 8 units  IF any doubts please reach out to a medical professional. If not eating and FS are very elevated would seek medical attention      SECONDARY DISCHARGE DIAGNOSES  Diagnosis: Jugular vein thrombosis  Assessment and Plan of Treatment: Re-dosed your Lovenox to 40 mg twice per day. You weigh 42 kg so this is the more appropriate dosing. Please follow up with your PCP.    Diagnosis: Multiple sclerosis  Assessment and Plan of Treatment: Continue with home medications    Diagnosis: IBS (irritable bowel syndrome)  Assessment and Plan of Treatment: Continue with home medications    Diagnosis: COPD (chronic obstructive pulmonary disease)  Assessment and Plan of Treatment: Continue with home medications

## 2025-01-31 NOTE — DISCHARGE NOTE NURSING/CASE MANAGEMENT/SOCIAL WORK - PATIENT PORTAL LINK FT
You can access the FollowMyHealth Patient Portal offered by [facility] by registering at the following website: [email or web address]. By joining [facility]’s FollowMyHealth portal, you will also be able to view your health information using other applications (apps) compatible with our system.

## 2025-01-31 NOTE — PROGRESS NOTE ADULT - PROBLEM SELECTOR PLAN 8
- FD Lovenox     Carmelo [phone] (cell) --> best contact. Number listed on EMR is house number. The [phone] number is patient's cell

## 2025-01-31 NOTE — PROGRESS NOTE ADULT - PROBLEM SELECTOR PLAN 4
- Home med Dimethyl fumarate 240mg BID. Can continue if brought in

## 2025-01-31 NOTE — DIETITIAN INITIAL EVALUATION ADULT - PERTINENT LABORATORY DATA
01-30    132[L]  |  98  |  10  ----------------------------<  127[H]  3.9   |  31  |  0.64    Ca    8.1[L]      30 Jan 2025 05:30    POCT Blood Glucose.: 369 mg/dL (01-31-25); 01-30 77, 105, 234, 84  A1C with Estimated Average Glucose Result: 8.7 %, 203 (01-30-25)  A1C Result: 9.5 % (06-28-24)

## 2025-01-31 NOTE — DIETITIAN INITIAL EVALUATION ADULT - PROBLEM SELECTOR PLAN 7
- C/w home metoprolol, diltiazem and enalapril  - Will give additional dose of Enalapril 5mg tonight for uncontrolled HTN

## 2025-01-31 NOTE — DIETITIAN INITIAL EVALUATION ADULT - NS FNS DIET ORDER
Diet, Pureed:   Consistent Carbohydrate {Evening Snack}  DASH/TLC {Sodium & Cholesterol Restricted}  Moderately Thick Liquids (MODTHICKLIQS) (01-28-25 @ 22:22)

## 2025-01-31 NOTE — PROGRESS NOTE ADULT - PROBLEM SELECTOR PLAN 2
- RSV positive. Unsure of original positive date at the other hospital  - CXR with no obvious infiltrates  - Supportive care

## 2025-01-31 NOTE — PROGRESS NOTE ADULT - ASSESSMENT
diabetes 
diabetes 
69F with PMHx of lung cancer, COPD, MS, HTN, IDDM, jugular vein thrombosis, who comes in with elevated glucose. Admitted for uncontrolled DM with hyperglycemia.

## 2025-01-31 NOTE — PROGRESS NOTE ADULT - SUBJECTIVE AND OBJECTIVE BOX
Patient is a 69y old  Female who presents with a chief complaint of HYPERGLYCEMIA     (31 Jan 2025 13:19)      Interval History: finger sticks are stable but in high 200s   on Lantus 6 units and Lispro sliding scale coverage with meals   discharge planning is on     MEDICATIONS  (STANDING):  albuterol    90 MICROgram(s) HFA Inhaler 2 Puff(s) Inhalation every 6 hours  clonazePAM  Tablet 0.5 milliGRAM(s) Oral at bedtime  dextrose 50% Injectable 25 Gram(s) IV Push once  diltiazem    milliGRAM(s) Oral daily  Dimethyl Fumarate DR capsule 240 milliGRAM(s) 1 Capsule(s) Oral two times a day  diphenoxylate/atropine 1 Tablet(s) Oral three times a day  enalapril 5 milliGRAM(s) Oral at bedtime  enoxaparin Injectable 40 milliGRAM(s) SubCutaneous every 12 hours  famotidine    Tablet 20 milliGRAM(s) Oral daily  fluticasone propionate/ salmeterol 100-50 MICROgram(s) Diskus 1 Dose(s) Inhalation two times a day  glucagon  Injectable 1 milliGRAM(s) IntraMuscular once  influenza  Vaccine (HIGH DOSE) 0.5 milliLiter(s) IntraMuscular once  insulin glargine Injectable (LANTUS) 6 Unit(s) SubCutaneous at bedtime  insulin lispro (ADMELOG) corrective regimen sliding scale   SubCutaneous three times a day before meals  insulin lispro (ADMELOG) corrective regimen sliding scale   SubCutaneous at bedtime  metoprolol succinate ER 50 milliGRAM(s) Oral daily  pregabalin 200 milliGRAM(s) Oral at bedtime  tiotropium 2.5 MICROgram(s) Inhaler 2 Puff(s) Inhalation daily    MEDICATIONS  (PRN):  acetaminophen     Tablet .. 650 milliGRAM(s) Oral every 6 hours PRN Temp greater or equal to 38C (100.4F), Mild Pain (1 - 3)  melatonin 3 milliGRAM(s) Oral at bedtime PRN Insomnia  ondansetron Injectable 4 milliGRAM(s) IV Push every 6 hours PRN Nausea and/or Vomiting      Allergies    No Known Allergies    Intolerances        REVIEW OF SYSTEMS:      Vital Signs Last 24 Hrs  T(C): 36.8 (31 Jan 2025 11:09), Max: 37.6 (30 Jan 2025 23:50)  T(F): 98.2 (31 Jan 2025 11:09), Max: 99.6 (30 Jan 2025 23:50)  HR: 85 (31 Jan 2025 11:09) (82 - 98)  BP: 106/66 (31 Jan 2025 11:09) (106/66 - 121/62)  BP(mean): --  RR: 16 (31 Jan 2025 11:09) (14 - 16)  SpO2: 93% (31 Jan 2025 11:09) (93% - 97%)    Parameters below as of 31 Jan 2025 11:09  Patient On (Oxygen Delivery Method): nasal cannula  O2 Flow (L/min): 3      PHYSICAL EXAM:  GENERAL:   HEAD: Atraumatic, Normocephalic  EYES: PERRLA, conjunctiva and sclera clear  ENMT: No  exudates,; Moist mucous membranes,, No lesions      LABS:      Urinalysis Basic - ( 30 Jan 2025 05:30 )    Color: x / Appearance: x / SG: x / pH: x  Gluc: 127 mg/dL / Ketone: x  / Bili: x / Urobili: x   Blood: x / Protein: x / Nitrite: x   Leuk Esterase: x / RBC: x / WBC x   Sq Epi: x / Non Sq Epi: x / Bacteria: x      CAPILLARY BLOOD GLUCOSE      POCT Blood Glucose.: 369 mg/dL (31 Jan 2025 11:20)  POCT Blood Glucose.: 242 mg/dL (31 Jan 2025 07:53)  POCT Blood Glucose.: 84 mg/dL (30 Jan 2025 21:29)    Lipid panel:           Thyroid:  Diabetes Tests:  Parathyroid Panel:  Adrenals:  RADIOLOGY & ADDITIONAL TESTS:    Imaging Personally Reviewed:  [ ] YES  [ ] NO    Consultant(s) Notes Reviewed:  [ ] YES  [ ] NO    Care Discussed with Consultants/Other Providers [ ] YES  [ ] NO
Patient is a 69y old  Female who presents with a chief complaint of Uncontrolled DM with hyperglycemia (30 Jan 2025 04:11)      Interval History: Poor Nutrition   finger sticks are in low 100s , on Lantus 6 units and Lispro sliding scale coverage with meals     MEDICATIONS  (STANDING):  albuterol    90 MICROgram(s) HFA Inhaler 2 Puff(s) Inhalation every 6 hours  clonazePAM  Tablet 0.5 milliGRAM(s) Oral at bedtime  dextrose 50% Injectable 25 Gram(s) IV Push once  diltiazem    milliGRAM(s) Oral daily  Dimethyl Fumarate DR capsule 240 milliGRAM(s) 1 Capsule(s) Oral two times a day  diphenoxylate/atropine 1 Tablet(s) Oral three times a day  enalapril 5 milliGRAM(s) Oral at bedtime  enoxaparin Injectable 40 milliGRAM(s) SubCutaneous every 12 hours  famotidine    Tablet 20 milliGRAM(s) Oral daily  fluticasone propionate/ salmeterol 100-50 MICROgram(s) Diskus 1 Dose(s) Inhalation two times a day  glucagon  Injectable 1 milliGRAM(s) IntraMuscular once  guaiFENesin  milliGRAM(s) Oral every 12 hours  influenza  Vaccine (HIGH DOSE) 0.5 milliLiter(s) IntraMuscular once  insulin glargine Injectable (LANTUS) 6 Unit(s) SubCutaneous at bedtime  insulin lispro (ADMELOG) corrective regimen sliding scale   SubCutaneous three times a day before meals  insulin lispro (ADMELOG) corrective regimen sliding scale   SubCutaneous at bedtime  metoprolol succinate ER 50 milliGRAM(s) Oral daily  pregabalin 200 milliGRAM(s) Oral at bedtime  tiotropium 2.5 MICROgram(s) Inhaler 2 Puff(s) Inhalation daily    MEDICATIONS  (PRN):  acetaminophen     Tablet .. 650 milliGRAM(s) Oral every 6 hours PRN Temp greater or equal to 38C (100.4F), Mild Pain (1 - 3)  melatonin 3 milliGRAM(s) Oral at bedtime PRN Insomnia  ondansetron Injectable 4 milliGRAM(s) IV Push every 6 hours PRN Nausea and/or Vomiting      Allergies    No Known Allergies    Intolerances        REVIEW OF SYSTEMS:  CONSTITUTIONAL: no changes  EYES: No eye pain, visual disturbances, or discharge  ENMT:  No difficulty hearing, No sinus or throat pain  NECK: No pain or stiffness  RESPIRATORY: No cough, wheezing, chills or hemoptysis; No shortness of breath  CARDIOVASCULAR: No chest pain, palpitations or leg swelling  GASTROINTESTINAL: No abdominal or epigastric pain. No nausea, vomiting, or hematemesis; No diarrhea or constipation. No melena or hematochezia.  GENITOURINARY: No dysuria, frequency, hematuria, or incontinence  NEUROLOGICAL: No headaches, memory loss, loss of strength, numbness, or tremors  SKIN: No itching, burning, rashes, or lesions   ENDOCRINE: No heat or cold intolerance; No hair loss  MUSCULOSKELETAL: No joint pain or swelling; No muscle, back, or extremity pain  PSYCHIATRIC: No depression, anxiety, mood swings, or difficulty sleeping  HEME/LYMPH: No easy bruising, or bleeding gums  ALLERY AND IMMUNOLOGIC: No hives or eczema    Vital Signs Last 24 Hrs  T(C): 37.4 (30 Jan 2025 17:56), Max: 37.4 (30 Jan 2025 05:10)  T(F): 99.4 (30 Jan 2025 17:56), Max: 99.4 (30 Jan 2025 05:10)  HR: 92 (30 Jan 2025 17:56) (81 - 92)  BP: 140/75 (30 Jan 2025 17:56) (121/74 - 183/80)  BP(mean): --  RR: 15 (30 Jan 2025 17:56) (15 - 18)  SpO2: 98% (30 Jan 2025 17:56) (94% - 98%)    Parameters below as of 30 Jan 2025 16:49  Patient On (Oxygen Delivery Method): nasal cannula        PHYSICAL EXAM:  GENERAL:   HEAD: Atraumatic, Normocephalic  EYES: PERRLA, conjunctiva and sclera clear  ENMT: No  exudates,; Moist mucous membranes,, No lesions  NECK: Supple, No JVD, Normal thyroid  NERVOUS SYSTEM:  Alert & Oriented,   CHEST/LUNG: Clear to auscultation bilaterally; No rales, rhonchi, wheezing, or rubs  HEART: Regular rate and rhythm; No murmurs, rubs, or gallops  ABDOMEN: Soft, Nontender, Nondistended; Bowel sounds present  EXTREMITIES:  2+ Peripheral Pulses, no edema  SKIN: No rashes or lesions    LABS:      Urinalysis Basic - ( 30 Jan 2025 05:30 )    Color: x / Appearance: x / SG: x / pH: x  Gluc: 127 mg/dL / Ketone: x  / Bili: x / Urobili: x   Blood: x / Protein: x / Nitrite: x   Leuk Esterase: x / RBC: x / WBC x   Sq Epi: x / Non Sq Epi: x / Bacteria: x      CAPILLARY BLOOD GLUCOSE      POCT Blood Glucose.: 84 mg/dL (30 Jan 2025 21:29)  POCT Blood Glucose.: 234 mg/dL (30 Jan 2025 16:39)  POCT Blood Glucose.: 105 mg/dL (30 Jan 2025 11:46)  POCT Blood Glucose.: 77 mg/dL (30 Jan 2025 08:02)    Lipid panel:           Thyroid:  Diabetes Tests:  Parathyroid Panel:  Adrenals:  RADIOLOGY & ADDITIONAL TESTS:    Imaging Personally Reviewed:  [ ] YES  [ ] NO    Consultant(s) Notes Reviewed:  [ ] YES  [ ] NO    Care Discussed with Consultants/Other Providers [ ] YES  [ ] NO
Patient is a 69y old  Female who presents with a chief complaint of Uncontrolled DM with hyperglycemia (30 Jan 2025 04:11)      SUBJECTIVE / OVERNIGHT EVENTS: Patient seen and examined at bedside. No acute events overnight. Drowsy, but no complaints. FS quite labile.    MEDICATIONS  (STANDING):  albuterol    90 MICROgram(s) HFA Inhaler 2 Puff(s) Inhalation every 6 hours  clonazePAM  Tablet 0.5 milliGRAM(s) Oral at bedtime  dextrose 50% Injectable 25 Gram(s) IV Push once  diltiazem    milliGRAM(s) Oral daily  Dimethyl Fumarate DR capsule 240 milliGRAM(s) 1 Capsule(s) Oral two times a day  diphenoxylate/atropine 1 Tablet(s) Oral three times a day  enalapril 5 milliGRAM(s) Oral at bedtime  enoxaparin Injectable 40 milliGRAM(s) SubCutaneous every 12 hours  famotidine    Tablet 20 milliGRAM(s) Oral daily  fluticasone propionate/ salmeterol 100-50 MICROgram(s) Diskus 1 Dose(s) Inhalation two times a day  glucagon  Injectable 1 milliGRAM(s) IntraMuscular once  guaiFENesin  milliGRAM(s) Oral every 12 hours  influenza  Vaccine (HIGH DOSE) 0.5 milliLiter(s) IntraMuscular once  insulin glargine Injectable (LANTUS) 6 Unit(s) SubCutaneous at bedtime  insulin lispro (ADMELOG) corrective regimen sliding scale   SubCutaneous three times a day before meals  insulin lispro (ADMELOG) corrective regimen sliding scale   SubCutaneous at bedtime  metoprolol succinate ER 50 milliGRAM(s) Oral daily  pregabalin 200 milliGRAM(s) Oral at bedtime  tiotropium 2.5 MICROgram(s) Inhaler 2 Puff(s) Inhalation daily    MEDICATIONS  (PRN):  acetaminophen     Tablet .. 650 milliGRAM(s) Oral every 6 hours PRN Temp greater or equal to 38C (100.4F), Mild Pain (1 - 3)  melatonin 3 milliGRAM(s) Oral at bedtime PRN Insomnia  ondansetron Injectable 4 milliGRAM(s) IV Push every 6 hours PRN Nausea and/or Vomiting      CAPILLARY BLOOD GLUCOSE      POCT Blood Glucose.: 77 mg/dL (30 Jan 2025 08:02)  POCT Blood Glucose.: 299 mg/dL (29 Jan 2025 21:38)  POCT Blood Glucose.: 182 mg/dL (29 Jan 2025 17:49)  POCT Blood Glucose.: 53 mg/dL (29 Jan 2025 16:53)  POCT Blood Glucose.: 52 mg/dL (29 Jan 2025 16:49)  POCT Blood Glucose.: 141 mg/dL (29 Jan 2025 11:30)    I&O's Summary    29 Jan 2025 07:01  -  30 Jan 2025 07:00  --------------------------------------------------------  IN: 0 mL / OUT: 200 mL / NET: -200 mL        PHYSICAL EXAM:  Vital Signs Last 24 Hrs  T(C): 37.4 (30 Jan 2025 05:10), Max: 37.4 (30 Jan 2025 05:10)  T(F): 99.4 (30 Jan 2025 05:10), Max: 99.4 (30 Jan 2025 05:10)  HR: 83 (30 Jan 2025 05:10) (78 - 90)  BP: 126/80 (30 Jan 2025 05:10) (126/80 - 155/83)  BP(mean): --  RR: 16 (30 Jan 2025 05:10) (14 - 19)  SpO2: 98% (30 Jan 2025 05:10) (94% - 99%)    Parameters below as of 30 Jan 2025 05:10  Patient On (Oxygen Delivery Method): room air        GEN: female in NAD, appears comfortable, no diaphoresis  EYES: No scleral injection, EOMI  ENTM: neck supple & symmetric without tracheal deviation, moist membranes, no gross hearing impairment, thyroid gland not enlarged  CV: +S1/S2, no m/r/g, no abdominal bruit, no LE edema  RESP: breathing comfortably, no respiratory accessory muscle use, CTAB, no w/r/r  GI: normoactive BS, soft, NTND, no rebounding/guarding, no palpable masses    LABS:                        9.7    7.38  )-----------( 277      ( 30 Jan 2025 05:30 )             30.8     01-30    132[L]  |  98  |  10  ----------------------------<  127[H]  3.9   |  31  |  0.64    Ca    8.1[L]      30 Jan 2025 05:30  Phos  3.0     01-28  Mg     1.8     01-28    TPro  5.5[L]  /  Alb  1.2[L]  /  TBili  0.4  /  DBili  x   /  AST  7[L]  /  ALT  10[L]  /  AlkPhos  112  01-29          Urinalysis Basic - ( 30 Jan 2025 05:30 )    Color: x / Appearance: x / SG: x / pH: x  Gluc: 127 mg/dL / Ketone: x  / Bili: x / Urobili: x   Blood: x / Protein: x / Nitrite: x   Leuk Esterase: x / RBC: x / WBC x   Sq Epi: x / Non Sq Epi: x / Bacteria: x          RADIOLOGY & ADDITIONAL TESTS:  Results Reviewed:   Imaging Personally Reviewed:  Electrocardiogram Personally Reviewed:    COORDINATION OF CARE:  Care Discussed with Consultants/Other Providers [Y/N]:  Prior or Outpatient Records Reviewed [Y/N]:  
Patient is a 69y old  Female who presents with a chief complaint of Uncontrolled DM with hyperglycemia (31 Jan 2025 04:42)      SUBJECTIVE / OVERNIGHT EVENTS: Patient seen and examined at bedside. No acute events overnight. No complaints. Did okay with PT yesterday.    MEDICATIONS  (STANDING):  albuterol    90 MICROgram(s) HFA Inhaler 2 Puff(s) Inhalation every 6 hours  clonazePAM  Tablet 0.5 milliGRAM(s) Oral at bedtime  dextrose 50% Injectable 25 Gram(s) IV Push once  diltiazem    milliGRAM(s) Oral daily  Dimethyl Fumarate DR capsule 240 milliGRAM(s) 1 Capsule(s) Oral two times a day  diphenoxylate/atropine 1 Tablet(s) Oral three times a day  enalapril 5 milliGRAM(s) Oral at bedtime  enoxaparin Injectable 40 milliGRAM(s) SubCutaneous every 12 hours  famotidine    Tablet 20 milliGRAM(s) Oral daily  fluticasone propionate/ salmeterol 100-50 MICROgram(s) Diskus 1 Dose(s) Inhalation two times a day  glucagon  Injectable 1 milliGRAM(s) IntraMuscular once  influenza  Vaccine (HIGH DOSE) 0.5 milliLiter(s) IntraMuscular once  insulin glargine Injectable (LANTUS) 6 Unit(s) SubCutaneous at bedtime  insulin lispro (ADMELOG) corrective regimen sliding scale   SubCutaneous three times a day before meals  insulin lispro (ADMELOG) corrective regimen sliding scale   SubCutaneous at bedtime  metoprolol succinate ER 50 milliGRAM(s) Oral daily  pregabalin 200 milliGRAM(s) Oral at bedtime  tiotropium 2.5 MICROgram(s) Inhaler 2 Puff(s) Inhalation daily    MEDICATIONS  (PRN):  acetaminophen     Tablet .. 650 milliGRAM(s) Oral every 6 hours PRN Temp greater or equal to 38C (100.4F), Mild Pain (1 - 3)  melatonin 3 milliGRAM(s) Oral at bedtime PRN Insomnia  ondansetron Injectable 4 milliGRAM(s) IV Push every 6 hours PRN Nausea and/or Vomiting      CAPILLARY BLOOD GLUCOSE      POCT Blood Glucose.: 369 mg/dL (31 Jan 2025 11:20)  POCT Blood Glucose.: 242 mg/dL (31 Jan 2025 07:53)  POCT Blood Glucose.: 84 mg/dL (30 Jan 2025 21:29)  POCT Blood Glucose.: 234 mg/dL (30 Jan 2025 16:39)    I&O's Summary    30 Jan 2025 07:01  -  31 Jan 2025 07:00  --------------------------------------------------------  IN: 120 mL / OUT: 0 mL / NET: 120 mL        PHYSICAL EXAM:  Vital Signs Last 24 Hrs  T(C): 36.8 (31 Jan 2025 11:09), Max: 37.6 (30 Jan 2025 23:50)  T(F): 98.2 (31 Jan 2025 11:09), Max: 99.6 (30 Jan 2025 23:50)  HR: 85 (31 Jan 2025 11:09) (82 - 98)  BP: 106/66 (31 Jan 2025 11:09) (106/66 - 183/80)  BP(mean): --  RR: 16 (31 Jan 2025 11:09) (14 - 16)  SpO2: 93% (31 Jan 2025 11:09) (93% - 98%)    Parameters below as of 31 Jan 2025 11:09  Patient On (Oxygen Delivery Method): nasal cannula  O2 Flow (L/min): 3      GEN: female in NAD, appears comfortable, no diaphoresis  EYES: No scleral injection, EOMI  ENTM: neck supple & symmetric without tracheal deviation, moist membranes, no gross hearing impairment  CV: +S1/S2, no m/r/g, no abdominal bruit, no LE edema  RESP: breathing comfortably, no respiratory accessory muscle use, CTAB, no w/r/r  GI: normoactive BS, soft, NTND, no rebounding/guarding, no palpable masses    LABS:                        9.7    7.38  )-----------( 277      ( 30 Jan 2025 05:30 )             30.8     01-30    132[L]  |  98  |  10  ----------------------------<  127[H]  3.9   |  31  |  0.64    Ca    8.1[L]      30 Jan 2025 05:30            Urinalysis Basic - ( 30 Jan 2025 05:30 )    Color: x / Appearance: x / SG: x / pH: x  Gluc: 127 mg/dL / Ketone: x  / Bili: x / Urobili: x   Blood: x / Protein: x / Nitrite: x   Leuk Esterase: x / RBC: x / WBC x   Sq Epi: x / Non Sq Epi: x / Bacteria: x          RADIOLOGY & ADDITIONAL TESTS:  Results Reviewed:   Imaging Personally Reviewed:  Electrocardiogram Personally Reviewed:    COORDINATION OF CARE:  Care Discussed with Consultants/Other Providers [Y/N]:  Prior or Outpatient Records Reviewed [Y/N]:  
Patient is a 69y old  Female who presents with a chief complaint of Uncontrolled DM with hyperglycemia (29 Jan 2025 08:43)      SUBJECTIVE / OVERNIGHT EVENTS: Patient seen and examined at bedside. No acute events overnight. No dyspnea or cough.     MEDICATIONS  (STANDING):  albuterol    90 MICROgram(s) HFA Inhaler 2 Puff(s) Inhalation every 6 hours  clonazePAM  Tablet 0.5 milliGRAM(s) Oral at bedtime  dextrose 50% Injectable 25 Gram(s) IV Push once  diltiazem    milliGRAM(s) Oral daily  Dimethyl Fumarate DR capsule 240 milliGRAM(s) 1 Capsule(s) Oral two times a day  diphenoxylate/atropine 1 Tablet(s) Oral three times a day  enalapril 5 milliGRAM(s) Oral at bedtime  enoxaparin Injectable 40 milliGRAM(s) SubCutaneous every 12 hours  famotidine    Tablet 20 milliGRAM(s) Oral daily  fluticasone propionate/ salmeterol 100-50 MICROgram(s) Diskus 1 Dose(s) Inhalation two times a day  glucagon  Injectable 1 milliGRAM(s) IntraMuscular once  guaiFENesin  milliGRAM(s) Oral every 12 hours  influenza  Vaccine (HIGH DOSE) 0.5 milliLiter(s) IntraMuscular once  insulin glargine Injectable (LANTUS) 8 Unit(s) SubCutaneous at bedtime  insulin lispro (ADMELOG) corrective regimen sliding scale   SubCutaneous three times a day before meals  insulin lispro (ADMELOG) corrective regimen sliding scale   SubCutaneous at bedtime  insulin lispro Injectable (ADMELOG) 2 Unit(s) SubCutaneous three times a day before meals  metoprolol succinate ER 50 milliGRAM(s) Oral daily  pregabalin 200 milliGRAM(s) Oral at bedtime  tiotropium 2.5 MICROgram(s) Inhaler 2 Puff(s) Inhalation daily    MEDICATIONS  (PRN):  acetaminophen     Tablet .. 650 milliGRAM(s) Oral every 6 hours PRN Temp greater or equal to 38C (100.4F), Mild Pain (1 - 3)  aluminum hydroxide/magnesium hydroxide/simethicone Suspension 30 milliLiter(s) Oral every 4 hours PRN Dyspepsia  melatonin 3 milliGRAM(s) Oral at bedtime PRN Insomnia  ondansetron Injectable 4 milliGRAM(s) IV Push every 6 hours PRN Nausea and/or Vomiting      CAPILLARY BLOOD GLUCOSE      POCT Blood Glucose.: 141 mg/dL (29 Jan 2025 11:30)  POCT Blood Glucose.: 73 mg/dL (29 Jan 2025 07:13)  POCT Blood Glucose.: 324 mg/dL (28 Jan 2025 23:40)  POCT Blood Glucose.: 462 mg/dL (28 Jan 2025 20:47)  POCT Blood Glucose.: 495 mg/dL (28 Jan 2025 20:46)  POCT Blood Glucose.: 530 mg/dL (28 Jan 2025 19:21)  POCT Blood Glucose.: 459 mg/dL (28 Jan 2025 19:20)  POCT Blood Glucose.: 482 mg/dL (28 Jan 2025 18:25)  POCT Blood Glucose.: 526 mg/dL (28 Jan 2025 18:24)  POCT Blood Glucose.: 500 mg/dL (28 Jan 2025 16:52)  POCT Blood Glucose.: 530 mg/dL (28 Jan 2025 16:51)    I&O's Summary      PHYSICAL EXAM:  Vital Signs Last 24 Hrs  T(C): 36.8 (29 Jan 2025 11:08), Max: 36.9 (29 Jan 2025 00:08)  T(F): 98.2 (29 Jan 2025 11:08), Max: 98.4 (29 Jan 2025 00:08)  HR: 78 (29 Jan 2025 11:08) (77 - 108)  BP: 127/68 (29 Jan 2025 11:08) (127/68 - 184/98)  BP(mean): --  RR: 19 (29 Jan 2025 11:08) (17 - 19)  SpO2: 99% (29 Jan 2025 11:08) (94% - 99%)    Parameters below as of 29 Jan 2025 11:08  Patient On (Oxygen Delivery Method): nasal cannula        GEN: female in NAD, appears comfortable, no diaphoresis  EYES: No scleral injection, EOMI  ENTM: neck supple & symmetric without tracheal deviation, moist membranes, no gross hearing impairment, thyroid gland not enlarged  CV: +S1/S2, no m/r/g, no abdominal bruit, no LE edema  RESP: breathing comfortably, no respiratory accessory muscle use, CTAB, no w/r/r  GI: normoactive BS, soft, NTND, no rebounding/guarding, no palpable masses    LABS:                        10.4   5.20  )-----------( 285      ( 29 Jan 2025 07:38 )             32.9     01-29    136  |  101  |  11  ----------------------------<  69[L]  3.8   |  33[H]  |  0.60    Ca    8.3[L]      29 Jan 2025 07:38  Phos  3.0     01-28  Mg     1.8     01-28    TPro  5.5[L]  /  Alb  1.2[L]  /  TBili  0.4  /  DBili  x   /  AST  7[L]  /  ALT  10[L]  /  AlkPhos  112  01-29          Urinalysis Basic - ( 29 Jan 2025 07:38 )    Color: x / Appearance: x / SG: x / pH: x  Gluc: 69 mg/dL / Ketone: x  / Bili: x / Urobili: x   Blood: x / Protein: x / Nitrite: x   Leuk Esterase: x / RBC: x / WBC x   Sq Epi: x / Non Sq Epi: x / Bacteria: x          RADIOLOGY & ADDITIONAL TESTS:  Results Reviewed:   Imaging Personally Reviewed:  Electrocardiogram Personally Reviewed:    COORDINATION OF CARE:  Care Discussed with Consultants/Other Providers [Y/N]:  Prior or Outpatient Records Reviewed [Y/N]:

## 2025-01-31 NOTE — PROGRESS NOTE ADULT - PROBLEM SELECTOR PLAN 1
Continue with the current  regimen while inpatient   discharge planning is on   Patient can resume  home regimen upon discharge   while inpatient, finger sticks should be 100-180
Continue with the current  regimen while inpatient   Encourage more nutrition   brittle diabetes
- P/w glucose in 500s  - Her glargine was decreased from 8U to 2U when she was discharged from [facility] recently. Also, cannot be sure whether she received steroids for COPD in the setting of RSV infection  - Endocrine following  - Currently on Lantus 6 units qhs with insulin sliding scale   - Diabetic education  - A1c for risk stratification
- P/w glucose in 500s  - Her glargine was decreased from 8U to 2U when she was discharged from [facility] recently. Also, cannot be sure whether she received steroids for COPD in the setting of RSV infection  - Endocrine following  - Currently on Lantus 6 units qhs with insulin sliding scale   - Diabetic education
- P/w glucose in 500s  - Her glargine was decreased from 8U to 2U when she was discharged from [facility] recently. Also, cannot be sure whether she received steroids for COPD in the setting of RSV infection  - Will c/w Lantus 8U + Admelog 2U premeals + ISS moderate sliding scale  - Adjust insulin as needed  - Diabetic education  - A1c for risk stratification

## 2025-01-31 NOTE — PROGRESS NOTE ADULT - REASON FOR ADMISSION
Uncontrolled DM with hyperglycemia

## 2025-01-31 NOTE — DIETITIAN INITIAL EVALUATION ADULT - PERTINENT MEDS FT
Lovenox, Lantus, Admelog sliding scale, Cardizem CD, Lomotil, Zofran, Klonopin, Vasotec, Pepcid, Melatonin, Toprol XL, Lyrica, Dimethyl Fumarate

## 2025-01-31 NOTE — DISCHARGE NOTE PROVIDER - NSDCMRMEDTOKEN_GEN_ALL_CORE_FT
albuterol 90 mcg/inh inhalation aerosol: 2 puff(s) inhaled every 6 hours As needed Shortness of Breath and/or Wheezing  clonazePAM 0.5 mg oral tablet: 1 tab(s) orally once a day (at bedtime)  DilTIAZem (Eqv-Cardizem CD) 120 mg/24 hours oral capsule, extended release: 1 cap(s) orally once a day (at bedtime)  dimethyl fumarate 240 mg oral delayed release capsule: 1 cap(s) orally 2 times a day  diphenoxylate-atropine 2.5 mg-0.025 mg oral tablet: 2 tab(s) orally 3 times a day  enalapril 5 mg oral tablet: 1 tab(s) orally once a day (at bedtime)  enoxaparin: 40 milligram(s) subcutaneous 2 times a day  famotidine 20 mg oral tablet: 1 tab(s) orally once a day  insulin glargine 100 units/mL subcutaneous solution: 6 unit(s) subcutaneous once a day (at bedtime)  Metoprolol Succinate ER 50 mg oral tablet, extended release: 1 tab(s) orally once a day (at bedtime)  pregabalin 200 mg oral capsule: 1 cap(s) orally once a day (at bedtime)  Trelegy Ellipta 200 mcg-62.5 mcg-25 mcg/inh inhalation powder: 1 puff(s) inhaled once a day   albuterol 90 mcg/inh inhalation aerosol: 2 puff(s) inhaled every 6 hours As needed Shortness of Breath and/or Wheezing  clonazePAM 0.5 mg oral tablet: 1 tab(s) orally once a day (at bedtime)  DilTIAZem (Eqv-Cardizem CD) 120 mg/24 hours oral capsule, extended release: 1 cap(s) orally once a day (at bedtime)  dimethyl fumarate 240 mg oral delayed release capsule: 1 cap(s) orally 2 times a day  diphenoxylate-atropine 2.5 mg-0.025 mg oral tablet: 2 tab(s) orally 3 times a day  enalapril 5 mg oral tablet: 1 tab(s) orally once a day (at bedtime)  enoxaparin: 40 milligram(s) subcutaneous 2 times a day  famotidine 20 mg oral tablet: 1 tab(s) orally once a day  insulin glargine 100 units/mL subcutaneous solution: 6 unit(s) subcutaneous once a day (at bedtime)  Insulin Lispro KwikPen 100 units/mL injectable solution: injectable 3 times a day (before meals) Please follow the scale below for short acting insulin before meals. Only administer if eating    Blood glucose 251-300 2 units  Blood glucose 301-350 4 units  Blood glucose 351-400 6 units  Blood glucose Greater than 400 8 units  Metoprolol Succinate ER 50 mg oral tablet, extended release: 1 tab(s) orally once a day (at bedtime)  pregabalin 200 mg oral capsule: 1 cap(s) orally once a day (at bedtime)  Trelegy Ellipta 200 mcg-62.5 mcg-25 mcg/inh inhalation powder: 1 puff(s) inhaled once a day

## 2025-01-31 NOTE — PROGRESS NOTE ADULT - PROBLEM SELECTOR PLAN 5
- C/w home FD Lovenox. Prescribed 70mg BID, but patient is 42 kg here  - Continue with Lovenox 40 mg BID and discharge on this
- C/w home FD Lovenox. Prescribed 70mg, but pt is 40.8mg, Likely 1.5 mg/kg dosing  - Continue with BID dosing here
- C/w home FD Lovenox. Prescribed 70mg BID, but patient is 42 kg here  - Continue with Lovenox 40 mg BID and discharge on this

## 2025-01-31 NOTE — DIETITIAN INITIAL EVALUATION ADULT - OTHER INFO
Unable to interview pt due to confusion, lethargy. Per medical record pt lives c spouse who reports he is concerned c pt recent functional & metal decline; reports pt has been hospitalized numerous times over short period of time. Pt presented c elevated glucose, SOB & diarrhea; now resolved. Per MD note, he educated  on insulin regimen (sliding scale) & stressed importance of only taking fast-acting insulin if she eats in order to avoid hypoglycemia; endocrinology followed pt during admission & made recommendation for outpatient regimen; pt to f/y c outpatient endocrinologist.  No reports of N/V/C; diarrhea resolved.

## 2025-01-31 NOTE — DISCHARGE NOTE PROVIDER - HOSPITAL COURSE
HPI:  Patient is a 69F, with PMHx of lung cancer, COPD, MS, HTN, IDDM, jugular vein thrombosis, who comes in with elevated glucose. She said her  sent her in for elevated glucose. She was admitted to [facility] from 1/13~1/27 for pneumonia and RSV infection. She denies getting any steroids during her stay. Her glargine was decreased from 8U to 2U. She was discharged yesterday, and her sugar was high so her  sent her to the ED. She endorses shortness of breath. She also endorses diarrhea for the last few days. Patient denies headache, fever, chills, nausea, vomit, chest pain, lightheadedness, abdominal pain, dark/bloody stool, dysuria, hematuria. (28 Jan 2025 22:40)    Hospital Course:  69F with PMHx of lung cancer, COPD, MS, HTN, IDDM, jugular vein thrombosis, who comes in with elevated glucose. Admitted for uncontrolled DM with hyperglycemia. Likely due recently decreased Lantus when she was at [facility]. Patient started on Lantus 6 units at night and seen by endocrine. Though blood glucose still fluctuated, her hyperglycemia episodes have improved with no major hypoglycemic episodes on this regimen. Patient to be discharged on this regimen and will be referred to an endocrinologist as outpatient. We will also re-dose her Lovenox to a more appropriate dosing given her age.

## 2025-01-31 NOTE — CHART NOTE - NSCHARTNOTEFT_GEN_A_CORE
Discussed with  Carmelo who seems overwhelmed and anxious about patient being discharged. I explained to him that she has brittle diabetes and attempts to aggressively control blood glucose may have resultant hypoglycemia. He states she takes both long acting and short acting and that she administers it herself. She follows with Dr. Goldberg of [facility]. Explained to him FS have fluctuated, but are within an acceptable range. The longer she stays admitted then the less she will benefit. She used to use a continuous glucose monitor, though he thinks it wasn't accurate so now he checks her FS manually. Thus, I recommended a sliding scale prior to meals (only take the short acting if she's going to eat) with close outpatient follow up.    The scale I will recommend:    251-300 2 units  301-350 4 units  351-400 6 units  Greater than 400 8 units    She can follow up with the endocrinologist listed on discharge or her own. Patient's  understands the reasoning, but still feels apprehensive. NO further inpatient management. Patient is not a new diabetic.
 Carmelo updated (best phone number/contact: [phone]).    He is concerned that she is lethargic and not completely oriented. I explained likely metabolic in setting of recent infection and fluctuating blood glucose levels. Doesn't seem like brain imaging would be high yield. May be some superimposed delirium. He states she has been to [place] 20 times in a short span. Also concerned about her functional incontinence, but explained to him that if it's behavioral it will be hard for us to treat medically. He is in agreement for PT consult, possible rehab if indicated.     He understands that the longer she is here the worse she may become cognitively.

## 2025-02-11 DIAGNOSIS — E10.65 TYPE 1 DIABETES MELLITUS WITH HYPERGLYCEMIA: ICD-10-CM

## 2025-02-11 DIAGNOSIS — Z11.52 ENCOUNTER FOR SCREENING FOR COVID-19: ICD-10-CM

## 2025-02-11 DIAGNOSIS — I10 ESSENTIAL (PRIMARY) HYPERTENSION: ICD-10-CM

## 2025-02-11 DIAGNOSIS — Z85.118 PERSONAL HISTORY OF OTHER MALIGNANT NEOPLASM OF BRONCHUS AND LUNG: ICD-10-CM

## 2025-02-11 DIAGNOSIS — I48.91 UNSPECIFIED ATRIAL FIBRILLATION: ICD-10-CM

## 2025-02-11 DIAGNOSIS — Z87.891 PERSONAL HISTORY OF NICOTINE DEPENDENCE: ICD-10-CM

## 2025-02-11 DIAGNOSIS — Z86.718 PERSONAL HISTORY OF OTHER VENOUS THROMBOSIS AND EMBOLISM: ICD-10-CM

## 2025-02-11 DIAGNOSIS — K58.9 IRRITABLE BOWEL SYNDROME, UNSPECIFIED: ICD-10-CM

## 2025-02-11 DIAGNOSIS — E43 UNSPECIFIED SEVERE PROTEIN-CALORIE MALNUTRITION: ICD-10-CM

## 2025-02-11 DIAGNOSIS — J44.9 CHRONIC OBSTRUCTIVE PULMONARY DISEASE, UNSPECIFIED: ICD-10-CM

## 2025-02-11 DIAGNOSIS — Z79.51 LONG TERM (CURRENT) USE OF INHALED STEROIDS: ICD-10-CM

## 2025-02-11 DIAGNOSIS — B97.4 RESPIRATORY SYNCYTIAL VIRUS AS THE CAUSE OF DISEASES CLASSIFIED ELSEWHERE: ICD-10-CM

## 2025-02-11 DIAGNOSIS — G35 MULTIPLE SCLEROSIS: ICD-10-CM

## 2025-02-11 DIAGNOSIS — J06.9 ACUTE UPPER RESPIRATORY INFECTION, UNSPECIFIED: ICD-10-CM

## 2025-02-11 DIAGNOSIS — Z79.4 LONG TERM (CURRENT) USE OF INSULIN: ICD-10-CM

## 2025-05-22 NOTE — PATIENT PROFILE ADULT - PATIENT REPRESENTATIVE: ( YOU CAN CHOOSE ANY PERSON THAT CAN ASSIST YOU WITH YOUR HEALTH CARE PREFERENCES, DOES NOT HAVE TO BE A SPOUSE, IMMEDIATE FAMILY OR SIGNIFICANT OTHER/PARTNER)
Reminder: Please contact the [facility] at [phone]  when you have medication changes. Examples, new medications, antibiotics, discontinued medications, new supplements, missed doses of warfarin or if you took extra doses of warfarin.  This also includes OTC medications. Notifying us helps reduce the possibility of high and low INR's. In addition, if warfarin needs to be held for any procedures, please have surgeon or physician's office contact us before holding anticoagulant. Thanks, [facility].     
same name as above
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
## (undated) DEVICE — DRSG STERISTRIPS 0.5 X 4"

## (undated) DEVICE — STRYKER IVAS CURETTE 11G

## (undated) DEVICE — DRSG CURITY GAUZE SPONGE 4 X 4" 12-PLY

## (undated) DEVICE — STRYKER IVAS BONE BIOPSY KIT 11G

## (undated) DEVICE — KIT AVAFLEX CURVED NEEDLE W/ CANNULA

## (undated) DEVICE — GLV 7.5 PROTEXIS (WHITE)

## (undated) DEVICE — PACK MINOR WITH LAP

## (undated) DEVICE — VENODYNE/SCD SLEEVE CALF MEDIUM

## (undated) DEVICE — GLV 8 PROTEXIS (BLUE)

## (undated) DEVICE — SUT MONOCRYL 4-0 27" PS-2 UNDYED

## (undated) DEVICE — DRSG TEGADERM 4X4.75"

## (undated) DEVICE — DRAPE TOWEL BLUE 17" X 24"

## (undated) DEVICE — DRILL BIT STRYKER ORTHO 4.2 X 340MM

## (undated) DEVICE — NDL HYPO SAFE 25G X 1.5" (ORANGE)

## (undated) DEVICE — DRAPE SURGICAL #1010

## (undated) DEVICE — DRAPE C ARM 41X140"

## (undated) DEVICE — WARMING BLANKET LOWER ADULT

## (undated) DEVICE — DRSG AQUACEL 3.5 X 10"

## (undated) DEVICE — DRAPE 3/4 SHEET W REINFORCEMENT 56X77"

## (undated) DEVICE — DRSG MASTISOL